# Patient Record
Sex: MALE | Race: WHITE | NOT HISPANIC OR LATINO | Employment: UNEMPLOYED | URBAN - METROPOLITAN AREA
[De-identification: names, ages, dates, MRNs, and addresses within clinical notes are randomized per-mention and may not be internally consistent; named-entity substitution may affect disease eponyms.]

---

## 2018-01-12 ENCOUNTER — RESOLUTE PROFESSIONAL BILLING HOSPITAL PROF FEE (OUTPATIENT)
Dept: HOSPITALIST | Facility: MEDICAL CENTER | Age: 63
End: 2018-01-12
Payer: COMMERCIAL

## 2018-01-12 ENCOUNTER — HOSPITAL ENCOUNTER (INPATIENT)
Facility: MEDICAL CENTER | Age: 63
LOS: 3 days | DRG: 562 | End: 2018-01-15
Attending: EMERGENCY MEDICINE | Admitting: HOSPITALIST
Payer: COMMERCIAL

## 2018-01-12 ENCOUNTER — APPOINTMENT (OUTPATIENT)
Dept: RADIOLOGY | Facility: MEDICAL CENTER | Age: 63
DRG: 562 | End: 2018-01-12
Attending: EMERGENCY MEDICINE
Payer: COMMERCIAL

## 2018-01-12 DIAGNOSIS — L97.509 DIABETIC ULCER OF OTHER PART OF FOOT ASSOCIATED WITH TYPE 2 DIABETES MELLITUS, UNSPECIFIED LATERALITY, UNSPECIFIED ULCER STAGE (HCC): ICD-10-CM

## 2018-01-12 DIAGNOSIS — E11.621 DIABETIC ULCER OF OTHER PART OF FOOT ASSOCIATED WITH TYPE 2 DIABETES MELLITUS, UNSPECIFIED LATERALITY, UNSPECIFIED ULCER STAGE (HCC): ICD-10-CM

## 2018-01-12 DIAGNOSIS — J96.01 ACUTE RESPIRATORY FAILURE WITH HYPOXEMIA (HCC): ICD-10-CM

## 2018-01-12 DIAGNOSIS — S42.225A CLOSED 2-PART NONDISPLACED FRACTURE OF SURGICAL NECK OF LEFT HUMERUS, INITIAL ENCOUNTER: ICD-10-CM

## 2018-01-12 DIAGNOSIS — F11.20 METHADONE DEPENDENCE (HCC): ICD-10-CM

## 2018-01-12 PROBLEM — S42.215A: Status: ACTIVE | Noted: 2018-01-12

## 2018-01-12 PROBLEM — R11.2 NAUSEA & VOMITING: Status: ACTIVE | Noted: 2018-01-12

## 2018-01-12 LAB
ALBUMIN SERPL BCP-MCNC: 3.3 G/DL (ref 3.2–4.9)
ALBUMIN/GLOB SERPL: 0.8 G/DL
ALP SERPL-CCNC: 60 U/L (ref 30–99)
ALT SERPL-CCNC: 10 U/L (ref 2–50)
ANION GAP SERPL CALC-SCNC: 10 MMOL/L (ref 0–11.9)
APTT PPP: 28.2 SEC (ref 24.7–36)
AST SERPL-CCNC: 19 U/L (ref 12–45)
BASOPHILS # BLD AUTO: 0.4 % (ref 0–1.8)
BASOPHILS # BLD: 0.05 K/UL (ref 0–0.12)
BILIRUB SERPL-MCNC: 0.8 MG/DL (ref 0.1–1.5)
BNP SERPL-MCNC: 162 PG/ML (ref 0–100)
BUN SERPL-MCNC: 13 MG/DL (ref 8–22)
CALCIUM SERPL-MCNC: 8.6 MG/DL (ref 8.5–10.5)
CHLORIDE SERPL-SCNC: 96 MMOL/L (ref 96–112)
CO2 SERPL-SCNC: 28 MMOL/L (ref 20–33)
CREAT SERPL-MCNC: 0.78 MG/DL (ref 0.5–1.4)
EOSINOPHIL # BLD AUTO: 0.04 K/UL (ref 0–0.51)
EOSINOPHIL NFR BLD: 0.3 % (ref 0–6.9)
ERYTHROCYTE [DISTWIDTH] IN BLOOD BY AUTOMATED COUNT: 45.4 FL (ref 35.9–50)
GLOBULIN SER CALC-MCNC: 4.2 G/DL (ref 1.9–3.5)
GLUCOSE BLD-MCNC: 259 MG/DL (ref 65–99)
GLUCOSE SERPL-MCNC: 238 MG/DL (ref 65–99)
HCT VFR BLD AUTO: 45.7 % (ref 42–52)
HGB BLD-MCNC: 15.3 G/DL (ref 14–18)
IMM GRANULOCYTES # BLD AUTO: 0.04 K/UL (ref 0–0.11)
IMM GRANULOCYTES NFR BLD AUTO: 0.3 % (ref 0–0.9)
INR PPP: 1.06 (ref 0.87–1.13)
LYMPHOCYTES # BLD AUTO: 1.88 K/UL (ref 1–4.8)
LYMPHOCYTES NFR BLD: 15.6 % (ref 22–41)
MCH RBC QN AUTO: 29.1 PG (ref 27–33)
MCHC RBC AUTO-ENTMCNC: 33.5 G/DL (ref 33.7–35.3)
MCV RBC AUTO: 87 FL (ref 81.4–97.8)
MONOCYTES # BLD AUTO: 0.87 K/UL (ref 0–0.85)
MONOCYTES NFR BLD AUTO: 7.2 % (ref 0–13.4)
NEUTROPHILS # BLD AUTO: 9.19 K/UL (ref 1.82–7.42)
NEUTROPHILS NFR BLD: 76.2 % (ref 44–72)
NRBC # BLD AUTO: 0 K/UL
NRBC BLD-RTO: 0 /100 WBC
PLATELET # BLD AUTO: 288 K/UL (ref 164–446)
PMV BLD AUTO: 9.9 FL (ref 9–12.9)
POTASSIUM SERPL-SCNC: 3.7 MMOL/L (ref 3.6–5.5)
PROT SERPL-MCNC: 7.5 G/DL (ref 6–8.2)
PROTHROMBIN TIME: 13.5 SEC (ref 12–14.6)
RBC # BLD AUTO: 5.25 M/UL (ref 4.7–6.1)
SODIUM SERPL-SCNC: 134 MMOL/L (ref 135–145)
TROPONIN I SERPL-MCNC: 0.01 NG/ML (ref 0–0.04)
WBC # BLD AUTO: 12.1 K/UL (ref 4.8–10.8)

## 2018-01-12 PROCEDURE — 85730 THROMBOPLASTIN TIME PARTIAL: CPT

## 2018-01-12 PROCEDURE — 73030 X-RAY EXAM OF SHOULDER: CPT | Mod: LT

## 2018-01-12 PROCEDURE — 700111 HCHG RX REV CODE 636 W/ 250 OVERRIDE (IP): Performed by: HOSPITALIST

## 2018-01-12 PROCEDURE — 71046 X-RAY EXAM CHEST 2 VIEWS: CPT

## 2018-01-12 PROCEDURE — 80053 COMPREHEN METABOLIC PANEL: CPT

## 2018-01-12 PROCEDURE — 94760 N-INVAS EAR/PLS OXIMETRY 1: CPT

## 2018-01-12 PROCEDURE — 82962 GLUCOSE BLOOD TEST: CPT

## 2018-01-12 PROCEDURE — 84484 ASSAY OF TROPONIN QUANT: CPT

## 2018-01-12 PROCEDURE — 96376 TX/PRO/DX INJ SAME DRUG ADON: CPT

## 2018-01-12 PROCEDURE — 93005 ELECTROCARDIOGRAM TRACING: CPT | Performed by: EMERGENCY MEDICINE

## 2018-01-12 PROCEDURE — 83880 ASSAY OF NATRIURETIC PEPTIDE: CPT

## 2018-01-12 PROCEDURE — 304561 HCHG STAT O2

## 2018-01-12 PROCEDURE — 96375 TX/PRO/DX INJ NEW DRUG ADDON: CPT

## 2018-01-12 PROCEDURE — 99285 EMERGENCY DEPT VISIT HI MDM: CPT

## 2018-01-12 PROCEDURE — 770006 HCHG ROOM/CARE - MED/SURG/GYN SEMI*

## 2018-01-12 PROCEDURE — 85610 PROTHROMBIN TIME: CPT

## 2018-01-12 PROCEDURE — A9270 NON-COVERED ITEM OR SERVICE: HCPCS | Performed by: HOSPITALIST

## 2018-01-12 PROCEDURE — 700102 HCHG RX REV CODE 250 W/ 637 OVERRIDE(OP): Performed by: HOSPITALIST

## 2018-01-12 PROCEDURE — 85025 COMPLETE CBC W/AUTO DIFF WBC: CPT

## 2018-01-12 PROCEDURE — 700111 HCHG RX REV CODE 636 W/ 250 OVERRIDE (IP): Performed by: EMERGENCY MEDICINE

## 2018-01-12 PROCEDURE — 700105 HCHG RX REV CODE 258: Performed by: EMERGENCY MEDICINE

## 2018-01-12 PROCEDURE — 73060 X-RAY EXAM OF HUMERUS: CPT | Mod: LT

## 2018-01-12 PROCEDURE — 99223 1ST HOSP IP/OBS HIGH 75: CPT | Performed by: HOSPITALIST

## 2018-01-12 PROCEDURE — 96374 THER/PROPH/DIAG INJ IV PUSH: CPT

## 2018-01-12 RX ORDER — ONDANSETRON 2 MG/ML
0.1 INJECTION INTRAMUSCULAR; INTRAVENOUS ONCE
Status: DISCONTINUED | OUTPATIENT
Start: 2018-01-12 | End: 2018-01-12

## 2018-01-12 RX ORDER — PROMETHAZINE HYDROCHLORIDE 12.5 MG/1
12.5-25 SUPPOSITORY RECTAL EVERY 4 HOURS PRN
Status: DISCONTINUED | OUTPATIENT
Start: 2018-01-12 | End: 2018-01-16 | Stop reason: HOSPADM

## 2018-01-12 RX ORDER — ONDANSETRON 2 MG/ML
4 INJECTION INTRAMUSCULAR; INTRAVENOUS EVERY 4 HOURS PRN
Status: DISCONTINUED | OUTPATIENT
Start: 2018-01-12 | End: 2018-01-16 | Stop reason: HOSPADM

## 2018-01-12 RX ORDER — ONDANSETRON 4 MG/1
4 TABLET, ORALLY DISINTEGRATING ORAL EVERY 4 HOURS PRN
Status: DISCONTINUED | OUTPATIENT
Start: 2018-01-12 | End: 2018-01-16 | Stop reason: HOSPADM

## 2018-01-12 RX ORDER — AMOXICILLIN 250 MG
2 CAPSULE ORAL 2 TIMES DAILY
Status: DISCONTINUED | OUTPATIENT
Start: 2018-01-13 | End: 2018-01-16 | Stop reason: HOSPADM

## 2018-01-12 RX ORDER — METHADONE HYDROCHLORIDE 10 MG/1
35 TABLET ORAL EVERY 12 HOURS
Status: DISCONTINUED | OUTPATIENT
Start: 2018-01-12 | End: 2018-01-16 | Stop reason: HOSPADM

## 2018-01-12 RX ORDER — DEXTROSE MONOHYDRATE 25 G/50ML
25 INJECTION, SOLUTION INTRAVENOUS
Status: DISCONTINUED | OUTPATIENT
Start: 2018-01-12 | End: 2018-01-16 | Stop reason: HOSPADM

## 2018-01-12 RX ORDER — INSULIN GLARGINE 100 [IU]/ML
20 INJECTION, SOLUTION SUBCUTANEOUS
Status: DISCONTINUED | OUTPATIENT
Start: 2018-01-12 | End: 2018-01-16 | Stop reason: HOSPADM

## 2018-01-12 RX ORDER — ONDANSETRON 2 MG/ML
4 INJECTION INTRAMUSCULAR; INTRAVENOUS ONCE
Status: COMPLETED | OUTPATIENT
Start: 2018-01-12 | End: 2018-01-12

## 2018-01-12 RX ORDER — OXYCODONE HYDROCHLORIDE 5 MG/1
5-10 TABLET ORAL EVERY 4 HOURS PRN
COMMUNITY
End: 2018-01-12

## 2018-01-12 RX ORDER — ONDANSETRON 4 MG/1
4 TABLET, FILM COATED ORAL EVERY 4 HOURS PRN
COMMUNITY
End: 2018-01-25

## 2018-01-12 RX ORDER — ONDANSETRON 4 MG/1
4 TABLET, FILM COATED ORAL EVERY 4 HOURS PRN
Qty: 20 TAB | Refills: 0 | Status: SHIPPED | OUTPATIENT
Start: 2018-01-12 | End: 2018-01-25

## 2018-01-12 RX ORDER — OXYCODONE HYDROCHLORIDE AND ACETAMINOPHEN 5; 325 MG/1; MG/1
1-2 TABLET ORAL EVERY 4 HOURS PRN
Qty: 15 TAB | Refills: 0 | Status: SHIPPED | OUTPATIENT
Start: 2018-01-12 | End: 2018-01-17

## 2018-01-12 RX ORDER — BISACODYL 10 MG
10 SUPPOSITORY, RECTAL RECTAL
Status: DISCONTINUED | OUTPATIENT
Start: 2018-01-12 | End: 2018-01-16 | Stop reason: HOSPADM

## 2018-01-12 RX ORDER — HEPARIN SODIUM 5000 [USP'U]/ML
5000 INJECTION, SOLUTION INTRAVENOUS; SUBCUTANEOUS EVERY 8 HOURS
Status: DISCONTINUED | OUTPATIENT
Start: 2018-01-13 | End: 2018-01-13

## 2018-01-12 RX ORDER — MORPHINE SULFATE 10 MG/ML
10 INJECTION, SOLUTION INTRAMUSCULAR; INTRAVENOUS ONCE
Status: COMPLETED | OUTPATIENT
Start: 2018-01-12 | End: 2018-01-12

## 2018-01-12 RX ORDER — SODIUM CHLORIDE 9 MG/ML
1000 INJECTION, SOLUTION INTRAVENOUS ONCE
Status: COMPLETED | OUTPATIENT
Start: 2018-01-12 | End: 2018-01-12

## 2018-01-12 RX ORDER — SODIUM CHLORIDE 9 MG/ML
INJECTION, SOLUTION INTRAVENOUS CONTINUOUS
Status: DISCONTINUED | OUTPATIENT
Start: 2018-01-12 | End: 2018-01-13

## 2018-01-12 RX ORDER — ACETAMINOPHEN 325 MG/1
650 TABLET ORAL EVERY 6 HOURS PRN
Status: DISCONTINUED | OUTPATIENT
Start: 2018-01-12 | End: 2018-01-16 | Stop reason: HOSPADM

## 2018-01-12 RX ORDER — OMEPRAZOLE 20 MG/1
20 CAPSULE, DELAYED RELEASE ORAL DAILY
Status: DISCONTINUED | OUTPATIENT
Start: 2018-01-13 | End: 2018-01-16 | Stop reason: HOSPADM

## 2018-01-12 RX ORDER — METHADONE HYDROCHLORIDE 10 MG/1
35 TABLET ORAL EVERY 12 HOURS
Status: DISCONTINUED | OUTPATIENT
Start: 2018-01-13 | End: 2018-01-12

## 2018-01-12 RX ORDER — PROMETHAZINE HYDROCHLORIDE 25 MG/1
12.5-25 TABLET ORAL EVERY 4 HOURS PRN
Status: DISCONTINUED | OUTPATIENT
Start: 2018-01-12 | End: 2018-01-16 | Stop reason: HOSPADM

## 2018-01-12 RX ORDER — MORPHINE SULFATE 4 MG/ML
4 INJECTION, SOLUTION INTRAMUSCULAR; INTRAVENOUS
Status: DISCONTINUED | OUTPATIENT
Start: 2018-01-12 | End: 2018-01-13

## 2018-01-12 RX ORDER — POLYETHYLENE GLYCOL 3350 17 G/17G
1 POWDER, FOR SOLUTION ORAL
Status: DISCONTINUED | OUTPATIENT
Start: 2018-01-12 | End: 2018-01-16 | Stop reason: HOSPADM

## 2018-01-12 RX ORDER — OXYCODONE HYDROCHLORIDE 5 MG/1
5 TABLET ORAL
Status: DISCONTINUED | OUTPATIENT
Start: 2018-01-12 | End: 2018-01-16 | Stop reason: HOSPADM

## 2018-01-12 RX ORDER — OXYCODONE HYDROCHLORIDE 10 MG/1
10 TABLET ORAL
Status: DISCONTINUED | OUTPATIENT
Start: 2018-01-12 | End: 2018-01-16 | Stop reason: HOSPADM

## 2018-01-12 RX ORDER — PREGABALIN 150 MG/1
300 CAPSULE ORAL 2 TIMES DAILY
Status: DISCONTINUED | OUTPATIENT
Start: 2018-01-13 | End: 2018-01-16 | Stop reason: HOSPADM

## 2018-01-12 RX ADMIN — SODIUM CHLORIDE 1000 ML: 9 INJECTION, SOLUTION INTRAVENOUS at 15:45

## 2018-01-12 RX ADMIN — ONDANSETRON 4 MG: 2 INJECTION INTRAMUSCULAR; INTRAVENOUS at 15:25

## 2018-01-12 RX ADMIN — MORPHINE SULFATE 4 MG: 4 INJECTION INTRAVENOUS at 22:01

## 2018-01-12 RX ADMIN — ONDANSETRON 4 MG: 2 INJECTION INTRAMUSCULAR; INTRAVENOUS at 17:09

## 2018-01-12 RX ADMIN — MORPHINE SULFATE 10 MG: 10 INJECTION INTRAVENOUS at 15:25

## 2018-01-12 RX ADMIN — OXYCODONE HYDROCHLORIDE 10 MG: 10 TABLET ORAL at 23:09

## 2018-01-12 RX ADMIN — MORPHINE SULFATE 10 MG: 10 INJECTION INTRAVENOUS at 17:25

## 2018-01-12 RX ADMIN — ONDANSETRON 4 MG: 2 INJECTION INTRAMUSCULAR; INTRAVENOUS at 22:01

## 2018-01-12 ASSESSMENT — PAIN SCALES - GENERAL: PAINLEVEL_OUTOF10: 10

## 2018-01-12 NOTE — LETTER
ORTHOPEDICS [facility] 3RD   [address] [place] [id number]  Phone: Dept: [phone] - Fax:        [facility] Progress Report and Disability Certification  Date of Service: 1/12/2018   No Show:  No  Date / Time of Next Visit:     Claim Information   Patient Name: Travon Pollack  Claim Number:     Employer: [employer]  Date of Injury: 12/16/2002     Insurer / TPA: State Compensation Ins Fund ID / SSN:    Occupation: [employer] Diagnosis: The encounter diagnosis was Closed 2-part nondisplaced fracture of surgical neck of left humerus, initial encounter.    Medical Information   Related to Industrial Injury?   ***   Subjective Complaints:      Objective Findings:     Pre-Existing Condition(s):     Assessment:        Status:    Permanent Disability:     Plan:      Diagnostics:      Comments:       Disability Information   Status:      From:     Through:   Restrictions are:     Physical Restrictions   Sitting:    Standing:    Stooping:    Bending:      Squatting:    Walking:    Climbing:    Pushing:      Pulling:    Other:    Reaching Above Shoulder (L):   Reaching Above Shoulder (R):       Reaching Below Shoulder (L):    Reaching Below Shoulder (R):      Not to exceed Weight Limits   Carrying(hrs):   Weight Limit(lb):   Lifting(hrs):   Weight  Limit(lb):     Comments:      Repetitive Actions   Hands: i.e. Fine Manipulations from Grasping:     Feet: i.e. Operating Foot Controls:     Driving / Operate Machinery:     Physician Name: Nolan Rasmussen Physician Signature:   e-Signature:  , Medical Director   Clinic Name / Location: [facility]  ORTHOPEDICS [address] [id number] [phone]     Clinic Phone Number: Dept: [phone]   Appointment Time:  Visit Start Time:    Check-In Time:  1:22 PM Visit Discharge Time:    Original-Treating Physician or Chiropractor    Page 2-Insurer/TPA    Page  3-Employer    Page 4-Employee

## 2018-01-12 NOTE — ED NOTES
Chief Complaint   Patient presents with   • Vomiting     pt BIB daughter to triage/ pt reports to have been transferred by flight to [facility] for care of left shoulder pain/poss surgery. pt now with vomiting since last night and not able to hold pain meds down. left shoulder warm to touch/edema to left forearm   • Shoulder Pain     pt reports to be diabetic and has not been able to check BG/ 259 in triage     Explained to pt triage process, made pt aware to tell this RN of any changes/concerns, pt verbalized understanding of process and instructions given. Pt to ER lobby.

## 2018-01-12 NOTE — ED PROVIDER NOTES
ED Provider Note    Scribed for Nolan Rasmussen M.D. by Alyssia Bhatti. 1/12/2018  3:07 PM    Primary care provider: Jon Turner M.D.  Means of arrival: walk-in  History obtained from: patient  History limited by: none    CHIEF COMPLAINT  Chief Complaint   Patient presents with   • Vomiting     pt BIB daughter to triage/ pt reports to have been transferred by flight to [facility] for care of left shoulder pain/poss surgery. pt now with vomiting since last night and not able to hold pain meds down. left shoulder warm to touch/edema to left forearm   • Shoulder Pain     pt reports to be diabetic and has not been able to check BG/ 259 in triage       HPI  Travon Pollack is a 62 y.o. male with a history of diabetes and renal disorder who presents to the Emergency Department for evaluation of left shoulder injury sustained 3 days ago when the patient experienced a mechanical ground level fall.He then began experiencing left shoulder pain and swelling and went to the ED. Imaging completed at initial evaluation in [place] showed a humeral fracture and patient was transferred to [facility]. Physicians at [facility] did not feel surgery was an option at that time and discharged patient home. Patient was discharged with 5mg Oxycodone tablets, however, whenever patient has attempted to take these, he immediately begins to vomit. Patient is normally on Methadone daily as well, however, has been unable to tolerate that for 3 days either. He is now experiencing, chills and diaphoresis.   Patient sustained a crush injury to this affected shoulder, several years ago, and it was required to be fused. He has limited mobility of that shoulder secondary to that.  No complaints of diarrhea.    REVIEW OF SYSTEMS  Pertinent positives include left shoulder pain and swelling, chills, diaphoresis, vomiting. Pertinent negatives include no diarrhea.  All other systems reviewed and negative.    PAST MEDICAL HISTORY   has a past medical history  "of Dental disorder; Diabetes (2011); Heart burn; Indigestion; Infectious disease (staph); MRSA (methicillin resistant Staphylococcus aureus); Pain (2/1/13); Renal disorder; and Snoring.    SURGICAL HISTORY   has a past surgical history that includes hernia repair (2012); other (2007); other; lumbar fusion posterior (2/11/2013); lumbar fusion posterior (3/4/2013); and lumbar laminectomy diskectomy (3/4/2013).    SOCIAL HISTORY  Social History   Substance Use Topics   • Smoking status: Former Smoker     Packs/day: 0.40     Years: 15.00     Types: Cigarettes   • Smokeless tobacco: Former User     Quit date: 2/21/2013   • Alcohol use No      History   Drug Use No       FAMILY HISTORY  No pertinent family history    CURRENT MEDICATIONS  Home Medications     Reviewed by Janis Encarnacion R.N. (Registered Nurse) on 01/12/18 at 1504  Med List Status: Complete   Medication Last Dose Status   esomeprazole (NEXIUM) 40 MG delayed-release capsule 1/9/2018 Active   insulin glargine (LANTUS) 100 UNIT/ML Solution 1/9/2018 Active   insulin regular (HUMULIN R) 100 Unit/mL SOLN 1/9/2018 Active   methadone (DOLOPHINE) 10 MG TABS 1/9/2018 Active   nicotine (NICODERM) 14 MG/24HR PT24 1/9/2018 Active   oxycodone immediate-release (ROXICODONE) 5 MG Tab  Active   pregabalin (LYRICA) 300 MG capsule 1/9/2018 Active                ALLERGIES  Allergies   Allergen Reactions   • Influenza Virus Vaccine Vomiting     Fever         PHYSICAL EXAM  VITAL SIGNS: /83   Pulse (!) 114   Temp 36.3 °C (97.3 °F)   Resp 18   Ht 1.803 m (5' 11\")   Wt 101.2 kg (223 lb)   BMI 31.10 kg/m²     Vital signs reviewed.  Constitutional:  Awake and alert, able to answer questions. Patient is diaphoretic  Head: nose is normal, no evidence of trauma  Neck: normal ROM  Cardiovascular: regular rate and rhythm  Pulmonary/Chest: clear to auscultation  Abdominal: soft, non-tender  Musculoskeletal: tenderness,swelling over left humeral head, ulceration on left 2nd toe " on dorsal surface   Neurological: moves all extremities equally  Skin: pale, mildly diaphoretic  Psychiatric: mood normal    LABS  Results for orders placed or performed during the hospital encounter of 01/12/18   CBC WITH DIFFERENTIAL   Result Value Ref Range    WBC 12.1 (H) 4.8 - 10.8 K/uL    RBC 5.25 4.70 - 6.10 M/uL    Hemoglobin 15.3 14.0 - 18.0 g/dL    Hematocrit 45.7 42.0 - 52.0 %    MCV 87.0 81.4 - 97.8 fL    MCH 29.1 27.0 - 33.0 pg    MCHC 33.5 (L) 33.7 - 35.3 g/dL    RDW 45.4 35.9 - 50.0 fL    Platelet Count 288 164 - 446 K/uL    MPV 9.9 9.0 - 12.9 fL    Neutrophils-Polys 76.20 (H) 44.00 - 72.00 %    Lymphocytes 15.60 (L) 22.00 - 41.00 %    Monocytes 7.20 0.00 - 13.40 %    Eosinophils 0.30 0.00 - 6.90 %    Basophils 0.40 0.00 - 1.80 %    Immature Granulocytes 0.30 0.00 - 0.90 %    Nucleated RBC 0.00 /100 WBC    Neutrophils (Absolute) 9.19 (H) 1.82 - 7.42 K/uL    Lymphs (Absolute) 1.88 1.00 - 4.80 K/uL    Monos (Absolute) 0.87 (H) 0.00 - 0.85 K/uL    Eos (Absolute) 0.04 0.00 - 0.51 K/uL    Baso (Absolute) 0.05 0.00 - 0.12 K/uL    Immature Granulocytes (abs) 0.04 0.00 - 0.11 K/uL    NRBC (Absolute) 0.00 K/uL   COMP METABOLIC PANEL   Result Value Ref Range    Sodium 134 (L) 135 - 145 mmol/L    Potassium 3.7 3.6 - 5.5 mmol/L    Chloride 96 96 - 112 mmol/L    Co2 28 20 - 33 mmol/L    Anion Gap 10.0 0.0 - 11.9    Glucose 238 (H) 65 - 99 mg/dL    Bun 13 8 - 22 mg/dL    Creatinine 0.78 0.50 - 1.40 mg/dL    Calcium 8.6 8.5 - 10.5 mg/dL    AST(SGOT) 19 12 - 45 U/L    ALT(SGPT) 10 2 - 50 U/L    Alkaline Phosphatase 60 30 - 99 U/L    Total Bilirubin 0.8 0.1 - 1.5 mg/dL    Albumin 3.3 3.2 - 4.9 g/dL    Total Protein 7.5 6.0 - 8.2 g/dL    Globulin 4.2 (H) 1.9 - 3.5 g/dL    A-G Ratio 0.8 g/dL   PROTHROMBIN TIME (INR)   Result Value Ref Range    PT 13.5 12.0 - 14.6 sec    INR 1.06 0.87 - 1.13   APTT   Result Value Ref Range    APTT 28.2 24.7 - 36.0 sec   ESTIMATED GFR   Result Value Ref Range    GFR If   >60 >60 mL/min/1.73 m 2    GFR If Non African American >60 >60 mL/min/1.73 m 2   TROPONIN   Result Value Ref Range    Troponin I 0.01 0.00 - 0.04 ng/mL   BTYPE NATRIURETIC PEPTIDE   Result Value Ref Range    B Natriuretic Peptide 162 (H) 0 - 100 pg/mL   ACCU-CHEK GLUCOSE   Result Value Ref Range    Glucose - Accu-Ck 259 (H) 65 - 99 mg/dL      All labs reviewed by me.    RADIOLOGY  DX-SHOULDER 2+ LEFT   Final Result      Prior LEFT shoulder arthrodesis with superimposed acute mildly displaced fracture of the proximal LEFT humerus.      DX-HUMERUS 2+ LEFT   Final Result      Left shoulder arthrodesis with proximal humeral fracture.        The radiologist's interpretation of all radiological studies have been reviewed by me.    COURSE & MEDICAL DECISION MAKING  Pertinent Labs & Imaging studies reviewed. (See chart for details) The patient's [facility] and past medical  records were reviewed    3:07 PM - Patient seen and examined at bedside. Patient will be treated with 10mg IV Morphine and 4mg IV Zofran. Ordered left shoulder xray, left humerus xray prothrombin time, APTT, CBC, CMP, accucheck glucose to evaluate his symptoms. The differential diagnoses include but are not limited to: fracture, dislocation, narcotic withdrawal. I informed the patient that some of his symptoms may be secondary to opiate withdrawal. I explained that we would administer pain medication via IV and evaluate his shoulder with imaging for consultation with orthopedics. Patient understands and agrees with treatment plan.    6:30 PM Paged Orthopedics    6:45 PM Spoke with Dr. Powers, Orthopedics, about the patient's condition. He states that because the patient's shoulder has been fused, it should heal on its own and advised the apteint to follow up with him as an outpatient.    6:49 PM I informed the patient of what the Orthopedic specialist recommended. I explained that I believed he should be able to be discharged home at this time and  advised him to restart his Methadone to help treat the withdrawal symptoms. I explained that I can prescribe break through pain medication. ORT score 0. I informed the patient of the risks of this certain pain medication. He understands and agrees with treatment plan and discharge at this time.    7:30PM patient's oxygen saturations decreased to the 80s. Chest xray will be ordered for further evaluation.    8:36 PM Paged Hospitalist. The blood work and chest x-ray do not show any acute abnormalities. I suspect he is hypoxic secondary to splinting and narcotic analgesia. Patient will be admitted for further testing and treatment    EKG: Sinus rhythm at a rate of 100. Normal P waves. Q waves in lead 3. Lead aVF. Normal ST segments. Early R-wave progression. Abnormal EKG showing a left anterior fascicular block.    DISPOSITION:  Patient will be admitted to Dr. Gurrola, Hospitalist in guarded condition.    FINAL IMPRESSION  1. Closed 2-part nondisplaced fracture of surgical neck of left humerus, initial encounter    2. Hypoxia     Alyssia ELLIOTT (Josh), am scribing for, and in the presence of, Nolan Rasmussen M.D..    Electronically signed by: Alyssia Bhatti (Josh), 1/12/2018    INolan M.D. personally performed the services described in this documentation, as scribed by Alyssia Bhatti in my presence, and it is both accurate and complete.    The note accurately reflects work and decisions made by me.  Nolan Rasmussen  1/12/2018  8:48 PM

## 2018-01-12 NOTE — LETTER
ORTHOPEDICS [facility]   [address] [place] [id number]  Phone: Dept: [phone] - Fax:        [facility] Progress Report and Disability Certification  Date of Service: 1/12/2018   No Show:  No  Date / Time of Next Visit:     Claim Information   Patient Name: Travon Pollack  Claim Number:     Employer: [employer]  Date of Injury: 12/16/2002     Insurer / TPA: [employer] ID / SSN:    Occupation: [employer] Diagnosis: Diagnoses of Closed 2-part nondisplaced fracture of surgical neck of left humerus, initial encounter, Methadone dependence (CMS-[facility]), Acute respiratory failure with hypoxemia (CMS-[facility]ly [facility]), and Diabetic ulcer of other part of foot associated with type 2 diabetes mellitus, unspecified laterality, unspecified ulcer stage (CMS-Formerly [facility]) were pertinent to this visit.    Medical Information   Related to Industrial Injury?   ***   Subjective Complaints:      Objective Findings:     Pre-Existing Condition(s):     Assessment:        Status:    Permanent Disability:     Plan:      Diagnostics:      Comments:       Disability Information   Status:      From:     Through:   Restrictions are:     Physical Restrictions   Sitting:    Standing:    Stooping:    Bending:      Squatting:    Walking:    Climbing:    Pushing:      Pulling:    Other:    Reaching Above Shoulder (L):   Reaching Above Shoulder (R):       Reaching Below Shoulder (L):    Reaching Below Shoulder (R):      Not to exceed Weight Limits   Carrying(hrs):   Weight Limit(lb):   Lifting(hrs):   Weight  Limit(lb):     Comments:      Repetitive Actions   Hands: i.e. Fine Manipulations from Grasping:     Feet: i.e. Operating Foot Controls:     Driving / Operate Machinery:     Physician Name: Nolan Rasmussen Physician Signature: IZABEL Casas M.D. e-Signature:  , Medical Director   Clinic Name / Location: [facility]  ORTHOPEDICS [facility]  [address] [place] [id number]  [phone]     Clinic Phone Number: Dept: [phone]   Appointment Time:  Visit Start Time:    Check-In Time:  1:22 PM Visit Discharge Time:    Original-Treating Physician or Chiropractor    Page 2-Insurer/TPA    Page 3-Employer    Page 4-Employee

## 2018-01-13 PROBLEM — S42.225A CLOSED 2-PART NONDISPLACED FRACTURE OF SURGICAL NECK OF LEFT HUMERUS: Status: ACTIVE | Noted: 2018-01-12

## 2018-01-13 PROBLEM — F11.93 OPIATE WITHDRAWAL (HCC): Status: ACTIVE | Noted: 2018-01-12

## 2018-01-13 PROBLEM — E11.621 DIABETIC FOOT ULCER (HCC): Status: ACTIVE | Noted: 2018-01-13

## 2018-01-13 PROBLEM — L97.509 DIABETIC FOOT ULCER (HCC): Status: ACTIVE | Noted: 2018-01-13

## 2018-01-13 LAB
ALBUMIN SERPL BCP-MCNC: 2.6 G/DL (ref 3.2–4.9)
BASOPHILS # BLD AUTO: 0.3 % (ref 0–1.8)
BASOPHILS # BLD: 0.03 K/UL (ref 0–0.12)
BUN SERPL-MCNC: 14 MG/DL (ref 8–22)
CALCIUM SERPL-MCNC: 8.1 MG/DL (ref 8.5–10.5)
CHLORIDE SERPL-SCNC: 100 MMOL/L (ref 96–112)
CO2 SERPL-SCNC: 26 MMOL/L (ref 20–33)
CREAT SERPL-MCNC: 0.75 MG/DL (ref 0.5–1.4)
CRP SERPL HS-MCNC: 3.07 MG/DL (ref 0–0.75)
DEPRECATED D DIMER PPP IA-ACNC: 431 NG/ML(D-DU)
EOSINOPHIL # BLD AUTO: 0.14 K/UL (ref 0–0.51)
EOSINOPHIL NFR BLD: 1.4 % (ref 0–6.9)
ERYTHROCYTE [DISTWIDTH] IN BLOOD BY AUTOMATED COUNT: 46.2 FL (ref 35.9–50)
ERYTHROCYTE [SEDIMENTATION RATE] IN BLOOD BY WESTERGREN METHOD: 85 MM/HOUR (ref 0–20)
EST. AVERAGE GLUCOSE BLD GHB EST-MCNC: 226 MG/DL
GLUCOSE BLD-MCNC: 131 MG/DL (ref 65–99)
GLUCOSE BLD-MCNC: 138 MG/DL (ref 65–99)
GLUCOSE BLD-MCNC: 141 MG/DL (ref 65–99)
GLUCOSE BLD-MCNC: 153 MG/DL (ref 65–99)
GLUCOSE BLD-MCNC: 178 MG/DL (ref 65–99)
GLUCOSE SERPL-MCNC: 166 MG/DL (ref 65–99)
HBA1C MFR BLD: 9.5 % (ref 0–5.6)
HCT VFR BLD AUTO: 41.1 % (ref 42–52)
HGB BLD-MCNC: 13.4 G/DL (ref 14–18)
IMM GRANULOCYTES # BLD AUTO: 0.05 K/UL (ref 0–0.11)
IMM GRANULOCYTES NFR BLD AUTO: 0.5 % (ref 0–0.9)
LYMPHOCYTES # BLD AUTO: 2.18 K/UL (ref 1–4.8)
LYMPHOCYTES NFR BLD: 21.4 % (ref 22–41)
MCH RBC QN AUTO: 28.5 PG (ref 27–33)
MCHC RBC AUTO-ENTMCNC: 32.6 G/DL (ref 33.7–35.3)
MCV RBC AUTO: 87.4 FL (ref 81.4–97.8)
MONOCYTES # BLD AUTO: 0.91 K/UL (ref 0–0.85)
MONOCYTES NFR BLD AUTO: 8.9 % (ref 0–13.4)
NEUTROPHILS # BLD AUTO: 6.86 K/UL (ref 1.82–7.42)
NEUTROPHILS NFR BLD: 67.5 % (ref 44–72)
NRBC # BLD AUTO: 0 K/UL
NRBC BLD-RTO: 0 /100 WBC
PHOSPHATE SERPL-MCNC: 3.1 MG/DL (ref 2.5–4.5)
PLATELET # BLD AUTO: 243 K/UL (ref 164–446)
PMV BLD AUTO: 9.5 FL (ref 9–12.9)
POTASSIUM SERPL-SCNC: 3.6 MMOL/L (ref 3.6–5.5)
RBC # BLD AUTO: 4.7 M/UL (ref 4.7–6.1)
SODIUM SERPL-SCNC: 135 MMOL/L (ref 135–145)
WBC # BLD AUTO: 10.2 K/UL (ref 4.8–10.8)

## 2018-01-13 PROCEDURE — 700102 HCHG RX REV CODE 250 W/ 637 OVERRIDE(OP): Performed by: HOSPITALIST

## 2018-01-13 PROCEDURE — 700105 HCHG RX REV CODE 258: Performed by: HOSPITALIST

## 2018-01-13 PROCEDURE — 82962 GLUCOSE BLOOD TEST: CPT | Mod: 91

## 2018-01-13 PROCEDURE — 700111 HCHG RX REV CODE 636 W/ 250 OVERRIDE (IP): Performed by: HOSPITALIST

## 2018-01-13 PROCEDURE — 85025 COMPLETE CBC W/AUTO DIFF WBC: CPT

## 2018-01-13 PROCEDURE — 85652 RBC SED RATE AUTOMATED: CPT

## 2018-01-13 PROCEDURE — 85379 FIBRIN DEGRADATION QUANT: CPT

## 2018-01-13 PROCEDURE — A9270 NON-COVERED ITEM OR SERVICE: HCPCS | Performed by: HOSPITALIST

## 2018-01-13 PROCEDURE — 700111 HCHG RX REV CODE 636 W/ 250 OVERRIDE (IP): Performed by: INTERNAL MEDICINE

## 2018-01-13 PROCEDURE — 99233 SBSQ HOSP IP/OBS HIGH 50: CPT | Performed by: INTERNAL MEDICINE

## 2018-01-13 PROCEDURE — 302135 SEQUENTIAL COMPRESSION MACHINE: Performed by: INTERNAL MEDICINE

## 2018-01-13 PROCEDURE — 36415 COLL VENOUS BLD VENIPUNCTURE: CPT

## 2018-01-13 PROCEDURE — 83036 HEMOGLOBIN GLYCOSYLATED A1C: CPT

## 2018-01-13 PROCEDURE — 770006 HCHG ROOM/CARE - MED/SURG/GYN SEMI*

## 2018-01-13 PROCEDURE — 86140 C-REACTIVE PROTEIN: CPT

## 2018-01-13 PROCEDURE — 80069 RENAL FUNCTION PANEL: CPT

## 2018-01-13 RX ADMIN — STANDARDIZED SENNA CONCENTRATE AND DOCUSATE SODIUM 2 TABLET: 8.6; 5 TABLET, FILM COATED ORAL at 08:15

## 2018-01-13 RX ADMIN — SODIUM CHLORIDE: 9 INJECTION, SOLUTION INTRAVENOUS at 00:20

## 2018-01-13 RX ADMIN — PREGABALIN 300 MG: 150 CAPSULE ORAL at 21:32

## 2018-01-13 RX ADMIN — PROMETHAZINE HYDROCHLORIDE 25 MG: 25 TABLET ORAL at 06:26

## 2018-01-13 RX ADMIN — SODIUM CHLORIDE: 9 INJECTION, SOLUTION INTRAVENOUS at 08:19

## 2018-01-13 RX ADMIN — OXYCODONE HYDROCHLORIDE 10 MG: 10 TABLET ORAL at 03:12

## 2018-01-13 RX ADMIN — OMEPRAZOLE 20 MG: 20 CAPSULE, DELAYED RELEASE ORAL at 08:15

## 2018-01-13 RX ADMIN — MORPHINE SULFATE 4 MG: 4 INJECTION INTRAVENOUS at 07:00

## 2018-01-13 RX ADMIN — OXYCODONE HYDROCHLORIDE 10 MG: 10 TABLET ORAL at 11:59

## 2018-01-13 RX ADMIN — ACETAMINOPHEN 650 MG: 325 TABLET, FILM COATED ORAL at 16:22

## 2018-01-13 RX ADMIN — HEPARIN SODIUM 5000 UNITS: 5000 INJECTION, SOLUTION INTRAVENOUS; SUBCUTANEOUS at 13:38

## 2018-01-13 RX ADMIN — METHADONE HYDROCHLORIDE 35 MG: 10 TABLET ORAL at 00:13

## 2018-01-13 RX ADMIN — OXYCODONE HYDROCHLORIDE 10 MG: 10 TABLET ORAL at 08:15

## 2018-01-13 RX ADMIN — METHADONE HYDROCHLORIDE 35 MG: 10 TABLET ORAL at 21:32

## 2018-01-13 RX ADMIN — STANDARDIZED SENNA CONCENTRATE AND DOCUSATE SODIUM 2 TABLET: 8.6; 5 TABLET, FILM COATED ORAL at 19:50

## 2018-01-13 RX ADMIN — ACETAMINOPHEN 650 MG: 325 TABLET, FILM COATED ORAL at 00:14

## 2018-01-13 RX ADMIN — METHADONE HYDROCHLORIDE 35 MG: 10 TABLET ORAL at 08:16

## 2018-01-13 RX ADMIN — ONDANSETRON 4 MG: 2 INJECTION INTRAMUSCULAR; INTRAVENOUS at 06:01

## 2018-01-13 RX ADMIN — PROCHLORPERAZINE EDISYLATE 10 MG: 5 INJECTION INTRAMUSCULAR; INTRAVENOUS at 06:52

## 2018-01-13 RX ADMIN — OXYCODONE HYDROCHLORIDE 10 MG: 10 TABLET ORAL at 19:50

## 2018-01-13 RX ADMIN — PREGABALIN 300 MG: 150 CAPSULE ORAL at 08:16

## 2018-01-13 RX ADMIN — INSULIN GLARGINE 20 UNITS: 100 INJECTION, SOLUTION SUBCUTANEOUS at 19:55

## 2018-01-13 RX ADMIN — INSULIN HUMAN 3 UNITS: 100 INJECTION, SOLUTION PARENTERAL at 11:59

## 2018-01-13 RX ADMIN — INSULIN GLARGINE 20 UNITS: 100 INJECTION, SOLUTION SUBCUTANEOUS at 00:43

## 2018-01-13 RX ADMIN — ENOXAPARIN SODIUM 40 MG: 100 INJECTION SUBCUTANEOUS at 21:00

## 2018-01-13 RX ADMIN — OXYCODONE HYDROCHLORIDE 10 MG: 10 TABLET ORAL at 16:22

## 2018-01-13 ASSESSMENT — PATIENT HEALTH QUESTIONNAIRE - PHQ9
4. FEELING TIRED OR HAVING LITTLE ENERGY: NEARLY EVERY DAY
9. THOUGHTS THAT YOU WOULD BE BETTER OFF DEAD, OR OF HURTING YOURSELF: NOT AT ALL
8. MOVING OR SPEAKING SO SLOWLY THAT OTHER PEOPLE COULD HAVE NOTICED. OR THE OPPOSITE, BEING SO FIGETY OR RESTLESS THAT YOU HAVE BEEN MOVING AROUND A LOT MORE THAN USUAL: NOT AT ALL
5. POOR APPETITE OR OVEREATING: SEVERAL DAYS
6. FEELING BAD ABOUT YOURSELF - OR THAT YOU ARE A FAILURE OR HAVE LET YOURSELF OR YOUR FAMILY DOWN: SEVERAL DAYS
SUM OF ALL RESPONSES TO PHQ9 QUESTIONS 1 AND 2: 2
SUM OF ALL RESPONSES TO PHQ QUESTIONS 1-9: 9
7. TROUBLE CONCENTRATING ON THINGS, SUCH AS READING THE NEWSPAPER OR WATCHING TELEVISION: MORE THAN HALF THE DAYS
3. TROUBLE FALLING OR STAYING ASLEEP OR SLEEPING TOO MUCH: NOT AT ALL
1. LITTLE INTEREST OR PLEASURE IN DOING THINGS: SEVERAL DAYS
2. FEELING DOWN, DEPRESSED, IRRITABLE, OR HOPELESS: SEVERAL DAYS

## 2018-01-13 ASSESSMENT — PAIN SCALES - GENERAL
PAINLEVEL_OUTOF10: ASSUMED PAIN PRESENT
PAINLEVEL_OUTOF10: 10
PAINLEVEL_OUTOF10: 8
PAINLEVEL_OUTOF10: 10
PAINLEVEL_OUTOF10: ASSUMED PAIN PRESENT
PAINLEVEL_OUTOF10: ASSUMED PAIN PRESENT
PAINLEVEL_OUTOF10: 8
PAINLEVEL_OUTOF10: 7
PAINLEVEL_OUTOF10: ASSUMED PAIN PRESENT
PAINLEVEL_OUTOF10: ASSUMED PAIN PRESENT
PAINLEVEL_OUTOF10: 10
PAINLEVEL_OUTOF10: 8
PAINLEVEL_OUTOF10: 8

## 2018-01-13 ASSESSMENT — ENCOUNTER SYMPTOMS
ABDOMINAL PAIN: 0
ROS GI COMMENTS: POOR APPETITE
VOMITING: 0
SHORTNESS OF BREATH: 0
CHILLS: 0
DIARRHEA: 0
HEADACHES: 1
FEVER: 0
CONSTIPATION: 0
SPUTUM PRODUCTION: 0
NAUSEA: 0
VOMITING: 1
COUGH: 0
WHEEZING: 0
NAUSEA: 1
DIZZINESS: 1
DIZZINESS: 0

## 2018-01-13 ASSESSMENT — LIFESTYLE VARIABLES
EVER_SMOKED: YES
ALCOHOL_USE: NO

## 2018-01-13 NOTE — DISCHARGE PLANNING
SW met with pt at bedside regarding HH and DME for O2.  Pt chose [facility].  SW faxed choice to [facility].

## 2018-01-13 NOTE — CARE PLAN
Problem: Pain Management  Goal: Pain level will decrease to patient's comfort goal  Outcome: PROGRESSING AS EXPECTED  Pt c/o uncontrolled pain. Requested night time dose of methadone be scheduled in MAR. After administration of scheduled methadone patient sleeping comfortably.     Problem: Respiratory:  Goal: Respiratory status will improve  Outcome: PROGRESSING SLOWER THAN EXPECTED  Refused to perform IS. States will try in AM

## 2018-01-13 NOTE — ED NOTES
"Medicated pt for pain per MAR. Pt states that he \"needs more pain medication and the doses given are not touching his pain.\"  "

## 2018-01-13 NOTE — CARE PLAN
Problem: Discharge Barriers/Planning  Goal: Patient's continuum of care needs will be met  Outcome: PROGRESSING AS EXPECTED  Ortho MD has cleared pt for dc today    Problem: Pain Management  Goal: Pain level will decrease to patient's comfort goal  Outcome: PROGRESSING AS EXPECTED  Pt pain medication is oxycodone 10mg q 3 hours. Pt was given a dose this am and is now sleeping comfortably

## 2018-01-13 NOTE — ED NOTES
Med rec complete per pt at bedside  Allergies reviewed  No ABX in last month  Pt states he has not had any of his diabetic medications since Wednesday  Has not been able to take his other medications for longer

## 2018-01-13 NOTE — ED NOTES
Report received from Ranjit DUNCAN. Assumed patient care. Pt does not wear 02 at home. Pt it currently on 2L sating at 93%. On room air pt is at 86%. MD notified.

## 2018-01-13 NOTE — FACE TO FACE
Face to Face Note  -  Durable Medical Equipment    Asiya Bennett M.D. - NPI: [id number]  I certify that this patient is under my care and that they have had a durable medical equipment(DME)face to face encounter by myself that meets the physician DME face-to-face encounter requirements with this patient on:    Date of encounter:   Patient:                    MRN:                       YOB: 2020  Travon Thao Pollack  [id number]  1955     The encounter with the patient was in whole, or in part, for the following medical condition, which is the primary reason for durable medical equipment:  Other - Hypoxemia, Humeral Fracture    I certify that, based on my findings, the following durable medical equipment is medically necessary:  Oxygen.    HOME O2 Saturation Measurements:(Values must be present for Home Oxygen orders)  Room air sat at rest: 86  Room air sat with amb: 85  With liters of O2: 5, O2 sat at rest with O2: 91  With Liters of O2: 5, O2 sat with amb with O2 : 89  Is the patient mobile?: Yes    My Clinical findings support the need for the above equipment due to:  Hypoxia    Supporting Symptoms: desaturations on room air, fatigue, nausea     ------------------------------------------------------------------------------------------------------------------    Face to Face Supporting Documentation - Home Health    The encounter with this patient was in whole or in part the primary reason for home health admission.    Date of encounter:   Patient:                    MRN:                       YOB: 2020  Travon Pollack  [id number]  1955     Home health to see patient for:  Skilled Nursing care for assessment, interventions & education, Physical Therapy evaluation and treatment and Occupational therapy evaluation and treatment    Skilled need for:  New Onset Medical Diagnosis Humeral Fracture, hypoxemia    Skilled nursing interventions to include:  Comment:  Assessment    Homebound evidenced status by:  Needs the assistance of another person in order to leave the home. Leaving home must require a considerable and taxing effort. There must exist a normal inability to leave the home.    Community Physician to provide follow up care: Jon Turner M.D.     Optional Interventions    Wound information & treatment:    Home Infusion Therapy orders:    Line/Drain/Airway:    I certify the face to face encounter for this home care referral meets the CMS requirements and the encounter/clinical assessment with the patient was, in whole, or in part, for the medical condition(s) listed above, which is the primary reason for home health care. Based on my clinical findings: the service(s) are medically necessary, support the need for home health care, and the homebound criteria are met.  I certify that this patient has had a face to face encounter by myself.  Asiya Bennett M.D. - NPI: [id number]    *Debility, frailty and advanced age in the absence of an acute deterioration or exacerbation of a condition do not qualify a patient for home health.

## 2018-01-13 NOTE — PROGRESS NOTES
[facility] Hospitalist Progress Note    Date of Service: 1/13/2018    Chief Complaint  62 y.o. Male diabetic smoker who is on chronic methadone twice a day admitted 1/12/2018 with intractable nausea and vomiting. Patient had sustained a humeral fracture, he was referred down to [place] for evaluation no surgery was performed over the course of this referral he went 3 days without his methadone he was having severe pain oxycodone or OxyContin was added to his regimen however by the time he returned home he had developed intractable nausea and vomiting likely secondary to withdrawal. He presented yesterday due to the persistent nausea and vomiting. Orthopedics has seen the patient here and recommends no surgery. He is incidentally noted to have multiple diabetic foot ulcers most with dry eschars, nothing appears to be acutely infected. He also is noted to be significantly hypoxemic with a clear chest x-ray. His BMP was unremarkable he has no peripheral edema.    Interval Problem Update  Spouse is at bedside, patient seen with RN  No surgery is planned by orthopedics  The patient still has left upper extremity pain, nausea and vomiting have resolved but he's eaten very little. He denies constipation  He is significantly but denies shortness of breath.   Plan of care discussed with the patient and spouse at bedside- limb preservation services evaluation is pending, patient will require home oxygen. Earliest possible discharge would be tomorrow.    Consultants/Specialty  Orthopaedics    Disposition  Home w/ HH      Review of Systems   Constitutional: Positive for malaise/fatigue.   Respiratory: Negative for cough, sputum production, shortness of breath and wheezing.    Cardiovascular: Negative for chest pain.   Gastrointestinal: Negative for abdominal pain, constipation, diarrhea, nausea and vomiting.        Poor appetite   Musculoskeletal: Positive for joint pain (left arm, chronic neuropathy pain).   Neurological:  Negative for dizziness.      Physical Exam  Laboratory/Imaging   Hemodynamics  Temp (24hrs), Av.4 °C (97.6 °F), Min:36.2 °C (97.1 °F), Max:36.7 °C (98 °F)   Temperature: 36.6 °C (97.8 °F)  Pulse  Av.1  Min: 91  Max: 114    Blood Pressure: 158/90, NIBP: (!) 168/89      Respiratory      Respiration: 16, Pulse Oximetry: 95 %        RLL Breath Sounds: Crackles, LLL Breath Sounds: Crackles    Fluids  No intake or output data in the 24 hours ending 18 1343    Nutrition  Orders Placed This Encounter   Procedures   • Diet Order     Standing Status:   Standing     Number of Occurrences:   1     Order Specific Question:   Diet:     Answer:   Diabetic [3]     Physical Exam   Constitutional: He is oriented to person, place, and time. He appears well-developed and well-nourished. No distress.   Moderately disheveled  Overweight   HENT:   Head: Normocephalic and atraumatic.   Eyes: EOM are normal. Pupils are equal, round, and reactive to light. Right eye exhibits no discharge. Left eye exhibits no discharge.   Poor eye contact   Cardiovascular: Normal rate and regular rhythm.    Pulmonary/Chest: No respiratory distress. He has no wheezes. He has no rales.   Shallow effort but clear  Poor excursion   Abdominal: Soft. He exhibits no distension.   Musculoskeletal: He exhibits no edema or tenderness.   Left upper extremity in sling   Neurological: He is alert and oriented to person, place, and time. No cranial nerve deficit.   Skin: Skin is warm and dry. He is not diaphoretic. No erythema.   Eschars on PIPs of toes and scattered eschars on soles of the feet-no erythema or drainage   Psychiatric:   Flat affect with depressed mood   Nursing note and vitals reviewed.      Recent Labs      18   1500  18   0341   WBC  12.1*  10.2   RBC  5.25  4.70   HEMOGLOBIN  15.3  13.4*   HEMATOCRIT  45.7  41.1*   MCV  87.0  87.4   MCH  29.1  28.5   MCHC  33.5*  32.6*   RDW  45.4  46.2   PLATELETCT  288  243   MPV  9.9  9.5      Recent Labs      01/12/18   1500  01/13/18   0341   SODIUM  134*  135   POTASSIUM  3.7  3.6   CHLORIDE  96  100   CO2  28  26   GLUCOSE  238*  166*   BUN  13  14   CREATININE  0.78  0.75   CALCIUM  8.6  8.1*     Recent Labs      01/12/18   1500   APTT  28.2   INR  1.06     Recent Labs      01/12/18   1500   BNPBTYPENAT  162*              Assessment/Plan     Diabetic foot ulcer (CMS-HCC)- (present on admission)   Assessment & Plan    Multiple eschars, none look infected LPS consult pending        Closed 2-part nondisplaced fracture of surgical neck of left humerus- (present on admission)   Assessment & Plan    Seen by Ortho  Non-operative Management/ immobilizer  Patient is right handed        Acute respiratory failure with hypoxemia (CMS-HCC)- (present on admission)   Assessment & Plan    - Chest x-ray unremarkable  - BNP only slightly elevated  - Patient denies any shortness of breath  - Incentive spirometry  Will need supplemental O2  Cause likely hypoventilation from increased narcotics        Methadone dependence (CMS-HCC)- (present on admission)   Assessment & Plan    Resume Chronic methadone dosing          Opiate withdrawal (CMS-HCC)- (present on admission)   Assessment & Plan    Intractable NV likely 2/2 methadone missed X 3 days  NV stopped  IVF stopped  Monitor PO  Denies constipation        Tobacco dependence- (present on admission)   Assessment & Plan    [place]te PRN        DM (diabetes mellitus), type 2, uncontrolled (CMS-HCC)- (present on admission)   Assessment & Plan    W/ hyperglycemia  No Lantus for 2 days sugar 200's  Dose high (generally increases beyond 20U do not result in meaningful changes in BG)  Lowered to 20U            Reviewed items::  Labs reviewed, Medications reviewed and Radiology images reviewed (there is no radiologic evidence of COPD)  Lutz catheter::  No Lutz  DVT prophylaxis pharmacological::  Enoxaparin (Lovenox)  Ulcer Prophylaxis::  Yes (chronic medication)

## 2018-01-13 NOTE — DIETARY
"Nutrition Services:    Consult for wt loss and wound noted on Nutrition Admit Screen. Pt is currently on a diabetic diet and per chart no PO documented yet. Pt denies wt loss and reports his UBW is 220 lbs (100 kg). No wt loss noted. Pt declined snacks. Per wound team note on 1/13 - \"Consulted LPS for the diabetic foot wound.  Wound care signing off\".   Consult received for diabetic diet education. During visit pt sleepy. RD will follow up for diet education. Ht: 180.3 cm, Wt: 101.152 kg, BMI 31.1.  Consult RD as needed. RD will re-screen weekly.      RD available prn     "

## 2018-01-13 NOTE — CONSULTS
DATE OF SERVICE:  01/13/2018    HISTORY OF PRESENT ILLNESS:  He is a very pleasant gentleman who had left arm   injury and was noted to have a fracture when I was called by the emergency   room to consult.  Of note, the patient has a prior history of left shoulder   arthrodesis.  He had been seen by Dr. Matthieu Garcia, underwent a left   glenohumeral arthrodesis sometime ago for neurological injury.    PHYSICAL EXAMINATION:  Examination of the shoulder was grossly limited.  He is   also grossly limited about the elbow; however, his hand is clean, dry, and   intact.  He notes no sensory disturbances.  His affect is appropriate.  He has   some swelling in the limb.  He also has some ecchymosis about the shoulder,   which is pulling in the elbow.    Reviewed x-rays, which demonstrates the cervical neck fracture of a prior   shoulder arthrodesis.    IMPRESSION:  Left proximal humerus fracture.    ASSESSMENT AND PLAN:  I believe we can treat this nonoperatively.  We will   continue the patient in a sling.  It is well aligned.  I feel that this is   probably aligned well postoperatively.  I cannot comment on his neurological   examination, as he is quite swollen at this time and I suspect he does have a   neurologic deficit from the left upper extremity injury before the   arthrodesis.    Obviously examination about the shoulder going to be limited by the fracture   as well as the history of arthrodesis.    Moving forward, I will communicate the patient's findings with Dr. Matthieu Garcia, who certainly will be the best person to care for this patient   postoperatively when he is discharged.  He should follow up with Dr. Matthieu Garcia, office number [phone].  I will personally communicate with   Dr. Garcia and his office 2 weeks out for this patient for further   followup as well.  From my perspective, the patient is stable for discharge at   any time when he meets other discharge criteria.        ____________________________________     MD JOHN Guzman    DD:  01/13/2018 11:46:53  DT:  01/13/2018 12:30:13    D#:  [id number]  Job#:  [id number]

## 2018-01-13 NOTE — H&P
Hospital Medicine History and Physical      Date of Service  1/12/2018    Chief Complaint  Chief Complaint   Patient presents with   • Vomiting     pt BIB daughter to triage/ pt reports to have been transferred by flight to [facility] for care of left shoulder pain/poss surgery. pt now with vomiting since last night and not able to hold pain meds down. left shoulder warm to touch/edema to left forearm   • Shoulder Pain     pt reports to be diabetic and has not been able to check BG/ 259 in triage     History of Presenting Illness  Ranjeet is a 62 y.o. male w/h/o insulin-dependent diabetes, heartburn, solitary kidney who presents with nausea vomiting and pain. Patient fell a few days ago and broke his arm. He went to the [facility] and was then transferred over to [facility] in [place] for orthopedic consultation. Patient's fracture was likely nonoperative so patient was discharged. However, patient returned back to his home in [place] but continued to have severe pain. He normally takes methadone twice a day and did not bring his methadone with him to either hospital. Thus with his withdrawal he had severe pain as well as nausea and vomiting. Eventually he came over to [facility] for further management.    Primary Care Physician  Jon Turner M.D.    Code Status  DNR per patient     Review of Systems  Review of Systems   Constitutional: Negative for chills and fever.   Respiratory: Negative for cough, shortness of breath and wheezing.    Cardiovascular: Negative for chest pain.   Gastrointestinal: Positive for nausea and vomiting. Negative for constipation and diarrhea.   Genitourinary: Negative for dysuria.   Musculoskeletal: Positive for joint pain (left arm).   Neurological: Positive for dizziness and headaches.     Please see HPI, all other systems were reviewed and are negative (AMA/CMS criteria)     Past Medical History  Past Medical History:   Diagnosis Date   • Pain 2/1/13    6/10 back   • Diabetes      insulin   • Dental disorder     upper and lower   • Heart burn    • Indigestion    • Infectious disease staph   • MRSA (methicillin resistant Staphylococcus aureus)    • Renal disorder     pt born with only one kidney   • Snoring        Surgical History  Past Surgical History:   Procedure Laterality Date   • LUMBAR FUSION POSTERIOR  3/4/2013    Performed by Nixon Zhao M.D. at SUR[facility]ERY [facility]   • LUMBAR LAMINECTOMY DISKECTOMY  3/4/2013    Performed by Nixon Zhao M.D. at SURGERY [facility]   • LUMBAR FUSION POSTERIOR  2013    Performed by Nixon Zhao M.D. at SURGERY [facility]   • HERNIA REPAIR  2012    x7 last one in    • OTHER  2007    back   • OTHER      neck       Medications  No current facility-administered medications on file prior to encounter.      Current Outpatient Prescriptions on File Prior to Encounter   Medication Sig Dispense Refill   • insulin glargine (LANTUS) 100 UNIT/ML Solution Inject 44 Units as instructed 2 times a day.     • nicotine (NICODERM) 14 MG/24HR PT24 Apply 1 Patch to skin as directed every 24 hours. 30 Patch 3   • esomeprazole (NEXIUM) 40 MG delayed-release capsule Take 40 mg by mouth every 48 hours.     • methadone (DOLOPHINE) 10 MG TABS Take 35 mg by mouth every 12 hours.     • pregabalin (LYRICA) 300 MG capsule Take 300 mg by mouth 2 times a day.     • insulin regular (HUMULIN R) 100 Unit/mL SOLN Inject 0-60 Units as instructed 3 times a day before meals. Sliding scale:  <200 = 0 units  200 = 20 units  250 = 40 units  300 = 60 units  >300 = 60 units       Family History  No family history on file.  Mother had unknown type of metastatic cancer and  at age 78  Father  of MI at age 62  Social History  Social History   Substance Use Topics   • Smoking status: Former Smoker     Packs/day: 0.40     Years: 15.00     Types: Cigarettes   • Smokeless tobacco: Former User     Quit date: 2013   • Alcohol use No        Allergies  Allergies   Allergen Reactions   • Influenza Virus Vaccine Rash and Nausea              Physical Exam  Laboratory   Hemodynamics  Temp (24hrs), Av.5 °C (97.7 °F), Min:36.3 °C (97.3 °F), Max:36.7 °C (98 °F)   Temperature: 36.7 °C (98 °F)  Pulse  Av  Min: 91  Max: 114    Blood Pressure: 153/86, NIBP: (!) 168/89      Respiratory      Respiration: 18, Pulse Oximetry: 91 %             Physical Exam   Constitutional: He is oriented to person, place, and time. He appears well-developed and well-nourished.   HENT:   Head: Normocephalic.   Right Ear: External ear normal.   Left Ear: External ear normal.   Nose: Nose normal.   Eyes: Conjunctivae and EOM are normal. Pupils are equal, round, and reactive to light. Right eye exhibits no discharge. Left eye exhibits no discharge. No scleral icterus.   Neck: Neck supple. No tracheal deviation present.   Cardiovascular: Tachycardia present.  Exam reveals no gallop and no friction rub.    Pulmonary/Chest: No respiratory distress. He has no wheezes. He has no rales.   Abdominal: Soft. He exhibits no distension. There is tenderness (Left abdomen). There is no rebound and no guarding.   Musculoskeletal: He exhibits edema (bilateral leg edema) and tenderness.   Left arm splint and swelling   Neurological: He is alert and oriented to person, place, and time.   Skin: Skin is warm and dry. No rash noted. No erythema.   Psychiatric: He has a normal mood and affect.       Recent Labs      18   1500   WBC  12.1*   RBC  5.25   HEMOGLOBIN  15.3   HEMATOCRIT  45.7   MCV  87.0   MCH  29.1   MCHC  33.5*   RDW  45.4   PLATELETCT  288   MPV  9.9     Recent Labs      18   1500   SODIUM  134*   POTASSIUM  3.7   CHLORIDE  96   CO2  28   GLUCOSE  238*   BUN  13   CREATININE  0.78   CALCIUM  8.6     Recent Labs      18   1500   ALTSGPT  10   ASTSGOT  19   ALKPHOSPHAT  60   TBILIRUBIN  0.8   GLUCOSE  238*     Recent Labs      18   1500   APTT  28.2   INR   1.06     Recent Labs      01/12/18   1500   BNPBTYPENAT  162*         Lab Results   Component Value Date    TROPONINI 0.01 01/12/2018       Imaging  DX-CHEST-2 VIEWS   Final Result      No acute findings.      DX-SHOULDER 2+ LEFT   Final Result      Prior LEFT shoulder arthrodesis with superimposed acute mildly displaced fracture of the proximal LEFT humerus.      DX-HUMERUS 2+ LEFT   Final Result      Left shoulder arthrodesis with proximal humeral fracture.        EKG  per my independant read:  QTc: 501, HR: 100, sinus tach, no ST/T changes     Assessment/Plan     I anticipate this patient will require at least two midnights for appropriate medical management, necessitating inpatient admission.    Closed 2-part nondisplaced fracture of surgical neck of left humerus- (present on admission)   Assessment & Plan    - Seen on arm x-ray  - Was previously evaluated in [place] but did not get surgery at that time  - Orthopedic surgeon Dr. Powers consulted        Acute hypoxemic respiratory failure (CMS-HCC)- (present on admission)   Assessment & Plan    - Chest x-ray unremarkable  - BNP only slightly elevated  - Patient denies any shortness of breath  - Incentive spirometry        Methadone dependence (CMS-HCC)- (present on admission)   Assessment & Plan    - Has been off methadone for three days  - Now having withdrawal symptoms  - Resuming home dose of methadone          Nausea & vomiting- (present on admission)   Assessment & Plan    - Likely due to methadone withdrawal  - Rehydrating with IV fluids        DM (diabetes mellitus), type 2, uncontrolled (CMS-HCC)- (present on admission)   Assessment & Plan    - Patient has not taken Lantus at home for two days, currently glucose in the 200s  - As his home dose of Lantus is fairly high dose, will try him at a lower dose of 20 q.h.s. for now with a lower sliding scale and see how he does, we can then adjust as necessary            Prophylaxis:  sc heparin

## 2018-01-13 NOTE — PROGRESS NOTES
Received bedside shift report from night RN. Pt AOx4.  Pt reports pain is 8/10 comfortable gaol is 6/10. Does call appropriately. Bed alarm is off.  Pt is resting in bed. PIV assessed and is patent. Pt is on 3 L NC. POC discussed as well as unit routine, comfort, and safety. Dicussed DC planning to home. Discussed the goal for today dc planning, mobility, LPS consult, DM education and pain control. Reviewed orders, notes, labs, and test results. Hourly rounding in place with RN rounding on odd hours and CNA on even hours. 12 hour chart check completed.

## 2018-01-14 ENCOUNTER — APPOINTMENT (OUTPATIENT)
Dept: RADIOLOGY | Facility: MEDICAL CENTER | Age: 63
DRG: 562 | End: 2018-01-14
Attending: INTERNAL MEDICINE
Payer: COMMERCIAL

## 2018-01-14 LAB
BUN SERPL-MCNC: 11 MG/DL (ref 8–22)
CREAT SERPL-MCNC: 0.64 MG/DL (ref 0.5–1.4)
GLUCOSE BLD-MCNC: 136 MG/DL (ref 65–99)
GLUCOSE BLD-MCNC: 137 MG/DL (ref 65–99)
GLUCOSE BLD-MCNC: 160 MG/DL (ref 65–99)
GLUCOSE BLD-MCNC: 235 MG/DL (ref 65–99)

## 2018-01-14 PROCEDURE — 700111 HCHG RX REV CODE 636 W/ 250 OVERRIDE (IP): Performed by: INTERNAL MEDICINE

## 2018-01-14 PROCEDURE — 82565 ASSAY OF CREATININE: CPT

## 2018-01-14 PROCEDURE — 36415 COLL VENOUS BLD VENIPUNCTURE: CPT

## 2018-01-14 PROCEDURE — 82962 GLUCOSE BLOOD TEST: CPT | Mod: 91

## 2018-01-14 PROCEDURE — 71275 CT ANGIOGRAPHY CHEST: CPT

## 2018-01-14 PROCEDURE — 770006 HCHG ROOM/CARE - MED/SURG/GYN SEMI*

## 2018-01-14 PROCEDURE — 84520 ASSAY OF UREA NITROGEN: CPT

## 2018-01-14 PROCEDURE — A9270 NON-COVERED ITEM OR SERVICE: HCPCS | Performed by: HOSPITALIST

## 2018-01-14 PROCEDURE — 99232 SBSQ HOSP IP/OBS MODERATE 35: CPT | Performed by: INTERNAL MEDICINE

## 2018-01-14 PROCEDURE — 700117 HCHG RX CONTRAST REV CODE 255: Performed by: INTERNAL MEDICINE

## 2018-01-14 PROCEDURE — 700102 HCHG RX REV CODE 250 W/ 637 OVERRIDE(OP): Performed by: HOSPITALIST

## 2018-01-14 RX ADMIN — ENOXAPARIN SODIUM 40 MG: 100 INJECTION SUBCUTANEOUS at 20:03

## 2018-01-14 RX ADMIN — OXYCODONE HYDROCHLORIDE 10 MG: 10 TABLET ORAL at 15:53

## 2018-01-14 RX ADMIN — INSULIN HUMAN 5 UNITS: 100 INJECTION, SOLUTION PARENTERAL at 20:01

## 2018-01-14 RX ADMIN — STANDARDIZED SENNA CONCENTRATE AND DOCUSATE SODIUM 2 TABLET: 8.6; 5 TABLET, FILM COATED ORAL at 08:40

## 2018-01-14 RX ADMIN — OXYCODONE HYDROCHLORIDE 10 MG: 10 TABLET ORAL at 19:29

## 2018-01-14 RX ADMIN — INSULIN GLARGINE 20 UNITS: 100 INJECTION, SOLUTION SUBCUTANEOUS at 20:01

## 2018-01-14 RX ADMIN — PREGABALIN 300 MG: 150 CAPSULE ORAL at 20:02

## 2018-01-14 RX ADMIN — METHADONE HYDROCHLORIDE 35 MG: 10 TABLET ORAL at 20:02

## 2018-01-14 RX ADMIN — OXYCODONE HYDROCHLORIDE 10 MG: 10 TABLET ORAL at 02:28

## 2018-01-14 RX ADMIN — OMEPRAZOLE 20 MG: 20 CAPSULE, DELAYED RELEASE ORAL at 08:39

## 2018-01-14 RX ADMIN — OXYCODONE HYDROCHLORIDE 10 MG: 10 TABLET ORAL at 06:29

## 2018-01-14 RX ADMIN — INSULIN HUMAN 3 UNITS: 100 INJECTION, SOLUTION PARENTERAL at 11:32

## 2018-01-14 RX ADMIN — STANDARDIZED SENNA CONCENTRATE AND DOCUSATE SODIUM 2 TABLET: 8.6; 5 TABLET, FILM COATED ORAL at 20:03

## 2018-01-14 RX ADMIN — IOHEXOL 56 ML: 350 INJECTION, SOLUTION INTRAVENOUS at 12:45

## 2018-01-14 RX ADMIN — METHADONE HYDROCHLORIDE 35 MG: 10 TABLET ORAL at 08:39

## 2018-01-14 RX ADMIN — PREGABALIN 300 MG: 150 CAPSULE ORAL at 08:40

## 2018-01-14 ASSESSMENT — PAIN SCALES - GENERAL
PAINLEVEL_OUTOF10: 3
PAINLEVEL_OUTOF10: 8
PAINLEVEL_OUTOF10: 5
PAINLEVEL_OUTOF10: ASSUMED PAIN PRESENT
PAINLEVEL_OUTOF10: 3
PAINLEVEL_OUTOF10: 6
PAINLEVEL_OUTOF10: 8
PAINLEVEL_OUTOF10: ASSUMED PAIN PRESENT

## 2018-01-14 ASSESSMENT — ENCOUNTER SYMPTOMS
NAUSEA: 0
ABDOMINAL PAIN: 0
DIARRHEA: 0
CONSTIPATION: 0
SHORTNESS OF BREATH: 0
WHEEZING: 0
SPUTUM PRODUCTION: 0
COUGH: 0
DIZZINESS: 0
VOMITING: 0

## 2018-01-14 ASSESSMENT — LIFESTYLE VARIABLES: DO YOU DRINK ALCOHOL: NO

## 2018-01-14 NOTE — PROGRESS NOTES
Pt going for CT scan of chest to r/o PE, consents for contrast not signed, waiting for pt to go down in cat [facility] to talk to radiologist prior signing.

## 2018-01-14 NOTE — CARE PLAN
Problem: Mobility  Goal: Risk for activity intolerance will decrease  Outcome: PROGRESSING AS EXPECTED  Ambulating without difficulty in 50 + feet at least three times a day

## 2018-01-14 NOTE — WOUND TEAM
"[facility]  Inpatient Services  Initial Wound & Skin Care Evaluation    LIMB PRESERVATION SERVICE NOTE:    Wound(s):Bilateral Diabetic Foot Ulcers and DTIs  Allergies: Influenza virus vaccine  Start of Care: 1/13/2018  Room/Bed  T327/02      Subjective:      History of Present Illness:   Past Medical History:   Diagnosis Date   • Dental disorder     upper and lower   • Diabetes 2011    insulin   • Heart burn    • Indigestion    • Infectious disease staph   • MRSA (methicillin resistant Staphylococcus aureus)    • Opiate withdrawal (CMS-HCC)    • Pain 2/1/13    6/10 back   • Renal disorder     pt born with only one kidney   • Snoring        Patient is a 62 y.o. male. Admitted for Acute hypoxemic respiratory failure (CMS-HCC)  Methadone dependence (CMS-HCC)  Unspecified nondisplaced fracture of surgical neck of left humerus, initial encounter for closed fracture. Nausea & vomiting,        Patient states they fell a few days ago and broke their arm. Patient's fracture was nonoperative and patient continued to have severe pain after discharge from local hospital. He normally takes methadone twice a day and did not bring his methadone with him to hospital. His has withdrawal and had severe pain as well as nausea and vomiting since.    Patient denies fevers chills, nausea, vomiting at this time.     Pain:        Patient Somnolent and resting comfortably        Objective:        PHYSICAL EXAMINATION:     General Appearance:  Well developed,  nourished, in no acute distress    Sensory Assessment       Patient sensation insensate bilaterally with light touch 10 of 10 points        Vascular Assessment       +2 DP, +2 PT bilaterally    Orthotic, protective, supportive devices:     Offloading  Heels floated with Pillows/ [employer] contacted for diabetic shoes    Vitals    BP (!) 165/95   Pulse 98   Temp 36.1 °C (97 °F)   Resp 15   Ht 1.803 m (5' 11\")   Wt 101.2 kg (223 lb)   SpO2 94%   BMI 31.10 kg/m²   "      Wound Characteristics                                                    Location: Right Foot - Plantar 1st MTH  Unstageable DFU   Initial Evaluation  Date:1/13/2018   Tissue Type and %: 100% Yellow   Periwound: Calloused   Drainage: None   Exposed structures None   Wound Edges:   Intact   Odor: None   S&S of Infection:   None   Edema: None   Sensation: Insensate               Measurements: Initial Evaluation  Date:1/13/2018   Length (cm) 1   Width (cm) 3   Depth (cm) MESHA   Tract/undermine      Location: Right Foot - 1st MTH DTI Initial Evaluation  Date:1/13/2018   Tissue Type and %: Purple - NonBlanching   Periwound: Calloused   Drainage: None   Exposed structures None   Wound Edges:   Intact   Odor: None   S&S of Infection:   None   Edema: None   Sensation: Insensate               Measurements: Initial Evaluation  Date:1/13/2018   Length (cm) 1   Width (cm) 0.2   Depth (cm) NA   Tract/undermine          Location: Left Foot Plantar - 1st Toe DTI Initial Evaluation  Date:1/13/2018   Tissue Type and %: Purple - NonBlanching   Periwound: Calloused   Drainage: None   Exposed structures None   Wound Edges:   Intact   Odor: None   S&S of Infection:   None   Edema: None   Sensation: Insensate               Measurements: Initial Evaluation  Date:1/13/2018   Length (cm) 1   Width (cm) 1   Depth (cm) NA   Tract/undermine            Location: Left Medial Heel Unstageable DFU Initial Evaluation  Date:1/13/2018   Tissue Type and %: 100% Yellow   Periwound: Calloused   Drainage: None   Exposed structures None   Wound Edges:   Intact   Odor: None   S&S of Infection:   None   Edema: None   Sensation: Insensate               Measurements: Initial Evaluation  Date:1/13/2018   Length (cm) 5   Width (cm) 2   Depth (cm) MESHA   Tract/undermine      Location: Right Medial Heel Unstageable DFU Initial Evaluation  Date:1/13/2018   Tissue Type and %: 100% Yellow   Periwound: Calloused   Drainage: None   Exposed structures None   Wound  Edges:   Intact   Odor: None   S&S of Infection:   None   Edema: None   Sensation: Insensate               Measurements: Initial Evaluation  Date:1/13/2018   Length (cm) 3   Width (cm) 2   Depth (cm) MESHA   Tract/undermine            Location: Bilateral Dorsal 2nd Toe -  Unstageable DFU Initial Evaluation  Date:1/13/2018   Tissue Type and %: 80%Black and 20% Yellow   Periwound: Calloused   Drainage: None   Exposed structures None   Wound Edges:   Intact   Odor: None   S&S of Infection:   None   Edema: None   Sensation: Insensate               Measurements: Initial Evaluation  Date:1/13/2018   Length (cm) 1.5   Width (cm) 1.5   Depth (cm) MESHA   Tract/undermine              Tests and Measures:    Labs  Recent Labs      01/12/18   1500  01/13/18   0341   WBC  12.1*  10.2   RBC  5.25  4.70   HEMOGLOBIN  15.3  13.4*   HEMATOCRIT  45.7  41.1*   MCV  87.0  87.4   MCH  29.1  28.5   MCHC  33.5*  32.6*   RDW  45.4  46.2   PLATELETCT  288  243   MPV  9.9  9.5     Recent Labs      01/12/18   1500  01/13/18   0341   SODIUM  134*  135   POTASSIUM  3.7  3.6   CHLORIDE  96  100   CO2  28  26   GLUCOSE  238*  166*   BUN  13  14       A1C 10.6% 7/11/2015 Ordered  ESR: Ordered  CRP: Ordered    BORIS    NA    Imaging    X-Ray: NA    Infection Management  Microbiology Neg Bld    Antibiotics NA       Procedures:     Debridement :  Autolytic   Cleansed with:  Normal Saline                                                                        Periwound protected with: Skin Prep   Primary dressing: Honey Hydrocolloid   Secondary Dressing: Adhesive Foam, Roll Guaze   Other:      Patient Education: Diabetic, Offloadiing    Professional Collaboration: CHRYSTAL Champion      Assessment:      Wound etiology: Diabetic Foot Ulcers    Wound Progress:  Initial Assessment    Rationale for Treatment:Medihoney Alginate:  Used to absorb exudate, provide moist wound environment, and facilitate autolytic debridement    Patient tolerance/compliance: Wants to  not lose limbs    Complicating factors: Lives in [place], Mobility, DM    Need for ongoing  Wound Care: Scheduled for OP LPS and Wound Clinic, Nursing to do dressing changes, LPS to follow      Plan:      Treatment Plan and Recommendations:    Procedures:     Bilateral Unstageable Diabetic Ulcers   Remove previous dressing, cleanse area with water and washcloth to remove residual honey. Clean with normal saline, place honey hydrocolloid, cut to size of wound bed, DO NOT PLACE HONEY ON HEALTHY TISSUE. Cover with piece of adhesive foam secure with roll gauze  Notify Wound Care if wound fails to progress or worsens.    Frequency:   NURSING TO CHANGE DRESSING EVERY 72 HOURS AND PRN FOR SATURATION OR DISLODGEMENT    Diabetic Shoes orderd  Diabetic Education ordered  A1C, ESR, CRP ordred  Outpatient Wound Care/LPS Rounds 1/26/18 ordered  Ortho Techs involved      RSKIN: CURRENT (X) ORDERED (O)  Q shift Mario:  X  Q shift pressure point assessments:  X  Pressure redistribution mattress        NUHA      Bariatric NUHA      Bariatric foam        Heel float boots       Heels floated on pillows    X  Barrier wipes      Barrier Cream      Barrier paste      Sacral silicone dressing      Padded O2 tubing      Anchorfast      Trach with Optifoam split foam       Waffle cushion      Rectal tube or BMS      Antifungal tx    Turn q 2 hours  Up to chair  Ambulate   PT/OT     Dietician      PO     TF   TPN     PVN    NPO   # days   Other       WOUND TEAM PLAN OF CARE (X):   NPWT change 3 x week:        Dressing changes by wound team:       Follow up as needed:    X LPS to FU   Other (explain):    Anticipated discharge plans (X):  SNF:           Home Care:           Outpatient Wound Center:  X       Self Care:     X    Other:           TBD:

## 2018-01-14 NOTE — CARE PLAN
Problem: Safety  Goal: Will remain free from injury  Call light within reach, pt calls for assistance at all times. Bed in low position and locked, upper bedside rails up, proper mobility signs placed, personal possessions within reach, treaded socks on. Hourly rounding in place.        Problem: Mobility  Goal: Risk for activity intolerance will decrease  Up self in room with oxygen mask at 5L.

## 2018-01-14 NOTE — PROGRESS NOTES
[facility]ist Progress Note    Date of Service: 2018    Chief Complaint  62 y.o. Male diabetic smoker who is on chronic methadone twice a day admitted 2018 with intractable nausea and vomiting. Patient had sustained a humeral fracture, he was referred down to [place] for evaluation no surgery was performed over the course of this referral he went 3 days without his methadone he was having severe pain oxycodone or OxyContin was added to his regimen however by the time he returned home he had developed intractable nausea and vomiting likely secondary to withdrawal. He presented yesterday due to the persistent nausea and vomiting. Orthopedics has seen the patient here and recommends no surgery. He is incidentally noted to have multiple diabetic foot ulcers most with dry eschars, nothing appears to be acutely infected. He also is noted to be significantly hypoxemic with a clear chest x-ray. His BMP was unremarkable he has no peripheral edema.    Interval Problem Update  Patient is much more alert, pleasant  Reviewed care plan  He is eating better, his mobility is improved, pain is controlled    Consultants/Specialty  Orthopaedics    Disposition  Home w/ HH      Review of Systems   Respiratory: Negative for cough, sputum production, shortness of breath and wheezing.    Cardiovascular: Negative for chest pain.   Gastrointestinal: Negative for abdominal pain, constipation, diarrhea, nausea and vomiting.        Appetite restored   Musculoskeletal: Positive for joint pain (left arm, chronic neuropathy pain).   Neurological: Negative for dizziness.      Physical Exam  Laboratory/Imaging   Hemodynamics  Temp (24hrs), Av.2 °C (97.2 °F), Min:36 °C (96.8 °F), Max:36.4 °C (97.6 °F)   Temperature: 36 °C (96.8 °F)  Pulse  Av  Min: 82  Max: 114    Blood Pressure: 132/86      Respiratory      Respiration: 16, Pulse Oximetry: 91 %        RUL Breath Sounds: Inspiratory Wheezes, RML Breath Sounds: Expiratory  Wheezes, RLL Breath Sounds: Diminished, JAZIEL Breath Sounds: Crackles, LLL Breath Sounds: Diminished    Fluids  No intake or output data in the 24 hours ending 01/14/18 1439    Nutrition  Orders Placed This Encounter   Procedures   • Diet Order     Standing Status:   Standing     Number of Occurrences:   1     Order Specific Question:   Diet:     Answer:   Diabetic [3]     Physical Exam   Constitutional: He is oriented to person, place, and time. He appears well-developed and well-nourished. No distress.   Obese   HENT:   Head: Normocephalic and atraumatic.   Mouth/Throat: Oropharynx is clear and moist.   Eyes: EOM are normal. Pupils are equal, round, and reactive to light. Right eye exhibits no discharge. Left eye exhibits no discharge.   Cardiovascular: Normal rate and regular rhythm.    Pulmonary/Chest: Effort normal. No respiratory distress. He has no wheezes. He has no rales.   Slightly improved breath sounds overall clear   Abdominal: Soft. Bowel sounds are normal. He exhibits no distension.   Musculoskeletal: He exhibits no edema or tenderness.   Left upper extremity in sling   Neurological: He is alert and oriented to person, place, and time. No cranial nerve deficit.   Skin: Skin is warm and dry. He is not diaphoretic. No erythema.   Both feet wrapped in bulky Kerlix   Psychiatric: He has a normal mood and affect.   Nursing note and vitals reviewed.      Recent Labs      01/12/18   1500  01/13/18   0341   WBC  12.1*  10.2   RBC  5.25  4.70   HEMOGLOBIN  15.3  13.4*   HEMATOCRIT  45.7  41.1*   MCV  87.0  87.4   MCH  29.1  28.5   MCHC  33.5*  32.6*   RDW  45.4  46.2   PLATELETCT  288  243   MPV  9.9  9.5     Recent Labs      01/12/18   1500  01/13/18   0341  01/14/18   1139   SODIUM  134*  135   --    POTASSIUM  3.7  3.6   --    CHLORIDE  96  100   --    CO2  28  26   --    GLUCOSE  238*  166*   --    BUN  13  14  11   CREATININE  0.78  0.75  0.64   CALCIUM  8.6  8.1*   --      Recent Labs      01/12/18   1500    APTT  28.2   INR  1.06     Recent Labs      01/12/18   1500   BNPBTYPENAT  162*              Assessment/Plan     Diabetic foot ulcer (CMS-HCC)- (present on admission)   Assessment & Plan    Interval dressings applied        Closed 2-part nondisplaced fracture of surgical neck of left humerus- (present on admission)   Assessment & Plan    Seen by Ortho  Non-operative Management/ immobilizer  Patient is right handed        Acute respiratory failure with hypoxemia (CMS-HCC)- (present on admission)   Assessment & Plan    Patchy bilateral opacities were reviewed on CT images-they are not convincing for pneumonia  There is no evidence of pulmonary embolus  Continue incentive spirometry  We'll check pro-calcitonin in the morning. But he does not appear to be acutely ill  Probable discharge tomorrow        Methadone dependence (CMS-HCC)- (present on admission)   Assessment & Plan    Resumed Chronic methadone dosing          Opiate withdrawal (CMS-HCC)- (present on admission)   Assessment & Plan    Resolved  Eating better        Tobacco dependence- (present on admission)   Assessment & Plan    Nicorette PRN        DM (diabetes mellitus), type 2, uncontrolled (CMS-HCC)- (present on admission)   Assessment & Plan    Sugars are within range, Lantus is 20 units            Reviewed items::  Labs reviewed, Medications reviewed and Radiology images reviewed (there is no radiologic evidence of COPD)  Lutz catheter::  No Lutz  DVT prophylaxis pharmacological::  Enoxaparin (Lovenox)  Ulcer Prophylaxis::  Yes (chronic medication)

## 2018-01-14 NOTE — PROGRESS NOTES
Unable to properly fit pt with appropriate sized offloading shoes. Will contact [facility] for different sizes.

## 2018-01-14 NOTE — PROGRESS NOTES
Pt arrived back to room from cat scan. Pt sitting at eob eating lunch. Wife at bedside. Call light within reach. Denies need for pain meds at this time.

## 2018-01-14 NOTE — PROGRESS NOTES
Custom order was placed to [employer] for bilateral diabetic shoes. For questions or status on order contact traction at ext. [phone]

## 2018-01-14 NOTE — CARE PLAN
Problem: Respiratory:  Goal: Respiratory status will improve    Intervention: Assess and monitor pulmonary status  Encouraged IS/deep breathing. Mobility and OOB for meals

## 2018-01-14 NOTE — DISCHARGE PLANNING
Medical Social Work    ASTER received update from ASTER Martinez that pt is awaiting DME and HH. ASTER requested assistance from [facility] on following up on these referrals. Unsure if the [facility] that services his area are open on Sundays

## 2018-01-15 VITALS
BODY MASS INDEX: 31.22 KG/M2 | WEIGHT: 223 LBS | RESPIRATION RATE: 16 BRPM | SYSTOLIC BLOOD PRESSURE: 130 MMHG | HEIGHT: 71 IN | OXYGEN SATURATION: 96 % | DIASTOLIC BLOOD PRESSURE: 71 MMHG | TEMPERATURE: 97.3 F | HEART RATE: 89 BPM

## 2018-01-15 LAB
GLUCOSE BLD-MCNC: 112 MG/DL (ref 65–99)
GLUCOSE BLD-MCNC: 137 MG/DL (ref 65–99)
GLUCOSE BLD-MCNC: 188 MG/DL (ref 65–99)
PROCALCITONIN SERPL-MCNC: 0.05 NG/ML

## 2018-01-15 PROCEDURE — G8979 MOBILITY GOAL STATUS: HCPCS | Mod: CI

## 2018-01-15 PROCEDURE — G8980 MOBILITY D/C STATUS: HCPCS | Mod: CI

## 2018-01-15 PROCEDURE — 97161 PT EVAL LOW COMPLEX 20 MIN: CPT

## 2018-01-15 PROCEDURE — G8988 SELF CARE GOAL STATUS: HCPCS | Mod: CI

## 2018-01-15 PROCEDURE — 99239 HOSP IP/OBS DSCHRG MGMT >30: CPT | Performed by: INTERNAL MEDICINE

## 2018-01-15 PROCEDURE — G8978 MOBILITY CURRENT STATUS: HCPCS | Mod: CI

## 2018-01-15 PROCEDURE — 700102 HCHG RX REV CODE 250 W/ 637 OVERRIDE(OP): Performed by: HOSPITALIST

## 2018-01-15 PROCEDURE — G8989 SELF CARE D/C STATUS: HCPCS | Mod: CI

## 2018-01-15 PROCEDURE — 36415 COLL VENOUS BLD VENIPUNCTURE: CPT

## 2018-01-15 PROCEDURE — 82962 GLUCOSE BLOOD TEST: CPT | Mod: 91

## 2018-01-15 PROCEDURE — 97165 OT EVAL LOW COMPLEX 30 MIN: CPT

## 2018-01-15 PROCEDURE — 770006 HCHG ROOM/CARE - MED/SURG/GYN SEMI*

## 2018-01-15 PROCEDURE — G8987 SELF CARE CURRENT STATUS: HCPCS | Mod: CI

## 2018-01-15 PROCEDURE — 700111 HCHG RX REV CODE 636 W/ 250 OVERRIDE (IP): Performed by: HOSPITALIST

## 2018-01-15 PROCEDURE — A9270 NON-COVERED ITEM OR SERVICE: HCPCS | Performed by: HOSPITALIST

## 2018-01-15 PROCEDURE — 84145 PROCALCITONIN (PCT): CPT

## 2018-01-15 RX ORDER — INSULIN GLARGINE 100 [IU]/ML
20 INJECTION, SOLUTION SUBCUTANEOUS
Qty: 1 ML | Refills: 0
Start: 2018-01-15 | End: 2019-06-15

## 2018-01-15 RX ADMIN — OXYCODONE HYDROCHLORIDE 10 MG: 10 TABLET ORAL at 11:54

## 2018-01-15 RX ADMIN — OXYCODONE HYDROCHLORIDE 10 MG: 10 TABLET ORAL at 00:02

## 2018-01-15 RX ADMIN — METHADONE HYDROCHLORIDE 35 MG: 10 TABLET ORAL at 08:49

## 2018-01-15 RX ADMIN — OMEPRAZOLE 20 MG: 20 CAPSULE, DELAYED RELEASE ORAL at 08:48

## 2018-01-15 RX ADMIN — INSULIN HUMAN 3 UNITS: 100 INJECTION, SOLUTION PARENTERAL at 16:53

## 2018-01-15 RX ADMIN — ACETAMINOPHEN 650 MG: 325 TABLET, FILM COATED ORAL at 08:49

## 2018-01-15 RX ADMIN — PROCHLORPERAZINE EDISYLATE 10 MG: 5 INJECTION INTRAMUSCULAR; INTRAVENOUS at 06:00

## 2018-01-15 RX ADMIN — OXYCODONE HYDROCHLORIDE 10 MG: 10 TABLET ORAL at 16:48

## 2018-01-15 RX ADMIN — PREGABALIN 300 MG: 150 CAPSULE ORAL at 08:48

## 2018-01-15 RX ADMIN — OXYCODONE HYDROCHLORIDE 10 MG: 10 TABLET ORAL at 03:00

## 2018-01-15 RX ADMIN — ACETAMINOPHEN 650 MG: 325 TABLET, FILM COATED ORAL at 16:48

## 2018-01-15 RX ADMIN — OXYCODONE HYDROCHLORIDE 10 MG: 10 TABLET ORAL at 06:00

## 2018-01-15 ASSESSMENT — COGNITIVE AND FUNCTIONAL STATUS - GENERAL
DAILY ACTIVITIY SCORE: 20
TURNING FROM BACK TO SIDE WHILE IN FLAT BAD: A LITTLE
SUGGESTED CMS G CODE MODIFIER DAILY ACTIVITY: CJ
CLIMB 3 TO 5 STEPS WITH RAILING: A LITTLE
PERSONAL GROOMING: A LITTLE
MOVING FROM LYING ON BACK TO SITTING ON SIDE OF FLAT BED: A LITTLE
MOBILITY SCORE: 18
WALKING IN HOSPITAL ROOM: A LITTLE
SUGGESTED CMS G CODE MODIFIER MOBILITY: CK
HELP NEEDED FOR BATHING: A LITTLE
DRESSING REGULAR LOWER BODY CLOTHING: A LITTLE
DRESSING REGULAR UPPER BODY CLOTHING: A LITTLE
STANDING UP FROM CHAIR USING ARMS: A LITTLE
MOVING TO AND FROM BED TO CHAIR: A LITTLE

## 2018-01-15 ASSESSMENT — LIFESTYLE VARIABLES: EVER_SMOKED: YES

## 2018-01-15 ASSESSMENT — ENCOUNTER SYMPTOMS
WHEEZING: 0
NAUSEA: 0
DIARRHEA: 0
SHORTNESS OF BREATH: 0
VOMITING: 0
COUGH: 0
ABDOMINAL PAIN: 0
CONSTIPATION: 0
SPUTUM PRODUCTION: 0
DIZZINESS: 0

## 2018-01-15 ASSESSMENT — PAIN SCALES - GENERAL
PAINLEVEL_OUTOF10: 4
PAINLEVEL_OUTOF10: 8
PAINLEVEL_OUTOF10: 8

## 2018-01-15 ASSESSMENT — ACTIVITIES OF DAILY LIVING (ADL): TOILETING: INDEPENDENT

## 2018-01-15 ASSESSMENT — GAIT ASSESSMENTS
DEVIATION: BRADYKINETIC;SHUFFLED GAIT
DISTANCE (FEET): 50
GAIT LEVEL OF ASSIST: SUPERVISED
ASSISTIVE DEVICE: SINGLE POINT CANE

## 2018-01-15 NOTE — THERAPY
"Occupational Therapy Evaluation completed.   Functional Status:  Pt EOB at start of session.  Pt with fused L shoulder and reports he uses UB compensatory dressing strategies at baseline.  Pt educated on sling management and ROM of LUE.  Pt walked in room with HHA (SPC not available).  Pt reports no further concerns with self-care at this time and is eager to DC home with wife.  Pt left up in chair for lunch.  Plan of Care: Patient with no further skilled OT needs in the acute care setting at this time  Discharge Recommendations:  Equipment: No Equipment Needed. Post-acute therapy Discharge to home with outpatient or home health for additional skilled therapy services.    See \"Rehab Therapy-Acute\" Patient Summary Report for complete documentation.    "

## 2018-01-15 NOTE — PROGRESS NOTES
Assumed care of patient following shift report. Patient A&Ox4. He was wearing oxmask but complains of dry membranes. Switched to nasal cannula with humidifier at 5L. Patient sats are remaining between 89-92%. Patient complains of pain for which oxy 10mg was provided. Left arm remains in sling. Plan of care and safety precautions reviewed. Call device in reach. Will continue to monitor.

## 2018-01-15 NOTE — CARE PLAN
Problem: Venous Thromboembolism (VTW)/Deep Vein Thrombosis (DVT) Prevention:  Goal: Patient will participate in Venous Thrombosis (VTE)/Deep Vein Thrombosis (DVT)Prevention Measures  Outcome: PROGRESSING AS EXPECTED  SCDs, ambulation and Lovenox for VTE prophylaxis.     Problem: Pain Management  Goal: Pain level will decrease to patient's comfort goal  Outcome: PROGRESSING AS EXPECTED  Rest, repositioning and PRN pain meds for pain control.

## 2018-01-15 NOTE — CONSULTS
Diabetes Education:  Patient with existing type 2 diabetes, AyA3e=7.5%.  Patient presents with LUE fracture and wounds on feet.  He checks FSBG religiously 3x/day and takes Lantus 44 units twice daily and regular insulin sliding scale.  In the hospital, he is only requiring Lantus 20 units once daily.  He does not take metformin due to having only 1 kidney.     Reviewed basic type 2 care including nutrition, exercise, FSBG testing, medications, sick day care, foot care, preventing and treating hypoglycemia, preventing complications.  Taught to increase Lantus by 3 units every 3 days until fasting FSBG .  Written material provided.      I Recommend checking FSBG 4x/day to keep FSBG  for optimal healing, and follow up with PCP after discharge to start a GLP-1.

## 2018-01-15 NOTE — DISCHARGE PLANNING
[facility] left a second Message for [facility].     [facility] also called [facility] to and spoke to Jluis. Per Jluis the referral will have to go through the [place] [facility]. The [place] office would also provide pricing if they accept a self Pay. ASTER on floor has been notified.

## 2018-01-15 NOTE — THERAPY
"Physical Therapy Evaluation completed.   Bed Mobility:  Supine to Sit:  (up in chair)  Transfers: Sit to Stand: Supervised  Gait: Level Of Assist: Supervised with Single Point Cane       Plan of Care: Patient with no further skilled PT needs in the acute care setting at this time  Discharge Recommendations: Equipment: No Equipment Needed. Post-acute therapy Discharge to home with outpatient or home health for additional skilled therapy services.    Mr. Pollack is a 63 y/o male who presents to acute secondary to ground level fall that resulted in closed 2 part nondisplaced fracture of surgical neck of humerus. This is being treated non-operatively and he is NWB L UE with sling. Additionally he has experienced acute respiratory failure with hypoxemia. He presents with lower extremity strength that is equal bilaterally and WFL. Bilateral LE sensation deficits, however pt reports this is baseline for him and why he ambulates with a cane. Requested trial gait with quad cane, however pt felt this was cumbersome. Pt able to perform transfers, gait, and stairs with single point cane and no physical assist. No LOB when standing and ambulating. At this time no additional acute physical therapy needs. Recommend follow up with outpatient physical therapy services to address UE deficits.     See \"Rehab Therapy-Acute\" Patient Summary Report for complete documentation.     "

## 2018-01-15 NOTE — DISCHARGE PLANNING
[facility] called [facility] and spoke to ABBEY. The will call insurance and try to obtain authorization. Abbey will send notification.

## 2018-01-15 NOTE — DISCHARGE PLANNING
Medical Social Work    SW left message for wife to see if she can pick him up tonight.     SW will remain available for DC planning.

## 2018-01-15 NOTE — DISCHARGE PLANNING
[facility] received Notification from [facility] the referral has been denied. Not accepting Workmans Comp Insurance.

## 2018-01-15 NOTE — PROGRESS NOTES
[facility] Hospitalist Progress Note    Date of Service: 1/15/2018    Chief Complaint  62 y.o. Male diabetic smoker who is on chronic methadone twice a day admitted 1/12/2018 with intractable nausea and vomiting. Patient had sustained a humeral fracture, he was referred down to [place] for evaluation no surgery was performed over the course of this referral he went 3 days without his methadone he was having severe pain oxycodone or OxyContin was added to his regimen however by the time he returned home he had developed intractable nausea and vomiting likely secondary to withdrawal. He presented yesterday due to the persistent nausea and vomiting. Orthopedics has seen the patient here and recommends no surgery. He is incidentally noted to have multiple diabetic foot ulcers most with dry eschars, nothing appears to be acutely infected. He also is noted to be significantly hypoxemic with a clear chest x-ray. His BMP was unremarkable he has no peripheral edema.    Interval Problem Update  Thus far unable to arrange for home oxygen  Home health also being declined at present  Is possible to discharge him without home health but not without oxygen at this point  CAT scan was negative for PE, showed bibasilar groundglass opacity I reviewed the images these are fairly mild-A do not appear to be infectious.  Discussed plan of care with patient  Discussed with case management  His hypoxemia is not related to his workmen's comp    Consultants/Specialty  Orthopaedics    Disposition  Home w/ HH      Review of Systems   Respiratory: Negative for cough, sputum production, shortness of breath and wheezing.    Cardiovascular: Negative for chest pain.   Gastrointestinal: Negative for abdominal pain, constipation, diarrhea, nausea and vomiting.        Appetite restored   Musculoskeletal: Positive for joint pain (left arm, chronic neuropathy pain).   Neurological: Negative for dizziness.      Physical Exam  Laboratory/Imaging    Hemodynamics  Temp (24hrs), Av.2 °C (97.2 °F), Min:36.2 °C (97.1 °F), Max:36.2 °C (97.2 °F)   Temperature: 36.2 °C (97.1 °F)  Pulse  Av.1  Min: 82  Max: 114    Blood Pressure: 152/88      Respiratory      Respiration: 17, Pulse Oximetry: 95 %        RUL Breath Sounds: Inspiratory Wheezes, RML Breath Sounds: Expiratory Wheezes, RLL Breath Sounds: Diminished, JAZIEL Breath Sounds: Expiratory Wheezes, LLL Breath Sounds: Diminished    Fluids  No intake or output data in the 24 hours ending 01/15/18 1448    Nutrition  Orders Placed This Encounter   Procedures   • Diet Order     Standing Status:   Standing     Number of Occurrences:   1     Order Specific Question:   Diet:     Answer:   Diabetic [3]     Physical Exam   Constitutional: He is oriented to person, place, and time. He appears well-developed and well-nourished. No distress.   Obese   HENT:   Head: Normocephalic and atraumatic.   Mouth/Throat: Oropharynx is clear and moist.   Eyes: EOM are normal. Pupils are equal, round, and reactive to light. Right eye exhibits no discharge. Left eye exhibits no discharge.   Cardiovascular: Normal rate and regular rhythm.    Pulmonary/Chest: Effort normal. No respiratory distress. He has no wheezes. He has no rales.   Breath sounds mildly diminished but otherwise clear   Abdominal: Soft. Bowel sounds are normal. He exhibits no distension.   Musculoskeletal: He exhibits no edema or tenderness.   Left upper extremity in sling   Neurological: He is alert and oriented to person, place, and time. No cranial nerve deficit.   Skin: Skin is warm and dry. He is not diaphoretic. No erythema.   Both feet wrapped in bulky Kerlix- same   Psychiatric: He has a normal mood and affect.   Nursing note and vitals reviewed.      Recent Labs      18   1500  18   0341   WBC  12.1*  10.2   RBC  5.25  4.70   HEMOGLOBIN  15.3  13.4*   HEMATOCRIT  45.7  41.1*   MCV  87.0  87.4   MCH  29.1  28.5   MCHC  33.5*  32.6*   RDW  45.4   46.2   PLATELETCT  288  243   MPV  9.9  9.5     Recent Labs      01/12/18   1500  01/13/18   0341  01/14/18   1139   SODIUM  134*  135   --    POTASSIUM  3.7  3.6   --    CHLORIDE  96  100   --    CO2  28  26   --    GLUCOSE  238*  166*   --    BUN  13  14  11   CREATININE  0.78  0.75  0.64   CALCIUM  8.6  8.1*   --      Recent Labs      01/12/18   1500   APTT  28.2   INR  1.06     Recent Labs      01/12/18   1500   BNPBTYPENAT  162*              Assessment/Plan     Diabetic foot ulcer (CMS-HCC)- (present on admission)   Assessment & Plan    Interval dressings applied        Closed 2-part nondisplaced fracture of surgical neck of left humerus- (present on admission)   Assessment & Plan    Seen by Ortho  Non-operative Management/ immobilizer  Patient is right handed        Acute respiratory failure with hypoxemia (CMS-HCC)- (present on admission)   Assessment & Plan    Patchy bilateral opacities were reviewed on CT images-they are not convincing for pneumonia  There is no evidence of pulmonary embolus  Continue incentive spirometry  We'll check pro-calcitonin in the morning. But he does not appear to be acutely ill  Probable discharge tomorrow        Methadone dependence (CMS-HCC)- (present on admission)   Assessment & Plan    Resumed Chronic methadone dosing          Opiate withdrawal (CMS-HCC)- (present on admission)   Assessment & Plan    Resolved  Eating better        Tobacco dependence- (present on admission)   Assessment & Plan    Nicorette PRN        DM (diabetes mellitus), type 2, uncontrolled (CMS-HCC)- (present on admission)   Assessment & Plan    Sugars are within range, Lantus is 20 units            Reviewed items::  Labs reviewed, Medications reviewed and Radiology images reviewed (peripheral opacities in bases)  Lutz catheter::  No Lutz  DVT prophylaxis pharmacological::  Enoxaparin (Lovenox)  Ulcer Prophylaxis::  Yes (chronic medication)

## 2018-01-16 ENCOUNTER — PATIENT OUTREACH (OUTPATIENT)
Dept: HEALTH INFORMATION MANAGEMENT | Facility: OTHER | Age: 63
End: 2018-01-16

## 2018-01-16 NOTE — PROGRESS NOTES
Received updated bedside shift report from night RN. Pt AOx4.  Pt reports pain is 8/10 comfort level is 3 or 4/10 . Does call appropriately. Bed alarm is off.  Pt is up in the chair for breakfast. PIV assessed and is patent. Pt is on 5 L NC, up from 3 L NC Md aware. Pt does not have o2 at home. It will be ordered for dc. POC discussed as well as unit routine, comfort, and safety. Dicussed DC planning to home. Discussed the goal for today, dc education, DM education, pain control, PT and OT evaluation, and safety. Reviewed orders, notes, labs, and test results. Hourly rounding in place with RN rounding on odd hours and CNA on even hours. 12 hour chart check completed.

## 2018-01-16 NOTE — DISCHARGE SUMMARY
CHIEF COMPLAINT ON ADMISSION  Chief Complaint   Patient presents with   • Vomiting     pt BIB daughter to triage/ pt reports to have been transferred by flight to [facility] for care of left shoulder pain/poss surgery. pt now with vomiting since last night and not able to hold pain meds down. left shoulder warm to touch/edema to left forearm   • Shoulder Pain     pt reports to be diabetic and has not been able to check BG/ 259 in triage       CODE STATUS  DNR    HPI & HOSPITAL COURSE  62 y.o. Male diabetic smoker who is on chronic methadone twice a day admitted 1/12/2018 with intractable nausea and vomiting. Patient had sustained a humeral fracture, he was referred down to [place] for evaluation no surgery was performed over the course of this referral he went 3 days without his methadone he was having severe pain oxycodone or OxyContin was added to his regimen however by the time he returned home he had developed intractable nausea and vomiting likely secondary to withdrawal. He presented yesterday due to the persistent nausea and vomiting. Orthopedics has seen the patient here and recommends no surgery. He is incidentally noted to have multiple diabetic foot ulcers most with dry eschars, nothing appears to be acutely infected. He also is noted to be significantly hypoxemic with a clear chest x-ray. His BMP was unremarkable he has no peripheral edema.  CT revealed bibasilar densities consistent with atelectasis versus infiltrate. The amount of hypoxemia he has a somewhat disproportionate to these findings. His shortness of breath improved but his hypoxemia persisted. His nausea and vomiting resolved and his appetite improved. His pain was well controlled on current regimen, he had pain medication at home and required no new prescriptions at discharge.    Home oxygen was arranged    Therefore, he is discharged in good and stable condition with close outpatient follow-up.    SPECIFIC OUTPATIENT FOLLOW-UP  Primary care,  orthopedics    DISCHARGE PROBLEM LIST  Active Problems:    DM (diabetes mellitus), type 2, uncontrolled (CMS-[facility]) POA: Yes    Tobacco dependence POA: Yes    Opiate withdrawal (CMS-[facility]) POA: Yes    Methadone dependence (CMS-[facility]) POA: Yes    Acute respiratory failure with hypoxemia (CMS-[facility]) POA: Yes    Closed 2-part nondisplaced fracture of surgical neck of left humerus POA: Yes    Diabetic foot ulcer (CMS-[facility]) POA: Yes  Resolved Problems:    * No resolved hospital problems. *      FOLLOW UP  No future appointments.  Miles Powers M.D.  [address]  [facility] [address]  [phone]    In 3 days        MEDICATIONS ON DISCHARGE   [facility]   Home Medication Instructions SHERIN:[id number]    Printed on:01/15/18 1616   Medication Information                      esomeprazole (NEXIUM) 40 MG delayed-release capsule  Take 40 mg by mouth every 48 hours.             insulin glargine (LANTUS) 100 UNIT/ML Solution  Inject 20 Units as instructed every bedtime.             insulin regular (HUMULIN R) 100 Unit/mL SOLN  Inject 0-60 Units as instructed 3 times a day before meals. Sliding scale:  <200 = 0 units  200 = 20 units  250 = 40 units  300 = 60 units  >300 = 60 units             methadone (DOLOPHINE) 10 MG TABS  Take 35 mg by mouth every 12 hours.             nicotine (NICODERM) 14 MG/24HR PT24  Apply 1 Patch to skin as directed every 24 hours.             ondansetron (ZOFRAN) 4 MG Tab tablet  Take 1 Tab by mouth every four hours as needed for Nausea/Vomiting.             ondansetron (ZOFRAN) 4 MG Tab tablet  Take 4 mg by mouth every four hours as needed for Nausea/Vomiting.             oxycodone-acetaminophen (PERCOCET) 5-325 MG Tab  Take 1-2 Tabs by mouth every four hours as needed for up to 5 days.             pregabalin (LYRICA) 300 MG capsule  Take 300 mg by mouth 2 times a day.                 DIET  Orders Placed This Encounter   Procedures   • Diet Order     Standing Status:   Standing     Number of  Occurrences:   1     Order Specific Question:   Diet:     Answer:   Diabetic [3]       ACTIVITY  Left upper extremity immobilized  Otherwise activity as tolerated    CONSULTATIONS  Orthopedics- Miles Powers MD    PROCEDURES  none    LABORATORY  Lab Results   Component Value Date/Time    SODIUM 135 01/13/2018 03:41 AM    POTASSIUM 3.6 01/13/2018 03:41 AM    CHLORIDE 100 01/13/2018 03:41 AM    CO2 26 01/13/2018 03:41 AM    GLUCOSE 166 (H) 01/13/2018 03:41 AM    BUN 11 01/14/2018 11:39 AM    CREATININE 0.64 01/14/2018 11:39 AM    CREATININE 0.7 09/17/2007 03:20 AM        Lab Results   Component Value Date/Time    WBC 10.2 01/13/2018 03:41 AM    HEMOGLOBIN 13.4 (L) 01/13/2018 03:41 AM    HEMATOCRIT 41.1 (L) 01/13/2018 03:41 AM    PLATELETCT 243 01/13/2018 03:41 AM        Total time of the discharge process exceeds 38 minutes

## 2018-01-16 NOTE — PROGRESS NOTES
Placed discharge outreach phone call to patient s/p hospital discharge 1/15/18.  Left voicemail providing my contact information and instructions to call with any questions or concerns.

## 2018-01-16 NOTE — DISCHARGE INSTRUCTIONS
Discharge Instructions    Discharged to home by car with relative. Discharged via wheelchair, hospital escort: Yes.  Special equipment needed: Not Applicable    Be sure to schedule a follow-up appointment with your primary care doctor or any specialists as instructed.     Discharge Plan:   Diet Plan: Discussed  Activity Level: Discussed  Smoking Cessation Offered: Patient Refused  Confirmed Follow up Appointment: Patient to Call and Schedule Appointment  Confirmed Symptoms Management: Discussed  Medication Reconciliation Updated: Yes  Influenza Vaccine Indication: Patient Refuses    I understand that a diet low in cholesterol, fat, and sodium is recommended for good health. Unless I have been given specific instructions below for another diet, I accept this instruction as my diet prescription.   Other diet: 1800 Calorie Diet for Diabetes Meal Planning  The 1800 calorie diet is designed for eating up to 1800 calories each day. Following this diet and making healthy meal choices can help improve overall health. This diet controls blood sugar (glucose) levels and can also help lower blood pressure and cholesterol.  SERVING SIZES  Measuring foods and serving sizes helps to make sure you are getting the right amount of food. The list below tells how big or small some common serving sizes are:  · 1 oz.........4 stacked dice.   · 3 oz.........Deck of cards.   · 1 tsp........Tip of little finger.   · 1 tbs........Thumb.   · 2 tbs........Golf ball.   · ½ cup.......Half of a fist.   · 1 cup........A fist.   GUIDELINES FOR CHOOSING FOODS  The goal of this diet is to eat a variety of foods and limit calories to 1800 each day. This can be done by choosing foods that are low in calories and fat. The diet also suggests eating small amounts of food frequently. Doing this helps control your blood glucose levels so they do not get too high or too low. Each meal or snack may include a protein food source to help you feel more  satisfied and to stabilize your blood glucose. Try to eat about the same amount of food around the same time each day. This includes weekend days, travel days, and days off work. Space your meals about 4 to 5 hours apart and add a snack between them if you wish.   For example, a daily food plan could include breakfast, a morning snack, lunch, dinner, and an evening snack. Healthy meals and snacks include whole grains, vegetables, fruits, lean meats, poultry, fish, and dairy products. As you plan your meals, select a variety of foods. Choose from the bread and starch, vegetable, fruit, dairy, and meat/protein groups. Examples of foods from each group and their suggested serving sizes are listed below. Use measuring cups and spoons to become familiar with what a healthy portion looks like.  Bread and Starch  Each serving equals 15 grams of carbohydrates.  · 1 slice bread.   · ¼ bagel.   · ¾ cup cold cereal (unsweetened).   · ½ cup hot cereal or mashed potatoes.   · 1 small potato (size of a computer mouse).   ·  cup cooked pasta or rice.   · ½ English muffin.   · 1 cup broth-based soup.   · 3 cups of popcorn.   · 4 to 6 whole-wheat crackers.   · ½ cup cooked beans, peas, or corn.   Vegetable  Each serving equals 5 grams of carbohydrates.  · ½ cup cooked vegetables.   · 1 cup raw vegetables.   · ½ cup tomato or vegetable juice.   Fruit  Each serving equals 15 grams of carbohydrates.  · 1 small apple or orange.   · 1¼ cup watermelon or strawberries.   · ½ cup applesauce (no sugar added).   · 2 tbs raisins.   · ½ banana.   · ½ cup canned fruit, packed in water, its own juice, or sweetened with a sugar substitute.   · ½ cup unsweetened fruit juice.   Dairy  Each serving equals 12 to 15 grams of carbohydrates.  · 1 cup fat-free milk.   · 6 oz artificially sweetened yogurt or plain yogurt.   · 1 cup low-fat buttermilk.   · 1 cup soy milk.   · 1 cup almond milk.   Meat/Protein  · 1 large egg.   · 2 to 3 oz meat, poultry, or  fish.   · ¼ cup low-fat cottage cheese.   · 1 tbs peanut butter.   · 1 oz low-fat cheese.   · ¼ cup tuna in water.   · ½ cup tofu.   Fat  · 1 tsp oil.   · 1 tsp trans-fat-free margarine.   · 1 tsp butter.   · 1 tsp mayonnaise.   · 2 tbs avocado.   · 1 tbs salad dressing.   · 1 tbs cream cheese.   · 2 tbs sour cream.   SAMPLE 1800 CALORIE DIET PLAN  Breakfast  · ¾ cup unsweetened cereal (1 carb serving).   · 1 cup fat-free milk (1 carb serving).   · 1 slice whole-wheat toast (1 carb serving).   · ½ small banana (1 carb serving).   · 1 scrambled egg.   · 1 tsp trans-fat-free margarine.   Lunch  · Tuna sandwich.   · 2 slices whole-wheat bread (2 carb servings).   · ½ cup canned tuna in water, drained.   · 1 tbs reduced fat mayonnaise.   · 1 stalk celery, chopped.   · 2 slices tomato.   · 1 lettuce leaf.   · 1 cup carrot sticks.   · 24 to 30 seedless grapes (2 carb servings).   · 6 oz light yogurt (1 carb serving).   Afternoon Snack  · 3 darius cracker squares (1 carb serving).   · Fat-free milk, 1 cup (1 carb serving).   · 1 tbs peanut butter.   Dinner  · 3 oz salmon, broiled with 1 tsp oil.   · 1 cup mashed potatoes (2 carb servings) with 1 tsp trans-fat-free margarine.   · 1 cup fresh or frozen green beans.   · 1 cup steamed asparagus.   · 1 cup fat-free milk (1 carb serving).   Evening Snack  · 3 cups air-popped popcorn (1 carb serving).   · 2 tbs parmesan cheese sprinkled on top.   MEAL PLAN  Use this worksheet to help you make a daily meal plan based on the 1800 calorie diet suggestions. If you are using this plan to help you control your blood glucose, you may interchange carbohydrate-containing foods (dairy, starches, and fruits). Select a variety of fresh foods of varying colors and flavors. The total amount of carbohydrate in your meals or snacks is more important than making sure you include all of the food groups every time you eat. Choose from the following foods to build your day's meals:  · 8 Starches.    · 4 Vegetables.   · 3 Fruits.   · 2 Dairy.   · 6 to 7 oz Meat/Protein.   · Up to 4 Fats.   Your dietician can use this worksheet to help you decide how many servings and which types of foods are right for you.  BREAKFAST  Food Group and Servings / Food Choice  Starch ________________________________________________________  Dairy _________________________________________________________  Fruit _________________________________________________________  Meat/Protein __________________________________________________  Fat ___________________________________________________________  LUNCH  Food Group and Servings / Food Choice  Starch ________________________________________________________  Meat/Protein __________________________________________________  Vegetable _____________________________________________________  Fruit _________________________________________________________  Dairy _________________________________________________________  Fat ___________________________________________________________  AFTERNOON SNACK  Food Group and Servings / Food Choice  Starch ________________________________________________________  Meat/Protein __________________________________________________  Fruit __________________________________________________________  Dairy _________________________________________________________  DINNER  Food Group and Servings / Food Choice  Starch _________________________________________________________  Meat/Protein ___________________________________________________  Dairy __________________________________________________________  Vegetable ______________________________________________________  Fruit ___________________________________________________________  Fat ____________________________________________________________  EVENING SNACK  Food Group and Servings / Food Choice  Fruit __________________________________________________________  Meat/Protein  ___________________________________________________  Dairy __________________________________________________________  Starch _________________________________________________________  DAILY TOTALS  Starch ____________________________  Vegetable _________________________  Fruit _____________________________  Dairy _____________________________  Meat/Protein______________________  Fat _______________________________  Document Released: 07/10/2006 Document Revised: 03/11/2013 Document Reviewed: 11/02/2012  [employer]® Patient Information ©2013 [employer] [facility].    Special Instructions: None    · Is patient discharged on Warfarin / Coumadin?   No     · Is patient Post Blood Transfusion?  No  Humerus Fracture Treated With Immobilization  The humerus is the large bone in the upper arm. A broken (fractured) humerus is often treated by wearing a cast, splint, or sling (immobilization). This holds the broken pieces in place so they can heal.   HOME CARE  · Put ice on the injured area.  ¨ Put ice in a plastic bag.  ¨ Place a towel between your skin and the bag.  ¨ Leave the ice on for 15-20 minutes, 03-04 times a day.  · If you are given a cast:  ¨ Do not scratch the skin under the cast.  ¨ Check the skin around the cast every day. You may put lotion on any red or sore areas.  ¨ Keep the cast dry and clean.  · If you are given a splint:  ¨ Wear the splint as told.  ¨ Keep the splint clean and dry.  ¨ Loosen the elastic around the splint if your fingers become numb, cold, tingle, or turn blue.  · If you are given a sling:  ¨ Wear the sling as told.  · Do not put pressure on any part of the cast or splint until it is fully hardened.  · The cast or splint must be protected with a plastic bag during bathing. Do not lower the cast or splint into water.  · Only take medicine as told by your doctor.  · Do exercises as told by your doctor.  · Follow up as told by your doctor.  GET HELP RIGHT AWAY IF:   · Your skin or fingernails turn  blue or gray.  · Your arm feels cold or numb.  · You have very bad pain in the injured arm.  · You are having problems with the medicines you were given.  MAKE SURE YOU:   · Understand these instructions.  · Will watch your condition.  · Will get help right away if you are not doing well or get worse.     This information is not intended to replace advice given to you by your health care provider. Make sure you discuss any questions you have with your health care provider.     Document Released: 06/05/2009 Document Revised: 01/08/2016 Document Reviewed: 02/01/2012  [employer] Interactive Patient Education ©2016 [employer]    Fracture Care, Generic  The 206 bones in our body are important for supporting our body (skeleton) and also for production of blood cells by the bone marrow. A fracture is a break in a tissue. A tissue is a collection of cells that performs a function or job in our body. We most commonly think of fractures in bones.  When a bone is broken, or fractured, it affects not only blood production and function. There may also be other damage when structures near the bones are injured.  There are three main types of fractures:  · Open - where there is a wound leading to the fracture site or the bone is protruding from the skin.   · Closed - where the bone has fractured but has no external wound.   · Complicated - this may involve damage to associated vital organs and major blood vessels as a result of the fracture.   Fractures are usually managed by keeping the bones in place long enough for them to heal. This is usually done with splints or casts. Sometimes surgery is required and pins, plates and screws may be used to hold fractures in proper position. The amount of time it takes a fracture to heal depends mostly on the age and health of the patient.  Young children are prone to fractures. These fractures heal rather quickly. The common fractures suffered by children tend to be associated with the  arms and wrists. As young bones do not harden for some years, children's fractures tend to 'bend and splinter', similar to a broken branch on a tree. This the reason for the name, 'greenstick fracture'.  As we grow older, there may be a loss of bone known as osteopenia or osteoporosis. These conditions make breaking a bone much easier. Sometimes a minor accident or simply over-use may produce a fracture. These fractures do not heal as fast a younger person's.  SYMPTOMS  The signs and symptoms of fractures of bones depend on how bad the injury is. If shock is present, there may be pale, cool, clammy skin with a rapid, weak pulse. There is usually pain and tenderness in the area of the fracture. There may or may not be deformity of the bone. There may be injury to surrounding tissues.  TREATMENT  · Care and treatment of fractures relies on immobilization and adequate splinting of the injury. If the fracture is complex, the wound associated with an open fracture may be difficult to handle without professional help.   · If the pulse to the end part of the limb (distal pulse) cannot be restored by gentle traction, then the limb should be stabilized in its current position. Urgent ambulance transport should be obtained. Do not waste time with splinting.   · Generally, fractured limbs should be made immobile and left for medical aid. In remote areas or some distance from medical aid, you may be required to treat as follows.   FRACTURED FOREARM  · Check for pulse at the end part of the limb. If none - gentle traction until pulse returns   · Treat any wounds   · Pad bony prominences   · Apply adequate splinting.   · Secure above and below fracture, secure wrist.   · Reassess pulse or return of color and/or warmth.   · Elevate injury with arm sling.    FRACTURED UPPER ARM  · Check for pulse at the end part of the limb, if none - gentle traction until pulse returns.   · Treat any wounds.   · Pad between arm and chest.    · Apply 'collar and cuff' sling, secure above and below fracture firmly against chest with triangular bandages.   · Reassess pulse or return of color and/or warmth.    SLINGS  · Slings are used to support an injured arm, or to supplement treatment for another injury such as fractured ribs. Generally, the most effective sling is made with a triangular bandage. Every first aid kit, no matter how small, should have at least one of these bandages.   · Although triangular bandages are preferable, any material (tie, belt, or piece of thick twine or rope) can be used in an emergency. If no likely material is at hand, an injured arm can be adequately supported by inserting it inside the injured person's shirt or blouse. Similarly, a safety pin applied to a sleeve and secured to clothing on the chest may work well enough.   · There are essentially three types of sling; the arm sling for injuries to the forearm, the elevated sling for injuries to the shoulder, and the 'collar-and-cuff' or clove hitch for injuries to the upper arm and as supplementary support to fractured ribs.   · After application of any sling, always check the circulation to the limb by feeling for the pulse at the wrist, or by squeezing a fingernail and observing for change of color in the nail bed. All slings must be in a position that is comfortable for the injured person. Never force an arm into the 'right position'.   ARM SLING  · Support the injured forearm approximately parallel to the ground with the wrist slightly higher than the elbow.   · Place an opened triangular bandage between the body and the arm, with its apex towards the elbow.   · Extend the upper point of the bandage over the shoulder on the uninjured side.   · Bring the lower point up over the arm, across the shoulder on the injured side to join the upper point and tie firmly with a reef knot.   · Ensure the elbow is secured by folding the excess bandage over the elbow and securing  with a safety pin.   ELEVATED SLING  · Support the injured arm with the elbow beside the body and the hand extended towards the uninjured shoulder.   · Place an opened triangular bandage over the forearm and hand, with the apex towards the elbow.   · Extend the upper point of the bandage over the uninjured shoulder.   · Tuck the lower part of the bandage under the injured arm, bring it under the elbow and around the back and extend the lower point up to meet the upper point at the shoulder.   · Tie firmly with a reef knot.   · Secure the elbow by folding the excess material and applying a safety pin, then ensure that the sling is tucked under the arm giving firm support.   If your caregiver has given you a follow-up appointment, it is very important to keep that appointment. This includes any orthopedic referrals, physical therapy, and rehabilitation. Any delay in obtaining necessary care could result in a delay or failure of the bones to heal. Not keeping the appointment could result in a chronic or permanent injury, pain, and disability. If there is any problem keeping the appointment, you must call back to this facility for assistance.   Document Released: 12/22/2005 Document Revised: 03/11/2013 Document Reviewed: 07/24/2009  [employer]® Patient Information ©2013 [employer].      Pain Medicine Instructions  HOW CAN PAIN MEDICINE AFFECT ME?  You were prescribed pain medicine. This medicine may:  · Make you tired or sleepy.  · Affect how well you can:  ¨ Drive  ¨ Do certain activities.  Pain medicine may not make all of your pain go away. You should be comfortable enough to:  · Move.  · Breathe.  · Take care of yourself.  HOW OFTEN SHOULD I TAKE PAIN MEDICINE AND HOW MUCH SHOULD I TAKE?  · Take pain medicine only as told by your doctor and only as needed for pain.  · You do not need to take pain medicine if you are not having pain, unless your doctor tells you to do that.  · You can take less than the prescribed  dose if you find that less medicine helps your pain.  WHAT SHOULD I AVOID WHILE I AM TAKING PAIN MEDICINE?  Follow these instructions after you start taking pain medicine, while you are taking the medicine, and for 8 hours after you stop taking the medicine:  · Do not drive.  · Do not use machinery.  · Do not use power tools.  · Do not sign legal documents.  · Do not drink alcohol.  · Do not take sleeping pills.  · Do not take care of children by yourself.  · Do not do any activities that involve climbing or being in high places.  · Do not go into any body of water unless there is an adult nearby who can watch and help you. This includes:  ¨ Lakes.  ¨ Rivers.  ¨ Oceans.  ¨ Spas.  ¨ Swimming pools.  HOW CAN I KEEP OTHERS SAFE WHILE I AM TAKING PAIN MEDICINE?  · Store your pain medicine as told by your doctor. Make sure that you keep it where children and pets cannot reach it.  · Do not share your pain medicine with anyone.  · Do not save any leftover pills. If you have any leftover pain medicine, get rid of it or destroy it as told by your doctor.  WHAT ELSE DO I NEED TO KNOW ABOUT TAKING PAIN MEDICINE?  · Use a poop (stool) softener if you have trouble pooping (constipation) because of your pain medicine. Eating more fruits and vegetables also helps with constipation.  · Write down the times when you take your pain medicine. Look at the times before you take your next dose of medicine.  · If your pain is very bad, do not take more pills than told by your doctor. Call your doctor for help.  · Your pain medicine might have acetaminophen in it. Do not take any other acetaminophen while you are taking this medicine. An overdose of acetaminophen can do very bad damage to your liver. If you are taking any medicines in addition to your pain medicine, check the active ingredients on those medicines to see if acetaminophen is listed.  WHEN SHOULD I CALL MY DOCTOR?  · Your medicine is not helping the pain.  · You do either of  these soon after you take the medicine:  ¨ Throw up (vomit).  ¨ Have watery poop (diarrhea).  · You have new pain in areas that did not hurt before.  · You have an allergic reaction to your medicine. This may include:  ¨ Feeling itchy.  ¨ Swelling.  ¨ Feeling dizzy.  ¨ Getting a new rash.  WHEN SHOULD I CALL 911 OR GO TO THE EMERGENCY ROOM?  · You feel dizzy or you faint.  · You feel very confused.  · You throw up again and again.  · Your skin or lips turn pale or bluish in color.  · You are:  ¨ Short of breath.  ¨ Breathing much more slowly than usual.  · You have a very bad allergic reaction to your medicine. This includes:  ¨ Developing a swollen tongue.  ¨ Having trouble breathing.     This information is not intended to replace advice given to you by your health care provider. Make sure you discuss any questions you have with your health care provider.     Document Released: 06/05/2009 Document Revised: 05/03/2016 Document Reviewed: 10/22/2015  [employer] Interactive Patient Education ©2016 [employer]      Diabetes and Foot Care  Diabetes may cause you to have problems because of poor blood supply (circulation) to your feet and legs. This may cause the skin on your feet to become thinner, break easier, and heal more slowly. Your skin may become dry, and the skin may peel and crack. You may also have nerve damage in your legs and feet causing decreased feeling in them. You may not notice minor injuries to your feet that could lead to infections or more serious problems. Taking care of your feet is one of the most important things you can do for yourself.   HOME CARE INSTRUCTIONS  · Wear shoes at all times, even in the house. Do not go barefoot. Bare feet are easily injured.  · Check your feet daily for blisters, cuts, and redness. If you cannot see the bottom of your feet, use a mirror or ask someone for help.  · Wash your feet with warm water (do not use hot water) and mild soap. Then pat your feet and the  areas between your toes until they are completely dry. Do not soak your feet as this can dry your skin.  · Apply a moisturizing lotion or petroleum jelly (that does not contain alcohol and is unscented) to the skin on your feet and to dry, brittle toenails. Do not apply lotion between your toes.  · Trim your toenails straight across. Do not dig under them or around the cuticle. File the edges of your nails with an emery board or nail file.  · Do not cut corns or calluses or try to remove them with medicine.  · Wear clean socks or stockings every day. Make sure they are not too tight. Do not wear knee-high stockings since they may decrease blood flow to your legs.  · Wear shoes that fit properly and have enough cushioning. To break in new shoes, wear them for just a few hours a day. This prevents you from injuring your feet. Always look in your shoes before you put them on to be sure there are no objects inside.  · Do not cross your legs. This may decrease the blood flow to your feet.  · If you find a minor scrape, cut, or break in the skin on your feet, keep it and the skin around it clean and dry. These areas may be cleansed with mild soap and water. Do not cleanse the area with peroxide, alcohol, or iodine.  · When you remove an adhesive bandage, be sure not to damage the skin around it.  · If you have a wound, look at it several times a day to make sure it is healing.  · Do not use heating pads or hot water bottles. They may burn your skin. If you have lost feeling in your feet or legs, you may not know it is happening until it is too late.  · Make sure your health care provider performs a complete foot exam at least annually or more often if you have foot problems. Report any cuts, sores, or bruises to your health care provider immediately.  SEEK MEDICAL CARE IF:   · You have an injury that is not healing.  · You have cuts or breaks in the skin.  · You have an ingrown nail.  · You notice redness on your legs or  feet.  · You feel burning or tingling in your legs or feet.  · You have pain or cramps in your legs and feet.  · Your legs or feet are numb.  · Your feet always feel cold.  SEEK IMMEDIATE MEDICAL CARE IF:   · There is increasing redness, swelling, or pain in or around a wound.  · There is a red line that goes up your leg.  · Pus is coming from a wound.  · You develop a fever or as directed by your health care provider.  · You notice a bad smell coming from an ulcer or wound.     This information is not intended to replace advice given to you by your health care provider. Make sure you discuss any questions you have with your health care provider.     Document Released: 12/15/2001 Document Revised: 08/20/2014 Document Reviewed: 05/27/2014  [employer] Patient Education ©2016 [employer]    Depression / Suicide Risk    As you are discharged from this [facility] facility, it is important to learn how to keep safe from harming yourself.    Recognize the warning signs:  · Abrupt changes in personality, positive or negative- including increase in energy   · Giving away possessions  · Change in eating patterns- significant weight changes-  positive or negative  · Change in sleeping patterns- unable to sleep or sleeping all the time   · Unwillingness or inability to communicate  · Depression  · Unusual sadness, discouragement and loneliness  · Talk of wanting to die  · Neglect of personal appearance   · Rebelliousness- reckless behavior  · Withdrawal from people/activities they love  · Confusion- inability to concentrate     If you or a loved one observes any of these behaviors or has concerns about self-harm, here's what you can do:  · Talk about it- your feelings and reasons for harming yourself  · Remove any means that you might use to hurt yourself (examples: pills, rope, extension cords, firearm)  · Get professional help from the community (Mental Health, Substance Abuse, psychological counseling)  · Do  not be alone:Call your Safe Contact- someone whom you trust who will be there for you.  · Call your local CRISIS HOTLINE 414-9661 or 594-343-1541  · Call your local Children's Mobile Crisis Response Team [place] (604) 838-5243 or www.[facility]  · Call the toll free National Suicide Prevention Hotlines   · National Suicide Prevention Lifeline 542-991-BZNI (0068)  · National for; to (do) Line Network 800-SUICIDE (047-0320)

## 2018-01-16 NOTE — DISCHARGE PLANNING
Medical Social Work    ASTER spoke with wife and she is on her way to  pt.  Oxygen has been arranged through [facility].  Oxygen has been delivered to pt's room for transport home.  Wife will need to call [facility] to set up oxygen tonight in the home, this has been arranged prior to pt going home.  SW notified bedside nurse.

## 2018-01-16 NOTE — PROGRESS NOTES
Assumed care pt @ 1900 after report from Day RN. Pt NATALI/Leonid. VSS. HRR. Lt arm with sling, NWB. BLE with dressing kept CDI.    Pt ready to be discharged. Discharge instructions given. Oxygen tank with patient.  Scripts given, AVS and consent for use of  controlled substances  Signed.     2010: pt discharged via wheelchair in private car accompanied in the lobby by CNA.

## 2018-01-16 NOTE — CARE PLAN
Problem: Discharge Barriers/Planning  Goal: Patient's continuum of care needs will be met  Outcome: PROGRESSING AS EXPECTED  Pt is cleared to dc when home o2 gets approved                                                        Problem: Pain Management  Goal: Pain level will decrease to patient's comfort goal  Outcome: PROGRESSING SLOWER THAN EXPECTED      Problem: Mobility  Goal: Risk for activity intolerance will decrease  Outcome: PROGRESSING AS EXPECTED  Pt is up with stand by assist. Sling in place for comfort

## 2018-01-17 LAB — EKG IMPRESSION: NORMAL

## 2018-01-25 ENCOUNTER — APPOINTMENT (OUTPATIENT)
Dept: RADIOLOGY | Facility: MEDICAL CENTER | Age: 63
DRG: 917 | End: 2018-01-25
Attending: EMERGENCY MEDICINE

## 2018-01-25 ENCOUNTER — RESOLUTE PROFESSIONAL BILLING HOSPITAL PROF FEE (OUTPATIENT)
Dept: HOSPITALIST | Facility: MEDICAL CENTER | Age: 63
End: 2018-01-25
Payer: COMMERCIAL

## 2018-01-25 ENCOUNTER — HOSPITAL ENCOUNTER (INPATIENT)
Facility: MEDICAL CENTER | Age: 63
LOS: 4 days | DRG: 917 | End: 2018-01-29
Attending: EMERGENCY MEDICINE | Admitting: HOSPITALIST
Payer: COMMERCIAL

## 2018-01-25 DIAGNOSIS — R06.03 RESPIRATORY DISTRESS: ICD-10-CM

## 2018-01-25 DIAGNOSIS — J96.21 ACUTE ON CHRONIC RESPIRATORY FAILURE WITH HYPOXIA (HCC): ICD-10-CM

## 2018-01-25 DIAGNOSIS — G89.4 CHRONIC PAIN SYNDROME: ICD-10-CM

## 2018-01-25 DIAGNOSIS — M51.37 DEGENERATION OF LUMBAR OR LUMBOSACRAL INTERVERTEBRAL DISC: ICD-10-CM

## 2018-01-25 DIAGNOSIS — T40.601A NARCOTIC OVERDOSE, ACCIDENTAL OR UNINTENTIONAL, INITIAL ENCOUNTER (HCC): ICD-10-CM

## 2018-01-25 DIAGNOSIS — J81.0 ACUTE PULMONARY EDEMA (HCC): ICD-10-CM

## 2018-01-25 DIAGNOSIS — T40.601A OPIATE OVERDOSE, ACCIDENTAL OR UNINTENTIONAL, INITIAL ENCOUNTER (HCC): ICD-10-CM

## 2018-01-25 PROBLEM — R79.89 ELEVATED TROPONIN: Status: ACTIVE | Noted: 2018-01-25

## 2018-01-25 PROBLEM — R40.4 ALTERED LEVEL OF CONSCIOUSNESS: Status: ACTIVE | Noted: 2018-01-25

## 2018-01-25 PROBLEM — J96.01 ACUTE RESPIRATORY FAILURE WITH HYPOXIA (HCC): Status: ACTIVE | Noted: 2018-01-25

## 2018-01-25 LAB
ALBUMIN SERPL BCP-MCNC: 3.1 G/DL (ref 3.2–4.9)
ALBUMIN/GLOB SERPL: 0.8 G/DL
ALP SERPL-CCNC: 89 U/L (ref 30–99)
ALT SERPL-CCNC: 23 U/L (ref 2–50)
ANION GAP SERPL CALC-SCNC: 8 MMOL/L (ref 0–11.9)
APTT PPP: 29.7 SEC (ref 24.7–36)
AST SERPL-CCNC: 28 U/L (ref 12–45)
BASOPHILS # BLD AUTO: 0.9 % (ref 0–1.8)
BASOPHILS # BLD: 0.09 K/UL (ref 0–0.12)
BILIRUB SERPL-MCNC: 0.4 MG/DL (ref 0.1–1.5)
BNP SERPL-MCNC: 167 PG/ML (ref 0–100)
BUN BLD-MCNC: 16 MG/DL (ref 8–22)
BUN SERPL-MCNC: 14 MG/DL (ref 8–22)
CALCIUM SERPL-MCNC: 9.1 MG/DL (ref 8.5–10.5)
CHLORIDE BLD-SCNC: 94 MMOL/L (ref 96–112)
CHLORIDE SERPL-SCNC: 96 MMOL/L (ref 96–112)
CO2 BLD-SCNC: 35 MMOL/L (ref 20–33)
CO2 SERPL-SCNC: 32 MMOL/L (ref 20–33)
CREAT BLD-MCNC: 1.3 MG/DL (ref 0.5–1.4)
CREAT SERPL-MCNC: 0.59 MG/DL (ref 0.5–1.4)
EOSINOPHIL # BLD AUTO: 0.38 K/UL (ref 0–0.51)
EOSINOPHIL NFR BLD: 3.7 % (ref 0–6.9)
ERYTHROCYTE [DISTWIDTH] IN BLOOD BY AUTOMATED COUNT: 51.3 FL (ref 35.9–50)
FLUAV RNA SPEC QL NAA+PROBE: NEGATIVE
FLUBV RNA SPEC QL NAA+PROBE: NEGATIVE
GLOBULIN SER CALC-MCNC: 4 G/DL (ref 1.9–3.5)
GLUCOSE BLD-MCNC: 165 MG/DL (ref 65–99)
GLUCOSE SERPL-MCNC: 155 MG/DL (ref 65–99)
HCT VFR BLD AUTO: 46.5 % (ref 42–52)
HGB BLD-MCNC: 14.6 G/DL (ref 14–18)
IMM GRANULOCYTES # BLD AUTO: 0.04 K/UL (ref 0–0.11)
IMM GRANULOCYTES NFR BLD AUTO: 0.4 % (ref 0–0.9)
LACTATE BLD-SCNC: 1.8 MMOL/L (ref 0.5–2)
LACTATE BLD-SCNC: 1.8 MMOL/L (ref 0.5–2)
LYMPHOCYTES # BLD AUTO: 2.05 K/UL (ref 1–4.8)
LYMPHOCYTES NFR BLD: 20 % (ref 22–41)
MAGNESIUM SERPL-MCNC: 1.5 MG/DL (ref 1.5–2.5)
MCH RBC QN AUTO: 28.4 PG (ref 27–33)
MCHC RBC AUTO-ENTMCNC: 31.4 G/DL (ref 33.7–35.3)
MCV RBC AUTO: 90.5 FL (ref 81.4–97.8)
MONOCYTES # BLD AUTO: 1.09 K/UL (ref 0–0.85)
MONOCYTES NFR BLD AUTO: 10.6 % (ref 0–13.4)
NEUTROPHILS # BLD AUTO: 6.62 K/UL (ref 1.82–7.42)
NEUTROPHILS NFR BLD: 64.4 % (ref 44–72)
NRBC # BLD AUTO: 0 K/UL
NRBC BLD-RTO: 0 /100 WBC
PLATELET # BLD AUTO: 232 K/UL (ref 164–446)
PMV BLD AUTO: 10.1 FL (ref 9–12.9)
POTASSIUM BLD-SCNC: 4.1 MMOL/L (ref 3.6–5.5)
POTASSIUM SERPL-SCNC: 4.1 MMOL/L (ref 3.6–5.5)
PROT SERPL-MCNC: 7.1 G/DL (ref 6–8.2)
RBC # BLD AUTO: 5.14 M/UL (ref 4.7–6.1)
SODIUM BLD-SCNC: 137 MMOL/L (ref 135–145)
SODIUM SERPL-SCNC: 136 MMOL/L (ref 135–145)
TROPONIN I SERPL-MCNC: 0.59 NG/ML (ref 0–0.04)
TROPONIN I SERPL-MCNC: 0.94 NG/ML (ref 0–0.04)
WBC # BLD AUTO: 10.3 K/UL (ref 4.8–10.8)

## 2018-01-25 PROCEDURE — 700105 HCHG RX REV CODE 258: Performed by: EMERGENCY MEDICINE

## 2018-01-25 PROCEDURE — 87040 BLOOD CULTURE FOR BACTERIA: CPT

## 2018-01-25 PROCEDURE — 82374 ASSAY BLOOD CARBON DIOXIDE: CPT

## 2018-01-25 PROCEDURE — 70450 CT HEAD/BRAIN W/O DYE: CPT

## 2018-01-25 PROCEDURE — 700111 HCHG RX REV CODE 636 W/ 250 OVERRIDE (IP): Performed by: HOSPITALIST

## 2018-01-25 PROCEDURE — 84295 ASSAY OF SERUM SODIUM: CPT

## 2018-01-25 PROCEDURE — 700101 HCHG RX REV CODE 250: Performed by: EMERGENCY MEDICINE

## 2018-01-25 PROCEDURE — 82565 ASSAY OF CREATININE: CPT

## 2018-01-25 PROCEDURE — 84132 ASSAY OF SERUM POTASSIUM: CPT

## 2018-01-25 PROCEDURE — 83880 ASSAY OF NATRIURETIC PEPTIDE: CPT

## 2018-01-25 PROCEDURE — 80053 COMPREHEN METABOLIC PANEL: CPT

## 2018-01-25 PROCEDURE — 700105 HCHG RX REV CODE 258: Performed by: HOSPITALIST

## 2018-01-25 PROCEDURE — 99291 CRITICAL CARE FIRST HOUR: CPT

## 2018-01-25 PROCEDURE — 700111 HCHG RX REV CODE 636 W/ 250 OVERRIDE (IP)

## 2018-01-25 PROCEDURE — 83605 ASSAY OF LACTIC ACID: CPT

## 2018-01-25 PROCEDURE — 770022 HCHG ROOM/CARE - ICU (200)

## 2018-01-25 PROCEDURE — 96365 THER/PROPH/DIAG IV INF INIT: CPT

## 2018-01-25 PROCEDURE — 85730 THROMBOPLASTIN TIME PARTIAL: CPT

## 2018-01-25 PROCEDURE — 96366 THER/PROPH/DIAG IV INF ADDON: CPT

## 2018-01-25 PROCEDURE — 71045 X-RAY EXAM CHEST 1 VIEW: CPT

## 2018-01-25 PROCEDURE — 82435 ASSAY OF BLOOD CHLORIDE: CPT

## 2018-01-25 PROCEDURE — 99291 CRITICAL CARE FIRST HOUR: CPT | Performed by: HOSPITALIST

## 2018-01-25 PROCEDURE — 84520 ASSAY OF UREA NITROGEN: CPT

## 2018-01-25 PROCEDURE — 700111 HCHG RX REV CODE 636 W/ 250 OVERRIDE (IP): Performed by: EMERGENCY MEDICINE

## 2018-01-25 PROCEDURE — 84484 ASSAY OF TROPONIN QUANT: CPT

## 2018-01-25 PROCEDURE — 96375 TX/PRO/DX INJ NEW DRUG ADDON: CPT

## 2018-01-25 PROCEDURE — 82947 ASSAY GLUCOSE BLOOD QUANT: CPT

## 2018-01-25 PROCEDURE — 87502 INFLUENZA DNA AMP PROBE: CPT

## 2018-01-25 PROCEDURE — 93005 ELECTROCARDIOGRAM TRACING: CPT | Performed by: EMERGENCY MEDICINE

## 2018-01-25 PROCEDURE — 83735 ASSAY OF MAGNESIUM: CPT

## 2018-01-25 PROCEDURE — 96376 TX/PRO/DX INJ SAME DRUG ADON: CPT

## 2018-01-25 PROCEDURE — 85025 COMPLETE CBC W/AUTO DIFF WBC: CPT

## 2018-01-25 RX ORDER — ONDANSETRON 2 MG/ML
4 INJECTION INTRAMUSCULAR; INTRAVENOUS ONCE
Status: COMPLETED | OUTPATIENT
Start: 2018-01-25 | End: 2018-01-25

## 2018-01-25 RX ORDER — DEXTROSE MONOHYDRATE 25 G/50ML
25 INJECTION, SOLUTION INTRAVENOUS
Status: DISCONTINUED | OUTPATIENT
Start: 2018-01-25 | End: 2018-01-29 | Stop reason: HOSPADM

## 2018-01-25 RX ORDER — NALOXONE HYDROCHLORIDE 0.4 MG/ML
0.4 INJECTION, SOLUTION INTRAMUSCULAR; INTRAVENOUS; SUBCUTANEOUS ONCE
Status: COMPLETED | OUTPATIENT
Start: 2018-01-25 | End: 2018-01-25

## 2018-01-25 RX ORDER — KETAMINE HYDROCHLORIDE 50 MG/ML
25 INJECTION, SOLUTION INTRAMUSCULAR; INTRAVENOUS ONCE
Status: COMPLETED | OUTPATIENT
Start: 2018-01-25 | End: 2018-01-25

## 2018-01-25 RX ORDER — AMOXICILLIN 250 MG
2 CAPSULE ORAL 2 TIMES DAILY
Status: DISCONTINUED | OUTPATIENT
Start: 2018-01-25 | End: 2018-01-29 | Stop reason: HOSPADM

## 2018-01-25 RX ORDER — PROMETHAZINE HYDROCHLORIDE 25 MG/1
12.5-25 TABLET ORAL EVERY 4 HOURS PRN
Status: DISCONTINUED | OUTPATIENT
Start: 2018-01-25 | End: 2018-01-29 | Stop reason: HOSPADM

## 2018-01-25 RX ORDER — ONDANSETRON 2 MG/ML
4 INJECTION INTRAMUSCULAR; INTRAVENOUS EVERY 4 HOURS PRN
Status: DISCONTINUED | OUTPATIENT
Start: 2018-01-25 | End: 2018-01-29 | Stop reason: HOSPADM

## 2018-01-25 RX ORDER — LORAZEPAM 2 MG/ML
1 INJECTION INTRAMUSCULAR ONCE
Status: COMPLETED | OUTPATIENT
Start: 2018-01-25 | End: 2018-01-25

## 2018-01-25 RX ORDER — HEPARIN SODIUM 5000 [USP'U]/ML
5000 INJECTION, SOLUTION INTRAVENOUS; SUBCUTANEOUS EVERY 8 HOURS
Status: DISCONTINUED | OUTPATIENT
Start: 2018-01-25 | End: 2018-01-29 | Stop reason: HOSPADM

## 2018-01-25 RX ORDER — NICOTINE 21 MG/24HR
21 PATCH, TRANSDERMAL 24 HOURS TRANSDERMAL
Status: DISCONTINUED | OUTPATIENT
Start: 2018-01-26 | End: 2018-01-29 | Stop reason: HOSPADM

## 2018-01-25 RX ORDER — BISACODYL 10 MG
10 SUPPOSITORY, RECTAL RECTAL
Status: DISCONTINUED | OUTPATIENT
Start: 2018-01-25 | End: 2018-01-29 | Stop reason: HOSPADM

## 2018-01-25 RX ORDER — ONDANSETRON 4 MG/1
4 TABLET, ORALLY DISINTEGRATING ORAL EVERY 4 HOURS PRN
Status: DISCONTINUED | OUTPATIENT
Start: 2018-01-25 | End: 2018-01-29 | Stop reason: HOSPADM

## 2018-01-25 RX ORDER — POLYETHYLENE GLYCOL 3350 17 G/17G
1 POWDER, FOR SOLUTION ORAL
Status: DISCONTINUED | OUTPATIENT
Start: 2018-01-25 | End: 2018-01-29 | Stop reason: HOSPADM

## 2018-01-25 RX ORDER — NALOXONE HYDROCHLORIDE 0.4 MG/ML
INJECTION, SOLUTION INTRAMUSCULAR; INTRAVENOUS; SUBCUTANEOUS
Status: COMPLETED
Start: 2018-01-25 | End: 2018-01-25

## 2018-01-25 RX ORDER — PROMETHAZINE HYDROCHLORIDE 25 MG/1
12.5-25 SUPPOSITORY RECTAL EVERY 4 HOURS PRN
Status: DISCONTINUED | OUTPATIENT
Start: 2018-01-25 | End: 2018-01-29 | Stop reason: HOSPADM

## 2018-01-25 RX ORDER — INSULIN GLARGINE 100 [IU]/ML
10 INJECTION, SOLUTION SUBCUTANEOUS
Status: DISCONTINUED | OUTPATIENT
Start: 2018-01-25 | End: 2018-01-27

## 2018-01-25 RX ADMIN — ONDANSETRON 4 MG: 2 INJECTION INTRAMUSCULAR; INTRAVENOUS at 16:25

## 2018-01-25 RX ADMIN — HEPARIN SODIUM 5000 UNITS: 5000 INJECTION, SOLUTION INTRAVENOUS; SUBCUTANEOUS at 22:50

## 2018-01-25 RX ADMIN — AMPICILLIN SODIUM AND SULBACTAM SODIUM 3 G: 2; 1 INJECTION, POWDER, FOR SOLUTION INTRAMUSCULAR; INTRAVENOUS at 20:59

## 2018-01-25 RX ADMIN — NALOXONE HYDROCHLORIDE 0.1 MG/HR: 1 INJECTION PARENTERAL at 19:39

## 2018-01-25 RX ADMIN — NALOXONE HYDROCHLORIDE 0.4 MG: 0.4 INJECTION, SOLUTION INTRAMUSCULAR; INTRAVENOUS; SUBCUTANEOUS at 16:10

## 2018-01-25 RX ADMIN — LORAZEPAM 1 MG: 2 INJECTION INTRAMUSCULAR; INTRAVENOUS at 17:04

## 2018-01-25 RX ADMIN — KETAMINE HYDROCHLORIDE 25 MG: 50 INJECTION, SOLUTION, CONCENTRATE INTRAMUSCULAR; INTRAVENOUS at 16:25

## 2018-01-25 RX ADMIN — NALOXONE HYDROCHLORIDE 0.4 MG: 0.4 INJECTION, SOLUTION INTRAMUSCULAR; INTRAVENOUS; SUBCUTANEOUS at 18:45

## 2018-01-25 RX ADMIN — AMPICILLIN SODIUM AND SULBACTAM SODIUM: 100; 50 INJECTION, POWDER, FOR SOLUTION INTRAVENOUS at 21:38

## 2018-01-25 ASSESSMENT — PAIN SCALES - GENERAL: PAINLEVEL_OUTOF10: 6

## 2018-01-26 ENCOUNTER — APPOINTMENT (OUTPATIENT)
Dept: RADIOLOGY | Facility: MEDICAL CENTER | Age: 63
DRG: 917 | End: 2018-01-26
Attending: HOSPITALIST

## 2018-01-26 PROBLEM — J69.0 ASPIRATION PNEUMONIA (HCC): Status: ACTIVE | Noted: 2018-01-26

## 2018-01-26 PROBLEM — J96.21 ACUTE ON CHRONIC RESPIRATORY FAILURE WITH HYPOXIA (HCC): Status: ACTIVE | Noted: 2018-01-25

## 2018-01-26 LAB
ACTION RANGE TRIGGERED IACRT: NO
ANION GAP SERPL CALC-SCNC: 7 MMOL/L (ref 0–11.9)
BASE EXCESS BLDA CALC-SCNC: 7 MMOL/L (ref -4–3)
BASOPHILS # BLD AUTO: 0.5 % (ref 0–1.8)
BASOPHILS # BLD: 0.06 K/UL (ref 0–0.12)
BODY TEMPERATURE: ABNORMAL DEGREES
BUN SERPL-MCNC: 12 MG/DL (ref 8–22)
CALCIUM SERPL-MCNC: 8.6 MG/DL (ref 8.5–10.5)
CHLORIDE SERPL-SCNC: 101 MMOL/L (ref 96–112)
CO2 BLDA-SCNC: 34 MMOL/L (ref 20–33)
CO2 SERPL-SCNC: 30 MMOL/L (ref 20–33)
CREAT SERPL-MCNC: 0.71 MG/DL (ref 0.5–1.4)
EOSINOPHIL # BLD AUTO: 0.29 K/UL (ref 0–0.51)
EOSINOPHIL NFR BLD: 2.6 % (ref 0–6.9)
ERYTHROCYTE [DISTWIDTH] IN BLOOD BY AUTOMATED COUNT: 50.3 FL (ref 35.9–50)
GLUCOSE BLD-MCNC: 109 MG/DL (ref 65–99)
GLUCOSE BLD-MCNC: 129 MG/DL (ref 65–99)
GLUCOSE BLD-MCNC: 132 MG/DL (ref 65–99)
GLUCOSE SERPL-MCNC: 110 MG/DL (ref 65–99)
HCO3 BLDA-SCNC: 33 MMOL/L (ref 17–25)
HCT VFR BLD AUTO: 43 % (ref 42–52)
HGB BLD-MCNC: 14.3 G/DL (ref 14–18)
IMM GRANULOCYTES # BLD AUTO: 0.03 K/UL (ref 0–0.11)
IMM GRANULOCYTES NFR BLD AUTO: 0.3 % (ref 0–0.9)
INST. QUALIFIED PATIENT IIQPT: YES
LYMPHOCYTES # BLD AUTO: 1.68 K/UL (ref 1–4.8)
LYMPHOCYTES NFR BLD: 14.8 % (ref 22–41)
MCH RBC QN AUTO: 29.7 PG (ref 27–33)
MCHC RBC AUTO-ENTMCNC: 33.3 G/DL (ref 33.7–35.3)
MCV RBC AUTO: 89.2 FL (ref 81.4–97.8)
MONOCYTES # BLD AUTO: 0.95 K/UL (ref 0–0.85)
MONOCYTES NFR BLD AUTO: 8.4 % (ref 0–13.4)
NEUTROPHILS # BLD AUTO: 8.36 K/UL (ref 1.82–7.42)
NEUTROPHILS NFR BLD: 73.4 % (ref 44–72)
NRBC # BLD AUTO: 0 K/UL
NRBC BLD-RTO: 0 /100 WBC
O2/TOTAL GAS SETTING VFR VENT: 65 %
PCO2 BLDA: 49 MMHG (ref 26–37)
PCO2 TEMP ADJ BLDA: 49 MMHG (ref 26–37)
PH BLDA: 7.44 [PH] (ref 7.4–7.5)
PH TEMP ADJ BLDA: 7.44 [PH] (ref 7.4–7.5)
PLATELET # BLD AUTO: 182 K/UL (ref 164–446)
PMV BLD AUTO: 9.9 FL (ref 9–12.9)
PO2 BLDA: 64 MMHG (ref 64–87)
PO2 TEMP ADJ BLDA: 64 MMHG (ref 64–87)
POTASSIUM SERPL-SCNC: 4 MMOL/L (ref 3.6–5.5)
RBC # BLD AUTO: 4.82 M/UL (ref 4.7–6.1)
SAO2 % BLDA: 92 % (ref 93–99)
SODIUM SERPL-SCNC: 138 MMOL/L (ref 135–145)
SPECIMEN DRAWN FROM PATIENT: ABNORMAL
TROPONIN I SERPL-MCNC: 0.55 NG/ML (ref 0–0.04)
WBC # BLD AUTO: 11.4 K/UL (ref 4.8–10.8)

## 2018-01-26 PROCEDURE — 82803 BLOOD GASES ANY COMBINATION: CPT

## 2018-01-26 PROCEDURE — 51798 US URINE CAPACITY MEASURE: CPT

## 2018-01-26 PROCEDURE — 700111 HCHG RX REV CODE 636 W/ 250 OVERRIDE (IP): Performed by: HOSPITALIST

## 2018-01-26 PROCEDURE — A9270 NON-COVERED ITEM OR SERVICE: HCPCS | Performed by: HOSPITALIST

## 2018-01-26 PROCEDURE — 700105 HCHG RX REV CODE 258: Performed by: INTERNAL MEDICINE

## 2018-01-26 PROCEDURE — 80048 BASIC METABOLIC PNL TOTAL CA: CPT

## 2018-01-26 PROCEDURE — 84484 ASSAY OF TROPONIN QUANT: CPT

## 2018-01-26 PROCEDURE — 770022 HCHG ROOM/CARE - ICU (200)

## 2018-01-26 PROCEDURE — 700102 HCHG RX REV CODE 250 W/ 637 OVERRIDE(OP): Performed by: HOSPITALIST

## 2018-01-26 PROCEDURE — 82962 GLUCOSE BLOOD TEST: CPT

## 2018-01-26 PROCEDURE — 36600 WITHDRAWAL OF ARTERIAL BLOOD: CPT

## 2018-01-26 PROCEDURE — 99233 SBSQ HOSP IP/OBS HIGH 50: CPT | Performed by: HOSPITALIST

## 2018-01-26 PROCEDURE — 85025 COMPLETE CBC W/AUTO DIFF WBC: CPT

## 2018-01-26 PROCEDURE — 71045 X-RAY EXAM CHEST 1 VIEW: CPT

## 2018-01-26 RX ORDER — SODIUM CHLORIDE 9 MG/ML
INJECTION, SOLUTION INTRAVENOUS CONTINUOUS
Status: DISCONTINUED | OUTPATIENT
Start: 2018-01-26 | End: 2018-01-26

## 2018-01-26 RX ORDER — ASPIRIN 81 MG/1
81 TABLET, CHEWABLE ORAL DAILY
Status: DISCONTINUED | OUTPATIENT
Start: 2018-01-26 | End: 2018-01-29 | Stop reason: HOSPADM

## 2018-01-26 RX ORDER — SODIUM CHLORIDE 9 MG/ML
INJECTION, SOLUTION INTRAVENOUS CONTINUOUS
Status: DISCONTINUED | OUTPATIENT
Start: 2018-01-26 | End: 2018-01-28

## 2018-01-26 RX ORDER — ATORVASTATIN CALCIUM 40 MG/1
40 TABLET, FILM COATED ORAL
Status: DISCONTINUED | OUTPATIENT
Start: 2018-01-26 | End: 2018-01-29 | Stop reason: HOSPADM

## 2018-01-26 RX ADMIN — SODIUM CHLORIDE: 9 INJECTION, SOLUTION INTRAVENOUS at 09:58

## 2018-01-26 RX ADMIN — HEPARIN SODIUM 5000 UNITS: 5000 INJECTION, SOLUTION INTRAVENOUS; SUBCUTANEOUS at 06:10

## 2018-01-26 RX ADMIN — HEPARIN SODIUM 5000 UNITS: 5000 INJECTION, SOLUTION INTRAVENOUS; SUBCUTANEOUS at 21:13

## 2018-01-26 RX ADMIN — ATORVASTATIN CALCIUM 40 MG: 40 TABLET, FILM COATED ORAL at 21:14

## 2018-01-26 RX ADMIN — STANDARDIZED SENNA CONCENTRATE AND DOCUSATE SODIUM 2 TABLET: 8.6; 5 TABLET, FILM COATED ORAL at 14:41

## 2018-01-26 RX ADMIN — ONDANSETRON 4 MG: 4 TABLET, ORALLY DISINTEGRATING ORAL at 23:29

## 2018-01-26 RX ADMIN — HEPARIN SODIUM 5000 UNITS: 5000 INJECTION, SOLUTION INTRAVENOUS; SUBCUTANEOUS at 14:40

## 2018-01-26 RX ADMIN — INSULIN GLARGINE 10 UNITS: 100 INJECTION, SOLUTION SUBCUTANEOUS at 21:21

## 2018-01-26 RX ADMIN — ASPIRIN 81 MG: 81 TABLET, CHEWABLE ORAL at 21:15

## 2018-01-26 RX ADMIN — NICOTINE 21 MG: 21 PATCH, EXTENDED RELEASE TRANSDERMAL at 06:09

## 2018-01-26 ASSESSMENT — PAIN SCALES - GENERAL
PAINLEVEL_OUTOF10: 2
PAINLEVEL_OUTOF10: 3
PAINLEVEL_OUTOF10: 4
PAINLEVEL_OUTOF10: 5
PAINLEVEL_OUTOF10: 4
PAINLEVEL_OUTOF10: 6
PAINLEVEL_OUTOF10: 6

## 2018-01-26 ASSESSMENT — ENCOUNTER SYMPTOMS
PALPITATIONS: 0
NAUSEA: 0
ABDOMINAL PAIN: 0
HALLUCINATIONS: 0
EYE DISCHARGE: 0
DIZZINESS: 0
BLOOD IN STOOL: 0
FEVER: 0
NERVOUS/ANXIOUS: 0
SPUTUM PRODUCTION: 0
COUGH: 1
TREMORS: 0
SPEECH CHANGE: 0
VOMITING: 0
FLANK PAIN: 0
EYE REDNESS: 0
BACK PAIN: 1

## 2018-01-26 ASSESSMENT — COPD QUESTIONNAIRES
DO YOU EVER COUGH UP ANY MUCUS OR PHLEGM?: NO/ONLY WITH OCCASIONAL COLDS OR INFECTIONS
DURING THE PAST 4 WEEKS HOW MUCH DID YOU FEEL SHORT OF BREATH: NONE/LITTLE OF THE TIME
HAVE YOU SMOKED AT LEAST 100 CIGARETTES IN YOUR ENTIRE LIFE: YES
COPD SCREENING SCORE: 4

## 2018-01-26 ASSESSMENT — LIFESTYLE VARIABLES: EVER_SMOKED: YES

## 2018-01-26 NOTE — ED NOTES
Chief Complaint   Patient presents with   • ALOC     1545-Patient bib wife from doctor office, states patient became ALOC around 1200. Patient VSS unstable in triage, sating 60% on RA.     1550-Patient taken to Ortho room, ERP called to bedside. PIV placed. NRB mask in place.

## 2018-01-26 NOTE — PROGRESS NOTES
Pulmonary Critical Care Progress Note        Chief Complaint: Change in mental status    History of Present Illness:  I was called to evaluate this patient urgently is being placed on a Narcan drip. He is a 62-year-old male with a history of chronic pain and narcotic dependence. He's been using methadone daily for many years. Apparently his wife noted that he had been lethargic and acting abnormally. He is brought to his primary care physician because of significant somnolence he was transferred to the ED. He received Narcan with significant improvement in mental status. Complained of back pain. He is required several doses of Narcan in the ED and is now being placed on Narcan drip and transferred to the intensive care unit. In the ICU on a Narcan drip he easily arouses, follows all commands. Currently denies pain. He'll be monitored very closely in the ICU with risk for further respiratory depression or possible need for intubation. There is concern for aspiration and he was placed on IV Unasyn. He was initially on a 15 L nonrebreather currently on 7 L oxygen mask.    Please note above history obtained from my partner Dr. Mcgovern.     ROS:  Respiratory: positive shortness of breath, Cardiac: unable to perform due to the patient's inability to effectively communicate, GI: unable to perform due to the patient's inability to effectively communicate.  All other systems negative.    Interval Events:  24 hour interval history reviewed    -Opiate withdrawal in the past. Crush injury to right shoulder. Sling in place.   -Off narcan today as of this a.m. No signs of additional somnolence from the pain medication  -Pneumonia and influenza recently.  -Passed swallow this am.   -Ns at 75 cc/hour  -Influenza - this visit  -Incentive spirometry  -Day 2 unasyn.  -tropi trending down .94 highest  -Echo ordered.     PFSH:  No change.    Respiratory:     Pulse Oximetry: 95 %  Chest Tube Drains:          Exam: rales  bibasilar  ImagingCXR  I have personally reviewed the chest x-ray my impression is  bilateral alveolar densities. Her inspiratory effort is noted. There appears to be fracture, subacute of the left shoulder region.         Invalid input(s): [id number]    HemoDynamics:  Pulse: 90, Heart Rate (Monitored): 89  Blood Pressure: 125/76, NIBP: 115/67       Exam: regular rate and rhythm  Imaging: Echocardiogram has been ordered but is pending interpretation.  Recent Labs      01/25/18   1608  01/25/18   2300   TROPONINI  0.94*  0.59*   BNPBTYPENAT  167*   --        Neuro:  GCS Total [place] Coma Score: 14       Exam: mild Sedated initially early this am, but now improved without evidence of somnolence. Off Narcan infusion for 3 hours prior to rounding. Follows commands. Complains of pain. He has no obvious focal deficits of his right upper extremity and legs bilaterally. His left arm is in a sling due to recent fracture and previous multiple surgeries. He has adequate sensation of his fingertips however.  Imaging: None - Reviewed and Ct Brain Reviewed from yesterday which revealed no obvious acute abnormality with consultations to vertebral carotid arteries.    Fluids:  Intake/Output       01/24/18 0700 - 01/25/18 0659 (Not Admitted) 01/25/18 0700 - 01/26/18 0659 01/26/18 0700 - 01/27/18 0659      [id number] 2081-0685 Total 8601-0174 5108-0253 Total 0285-2703 3879-5659 Total       Intake    Total Intake -- -- -- -- -- -- -- -- --       Output    Urine  --  -- --  --  250 250  --  -- --    Number of Times Voided -- -- -- -- 1 x 1 x -- -- --    Void (ml) -- -- -- -- 250 250 -- -- --    Stool  --  -- --  --  -- --  --  -- --    Number of Times Stooled -- -- -- -- 0 x 0 x -- -- --    Total Output -- -- -- -- 250 250 -- -- --       Net I/O     -- -- -- -- -250 -250 -- -- --        Weight: 99.5 kg (219 lb 5.7 oz)  Recent Labs      01/25/18   1608   SODIUM  136   POTASSIUM  4.1   CHLORIDE  96   CO2  32   BUN  14   CREATININE   0.59   MAGNESIUM  1.5   CALCIUM  9.1       GI/Nutrition:  Exam: abdomen is soft and non-tender  Imaging: None - Reviewed  NPO and taking PO  Liver Function  Recent Labs      18   1608   ALTSGPT  23   ASTSGOT  28   ALKPHOSPHAT  89   TBILIRUBIN  0.4   GLUCOSE  155*       Heme:  Recent Labs      18   1608   RBC  5.14   HEMOGLOBIN  14.6   HEMATOCRIT  46.5   PLATELETCT  232   APTT  29.7       Infectious Disease:  Temp  Av.7 °C (98.1 °F)  Min: 36.3 °C (97.3 °F)  Max: 37.1 °C (98.8 °F)  Micro: antibiotics reviewed. Negative influenza. Evidence of pneumonia and ongoing hypoxemia.  Recent Labs      18   1608   WBC  10.3   NEUTSPOLYS  64.40   LYMPHOCYTES  20.00*   MONOCYTES  10.60   EOSINOPHILS  3.70   BASOPHILS  0.90   ASTSGOT  28   ALTSGPT  23   ALKPHOSPHAT  89   TBILIRUBIN  0.4     Current Facility-Administered Medications   Medication Dose Frequency Provider Last Rate Last Dose   • Naloxone HCl (NARCAN) 4 mg in NS 1,000 mL infusion  0.1-2 mg/hr Continuous Pastor Sewell M.D. 50 mL/hr at 18 2307 0.2 mg/hr at 18 2307   • insulin glargine (LANTUS) injection 10 Units  10 Units QHS Erica Harris M.D.   Stopped at 18 2100   • senna-docusate (PERICOLACE or SENOKOT S) 8.6-50 MG per tablet 2 Tab  2 Tab BID Erica Harris M.D.        And   • polyethylene glycol/lytes (MIRALAX) PACKET 1 Packet  1 Packet QDAY PRN Erica Harris M.D.        And   • magnesium hydroxide (MILK OF MAGNESIA) suspension 30 mL  30 mL QDAY PRN Erica Harris M.D.        And   • bisacodyl (DULCOLAX) suppository 10 mg  10 mg QDAY PRN Erica Harris M.D.       • Respiratory Care per Protocol   Continuous RT Erica Harris M.D.       • heparin injection 5,000 Units  5,000 Units Q8HRS Erica Harris M.D.   5,000 Units at 18 0610   • ondansetron (ZOFRAN) syringe/vial injection 4 mg  4 mg Q4HRS PRN Erica Harris M.D.       • ondansetron (ZOFRAN ODT) dispertab 4 mg  4 mg Q4HRS PRN Erica Harris M.D.        • promethazine (PHENERGAN) tablet 12.5-25 mg  12.5-25 mg Q4HRS PRN Erica Harris M.D.       • promethazine (PHENERGAN) suppository 12.5-25 mg  12.5-25 mg Q4HRS PRN Erica Harris M.D.       • prochlorperazine (COMPAZINE) injection 5-10 mg  5-10 mg Q4HRS PRN Erica Harris M.D.       • nicotine (NICODERM) 21 MG/24HR 21 mg  21 mg Daily-0600 Erica Harris M.D.   21 mg at 01/26/18 0609    And   • nicotine polacrilex (NICORETTE) 2 MG piece 2 mg  2 mg Q HOUR PRN Erica Harris M.D.       • insulin regular (HUMULIN R) injection 3-14 Units  3-14 Units 4X/DAY YASH Harris M.D.   Stopped at 01/25/18 2100    And   • glucose 4 g chewable tablet 16 g  16 g Q15 MIN PRN Erica Harris M.D.        And   • dextrose 50% (D50W) injection 25 mL  25 mL Q15 MIN PRN Erica Harris M.D.       • ampicillin-sulbactam 12 g in  mL infusion   Continuous Abx Erica Harris M.D. 20.83 mL/hr at 01/25/18 2138       Last reviewed on 1/25/2018  8:37 PM by Rae Ch, Pharmacy Intern    Quality  Measures:  Labs reviewed, Medications reviewed and Radiology images reviewed  Lutz catheter: No Lutz      DVT Prophylaxis: Heparin  DVT prophylaxis - mechanical: SCDs  Ulcer prophylaxis: Yes      Assessment and plan:    1.  Acute hypoxemic respiratory failure:    -Patient has bilateral alveolar infiltrates. Appears to be consistent with aspiration pneumonia.  -Previous recent influenza which was negative at this admission.  -Continue with Unasyn for now.  -Note BNP level was low not consistent with pulmonary edema.    2. 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    -Patient required Narcan infusion overnight but is improved.  -I have trialed off current regimen and will slowly reintroduce pain medication as needed.    3.  Diabetes mellitus.    -Glycemic control    4. Left bundle branch block pattern    -Mild increase in troponin level. This may be a demand ischemic effect. Rule out acute ischemic  event.  -Echocardiogram is pending.    5. Recurrent left shoulder injury including current proximal humeral fracture.     -Patient has had 5 previous surgeries, but current injury is nonoperative. Patient remains in sling.    Discussed patient condition and risk of morbidity and/or mortality with Charge nurse / hot rounds.  M with multidisciplinary team  The patient remains critically ill.  Critical care time = 40 minutes in directly providing and coordinating critical care and extensive data review.  No time overlap and excludes procedures.    Masoud Hernadez D.O.

## 2018-01-26 NOTE — CONSULTS
Critical Care/Pulmonary Consultation    Date of service: 1/26/2018    Consulting Physician: Erica Harris M.D.    Chief Complaint:  Altered mental status    History of Present Illness:  I was called to evaluate this patient urgently is being placed on a Narcan drip. He is a 62-year-old male with a history of chronic pain and narcotic dependence. He's been using methadone daily for many years. Apparently his wife noted that he had been lethargic and acting abnormally. He is brought to his primary care physician because of significant somnolence he was transferred to the ED. He received Narcan with significant improvement in mental status. Complained of back pain. He is required several doses of Narcan in the ED and is now being placed on Narcan drip and transferred to the intensive care unit. In the ICU on a Narcan drip he easily arouses, follows all commands. Currently denies pain. He'll be monitored very closely in the ICU with risk for further respiratory depression or possible need for intubation. There is concern for aspiration and he was placed on IV Unasyn. He was initially on a 15 L nonrebreather currently on 7 L oxygen mask.    ROS    No current facility-administered medications on file prior to encounter.      Current Outpatient Prescriptions on File Prior to Encounter   Medication Sig Dispense Refill   • insulin glargine (LANTUS) 100 UNIT/ML Solution Inject 20 Units as instructed every bedtime. 1 mL 0   • esomeprazole (NEXIUM) 40 MG delayed-release capsule Take 40 mg by mouth every 48 hours.     • methadone (DOLOPHINE) 10 MG TABS Take 35 mg by mouth every 12 hours.     • pregabalin (LYRICA) 300 MG capsule Take 300 mg by mouth 2 times a day.     • insulin regular (HUMULIN R) 100 Unit/mL SOLN Inject 0-60 Units as instructed 3 times a day before meals. Sliding scale:  <200 = 0 units  200 = 20 units  250 = 40 units  300 = 60 units  >300 = 60 units         Social History   Substance Use Topics   • Smoking  "status: Former Smoker     Packs/day: 0.40     Years: 15.00     Types: Cigarettes   • Smokeless tobacco: Former User     Quit date: 2/21/2013   • Alcohol use No        Past Medical History:   Diagnosis Date   • Dental disorder     upper and lower   • Diabetes 2011    insulin   • Heart burn    • Indigestion    • Infectious disease staph   • MRSA (methicillin resistant Staphylococcus aureus)    • Opiate withdrawal (CMS-HCC)    • Pain 2/1/13    6/10 back   • Renal disorder     pt born with only one kidney   • Snoring        Past Surgical History:   Procedure Laterality Date   • LUMBAR FUSION POSTERIOR  3/4/2013    Performed by Nixon Zhao M.D. at SURGERY [facility] ORS   • LUMBAR LAMINECTOMY DISKECTOMY  3/4/2013    Performed by Nixon Zhao M.D. at SURGERY [facility] ORS   • LUMBAR FUSION POSTERIOR  2/11/2013    Performed by Nixon Zhao M.D. at SURGERY [facility] ORS   • HERNIA REPAIR  2012    x7 last one in 2012   • OTHER  2007    back   • OTHER      neck       Allergies: Influenza virus vaccine    No family history on file.    Vitals:    01/25/18 1539 01/25/18 1540 01/25/18 1630 01/25/18 1656   Height:  1.803 m (5' 11\")     Weight:  98 kg (216 lb)     Weight % change since last entry.:  0 %     BP: 110/63  (!) 173/98 125/76   Pulse: 90  (!) 102 95   BMI (Calculated):  30.13     Resp: 16  18    Temp: 36.3 °C (97.3 °F)      TempSrc: Temporal       01/25/18 1829 01/25/18 1830 01/25/18 2130 01/25/18 2240   Height:       Weight:       Weight % change since last entry.:       BP:       Pulse: 94 93 89 (!) 120   BMI (Calculated):       Resp: (!) 10 12 12    Temp:    37.1 °C (98.8 °F)   TempSrc:        01/25/18 2330 01/26/18 0105 01/26/18 0207   Height:      Weight:      Weight % change since last entry.:      BP:      Pulse: 96 91 87   BMI (Calculated):      Resp: 12 (!) 10 (!) 11   Temp:      TempSrc:          Physical Examination  General: Somnolent, arouses, vital signs reviewed  Neuro/Psych: Follows all " commands, moves all extremities symmetrically, nonfocal exam  HEENT: PERRL, pedis membranes pink, moist, trachea midline, supple, no crepitus  CVS: Tachycardic, regular, distant, no murmur  Respiratory: Scattered coarse crackles, no wheezing, diminished  Abdomen/: Soft, nontender  Extremities: Moderate left lower extremity edema, 2+ distal pulses bilaterally  Skin: Warm, dry, no rash    Recent Labs      01/25/18   1608   WBC  10.3   NEUTSPOLYS  64.40   LYMPHOCYTES  20.00*   MONOCYTES  10.60   EOSINOPHILS  3.70   BASOPHILS  0.90   ASTSGOT  28   ALTSGPT  23   ALKPHOSPHAT  89   TBILIRUBIN  0.4     Recent Labs      01/25/18   1608   SODIUM  136   POTASSIUM  4.1   CHLORIDE  96   CO2  32   BUN  14   CREATININE  0.59   MAGNESIUM  1.5   CALCIUM  9.1     Recent Labs      01/25/18   1608   ALTSGPT  23   ASTSGOT  28   ALKPHOSPHAT  89   TBILIRUBIN  0.4   GLUCOSE  155*           Invalid input(s): [id number]  DX-CHEST-PORTABLE (1 VIEW)   Final Result         1.  Pulmonary edema and/or infiltrates are identified, which are somewhat decreased since the prior exam.      DX-CHEST-PORTABLE (1 VIEW)   Final Result         1. Diffuse interstitial prominence and patchy opacities throughout both lungs could relate to infection, pulmonary edema or hypoventilatory change and atelectasis      CT-HEAD W/O   Final Result      1.  No acute abnormality.   2.  Incidentally noted is atherosclerotic calcification in the vertebral and carotid arteries.          Assessment and Plan:  Acute hypoxemic respiratory failure   - Possible aspiration pneumonitis secondary to altered mental status and opioid overdose   - Titrated oxygen, respiratory protocols, close monitoring in ICU   - Patient is at increased risk for further respiratory deterioration prior to admission   - Empiric antibiotics with Unasyn for possible aspiration pneumonia   - Chest x-ray shows increased interstitial edema, possible aspiration,   Opiate overdose, methadone   -  He's received multiple doses of Narcan and has been started on Narcan infusion with improvement in mental status   - CT head negative  Chronic pain, narcotic dependence on methadone  History diabetes mellitus   - Glycemic control  Sinus tachycardia with left bundle branch block   - Low level troponin elevation, suspect demand/type II   - Follow serial troponins, echocardiogram  This patient is critically ill. He'll be monitored very closely in intensive care unit. We'll titrate down Narcan drip and follow neurologic status closely. He'll be continued on broad-spectrum antimicrobial therapy and we'll follow Mr. status with respiratory protocols and monitor need for intubation. Further recommendations will follow.    The patient remains critically ill.  Critical care time = 35 minutes in directly providing and coordinating critical care and extensive data review.  No time overlap and excludes procedures.

## 2018-01-26 NOTE — PROGRESS NOTES
Patient up to R-102 with CCT and ACLS RN on monitor.  Patient is disoriented to event, and requires repeat orientation and education, VSS.  Patient attempted to get out of bed, is extremely restless with a RASS of +2., narcan drip titrated per RASS with goal of RASS of 0.  Call light and education provided to patient.

## 2018-01-26 NOTE — ED NOTES
Pt speaking to nurse at this time, pt follows commands at times, no questions requested to urinate but nothing produced, bed in lowest position.

## 2018-01-26 NOTE — ED NOTES
Med rec completed by interview with patient's wife at bedside  No abx in past 30 days  Allergy reviewed and patient's wife believes that he is not allergic to the flu vaccine because he received it recently  Pt's wife reported that doses of insulins have not changed since last being admitted

## 2018-01-26 NOTE — CARE PLAN
Problem: Safety  Goal: Will remain free from injury  Outcome: PROGRESSING AS EXPECTED  Educating patient to use call light before getting out of bed.  Call light within reach, bed locked and in lowest position.     Problem: Infection  Goal: Will remain free from infection  Outcome: PROGRESSING AS EXPECTED  CDC handout for infection prevention given.

## 2018-01-26 NOTE — ED NOTES
Pt found sitting in chair after urinating in urinal, pt is talking in full sentences every now and then starts to dose off.  Doctor in room , pt speaking to wife and doctor.

## 2018-01-26 NOTE — ED PROVIDER NOTES
" ED Provider Note    Scribed for Pastor Sewell M.D. by Bety Medina. 1/25/2018  4:07 PM    Primary care provider: Jon Turner M.D.  Means of arrival: Walk-in  History obtained from: Patient's wife and Patient  History limited by: None    CHIEF COMPLAINT  Chief Complaint   Patient presents with   • ALOC       HPI  Travon Pollack is a 62 y.o. male who presents to the Emergency Department for evaluation of altered level of consciousness onset 1200. Per patient's wife, she drove the patient up here from [place] to see Dr. Turner, Primary Care. She states that the patient was not acting at baseline on the way up here and began feeling weak when he came back from his appointment. Wife also reports that the patient began \"sounding weird.\" She denies witnessing any seizure-like activity. The patient is currently on pain medication secondary to a recent fall and last took it this morning. He is currently complaining of back pain, weakness, and nausea. Per patient's wife, he has also had a cough for the past few weeks and received a chest x-ray which was normal. The patient denies any chest pain, difficulty breathing, or abdominal pain. He has a history of diabetes. Wife reports that the patient does not have a history of strokes, MI's, or hypertension.    REVIEW OF SYSTEMS  Pertinent positives include altered level of consciousness, weakness. Pertinent negatives include no chest pain, difficult breathing, cough, abdominal pain, seizure-like activity.  All other systems reviewed and negative.  C.    PAST MEDICAL HISTORY   has a past medical history of Dental disorder; Diabetes (2011); Heart burn; Indigestion; Infectious disease (stap); MRSA (methicillin resistant Staphylococcus aureus); Opiate withdrawal (CMS-[facility]); Pain (2/1/13); Renal disorder; and Snoring.    SURGICAL HISTORY   has a past surgical history that includes hernia repair (2012); other (2007); other; lumbar fusion posterior " (2/11/2013); lumbar fusion posterior (3/4/2013); and lumbar laminectomy diskectomy (3/4/2013).    SOCIAL HISTORY  Social History   Substance Use Topics   • Smoking status: Former Smoker     Packs/day: 0.40     Years: 15.00     Types: Cigarettes   • Smokeless tobacco: Former User     Quit date: 2/21/2013   • Alcohol use No      History   Drug Use No       FAMILY HISTORY  No family history noted    CURRENT MEDICATIONS  No current facility-administered medications on file prior to encounter.      Current Outpatient Prescriptions on File Prior to Encounter   Medication Sig Dispense Refill   • insulin glargine (LANTUS) 100 UNIT/ML Solution Inject 20 Units as instructed every bedtime. 1 mL 0   • esomeprazole (NEXIUM) 40 MG delayed-release capsule Take 40 mg by mouth every 48 hours.     • methadone (DOLOPHINE) 10 MG TABS Take 35 mg by mouth every 12 hours.     • pregabalin (LYRICA) 300 MG capsule Take 300 mg by mouth 2 times a day.     • insulin regular (HUMULIN R) 100 Unit/mL SOLN Inject 0-60 Units as instructed 3 times a day before meals. Sliding scale:  <200 = 0 units  200 = 20 units  250 = 40 units  300 = 60 units  >300 = 60 units       ALLERGIES  Allergies   Allergen Reactions   • Influenza Virus Vaccine Rash and Nausea             PHYSICAL EXAM  VITAL SIGNS: /63   Pulse 90   Temp 36.3 °C (97.3 °F) (Temporal)   Resp 16   SpO2 90% Comment: 74% on room air    Constitutional: Well developed, Well nourished, No distress, Non-toxic appearance.   HENT: Normocephalic, Atraumatic, Bilateral external ears normal, Dry mucous membranes, No oral exudates.   Eyes: PERRLA, EOMI, Conjunctiva normal, No discharge. Pupils are 3 and reactive.  Neck: No tenderness, Supple, No stridor.   Lymphatic: No lymphadenopathy noted.   Cardiovascular: Normal heart rate, Normal rhythm.   Thorax & Lungs: Clear to auscultation bilaterally, No respiratory distress, No wheezing, No crackles.   Abdomen: Soft, No tenderness, No masses, No  pulsatile masses.   Skin: Warm, Dry, No erythema, No rash.   Extremities:, No edema No cyanosis.   Musculoskeletal: No tenderness to palpation or major deformities noted.  Intact distal pulses  Neurologic: Awakens but confused. Very sleepy. Seems to be able to move all extremities except for weakness in left lower extremity.  Psychiatric: Affect normal, Judgment normal, Mood normal.     LABS  Results for orders placed or performed during the hospital encounter of 01/25/18   LACTIC ACID   Result Value Ref Range    Lactic Acid 1.8 0.5 - 2.0 mmol/L   LACTIC ACID   Result Value Ref Range    Lactic Acid 1.8 0.5 - 2.0 mmol/L   CBC WITH DIFFERENTIAL   Result Value Ref Range    WBC 10.3 4.8 - 10.8 K/uL    RBC 5.14 4.70 - 6.10 M/uL    Hemoglobin 14.6 14.0 - 18.0 g/dL    Hematocrit 46.5 42.0 - 52.0 %    MCV 90.5 81.4 - 97.8 fL    MCH 28.4 27.0 - 33.0 pg    MCHC 31.4 (L) 33.7 - 35.3 g/dL    RDW 51.3 (H) 35.9 - 50.0 fL    Platelet Count 232 164 - 446 K/uL    MPV 10.1 9.0 - 12.9 fL    Neutrophils-Polys 64.40 44.00 - 72.00 %    Lymphocytes 20.00 (L) 22.00 - 41.00 %    Monocytes 10.60 0.00 - 13.40 %    Eosinophils 3.70 0.00 - 6.90 %    Basophils 0.90 0.00 - 1.80 %    Immature Granulocytes 0.40 0.00 - 0.90 %    Nucleated RBC 0.00 /100 WBC    Neutrophils (Absolute) 6.62 1.82 - 7.42 K/uL    Lymphs (Absolute) 2.05 1.00 - 4.80 K/uL    Monos (Absolute) 1.09 (H) 0.00 - 0.85 K/uL    Eos (Absolute) 0.38 0.00 - 0.51 K/uL    Baso (Absolute) 0.09 0.00 - 0.12 K/uL    Immature Granulocytes (abs) 0.04 0.00 - 0.11 K/uL    NRBC (Absolute) 0.00 K/uL   COMP METABOLIC PANEL   Result Value Ref Range    Sodium 136 135 - 145 mmol/L    Potassium 4.1 3.6 - 5.5 mmol/L    Chloride 96 96 - 112 mmol/L    Co2 32 20 - 33 mmol/L    Anion Gap 8.0 0.0 - 11.9    Glucose 155 (H) 65 - 99 mg/dL    Bun 14 8 - 22 mg/dL    Creatinine 0.59 0.50 - 1.40 mg/dL    Calcium 9.1 8.5 - 10.5 mg/dL    AST(SGOT) 28 12 - 45 U/L    ALT(SGPT) 23 2 - 50 U/L    Alkaline Phosphatase 89 30  - 99 U/L    Total Bilirubin 0.4 0.1 - 1.5 mg/dL    Albumin 3.1 (L) 3.2 - 4.9 g/dL    Total Protein 7.1 6.0 - 8.2 g/dL    Globulin 4.0 (H) 1.9 - 3.5 g/dL    A-G Ratio 0.8 g/dL   TROPONIN   Result Value Ref Range    Troponin I 0.94 (H) 0.00 - 0.04 ng/mL   BTYPE NATRIURETIC PEPTIDE   Result Value Ref Range    B Natriuretic Peptide 167 (H) 0 - 100 pg/mL   APTT   Result Value Ref Range    APTT 29.7 24.7 - 36.0 sec   MAGNESIUM   Result Value Ref Range    Magnesium 1.5 1.5 - 2.5 mg/dL   Influenza By PCR, A/B   Result Value Ref Range    Influenza virus A RNA Negative Negative    Influenza virus B, PCR Negative Negative   ESTIMATED GFR   Result Value Ref Range    GFR If African American >60 >60 mL/min/1.73 m 2    GFR If Non African American >60 >60 mL/min/1.73 m 2   EKG   Result Value Ref Range    Report       [facility] Emergency Dept.    Test Date:  2018  Pt Name:    TAMARA PINK                    Department: ER  MRN:        [id number]                      Room:        18  Gender:     Male                         Technician: [id number]  :        [id number]                   Requested By:ZEE MCCURDY  Order #:    [id number]                    Reading MD:    Measurements  Intervals                                Axis  Rate:       105                          P:          63  NH:         160                          QRS:        -53  QRSD:       114                          T:          79  QT:         380  QTc:        503    Interpretive Statements  SINUS TACHYCARDIA  INCOMPLETE LEFT BUNDLE BRANCH BLOCK  BASELINE WANDER IN LEAD(S) V5,V6  Compared to ECG 2018 20:03:40  Left bundle-branch block now present  Left anterior fascicular block no longer present  Prolonged QT interval no longer present     ISTAT CO2   Result Value Ref Range    Istat CO2 35 (H) 20 - 33 mmol/L   ISTAT GLUCOSE   Result Value Ref Range    Istat Glucose 165 (H) 65 - 99 mg/dL   ISTAT BUN   Result Value Ref Range    Istat Bun 16  8 - 22 mg/dL   ISTAT CREATININE   Result Value Ref Range    Istat Creatinine 1.3 0.5 - 1.4 mg/dL   ISTAT SODIUM   Result Value Ref Range    Istat Sodium 137 135 - 145 mmol/L   ISTAT POTASSIUM   Result Value Ref Range    Istat Potassium 4.1 3.6 - 5.5 mmol/L   ISTAT CHLORIDE   Result Value Ref Range    Istat Chloride 94 (L) 96 - 112 mmol/L   All labs reviewed by me.    EKG  12 lead EKG interpreted by me to show sinus tachycardia at a rate of 105. Incomplete LBBB. Normal p waves, Normal QRS, ST depression in V3-V6,  Normal axis. Normal intervals. No evidence of ischemia. Old EKG from 1/12/18 grossly unchanged but new slight ST depression.    RADIOLOGY  DX-CHEST-PORTABLE (1 VIEW)   Final Result         1. Diffuse interstitial prominence and patchy opacities throughout both lungs could relate to infection, pulmonary edema or hypoventilatory change and atelectasis      CT-HEAD W/O   Final Result      1.  No acute abnormality.   2.  Incidentally noted is atherosclerotic calcification in the vertebral and carotid arteries.      DX-CHEST-PORTABLE (1 VIEW)    (Results Pending)     The radiologist's interpretation of all radiological studies have been reviewed by me.    COURSE & MEDICAL DECISION MAKING  Pertinent Labs & Imaging studies reviewed. (See chart for details)    I reviewed the patient's medical records which showed the patient has chronic pain management.    4:07 PM - Patient seen and examined at bedside. Patient will be treated with 4 mg IV Zofran, 50 mg/ml IV Ketalar, 0.4 mg/ ml IV Naloxone HCl. Ordered chest x-ray, head CT, Lactic acid, CBC with differential, CMP, Urinalysis, Urine culture, Blood culture, Troponin, B type natriuretic peptide, APTT, Magnesium, Influenza by PCR A/B, and an EKG to evaluate his symptoms. The differential diagnoses include but are not limited to: opiate overdose, sepsis, influenza.    6:39 PM Recheck: Patient re-evaluated at [facility]. Patient is somnolent and unarousable. He is on a re  breather. I will give the patient another 0.4 mg of IV Narcan. Patient's wife understood and is in agreement.    6:42 PM Paged Hospitalist.    6:44 PM Spoke with Dr. Harris, Hospitalist, about the patient's condition. He will admit the patient.    Patient was here with a confirmed overdose.    Decision Making:  Patient's coming in with hypoxia, respiratory distress. The patient is having episodes of apnea, O2 saturations of 60% out in triage. The patient does arouse however continues to be hypoxic and therefore I gave the patient Narcan. After the Narcan the patient was extremely agitated and moaning and screaming in pain with active nausea vomiting then I gave the patient low dose ketamine. Which helped sedate the patient for a while however the patient did not like that, give the patient a milligram of Ativan which sedated the patient some. Shortly thereafter the patient had worsening respiratory failure, I had to give the patient another dose of Narcan which woke him up. Patient has pulmonary edema on chest x-ray, however his BNP is normal. We'll start the patient on a Narcan drip, discussed the case with Dr. Harris for admission to hospital.    DISPOSITION:  Patient will be admitted to Dr. Harris in guarded condition.    FINAL IMPRESSION  1. Respiratory distress    2. Opiate overdose, accidental or unintentional, initial encounter    3. Acute pulmonary edema (CMS-HCC)          Bety ELLIOTT (Scribtom), am scribing for, and in the presence of, Pastor Sewell M.D..    Electronically signed by: Bety Medina (Josh), 1/25/2018    Pastor ELLIOTT M.D. personally performed the services described in this documentation, as scribed by Bety Medina in my presence, and it is both accurate and complete.    The note accurately reflects work and decisions made by me.  Pastor Sewell  1/25/2018  10:03 PM

## 2018-01-26 NOTE — ED NOTES
Denita from Lab called with critical result of troponin-0.94 at 1747. Critical lab result read back to Denita. Dr. Sewell notified of critical lab result at 1748.  Critical lab result read back by Dr. Sewell.

## 2018-01-26 NOTE — H&P
DATE OF ADMISSION:  01/25/2018    PRIMARY CARE PHYSICIAN:  _____     CHIEF COMPLAINT:  Altered level of consciousness.    HISTORY OF PRESENT ILLNESS:  Patient is a 62-year-old male who has a history   of chronic pain and narcotic dependence.  He is on methadone daily.  He was   brought to the hospital today after he was found to have altered level of   consciousness.  Apparently, patient's wife reports that he awakened and he was   somewhat lethargic and not acting normal.  She brought him down from   [place] to see his primary care physician and when they arrived, he became   somewhat somnolent and his PCP advised him to come to the emergency   department.  Here, he was given a dose of Narcan and he awakened and was   having quite a bit of pain and then immediately afterwards fell back to sleep.    This has happened multiple times in the emergency department where we have   given him Narcan, he awakens and goes back to sleep.  Therefore, he has been   started on a Narcan drip.  Currently, I am unable to obtain any type of   history from the patient and the patient's wife is unavailable at this time.    REVIEW OF SYSTEMS:  Unable to obtain secondary to patient's current medical   condition.    ALLERGIES:  INFLUENZA.    PAST MEDICAL HISTORY:  1.  Chronic pain and narcotic dependence.  2.  Insulin-dependent diabetes.  3.  GERD.    PAST SURGICAL HISTORY:  He has had multiple back surgeries, hernia repair x7   according to the chart.    FAMILY HISTORY:  According to chart is metastatic cancer in his mother.    SOCIAL HISTORY:  According to chart is ongoing tobacco dependence.  No alcohol   or drug use.    CODE STATUS:  He is currently a full code, but he has been both DNR and full   code according to past admissions to the hospital.  Most recently, he was a   DNR.  We will need to clarify this with his wife once we can get a hold of   her.    HOME MEDICATIONS:  1.  Lantus 20 units at bedtime.  2.  Nexium 40 mg every  48 hours.  3.  Methadone 35 mg b.i.d.  4.  Lyrica 300 mg b.i.d.  5.  Sliding scale insulin.    PHYSICAL EXAMINATION:  VITAL SIGNS:  Blood pressure 145/88, pulse of 96, respirations 12, temperature   37.1, oxygen saturation 93% on 8 L OxyMask and weight 97.977 kilograms.  GENERAL:  Patient appears chronically ill, currently he is obtunded.  HEENT:  Dry mucous membranes.  No oropharyngeal exudates.  Eyes, EOMI, PERRLA.    Pupils are 3 mm and reactive.  NECK:  No lymphadenopathy, no JVD.  CARDIOVASCULAR:  Regular rate and rhythm.  No murmurs.  LUNGS:  He has diffuse rhonchi bilaterally and decreased air entry.  ABDOMEN:  Positive bowel sounds, soft, nontender, nondistended.  EXTREMITIES:  No clubbing or cyanosis.  NEUROLOGIC:  He is obtunded.    LABORATORY DATA:  WBC 10.3, hemoglobin 14.6, hematocrit 46.5 and platelets   232.  Sodium 136, potassium 4.1, BUN is 14 and creatinine 0.59.  Lactic acid   is 1.8.  Troponin is 0.94.  BNP is 167.    IMAGING:  Chest x-ray shows diffuse interstitial prominence with patchy   opacities throughout both lungs could relate to infection, pulmonary edema or   hypoventilatory change and atelectasis.    ASSESSMENT AND PLAN:  Patient is a 62-year-old male with a history of chronic   pain and narcotic dependence and diabetes, presents to the hospital with   altered level of consciousness.  1.  Opiate overdose.  This is consistent with his altered level of   consciousness.  He does awaken and answer some questions appropriately after   he was given Narcan.  At this point, the patient is going to be admitted to   the intensive care unit.  I have started him on a Narcan drip and will titrate   this accordingly.  According to the chart, there is also some mention of   patient being given ketamine, I am not sure what the reason was for that or   where that was given.  We will continue monitoring his oxygen saturations and   continue with OxyMask and cardiac monitoring.  Furthermore, I have  consulted   the intensivist for recommendations.  2.  Acute respiratory failure with hypoxia.  He has diffuse rhonchi   bilaterally and very abnormal chest x-ray.  He also has 1+ edema in his lower   extremities, so this may be a fluid, but there is also high likelihood that he   aspirated, so therefore, I have started him on Unasyn.  We will defer   starting Lasix or diuretics to the intensivist.  3.  Chronic pain and narcotic dependence.  4.  Deep venous thrombosis prophylaxis, heparin 5000 units t.i.d.  5.  History of diabetes.  He is on Lantus 20 units at bedtime.  Since he is   going to be n.p.o. for now, we will change him over to 10 units at bedtime and   sliding scale insulin.  6.  Code status.  Currently, I have kept him as a full code until we can   verify this with his wife.  According to his last hospitalization, he was a   DNR, but in the past he was full code and prior to that, he was DNR, so we   need to clarify this.  7.  Total critical care time 35 minutes excluding procedures and with no   overlap.  He is going to be admitted to the intensive care unit and on a   Narcan drip and we will titrate this accordingly.       ____________________________________     MD SÁNCHEZ Zavala / SUNDAR    DD:  01/26/2018 00:24:55  DT:  01/26/2018 01:34:59    D#:  [id number]  Job#:  [id number]

## 2018-01-26 NOTE — ED NOTES
Report from Love DUNCAN, assumed care of pt. Pt awakens with repeated painful stimuli. Quickly falls back asleep. Pt oxygen saturation on 5L NC down to 80% when pt is not stimulated (noted open mouth breather). Placed oxymask on pt at 15L, increased O2 saturation to 99%.

## 2018-01-26 NOTE — ED NOTES
O2 sats on 4L 74% when sleeping, O2 turned up to 5L, sat up the head of the bed, O2 sats now 95%.  Trop 0.94, ERP aware.

## 2018-01-27 PROBLEM — G89.4 CHRONIC PAIN SYNDROME: Status: ACTIVE | Noted: 2018-01-27

## 2018-01-27 PROBLEM — J96.01 ACUTE RESPIRATORY FAILURE WITH HYPOXIA (HCC): Status: ACTIVE | Noted: 2018-01-27

## 2018-01-27 LAB
ALBUMIN SERPL BCP-MCNC: 2.6 G/DL (ref 3.2–4.9)
ALBUMIN/GLOB SERPL: 0.7 G/DL
ALP SERPL-CCNC: 107 U/L (ref 30–99)
ALT SERPL-CCNC: 18 U/L (ref 2–50)
ANION GAP SERPL CALC-SCNC: 9 MMOL/L (ref 0–11.9)
AST SERPL-CCNC: 28 U/L (ref 12–45)
BASOPHILS # BLD AUTO: 0.6 % (ref 0–1.8)
BASOPHILS # BLD: 0.04 K/UL (ref 0–0.12)
BILIRUB SERPL-MCNC: 0.8 MG/DL (ref 0.1–1.5)
BNP SERPL-MCNC: 139 PG/ML (ref 0–100)
BUN SERPL-MCNC: 10 MG/DL (ref 8–22)
CALCIUM SERPL-MCNC: 8.8 MG/DL (ref 8.5–10.5)
CHLORIDE SERPL-SCNC: 98 MMOL/L (ref 96–112)
CO2 SERPL-SCNC: 27 MMOL/L (ref 20–33)
CREAT SERPL-MCNC: 0.62 MG/DL (ref 0.5–1.4)
EOSINOPHIL # BLD AUTO: 0.26 K/UL (ref 0–0.51)
EOSINOPHIL NFR BLD: 4.1 % (ref 0–6.9)
ERYTHROCYTE [DISTWIDTH] IN BLOOD BY AUTOMATED COUNT: 49.7 FL (ref 35.9–50)
GLOBULIN SER CALC-MCNC: 3.9 G/DL (ref 1.9–3.5)
GLUCOSE BLD-MCNC: 138 MG/DL (ref 65–99)
GLUCOSE BLD-MCNC: 144 MG/DL (ref 65–99)
GLUCOSE BLD-MCNC: 145 MG/DL (ref 65–99)
GLUCOSE BLD-MCNC: 240 MG/DL (ref 65–99)
GLUCOSE BLD-MCNC: 248 MG/DL (ref 65–99)
GLUCOSE SERPL-MCNC: 155 MG/DL (ref 65–99)
HCT VFR BLD AUTO: 44.7 % (ref 42–52)
HGB BLD-MCNC: 14.7 G/DL (ref 14–18)
IMM GRANULOCYTES # BLD AUTO: 0.01 K/UL (ref 0–0.11)
IMM GRANULOCYTES NFR BLD AUTO: 0.2 % (ref 0–0.9)
LYMPHOCYTES # BLD AUTO: 1.18 K/UL (ref 1–4.8)
LYMPHOCYTES NFR BLD: 18.8 % (ref 22–41)
MAGNESIUM SERPL-MCNC: 1.3 MG/DL (ref 1.5–2.5)
MCH RBC QN AUTO: 29.1 PG (ref 27–33)
MCHC RBC AUTO-ENTMCNC: 32.9 G/DL (ref 33.7–35.3)
MCV RBC AUTO: 88.3 FL (ref 81.4–97.8)
MONOCYTES # BLD AUTO: 0.78 K/UL (ref 0–0.85)
MONOCYTES NFR BLD AUTO: 12.4 % (ref 0–13.4)
NEUTROPHILS # BLD AUTO: 4.02 K/UL (ref 1.82–7.42)
NEUTROPHILS NFR BLD: 63.9 % (ref 44–72)
NRBC # BLD AUTO: 0 K/UL
NRBC BLD-RTO: 0 /100 WBC
PHOSPHATE SERPL-MCNC: 2.7 MG/DL (ref 2.5–4.5)
PLATELET # BLD AUTO: 159 K/UL (ref 164–446)
PMV BLD AUTO: 9.4 FL (ref 9–12.9)
POTASSIUM SERPL-SCNC: 3.6 MMOL/L (ref 3.6–5.5)
PROT SERPL-MCNC: 6.5 G/DL (ref 6–8.2)
RBC # BLD AUTO: 5.06 M/UL (ref 4.7–6.1)
SODIUM SERPL-SCNC: 134 MMOL/L (ref 135–145)
WBC # BLD AUTO: 6.3 K/UL (ref 4.8–10.8)

## 2018-01-27 PROCEDURE — 700102 HCHG RX REV CODE 250 W/ 637 OVERRIDE(OP): Performed by: INTERNAL MEDICINE

## 2018-01-27 PROCEDURE — 700105 HCHG RX REV CODE 258: Performed by: HOSPITALIST

## 2018-01-27 PROCEDURE — 700102 HCHG RX REV CODE 250 W/ 637 OVERRIDE(OP): Performed by: HOSPITALIST

## 2018-01-27 PROCEDURE — 93306 TTE W/DOPPLER COMPLETE: CPT

## 2018-01-27 PROCEDURE — A9270 NON-COVERED ITEM OR SERVICE: HCPCS | Performed by: INTERNAL MEDICINE

## 2018-01-27 PROCEDURE — A9270 NON-COVERED ITEM OR SERVICE: HCPCS | Performed by: HOSPITALIST

## 2018-01-27 PROCEDURE — 82962 GLUCOSE BLOOD TEST: CPT | Mod: 91

## 2018-01-27 PROCEDURE — 84100 ASSAY OF PHOSPHORUS: CPT

## 2018-01-27 PROCEDURE — 770020 HCHG ROOM/CARE - TELE (206)

## 2018-01-27 PROCEDURE — 700111 HCHG RX REV CODE 636 W/ 250 OVERRIDE (IP): Performed by: INTERNAL MEDICINE

## 2018-01-27 PROCEDURE — 80053 COMPREHEN METABOLIC PANEL: CPT

## 2018-01-27 PROCEDURE — 700111 HCHG RX REV CODE 636 W/ 250 OVERRIDE (IP): Performed by: HOSPITALIST

## 2018-01-27 PROCEDURE — 51798 US URINE CAPACITY MEASURE: CPT

## 2018-01-27 PROCEDURE — 83735 ASSAY OF MAGNESIUM: CPT

## 2018-01-27 PROCEDURE — 93306 TTE W/DOPPLER COMPLETE: CPT | Mod: 26 | Performed by: INTERNAL MEDICINE

## 2018-01-27 PROCEDURE — 83880 ASSAY OF NATRIURETIC PEPTIDE: CPT

## 2018-01-27 PROCEDURE — 85025 COMPLETE CBC W/AUTO DIFF WBC: CPT

## 2018-01-27 PROCEDURE — 700105 HCHG RX REV CODE 258: Performed by: INTERNAL MEDICINE

## 2018-01-27 PROCEDURE — 99233 SBSQ HOSP IP/OBS HIGH 50: CPT | Performed by: HOSPITALIST

## 2018-01-27 RX ORDER — OMEPRAZOLE 20 MG/1
20 CAPSULE, DELAYED RELEASE ORAL
Status: DISCONTINUED | OUTPATIENT
Start: 2018-01-27 | End: 2018-01-29 | Stop reason: HOSPADM

## 2018-01-27 RX ORDER — POTASSIUM CHLORIDE 20 MEQ/1
20 TABLET, EXTENDED RELEASE ORAL ONCE
Status: COMPLETED | OUTPATIENT
Start: 2018-01-27 | End: 2018-01-27

## 2018-01-27 RX ORDER — LORAZEPAM 1 MG/1
1 TABLET ORAL EVERY 4 HOURS PRN
Status: DISCONTINUED | OUTPATIENT
Start: 2018-01-27 | End: 2018-01-29 | Stop reason: HOSPADM

## 2018-01-27 RX ORDER — MAGNESIUM SULFATE HEPTAHYDRATE 40 MG/ML
4 INJECTION, SOLUTION INTRAVENOUS ONCE
Status: COMPLETED | OUTPATIENT
Start: 2018-01-27 | End: 2018-01-27

## 2018-01-27 RX ORDER — PREGABALIN 150 MG/1
300 CAPSULE ORAL 2 TIMES DAILY
Status: DISCONTINUED | OUTPATIENT
Start: 2018-01-27 | End: 2018-01-29 | Stop reason: HOSPADM

## 2018-01-27 RX ORDER — AMLODIPINE BESYLATE 5 MG/1
5 TABLET ORAL
Status: DISCONTINUED | OUTPATIENT
Start: 2018-01-28 | End: 2018-01-29 | Stop reason: HOSPADM

## 2018-01-27 RX ORDER — ESOMEPRAZOLE MAGNESIUM 40 MG/1
40 CAPSULE, DELAYED RELEASE ORAL
Status: DISCONTINUED | OUTPATIENT
Start: 2018-01-27 | End: 2018-01-27

## 2018-01-27 RX ORDER — METHADONE HYDROCHLORIDE 10 MG/1
15 TABLET ORAL EVERY 8 HOURS
Status: DISCONTINUED | OUTPATIENT
Start: 2018-01-27 | End: 2018-01-29 | Stop reason: HOSPADM

## 2018-01-27 RX ORDER — METHADONE HYDROCHLORIDE 10 MG/1
15 TABLET ORAL ONCE
Status: DISPENSED | OUTPATIENT
Start: 2018-01-27 | End: 2018-01-28

## 2018-01-27 RX ORDER — ACETAMINOPHEN 325 MG/1
650 TABLET ORAL EVERY 4 HOURS PRN
Status: DISCONTINUED | OUTPATIENT
Start: 2018-01-27 | End: 2018-01-29 | Stop reason: HOSPADM

## 2018-01-27 RX ORDER — OXYCODONE HYDROCHLORIDE 5 MG/1
5 TABLET ORAL EVERY 4 HOURS PRN
Status: DISCONTINUED | OUTPATIENT
Start: 2018-01-27 | End: 2018-01-29 | Stop reason: HOSPADM

## 2018-01-27 RX ORDER — INSULIN GLARGINE 100 [IU]/ML
15 INJECTION, SOLUTION SUBCUTANEOUS
Status: DISCONTINUED | OUTPATIENT
Start: 2018-01-27 | End: 2018-01-28

## 2018-01-27 RX ADMIN — AMPICILLIN SODIUM AND SULBACTAM SODIUM 3 G: 2; 1 INJECTION, POWDER, FOR SOLUTION INTRAMUSCULAR; INTRAVENOUS at 21:24

## 2018-01-27 RX ADMIN — STANDARDIZED SENNA CONCENTRATE AND DOCUSATE SODIUM 2 TABLET: 8.6; 5 TABLET, FILM COATED ORAL at 19:43

## 2018-01-27 RX ADMIN — OXYCODONE HYDROCHLORIDE 5 MG: 5 TABLET ORAL at 10:28

## 2018-01-27 RX ADMIN — POTASSIUM CHLORIDE 20 MEQ: 1500 TABLET, EXTENDED RELEASE ORAL at 10:56

## 2018-01-27 RX ADMIN — OXYCODONE HYDROCHLORIDE 5 MG: 5 TABLET ORAL at 17:18

## 2018-01-27 RX ADMIN — ATORVASTATIN CALCIUM 40 MG: 40 TABLET, FILM COATED ORAL at 19:43

## 2018-01-27 RX ADMIN — SODIUM CHLORIDE: 9 INJECTION, SOLUTION INTRAVENOUS at 21:36

## 2018-01-27 RX ADMIN — LORAZEPAM 1 MG: 1 TABLET ORAL at 11:56

## 2018-01-27 RX ADMIN — MAGNESIUM SULFATE IN WATER 4 G: 40 INJECTION, SOLUTION INTRAVENOUS at 11:46

## 2018-01-27 RX ADMIN — INSULIN GLARGINE 15 UNITS: 100 INJECTION, SOLUTION SUBCUTANEOUS at 21:25

## 2018-01-27 RX ADMIN — OXYCODONE HYDROCHLORIDE 5 MG: 5 TABLET ORAL at 21:25

## 2018-01-27 RX ADMIN — INSULIN HUMAN 4 UNITS: 100 INJECTION, SOLUTION PARENTERAL at 17:21

## 2018-01-27 RX ADMIN — ONDANSETRON 4 MG: 4 TABLET, ORALLY DISINTEGRATING ORAL at 10:44

## 2018-01-27 RX ADMIN — AMPICILLIN SODIUM AND SULBACTAM SODIUM 3 G: 2; 1 INJECTION, POWDER, FOR SOLUTION INTRAMUSCULAR; INTRAVENOUS at 17:21

## 2018-01-27 RX ADMIN — HEPARIN SODIUM 5000 UNITS: 5000 INJECTION, SOLUTION INTRAVENOUS; SUBCUTANEOUS at 19:43

## 2018-01-27 RX ADMIN — HEPARIN SODIUM 5000 UNITS: 5000 INJECTION, SOLUTION INTRAVENOUS; SUBCUTANEOUS at 14:24

## 2018-01-27 RX ADMIN — PREGABALIN 300 MG: 150 CAPSULE ORAL at 17:18

## 2018-01-27 RX ADMIN — METHADONE HYDROCHLORIDE 15 MG: 10 TABLET ORAL at 14:25

## 2018-01-27 RX ADMIN — ACETAMINOPHEN 650 MG: 325 TABLET, FILM COATED ORAL at 19:43

## 2018-01-27 RX ADMIN — ACETAMINOPHEN 650 MG: 325 TABLET, FILM COATED ORAL at 00:47

## 2018-01-27 RX ADMIN — STANDARDIZED SENNA CONCENTRATE AND DOCUSATE SODIUM 2 TABLET: 8.6; 5 TABLET, FILM COATED ORAL at 09:06

## 2018-01-27 RX ADMIN — INSULIN HUMAN 4 UNITS: 100 INJECTION, SOLUTION PARENTERAL at 12:05

## 2018-01-27 RX ADMIN — HEPARIN SODIUM 5000 UNITS: 5000 INJECTION, SOLUTION INTRAVENOUS; SUBCUTANEOUS at 05:57

## 2018-01-27 RX ADMIN — FENTANYL CITRATE 50 MCG: 50 INJECTION, SOLUTION INTRAMUSCULAR; INTRAVENOUS at 02:45

## 2018-01-27 RX ADMIN — AMPICILLIN SODIUM AND SULBACTAM SODIUM 3 G: 2; 1 INJECTION, POWDER, FOR SOLUTION INTRAMUSCULAR; INTRAVENOUS at 10:54

## 2018-01-27 RX ADMIN — NICOTINE 21 MG: 21 PATCH, EXTENDED RELEASE TRANSDERMAL at 05:57

## 2018-01-27 RX ADMIN — METHADONE HYDROCHLORIDE 15 MG: 10 TABLET ORAL at 21:24

## 2018-01-27 RX ADMIN — AMPICILLIN SODIUM AND SULBACTAM SODIUM 3 G: 2; 1 INJECTION, POWDER, FOR SOLUTION INTRAMUSCULAR; INTRAVENOUS at 05:30

## 2018-01-27 RX ADMIN — ASPIRIN 81 MG: 81 TABLET, CHEWABLE ORAL at 09:06

## 2018-01-27 RX ADMIN — OMEPRAZOLE 20 MG: 20 CAPSULE, DELAYED RELEASE ORAL at 17:18

## 2018-01-27 RX ADMIN — PROCHLORPERAZINE EDISYLATE 5 MG: 5 INJECTION INTRAMUSCULAR; INTRAVENOUS at 01:36

## 2018-01-27 ASSESSMENT — LIFESTYLE VARIABLES: ALCOHOL_USE: NO

## 2018-01-27 ASSESSMENT — PAIN SCALES - GENERAL
PAINLEVEL_OUTOF10: 10
PAINLEVEL_OUTOF10: 5
PAINLEVEL_OUTOF10: 6
PAINLEVEL_OUTOF10: 8
PAINLEVEL_OUTOF10: 6
PAINLEVEL_OUTOF10: 6
PAINLEVEL_OUTOF10: 9
PAINLEVEL_OUTOF10: 0
PAINLEVEL_OUTOF10: 10
PAINLEVEL_OUTOF10: 6

## 2018-01-27 ASSESSMENT — ENCOUNTER SYMPTOMS
BLURRED VISION: 0
EYE REDNESS: 0
TREMORS: 0
FLANK PAIN: 0
EYE DISCHARGE: 0
WHEEZING: 0
BLOOD IN STOOL: 0
DIZZINESS: 0
SEIZURES: 0
NAUSEA: 0
SHORTNESS OF BREATH: 1
BACK PAIN: 1
SPUTUM PRODUCTION: 0
HALLUCINATIONS: 0
SPEECH CHANGE: 0
CHILLS: 0
COUGH: 1
PALPITATIONS: 0
ABDOMINAL PAIN: 0
FEVER: 0
NERVOUS/ANXIOUS: 0
FOCAL WEAKNESS: 0

## 2018-01-27 ASSESSMENT — PATIENT HEALTH QUESTIONNAIRE - PHQ9
1. LITTLE INTEREST OR PLEASURE IN DOING THINGS: NOT AT ALL
SUM OF ALL RESPONSES TO PHQ9 QUESTIONS 1 AND 2: 0
SUM OF ALL RESPONSES TO PHQ QUESTIONS 1-9: 0
2. FEELING DOWN, DEPRESSED, IRRITABLE, OR HOPELESS: NOT AT ALL

## 2018-01-27 NOTE — ASSESSMENT & PLAN NOTE
Suspected narcotic overdose  Mr. Pollack was here with a suspected overdose of T40.3 - Methadone; and oxycodone he has no other known overdoses     resolved

## 2018-01-27 NOTE — ASSESSMENT & PLAN NOTE
Likely demand ischemia    Echo:  Moderately reduced left ventricular systolic function.  Left   ventricular ejection fraction is visually estimated to be 45%.  No hemodynamically significant valvular abnormalities identified  Aspirin statin

## 2018-01-27 NOTE — PROGRESS NOTES
Pulmonary Critical Care Progress Note        Chief Complaint: Change in mental status    History of Present Illness:  I was called to evaluate this patient urgently is being placed on a Narcan drip. He is a 62-year-old male with a history of chronic pain and narcotic dependence. He's been using methadone daily for many years. Apparently his wife noted that he had been lethargic and acting abnormally. He is brought to his primary care physician because of significant somnolence he was transferred to the ED. He received Narcan with significant improvement in mental status. Complained of back pain. He is required several doses of Narcan in the ED and is now being placed on Narcan drip and transferred to the intensive care unit. In the ICU on a Narcan drip he easily arouses, follows all commands. Currently denies pain. He'll be monitored very closely in the ICU with risk for further respiratory depression or possible need for intubation. There is concern for aspiration and he was placed on IV Unasyn. He was initially on a 15 L nonrebreather currently on 7 L oxygen mask.    Please note above history obtained from my partner Dr. Mcgovern.     ROS:  Respiratory: positive shortness of breath, patient states he is on 8 L oxygen at home Cardiac: unable to perform due to the patient's inability to effectively communicate, GI: unable to perform due to the patient's inability to effectively communicate.  All other systems negative.    Interval Events:  24 hour interval history reviewed     -Pain last night  -Ortho eval if necessary  -No narcan required  -Straight cath once  -NS 75   -8 liter oxy mask  -No CXR  -1500 IS  -Day 3 unasyn  -Methadone 15 q8 to be started at a low dose  -Add oxycontin back slowly. Patient admits that he received additional pain medication at [facility]    PFSH:  No change.    Respiratory:     Pulse Oximetry: 95 %  Chest Tube Drains:          Exam: rales bibasilar, however improved.  ImagingCXR  I have  personally reviewed the chest x-ray my impression is  no chest x-ray today  Recent Labs      01/26/18 2023   ISTATAPH  7.437   ISTATAPCO2  49.0*   ISTATAPO2  64   ISTATATCO2  34*   [id number]  92*   ISTATARTHCO3  33.0*   ISTATARTBE  7*   ISTATTEMP  98.6 F   ISTATFIO2  65   ISTATSPEC  Arterial   ISTATAPHTC  7.437   [id number]  64       HemoDynamics:  Pulse: 93, Heart Rate (Monitored): 91  NIBP: (!) 163/80       Exam: regular rate and rhythm  Imaging: Echocardiogram has been ordered but is pending interpretation.  Recent Labs      01/25/18   1608  01/25/18   2300  01/26/18   0610  01/27/18   0625   TROPONINI  0.94*  0.59*  0.55*   --    BNPBTYPENAT  167*   --    --   139*     BNP normal.  Neuro:  GCS Total Lang Coma Score: 15       Exam: mild Sedated initially early this am, but now improved without evidence of somnolence. Off Narcan infusion for 3 hours prior to rounding. Follows commands. Complains of pain. He has no obvious focal deficits of his right upper extremity and legs bilaterally. His left arm is in a sling due to recent fracture and previous multiple surgeries. He has adequate sensation of his fingertips.  Imaging: None - Reviewed and Ct Brain Reviewed from previously which revealed no obvious acute abnormality with of the carotid arteries.    Fluids:  Intake/Output       01/25/18 0700 - 01/26/18 0659 01/26/18 0700 - 01/27/18 0659 01/27/18 0700 - 01/28/18 0659      [id number] 0087-8300 Total 1682-1218 9266-3061 Total 1142-5723 2536-4807 Total       Intake    P.O.  --  -- --  600  -- 600  --  -- --    P.O. -- -- -- 600 -- 600 -- -- --    I.V.  --  -- --  75  600 675  150  -- 150    IV Volume (NS) -- -- -- 75 600 675 150 -- 150    Other  --  -- --  --  -- --  30  -- 30    Medications (P.O./ Enteral Liquids) -- -- -- -- -- -- 30 -- 30    Total Intake -- -- --  180 -- 180       Output    Urine  --  800 800  700  1570 2270  250  -- 250    Condom Catheter -- 550 550 200 -- 200 -- -- --     Number of Times Voided -- 1 x 1 x 6 x 11 x 17 x 2 x -- 2 x    Number of Times Incontinent of Urine -- -- -- 2 x -- 2 x -- -- --    Straight Catheter -- -- -- -- 720 720 -- -- --    Void (ml) -- 250 250  250 -- 250    Stool  --  -- --  --  -- --  --  -- --    Number of Times Stooled -- 0 x 0 x -- -- -- -- -- --    Total Output -- 800  2270 250 -- 250       Net I/O     -- -800 -800 -25 -970 -995 -70 -- -70           Recent Labs      18   1608  18   0610  18   0625   SODIUM  136  138  134*   POTASSIUM  4.1  4.0  3.6   CHLORIDE  96  101  98   CO2  32  30  27   BUN  14  12  10   CREATININE  0.59  0.71  0.62   MAGNESIUM  1.5   --   1.3*   PHOSPHORUS   --    --   2.7   CALCIUM  9.1  8.6  8.8       GI/Nutrition:  Exam: abdomen is soft and non-tender  Imaging: None - Reviewed  NPO and taking PO  Liver Function  Recent Labs      18   1608  18   0610  18   0625   ALTSGPT  23   --   18   ASTSGOT  28   --   28   ALKPHOSPHAT  89   --   107*   TBILIRUBIN  0.4   --   0.8   GLUCOSE  155*  110*  155*       Heme:  Recent Labs      18   1608  18   0610  18   0625   RBC  5.14  4.82  5.06   HEMOGLOBIN  14.6  14.3  14.7   HEMATOCRIT  46.5  43.0  44.7   PLATELETCT  232  182  159*   APTT  29.7   --    --        Infectious Disease:  Temp  Av.7 °C (98.1 °F)  Min: 36.4 °C (97.5 °F)  Max: 37.1 °C (98.7 °F)  Micro: antibiotics reviewed. Negative influenza. Evidence of pneumonia and ongoing hypoxemia, though the patient has chronic underlying obstructive lung disease with chronic hypoxemia. Note however hemoglobin only 14.7.  Recent Labs      18   1608  18   0610  18   0625   WBC  10.3  11.4*  6.3   NEUTSPOLYS  64.40  73.40*  63.90   LYMPHOCYTES  20.00*  14.80*  18.80*   MONOCYTES  10.60  8.40  12.40   EOSINOPHILS  3.70  2.60  4.10   BASOPHILS  0.90  0.50  0.60   ASTSGOT  28   --   28   ALTSGPT  23   --   18   ALKPHOSPHAT  89   --   107*    TBILIRUBIN  0.4   --   0.8     Current Facility-Administered Medications   Medication Dose Frequency Provider Last Rate Last Dose   • acetaminophen (TYLENOL) tablet 650 mg  650 mg Q4HRS PRN German Mcgovern M.D.   650 mg at 01/27/18 0047   • NS infusion   Continuous Masoud Hernadez D.O. 75 mL/hr at 01/26/18 0958     • ampicillin/sulbactam (UNASYN) 3 g in  mL IVPB  3 g Q6HRS Erica Harris M.D.   Stopped at 01/27/18 0600   • aspirin (ASA) chewable tab 81 mg  81 mg DAILY Edward Marcos M.D.   81 mg at 01/27/18 0906   • atorvastatin (LIPITOR) tablet 40 mg  40 mg QHS Edward Marcos M.D.   40 mg at 01/26/18 2114   • insulin glargine (LANTUS) injection 10 Units  10 Units QHS Erica Harris M.D.   10 Units at 01/26/18 2121   • senna-docusate (PERICOLACE or SENOKOT S) 8.6-50 MG per tablet 2 Tab  2 Tab BID Erica Harris M.D.   2 Tab at 01/27/18 0906    And   • polyethylene glycol/lytes (MIRALAX) PACKET 1 Packet  1 Packet QDAY PRN Erica Harris M.D.        And   • magnesium hydroxide (MILK OF MAGNESIA) suspension 30 mL  30 mL QDAY PRN Erica Harris M.D.        And   • bisacodyl (DULCOLAX) suppository 10 mg  10 mg QDAY PRN Erica Harris M.D.       • Respiratory Care per Protocol   Continuous RT Erica Harris M.D.       • heparin injection 5,000 Units  5,000 Units Q8HRS Erica Harris M.D.   5,000 Units at 01/27/18 0557   • ondansetron (ZOFRAN) syringe/vial injection 4 mg  4 mg Q4HRS PRN Erica Harris M.D.       • ondansetron (ZOFRAN ODT) dispertab 4 mg  4 mg Q4HRS PRN Erica Harris M.D.   4 mg at 01/26/18 2329   • promethazine (PHENERGAN) tablet 12.5-25 mg  12.5-25 mg Q4HRS PRN Erica Harris M.D.       • promethazine (PHENERGAN) suppository 12.5-25 mg  12.5-25 mg Q4HRS PRN Erica Harris M.D.       • prochlorperazine (COMPAZINE) injection 5-10 mg  5-10 mg Q4HRS PRN Erica Harris M.D.   5 mg at 01/27/18 0136   • nicotine (NICODERM) 21 MG/24HR 21 mg  21 mg Daily-0600 Erica Harris M.D.    21 mg at 01/27/18 0557    And   • nicotine polacrilex (NICORETTE) 2 MG piece 2 mg  2 mg Q HOUR PRN Erica Harris M.D.       • insulin regular (HUMULIN R) injection 3-14 Units  3-14 Units 4X/DAY ACHS Erica Harris M.D.   Stopped at 01/25/18 2100    And   • glucose 4 g chewable tablet 16 g  16 g Q15 MIN PRN Erica Harris M.D.        And   • dextrose 50% (D50W) injection 25 mL  25 mL Q15 MIN PRN Erica Harris M.D.         Last reviewed on 1/25/2018  8:37 PM by Rae Ch, Pharmacy Intern    Quality  Measures:   Labs reviewed, Medications reviewed and Radiology images reviewed   Lutz catheter: No Lutz       DVT Prophylaxis: Heparin   DVT prophylaxis - mechanical: SCDs   Ulcer prophylaxis: Yes      Assessment and plan:    1.  Acute hypoxemic respiratory failure:    -Patient has bilateral alveolar infiltrates. Appears to be consistent with aspiration pneumonia, however today patient describes severe obstructive lung disease with requirement of 8 L/m of oxygen during the day.  -Previous recent influenza which was negative at this admission.  -Continue with Unasyn for now.  -Note BNP level was low not consistent with pulmonary edema, with low BNP today as well.  -Doubt thromboembolism.    2. 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    -Patient required Narcan infusion overnight but is improved.  -I have trialed off current regimen and will slowly reintroduce pain medication as needed.  -Patient admits to also receiving oxycodone in addition to ongoing chronic methadone for his recent shoulder injury at another institution.    3.  Diabetes mellitus.    -Glycemic control    4. Left bundle branch block pattern    -Mild increase in troponin level. This may be a demand ischemic effect. Rule out acute ischemic event.  -Echocardiogram is pending.   -Again no sign of fluid overload based on normal BNP. There may be chronic alveolar densities as well.    5. Recurrent left shoulder  injury including current proximal humeral fracture.     -Patient has had 5 previous surgeries, but current injury is nonoperative. Patient remains in sling.    Discussed patient condition and risk of morbidity and/or mortality with Charge nurse / hot rounds.  M with multidisciplinary team  The patient remains critically ill.  Critical care time = 35 minutes in directly providing and coordinating critical care and extensive data review.  No time overlap and excludes procedures.    Masoud Hernadez D.O.

## 2018-01-27 NOTE — ASSESSMENT & PLAN NOTE
Uncontrolled hyperglycemia   increase Lantus 20 units continue sliding scale insulin    Monitor blood sugars and adjust accordingly

## 2018-01-27 NOTE — PROGRESS NOTES
[facility]    Date of Service: 2018    Chief Complaint  62 y.o. male admitted 2018 with altered level of consciousness secondary to suspected narcotic overdose    Interval Problem Update  In the ICU being weaned off Narcan drip    Consultants/Specialty  Dr. Hernadez    Disposition  TBD        Review of Systems   Constitutional: Negative for fever.   HENT: Negative.    Eyes: Negative for discharge and redness.   Respiratory: Positive for cough. Negative for sputum production.    Cardiovascular: Negative for chest pain and palpitations.   Gastrointestinal: Negative for abdominal pain, blood in stool, nausea and vomiting.   Genitourinary: Negative for flank pain and hematuria.   Musculoskeletal: Positive for back pain and joint pain.   Skin: Negative.    Neurological: Negative for dizziness, tremors and speech change.   Psychiatric/Behavioral: Negative for hallucinations. The patient is not nervous/anxious.    All other systems reviewed and are negative.     Physical Exam  Laboratory/Imaging   Hemodynamics  Temp (24hrs), Av.9 °C (98.4 °F), Min:36.4 °C (97.6 °F), Max:37.1 °C (98.8 °F)   Temperature: 36.4 °C (97.6 °F)  Pulse  Av.4  Min: 87  Max: 120 Heart Rate (Monitored): 91  NIBP: 111/64      Respiratory      Respiration: (!) 11, Pulse Oximetry: 94 %, O2 Daily Delivery Respiratory : OxyMask     Work Of Breathing / Effort: Mild;Shallow  RUL Breath Sounds: Clear, RML Breath Sounds: Diminished, RLL Breath Sounds: Diminished, JAZIEL Breath Sounds: Clear, LLL Breath Sounds: Diminished    Fluids    Intake/Output Summary (Last 24 hours) at 18 1709  Last data filed at 18 1600   Gross per 24 hour   Intake              475 ml   Output             1000 ml   Net             -525 ml       Nutrition  No orders of the defined types were placed in this encounter.    Physical Exam   Constitutional: He is oriented to person, place, and time. He appears well-developed and well-nourished.    HENT:   Head: Normocephalic and atraumatic.   Right Ear: External ear normal.   Left Ear: External ear normal.   Mouth/Throat: No oropharyngeal exudate.   Eyes: Conjunctivae are normal. Right eye exhibits no discharge. Left eye exhibits no discharge. No scleral icterus.   Neck: Neck supple. No JVD present. No tracheal deviation present.   Cardiovascular: Normal rate and regular rhythm.  Exam reveals no gallop and no friction rub.    No murmur heard.  Pulmonary/Chest: Effort normal. No stridor. No respiratory distress. He has no wheezes. He has rhonchi. He has no rales. He exhibits no tenderness.   Abdominal: Soft. Bowel sounds are normal. He exhibits no distension. There is no tenderness. There is no rebound.   Musculoskeletal: He exhibits no edema or tenderness.   Left shoulder immobilizer in place   Neurological: He is alert and oriented to person, place, and time. No cranial nerve deficit. He exhibits normal muscle tone.   Skin: Skin is warm and dry. He is not diaphoretic. No cyanosis. Nails show no clubbing.   Psychiatric: He has a normal mood and affect. His behavior is normal. Thought content normal.   Nursing note and vitals reviewed.      Recent Labs      01/25/18   1608  01/26/18   0610   WBC  10.3  11.4*   RBC  5.14  4.82   HEMOGLOBIN  14.6  14.3   HEMATOCRIT  46.5  43.0   MCV  90.5  89.2   MCH  28.4  29.7   MCHC  31.4*  33.3*   RDW  51.3*  50.3*   PLATELETCT  232  182   MPV  10.1  9.9     Recent Labs      01/25/18   1608  01/26/18   0610   SODIUM  136  138   POTASSIUM  4.1  4.0   CHLORIDE  96  101   CO2  32  30   GLUCOSE  155*  110*   BUN  14  12   CREATININE  0.59  0.71   CALCIUM  9.1  8.6     Recent Labs      01/25/18   1608   APTT  29.7     Recent Labs      01/25/18   1608   BNPBTYPENAT  167*              Assessment/Plan     * Acute on chronic respiratory failure with hypoxia (CMS-HCC)   Assessment & Plan    Continue oxygen  RT protocol        Aspiration pneumonia (CMS-[facility])   Assessment & Plan     Continue Unasyn          Narcotic overdose   Assessment & Plan    Suspected narcotic overdose  Mr. Pollack was here with a suspected overdose of T40.3 - Methadone; he has no other known overdoses     Weaned off Narcan  Continue to hold narcotics until mental status is improved.           Elevated troponin   Assessment & Plan    Likely demand ischemia  Check echocardiogram  Aspirin statin        Altered level of consciousness   Assessment & Plan    Likely secondary to narcotic overdose  -Clinically improved weaned off Narcan        HTN (hypertension)- (present on admission)   Assessment & Plan    Stable off medical therapy monitor blood pressure and adjust accordingly        DM (diabetes mellitus) (CMS-HCC)- (present on admission)   Assessment & Plan    Continue Lantus and sliding scale insulin  Monitor blood sugars and adjust accordingly            Reviewed items::  Medications reviewed and Labs reviewed  Lutz catheter::  No Lutz  DVT prophylaxis pharmacological::  Enoxaparin (Lovenox)  Antibiotics:  Treating active infection/contamination beyond 24 hours perioperative coverage

## 2018-01-28 LAB
ALBUMIN SERPL BCP-MCNC: 2.9 G/DL (ref 3.2–4.9)
ALBUMIN/GLOB SERPL: 0.7 G/DL
ALP SERPL-CCNC: 108 U/L (ref 30–99)
ALT SERPL-CCNC: 16 U/L (ref 2–50)
ANION GAP SERPL CALC-SCNC: 8 MMOL/L (ref 0–11.9)
AST SERPL-CCNC: 23 U/L (ref 12–45)
BASOPHILS # BLD AUTO: 0.7 % (ref 0–1.8)
BASOPHILS # BLD: 0.06 K/UL (ref 0–0.12)
BILIRUB SERPL-MCNC: 0.6 MG/DL (ref 0.1–1.5)
BUN SERPL-MCNC: 9 MG/DL (ref 8–22)
CALCIUM SERPL-MCNC: 9.2 MG/DL (ref 8.5–10.5)
CHLORIDE SERPL-SCNC: 99 MMOL/L (ref 96–112)
CO2 SERPL-SCNC: 31 MMOL/L (ref 20–33)
CREAT SERPL-MCNC: 0.7 MG/DL (ref 0.5–1.4)
EOSINOPHIL # BLD AUTO: 0.44 K/UL (ref 0–0.51)
EOSINOPHIL NFR BLD: 5 % (ref 0–6.9)
ERYTHROCYTE [DISTWIDTH] IN BLOOD BY AUTOMATED COUNT: 49.7 FL (ref 35.9–50)
GLOBULIN SER CALC-MCNC: 4.1 G/DL (ref 1.9–3.5)
GLUCOSE BLD-MCNC: 130 MG/DL (ref 65–99)
GLUCOSE BLD-MCNC: 145 MG/DL (ref 65–99)
GLUCOSE BLD-MCNC: 149 MG/DL (ref 65–99)
GLUCOSE BLD-MCNC: 172 MG/DL (ref 65–99)
GLUCOSE SERPL-MCNC: 127 MG/DL (ref 65–99)
HCT VFR BLD AUTO: 48 % (ref 42–52)
HGB BLD-MCNC: 15.2 G/DL (ref 14–18)
IMM GRANULOCYTES # BLD AUTO: 0.03 K/UL (ref 0–0.11)
IMM GRANULOCYTES NFR BLD AUTO: 0.3 % (ref 0–0.9)
LV EJECT FRACT  99904: 40
LYMPHOCYTES # BLD AUTO: 1.92 K/UL (ref 1–4.8)
LYMPHOCYTES NFR BLD: 21.7 % (ref 22–41)
MAGNESIUM SERPL-MCNC: 1.8 MG/DL (ref 1.5–2.5)
MCH RBC QN AUTO: 28.1 PG (ref 27–33)
MCHC RBC AUTO-ENTMCNC: 31.7 G/DL (ref 33.7–35.3)
MCV RBC AUTO: 88.7 FL (ref 81.4–97.8)
MONOCYTES # BLD AUTO: 0.66 K/UL (ref 0–0.85)
MONOCYTES NFR BLD AUTO: 7.5 % (ref 0–13.4)
NEUTROPHILS # BLD AUTO: 5.74 K/UL (ref 1.82–7.42)
NEUTROPHILS NFR BLD: 64.8 % (ref 44–72)
NRBC # BLD AUTO: 0 K/UL
NRBC BLD-RTO: 0 /100 WBC
PHOSPHATE SERPL-MCNC: 3 MG/DL (ref 2.5–4.5)
PLATELET # BLD AUTO: 187 K/UL (ref 164–446)
PMV BLD AUTO: 9.4 FL (ref 9–12.9)
POTASSIUM SERPL-SCNC: 3.7 MMOL/L (ref 3.6–5.5)
PROT SERPL-MCNC: 7 G/DL (ref 6–8.2)
RBC # BLD AUTO: 5.41 M/UL (ref 4.7–6.1)
SODIUM SERPL-SCNC: 138 MMOL/L (ref 135–145)
WBC # BLD AUTO: 8.9 K/UL (ref 4.8–10.8)

## 2018-01-28 PROCEDURE — 80053 COMPREHEN METABOLIC PANEL: CPT

## 2018-01-28 PROCEDURE — 82962 GLUCOSE BLOOD TEST: CPT

## 2018-01-28 PROCEDURE — A9270 NON-COVERED ITEM OR SERVICE: HCPCS | Performed by: HOSPITALIST

## 2018-01-28 PROCEDURE — 700102 HCHG RX REV CODE 250 W/ 637 OVERRIDE(OP): Performed by: INTERNAL MEDICINE

## 2018-01-28 PROCEDURE — A9270 NON-COVERED ITEM OR SERVICE: HCPCS | Performed by: INTERNAL MEDICINE

## 2018-01-28 PROCEDURE — 700111 HCHG RX REV CODE 636 W/ 250 OVERRIDE (IP): Performed by: INTERNAL MEDICINE

## 2018-01-28 PROCEDURE — 770020 HCHG ROOM/CARE - TELE (206)

## 2018-01-28 PROCEDURE — 700111 HCHG RX REV CODE 636 W/ 250 OVERRIDE (IP): Performed by: HOSPITALIST

## 2018-01-28 PROCEDURE — 700102 HCHG RX REV CODE 250 W/ 637 OVERRIDE(OP): Performed by: HOSPITALIST

## 2018-01-28 PROCEDURE — 85025 COMPLETE CBC W/AUTO DIFF WBC: CPT

## 2018-01-28 PROCEDURE — 99232 SBSQ HOSP IP/OBS MODERATE 35: CPT | Performed by: HOSPITALIST

## 2018-01-28 PROCEDURE — 84100 ASSAY OF PHOSPHORUS: CPT

## 2018-01-28 PROCEDURE — 700105 HCHG RX REV CODE 258: Performed by: INTERNAL MEDICINE

## 2018-01-28 PROCEDURE — 83735 ASSAY OF MAGNESIUM: CPT

## 2018-01-28 RX ORDER — INSULIN GLARGINE 100 [IU]/ML
20 INJECTION, SOLUTION SUBCUTANEOUS
Status: DISCONTINUED | OUTPATIENT
Start: 2018-01-28 | End: 2018-01-29 | Stop reason: HOSPADM

## 2018-01-28 RX ORDER — LISINOPRIL 20 MG/1
10 TABLET ORAL
Status: DISCONTINUED | OUTPATIENT
Start: 2018-01-28 | End: 2018-01-29 | Stop reason: HOSPADM

## 2018-01-28 RX ORDER — AMOXICILLIN AND CLAVULANATE POTASSIUM 875; 125 MG/1; MG/1
1 TABLET, FILM COATED ORAL EVERY 12 HOURS
Status: DISCONTINUED | OUTPATIENT
Start: 2018-01-28 | End: 2018-01-29 | Stop reason: HOSPADM

## 2018-01-28 RX ORDER — MAGNESIUM SULFATE HEPTAHYDRATE 40 MG/ML
2 INJECTION, SOLUTION INTRAVENOUS ONCE
Status: COMPLETED | OUTPATIENT
Start: 2018-01-28 | End: 2018-01-28

## 2018-01-28 RX ADMIN — AMPICILLIN SODIUM AND SULBACTAM SODIUM 3 G: 2; 1 INJECTION, POWDER, FOR SOLUTION INTRAMUSCULAR; INTRAVENOUS at 03:17

## 2018-01-28 RX ADMIN — AMLODIPINE BESYLATE 5 MG: 5 TABLET ORAL at 07:52

## 2018-01-28 RX ADMIN — ASPIRIN 81 MG: 81 TABLET, CHEWABLE ORAL at 07:56

## 2018-01-28 RX ADMIN — AMOXICILLIN AND CLAVULANATE POTASSIUM 1 TABLET: 875; 125 TABLET, FILM COATED ORAL at 16:14

## 2018-01-28 RX ADMIN — HEPARIN SODIUM 5000 UNITS: 5000 INJECTION, SOLUTION INTRAVENOUS; SUBCUTANEOUS at 13:32

## 2018-01-28 RX ADMIN — LISINOPRIL 10 MG: 20 TABLET ORAL at 18:22

## 2018-01-28 RX ADMIN — HEPARIN SODIUM 5000 UNITS: 5000 INJECTION, SOLUTION INTRAVENOUS; SUBCUTANEOUS at 21:09

## 2018-01-28 RX ADMIN — OXYCODONE HYDROCHLORIDE 5 MG: 5 TABLET ORAL at 23:50

## 2018-01-28 RX ADMIN — OXYCODONE HYDROCHLORIDE 5 MG: 5 TABLET ORAL at 02:49

## 2018-01-28 RX ADMIN — INSULIN HUMAN 3 UNITS: 100 INJECTION, SOLUTION PARENTERAL at 21:08

## 2018-01-28 RX ADMIN — NICOTINE 21 MG: 21 PATCH, EXTENDED RELEASE TRANSDERMAL at 04:56

## 2018-01-28 RX ADMIN — ATORVASTATIN CALCIUM 40 MG: 40 TABLET, FILM COATED ORAL at 21:10

## 2018-01-28 RX ADMIN — METHADONE HYDROCHLORIDE 15 MG: 10 TABLET ORAL at 04:55

## 2018-01-28 RX ADMIN — INSULIN GLARGINE 20 UNITS: 100 INJECTION, SOLUTION SUBCUTANEOUS at 21:08

## 2018-01-28 RX ADMIN — MAGNESIUM SULFATE IN WATER 2 G: 40 INJECTION, SOLUTION INTRAVENOUS at 10:35

## 2018-01-28 RX ADMIN — HEPARIN SODIUM 5000 UNITS: 5000 INJECTION, SOLUTION INTRAVENOUS; SUBCUTANEOUS at 04:56

## 2018-01-28 RX ADMIN — OXYCODONE HYDROCHLORIDE 5 MG: 5 TABLET ORAL at 14:01

## 2018-01-28 RX ADMIN — PROMETHAZINE HYDROCHLORIDE 25 MG: 25 TABLET ORAL at 07:57

## 2018-01-28 RX ADMIN — STANDARDIZED SENNA CONCENTRATE AND DOCUSATE SODIUM 2 TABLET: 8.6; 5 TABLET, FILM COATED ORAL at 21:09

## 2018-01-28 RX ADMIN — AMPICILLIN SODIUM AND SULBACTAM SODIUM 3 G: 2; 1 INJECTION, POWDER, FOR SOLUTION INTRAMUSCULAR; INTRAVENOUS at 10:35

## 2018-01-28 RX ADMIN — METHADONE HYDROCHLORIDE 15 MG: 10 TABLET ORAL at 21:09

## 2018-01-28 RX ADMIN — PREGABALIN 300 MG: 150 CAPSULE ORAL at 07:51

## 2018-01-28 RX ADMIN — OXYCODONE HYDROCHLORIDE 5 MG: 5 TABLET ORAL at 07:52

## 2018-01-28 RX ADMIN — PREGABALIN 300 MG: 150 CAPSULE ORAL at 17:48

## 2018-01-28 RX ADMIN — METHADONE HYDROCHLORIDE 15 MG: 10 TABLET ORAL at 13:32

## 2018-01-28 RX ADMIN — STANDARDIZED SENNA CONCENTRATE AND DOCUSATE SODIUM 2 TABLET: 8.6; 5 TABLET, FILM COATED ORAL at 07:52

## 2018-01-28 RX ADMIN — ACETAMINOPHEN 650 MG: 325 TABLET, FILM COATED ORAL at 18:22

## 2018-01-28 RX ADMIN — MAGNESIUM HYDROXIDE 30 ML: 400 SUSPENSION ORAL at 21:09

## 2018-01-28 ASSESSMENT — ENCOUNTER SYMPTOMS
DIZZINESS: 0
BACK PAIN: 1
BLURRED VISION: 0
EYE DISCHARGE: 0
TINGLING: 0
HEADACHES: 0
VOMITING: 0
HALLUCINATIONS: 0
FLANK PAIN: 0
BLOOD IN STOOL: 0
ABDOMINAL PAIN: 0
FEVER: 0
PALPITATIONS: 0
EYE REDNESS: 0
SEIZURES: 0
SPUTUM PRODUCTION: 0
SHORTNESS OF BREATH: 1
WHEEZING: 0
CHILLS: 0
NAUSEA: 0
MYALGIAS: 0
FOCAL WEAKNESS: 0
NERVOUS/ANXIOUS: 0
COUGH: 1
SPEECH CHANGE: 0

## 2018-01-28 ASSESSMENT — PAIN SCALES - GENERAL
PAINLEVEL_OUTOF10: 8
PAINLEVEL_OUTOF10: 5
PAINLEVEL_OUTOF10: 7
PAINLEVEL_OUTOF10: 2
PAINLEVEL_OUTOF10: 8
PAINLEVEL_OUTOF10: 0
PAINLEVEL_OUTOF10: 2

## 2018-01-28 NOTE — CARE PLAN
Problem: Knowledge Deficit  Goal: Knowledge of disease process/condition, treatment plan, diagnostic tests, and medications will improve    Intervention: Assess knowledge level of disease process/condition, treatment plan, diagnostic tests, and medications  Review plan of care for the day      Problem: Pain Management  Goal: Pain level will decrease to patient's comfort goal    Intervention: Follow pain managment plan developed in collaboration with patient and Interdisciplinary Team  Assess and medicate as needed for complaints of acute and chronic pain

## 2018-01-28 NOTE — CARE PLAN
Problem: Psychosocial Needs:  Goal: Level of anxiety will decrease    Intervention: Identify and develop with patient strategies to cope with anxiety triggers  Pt incessantly anxious/restless. This RN sitting close to pt room for safety observation. RN practicing verbal de-escalation and PRN ativan.       Problem: Respiratory:  Goal: Respiratory status will improve  Outcome: PROGRESSING AS EXPECTED  Pt progressed from oxymask to use of nasal cannula. Tolerating well.

## 2018-01-28 NOTE — PROGRESS NOTES
Pulmonary Critical Care Progress Note        Chief Complaint: Change in mental status    History of Present Illness:  I was called to evaluate this patient urgently is being placed on a Narcan drip. He is a 62-year-old male with a history of chronic pain and narcotic dependence. He's been using methadone daily for many years. Apparently his wife noted that he had been lethargic and acting abnormally. He is brought to his primary care physician because of significant somnolence he was transferred to the ED. He received Narcan with significant improvement in mental status. Complained of back pain. He is required several doses of Narcan in the ED and is now being placed on Narcan drip and transferred to the intensive care unit. In the ICU on a Narcan drip he easily arouses, follows all commands. Currently denies pain. He'll be monitored very closely in the ICU with risk for further respiratory depression or possible need for intubation. There is concern for aspiration and he was placed on IV Unasyn. He was initially on a 15 L nonrebreather currently on 7 L oxygen mask.    Please note above history obtained from my partner Dr. Mcgovern.     ROS:  Respiratory: positive shortness of breath, patient states he is on 5 L oxygen at home Cardiac: unable to perform due to the patient's inability to effectively communicate, GI: unable to perform due to the patient's inability to effectively communicate.  All other systems negative.    Interval Events:  24 hour interval history reviewed     -Full liquid and advance  -KVO  -Unasyn 4/5  -No BM yet.   -Increase SS   -Augmentin   -Reduced EF of 45%    PFSH:  No change.    Respiratory:     Pulse Oximetry: 94 %  Chest Tube Drains:          Exam: rales bibasilar, however improved.  ImagingCXR  I have personally reviewed the chest x-ray my impression is  no chest x-ray today  Recent Labs      01/26/18 2023   ISTATAPH  7.437   ISTATAPCO2  49.0*   ISTATAPO2  64   ISTATATCO2  34*    [id number]  92*   ISTATARTHCO3  33.0*   ISTATARTBE  7*   ISTATTEMP  98.6 F   ISTATFIO2  65   ISTATSPEC  Arterial   ISTATAPHTC  7.437   [id number]  64       HemoDynamics:  Pulse: 97, Heart Rate (Monitored): 96  NIBP: (!) 164/77       Exam: regular rate and rhythm  Imaging: Echocardiogram as reported below:    CONCLUSIONS  Moderately reduced left ventricular systolic function.  Left   ventricular ejection fraction is visually estimated to be 40%.  No hemodynamically significant valvular abnormalities identified.    Recent Labs      01/25/18   1608  01/25/18   2300  01/26/18   0610  01/27/18   0625   TROPONINI  0.94*  0.59*  0.55*   --    BNPBTYPENAT  167*   --    --   139*     BNP normal.  Neuro:  GCS Total [place] Coma Score: 15       Exam: Patient alert today. Following commands. Complains of pain of his left shoulder. Overall no significant new neurologic deficits. Continues to have request for pain medication due to his fracture the left shoulder. He remains in the sling  Imaging: None - Reviewed and Ct Brain Reviewed from previously which revealed no obvious acute abnormality with of the carotid arteries. No new imaging.    Fluids:  Intake/Output       01/26/18 0700 - 01/27/18 0659 01/27/18 0700 - 01/28/18 0659 01/28/18 0700 - 01/29/18 0659      [id number] 0897-9081 Total 8001-3618 2134-1218 Total 4992-2702 6020-8812 Total       Intake    P.O.  600  -- 600  30  540 570  --  -- --    P.O. 600 -- 600 30 540 570 -- -- --    I.V.  75  600 675  150  900 1050  --  -- --    IV Volume (NS) 75 600 675  -- -- --    Other  --  -- --  120  -- 120  --  -- --    Medications (P.O./ Enteral Liquids) -- -- -- 120 -- 120 -- -- --    Total Intake  300 1440 1740 -- -- --       Output    Urine  700  1570 2270  1025  650 1675  --  -- --    Condom Catheter 200 -- 200 -- -- -- -- -- --    Number of Times Voided 6 x 11 x 17 x 8 x 6 x 14 x -- -- --    Number of Times Incontinent of Urine 2 x -- 2 x -- -- -- --  -- --    Straight Catheter -- 720 720 -- -- -- -- -- --    Void (ml)  2415 623 7702 -- -- --    Total Output 700 1570 2270 3816 685 2029 -- -- --       Net I/O     -25 -970 -995 -725 790 65 -- -- --           Recent Labs      18   16018   SODIUM  136  138  134*  138   POTASSIUM  4.1  4.0  3.6  3.7   CHLORIDE  96  101  98  99   CO2  32  30  27  31   BUN  14  12  10  9   CREATININE  0.59  0.71  0.62  0.70   MAGNESIUM  1.5   --   1.3*  1.8   PHOSPHORUS   --    --   2.7  3.0   CALCIUM  9.1  8.6  8.8  9.2       GI/Nutrition:  Exam: abdomen is soft and non-tender  Imaging: None - Reviewed  NPO and taking PO  Liver Function  Recent Labs      18   ALTSGPT  23   --   18  16   ASTSGOT  28   --   28  23   ALKPHOSPHAT  89   --   107*  108*   TBILIRUBIN  0.4   --   0.8  0.6   GLUCOSE  155*  110*  155*  127*       Heme:  Recent Labs      18   RBC  5.14  4.82  5.06  5.41   HEMOGLOBIN  14.6  14.3  14.7  15.2   HEMATOCRIT  46.5  43.0  44.7  48.0   PLATELETCT  232  182  159*  187   APTT  29.7   --    --    --        Infectious Disease:  Temp  Av.4 °C (97.5 °F)  Min: 36 °C (96.8 °F)  Max: 36.6 °C (97.8 °F)  Micro: antibiotics reviewed. Negative influenza. Evidence of pneumonia and ongoing hypoxemia, though the patient has chronic underlying obstructive lung disease with chronic hypoxemia. Note however hemoglobin only 14.7.  Recent Labs      18   16018   WBC  10.3  11.4*  6.3  8.9   NEUTSPOLYS  64.40  73.40*  63.90  64.80   LYMPHOCYTES  20.00*  14.80*  18.80*  21.70*   MONOCYTES  10.60  8.40  12.40  7.50   EOSINOPHILS  3.70  2.60  4.10  5.00   BASOPHILS  0.90  0.50  0.60  0.70   ASTSGOT  28   --   28  23   ALTSGPT  23   --   18  16   ALKPHOSPHAT  89   --   107*  108*   TBILIRUBIN   0.4   --   0.8  0.6     Current Facility-Administered Medications   Medication Dose Frequency Provider Last Rate Last Dose   • acetaminophen (TYLENOL) tablet 650 mg  650 mg Q4HRS PRN German Mcgovern M.D.   650 mg at 01/27/18 1943   • methadone (DOLOPHINE) tablet 15 mg  15 mg Q8HRS Edward Marcos M.D.   15 mg at 01/28/18 0455   • oxycodone immediate-release (ROXICODONE) tablet 5 mg  5 mg Q4HRS PRN Edward Marcos M.D.   5 mg at 01/28/18 0249   • lorazepam (ATIVAN) tablet 1 mg  1 mg Q4HRS PRN Masoud Hernadez DSEDRICK   1 mg at 01/27/18 1156   • methadone (DOLOPHINE) tablet 15 mg  15 mg Once Edward Marcos M.D.       • insulin glargine (LANTUS) injection 15 Units  15 Units QHS Edward Marcos M.D.   15 Units at 01/27/18 2125   • pregabalin (LYRICA) capsule 300 mg  300 mg BID Edward Marcos M.D.   300 mg at 01/27/18 1718   • amlodipine (NORVASC) tablet 5 mg  5 mg Q DAY Edward Marcos M.D.       • omeprazole (PRILOSEC) capsule 20 mg  20 mg Q48HRS Edward Marcos M.D.   20 mg at 01/27/18 1718   • NS infusion   Continuous SHREYA Torres.O. 75 mL/hr at 01/27/18 2136     • ampicillin/sulbactam (UNASYN) 3 g in  mL IVPB  3 g Q6HRS SHREYA Torres.O.   Stopped at 01/28/18 0347   • aspirin (ASA) chewable tab 81 mg  81 mg DAILY Edwrad Marcos M.D.   81 mg at 01/27/18 0906   • atorvastatin (LIPITOR) tablet 40 mg  40 mg QHS Edward Marcos M.D.   40 mg at 01/27/18 1943   • senna-docusate (PERICOLACE or SENOKOT S) 8.6-50 MG per tablet 2 Tab  2 Tab BID Erica Harris M.D.   2 Tab at 01/27/18 1943    And   • polyethylene glycol/lytes (MIRALAX) PACKET 1 Packet  1 Packet QDAY PRN Erica Harris M.D.        And   • magnesium hydroxide (MILK OF MAGNESIA) suspension 30 mL  30 mL QDAY PRN Erica Harris M.D.        And   • bisacodyl (DULCOLAX) suppository 10 mg  10 mg QDAY PRN Erica Harris M.D.       • Respiratory Care per Protocol   Continuous RT Erica Harris M.D.       •  heparin injection 5,000 Units  5,000 Units Q8HRS Erica Harris M.D.   5,000 Units at 01/28/18 0456   • ondansetron (ZOFRAN) syringe/vial injection 4 mg  4 mg Q4HRS PRN Erica Harris M.D.       • ondansetron (ZOFRAN ODT) dispertab 4 mg  4 mg Q4HRS PRN Erica Harris M.D.   4 mg at 01/27/18 1044   • promethazine (PHENERGAN) tablet 12.5-25 mg  12.5-25 mg Q4HRS PRN Erica Harris M.D.       • promethazine (PHENERGAN) suppository 12.5-25 mg  12.5-25 mg Q4HRS PRN Erica Harris M.D.       • prochlorperazine (COMPAZINE) injection 5-10 mg  5-10 mg Q4HRS PRN Erica Harris M.D.   5 mg at 01/27/18 0136   • nicotine (NICODERM) 21 MG/24HR 21 mg  21 mg Daily-0600 Erica Harris M.D.   21 mg at 01/28/18 0456    And   • nicotine polacrilex (NICORETTE) 2 MG piece 2 mg  2 mg Q HOUR PRN Erica Harris M.D.       • insulin regular (HUMULIN R) injection 3-14 Units  3-14 Units 4X/DAY YASH Harris M.D.   Stopped at 01/27/18 2100    And   • glucose 4 g chewable tablet 16 g  16 g Q15 MIN PRN Erica Harris M.D.        And   • dextrose 50% (D50W) injection 25 mL  25 mL Q15 MIN PRMICHELE Harris M.D.         Last reviewed on 1/25/2018  8:37 PM by Rae Ch, Pharmacy Intern    Quality  Measures:  Labs reviewed, Medications reviewed and Radiology images reviewed  Lutz catheter: No Lutz      DVT Prophylaxis: Heparin  DVT prophylaxis - mechanical: SCDs  Ulcer prophylaxis: Yes      Assessment and plan:    1.  Acute hypoxemic respiratory failure:    -Patient has bilateral alveolar infiltrates. Appears to be consistent with aspiration pneumonia, however today patient describes severe obstructive lung disease with requirement of 5 L/m of oxygen during the day.  -Previous recent influenza which was negative at this admission.  -Continue with Unasyn for now.  -Note BNP level was low not consistent with pulmonary edema, with low BNP today as well.    2. Chronic methadone use for left shoulder pain at moderately high  doses.    -Patient required Narcan infusion overnight but is improved.  -I have trialed off current regimen and will slowly reintroduce pain medication as needed.  -Patient admits to also receiving oxycodone in addition to ongoing chronic methadone for his recent shoulder injury at another institution.    3.  Diabetes mellitus.    -Glycemic control    4. Left bundle branch block pattern    -Mild increase in troponin level. This may be a demand ischemic effect.  -Echocardiogram showing reduced ejection fraction of 40%.  -Again no sign of fluid overload based on normal BNP.   -There may be chronic alveolar densities as well on chest x-ray from previous disease    5. Recurrent left shoulder injury including current proximal humeral fracture.     -Patient has had 5 previous surgeries, but current injury is nonoperative. Patient remains in sling.  -Appreciate orthotics team evaluation during this admission.    Discussed patient condition and risk of morbidity and/or mortality with Charge nurse / hot rounds.  M with multidisciplinary team  The patient remains critically ill.  Critical care time = 35 minutes in directly providing and coordinating critical care and extensive data review.  No time overlap and excludes procedures.    Masoud Hernadez D.O.

## 2018-01-28 NOTE — RESPIRATORY CARE
COPD EDUCATION by COPD CLINICAL EDUCATOR  1/28/2018 at 7:59 AM by Noy Guevara     Patient reviewed by COPD education team. Patient does not qualify for COPD program.

## 2018-01-28 NOTE — PROGRESS NOTES
[facility] Hospitalist Progress Note    Date of Service: 2018    Chief Complaint  62 y.o. male admitted 2018 with altered level of consciousness secondary to suspected narcotic overdose    Interval Problem Update  Alert and oriented off Narcan drip  Reports that he was prescribed oxycodone on discharge from [facility] was taking 4 tablets twice a day as instructed in addition to his baseline methadone  Complains of moderate to severe pain in his left shoulder received IV fentanyl last evening for signs of narcotic withdrawal and pain      Consultants/Specialty  Dr. Hernadez    Disposition  TBD        Review of Systems   Constitutional: Negative for chills and fever.   HENT: Negative.    Eyes: Negative for blurred vision, discharge and redness.   Respiratory: Positive for cough and shortness of breath (improved). Negative for sputum production and wheezing.    Cardiovascular: Negative for chest pain and palpitations.   Gastrointestinal: Negative for abdominal pain, blood in stool and nausea.   Genitourinary: Negative for flank pain and hematuria.        Urinary retention and required straight cath once   Musculoskeletal: Positive for back pain and joint pain (left shoulder).   Skin: Negative.    Neurological: Negative for dizziness, tremors, speech change, focal weakness and seizures.   Psychiatric/Behavioral: Negative for hallucinations. The patient is not nervous/anxious.    All other systems reviewed and are negative.     Physical Exam  Laboratory/Imaging   Hemodynamics  Temp (24hrs), Av.5 °C (97.7 °F), Min:36.4 °C (97.5 °F), Max:36.6 °C (97.8 °F)   Temperature: 36.5 °C (97.7 °F)  Pulse  Av.4  Min: 87  Max: 120 Heart Rate (Monitored): (!) 103  NIBP: (!) 163/80      Respiratory      Respiration: 20, Pulse Oximetry: 95 %, O2 Daily Delivery Respiratory : Silicone Nasal Cannula     Work Of Breathing / Effort: Mild  RUL Breath Sounds: Clear, RML Breath Sounds: Clear, RLL Breath Sounds: Diminished, JAZIEL Breath  Sounds: Clear, LLL Breath Sounds: Diminished    Fluids    Intake/Output Summary (Last 24 hours) at 01/27/18 1635  Last data filed at 01/27/18 1200   Gross per 24 hour   Intake             1010 ml   Output             2795 ml   Net            -1785 ml       Nutrition  Orders Placed This Encounter   Procedures   • DIET ORDER     Standing Status:   Standing     Number of Occurrences:   1     Order Specific Question:   Diet:     Answer:   Full Liquid [11]     Physical Exam   Constitutional: He is oriented to person, place, and time. He appears well-developed and well-nourished.   HENT:   Head: Normocephalic and atraumatic.   Right Ear: External ear normal.   Left Ear: External ear normal.   Mouth/Throat: No oropharyngeal exudate.   Eyes: Conjunctivae are normal. Pupils are equal, round, and reactive to light. Right eye exhibits no discharge. Left eye exhibits no discharge. No scleral icterus.   Neck: Neck supple. No JVD present. No tracheal deviation present.   Cardiovascular: Normal rate and regular rhythm.  Exam reveals no gallop and no friction rub.    No murmur heard.  Pulmonary/Chest: Effort normal. No respiratory distress. He has no wheezes. He has rhonchi. He has rales. He exhibits no tenderness.   Abdominal: Soft. Bowel sounds are normal. He exhibits no distension. There is no tenderness. There is no rebound and no guarding.   Musculoskeletal: He exhibits no edema or tenderness.   Left shoulder immobilizer in place   Neurological: He is alert and oriented to person, place, and time. No cranial nerve deficit. He exhibits normal muscle tone.   Skin: Skin is warm and dry. He is not diaphoretic. No cyanosis. Nails show no clubbing.   Psychiatric: He has a normal mood and affect. His behavior is normal.   Nursing note and vitals reviewed.      Recent Labs      01/25/18   1608  01/26/18   0610  01/27/18   0625   WBC  10.3  11.4*  6.3   RBC  5.14  4.82  5.06   HEMOGLOBIN  14.6  14.3  14.7   HEMATOCRIT  46.5  43.0  44.7    MCV  90.5  89.2  88.3   MCH  28.4  29.7  29.1   MCHC  31.4*  33.3*  32.9*   RDW  51.3*  50.3*  49.7   PLATELETCT  232  182  159*   MPV  10.1  9.9  9.4     Recent Labs      01/25/18   1608  01/26/18   0610  01/27/18   0625   SODIUM  136  138  134*   POTASSIUM  4.1  4.0  3.6   CHLORIDE  96  101  98   CO2  32  30  27   GLUCOSE  155*  110*  155*   BUN  14  12  10   CREATININE  0.59  0.71  0.62   CALCIUM  9.1  8.6  8.8     Recent Labs      01/25/18   1608   APTT  29.7     Recent Labs      01/25/18   1608  01/27/18   0625   BNPBTYPENAT  167*  139*              Assessment/Plan     * Acute on chronic respiratory failure with hypoxia (CMS-HCC)   Assessment & Plan    Improved back to home baseline oxygen requirements        Chronic pain syndrome   Assessment & Plan    With narcotic dependence baseline regimen as 35 mg of methadone twice a day     Given intractable pain will restart methadone at 15 mg every 8 hours  Patient counseled on narcotic use and risks  Resume Lyrica        Aspiration pneumonia (CMS-HCC)   Assessment & Plan    Continue Unasyn complete 5 day course          Narcotic overdose   Assessment & Plan    Suspected narcotic overdose  Mr. Pollack was here with a suspected overdose of T40.3 - Methadone; and oxycodone he has no other known overdoses     Off Narcan drip            Elevated troponin   Assessment & Plan    Likely demand ischemia  Follow-up on echocardiogram results Aspirin statin        Altered level of consciousness   Assessment & Plan    Likely secondary to narcotic overdose    resolved        HTN (hypertension)- (present on admission)   Assessment & Plan    Start amlodipine monitor blood pressure        DM (diabetes mellitus) (CMS-HCC)- (present on admission)   Assessment & Plan    Uncontrolled hyperglycemia   increase Lantus 15 units continue sliding scale insulin    Monitor blood sugars and adjust accordingly            Reviewed items::  Medications reviewed and Labs reviewed  Lutz catheter::   No Lutz  DVT prophylaxis pharmacological::  Enoxaparin (Lovenox)  Antibiotics:  Treating active infection/contamination beyond 24 hours perioperative coverage

## 2018-01-28 NOTE — ASSESSMENT & PLAN NOTE
With narcotic dependence baseline regimen as 35 mg of methadone twice a day     Pain is controlled on methadone at 15 mg every 8 hours and when necessary oxycodone  Continue Lyrica

## 2018-01-29 ENCOUNTER — PATIENT OUTREACH (OUTPATIENT)
Dept: HEALTH INFORMATION MANAGEMENT | Facility: OTHER | Age: 63
End: 2018-01-29

## 2018-01-29 VITALS
SYSTOLIC BLOOD PRESSURE: 125 MMHG | RESPIRATION RATE: 21 BRPM | WEIGHT: 219.36 LBS | HEIGHT: 66 IN | BODY MASS INDEX: 35.25 KG/M2 | TEMPERATURE: 98 F | DIASTOLIC BLOOD PRESSURE: 76 MMHG | OXYGEN SATURATION: 93 % | HEART RATE: 96 BPM

## 2018-01-29 PROBLEM — T40.601A NARCOTIC OVERDOSE (HCC): Status: RESOLVED | Noted: 2018-01-25 | Resolved: 2018-01-29

## 2018-01-29 PROBLEM — J69.0 ASPIRATION PNEUMONIA (HCC): Status: RESOLVED | Noted: 2018-01-26 | Resolved: 2018-01-29

## 2018-01-29 PROBLEM — R40.4 ALTERED LEVEL OF CONSCIOUSNESS: Status: RESOLVED | Noted: 2018-01-25 | Resolved: 2018-01-29

## 2018-01-29 PROBLEM — J96.21 ACUTE ON CHRONIC RESPIRATORY FAILURE WITH HYPOXIA (HCC): Status: RESOLVED | Noted: 2018-01-25 | Resolved: 2018-01-29

## 2018-01-29 PROBLEM — I42.9 CARDIOMYOPATHY (HCC): Status: ACTIVE | Noted: 2018-01-29

## 2018-01-29 PROBLEM — J96.01 ACUTE RESPIRATORY FAILURE WITH HYPOXIA (HCC): Status: RESOLVED | Noted: 2018-01-27 | Resolved: 2018-01-29

## 2018-01-29 LAB
ALBUMIN SERPL BCP-MCNC: 3 G/DL (ref 3.2–4.9)
ALBUMIN/GLOB SERPL: 0.9 G/DL
ALP SERPL-CCNC: 72 U/L (ref 30–99)
ALT SERPL-CCNC: 13 U/L (ref 2–50)
ANION GAP SERPL CALC-SCNC: 6 MMOL/L (ref 0–11.9)
AST SERPL-CCNC: 19 U/L (ref 12–45)
BASOPHILS # BLD AUTO: 0.7 % (ref 0–1.8)
BASOPHILS # BLD: 0.04 K/UL (ref 0–0.12)
BILIRUB SERPL-MCNC: 0.5 MG/DL (ref 0.1–1.5)
BUN SERPL-MCNC: 11 MG/DL (ref 8–22)
CALCIUM SERPL-MCNC: 8.3 MG/DL (ref 8.5–10.5)
CHLORIDE SERPL-SCNC: 101 MMOL/L (ref 96–112)
CO2 SERPL-SCNC: 30 MMOL/L (ref 20–33)
CREAT SERPL-MCNC: 0.71 MG/DL (ref 0.5–1.4)
EOSINOPHIL # BLD AUTO: 0.58 K/UL (ref 0–0.51)
EOSINOPHIL NFR BLD: 9.4 % (ref 0–6.9)
ERYTHROCYTE [DISTWIDTH] IN BLOOD BY AUTOMATED COUNT: 51.1 FL (ref 35.9–50)
GLOBULIN SER CALC-MCNC: 3.3 G/DL (ref 1.9–3.5)
GLUCOSE BLD-MCNC: 124 MG/DL (ref 65–99)
GLUCOSE BLD-MCNC: 204 MG/DL (ref 65–99)
GLUCOSE BLD-MCNC: 97 MG/DL (ref 65–99)
GLUCOSE SERPL-MCNC: 115 MG/DL (ref 65–99)
HCT VFR BLD AUTO: 45.6 % (ref 42–52)
HGB BLD-MCNC: 14.5 G/DL (ref 14–18)
IMM GRANULOCYTES # BLD AUTO: 0.01 K/UL (ref 0–0.11)
IMM GRANULOCYTES NFR BLD AUTO: 0.2 % (ref 0–0.9)
LYMPHOCYTES # BLD AUTO: 2.17 K/UL (ref 1–4.8)
LYMPHOCYTES NFR BLD: 35.3 % (ref 22–41)
MAGNESIUM SERPL-MCNC: 1.7 MG/DL (ref 1.5–2.5)
MCH RBC QN AUTO: 28.2 PG (ref 27–33)
MCHC RBC AUTO-ENTMCNC: 31.8 G/DL (ref 33.7–35.3)
MCV RBC AUTO: 88.7 FL (ref 81.4–97.8)
MONOCYTES # BLD AUTO: 0.63 K/UL (ref 0–0.85)
MONOCYTES NFR BLD AUTO: 10.3 % (ref 0–13.4)
NEUTROPHILS # BLD AUTO: 2.71 K/UL (ref 1.82–7.42)
NEUTROPHILS NFR BLD: 44.1 % (ref 44–72)
NRBC # BLD AUTO: 0 K/UL
NRBC BLD-RTO: 0 /100 WBC
PHOSPHATE SERPL-MCNC: 3.4 MG/DL (ref 2.5–4.5)
PLATELET # BLD AUTO: 173 K/UL (ref 164–446)
PMV BLD AUTO: 9.9 FL (ref 9–12.9)
POTASSIUM SERPL-SCNC: 3.6 MMOL/L (ref 3.6–5.5)
PROT SERPL-MCNC: 6.3 G/DL (ref 6–8.2)
RBC # BLD AUTO: 5.14 M/UL (ref 4.7–6.1)
SODIUM SERPL-SCNC: 137 MMOL/L (ref 135–145)
WBC # BLD AUTO: 6.1 K/UL (ref 4.8–10.8)

## 2018-01-29 PROCEDURE — 83735 ASSAY OF MAGNESIUM: CPT

## 2018-01-29 PROCEDURE — 700102 HCHG RX REV CODE 250 W/ 637 OVERRIDE(OP): Performed by: INTERNAL MEDICINE

## 2018-01-29 PROCEDURE — A9270 NON-COVERED ITEM OR SERVICE: HCPCS | Performed by: HOSPITALIST

## 2018-01-29 PROCEDURE — 84100 ASSAY OF PHOSPHORUS: CPT

## 2018-01-29 PROCEDURE — G8987 SELF CARE CURRENT STATUS: HCPCS | Mod: CJ

## 2018-01-29 PROCEDURE — 700111 HCHG RX REV CODE 636 W/ 250 OVERRIDE (IP): Performed by: HOSPITALIST

## 2018-01-29 PROCEDURE — G8979 MOBILITY GOAL STATUS: HCPCS | Mod: CI

## 2018-01-29 PROCEDURE — 700102 HCHG RX REV CODE 250 W/ 637 OVERRIDE(OP): Performed by: HOSPITALIST

## 2018-01-29 PROCEDURE — 82962 GLUCOSE BLOOD TEST: CPT

## 2018-01-29 PROCEDURE — G8988 SELF CARE GOAL STATUS: HCPCS | Mod: CJ

## 2018-01-29 PROCEDURE — 80053 COMPREHEN METABOLIC PANEL: CPT

## 2018-01-29 PROCEDURE — A9270 NON-COVERED ITEM OR SERVICE: HCPCS | Performed by: INTERNAL MEDICINE

## 2018-01-29 PROCEDURE — 97162 PT EVAL MOD COMPLEX 30 MIN: CPT

## 2018-01-29 PROCEDURE — G8978 MOBILITY CURRENT STATUS: HCPCS | Mod: CJ

## 2018-01-29 PROCEDURE — 85025 COMPLETE CBC W/AUTO DIFF WBC: CPT

## 2018-01-29 PROCEDURE — 99239 HOSP IP/OBS DSCHRG MGMT >30: CPT | Performed by: HOSPITALIST

## 2018-01-29 PROCEDURE — 97166 OT EVAL MOD COMPLEX 45 MIN: CPT

## 2018-01-29 RX ORDER — ACETAMINOPHEN 325 MG/1
650 TABLET ORAL EVERY 4 HOURS PRN
Qty: 30 TAB | Refills: 0 | Status: SHIPPED | OUTPATIENT
Start: 2018-01-29 | End: 2019-06-15

## 2018-01-29 RX ORDER — POTASSIUM CHLORIDE 20 MEQ/1
20 TABLET, EXTENDED RELEASE ORAL ONCE
Status: COMPLETED | OUTPATIENT
Start: 2018-01-29 | End: 2018-01-29

## 2018-01-29 RX ORDER — AMOXICILLIN AND CLAVULANATE POTASSIUM 875; 125 MG/1; MG/1
1 TABLET, FILM COATED ORAL EVERY 12 HOURS
Qty: 2 TAB | Refills: 0 | Status: SHIPPED | OUTPATIENT
Start: 2018-01-29 | End: 2018-01-30

## 2018-01-29 RX ORDER — MAGNESIUM SULFATE HEPTAHYDRATE 40 MG/ML
2 INJECTION, SOLUTION INTRAVENOUS ONCE
Status: COMPLETED | OUTPATIENT
Start: 2018-01-29 | End: 2018-01-29

## 2018-01-29 RX ORDER — ATORVASTATIN CALCIUM 40 MG/1
40 TABLET, FILM COATED ORAL
Qty: 30 TAB
Start: 2018-01-29 | End: 2019-06-15

## 2018-01-29 RX ORDER — LISINOPRIL 10 MG/1
10 TABLET ORAL DAILY
Qty: 30 TAB
Start: 2018-01-30 | End: 2019-06-15

## 2018-01-29 RX ORDER — AMLODIPINE BESYLATE 5 MG/1
5 TABLET ORAL DAILY
Qty: 30 TAB | Refills: 0 | Status: SHIPPED | OUTPATIENT
Start: 2018-01-30 | End: 2019-06-15

## 2018-01-29 RX ORDER — ASPIRIN 81 MG/1
81 TABLET, CHEWABLE ORAL DAILY
Qty: 100 TAB | COMMUNITY
Start: 2018-01-30 | End: 2019-06-15

## 2018-01-29 RX ADMIN — METHADONE HYDROCHLORIDE 15 MG: 10 TABLET ORAL at 05:06

## 2018-01-29 RX ADMIN — POTASSIUM CHLORIDE 20 MEQ: 1500 TABLET, EXTENDED RELEASE ORAL at 10:55

## 2018-01-29 RX ADMIN — AMOXICILLIN AND CLAVULANATE POTASSIUM 1 TABLET: 875; 125 TABLET, FILM COATED ORAL at 08:36

## 2018-01-29 RX ADMIN — OXYCODONE HYDROCHLORIDE 5 MG: 5 TABLET ORAL at 08:39

## 2018-01-29 RX ADMIN — MAGNESIUM SULFATE IN WATER 2 G: 40 INJECTION, SOLUTION INTRAVENOUS at 10:55

## 2018-01-29 RX ADMIN — ASPIRIN 81 MG: 81 TABLET, CHEWABLE ORAL at 08:36

## 2018-01-29 RX ADMIN — HEPARIN SODIUM 5000 UNITS: 5000 INJECTION, SOLUTION INTRAVENOUS; SUBCUTANEOUS at 05:06

## 2018-01-29 RX ADMIN — PREGABALIN 300 MG: 150 CAPSULE ORAL at 17:28

## 2018-01-29 RX ADMIN — AMLODIPINE BESYLATE 5 MG: 5 TABLET ORAL at 08:36

## 2018-01-29 RX ADMIN — INSULIN HUMAN 4 UNITS: 100 INJECTION, SOLUTION PARENTERAL at 17:30

## 2018-01-29 RX ADMIN — NICOTINE 21 MG: 21 PATCH, EXTENDED RELEASE TRANSDERMAL at 05:04

## 2018-01-29 RX ADMIN — LISINOPRIL 10 MG: 20 TABLET ORAL at 08:36

## 2018-01-29 RX ADMIN — PREGABALIN 300 MG: 150 CAPSULE ORAL at 05:06

## 2018-01-29 RX ADMIN — OMEPRAZOLE 20 MG: 20 CAPSULE, DELAYED RELEASE ORAL at 17:28

## 2018-01-29 RX ADMIN — OXYCODONE HYDROCHLORIDE 5 MG: 5 TABLET ORAL at 15:56

## 2018-01-29 RX ADMIN — METHADONE HYDROCHLORIDE 15 MG: 10 TABLET ORAL at 13:44

## 2018-01-29 ASSESSMENT — COGNITIVE AND FUNCTIONAL STATUS - GENERAL
MOVING TO AND FROM BED TO CHAIR: A LITTLE
SUGGESTED CMS G CODE MODIFIER MOBILITY: CK
TURNING FROM BACK TO SIDE WHILE IN FLAT BAD: A LITTLE
CLIMB 3 TO 5 STEPS WITH RAILING: A LITTLE
TOILETING: A LITTLE
HELP NEEDED FOR BATHING: A LITTLE
SUGGESTED CMS G CODE MODIFIER DAILY ACTIVITY: CJ
WALKING IN HOSPITAL ROOM: A LITTLE
DRESSING REGULAR LOWER BODY CLOTHING: A LITTLE
MOVING FROM LYING ON BACK TO SITTING ON SIDE OF FLAT BED: A LITTLE
DRESSING REGULAR UPPER BODY CLOTHING: A LITTLE
DAILY ACTIVITIY SCORE: 20
MOBILITY SCORE: 19

## 2018-01-29 ASSESSMENT — PAIN SCALES - GENERAL
PAINLEVEL_OUTOF10: 9
PAINLEVEL_OUTOF10: 8
PAINLEVEL_OUTOF10: 6
PAINLEVEL_OUTOF10: 5
PAINLEVEL_OUTOF10: 10
PAINLEVEL_OUTOF10: 9
PAINLEVEL_OUTOF10: 5

## 2018-01-29 ASSESSMENT — ENCOUNTER SYMPTOMS
SPUTUM PRODUCTION: 0
SORE THROAT: 0
BLOOD IN STOOL: 0
SHORTNESS OF BREATH: 0
NECK PAIN: 0
WEAKNESS: 0
SPEECH CHANGE: 0
NAUSEA: 0
VOMITING: 0
DIZZINESS: 0
HEADACHES: 0
FEVER: 0
COUGH: 0
SENSORY CHANGE: 0
CHILLS: 0
PALPITATIONS: 0
NERVOUS/ANXIOUS: 0
MYALGIAS: 0
DEPRESSION: 0
EYES NEGATIVE: 1
FALLS: 0
ABDOMINAL PAIN: 0

## 2018-01-29 ASSESSMENT — GAIT ASSESSMENTS
GAIT LEVEL OF ASSIST: STAND BY ASSIST
ASSISTIVE DEVICE: OTHER (COMMENTS)
DISTANCE (FEET): 60
DEVIATION: OTHER (COMMENT)

## 2018-01-29 ASSESSMENT — ACTIVITIES OF DAILY LIVING (ADL): TOILETING: INDEPENDENT

## 2018-01-29 NOTE — THERAPY
"Occupational Therapy Evaluation completed.   Functional Status:  SBA supine>sit EOB, SBA w/sit>stand, min A w/LB dressing, d/t immobilized LUE from previous shoulder injury, pt reports spouse typically assists as needed. Walking in room and hallway w/WC push d/t lines w/close SBA. Txf to chair w/SBA. Min A w/UB dressing/sling management, currently sling not providing adequate support recommend new sling, which may also assist w/pain management. Remained in chair w/alarm on and call light w/in reach   Plan of Care: Will benefit from Occupational Therapy 1 times per week  Discharge Recommendations:  Equipment: Will Continue to Assess for Equipment Needs. Post-acute therapy Discharge to home with outpatient or home health for additional skilled therapy services.    See \"Rehab Therapy-Acute\" Patient Summary Report for complete documentation.    62 yr old male admitted for chronic pain and narcotic dependence, w/ALOC. Pt had a recent fall ~2 weeks ago, resulting in LUE-shoulder fx, no current documented restrictions pt reports NWB and in a sling. Pt has continued c/o 8/10 pain, he has diminished ADL's however is related to shoulder issues from prior. Pts wife was not present during evaluation, pt reports she is able to assist 24/7. Recommend follow up w/pts spouse that she can continue to assist w/ADL's recommend HH to address home safety, fall prevention, medication management  and adaption's for single-arm ADL's. Will remain available for 1-2 more tx in this setting to assist w/d/c planning.   "

## 2018-01-29 NOTE — PROGRESS NOTES
Assumed care. Discussed poc, safety, call system, medications and unit routine. All questions/concerns addressed. All needs met. No distress.

## 2018-01-29 NOTE — PROGRESS NOTES
Pulmonary Critical Care Progress Note        Date of service: 1/29/2018    Chief Complaint: Change in mental status    History of Present Illness:  I was called to evaluate this patient urgently is being placed on a Narcan drip. He is a 62-year-old male with a history of chronic pain and narcotic dependence. He's been using methadone daily for many years. Apparently his wife noted that he had been lethargic and acting abnormally. He is brought to his primary care physician because of significant somnolence he was transferred to the ED. He received Narcan with significant improvement in mental status. Complained of back pain. He is required several doses of Narcan in the ED and is now being placed on Narcan drip and transferred to the intensive care unit. In the ICU on a Narcan drip he easily arouses, follows all commands. Currently denies pain. He'll be monitored very closely in the ICU with risk for further respiratory depression or possible need for intubation. There is concern for aspiration and he was placed on IV Unasyn. He was initially on a 15 L nonrebreather currently on 7 L oxygen mask.       Review of Systems   Constitutional: Negative for chills and fever.   HENT: Negative for congestion, nosebleeds and sore throat.    Eyes: Negative.    Respiratory: Negative for cough, sputum production and shortness of breath.    Cardiovascular: Negative for chest pain, palpitations and leg swelling.   Gastrointestinal: Negative for abdominal pain, blood in stool, nausea and vomiting.   Genitourinary: Negative.    Musculoskeletal: Negative for falls, myalgias and neck pain.        Positive left shoulder pain but improving   Skin: Negative.    Neurological: Negative for dizziness, sensory change, speech change, weakness and headaches.   Endo/Heme/Allergies: Negative.    Psychiatric/Behavioral: Negative for depression and suicidal ideas. The patient is not nervous/anxious.    All other systems reviewed and are  negative.    Interval Events:  24 hour interval history reviewed    - possible urinary retention overnight, resolved   - K/Mag low    PFSH:  No change.    Physical Exam   Constitutional: He is oriented to person, place, and time. He appears well-developed and well-nourished. No distress.   HENT:   Head: Normocephalic and atraumatic.   Mouth/Throat: Oropharynx is clear and moist. No oropharyngeal exudate.   Eyes: Conjunctivae and EOM are normal. Pupils are equal, round, and reactive to light.   Neck: Normal range of motion. Neck supple.   Cardiovascular: Normal rate, regular rhythm, normal heart sounds and intact distal pulses.    No murmur heard.  Pulmonary/Chest: Effort normal and breath sounds normal. No respiratory distress.   Abdominal: Soft. Bowel sounds are normal. He exhibits no distension. There is no tenderness.   Genitourinary: Penis normal.   Musculoskeletal: Normal range of motion. He exhibits no edema.   Left shoulder tender but without deformity, crepitus, erythema. Sling in place, CMS intact   Neurological: He is oriented to person, place, and time. No cranial nerve deficit or sensory deficit. He exhibits normal muscle tone.   Skin: Skin is warm and dry. Capillary refill takes less than 2 seconds. No rash noted. No pallor.   Psychiatric: He has a normal mood and affect. His behavior is normal.   Nursing note and vitals reviewed.      Respiratory:    4-5 lpm FM (baseline 6-8 lpm)  Pulse Oximetry: 92 %          ImagingCXR  I have personally reviewed the chest x-ray my impression is  no film today  Recent Labs      01/26/18 2023   ISTATAPH  7.437   ISTATAPCO2  49.0*   ISTATAPO2  64   ISTATATCO2  34*   [id number]  92*   ISTATARTHCO3  33.0*   ISTATARTBE  7*   ISTATTEMP  98.6 F   ISTATFIO2  65   ISTATSPEC  Arterial   ISTATAPHTC  7.437   [id number]  64       HemoDynamics:  Pulse: 89, Heart Rate (Monitored): 87  NIBP: 142/79       Imaging: Echocardiogram as reported below:    CONCLUSIONS  Moderately  reduced left ventricular systolic function.  Left   ventricular ejection fraction is visually estimated to be 40%.  No hemodynamically significant valvular abnormalities identified.    Recent Labs      01/27/18   0625   BNPBTYPENAT  139*     BNP normal.  Neuro:  GCS Total Jasper Coma Score: 15       Imaging: None - Reviewed and Ct Brain Reviewed from previously which revealed no obvious acute abnormality with of the carotid arteries. No new imaging.    Fluids:  Intake/Output       01/27/18 0700 - 01/28/18 0659 01/28/18 0700 - 01/29/18 0659 01/29/18 0700 - 01/30/18 0659      0091-1656 4822-5592 Total 0799-9520 9248-6717 Total 4082-4593 0455-5172 Total       Intake    P.O.  30  540 570  800  540 1340  --  -- --    P.O. 30 540 570  -- -- --    I.V.  150  900 1050  --  -- --  --  -- --    IV Volume (NS)  -- -- -- -- -- --    Other  120  -- 120  --  60 60  --  -- --    Medications (P.O./ Enteral Liquids) 120 -- 120 -- 60 60 -- -- --    Total Intake 300 1440 1740  -- -- --       Output    Urine  1025  650 1675  525  325 850  --  -- --    Number of Times Voided 8 x 6 x 14 x 4 x 1 x 5 x -- -- --    Void (ml) 1272 221 6884 525 325 850 -- -- --    Stool  --  -- --  --  -- --  --  -- --    Number of Times Stooled -- -- -- -- 2 x 2 x -- -- --    Total Output 3165 328 4269 525 325 850 -- -- --       Net I/O     -725 790 65 275 275 550 -- -- --           Recent Labs      01/27/18   0625  01/28/18   0317  01/29/18   0520   SODIUM  134*  138  137   POTASSIUM  3.6  3.7  3.6   CHLORIDE  98  99  101   CO2  27  31  30   BUN  10  9  11   CREATININE  0.62  0.70  0.71   MAGNESIUM  1.3*  1.8  1.7   PHOSPHORUS  2.7  3.0  3.4   CALCIUM  8.8  9.2  8.3*       GI/Nutrition:    Imaging: None - Reviewed  taking PO  Liver Function  Recent Labs      01/27/18   0625  01/28/18   0317  01/29/18   0520   ALTSGPT  18  16  13   ASTSGOT  28  23  19   ALKPHOSPHAT  107*  108*  72   TBILIRUBIN  0.8  0.6  0.5   GLUCOSE   155*  127*  115*       Heme:  Recent Labs      18   0625  187  18   0520   RBC  5.06  5.41  5.14   HEMOGLOBIN  14.7  15.2  14.5   HEMATOCRIT  44.7  48.0  45.6   PLATELETCT  159*  187  173       Infectious Disease:  Temp  Av.5 °C (97.7 °F)  Min: 35.9 °C (96.6 °F)  Max: 37 °C (98.6 °F)  Micro: reviewed  Recent Labs      18   0520   WBC  6.3  8.9  6.1   NEUTSPOLYS  63.90  64.80  44.10   LYMPHOCYTES  18.80*  21.70*  35.30   MONOCYTES  12.40  7.50  10.30   EOSINOPHILS  4.10  5.00  9.40*   BASOPHILS  0.60  0.70  0.70   ASTSGOT  28  23  19   ALTSGPT  18  16  13   ALKPHOSPHAT  107*  108*  72   TBILIRUBIN  0.8  0.6  0.5     Current Facility-Administered Medications   Medication Dose Frequency Provider Last Rate Last Dose   • insulin glargine (LANTUS) injection 20 Units  20 Units QHS Edward Marcos M.D.   20 Units at 18 2108   • amoxicillin-clavulanate (AUGMENTIN) 875-125 MG per tablet 1 Tab  1 Tab Q12HRS Masoud Hernadez D.O.   1 Tab at 18 1614   • lisinopril (PRINIVIL) tablet 10 mg  10 mg Q DAY Edward Marcos M.D.   10 mg at 18 1822   • acetaminophen (TYLENOL) tablet 650 mg  650 mg Q4HRS PRN German Mcgovern M.D.   650 mg at 18 1822   • methadone (DOLOPHINE) tablet 15 mg  15 mg Q8HRS Edward Marcos M.D.   15 mg at 18 0506   • oxycodone immediate-release (ROXICODONE) tablet 5 mg  5 mg Q4HRS PRN Edward Marcos M.D.   5 mg at 18 2350   • lorazepam (ATIVAN) tablet 1 mg  1 mg Q4HRS PRN Masoud Hernadez D.O.   1 mg at 18 1156   • pregabalin (LYRICA) capsule 300 mg  300 mg BID Edward Marcos M.D.   300 mg at 18 0506   • amlodipine (NORVASC) tablet 5 mg  5 mg Q DAY Edward Marcos M.D.   5 mg at 18 0752   • omeprazole (PRILOSEC) capsule 20 mg  20 mg Q48HRS Edward Marcos M.D.   20 mg at 18 1718   • aspirin (ASA) chewable tab 81 mg  81 mg DAILY Edward Marcos  M.D.   81 mg at 01/28/18 0756   • atorvastatin (LIPITOR) tablet 40 mg  40 mg QHS Edward Marcos M.D.   40 mg at 01/28/18 2110   • senna-docusate (PERICOLACE or SENOKOT S) 8.6-50 MG per tablet 2 Tab  2 Tab BID Erica Harris M.D.   2 Tab at 01/28/18 2109    And   • polyethylene glycol/lytes (MIRALAX) PACKET 1 Packet  1 Packet QDAY PRN Erica Harris M.D.        And   • magnesium hydroxide (MILK OF MAGNESIA) suspension 30 mL  30 mL QDAY PRN Erica Harris M.D.   30 mL at 01/28/18 2109    And   • bisacodyl (DULCOLAX) suppository 10 mg  10 mg QDAY PRN Erica Harris M.D.       • Respiratory Care per Protocol   Continuous RT Erica Harris M.D.       • heparin injection 5,000 Units  5,000 Units Q8HRS Erica Harris M.D.   5,000 Units at 01/29/18 0506   • ondansetron (ZOFRAN) syringe/vial injection 4 mg  4 mg Q4HRS PRN Erica Harris M.D.       • ondansetron (ZOFRAN ODT) dispertab 4 mg  4 mg Q4HRS PRN Erica Harris M.D.   4 mg at 01/27/18 1044   • promethazine (PHENERGAN) tablet 12.5-25 mg  12.5-25 mg Q4HRS PRN Erica Harris M.D.   25 mg at 01/28/18 0757   • promethazine (PHENERGAN) suppository 12.5-25 mg  12.5-25 mg Q4HRS PRN Erica Harris M.D.       • prochlorperazine (COMPAZINE) injection 5-10 mg  5-10 mg Q4HRS PRN Erica Harris M.D.   5 mg at 01/27/18 0136   • nicotine (NICODERM) 21 MG/24HR 21 mg  21 mg Daily-0600 Erica Harris M.D.   21 mg at 01/29/18 0504    And   • nicotine polacrilex (NICORETTE) 2 MG piece 2 mg  2 mg Q HOUR PRN Erica Harris M.D.       • insulin regular (HUMULIN R) injection 3-14 Units  3-14 Units 4X/DAY ACHS Erica Harris M.D.   3 Units at 01/28/18 2108    And   • glucose 4 g chewable tablet 16 g  16 g Q15 MIN PRN Ercia Harris M.D.        And   • dextrose 50% (D50W) injection 25 mL  25 mL Q15 MIN PRN Erica Harris M.D.         Last reviewed on 1/25/2018  8:37 PM by Rae Ch, Pharmacy Intern    Quality  Measures:  Labs reviewed, Medications reviewed and  Radiology images reviewed  Lutz catheter: No Lutz      DVT Prophylaxis: Heparin  DVT prophylaxis - mechanical: SCDs  Ulcer prophylaxis: Yes      Assessment and plan:  Acute hypoxemic respiratory failure - resolved   - Encourage IS, limit sedative use  Chronic methadone use with unintentional overdose and associated acute toxic encephalopathy - resolved   - Educated on narcotic use and associated risk   - Wean off medications as able  Diabetes mellitus.   - Insulin sliding scale  Left bundle branch block pattern  Recurrent left shoulder injury including current proximal humeral fracture   - Outpatient or to follow-up, continue sling, therapies  Hypokalemia/mag - repletion  Prophylaxis, diet, therapies    Ok to discharge patient home today. [facility] will sign off at transfer. Please call with questions.    Discussed patient condition and risk of morbidity and/or mortality with RN, RT, Pharmacy, Charge nurse / hot rounds, Patient and hospitalist.     Jeremy M Gonda, M.D.

## 2018-01-29 NOTE — CARE PLAN
Problem: Bowel/Gastric:  Goal: Normal bowel function is maintained or improved  Outcome: PROGRESSING AS EXPECTED  Stool softeners given as needed, will discuss escalating bowel protocol with patient.     Problem: Pain Management  Goal: Pain level will decrease to patient's comfort goal  Outcome: PROGRESSING AS EXPECTED  Patient receiving schedule pain interventions per mar and PRN per MAR when needed. Non-pharmacologic interventions in place- heat pack provided to patient PRN.

## 2018-01-29 NOTE — DISCHARGE PLANNING
Informed by attending physician, Dr. Anne-Marie Marcos that the pt is ready to dc today and that the pt need HH. Met face to face with the pt. Pt's choice was [facility]. Faxed choice and notified [facility], Bindu via TC.

## 2018-01-29 NOTE — DISCHARGE PLANNING
Received call from ASTER Huang to send HH referral. [facility] at 1413.

## 2018-01-29 NOTE — FACE TO FACE
Face to Face Supporting Documentation - Home Health    The encounter with this patient was in whole or in part the primary reason for home health admission.    Date of encounter:   Patient:                    MRN:                       YOB: 2020  Travon Pollack  [id number]  1955     Home health to see patient for:  Skilled Nursing care for assessment, interventions & education, Physical Therapy evaluation and treatment and Occupational therapy evaluation and treatment    Skilled need for:  Exacerbation of Chronic Disease State chronic pain syndrome, recent shoulder surgery    Skilled nursing interventions to include:  Comment: med monitoring and management    Homebound status evidenced by:  Needs the assistance of another person in order to leave the home. Leaving home requires a considerable and taxing effort. There is a normal inability to leave the home.    Community Physician to provide follow up care: Jon Turner M.D.     Optional Interventions? No      I certify the face to face encounter for this home health care referral meets the CMS requirements and the encounter/clinical assessment with the patient was, in whole, or in part, for the medical condition(s) listed above, which is the primary reason for home health care. Based on my clinical findings: the service(s) are medically necessary, support the need for home health care, and the homebound criteria are met.  I certify that this patient has had a face to face encounter by myself.  Edward Marcos M.D. - NPI: [id number]

## 2018-01-29 NOTE — CARE PLAN
Problem: Venous Thromboembolism (VTW)/Deep Vein Thrombosis (DVT) Prevention:  Goal: Patient will participate in Venous Thrombosis (VTE)/Deep Vein Thrombosis (DVT)Prevention Measures  Heparin per MAR. SCDs on while in bed.    Problem: Skin Integrity  Goal: Risk for impaired skin integrity will decrease  Pt able to turn self in bed. Discussed skin breakdown preventative measures.    Problem: Urinary Elimination:  Goal: Ability to reestablish a normal urinary elimination pattern will improve  Pt able to urinate using urinal.    Problem: Mobility  Goal: Risk for activity intolerance will decrease  Mobilized w/ PT/OT this am w/ F[facility]. Tolerated well.

## 2018-01-29 NOTE — THERAPY
"Physical Therapy Evaluation completed.   Bed Mobility:  Supine to Sit: Stand by Assist  Transfers: Sit to Stand: Stand by Assist  Gait: Level Of Assist: Stand by Assist with w/c push and/or HHA; w/c push mostly 2' to managing lines/monitor; see below       Plan of Care: Will benefit from Physical Therapy 3 times per week  Discharge Recommendations: Equipment: Will Continue to Assess for Equipment Needs. See below    Pt presents to PT for risk reduction for LOB and falling. He is able to demonstrate short, hallway ambulation with HHA/SBA with no nohemy LOB. He reports no concerns with functional ability to enter/exit and navigate home environment once medically cleared to return home. He would optimally benefit from home PT after medical d/c for formal home assessment and higher level balance training and fall prevention. However pt is unsure if services are available as he lives somewhat remotely. If unavailable would recommend outpatient PT for higher level balance and gait training as able. WIll continue to follow while here.     See \"Rehab Therapy-Acute\" Patient Summary Report for complete documentation.     "

## 2018-01-29 NOTE — PROGRESS NOTES
Shoulder immobilizer sling has been applied and fitted to pt's L UE. At this time pt is tolerating it well.

## 2018-01-29 NOTE — DISCHARGE INSTRUCTIONS
Discharge Instructions    Discharged to home by car with relative. Discharged via wheelchair, hospital escort: Yes.  Special equipment needed: Not Applicable    Be sure to schedule a follow-up appointment with your primary care doctor or any specialists as instructed.     Discharge Plan:   Diet Plan: Discussed  Activity Level: Discussed  Confirmed Follow up Appointment: Patient to Call and Schedule Appointment  Confirmed Symptoms Management: Discussed  Medication Reconciliation Updated: Yes  Pneumococcal Vaccine Given - only chart on this line when given:  (PT REFUSED)  Influenza Vaccine Indication: Not indicated: Previously immunized this influenza season and > 8 years of age    I understand that a diet low in cholesterol, fat, and sodium is recommended for good health. Unless I have been given specific instructions below for another diet, I accept this instruction as my diet prescription.   Other diet: as tolerated    Special Instructions: None    · Is patient discharged on Warfarin / Coumadin?   No     Depression / Suicide Risk    As you are discharged from this [facility] facility, it is important to learn how to keep safe from harming yourself.    Recognize the warning signs:  · Abrupt changes in personality, positive or negative- including increase in energy   · Giving away possessions  · Change in eating patterns- significant weight changes-  positive or negative  · Change in sleeping patterns- unable to sleep or sleeping all the time   · Unwillingness or inability to communicate  · Depression  · Unusual sadness, discouragement and loneliness  · Talk of wanting to die  · Neglect of personal appearance   · Rebelliousness- reckless behavior  · Withdrawal from people/activities they love  · Confusion- inability to concentrate     If you or a loved one observes any of these behaviors or has concerns about self-harm, here's what you can do:  · Talk about it- your feelings and reasons for harming  yourself  · Remove any means that you might use to hurt yourself (examples: pills, rope, extension cords, firearm)  · Get professional help from the community (Mental Health, Substance Abuse, psychological counseling)  · Do not be alone:Call your Safe Contact- someone whom you trust who will be there for you.  · Call your local CRISIS HOTLINE 612-1528 or 418-718-6381  · Call your local Children's Mobile Crisis Response Team [place] (762) 641-3595 or www.[employer]  · Call the toll free National Suicide Prevention Hotlines   · National Suicide Prevention Lifeline 215-272-AGRY (9072)  · National Hope Line Network 800-SUICIDE (700-2295)

## 2018-01-30 LAB
BACTERIA BLD CULT: NORMAL
BACTERIA BLD CULT: NORMAL
SIGNIFICANT IND 70042: NORMAL
SIGNIFICANT IND 70042: NORMAL
SITE SITE: NORMAL
SITE SITE: NORMAL
SOURCE SOURCE: NORMAL
SOURCE SOURCE: NORMAL

## 2018-01-30 NOTE — PROGRESS NOTES
D/c instructions provided to pt. Verbalized understanding, all questions/concerns addressed. Pt left via w/c w/ all personal belongings. No distress. Stable condition.

## 2018-01-30 NOTE — DISCHARGE PLANNING
[facility] has declined referral due to not being contracted with patient's insurance.

## 2018-01-30 NOTE — DISCHARGE PLANNING
Message was left for [facility] to call regarding status of HH referral. MIRI Huang notified.

## 2018-01-30 NOTE — DISCHARGE SUMMARY
DATE OF ADMISSION:  01/25/2018.    DATE OF DISCHARGE:  01/29/2018.    PRINCIPAL DIAGNOSES:  1.  Accidental overdose on methadone and oxycodone.  2.  Acute-on-chronic respiratory failure with hypoxia.  3.  Chronic pain syndrome.  4.  Aspiration pneumonia.  5.  Elevated troponin, likely demand ischemia.  6.  Type 2 diabetes, uncontrolled with hyperglycemia.  7.  Cardiomyopathy.    PRESENTATION:  Please refer to the dictated H and P.    CONSULTANTS:  Dr. Mcgovern and Dr. Hernadez from critical care.    PERTINENT TEST RESULTS ON THIS HOSPITALIZATION:  White blood cell count 6.1,   hemoglobin 14.4, hematocrit 45.6, and platelet count 173.  Sodium is 137,   potassium 3.6, chloride 101, bicarbonate 30, glucose 115, BUN 11, creatinine   0.71, calcium is 8.3, AST 19, ALT 13, alkaline phosphatase 72, total bilirubin   0.5, albumin 3.0.  Magnesium 1.7.  Echocardiogram revealed moderately reduced   left ventricular systolic function, left ventricular ejection fraction is   visually estimated to be 45%.  No hemodynamically significant valvular   abnormalities are identified.  Chest x-ray revealed pulmonary edema and/or   infiltrate identified, which was somewhat decreased since the prior exam.    HOSPITAL COURSE:  The patient is a 62-year-old male on chronic narcotic   therapy with methadone for several years at 35 mg b.i.d., who recently had   surgery on his left shoulder at [facility].  He reports that on discharge, he   was provided with a prescription for oxycodone.  He thinks he was instructed   to taking a set of methadone, but he reports that he was taking both of his   methadone and oxycodone.  He was admitted with altered level of consciousness   secondary to narcotic overdose.  He was in the intensive care unit on Narcan   drip.  He had also aspiration pneumonia with acute-on-chronic respiratory   failure with hypoxia.  He was started on IV Unasyn.  He was clinically   monitored in the intensive care unit.  His narcotics were  held initially.  He   was tapered off Narcan drip.  His mental status improved and is back to his   baseline.  He was restarted on narcotics for his chronic pain.  His diet was   advanced.  His oxygen requirements are improved and he is currently on 5 L   nasal cannula, which is improved from his home baseline regimen of 6-8 liters.    He was evaluated by physical therapy and recommendation for home health or   outpatient therapy was made and home health referral is being processed.  The   patient was started on atorvastatin, aspirin, and ACE inhibitor.    The patient is otherwise clinically stable for discharge with the following   instructions.    DISCHARGE INSTRUCTIONS:  The patient will be discharged with the following   instructions.  DIET:  Diabetic, cardiac.    ACTIVITY:  As tolerated.    DISCHARGE MEDICATIONS:  Acetaminophen 650 mg every 4 hours as needed,   amlodipine 5 mg daily, Augmentin 875 b.i.d. for one more day, aspirin 81 mg   daily, atorvastatin 40 mg at bedtime, lisinopril 10 mg daily, Humulin R   sliding scale, insulin glargine 20 units at bedtime, Lyrica 300 mg b.i.d.,   methadone 35 mg b.i.d., Nexium 40 mg every 48 hours.  The patient was   instructed to review his medications when he gets home and discard any   oxycodone he has.  The patient will be referred for home health.  The patient   is set up to follow up with his primary care provider, Dr. Jon Turner.    Given the patient's chronic respiratory failure, he should be evaluated and   tried to be weaned off chronic narcotic therapy.    The patient should be evaluated by cardiology as an outpatient for his   cardiomyopathy, which he is currently compensated.    DISCHARGE PLAN:  Reviewed with the patient and his questions answered.    Total time spent preparing for this discharge was 40 minutes.       ____________________________________     MD ZHANNA DUBOIS / SUNDAR    DD:  01/29/2018 15:07:42  DT:  01/29/2018 23:52:54    D#:   [id number]  Job#:  [id number]    cc: REECE CARCAMO MD

## 2018-01-31 LAB — EKG IMPRESSION: NORMAL

## 2019-06-15 ENCOUNTER — HOSPITAL ENCOUNTER (OUTPATIENT)
Dept: RADIOLOGY | Facility: MEDICAL CENTER | Age: 64
End: 2019-06-15

## 2019-06-15 ENCOUNTER — HOSPITAL ENCOUNTER (INPATIENT)
Facility: MEDICAL CENTER | Age: 64
LOS: 25 days | DRG: 853 | End: 2019-07-10
Attending: EMERGENCY MEDICINE | Admitting: INTERNAL MEDICINE
Payer: COMMERCIAL

## 2019-06-15 ENCOUNTER — HOSPITAL ENCOUNTER (OUTPATIENT)
Facility: MEDICAL CENTER | Age: 64
End: 2019-06-15
Payer: COMMERCIAL

## 2019-06-15 ENCOUNTER — APPOINTMENT (OUTPATIENT)
Dept: RADIOLOGY | Facility: MEDICAL CENTER | Age: 64
DRG: 853 | End: 2019-06-15
Attending: EMERGENCY MEDICINE
Payer: COMMERCIAL

## 2019-06-15 DIAGNOSIS — R65.21 SEPTIC SHOCK DUE TO UNDETERMINED ORGANISM (HCC): ICD-10-CM

## 2019-06-15 DIAGNOSIS — L08.9 DIABETIC FOOT INFECTION (HCC): ICD-10-CM

## 2019-06-15 DIAGNOSIS — Z89.519 S/P UNILATERAL BKA (BELOW KNEE AMPUTATION) (HCC): ICD-10-CM

## 2019-06-15 DIAGNOSIS — A41.9 SEPTIC SHOCK DUE TO UNDETERMINED ORGANISM (HCC): ICD-10-CM

## 2019-06-15 DIAGNOSIS — R79.89 ELEVATED TROPONIN: ICD-10-CM

## 2019-06-15 DIAGNOSIS — E08.621 DIABETIC ULCER OF MIDFOOT ASSOCIATED WITH DIABETES MELLITUS DUE TO UNDERLYING CONDITION, LIMITED TO BREAKDOWN OF SKIN, UNSPECIFIED LATERALITY (HCC): ICD-10-CM

## 2019-06-15 DIAGNOSIS — E11.628 DIABETIC FOOT INFECTION (HCC): ICD-10-CM

## 2019-06-15 DIAGNOSIS — L97.401 DIABETIC ULCER OF MIDFOOT ASSOCIATED WITH DIABETES MELLITUS DUE TO UNDERLYING CONDITION, LIMITED TO BREAKDOWN OF SKIN, UNSPECIFIED LATERALITY (HCC): ICD-10-CM

## 2019-06-15 DIAGNOSIS — E11.65 UNCONTROLLED TYPE 2 DIABETES MELLITUS WITH HYPERGLYCEMIA (HCC): ICD-10-CM

## 2019-06-15 DIAGNOSIS — M25.512 ACUTE PAIN OF LEFT SHOULDER: ICD-10-CM

## 2019-06-15 LAB
ALBUMIN SERPL BCP-MCNC: 2.5 G/DL (ref 3.2–4.9)
ALBUMIN/GLOB SERPL: 0.7 G/DL
ALP SERPL-CCNC: 95 U/L (ref 30–99)
ALT SERPL-CCNC: 22 U/L (ref 2–50)
ANION GAP SERPL CALC-SCNC: 7 MMOL/L (ref 0–11.9)
APPEARANCE UR: CLEAR
AST SERPL-CCNC: 24 U/L (ref 12–45)
BACTERIA #/AREA URNS HPF: NEGATIVE /HPF
BASOPHILS # BLD AUTO: 0.3 % (ref 0–1.8)
BASOPHILS # BLD: 0.07 K/UL (ref 0–0.12)
BILIRUB SERPL-MCNC: 0.5 MG/DL (ref 0.1–1.5)
BILIRUB UR QL STRIP.AUTO: NEGATIVE
BUN SERPL-MCNC: 38 MG/DL (ref 8–22)
CALCIUM SERPL-MCNC: 7.7 MG/DL (ref 8.5–10.5)
CHLORIDE SERPL-SCNC: 101 MMOL/L (ref 96–112)
CO2 SERPL-SCNC: 27 MMOL/L (ref 20–33)
COLOR UR: YELLOW
CREAT SERPL-MCNC: 1 MG/DL (ref 0.5–1.4)
EKG IMPRESSION: NORMAL
EOSINOPHIL # BLD AUTO: 0.08 K/UL (ref 0–0.51)
EOSINOPHIL NFR BLD: 0.3 % (ref 0–6.9)
EPI CELLS #/AREA URNS HPF: NEGATIVE /HPF
ERYTHROCYTE [DISTWIDTH] IN BLOOD BY AUTOMATED COUNT: 50.2 FL (ref 35.9–50)
GLOBULIN SER CALC-MCNC: 3.8 G/DL (ref 1.9–3.5)
GLUCOSE SERPL-MCNC: 351 MG/DL (ref 65–99)
GLUCOSE UR STRIP.AUTO-MCNC: >=1000 MG/DL
HCT VFR BLD AUTO: 39 % (ref 42–52)
HGB BLD-MCNC: 12.2 G/DL (ref 14–18)
HYALINE CASTS #/AREA URNS LPF: ABNORMAL /LPF
IMM GRANULOCYTES # BLD AUTO: 0.27 K/UL (ref 0–0.11)
IMM GRANULOCYTES NFR BLD AUTO: 1 % (ref 0–0.9)
KETONES UR STRIP.AUTO-MCNC: NEGATIVE MG/DL
LACTATE BLD-SCNC: 1.6 MMOL/L (ref 0.5–2)
LEUKOCYTE ESTERASE UR QL STRIP.AUTO: NEGATIVE
LYMPHOCYTES # BLD AUTO: 2 K/UL (ref 1–4.8)
LYMPHOCYTES NFR BLD: 7.7 % (ref 22–41)
MCH RBC QN AUTO: 27.5 PG (ref 27–33)
MCHC RBC AUTO-ENTMCNC: 31.3 G/DL (ref 33.7–35.3)
MCV RBC AUTO: 88 FL (ref 81.4–97.8)
MICRO URNS: ABNORMAL
MONOCYTES # BLD AUTO: 1.86 K/UL (ref 0–0.85)
MONOCYTES NFR BLD AUTO: 7.1 % (ref 0–13.4)
NEUTROPHILS # BLD AUTO: 21.79 K/UL (ref 1.82–7.42)
NEUTROPHILS NFR BLD: 83.6 % (ref 44–72)
NITRITE UR QL STRIP.AUTO: NEGATIVE
NRBC # BLD AUTO: 0 K/UL
NRBC BLD-RTO: 0 /100 WBC
PH UR STRIP.AUTO: 6 [PH]
PLATELET # BLD AUTO: 224 K/UL (ref 164–446)
PMV BLD AUTO: 10.5 FL (ref 9–12.9)
POTASSIUM SERPL-SCNC: 4.2 MMOL/L (ref 3.6–5.5)
PROT SERPL-MCNC: 6.3 G/DL (ref 6–8.2)
PROT UR QL STRIP: 300 MG/DL
RBC # BLD AUTO: 4.43 M/UL (ref 4.7–6.1)
RBC # URNS HPF: ABNORMAL /HPF
RBC UR QL AUTO: ABNORMAL
SODIUM SERPL-SCNC: 135 MMOL/L (ref 135–145)
SP GR UR STRIP.AUTO: 1.02
UROBILINOGEN UR STRIP.AUTO-MCNC: 1 MG/DL
WBC # BLD AUTO: 26.1 K/UL (ref 4.8–10.8)
WBC #/AREA URNS HPF: ABNORMAL /HPF

## 2019-06-15 PROCEDURE — 80053 COMPREHEN METABOLIC PANEL: CPT

## 2019-06-15 PROCEDURE — 81001 URINALYSIS AUTO W/SCOPE: CPT

## 2019-06-15 PROCEDURE — 83605 ASSAY OF LACTIC ACID: CPT

## 2019-06-15 PROCEDURE — 73200 CT UPPER EXTREMITY W/O DYE: CPT | Mod: LT

## 2019-06-15 PROCEDURE — 86140 C-REACTIVE PROTEIN: CPT

## 2019-06-15 PROCEDURE — 700105 HCHG RX REV CODE 258: Performed by: EMERGENCY MEDICINE

## 2019-06-15 PROCEDURE — 700111 HCHG RX REV CODE 636 W/ 250 OVERRIDE (IP): Performed by: EMERGENCY MEDICINE

## 2019-06-15 PROCEDURE — 99285 EMERGENCY DEPT VISIT HI MDM: CPT

## 2019-06-15 PROCEDURE — 770020 HCHG ROOM/CARE - TELE (206)

## 2019-06-15 PROCEDURE — 85025 COMPLETE CBC W/AUTO DIFF WBC: CPT

## 2019-06-15 PROCEDURE — 96374 THER/PROPH/DIAG INJ IV PUSH: CPT

## 2019-06-15 PROCEDURE — 87040 BLOOD CULTURE FOR BACTERIA: CPT

## 2019-06-15 PROCEDURE — 93005 ELECTROCARDIOGRAM TRACING: CPT

## 2019-06-15 PROCEDURE — 99223 1ST HOSP IP/OBS HIGH 75: CPT | Mod: AI | Performed by: INTERNAL MEDICINE

## 2019-06-15 PROCEDURE — 99358 PROLONG SERVICE W/O CONTACT: CPT | Performed by: INTERNAL MEDICINE

## 2019-06-15 RX ORDER — ALBUTEROL SULFATE 90 UG/1
2 AEROSOL, METERED RESPIRATORY (INHALATION) EVERY 4 HOURS PRN
Status: DISCONTINUED | OUTPATIENT
Start: 2019-06-15 | End: 2019-06-16

## 2019-06-15 RX ORDER — SODIUM CHLORIDE 9 MG/ML
INJECTION, SOLUTION INTRAVENOUS CONTINUOUS
Status: DISCONTINUED | OUTPATIENT
Start: 2019-06-15 | End: 2019-06-17

## 2019-06-15 RX ORDER — ASPIRIN 81 MG/1
324 TABLET, CHEWABLE ORAL ONCE
Status: ACTIVE | OUTPATIENT
Start: 2019-06-15 | End: 2019-06-16

## 2019-06-15 RX ORDER — OXYCODONE HYDROCHLORIDE 5 MG/1
5 TABLET ORAL
Status: DISCONTINUED | OUTPATIENT
Start: 2019-06-15 | End: 2019-06-16

## 2019-06-15 RX ORDER — SODIUM CHLORIDE 9 MG/ML
1000 INJECTION, SOLUTION INTRAVENOUS
Status: DISCONTINUED | OUTPATIENT
Start: 2019-06-15 | End: 2019-06-17

## 2019-06-15 RX ORDER — AMOXICILLIN 250 MG
2 CAPSULE ORAL 2 TIMES DAILY
Status: DISCONTINUED | OUTPATIENT
Start: 2019-06-15 | End: 2019-06-16

## 2019-06-15 RX ORDER — INSULIN GLARGINE 100 [IU]/ML
44 INJECTION, SOLUTION SUBCUTANEOUS 2 TIMES DAILY
Status: ON HOLD | COMMUNITY
End: 2019-07-10

## 2019-06-15 RX ORDER — POLYETHYLENE GLYCOL 3350 17 G/17G
1 POWDER, FOR SOLUTION ORAL
Status: DISCONTINUED | OUTPATIENT
Start: 2019-06-15 | End: 2019-06-16

## 2019-06-15 RX ORDER — ASPIRIN 300 MG/1
300 SUPPOSITORY RECTAL ONCE
Status: ACTIVE | OUTPATIENT
Start: 2019-06-15 | End: 2019-06-16

## 2019-06-15 RX ORDER — MORPHINE SULFATE 4 MG/ML
4 INJECTION, SOLUTION INTRAMUSCULAR; INTRAVENOUS
Status: DISCONTINUED | OUTPATIENT
Start: 2019-06-15 | End: 2019-06-16

## 2019-06-15 RX ORDER — ACETAMINOPHEN 325 MG/1
650 TABLET ORAL EVERY 6 HOURS PRN
Status: DISCONTINUED | OUTPATIENT
Start: 2019-06-15 | End: 2019-06-16

## 2019-06-15 RX ORDER — PREGABALIN 150 MG/1
300 CAPSULE ORAL 2 TIMES DAILY
Status: DISCONTINUED | OUTPATIENT
Start: 2019-06-15 | End: 2019-06-16

## 2019-06-15 RX ORDER — ONDANSETRON 4 MG/1
4 TABLET, ORALLY DISINTEGRATING ORAL EVERY 4 HOURS PRN
Status: DISCONTINUED | OUTPATIENT
Start: 2019-06-15 | End: 2019-07-10 | Stop reason: HOSPADM

## 2019-06-15 RX ORDER — ASPIRIN 325 MG
325 TABLET ORAL ONCE
Status: ACTIVE | OUTPATIENT
Start: 2019-06-15 | End: 2019-06-16

## 2019-06-15 RX ORDER — BISACODYL 10 MG
10 SUPPOSITORY, RECTAL RECTAL
Status: DISCONTINUED | OUTPATIENT
Start: 2019-06-15 | End: 2019-06-16

## 2019-06-15 RX ORDER — SODIUM CHLORIDE 9 MG/ML
1000 INJECTION, SOLUTION INTRAVENOUS ONCE
Status: COMPLETED | OUTPATIENT
Start: 2019-06-15 | End: 2019-06-15

## 2019-06-15 RX ORDER — MORPHINE SULFATE 4 MG/ML
4 INJECTION, SOLUTION INTRAMUSCULAR; INTRAVENOUS ONCE
Status: COMPLETED | OUTPATIENT
Start: 2019-06-15 | End: 2019-06-15

## 2019-06-15 RX ORDER — INSULIN GLARGINE 100 [IU]/ML
44 INJECTION, SOLUTION SUBCUTANEOUS 2 TIMES DAILY
Status: DISCONTINUED | OUTPATIENT
Start: 2019-06-15 | End: 2019-06-16

## 2019-06-15 RX ORDER — OXYCODONE HYDROCHLORIDE 10 MG/1
10 TABLET ORAL
Status: DISCONTINUED | OUTPATIENT
Start: 2019-06-15 | End: 2019-06-16

## 2019-06-15 RX ORDER — ONDANSETRON 2 MG/ML
4 INJECTION INTRAMUSCULAR; INTRAVENOUS EVERY 4 HOURS PRN
Status: DISCONTINUED | OUTPATIENT
Start: 2019-06-15 | End: 2019-07-10 | Stop reason: HOSPADM

## 2019-06-15 RX ADMIN — SODIUM CHLORIDE 1000 ML: 9 INJECTION, SOLUTION INTRAVENOUS at 21:30

## 2019-06-15 RX ADMIN — MORPHINE SULFATE 4 MG: 4 INJECTION INTRAVENOUS at 22:07

## 2019-06-15 ASSESSMENT — ENCOUNTER SYMPTOMS
WHEEZING: 0
SPUTUM PRODUCTION: 0
NECK PAIN: 0
ABDOMINAL PAIN: 0
PALPITATIONS: 0
COUGH: 0
ROS SKIN COMMENTS: LEFT HEEL ULCER
FOCAL WEAKNESS: 0
BLURRED VISION: 0
NAUSEA: 0
SEIZURES: 0
SORE THROAT: 0
VOMITING: 0
MYALGIAS: 0
FLANK PAIN: 0
DIZZINESS: 0
DIAPHORESIS: 0
FALLS: 1
BACK PAIN: 0
SHORTNESS OF BREATH: 0
HEADACHES: 0
FEVER: 1
BLOOD IN STOOL: 0
CHILLS: 1
DIARRHEA: 0
BRUISES/BLEEDS EASILY: 0

## 2019-06-15 ASSESSMENT — COPD QUESTIONNAIRES
HAVE YOU SMOKED AT LEAST 100 CIGARETTES IN YOUR ENTIRE LIFE: YES
COPD SCREENING SCORE: 4
DURING THE PAST 4 WEEKS HOW MUCH DID YOU FEEL SHORT OF BREATH: NONE/LITTLE OF THE TIME
DO YOU EVER COUGH UP ANY MUCUS OR PHLEGM?: NO/ONLY WITH OCCASIONAL COLDS OR INFECTIONS

## 2019-06-15 ASSESSMENT — LIFESTYLE VARIABLES: EVER_SMOKED: YES

## 2019-06-15 NOTE — LETTER
[facility] [address] [place] [id number]  Phone: Dept: [phone] - Fax:        [facility] Progress Report and Disability Certification  Date of Service: 6/15/2019   No Show:  No  Date / Time of Next Visit:     Claim Information   Patient Name: Travon Pollack  Claim Number:     Employer: [employer]  Date of Injury:      Insurer / TPA: [employer] ID / SSN: [id number]    Occupation:  Diagnosis: Diagnoses of Diabetic foot infection (HCC), Elevated troponin, Acute pain of left shoulder, Uncontrolled type 2 diabetes mellitus with hyperglycemia (HCC), 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    Medical Information   Related to Industrial Injury?   ***   Subjective Complaints:  Reports all of his problems have been going on since 2007.  Including diabetes and the shoulder injury.   Objective Findings:     Pre-Existing Condition(s): Diabetes, left shoulder surgery in the 90s   Assessment:        Status:    Permanent Disability:     Plan:      Diagnostics: CTX-ray    Comments:       Disability Information   Status:      From:     Through:   Restrictions are:     Physical Restrictions   Sitting:    Standing:    Stooping:    Bending:      Squatting:    Walking:    Climbing:    Pushing:      Pulling:    Other:    Reaching Above Shoulder (L):   Reaching Above Shoulder (R):       Reaching Below Shoulder (L):    Reaching Below Shoulder (R):      Not to exceed Weight Limits   Carrying(hrs):   Weight Limit(lb):   Lifting(hrs):   Weight  Limit(lb):     Comments: It is unclear how these injuries are related to work.  Patient has diabetes he is a diabetic foot infection and he reinjured his shoulder a few weeks ago  and is complaining of pain in the shoulder secondary to a fall 3 weeks ago.    Repetitive Actions   Hands: i.e. Fine Manipulations from Grasping:     Feet: i.e. Operating Foot Controls:     Driving / Operate Machinery:     Physician Name: Ondina Curtis Physician Signature: ONDINA Lopez M.D. e-Signature:  , Medical Director   Clinic Name / Location: [facility]  ORTHOPEDICS [address] [address]  [phone]     Clinic Phone Number: Dept: [phone]   Appointment Time:  Visit Start Time:    Check-In Time:  9:02 PM Visit Discharge Time:    Original-Treating Physician or Chiropractor    Page 2-Insurer/TPA    Page 3-Employer    Page 4-Employee

## 2019-06-15 NOTE — LETTER
[facility] [address] [place] [address]  Phone: Dept: [phone] - Fax:        [facility] Progress Report and Disability Certification  Date of Service: 6/15/2019   No Show:  No  Date / Time of Next Visit:     Claim Information   Patient Name: Travon Pollack  Claim Number:     Employer: [employer]  Date of Injury:   12/16/2002   Insurer / TPA: [employer] ID / SSN:    Occupation: 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    Medical Information   Related to Industrial Injury?      Subjective Complaints:  Reports all of his problems have been going on since 2007.  Including diabetes and the shoulder injury.   Objective Findings:     Pre-Existing Condition(s): Diabetes, left shoulder surgery in the 90s   Assessment:        Status:    Permanent Disability:     Plan:      Diagnostics: CTX-ray    Comments:       Disability Information   Status:      From:     Through:   Restrictions are:     Physical Restrictions   Sitting:    Standing:    Stooping:    Bending:      Squatting:    Walking:    Climbing:    Pushing:      Pulling:    Other:    Reaching Above Shoulder (L):   Reaching Above Shoulder (R):       Reaching Below Shoulder (L):    Reaching Below Shoulder (R):      Not to exceed Weight Limits   Carrying(hrs):   Weight Limit(lb):   Lifting(hrs):   Weight  Limit(lb):     Comments: It is unclear how these injuries are related to work.  Patient has diabetes he is a diabetic foot infection and he reinjured  his shoulder a few weeks ago and is complaining of pain in the shoulder secondary to a fall 3 weeks ago.    Repetitive Actions   Hands: i.e. Fine Manipulations from Grasping:     Feet: i.e. Operating Foot Controls:     Driving / Operate Machinery:     Physician Name: Ondina Curtis Physician Signature: ONDINA Lopez M.D. e-Signature:  , Medical Director   Clinic Name / Location: [facility]  ORTHOPEDICS [address] [address]  [phone]     Clinic Phone Number: Dept: [phone]   Appointment Time:  Visit Start Time:    Check-In Time:  9:02 PM Visit Discharge Time:    Original-Treating Physician or Chiropractor    Page 2-Insurer/TPA    Page 3-Employer    Page 4-Employee

## 2019-06-16 ENCOUNTER — APPOINTMENT (OUTPATIENT)
Dept: RADIOLOGY | Facility: MEDICAL CENTER | Age: 64
DRG: 853 | End: 2019-06-16
Attending: FAMILY MEDICINE
Payer: COMMERCIAL

## 2019-06-16 ENCOUNTER — APPOINTMENT (OUTPATIENT)
Dept: RADIOLOGY | Facility: MEDICAL CENTER | Age: 64
DRG: 853 | End: 2019-06-16
Attending: STUDENT IN AN ORGANIZED HEALTH CARE EDUCATION/TRAINING PROGRAM
Payer: COMMERCIAL

## 2019-06-16 ENCOUNTER — APPOINTMENT (OUTPATIENT)
Dept: RADIOLOGY | Facility: MEDICAL CENTER | Age: 64
DRG: 853 | End: 2019-06-16
Attending: INTERNAL MEDICINE
Payer: COMMERCIAL

## 2019-06-16 PROBLEM — J18.9 PNEUMONIA DUE TO INFECTIOUS ORGANISM: Status: ACTIVE | Noted: 2019-06-16

## 2019-06-16 PROBLEM — G47.33 OSA (OBSTRUCTIVE SLEEP APNEA): Status: ACTIVE | Noted: 2019-06-16

## 2019-06-16 PROBLEM — T17.500A MUCUS PLUGGING OF BRONCHI: Status: ACTIVE | Noted: 2019-06-16

## 2019-06-16 PROBLEM — T17.908A ASPIRATION INTO AIRWAY: Status: ACTIVE | Noted: 2019-06-16

## 2019-06-16 LAB
ACTION RANGE TRIGGERED IACRT: YES
ANION GAP SERPL CALC-SCNC: 7 MMOL/L (ref 0–11.9)
ANION GAP SERPL CALC-SCNC: 7 MMOL/L (ref 0–11.9)
BASE EXCESS BLDA CALC-SCNC: -1 MMOL/L (ref -4–3)
BASE EXCESS BLDA CALC-SCNC: -2 MMOL/L (ref -4–3)
BASOPHILS # BLD AUTO: 0.2 % (ref 0–1.8)
BASOPHILS # BLD: 0.04 K/UL (ref 0–0.12)
BODY TEMPERATURE: 37.2 CENTIGRADE
BODY TEMPERATURE: ABNORMAL DEGREES
BUN SERPL-MCNC: 33 MG/DL (ref 8–22)
BUN SERPL-MCNC: 38 MG/DL (ref 8–22)
CALCIUM SERPL-MCNC: 7.4 MG/DL (ref 8.5–10.5)
CALCIUM SERPL-MCNC: 7.6 MG/DL (ref 8.5–10.5)
CHLORIDE SERPL-SCNC: 106 MMOL/L (ref 96–112)
CHLORIDE SERPL-SCNC: 106 MMOL/L (ref 96–112)
CO2 BLDA-SCNC: 28 MMOL/L (ref 20–33)
CO2 SERPL-SCNC: 24 MMOL/L (ref 20–33)
CO2 SERPL-SCNC: 26 MMOL/L (ref 20–33)
CREAT SERPL-MCNC: 0.97 MG/DL (ref 0.5–1.4)
CREAT SERPL-MCNC: 1.36 MG/DL (ref 0.5–1.4)
CRP SERPL HS-MCNC: 20.74 MG/DL (ref 0–0.75)
EOSINOPHIL # BLD AUTO: 0.09 K/UL (ref 0–0.51)
EOSINOPHIL NFR BLD: 0.4 % (ref 0–6.9)
ERYTHROCYTE [DISTWIDTH] IN BLOOD BY AUTOMATED COUNT: 49 FL (ref 35.9–50)
EST. AVERAGE GLUCOSE BLD GHB EST-MCNC: 303 MG/DL
GLUCOSE BLD-MCNC: 108 MG/DL (ref 65–99)
GLUCOSE BLD-MCNC: 130 MG/DL (ref 65–99)
GLUCOSE BLD-MCNC: 143 MG/DL (ref 65–99)
GLUCOSE BLD-MCNC: 236 MG/DL (ref 65–99)
GLUCOSE SERPL-MCNC: 137 MG/DL (ref 65–99)
GLUCOSE SERPL-MCNC: 205 MG/DL (ref 65–99)
GRAM STN SPEC: NORMAL
HBA1C MFR BLD: 12.2 % (ref 0–5.6)
HCO3 BLDA-SCNC: 25.9 MMOL/L (ref 17–25)
HCO3 BLDA-SCNC: 26 MMOL/L (ref 17–25)
HCT VFR BLD AUTO: 35.3 % (ref 42–52)
HGB BLD-MCNC: 11.7 G/DL (ref 14–18)
HOROWITZ INDEX BLDA+IHG-RTO: 158 MM[HG]
IMM GRANULOCYTES # BLD AUTO: 0.4 K/UL (ref 0–0.11)
IMM GRANULOCYTES NFR BLD AUTO: 1.7 % (ref 0–0.9)
INST. QUALIFIED PATIENT IIQPT: YES
LACTATE BLD-SCNC: 0.9 MMOL/L (ref 0.5–2)
LYMPHOCYTES # BLD AUTO: 1.75 K/UL (ref 1–4.8)
LYMPHOCYTES NFR BLD: 7.6 % (ref 22–41)
MCH RBC QN AUTO: 28.7 PG (ref 27–33)
MCHC RBC AUTO-ENTMCNC: 33.1 G/DL (ref 33.7–35.3)
MCV RBC AUTO: 86.7 FL (ref 81.4–97.8)
MONOCYTES # BLD AUTO: 1.66 K/UL (ref 0–0.85)
MONOCYTES NFR BLD AUTO: 7.2 % (ref 0–13.4)
NEUTROPHILS # BLD AUTO: 19.1 K/UL (ref 1.82–7.42)
NEUTROPHILS NFR BLD: 82.9 % (ref 44–72)
NRBC # BLD AUTO: 0 K/UL
NRBC BLD-RTO: 0 /100 WBC
O2/TOTAL GAS SETTING VFR VENT: 100 %
O2/TOTAL GAS SETTING VFR VENT: 97 % (ref 30–60)
PCO2 BLDA: 54.6 MMHG (ref 26–37)
PCO2 BLDA: 62.8 MMHG (ref 26–37)
PCO2 TEMP ADJ BLDA: 54.2 MMHG (ref 26–37)
PCO2 TEMP ADJ BLDA: 63.4 MMHG (ref 26–37)
PH BLDA: 7.24 [PH] (ref 7.4–7.5)
PH BLDA: 7.29 [PH] (ref 7.4–7.5)
PH TEMP ADJ BLDA: 7.24 [PH] (ref 7.4–7.5)
PH TEMP ADJ BLDA: 7.29 [PH] (ref 7.4–7.5)
PLATELET # BLD AUTO: 204 K/UL (ref 164–446)
PMV BLD AUTO: 10.2 FL (ref 9–12.9)
PO2 BLDA: 158 MMHG (ref 64–87)
PO2 BLDA: 47.7 MMHG (ref 64–87)
PO2 TEMP ADJ BLDA: 157 MMHG (ref 64–87)
PO2 TEMP ADJ BLDA: 48.4 MMHG (ref 64–87)
POTASSIUM SERPL-SCNC: 3.9 MMOL/L (ref 3.6–5.5)
POTASSIUM SERPL-SCNC: 4.4 MMOL/L (ref 3.6–5.5)
RBC # BLD AUTO: 4.07 M/UL (ref 4.7–6.1)
SALICYLATES SERPL-MCNC: 1 MG/DL (ref 15–25)
SAO2 % BLDA: 78.6 % (ref 93–99)
SAO2 % BLDA: 99 % (ref 93–99)
SIGNIFICANT IND 70042: NORMAL
SITE SITE: NORMAL
SODIUM SERPL-SCNC: 137 MMOL/L (ref 135–145)
SODIUM SERPL-SCNC: 139 MMOL/L (ref 135–145)
SOURCE SOURCE: NORMAL
SPECIMEN DRAWN FROM PATIENT: ABNORMAL
TROPONIN I SERPL-MCNC: 0.2 NG/ML (ref 0–0.04)
TROPONIN I SERPL-MCNC: 0.41 NG/ML (ref 0–0.04)
TROPONIN I SERPL-MCNC: 1.11 NG/ML (ref 0–0.04)
WBC # BLD AUTO: 23 K/UL (ref 4.8–10.8)

## 2019-06-16 PROCEDURE — 85025 COMPLETE CBC W/AUTO DIFF WBC: CPT

## 2019-06-16 PROCEDURE — 5A1945Z RESPIRATORY VENTILATION, 24-96 CONSECUTIVE HOURS: ICD-10-PCS | Performed by: INTERNAL MEDICINE

## 2019-06-16 PROCEDURE — 02HV33Z INSERTION OF INFUSION DEVICE INTO SUPERIOR VENA CAVA, PERCUTANEOUS APPROACH: ICD-10-PCS | Performed by: INTERNAL MEDICINE

## 2019-06-16 PROCEDURE — 0BC58ZZ EXTIRPATION OF MATTER FROM RIGHT MIDDLE LOBE BRONCHUS, VIA NATURAL OR ARTIFICIAL OPENING ENDOSCOPIC: ICD-10-PCS | Performed by: INTERNAL MEDICINE

## 2019-06-16 PROCEDURE — 302978 HCHG BRONCHOSCOPY-DIAGNOSTIC

## 2019-06-16 PROCEDURE — 83036 HEMOGLOBIN GLYCOSYLATED A1C: CPT

## 2019-06-16 PROCEDURE — 82962 GLUCOSE BLOOD TEST: CPT | Mod: 91

## 2019-06-16 PROCEDURE — 80307 DRUG TEST PRSMV CHEM ANLYZR: CPT

## 2019-06-16 PROCEDURE — 700105 HCHG RX REV CODE 258: Performed by: INTERNAL MEDICINE

## 2019-06-16 PROCEDURE — 99232 SBSQ HOSP IP/OBS MODERATE 35: CPT | Performed by: FAMILY MEDICINE

## 2019-06-16 PROCEDURE — 71045 X-RAY EXAM CHEST 1 VIEW: CPT

## 2019-06-16 PROCEDURE — 94640 AIRWAY INHALATION TREATMENT: CPT

## 2019-06-16 PROCEDURE — 94002 VENT MGMT INPAT INIT DAY: CPT

## 2019-06-16 PROCEDURE — A9270 NON-COVERED ITEM OR SERVICE: HCPCS | Performed by: INTERNAL MEDICINE

## 2019-06-16 PROCEDURE — 31500 INSERT EMERGENCY AIRWAY: CPT

## 2019-06-16 PROCEDURE — 87205 SMEAR GRAM STAIN: CPT

## 2019-06-16 PROCEDURE — 700101 HCHG RX REV CODE 250: Performed by: INTERNAL MEDICINE

## 2019-06-16 PROCEDURE — 99292 CRITICAL CARE ADDL 30 MIN: CPT | Mod: 25 | Performed by: INTERNAL MEDICINE

## 2019-06-16 PROCEDURE — 84484 ASSAY OF TROPONIN QUANT: CPT | Mod: 91

## 2019-06-16 PROCEDURE — B548ZZA ULTRASONOGRAPHY OF SUPERIOR VENA CAVA, GUIDANCE: ICD-10-PCS | Performed by: INTERNAL MEDICINE

## 2019-06-16 PROCEDURE — 93005 ELECTROCARDIOGRAM TRACING: CPT | Performed by: INTERNAL MEDICINE

## 2019-06-16 PROCEDURE — 700111 HCHG RX REV CODE 636 W/ 250 OVERRIDE (IP)

## 2019-06-16 PROCEDURE — 99291 CRITICAL CARE FIRST HOUR: CPT | Mod: 25 | Performed by: INTERNAL MEDICINE

## 2019-06-16 PROCEDURE — 36556 INSERT NON-TUNNEL CV CATH: CPT | Mod: GC,RT | Performed by: INTERNAL MEDICINE

## 2019-06-16 PROCEDURE — 82803 BLOOD GASES ANY COMBINATION: CPT | Mod: 91

## 2019-06-16 PROCEDURE — 31624 DX BRONCHOSCOPE/LAVAGE: CPT | Performed by: INTERNAL MEDICINE

## 2019-06-16 PROCEDURE — 83605 ASSAY OF LACTIC ACID: CPT

## 2019-06-16 PROCEDURE — 0BC48ZZ EXTIRPATION OF MATTER FROM RIGHT UPPER LOBE BRONCHUS, VIA NATURAL OR ARTIFICIAL OPENING ENDOSCOPIC: ICD-10-PCS | Performed by: INTERNAL MEDICINE

## 2019-06-16 PROCEDURE — C1751 CATH, INF, PER/CENT/MIDLINE: HCPCS

## 2019-06-16 PROCEDURE — 3E043XZ INTRODUCTION OF VASOPRESSOR INTO CENTRAL VEIN, PERCUTANEOUS APPROACH: ICD-10-PCS | Performed by: INTERNAL MEDICINE

## 2019-06-16 PROCEDURE — 700111 HCHG RX REV CODE 636 W/ 250 OVERRIDE (IP): Performed by: INTERNAL MEDICINE

## 2019-06-16 PROCEDURE — 770022 HCHG ROOM/CARE - ICU (200)

## 2019-06-16 PROCEDURE — 36556 INSERT NON-TUNNEL CV CATH: CPT

## 2019-06-16 PROCEDURE — 0BC68ZZ EXTIRPATION OF MATTER FROM RIGHT LOWER LOBE BRONCHUS, VIA NATURAL OR ARTIFICIAL OPENING ENDOSCOPIC: ICD-10-PCS | Performed by: INTERNAL MEDICINE

## 2019-06-16 PROCEDURE — 92950 HEART/LUNG RESUSCITATION CPR: CPT

## 2019-06-16 PROCEDURE — 0BH18EZ INSERTION OF ENDOTRACHEAL AIRWAY INTO TRACHEA, VIA NATURAL OR ARTIFICIAL OPENING ENDOSCOPIC: ICD-10-PCS | Performed by: INTERNAL MEDICINE

## 2019-06-16 PROCEDURE — 700102 HCHG RX REV CODE 250 W/ 637 OVERRIDE(OP): Performed by: INTERNAL MEDICINE

## 2019-06-16 PROCEDURE — 31645 BRNCHSC W/THER ASPIR 1ST: CPT | Performed by: INTERNAL MEDICINE

## 2019-06-16 PROCEDURE — 31500 INSERT EMERGENCY AIRWAY: CPT | Mod: GC | Performed by: INTERNAL MEDICINE

## 2019-06-16 PROCEDURE — 700105 HCHG RX REV CODE 258

## 2019-06-16 PROCEDURE — 87070 CULTURE OTHR SPECIMN AEROBIC: CPT

## 2019-06-16 PROCEDURE — 80048 BASIC METABOLIC PNL TOTAL CA: CPT | Mod: 91

## 2019-06-16 PROCEDURE — 0B9F8ZX DRAINAGE OF RIGHT LOWER LUNG LOBE, VIA NATURAL OR ARTIFICIAL OPENING ENDOSCOPIC, DIAGNOSTIC: ICD-10-PCS | Performed by: INTERNAL MEDICINE

## 2019-06-16 PROCEDURE — 0BCB8ZZ EXTIRPATION OF MATTER FROM LEFT LOWER LOBE BRONCHUS, VIA NATURAL OR ARTIFICIAL OPENING ENDOSCOPIC: ICD-10-PCS | Performed by: INTERNAL MEDICINE

## 2019-06-16 RX ORDER — BISACODYL 10 MG
10 SUPPOSITORY, RECTAL RECTAL
Status: DISCONTINUED | OUTPATIENT
Start: 2019-06-16 | End: 2019-06-18

## 2019-06-16 RX ORDER — MIDAZOLAM HYDROCHLORIDE 1 MG/ML
INJECTION INTRAMUSCULAR; INTRAVENOUS
Status: COMPLETED
Start: 2019-06-16 | End: 2019-06-16

## 2019-06-16 RX ORDER — MIDAZOLAM HYDROCHLORIDE 1 MG/ML
1-5 INJECTION INTRAMUSCULAR; INTRAVENOUS ONCE
Status: COMPLETED | OUTPATIENT
Start: 2019-06-16 | End: 2019-06-16

## 2019-06-16 RX ORDER — PROPOFOL 10 MG/ML
50 INJECTION, EMULSION INTRAVENOUS ONCE
Status: DISCONTINUED | OUTPATIENT
Start: 2019-06-16 | End: 2019-06-17

## 2019-06-16 RX ORDER — MORPHINE SULFATE 4 MG/ML
4 INJECTION, SOLUTION INTRAMUSCULAR; INTRAVENOUS
Status: DISCONTINUED | OUTPATIENT
Start: 2019-06-16 | End: 2019-06-17

## 2019-06-16 RX ORDER — ROCURONIUM BROMIDE 10 MG/ML
40 INJECTION, SOLUTION INTRAVENOUS ONCE
Status: COMPLETED | OUTPATIENT
Start: 2019-06-16 | End: 2019-06-16

## 2019-06-16 RX ORDER — SODIUM CHLORIDE 9 MG/ML
INJECTION, SOLUTION INTRAVENOUS
Status: COMPLETED
Start: 2019-06-16 | End: 2019-06-16

## 2019-06-16 RX ORDER — METHYLPREDNISOLONE SODIUM SUCCINATE 40 MG/ML
40 INJECTION, POWDER, LYOPHILIZED, FOR SOLUTION INTRAMUSCULAR; INTRAVENOUS EVERY 6 HOURS
Status: DISCONTINUED | OUTPATIENT
Start: 2019-06-16 | End: 2019-06-17

## 2019-06-16 RX ORDER — OXYCODONE HYDROCHLORIDE 10 MG/1
10 TABLET ORAL
Status: DISCONTINUED | OUTPATIENT
Start: 2019-06-16 | End: 2019-06-17

## 2019-06-16 RX ORDER — OXYCODONE HYDROCHLORIDE 5 MG/1
5 TABLET ORAL
Status: DISCONTINUED | OUTPATIENT
Start: 2019-06-16 | End: 2019-06-17

## 2019-06-16 RX ORDER — FAMOTIDINE 20 MG/1
20 TABLET, FILM COATED ORAL EVERY 12 HOURS
Status: DISCONTINUED | OUTPATIENT
Start: 2019-06-16 | End: 2019-06-17

## 2019-06-16 RX ORDER — PROPOFOL 10 MG/ML
50 INJECTION, EMULSION INTRAVENOUS ONCE
Status: COMPLETED | OUTPATIENT
Start: 2019-06-16 | End: 2019-06-16

## 2019-06-16 RX ORDER — IPRATROPIUM BROMIDE AND ALBUTEROL SULFATE 2.5; .5 MG/3ML; MG/3ML
3 SOLUTION RESPIRATORY (INHALATION)
Status: DISCONTINUED | OUTPATIENT
Start: 2019-06-16 | End: 2019-06-17

## 2019-06-16 RX ORDER — PROPOFOL 10 MG/ML
100 INJECTION, EMULSION INTRAVENOUS ONCE
Status: COMPLETED | OUTPATIENT
Start: 2019-06-16 | End: 2019-06-16

## 2019-06-16 RX ORDER — INSULIN GLARGINE 100 [IU]/ML
20 INJECTION, SOLUTION SUBCUTANEOUS 2 TIMES DAILY
Status: DISCONTINUED | OUTPATIENT
Start: 2019-06-16 | End: 2019-06-18

## 2019-06-16 RX ORDER — SODIUM CHLORIDE, SODIUM LACTATE, POTASSIUM CHLORIDE, AND CALCIUM CHLORIDE .6; .31; .03; .02 G/100ML; G/100ML; G/100ML; G/100ML
2000 INJECTION, SOLUTION INTRAVENOUS ONCE
Status: COMPLETED | OUTPATIENT
Start: 2019-06-16 | End: 2019-06-16

## 2019-06-16 RX ORDER — POLYETHYLENE GLYCOL 3350 17 G/17G
1 POWDER, FOR SOLUTION ORAL
Status: DISCONTINUED | OUTPATIENT
Start: 2019-06-16 | End: 2019-06-18

## 2019-06-16 RX ORDER — ACETAMINOPHEN 325 MG/1
650 TABLET ORAL EVERY 6 HOURS PRN
Status: DISCONTINUED | OUTPATIENT
Start: 2019-06-16 | End: 2019-06-18

## 2019-06-16 RX ORDER — AMOXICILLIN 250 MG
2 CAPSULE ORAL 2 TIMES DAILY
Status: DISCONTINUED | OUTPATIENT
Start: 2019-06-16 | End: 2019-06-17

## 2019-06-16 RX ADMIN — ENOXAPARIN SODIUM 40 MG: 100 INJECTION SUBCUTANEOUS at 18:21

## 2019-06-16 RX ADMIN — PROPOFOL 100 MG: 10 INJECTION, EMULSION INTRAVENOUS at 12:53

## 2019-06-16 RX ADMIN — METHYLPREDNISOLONE SODIUM SUCCINATE 40 MG: 40 INJECTION, POWDER, FOR SOLUTION INTRAMUSCULAR; INTRAVENOUS at 16:10

## 2019-06-16 RX ADMIN — IPRATROPIUM BROMIDE AND ALBUTEROL SULFATE 3 ML: .5; 3 SOLUTION RESPIRATORY (INHALATION) at 22:26

## 2019-06-16 RX ADMIN — SODIUM CHLORIDE: 9 INJECTION, SOLUTION INTRAVENOUS at 00:49

## 2019-06-16 RX ADMIN — PIPERACILLIN AND TAZOBACTAM 3.38 G: 3; .375 INJECTION, POWDER, LYOPHILIZED, FOR SOLUTION INTRAVENOUS; PARENTERAL at 00:50

## 2019-06-16 RX ADMIN — FAMOTIDINE 20 MG: 10 INJECTION INTRAVENOUS at 14:16

## 2019-06-16 RX ADMIN — PREGABALIN 300 MG: 150 CAPSULE ORAL at 08:37

## 2019-06-16 RX ADMIN — PIPERACILLIN SODIUM AND TAZOBACTAM SODIUM 3.38 G: 3; .375 INJECTION, POWDER, FOR SOLUTION INTRAVENOUS at 21:47

## 2019-06-16 RX ADMIN — PROPOFOL 50 MG: 10 INJECTION, EMULSION INTRAVENOUS at 14:49

## 2019-06-16 RX ADMIN — PIPERACILLIN SODIUM AND TAZOBACTAM SODIUM 3.38 G: 3; .375 INJECTION, POWDER, FOR SOLUTION INTRAVENOUS at 14:16

## 2019-06-16 RX ADMIN — SODIUM CHLORIDE, POTASSIUM CHLORIDE, SODIUM LACTATE AND CALCIUM CHLORIDE 2000 ML: 600; 310; 30; 20 INJECTION, SOLUTION INTRAVENOUS at 16:00

## 2019-06-16 RX ADMIN — FENTANYL CITRATE 100 MCG: 50 INJECTION INTRAMUSCULAR; INTRAVENOUS at 13:34

## 2019-06-16 RX ADMIN — FENTANYL CITRATE 100 MCG/HR: 50 INJECTION, SOLUTION INTRAMUSCULAR; INTRAVENOUS at 20:31

## 2019-06-16 RX ADMIN — PROPOFOL 5 MCG/KG/MIN: 10 INJECTION, EMULSION INTRAVENOUS at 15:37

## 2019-06-16 RX ADMIN — INSULIN GLARGINE 44 UNITS: 100 INJECTION, SOLUTION SUBCUTANEOUS at 08:31

## 2019-06-16 RX ADMIN — NOREPINEPHRINE BITARTRATE 5 MCG/MIN: 1 INJECTION INTRAVENOUS at 16:50

## 2019-06-16 RX ADMIN — PREGABALIN 300 MG: 150 CAPSULE ORAL at 00:50

## 2019-06-16 RX ADMIN — INSULIN GLARGINE 44 UNITS: 100 INJECTION, SOLUTION SUBCUTANEOUS at 01:15

## 2019-06-16 RX ADMIN — FENTANYL CITRATE 200 MCG: 50 INJECTION INTRAMUSCULAR; INTRAVENOUS at 23:04

## 2019-06-16 RX ADMIN — VANCOMYCIN HYDROCHLORIDE 1500 MG: 100 INJECTION, POWDER, LYOPHILIZED, FOR SOLUTION INTRAVENOUS at 02:20

## 2019-06-16 RX ADMIN — PIPERACILLIN SODIUM AND TAZOBACTAM SODIUM 3.38 G: 3; .375 INJECTION, POWDER, FOR SOLUTION INTRAVENOUS at 03:23

## 2019-06-16 RX ADMIN — FENTANYL CITRATE 200 MCG: 50 INJECTION INTRAMUSCULAR; INTRAVENOUS at 14:49

## 2019-06-16 RX ADMIN — INSULIN GLARGINE 20 UNITS: 100 INJECTION, SOLUTION SUBCUTANEOUS at 21:01

## 2019-06-16 RX ADMIN — SODIUM CHLORIDE 500 ML: 9 INJECTION, SOLUTION INTRAVENOUS at 14:25

## 2019-06-16 RX ADMIN — IPRATROPIUM BROMIDE AND ALBUTEROL SULFATE 3 ML: .5; 3 SOLUTION RESPIRATORY (INHALATION) at 19:20

## 2019-06-16 RX ADMIN — VANCOMYCIN HYDROCHLORIDE 1500 MG: 100 INJECTION, POWDER, LYOPHILIZED, FOR SOLUTION INTRAVENOUS at 17:57

## 2019-06-16 RX ADMIN — MIDAZOLAM: 1 INJECTION INTRAMUSCULAR; INTRAVENOUS at 14:45

## 2019-06-16 RX ADMIN — PROPOFOL 50 MG: 10 INJECTION, EMULSION INTRAVENOUS at 13:03

## 2019-06-16 RX ADMIN — ROCURONIUM BROMIDE 40 MG: 10 INJECTION INTRAVENOUS at 12:56

## 2019-06-16 RX ADMIN — MIDAZOLAM HYDROCHLORIDE 5 MG: 1 INJECTION, SOLUTION INTRAMUSCULAR; INTRAVENOUS at 14:49

## 2019-06-16 ASSESSMENT — ENCOUNTER SYMPTOMS
NAUSEA: 0
BLURRED VISION: 0
NECK PAIN: 0
TINGLING: 0
DOUBLE VISION: 0
PALPITATIONS: 0
CHILLS: 0
DIZZINESS: 0
FEVER: 0
COUGH: 1
HEARTBURN: 0
HEMOPTYSIS: 0
PHOTOPHOBIA: 0
SPUTUM PRODUCTION: 1
VOMITING: 0
DIAPHORESIS: 0
HEADACHES: 0
MYALGIAS: 0

## 2019-06-16 ASSESSMENT — PAIN SCALES - WONG BAKER
WONGBAKER_NUMERICALRESPONSE: HURTS JUST A LITTLE BIT
WONGBAKER_NUMERICALRESPONSE: HURTS EVEN MORE

## 2019-06-16 NOTE — ED PROVIDER NOTES
"ED Provider Note    CHIEF COMPLAINT  Chief Complaint   Patient presents with   • Abnormal Labs   • T-5000 GLF     left shoulder pain       HPI  Travon Pollack is a 63 y.o. male who presents with multiple complaints.  He is a transfer from [facility] with a diabetic foot wound and sepsis as well as an elevated troponin and left shoulder pain.  Per the patient's history he says he fell 3 weeks ago and hurt his left shoulder.  He has an old injury to that shoulder but the pain is been significant over the last 3 weeks he fell about a week ago as well denies any injuries from that.  He took a whole bottle of aspirin this week because of the pain in his left shoulder.  He denies any chest pain.  He says he has \"had the flu\" and had nausea and vomiting all week.  He denies any chest pain.  He has had a chronic wound of his left foot for 2 weeks for the last 4 days it is gotten worse and started draining foul-smelling material.  He currently denies chest pain he is primarily complaining of pain in his left shoulder.        REVIEW OF SYSTEMS  positive for left shoulder pain nausea vomiting left foot wound, negative for fevers. All other systems are negative.     PAST MEDICAL HISTORY   has a past medical history of Dental disorder; Diabetes (2011); Heart burn; Indigestion; Infectious disease (staph); MRSA (methicillin resistant Staphylococcus aureus); Opiate withdrawal (HCC); Pain (2/1/13); Renal disorder; and Snoring.    SOCIAL HISTORY  Social History     Social History Main Topics   • Smoking status: Former Smoker     Packs/day: 0.40     Years: 15.00     Types: Cigarettes   • Smokeless tobacco: Former User     Quit date: 2/21/2013   • Alcohol use No   • Drug use: No   • Sexual activity: Not on file       SURGICAL HISTORY   has a past surgical history that includes hernia repair (2012); other (2007); other; lumbar fusion posterior (2/11/2013); lumbar fusion posterior (3/4/2013); and lumbar laminectomy diskectomy " "(3/4/2013).    CURRENT MEDICATIONS  Home Medications     Reviewed by Aby Simmons (Pharmacy Tech) on 06/15/19 at 2204  Med List Status: Complete   Medication Last Dose Status   insulin glargine (LANTUS) 100 UNIT/ML Solution >week Active   pregabalin (LYRICA) 300 MG capsule >week Active                ALLERGIES  No Known Allergies    PHYSICAL EXAM  VITAL SIGNS: /89   Pulse (!) 108   Temp 36.6 °C (97.9 °F) (Temporal)   Resp 20   Ht 1.803 m (5' 11\")   Wt 99.8 kg (220 lb)   SpO2 94%   BMI 30.68 kg/m² .  Constitutional: Alert in no apparent distress.  HENT: No signs of trauma, Bilateral external ears normal, Nose normal.  Dry mucous membranes  Eyes: Pupils are equal and reactive, Conjunctiva normal, Non-icteric.   Neck: Normal range of motion, No tenderness, Supple, No stridor.   Cardiovascular: Regular rate and rhythm, no murmurs.   Thorax & Lungs: Normal breath sounds, No respiratory distress, No wheezing, No chest tenderness.   Abdomen: Bowel sounds normal, Soft, No tenderness, No masses, No peritoneal signs.  Skin: Warm, Dry, No erythema, No rash.   Musculoskeletal: Left shoulder with tenderness no palpable step-off, well-healed scar, left foot on the heel there is a large wound that is foul-smelling with crusting yellow drainage and surrounding cellulitis.  Neurologic: Alert,  No focal deficits noted.   Psychiatric: Affect normal, Judgment normal, Mood normal.       DIAGNOSTIC STUDIES / PROCEDURES      EKG  12 Lead EKG interpreted by me to show:  Normal sinus rhythm  Rate 109  Axis: Normal  Intervals: Normal  Normal T waves  ST depressions in leads II, aVF, V5, V6  My impression of this EKG: ST depressions without evidence of acute ischemia ST depressions are unchanged from previous EKG from 2018..      LABS  Labs Reviewed   CBC WITH DIFFERENTIAL - Abnormal; Notable for the following:        Result Value    WBC 26.1 (*)     RBC 4.43 (*)     Hemoglobin 12.2 (*)     Hematocrit 39.0 (*)     MCHC " 31.3 (*)     RDW 50.2 (*)     Neutrophils-Polys 83.60 (*)     Lymphocytes 7.70 (*)     Immature Granulocytes 1.00 (*)     Neutrophils (Absolute) 21.79 (*)     Monos (Absolute) 1.86 (*)     Immature Granulocytes (abs) 0.27 (*)     All other components within normal limits   URINALYSIS,CULTURE IF INDICATED - Abnormal; Notable for the following:     Glucose >=1000 (*)     Protein 300 (*)     Occult Blood Large (*)     All other components within normal limits   URINE MICROSCOPIC (W/UA) - Abnormal; Notable for the following:     WBC 2-5 (*)     RBC 20-50 (*)     All other components within normal limits   COMP METABOLIC PANEL   LACTIC ACID   CRP QUANTITIVE (NON-CARDIAC)   BLOOD CULTURE   BLOOD CULTURE   SALICYLATE   TROPONIN           COURSE & MEDICAL DECISION MAKING  Pertinent Labs & Imaging studies reviewed. (See chart for details)      Records from outside facility show patient is a diabetic.  They report that he had some LOC with head injury from the fall 1 week ago.  Patient was given cefepime and Vanco as well as IV fluid for hydration.  CT was performed and was negative for intracranial bleed.  Doppler ultrasound of the left lower extremity was negative for DVT.  Shoulder x-ray impression no other evidence of acute fracture there is deformity of the left humeral head which may be due to prior trauma they do report an inferior dislocation of the proximal humerus with respect to the glenoid.  Note it is unclear if this is acute or chronic.  He has a white count of 26.5, with left shift.  Sed rate is 90, INR 1.3, he was hyperglycemic with a sugar in the 500s but no evidence of DKA.  Troponin was elevated at 0.28.  Lactate was 1.1.  Urinalysis was negative for infection.  X-ray of the foot did not show evidence of obvious osteomyelitis.    This is a 63-year-old gentleman with diabetic foot wound and elevated troponin and left shoulder pain transferred from [facility].  Patient does have a leukocytosis.  His  shoulder has chronic injuries in it but apparently this pain is new as of 3 weeks ago.  CT will be performed to further evaluate.  He will require admission he has been given antibiotics.  He will be given another liter of fluid given his tachycardia.  He will be admitted to the hospitalist service.  I spoke with Dr. Olson who is agreeable to admit.  Patient will be admitted in guarded condition.      HYDRATION: Based on the patient's presentation of Tachycardia the patient was given IV fluids. IV Hydration was used because oral hydration was not as rapid as required. Upon recheck following hydration, the patient was improved.          FINAL IMPRESSION  1. Diabetic foot infection (HCC)    2. Elevated troponin    3. Acute pain of left shoulder        2.   3.    This dictation has been creating using voice recognition software. The accuracy of the dictation is limited the abilities of the software.  I expect there may be some errors of grammar and possibly content. I made every attempt to manually correct the errors within my dictation. However errors related to this voice recognition software may still exist and should be interpreted within the appropriate context.      The note accurately reflects work and decisions made by me.  Moon Curtis  6/15/2019  10:05 PM

## 2019-06-16 NOTE — PROCEDURES
Procedure Note    Date: 6/16/2019  Time: 1300    Procedure: Intubation    Indication: Acute respiratory failure, Hypercapnia  Consent: Emergency/implied    Procedure: A time-out was performed. Airway assessed and patient found to have Mallampati class 4.  Equipment prepared and RT/RN at bedside. Patient pre-oxygenated with 100% FiO2.  After medication delivery, a 4 blade used to acheive direct visualization. An ETT was placed with 1 attempt(s) into the trachea directly through the vocal cords. Appropriate placement confirmed by direct visualization, bilateral chest and epigastric auscultation, misting in the ETT, and ETCO2 detector. ETT secured in place at 27 cm at the teeth and patient connected to ventilator. Sedation/analgesia continued as appropriate. Patient tolerated procedure well without any difficulties and remains in care of bedside nurse and respiratory therapy. CXR will be performed to confirm appropriate placement of ETT.    Medications: Propofol and rocuronium  Complications: None  CXR: Pending, will follow for proper tube placement    Alberto Haines MD  [facility] Resident

## 2019-06-16 NOTE — PROGRESS NOTES
Became concerned for patient when going in to draw FSBS. Pt had poor response to sternal rubbing. Contacted [facility] for rapid response team to assist/assess. Informed Dr. Carroll and charge nurse. Dr. Carroll ordered stat ABG and chest x-ray. Orders entered. Increased oxygen to 10L and switched to oxy mask per recommendation of RRt.    Initially pt SPO2 showing at 81%.     Currently at 1218 on 15L non-rebreather pt is at 94%    See additional notes for stat ABG results/critical.

## 2019-06-16 NOTE — ASSESSMENT & PLAN NOTE
Blood pressure currently controlled at 143/67.  Continue at this point with metoprolol 12.5 mg twice daily.

## 2019-06-16 NOTE — H&P
Hospital Medicine History & Physical Note    Date of Service  6/15/2019    Primary Care Physician  Jon Turner M.D.    Consultants  None    Code Status  Full code    Chief Complaint  Left shoulder pain and left heel ulcer    History of Presenting Illness  63 y.o. male the past medical history of insulin-dependent diabetes mellitus, chronic respiratory failure on 3 L of oxygen who presented 6/15/2019 with multiple complaints.  Patient reports a history of left shoulder injury after which he underwent several surgeries.  The patient had a mechanical ground-level fall 1 week ago after which he injured his left shoulder again and reported excruciating left shoulder pain.  He reports bumping his head but denied any loss of consciousness or any headache.  He reports a left heel ulcer which has been progressively worsening..  He reports worsening redness swelling and warmth with drainage over the past 4 days.  He reports fevers and chills.  He denies any chest pain, shortness of breath, nausea, vomiting abdominal pain or diarrhea.    The patient's presented to [facility], I reviewed the medical records from the transferring facility which are summarized as follows:    WBC 26.5 neutrophil predominant, hemoglobin 12.4, hematocrit 37, MCV 86, platelets 193  Sodium 130, potassium 4.4, chloride 95, CO2 27, anion gap 8, glucose 508, BUN 45, creatinine 1.1 magnesium 2.0, calcium 8, phosphorus 2.6, total protein 6.9, albumin 3, total bili 0.7, AST 31, ALT 40, alkaline Phos 129   Troponin 0.28, lactic acid 1.1, d-dimer 1239, INR 1.3, APTT 26.7, ESR 90  Venous blood gas: pH 7.4, PCO2 36, PO2 46    UA: Nitrite negative, leukocyte esterase negative, WBC 2-5, RBC 5-10, epithelium 5-10, bacteria 1+    X-ray left calcaneus: No evidence of osteo-myelitis.    X-ray left foot: No acute fracture.  No evidence of ostium myelitis.    CT head without contrast: No acute intracranial hemorrhage    Chest x-ray: Mild increased  markings at right lung base, slightly more prominent now than on 1/9/2018 and mild infectious pneumonia is not included.    X-ray left humerus: No clear evidence of an acute fracture.  There is inferior dislocation of the proximal humerus with respect to the glenoid.    Ultrasound left lower extremity: No evidence of DVT  The patient was given a 2.8 L of normal saline and gram of IV cefepime prior to transfer.    Review of Systems  Review of Systems   Constitutional: Positive for chills, fever and malaise/fatigue. Negative for diaphoresis.   HENT: Negative for hearing loss and sore throat.    Eyes: Negative for blurred vision.   Respiratory: Negative for cough, sputum production, shortness of breath and wheezing.    Cardiovascular: Negative for chest pain, palpitations and leg swelling.   Gastrointestinal: Negative for abdominal pain, blood in stool, diarrhea, nausea and vomiting.   Genitourinary: Negative for dysuria, flank pain and urgency.   Musculoskeletal: Positive for falls and joint pain. Negative for back pain, myalgias and neck pain.   Skin: Negative for rash.        Left heel ulcer   Neurological: Negative for dizziness, focal weakness, seizures and headaches.   Endo/Heme/Allergies: Does not bruise/bleed easily.   Psychiatric/Behavioral: Negative for suicidal ideas.   All other systems reviewed and are negative.      Past Medical History   has a past medical history of Dental disorder; Diabetes (2011); Heart burn; Indigestion; Infectious disease (staph); MRSA (methicillin resistant Staphylococcus aureus); Opiate withdrawal ([facility]); Pain (2/1/13); Renal disorder; and Snoring. He also has no past medical history of CAD (coronary artery disease); COPD; Liver disease; or Seizure disorder ([facility]).    Surgical History   has a past surgical history that includes hernia repair (2012); other (2007); other; lumbar fusion posterior (2/11/2013); lumbar fusion posterior (3/4/2013); and lumbar laminectomy diskectomy  (3/4/2013).     Family History  No pertinent family history    Social History   reports that he has quit smoking. His smoking use included Cigarettes. He has a 6.00 pack-year smoking history. He quit smokeless tobacco use about 6 years ago. He reports that he does not drink alcohol or use drugs.    Allergies  No Known Allergies    Medications  Prior to Admission Medications   Prescriptions Last Dose Informant Patient Reported? Taking?   insulin glargine (LANTUS) 100 UNIT/ML Solution >week at OUT Patient Yes Yes   Sig: Inject 44 Units as instructed 2 Times a Day.   pregabalin (LYRICA) 300 MG capsule >week at OUT Patient Yes No   Sig: Take 300 mg by mouth 2 times a day.      Facility-Administered Medications: None       Physical Exam  Temp:  [36.6 °C (97.9 °F)] 36.6 °C (97.9 °F)  Pulse:  [104-108] 104  Resp:  [18-21] 19  BP: (140)/(89) 140/89  SpO2:  [88 %-94 %] 92 %    Physical Exam   Constitutional: He is oriented to person, place, and time. He appears well-developed and well-nourished. No distress.   HENT:   Head: Normocephalic and atraumatic.   Mouth/Throat: Oropharynx is clear and moist.   Eyes: Pupils are equal, round, and reactive to light. Conjunctivae are normal. No scleral icterus.   Neck: Normal range of motion. Neck supple.   Cardiovascular: Regular rhythm and normal heart sounds.    Tachycardic   Pulmonary/Chest: Effort normal and breath sounds normal. No respiratory distress. He has no wheezes. He has no rales.   Abdominal: Soft. Bowel sounds are normal. He exhibits no distension. There is no tenderness. There is no rebound.   Musculoskeletal:   Left shoulder tenderness and limited range of motion due to pain   Lymphadenopathy:     He has no cervical adenopathy.   Neurological: He is alert and oriented to person, place, and time. No cranial nerve deficit. Coordination normal.   Skin: Skin is warm. No rash noted.   Left heel ulcer with purulent drainage and surrounding cellulitis   Psychiatric: He has a  normal mood and affect. His behavior is normal.   Nursing note and vitals reviewed.      Laboratory:  Recent Labs      06/15/19   2127   WBC  26.1*   RBC  4.43*   HEMOGLOBIN  12.2*   HEMATOCRIT  39.0*   MCV  88.0   MCH  27.5   MCHC  31.3*   RDW  50.2*   PLATELETCT  224   MPV  10.5     Recent Labs      06/15/19   2127   SODIUM  135   POTASSIUM  4.2   CHLORIDE  101   CO2  27   GLUCOSE  351*   BUN  38*   CREATININE  1.00   CALCIUM  7.7*     Recent Labs      06/15/19   2127   ALTSGPT  22   ASTSGOT  24   ALKPHOSPHAT  95   TBILIRUBIN  0.5   GLUCOSE  351*                 No results for input(s): TROPONINI in the last 72 hours.    Urinalysis:    Recent Labs      06/15/19   2149   SPECGRAVITY  1.025   GLUCOSEUR  >=1000*   KETONES  Negative   NITRITE  Negative   LEUKESTERAS  Negative   WBCURINE  2-5*   RBCURINE  20-50*   BACTERIA  Negative   EPITHELCELL  Negative        Imaging:  CT-SHOULDER W/O PLUS RECONS LEFT   Final Result         Prior resection of left humeral head. The residual head neck junction appears well corticated and could relate to chronic resection/deformity. There is also deformity and remodeling of the glenoid with well-corticated margin, suggesting of chronic change    as well.      No acute fracture.      There is no articulation between the proximal humerus and glenoid. The glenohumeral joint is replaced by loculated soft tissue or dense fluid, likely chronic change. The fluid is slightly less in 2018 CT.      Infection is considered less likely given the osseous cortex adjacent to the fluid is well corticated and demonstrates no rarefaction or destruction.      US-FOREIGN FILM ULTRASOUND   Final Result      OUTSIDE IMAGES-CT HEAD   Final Result      [id number] FILM X-RAY   Final Result      OUTSIDE IMAGES-DX LOWER EXTREMITY, LEFT   Final Result      [id number] FILM X-RAY   Final Result      OUTSIDE IMAGES-DX CHEST   Final Result      OUTSIDE IMAGES-DX UPPER EXTREMITY, LEFT   Final Result             Assessment/Plan:  I anticipate this patient will require at least two midnights for appropriate medical management, necessitating inpatient admission.    Sepsis ([facility])- (present on admission)   Assessment & Plan    This is sepsis (without associated acute organ dysfunction).   Likely source is infected diabetic ulcer  Patient has been started on IV fluids per septic protocol  Lactate is within normal limits  Patient is started on IV Zosyn and IV vancomycin.  Follow blood and wound cultures       Left shoulder pain- (present on admission)   Assessment & Plan    No acute fracture noted on CT  Pain control with Tylenol, oxycodone and IV morphine.  Monitor respiratory status closely  PT OT  If persistent pain we will consider consulting his orthopedic surgeon      Elevated troponin- (present on admission)   Assessment & Plan    Likely type II NSTEMI in the setting of sepsis  Patient denies active chest pain  He will be given a full dose of aspirin  Continues cardiac monitoring with serial EKG and troponin  Check 2D echo     Diabetic foot ulcer ([facility])- (present on admission)   Assessment & Plan    Infected left heel diabetic foot ulcer  Patient has been started on IV Zosyn and IV vancomycin.  Monitor for vancomycin toxicity and follow trough levels.  Wound care and LPS have been consulted  Follow wound cultures  ESR elevated.  X-ray reveals no evidence of osteomyelitis.     DM (diabetes mellitus), type 2, uncontrolled (CMS-[facility])- (present on admission)   Assessment & Plan    Uncontrolled with hyperglycemia  Continue Lantus 44 units twice daily  Start on insulin sliding scale with serial Accu-Checks  Check hemoglobin A1c  Hypoglycemic protocol in place             VTE prophylaxis: scd    I spent a total of 30 minutes of non face to face time performing additional research, reviewing medical records from transferring facility, discussing plan of care with other healthcare providers. Start time: 10 00  pm. End time: 10 30 pm

## 2019-06-16 NOTE — PROGRESS NOTES
Pts ring removed from left hand and placed in specimen cup with pt id sticker on it.  Pts dentured removed for intubation and placed in denture cup with pt id sticker.  Both dentures and ring placed in pt bathroom.

## 2019-06-16 NOTE — PROGRESS NOTES
[facility] Medicine Daily Progress Note    Date of Service  6/16/2019    Chief Complaint  63 y.o. male admitted 6/15/2019 with Left shoulder pain and left heel ulcer    Hospital Course    63 y.o. male the past medical history of insulin-dependent diabetes mellitus, chronic respiratory failure on 3 L of oxygen who presented 6/15/2019 with multiple complaints.  Patient reports a history of left shoulder injury after which he underwent several surgeries.  The patient had a mechanical ground-level fall 1 week ago after which he injured his left shoulder again and reported excruciating left shoulder pain.  He reports bumping his head but denied any loss of consciousness or any headache.  He reports a left heel ulcer which has been progressively worsening..  He reports worsening redness swelling and warmth with drainage over the past 4 days.  He reports fevers and chills.  He denies any chest pain, shortness of breath, nausea, vomiting abdominal pain or diarrhea.    Interval Problem Update  On iv antibiotics    Consultants/Specialty  LPS    Code Status      Disposition  PENDING    Review of Systems  Review of Systems   Constitutional: Negative for chills, diaphoresis and fever.   HENT: Negative for ear discharge, ear pain and tinnitus.    Eyes: Negative for blurred vision, double vision and photophobia.   Respiratory: Positive for cough and sputum production. Negative for hemoptysis.    Cardiovascular: Negative for chest pain and palpitations.   Gastrointestinal: Negative for heartburn, nausea and vomiting.   Genitourinary: Negative for dysuria, frequency and urgency.   Musculoskeletal: Negative for myalgias and neck pain.   Skin: Negative for itching.   Neurological: Negative for dizziness, tingling and headaches.        Physical Exam  Temp:  [36.5 °C (97.7 °F)-36.8 °C (98.3 °F)] 36.8 °C (98.3 °F)  Pulse:  [] 106  Resp:  [13-22] 18  BP: (122-156)/(67-89) 141/68  SpO2:  [88 %-94 %] 90 %    Physical Exam    Constitutional: No distress.   HENT:   Head: Normocephalic and atraumatic.   Eyes: Pupils are equal, round, and reactive to light. Conjunctivae are normal.   Neck: Normal range of motion. Neck supple.   Cardiovascular: Normal rate and regular rhythm.    Pulmonary/Chest: No respiratory distress. He has rales.   Abdominal: Soft. Bowel sounds are normal.   Morbidly obese   Musculoskeletal:   Left foot dressing looks clean   Neurological: He is alert.   Skin: Skin is warm and dry. He is not diaphoretic.       Fluids    Intake/Output Summary (Last 24 hours) at 06/16/19 1111  Last data filed at 06/16/19 0757   Gross per 24 hour   Intake             1000 ml   Output              175 ml   Net              825 ml       Laboratory  Recent Labs      06/15/19   2127  06/16/19   0306   WBC  26.1*  23.0*   RBC  4.43*  4.07*   HEMOGLOBIN  12.2*  11.7*   HEMATOCRIT  39.0*  35.3*   MCV  88.0  86.7   MCH  27.5  28.7   MCHC  31.3*  33.1*   RDW  50.2*  49.0   PLATELETCT  224  204   MPV  10.5  10.2     Recent Labs      06/15/19   2127  06/16/19   0306   SODIUM  135  139   POTASSIUM  4.2  3.9   CHLORIDE  101  106   CO2  27  26   GLUCOSE  351*  205*   BUN  38*  33*   CREATININE  1.00  0.97   CALCIUM  7.7*  7.6*                   Imaging      Assessment/Plan  Sepsis (HCC)- (present on admission)   Assessment & Plan    This is sepsis (without associated acute organ dysfunction).   Likely source is infected diabetic ulcer  Has improved  Zosyn  Vancomycin and dosing as per pharmacy       Left shoulder pain- (present on admission)   Assessment & Plan    No acute fracture noted on CT  Pain control with Tylenol, oxycodone and IV morphine.  Monitor respiratory status closely  PT OT  If persistent pain we will consider consulting his orthopedic surgeon      Elevated troponin- (present on admission)   Assessment & Plan    Likely type II NSTEMI in the setting of sepsis  Patient denies active chest pain  He will be given a full dose  of aspirin  Continues cardiac monitoring with serial EKG and troponin  Check 2D echo  2nd to demand ischemia  Has trended down     Diabetic foot ulcer (HCC)- (present on admission)   Assessment & Plan    Infected left heel diabetic foot ulcer  Patient has been started on IV Zosyn and IV vancomycin.  Monitor for vancomycin toxicity and follow trough levels.  Wound care and LPS have been consulted  Follow wound cultures  ESR elevated.  X-ray reveals no evidence of osteomyelitis.     DM (diabetes mellitus), type 2, uncontrolled (CMS-H[facility])- (present on admission)   Assessment & Plan    Uncontrolled with hyperglycemia  Continue Lantus 44 units twice daily  Start on insulin sliding scale with serial Accu-Checks  Check hemoglobin A1c  Hypoglycemic protocol in place         HTN (hypertension)- (present on admission)   Assessment & Plan    Will monitor for now          VTE prophylaxis: LOVENOX

## 2019-06-16 NOTE — PROGRESS NOTES
Rapid RN (Antony Escudero) rounding on Tele 8, approached for RN concern on patient who is more lethargic than previous assessments. Per Primary RN, MD has been contacted and does not want to call a rapid response. Rapid RN placed patient on continuous pulse ox, pt saturating 80s on 6L, patient placed on Nonrebreather at 15L

## 2019-06-16 NOTE — ASSESSMENT & PLAN NOTE
Patient has chronic pain in the left shoulder.  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  His arm however moves independently.

## 2019-06-16 NOTE — PROGRESS NOTES
"Pharmacy Kinetics 63 y.o. male on vancomycin day # 1 6/15/2019    Currently on Vancomycin new start    Indication for Treatment: SSTI, diabetic foot ulcer    Pertinent history per medical record: Admitted on 6/15/2019 for severe sepsis related to diabetic foot ulcer.  Patient was transferred from [facility] where he received 1 g vancomycin. He reports a progressively worsening L heel ulceration with associated fever and chills. Empiric antibiotics initiated.     Other antibiotics: Zosyn 3.375 g IV q8h    Allergies: Patient has no known allergies.     List concerns for renal function: uncontrolled diabetes, BUN/SCr > 20:1, BMI 30, combination therapy with Zosyn     Pertinent cultures to date:   6/15 PBC x 2: in process    MRSA nares swab if pneumonia is a concern: N/A    Recent Labs      06/15/19   2127   WBC  26.1*   NEUTSPOLYS  83.60*     Recent Labs      06/15/19   2127   BUN  38*   CREATININE  1.00   ALBUMIN  2.5*     /89   Pulse (!) 104   Temp 36.6 °C (97.9 °F) (Temporal)   Resp 17   Ht 1.803 m (5' 11\")   Wt 99.8 kg (220 lb)   SpO2 92%  Temp (24hrs), Av.6 °C (97.9 °F), Min:36.6 °C (97.9 °F), Max:36.6 °C (97.9 °F)      A/P   1. Vancomycin dose change: initiate 1500 mg IV 12h  2. Next vancomycin level: prior to 4th total dose (not yet ordered)  3. Goal trough: 12-16 mcg/mL  4. Comments: new start vancomycin for SSTI. Some risk factors for renal insult and accumulation. Patient received 1 g vancomycin at [facility] at 1800. Will start maintenance dosing at ~8 hours as this is not a true loading dose. Will follow.    Oliva Shields, PharmD    "

## 2019-06-16 NOTE — PROGRESS NOTES
2 RN skin check with Elmo RN:    Pt is wearing silicone nasal cannula - skin is intact  Multiple wounds on bilateral feet, dry no drainage, brown and red coloration, kerlix dressing visualized on left heel/foot  Wound consult has been placed  Limb preservation group has been consulted  Per report, photos taken and uploaded

## 2019-06-16 NOTE — PROGRESS NOTES
Sofia from Lab called with critical result of ABG results: pH 7.24, temperature adjusted PCO2 63.4, temperature adjusted PO2 at 48.4 at 1201. Critical lab result read back to Sofia.   Dr. Carroll notified of critical lab result at 1205.  Critical lab result read back by Dr. Carroll. No new orders at this time Dr. Carroll states he will review and place orders.

## 2019-06-16 NOTE — PROGRESS NOTES
MD at bedside to assess patient. Lab at bedside attempting to draw ABG. Per MD, do not call rapid response, MD wants to see ABG results first.

## 2019-06-16 NOTE — CARE PLAN
Problem: Communication  Goal: The ability to communicate needs accurately and effectively will improve  Outcome: PROGRESSING SLOWER THAN EXPECTED  D/t lethargy this morning    Problem: Pain Management  Goal: Pain level will decrease to patient's comfort goal  Outcome: PROGRESSING AS EXPECTED  Pain increases with movement of left shoulder

## 2019-06-16 NOTE — PROCEDURES
Procedures  DATE OF OPERATION: 6/16/2019     PREOPERATIVE DIAGNOSIS: purulent secretions, increase in oxygen requirement, chest x-ray infiltrate and aspiration mucus plugging    POSTOPERATIVE DIAGNOSIS: same     PROCEDURE PERFORMED: Fiberoptic bronchoscopy with bronchoalveolar lavage    SURGEON: Zeus Reagan    ANESTHESIA: Intravenous sedation, analgesia, and pharmacologic restraint     INDICATIONS: The patient is a 63 y.o. male with acute respiratory failure.     FINDINGS: Normal anatomy and Purulent secretions  RUL, RML, RLL and LLL  Minimal to moderate JAZIEL.    SPECIMEN: Bronchoalveolar lavage for cultures    PROCEDURE: Following informed consent, the patient was properly identified and optimally positioned in bed. He was preoxygenated with 100% oxygen and placed on a regular ventilatory rate. Intravenous sedation, analgesia, and pharmacologic restraint was administered.    The fiberoptic bronchoscope was advanced through the indwelling endotracheal tube.  The upper airways were suctioned. The airways were systematically and sequentially inspected and lavaged. The pooled effluent RLL was collected in a sterile trap and submitted for cultures. Thick purulent secretions with mucus plugging from RML, RLL, RUL and LLL.  Thick green purulent secretions in right and left mainstem.  Minimal to moderate secretions JAZIEL.  Erythema present especially in Lower lobe segments and more on right.  Clear segments post bronchoscopy.  All segments including RUL, RLL, RML, JAZIEL and LLL and main airways and trachea lavaged with ns and suctioning.       The patient tolerated the procedure well. There were no apparent complications.    ____________________________________   Zeus Reagan    DD: 6/16/2019  1:55 PM

## 2019-06-16 NOTE — ASSESSMENT & PLAN NOTE
Today blood sugars are worse at 160-180.  Continue at this point with Humalog 5 units subcutaneously 3 times daily with meals as well as lispro insulin with sliding scale.

## 2019-06-16 NOTE — CONSULTS
Critical Care Consultation    Date of consult: 6/16/2019    Referring Physician  CHRYSTAL Montes De Oca*    Reason for Consultation  Consulted for respiratory failure    History of Presenting Illness  63 y.o. male who presented 6/15/2019 with multiple complaints. Admitted to medical floor.  He had fall 1 week ago and injured shoulder of which had recent surgery.  No loc but did hit his head.  Left heel ulcer with purulent fluid.  He has a past medical history of dmii on insulin, chronic respiratory failure with smoking and on 3 L o2 at home.  Fever and chills on admission. Admitted to medical floor. REspiratory failure with hypercapnia and hypoxia and lethargic on medical floor. Transferred to ICU and uanble to pull off o2 mask or follow commands, not a adequate bipap candidate. Intubated, significant secretions on bronchosocpy.  Hypotension requiring levophed.  Has shoulder pain on movement.  MOves all extremities.  Opens eyes with physical stimuli.  Post intubation family arrived, discussed clinical situation with them.  At baseline he is able to drive a car, carry out regular daily activities. Possible copd diagnosis in the past.     Code Status  Full Code    Review of Systems  Review of Systems   Unable to perform ROS: Mental status change       Past Medical History   has a past medical history of Dental disorder; Diabetes (2011); Heart burn; Indigestion; Infectious disease (staph); MRSA (methicillin resistant Staphylococcus aureus); Opiate withdrawal (HCC); Pain (2/1/13); Renal disorder; and Snoring. He also has no past medical history of CAD (coronary artery disease); COPD; Liver disease; or Seizure disorder ([facility]).    Surgical History   has a past surgical history that includes hernia repair (2012); other (2007); other; lumbar fusion posterior (2/11/2013); lumbar fusion posterior (3/4/2013); and lumbar laminectomy diskectomy (3/4/2013).    Family History  family history is not on file.    Social History    reports that he has quit smoking. His smoking use included Cigarettes. He has a 6.00 pack-year smoking history. He quit smokeless tobacco use about 6 years ago. He reports that he does not drink alcohol or use drugs.    Medications  Home Medications     Reviewed by Aby Simmons (Pharmacy [facility]) on 06/15/19 at 2205  Med List Status: Complete   Medication Last Dose Status   insulin glargine (LANTUS) 100 UNIT/ML Solution >week Active   pregabalin (LYRICA) 300 MG capsule >week Active              Current Facility-Administered Medications   Medication Dose Route Frequency Provider Last Rate Last Dose   • enoxaparin (LOVENOX) inj 40 mg  40 mg Subcutaneous DAILY Kristen Carroll M.D.       • aspirin (ASA) tablet 325 mg  325 mg Oral Once Onesimo Olson M.D.   Stopped at 06/15/19 2330    Or   • aspirin (ASA) chewable tab 324 mg  324 mg Oral Once Onesimo Olson M.D.        Or   • aspirin (ASA) suppository 300 mg  300 mg Rectal Once Onesimo Olson M.D.       • senna-docusate (PERICOLACE or SENOKOT S) 8.6-50 MG per tablet 2 Tab  2 Tab Oral BID Onesimo Olson M.D.        And   • polyethylene glycol/lytes (MIRALAX) PACKET 1 Packet  1 Packet Oral QDAY PRN Onesimo Olson M.D.        And   • magnesium hydroxide (MILK OF MAGNESIA) suspension 30 mL  30 mL Oral QDAY PRN Onesimo Olson M.D.        And   • bisacodyl (DULCOLAX) suppository 10 mg  10 mg Rectal QDAY PRN Onesimo Olson M.D.       • Respiratory Care per Protocol   Nebulization Continuous RT Onesimo Olson M.D.       • NS (BOLUS) infusion 1,000 mL  1,000 mL Intravenous Once PRN Onesimo Olson M.D.       • NS infusion   Intravenous Continuous Kristen Carroll M.D. 75 mL/hr at 06/16/19 1107     • acetaminophen (TYLENOL) tablet 650 mg  650 mg Oral Q6HRS PRN Onesimo Olson M.D.       • Pharmacy Consult Request ...Pain Management Review 1 Each  1 Each Other PHARMACY TO DOSE Onesimo Olson M.D.        And   • oxyCODONE immediate-release (ROXICODONE) tablet 5 mg  5 mg Oral Q3HRS PRN  Onesimo Olson M.D.        And   • oxyCODONE immediate release (ROXICODONE) tablet 10 mg  10 mg Oral Q3HRS PRN Onesimo Olson M.D.        And   • morphine (pf) 4 mg/ml injection 4 mg  4 mg Intravenous Q3HRS PRN Onesimo Olson M.D.       • piperacillin-tazobactam (ZOSYN) 3.375 g in  mL IVPB  3.375 g Intravenous Q8HRS Onesimo Olson M.D.   Stopped at 06/16/19 0723    And   • MD Alert...Vancomycin per Pharmacy  1 Each Other PHARMACY TO DOSE Onesimo Olson M.D.       • ondansetron (ZOFRAN) syringe/vial injection 4 mg  4 mg Intravenous Q4HRS PRN Onesimo Olson M.D.       • ondansetron (ZOFRAN ODT) dispertab 4 mg  4 mg Oral Q4HRS PRN Onesimo Olson M.D.       • insulin regular (HUMULIN R) injection 3-14 Units  3-14 Units Subcutaneous Q6HRS Onesimo Olson M.D.   Stopped at 06/16/19 0000    And   • glucose 4 g chewable tablet 16 g  16 g Oral Q15 MIN PRN Onesimo Olson M.D.        And   • DEXTROSE 10% BOLUS 250 mL  250 mL Intravenous Q15 MIN PRN Onesimo Olson M.D.       • insulin glargine (LANTUS) injection 44 Units  44 Units Subcutaneous BID Onesimo Olson M.D.   44 Units at 06/16/19 0831   • albuterol inhaler 2 Puff  2 Puff Inhalation Q4HRS PRN Onesimo Olson M.D.       • vancomycin 1,500 mg in  mL IVPB  15 mg/kg Intravenous Q12HR Onesimo Olson M.D.   Stopped at 06/16/19 0420       Allergies  No Known Allergies    Vital Signs last 24 hours  Temp:  [36.5 °C (97.7 °F)-37.2 °C (99 °F)] 37.1 °C (98.8 °F)  Pulse:  [] 110  Resp:  [13-22] 16  BP: (122-156)/(67-89) 138/80  SpO2:  [88 %-94 %] 94 %    Physical Exam  Physical Exam   Constitutional: He appears distressed.   HENT:   Head: Normocephalic and atraumatic.   Right Ear: External ear normal.   Left Ear: External ear normal.   Mouth/Throat: No oropharyngeal exudate.   Eyes: Pupils are equal, round, and reactive to light. Conjunctivae and EOM are normal. Right eye exhibits no discharge. Left eye exhibits no discharge.   Neck: No JVD present. No tracheal deviation present.    Cardiovascular: Regular rhythm and normal heart sounds.    No murmur heard.  Sinus tachycardia   Pulmonary/Chest: No stridor. He is in respiratory distress. He has wheezes. He has no rales. He exhibits no tenderness.   rhonchi   Abdominal: He exhibits no mass. There is no tenderness. There is no rebound and no guarding.   Musculoskeletal: He exhibits no edema, tenderness or deformity.   Left foot ulcer with drainage   Neurological: He displays normal reflexes. No cranial nerve deficit. Coordination normal.   Unable to remove mask, agitated with painful stimuli/movement, not following commands, unable to remove o2 mask.  Moves all ext   Skin: Skin is warm. No rash noted. He is diaphoretic. No erythema.   Psychiatric: He has a normal mood and affect. His behavior is normal.   Nursing note and vitals reviewed.      Fluids    Intake/Output Summary (Last 24 hours) at 06/16/19 1240  Last data filed at 06/16/19 0757   Gross per 24 hour   Intake             1000 ml   Output              175 ml   Net              825 ml       Laboratory  Recent Results (from the past 48 hour(s))   CBC WITH DIFFERENTIAL    Collection Time: 06/15/19  9:27 PM   Result Value Ref Range    WBC 26.1 (H) 4.8 - 10.8 K/uL    RBC 4.43 (L) 4.70 - 6.10 M/uL    Hemoglobin 12.2 (L) 14.0 - 18.0 g/dL    Hematocrit 39.0 (L) 42.0 - 52.0 %    MCV 88.0 81.4 - 97.8 fL    MCH 27.5 27.0 - 33.0 pg    MCHC 31.3 (L) 33.7 - 35.3 g/dL    RDW 50.2 (H) 35.9 - 50.0 fL    Platelet Count 224 164 - 446 K/uL    MPV 10.5 9.0 - 12.9 fL    Neutrophils-Polys 83.60 (H) 44.00 - 72.00 %    Lymphocytes 7.70 (L) 22.00 - 41.00 %    Monocytes 7.10 0.00 - 13.40 %    Eosinophils 0.30 0.00 - 6.90 %    Basophils 0.30 0.00 - 1.80 %    Immature Granulocytes 1.00 (H) 0.00 - 0.90 %    Nucleated RBC 0.00 /100 WBC    Neutrophils (Absolute) 21.79 (H) 1.82 - 7.42 K/uL    Lymphs (Absolute) 2.00 1.00 - 4.80 K/uL    Monos (Absolute) 1.86 (H) 0.00 - 0.85 K/uL    Eos (Absolute) 0.08 0.00 - 0.51 K/uL     Baso (Absolute) 0.07 0.00 - 0.12 K/uL    Immature Granulocytes (abs) 0.27 (H) 0.00 - 0.11 K/uL    NRBC (Absolute) 0.00 K/uL   COMP METABOLIC PANEL    Collection Time: 06/15/19  9:27 PM   Result Value Ref Range    Sodium 135 135 - 145 mmol/L    Potassium 4.2 3.6 - 5.5 mmol/L    Chloride 101 96 - 112 mmol/L    Co2 27 20 - 33 mmol/L    Anion Gap 7.0 0.0 - 11.9    Glucose 351 (H) 65 - 99 mg/dL    Bun 38 (H) 8 - 22 mg/dL    Creatinine 1.00 0.50 - 1.40 mg/dL    Calcium 7.7 (L) 8.5 - 10.5 mg/dL    AST(SGOT) 24 12 - 45 U/L    ALT(SGPT) 22 2 - 50 U/L    Alkaline Phosphatase 95 30 - 99 U/L    Total Bilirubin 0.5 0.1 - 1.5 mg/dL    Albumin 2.5 (L) 3.2 - 4.9 g/dL    Total Protein 6.3 6.0 - 8.2 g/dL    Globulin 3.8 (H) 1.9 - 3.5 g/dL    A-G Ratio 0.7 g/dL   LACTIC ACID    Collection Time: 06/15/19  9:27 PM   Result Value Ref Range    Lactic Acid 1.6 0.5 - 2.0 mmol/L   CRP QUANTITIVE (NON-CARDIAC)    Collection Time: 06/15/19  9:27 PM   Result Value Ref Range    Stat C-Reactive Protein 20.74 (H) 0.00 - 0.75 mg/dL   ESTIMATED GFR    Collection Time: 06/15/19  9:27 PM   Result Value Ref Range    GFR If African American >60 >60 mL/min/1.73 m 2    GFR If Non African American >60 >60 mL/min/1.73 m 2   URINALYSIS CULTURE, IF INDICATED    Collection Time: 06/15/19  9:49 PM   Result Value Ref Range    Color Yellow     Character Clear     Specific Gravity 1.025 <1.035    Ph 6.0 5.0 - 8.0    Glucose >=1000 (A) Negative mg/dL    Ketones Negative Negative mg/dL    Protein 300 (A) Negative mg/dL    Bilirubin Negative Negative    Urobilinogen, Urine 1.0 Negative    Nitrite Negative Negative    Leukocyte Esterase Negative Negative    Occult Blood Large (A) Negative    Micro Urine Req Microscopic    URINE MICROSCOPIC (W/UA)    Collection Time: 06/15/19  9:49 PM   Result Value Ref Range    WBC 2-5 (A) /hpf    RBC 20-50 (A) /hpf    Bacteria Negative None /hpf    Epithelial Cells Negative /hpf    Hyaline Cast 0-2 /lpf   ACCU-CHEK GLUCOSE     Collection Time: 06/16/19 12:44 AM   Result Value Ref Range    Glucose - Accu-Ck 236 (H) 65 - 99 mg/dL   Basic Metabolic Panel (BMP)    Collection Time: 06/16/19  3:06 AM   Result Value Ref Range    Sodium 139 135 - 145 mmol/L    Potassium 3.9 3.6 - 5.5 mmol/L    Chloride 106 96 - 112 mmol/L    Co2 26 20 - 33 mmol/L    Glucose 205 (H) 65 - 99 mg/dL    Bun 33 (H) 8 - 22 mg/dL    Creatinine 0.97 0.50 - 1.40 mg/dL    Calcium 7.6 (L) 8.5 - 10.5 mg/dL    Anion Gap 7.0 0.0 - 11.9   CBC with Differential    Collection Time: 06/16/19  3:06 AM   Result Value Ref Range    WBC 23.0 (H) 4.8 - 10.8 K/uL    RBC 4.07 (L) 4.70 - 6.10 M/uL    Hemoglobin 11.7 (L) 14.0 - 18.0 g/dL    Hematocrit 35.3 (L) 42.0 - 52.0 %    MCV 86.7 81.4 - 97.8 fL    MCH 28.7 27.0 - 33.0 pg    MCHC 33.1 (L) 33.7 - 35.3 g/dL    RDW 49.0 35.9 - 50.0 fL    Platelet Count 204 164 - 446 K/uL    MPV 10.2 9.0 - 12.9 fL    Neutrophils-Polys 82.90 (H) 44.00 - 72.00 %    Lymphocytes 7.60 (L) 22.00 - 41.00 %    Monocytes 7.20 0.00 - 13.40 %    Eosinophils 0.40 0.00 - 6.90 %    Basophils 0.20 0.00 - 1.80 %    Immature Granulocytes 1.70 (H) 0.00 - 0.90 %    Nucleated RBC 0.00 /100 WBC    Neutrophils (Absolute) 19.10 (H) 1.82 - 7.42 K/uL    Lymphs (Absolute) 1.75 1.00 - 4.80 K/uL    Monos (Absolute) 1.66 (H) 0.00 - 0.85 K/uL    Eos (Absolute) 0.09 0.00 - 0.51 K/uL    Baso (Absolute) 0.04 0.00 - 0.12 K/uL    Immature Granulocytes (abs) 0.40 (H) 0.00 - 0.11 K/uL    NRBC (Absolute) 0.00 K/uL   Salicylate    Collection Time: 06/16/19  3:06 AM   Result Value Ref Range    Salicylates, Quant. 1 (L) 15 - 25 mg/dL   Troponin in four (4) hours    Collection Time: 06/16/19  3:06 AM   Result Value Ref Range    Troponin I 0.41 (H) 0.00 - 0.04 ng/mL   ESTIMATED GFR    Collection Time: 06/16/19  3:06 AM   Result Value Ref Range    GFR If African American >60 >60 mL/min/1.73 m 2    GFR If Non African American >60 >60 mL/min/1.73 m 2   EKG in four (4) hours    Collection Time: 06/16/19   6:30 AM   Result Value Ref Range    Report       [facility]    Test Date:  2019  Pt Name:    TAMARA PINK                    Department: ER  MRN:        [id number]                      Room:       T828  Gender:     Male                         Technician: [facility]  :        [id number]                   Requested By:IDALMSI HENDRICKS  Order #:    [id number]                    Reading MD:    Measurements  Intervals                                Axis  Rate:       91                           P:          47  PA:         152                          QRS:        -53  QRSD:       116                          T:          -16  QT:         392  QTc:        483    Interpretive Statements  SINUS RHYTHM  INCOMPLETE LEFT BUNDLE BRANCH BLOCK  LVH WITH SECONDARY REPOLARIZATION ABNORMALITY  BASELINE WANDER IN LEAD(S) V4  Compared to ECG 2018 16:22:28  Left ventricular hypertrophy now present  Early repolarization now present  Sinus tachycardia no longer present     ACCU-CHEK GLUCOSE    Collection Time: 19 11:36 AM   Result Value Ref Range    Glucose - Accu-Ck 130 (H) 65 - 99 mg/dL   ABG - LAB    Collection Time: 19 11:53 AM   Result Value Ref Range    Ph 7.24 (LL) 7.40 - 7.50    Pco2 62.8 (HH) 26.0 - 37.0 mmHg    Po2 47.7 (LL) 64.0 - 87.0 mmHg    O2 Saturation 78.6 (L) 93.0 - 99.0 %    Hco3 26 (H) 17 - 25 mmol/L    Base Excess -2 -4 - 3 mmol/L    Body Temp 37.2 Centigrade    FIO2 97 (H) 30 - 60 %    Ph -TC 7.24 (LL) 7.40 - 7.50    Pco2 -TC 63.4 (HH) 26.0 - 37.0 mmHg    Po2 -TC 48.4 (LL) 64.0 - 87.0 mmHg       Imaging  DX-CHEST-PORTABLE (1 VIEW)   Final Result      Endotracheal tube projects at the level of the aortic arch.      Increased perihilar and bibasilar opacities may represent a combination of edema, atelectasis, pneumonia.         DX-CHEST-PORTABLE (1 VIEW)   Final Result      Increasing reticular and hazy opacity is compatible with edema      CT-SHOULDER W/O PLUS RECONS LEFT   Final Result         Prior  resection of left humeral head. The residual head neck junction appears well corticated and could relate to chronic resection/deformity. There is also deformity and remodeling of the glenoid with well-corticated margin, suggesting of chronic change    as well.      No acute fracture.      There is no articulation between the proximal humerus and glenoid. The glenohumeral joint is replaced by loculated soft tissue or dense fluid, likely chronic change. The fluid is slightly less in 2018 CT.      Infection is considered less likely given the osseous cortex adjacent to the fluid is well corticated and demonstrates no rarefaction or destruction.      US-FOREIGN FILM ULTRASOUND   Final Result      OUTSIDE IMAGES-CT HEAD   Final Result      [id number] FILM X-RAY   Final Result      OUTSIDE IMAGES-DX LOWER EXTREMITY, LEFT   Final Result      [id number] FILM X-RAY   Final Result      OUTSIDE IMAGES-DX CHEST   Final Result      OUTSIDE IMAGES-DX UPPER EXTREMITY, LEFT   Final Result          Assessment/Plan  Septic shock due to undetermined organism (HCC)- (present on admission)   Assessment & Plan    This is severe sepsis with the following associated acute organ dysfunction(s): acute respiratory failure, metabolic/septic encephalopathy.   Encephalopathy, confused, moves all extremities  Improved once on vent and sedation, following commands  Keep map > 65  On vanc and zosyn, continue for now  Significant secretions/purulence on bronchoscopy  Bal pending  Aspiration vs cap  mrsa nares  Reduce antibiotics tomorrow  Has leg wound, cultured prior  Blood cx ntd admission  No sedatives per nursing mar prior to respiratory failure except lyrica, unlikely to cause profound sedation as chronic med  Levophed map > 65  Volume resuscitated, post resuscitation us non fluid responsive, adequate  Will need post resusictation diuresis as equilibrizes       Mucus plugging of bronchi   Assessment & Plan    Thick mucus  plugging of airways  More on right  Bronchoscopy with extensive lavage     Aspiration into airway   Assessment & Plan    Some thick secretions on bronchoscopy  Right worse than left  Possible aspiration     Pneumonia due to infectious organism   Assessment & Plan    Aspiration vs community  ? Cause of falls at home  Antibiotics  Possible aspiration per bronchoscopy  Family says possible aspiration in the past  Will need swallow eval post extubation     SALOME (obstructive sleep apnea)   Assessment & Plan    Contributory  Diagnosis per family in past     Left shoulder pain- (present on admission)   Assessment & Plan    Acute on chronic     Cardiomyopathy (HCC)- (present on admission)   Assessment & Plan    Bedside echo reviewed  Echo may need to be repeated  Troponin elevation  Re-evaluate tomorrow  Stressed by respiratory failure     Acute on chronic respiratory failure with hypoxia (HCC)- (present on admission)   Assessment & Plan    Secondary to septic shock  Pneumonia  Baseline o2 at home  Rt/o2 protocol  Not bipap candidate  Significant purulence bronch, bal pending  Hx smoking heavily, ? Copd diagnosis per family  Steroids scheduled  duonebs scheduled  Antibiotics  Us post resuscitation adequate volume     Elevated troponin- (present on admission)   Assessment & Plan    Likely demand ischemia  Per bedside echo adequate ef  Repeat echo if no improvement, prior 40% ef in January  Troponin to 1.11, continue to trend     Diabetic foot ulcer (HCC)- (present on admission)   Assessment & Plan    Purulent  On vanc/zosyn  cx pending     Tobacco dependence- (present on admission)   Assessment & Plan    contributory     DM (diabetes mellitus), type 2, uncontrolled (CMS-HCC)- (present on admission)   Assessment & Plan    ssi  lantus     HTN (hypertension)- (present on admission)   Assessment & Plan    Hold anti hypertensives         Discussed patient condition and risk of morbidity and/or mortality with Hospitalist, Family,  RN, RT, Pharmacy, Code status disscussed and Patient.      The patient remains critically ill.  Required intubation and on mechanical ventilator.  Extensive fluid resuscitation, on pressors for map > 65.  Critical care time = 110 minutes in directly providing and coordinating critical care and extensive data review.  No time overlap and excludes procedures.

## 2019-06-16 NOTE — PROCEDURES
Procedure Note    Date: 6/16/2019  Time: 1520    Procedure: Central Venous Line placement  Site: RIJ vein    Indication: Venous Access, multiple medications  Consent: Informed consent obtained from patient or designated decision maker after explaining the benefits/risks of the procedure including but not limited to bleeding, infection, nerve or other deep structure injury, pneumothorax/hemothorax, arrythmia, or death. Patient or surrogate expressed understanding and agreement and signed consent which can be found in the patient's chart.    Procedure: After obtaining consent, a time-out was performed. Appropriate site confirmed with ultrasound and patient positioned, prepped, and draped in sterile fashion. All those present wearing cap and mask and those physically participating remained adhering to sterile fashion with cap, mask, gloves, and gown. 5 mL of local anesthetic injected (1% lidocaine without epinephrine) achieving appropriate comfort level for patient. Vein localized and accessed using continuous ultrasound guidance and a 7 Fr three lumen catheter placed using Seldinger technique. Able to aspirate dark, non-pulsatile blood through each lumen and sterile saline flushed easily before capping. Line secured and dressed in sterile fashion. Patient tolerated procedure well without any difficulties and remains in care of bedside nurse. CXR will be performed to confirm appropriate placement for internal jugular or subclavian CVLs.    EBL: minimal  Complications: None  CXR: cath tip in SVC, no pneumo    Alberto RUT Haines  [facility] Resident

## 2019-06-16 NOTE — ASSESSMENT & PLAN NOTE
Status post BKA day #15    Continue oxycodone as needed for pain management.  Continue with physical therapy and Occupational Therapy.  Continue with wound care.  Patient does have a brace in place.  Continue with bowel protocol with him having oxycodone.    Pending rehab/insurance approval  Encourage activity

## 2019-06-16 NOTE — PROGRESS NOTES
2 RN skin check complete with DAKOTA Black    · Devices in place NC  · Skin assessed under devices yes  · Confirmed wounds found on BL feet  · New potential wounds noted on BL feet  · Wound consult: yes  · Wound reported and pictures uploaded: yes    R foot mult wounds  L foot mult wounds. Large open wound on medial heel    · The following interventions in place: L foot wrapped, silicone NC

## 2019-06-16 NOTE — DISCHARGE PLANNING
PT's daughter requesting letter for hotel room discount and for additional days stay. Typed letter stating she was here on an emergency visit due to father's hospitalization delivered to PTs room and spoke with daughter. Instructed her to contact his RNCM at [phone] should the hotel require additional information.

## 2019-06-16 NOTE — ED TRIAGE NOTES
Pt transfered from [place] for possible sepsis from left heal diabetic ulcer x 3 weeks. + drainage. Pt also had GLF 3 weeks ago and 7 days ago with a left shoulder dislocation. Pt reports taking a whole bottle of 325 mg ASA this past week d/t pain, resulting in metabolic alkalosis. Pt has an elevated trop of 0.28, denies chest discomfort. WBC 26.5. + SOB and n/v. ERP at BS.

## 2019-06-16 NOTE — ED NOTES
Med Rec Updated and Complete per Pt at bedside  Allergies Reviewed  No PO ABX Last 14 days.    Pt reports he has been out of his medications for more than a week.    Pt denies OTC medication at this time.

## 2019-06-16 NOTE — PROGRESS NOTES
Assumed care at 0030, bedside report received from ED RN. Pt. Is ST on the monitor. Initial assessment completed, orders reviewed, call light within reach, bed alarm is in use, and hourly rounding in place. POC addressed with patient, no additional questions at this time.

## 2019-06-16 NOTE — THERAPY
PT consult received.  Pt pending LPS consultation.  Hold therapy intervention until LPS POC established for foot ulcerations.    Alyssia Beatty PT/DPT  Pager# [phone]

## 2019-06-17 ENCOUNTER — APPOINTMENT (OUTPATIENT)
Dept: RADIOLOGY | Facility: MEDICAL CENTER | Age: 64
DRG: 853 | End: 2019-06-17
Attending: INTERNAL MEDICINE
Payer: COMMERCIAL

## 2019-06-17 ENCOUNTER — APPOINTMENT (OUTPATIENT)
Dept: RADIOLOGY | Facility: MEDICAL CENTER | Age: 64
DRG: 853 | End: 2019-06-17
Attending: HOSPITALIST
Payer: COMMERCIAL

## 2019-06-17 LAB
ACTION RANGE TRIGGERED IACRT: NO
ALBUMIN SERPL BCP-MCNC: 2.2 G/DL (ref 3.2–4.9)
ALBUMIN/GLOB SERPL: 0.6 G/DL
ALP SERPL-CCNC: 158 U/L (ref 30–99)
ALT SERPL-CCNC: 42 U/L (ref 2–50)
ANION GAP SERPL CALC-SCNC: 12 MMOL/L (ref 0–11.9)
ANION GAP SERPL CALC-SCNC: 9 MMOL/L (ref 0–11.9)
AST SERPL-CCNC: 55 U/L (ref 12–45)
BASE EXCESS BLDA CALC-SCNC: -4 MMOL/L (ref -4–3)
BASOPHILS # BLD AUTO: 0.3 % (ref 0–1.8)
BASOPHILS # BLD: 0.06 K/UL (ref 0–0.12)
BILIRUB SERPL-MCNC: 0.4 MG/DL (ref 0.1–1.5)
BODY TEMPERATURE: ABNORMAL DEGREES
BUN SERPL-MCNC: 39 MG/DL (ref 8–22)
BUN SERPL-MCNC: 39 MG/DL (ref 8–22)
CALCIUM SERPL-MCNC: 7.4 MG/DL (ref 8.5–10.5)
CALCIUM SERPL-MCNC: 7.4 MG/DL (ref 8.5–10.5)
CHLORIDE SERPL-SCNC: 106 MMOL/L (ref 96–112)
CHLORIDE SERPL-SCNC: 106 MMOL/L (ref 96–112)
CO2 BLDA-SCNC: 21 MMOL/L (ref 20–33)
CO2 SERPL-SCNC: 22 MMOL/L (ref 20–33)
CO2 SERPL-SCNC: 23 MMOL/L (ref 20–33)
CREAT SERPL-MCNC: 1.34 MG/DL (ref 0.5–1.4)
CREAT SERPL-MCNC: 1.45 MG/DL (ref 0.5–1.4)
CRP SERPL HS-MCNC: 22.05 MG/DL (ref 0–0.75)
EKG IMPRESSION: NORMAL
EKG IMPRESSION: NORMAL
EOSINOPHIL # BLD AUTO: 0 K/UL (ref 0–0.51)
EOSINOPHIL NFR BLD: 0 % (ref 0–6.9)
ERYTHROCYTE [DISTWIDTH] IN BLOOD BY AUTOMATED COUNT: 52 FL (ref 35.9–50)
ERYTHROCYTE [SEDIMENTATION RATE] IN BLOOD BY WESTERGREN METHOD: 101 MM/HOUR (ref 0–20)
GLOBULIN SER CALC-MCNC: 3.6 G/DL (ref 1.9–3.5)
GLUCOSE BLD-MCNC: 116 MG/DL (ref 65–99)
GLUCOSE BLD-MCNC: 158 MG/DL (ref 65–99)
GLUCOSE BLD-MCNC: 349 MG/DL (ref 65–99)
GLUCOSE BLD-MCNC: 356 MG/DL (ref 65–99)
GLUCOSE SERPL-MCNC: 164 MG/DL (ref 65–99)
GLUCOSE SERPL-MCNC: 246 MG/DL (ref 65–99)
HCO3 BLDA-SCNC: 19.7 MMOL/L (ref 17–25)
HCT VFR BLD AUTO: 37 % (ref 42–52)
HGB BLD-MCNC: 11.5 G/DL (ref 14–18)
HOROWITZ INDEX BLDA+IHG-RTO: 160 MM[HG]
IMM GRANULOCYTES # BLD AUTO: 0.31 K/UL (ref 0–0.11)
IMM GRANULOCYTES NFR BLD AUTO: 1.4 % (ref 0–0.9)
INR PPP: 1.25 (ref 0.87–1.13)
INST. QUALIFIED PATIENT IIQPT: YES
LACTATE BLD-SCNC: 1.1 MMOL/L (ref 0.5–2)
LACTATE BLD-SCNC: 1.6 MMOL/L (ref 0.5–2)
LACTATE BLD-SCNC: 1.6 MMOL/L (ref 0.5–2)
LYMPHOCYTES # BLD AUTO: 0.97 K/UL (ref 1–4.8)
LYMPHOCYTES NFR BLD: 4.4 % (ref 22–41)
MAGNESIUM SERPL-MCNC: 2 MG/DL (ref 1.5–2.5)
MCH RBC QN AUTO: 27.7 PG (ref 27–33)
MCHC RBC AUTO-ENTMCNC: 31.1 G/DL (ref 33.7–35.3)
MCV RBC AUTO: 89.2 FL (ref 81.4–97.8)
MONOCYTES # BLD AUTO: 0.41 K/UL (ref 0–0.85)
MONOCYTES NFR BLD AUTO: 1.9 % (ref 0–13.4)
NEUTROPHILS # BLD AUTO: 20.05 K/UL (ref 1.82–7.42)
NEUTROPHILS NFR BLD: 92 % (ref 44–72)
NRBC # BLD AUTO: 0 K/UL
NRBC BLD-RTO: 0 /100 WBC
O2/TOTAL GAS SETTING VFR VENT: 50 %
PCO2 BLDA: 32.1 MMHG (ref 26–37)
PCO2 TEMP ADJ BLDA: 32.2 MMHG (ref 26–37)
PH BLDA: 7.39 [PH] (ref 7.4–7.5)
PH TEMP ADJ BLDA: 7.39 [PH] (ref 7.4–7.5)
PHOSPHATE SERPL-MCNC: 4 MG/DL (ref 2.5–4.5)
PLATELET # BLD AUTO: 245 K/UL (ref 164–446)
PMV BLD AUTO: 10.4 FL (ref 9–12.9)
PO2 BLDA: 80 MMHG (ref 64–87)
PO2 TEMP ADJ BLDA: 81 MMHG (ref 64–87)
POTASSIUM SERPL-SCNC: 4 MMOL/L (ref 3.6–5.5)
POTASSIUM SERPL-SCNC: 4.2 MMOL/L (ref 3.6–5.5)
PREALB SERPL-MCNC: <3 MG/DL (ref 18–38)
PROT SERPL-MCNC: 5.8 G/DL (ref 6–8.2)
PROTHROMBIN TIME: 16 SEC (ref 12–14.6)
RBC # BLD AUTO: 4.15 M/UL (ref 4.7–6.1)
SAO2 % BLDA: 96 % (ref 93–99)
SODIUM SERPL-SCNC: 138 MMOL/L (ref 135–145)
SODIUM SERPL-SCNC: 140 MMOL/L (ref 135–145)
SPECIMEN DRAWN FROM PATIENT: ABNORMAL
TROPONIN I SERPL-MCNC: 0.62 NG/ML (ref 0–0.04)
TROPONIN I SERPL-MCNC: 0.88 NG/ML (ref 0–0.04)
TROPONIN I SERPL-MCNC: 1.09 NG/ML (ref 0–0.04)
VANCOMYCIN TROUGH SERPL-MCNC: 26.1 UG/ML (ref 10–20)
VANCOMYCIN TROUGH SERPL-MCNC: 26.4 UG/ML (ref 10–20)
WBC # BLD AUTO: 21.8 K/UL (ref 4.8–10.8)

## 2019-06-17 PROCEDURE — 80048 BASIC METABOLIC PNL TOTAL CA: CPT

## 2019-06-17 PROCEDURE — 85652 RBC SED RATE AUTOMATED: CPT

## 2019-06-17 PROCEDURE — 85610 PROTHROMBIN TIME: CPT

## 2019-06-17 PROCEDURE — 700111 HCHG RX REV CODE 636 W/ 250 OVERRIDE (IP): Performed by: INTERNAL MEDICINE

## 2019-06-17 PROCEDURE — 700102 HCHG RX REV CODE 250 W/ 637 OVERRIDE(OP): Performed by: INTERNAL MEDICINE

## 2019-06-17 PROCEDURE — A9270 NON-COVERED ITEM OR SERVICE: HCPCS | Performed by: INTERNAL MEDICINE

## 2019-06-17 PROCEDURE — 80202 ASSAY OF VANCOMYCIN: CPT | Mod: 91

## 2019-06-17 PROCEDURE — 99291 CRITICAL CARE FIRST HOUR: CPT | Performed by: INTERNAL MEDICINE

## 2019-06-17 PROCEDURE — 80053 COMPREHEN METABOLIC PANEL: CPT

## 2019-06-17 PROCEDURE — 36600 WITHDRAWAL OF ARTERIAL BLOOD: CPT

## 2019-06-17 PROCEDURE — 85025 COMPLETE CBC W/AUTO DIFF WBC: CPT

## 2019-06-17 PROCEDURE — 84100 ASSAY OF PHOSPHORUS: CPT

## 2019-06-17 PROCEDURE — 770022 HCHG ROOM/CARE - ICU (200)

## 2019-06-17 PROCEDURE — 82803 BLOOD GASES ANY COMBINATION: CPT

## 2019-06-17 PROCEDURE — 93010 ELECTROCARDIOGRAM REPORT: CPT | Mod: 76 | Performed by: INTERNAL MEDICINE

## 2019-06-17 PROCEDURE — 94003 VENT MGMT INPAT SUBQ DAY: CPT

## 2019-06-17 PROCEDURE — 93010 ELECTROCARDIOGRAM REPORT: CPT | Performed by: INTERNAL MEDICINE

## 2019-06-17 PROCEDURE — 84484 ASSAY OF TROPONIN QUANT: CPT | Mod: 91

## 2019-06-17 PROCEDURE — 84134 ASSAY OF PREALBUMIN: CPT

## 2019-06-17 PROCEDURE — 94150 VITAL CAPACITY TEST: CPT

## 2019-06-17 PROCEDURE — 94640 AIRWAY INHALATION TREATMENT: CPT

## 2019-06-17 PROCEDURE — 86140 C-REACTIVE PROTEIN: CPT

## 2019-06-17 PROCEDURE — 83605 ASSAY OF LACTIC ACID: CPT | Mod: 91

## 2019-06-17 PROCEDURE — 700101 HCHG RX REV CODE 250: Performed by: INTERNAL MEDICINE

## 2019-06-17 PROCEDURE — 73610 X-RAY EXAM OF ANKLE: CPT | Mod: LT

## 2019-06-17 PROCEDURE — 700105 HCHG RX REV CODE 258: Performed by: INTERNAL MEDICINE

## 2019-06-17 PROCEDURE — 71045 X-RAY EXAM CHEST 1 VIEW: CPT

## 2019-06-17 PROCEDURE — 82962 GLUCOSE BLOOD TEST: CPT | Mod: 91

## 2019-06-17 PROCEDURE — 83735 ASSAY OF MAGNESIUM: CPT

## 2019-06-17 RX ORDER — FUROSEMIDE 10 MG/ML
20 INJECTION INTRAMUSCULAR; INTRAVENOUS EVERY 12 HOURS
Status: DISCONTINUED | OUTPATIENT
Start: 2019-06-17 | End: 2019-06-18

## 2019-06-17 RX ORDER — OXYCODONE HYDROCHLORIDE 5 MG/1
5-10 TABLET ORAL EVERY 4 HOURS PRN
Status: DISCONTINUED | OUTPATIENT
Start: 2019-06-17 | End: 2019-06-27

## 2019-06-17 RX ORDER — LABETALOL HYDROCHLORIDE 5 MG/ML
10-20 INJECTION, SOLUTION INTRAVENOUS EVERY 4 HOURS PRN
Status: DISCONTINUED | OUTPATIENT
Start: 2019-06-17 | End: 2019-07-10 | Stop reason: HOSPADM

## 2019-06-17 RX ORDER — HYDRALAZINE HYDROCHLORIDE 20 MG/ML
10-20 INJECTION INTRAMUSCULAR; INTRAVENOUS EVERY 4 HOURS PRN
Status: DISCONTINUED | OUTPATIENT
Start: 2019-06-17 | End: 2019-07-10 | Stop reason: HOSPADM

## 2019-06-17 RX ORDER — METHADONE HYDROCHLORIDE 10 MG/1
70 TABLET ORAL DAILY
Status: DISCONTINUED | OUTPATIENT
Start: 2019-06-17 | End: 2019-06-17

## 2019-06-17 RX ORDER — METHADONE HYDROCHLORIDE 10 MG/1
35 TABLET ORAL 2 TIMES DAILY
Status: ON HOLD | COMMUNITY
End: 2019-07-10

## 2019-06-17 RX ORDER — IPRATROPIUM BROMIDE AND ALBUTEROL SULFATE 2.5; .5 MG/3ML; MG/3ML
3 SOLUTION RESPIRATORY (INHALATION)
Status: DISCONTINUED | OUTPATIENT
Start: 2019-06-17 | End: 2019-07-10 | Stop reason: HOSPADM

## 2019-06-17 RX ORDER — METHADONE HYDROCHLORIDE 10 MG/1
35 TABLET ORAL 2 TIMES DAILY
Status: DISCONTINUED | OUTPATIENT
Start: 2019-06-17 | End: 2019-06-29

## 2019-06-17 RX ORDER — METHYLPREDNISOLONE SODIUM SUCCINATE 40 MG/ML
40 INJECTION, POWDER, LYOPHILIZED, FOR SOLUTION INTRAMUSCULAR; INTRAVENOUS EVERY 12 HOURS
Status: DISCONTINUED | OUTPATIENT
Start: 2019-06-18 | End: 2019-06-18

## 2019-06-17 RX ORDER — PREGABALIN 150 MG/1
300 CAPSULE ORAL 2 TIMES DAILY
Status: DISCONTINUED | OUTPATIENT
Start: 2019-06-17 | End: 2019-07-10 | Stop reason: HOSPADM

## 2019-06-17 RX ORDER — AMOXICILLIN 250 MG
2 CAPSULE ORAL 2 TIMES DAILY
Status: DISCONTINUED | OUTPATIENT
Start: 2019-06-17 | End: 2019-07-10 | Stop reason: HOSPADM

## 2019-06-17 RX ADMIN — FUROSEMIDE 20 MG: 10 INJECTION, SOLUTION INTRAMUSCULAR; INTRAVENOUS at 17:37

## 2019-06-17 RX ADMIN — PREGABALIN 300 MG: 150 CAPSULE ORAL at 17:45

## 2019-06-17 RX ADMIN — INSULIN HUMAN 12 UNITS: 100 INJECTION, SOLUTION PARENTERAL at 17:30

## 2019-06-17 RX ADMIN — VANCOMYCIN HYDROCHLORIDE 1500 MG: 100 INJECTION, POWDER, LYOPHILIZED, FOR SOLUTION INTRAVENOUS at 15:28

## 2019-06-17 RX ADMIN — METHADONE HYDROCHLORIDE 35 MG: 10 TABLET ORAL at 16:12

## 2019-06-17 RX ADMIN — INSULIN GLARGINE 20 UNITS: 100 INJECTION, SOLUTION SUBCUTANEOUS at 05:52

## 2019-06-17 RX ADMIN — IPRATROPIUM BROMIDE AND ALBUTEROL SULFATE 3 ML: .5; 3 SOLUTION RESPIRATORY (INHALATION) at 02:12

## 2019-06-17 RX ADMIN — METHYLPREDNISOLONE SODIUM SUCCINATE 40 MG: 40 INJECTION, POWDER, FOR SOLUTION INTRAMUSCULAR; INTRAVENOUS at 00:10

## 2019-06-17 RX ADMIN — IPRATROPIUM BROMIDE AND ALBUTEROL SULFATE 3 ML: .5; 3 SOLUTION RESPIRATORY (INHALATION) at 10:19

## 2019-06-17 RX ADMIN — HYDRALAZINE HYDROCHLORIDE 10 MG: 20 INJECTION INTRAMUSCULAR; INTRAVENOUS at 19:45

## 2019-06-17 RX ADMIN — SENNOSIDES,DOCUSATE SODIUM 2 TABLET: 8.6; 5 TABLET, FILM COATED ORAL at 17:34

## 2019-06-17 RX ADMIN — FUROSEMIDE 20 MG: 10 INJECTION, SOLUTION INTRAMUSCULAR; INTRAVENOUS at 09:31

## 2019-06-17 RX ADMIN — FAMOTIDINE 20 MG: 20 TABLET ORAL at 05:36

## 2019-06-17 RX ADMIN — INSULIN HUMAN 3 UNITS: 100 INJECTION, SOLUTION PARENTERAL at 01:32

## 2019-06-17 RX ADMIN — SENNOSIDES,DOCUSATE SODIUM 2 TABLET: 8.6; 5 TABLET, FILM COATED ORAL at 05:37

## 2019-06-17 RX ADMIN — INSULIN HUMAN 10 UNITS: 100 INJECTION, SOLUTION PARENTERAL at 12:22

## 2019-06-17 RX ADMIN — METOPROLOL TARTRATE 12.5 MG: 25 TABLET, FILM COATED ORAL at 17:33

## 2019-06-17 RX ADMIN — FENTANYL CITRATE 100 MCG: 50 INJECTION INTRAMUSCULAR; INTRAVENOUS at 08:32

## 2019-06-17 RX ADMIN — IPRATROPIUM BROMIDE AND ALBUTEROL SULFATE 3 ML: .5; 3 SOLUTION RESPIRATORY (INHALATION) at 06:26

## 2019-06-17 RX ADMIN — INSULIN HUMAN 3 UNITS: 100 INJECTION, SOLUTION PARENTERAL at 23:46

## 2019-06-17 RX ADMIN — VANCOMYCIN HYDROCHLORIDE 1500 MG: 100 INJECTION, POWDER, LYOPHILIZED, FOR SOLUTION INTRAVENOUS at 02:25

## 2019-06-17 RX ADMIN — FENTANYL CITRATE 100 MCG: 50 INJECTION INTRAMUSCULAR; INTRAVENOUS at 04:12

## 2019-06-17 RX ADMIN — FENTANYL CITRATE 200 MCG: 50 INJECTION INTRAMUSCULAR; INTRAVENOUS at 02:24

## 2019-06-17 RX ADMIN — METHYLPREDNISOLONE SODIUM SUCCINATE 40 MG: 40 INJECTION, POWDER, FOR SOLUTION INTRAMUSCULAR; INTRAVENOUS at 12:15

## 2019-06-17 RX ADMIN — INSULIN GLARGINE 20 UNITS: 100 INJECTION, SOLUTION SUBCUTANEOUS at 17:31

## 2019-06-17 RX ADMIN — ENOXAPARIN SODIUM 40 MG: 100 INJECTION SUBCUTANEOUS at 05:37

## 2019-06-17 RX ADMIN — PIPERACILLIN SODIUM AND TAZOBACTAM SODIUM 3.38 G: 3; .375 INJECTION, POWDER, FOR SOLUTION INTRAVENOUS at 14:14

## 2019-06-17 RX ADMIN — PIPERACILLIN SODIUM AND TAZOBACTAM SODIUM 3.38 G: 3; .375 INJECTION, POWDER, FOR SOLUTION INTRAVENOUS at 21:52

## 2019-06-17 RX ADMIN — PIPERACILLIN SODIUM AND TAZOBACTAM SODIUM 3.38 G: 3; .375 INJECTION, POWDER, FOR SOLUTION INTRAVENOUS at 05:37

## 2019-06-17 RX ADMIN — METHYLPREDNISOLONE SODIUM SUCCINATE 40 MG: 40 INJECTION, POWDER, FOR SOLUTION INTRAMUSCULAR; INTRAVENOUS at 05:38

## 2019-06-17 RX ADMIN — INSULIN HUMAN 4 UNITS: 100 INJECTION, SOLUTION PARENTERAL at 05:52

## 2019-06-17 RX ADMIN — FENTANYL CITRATE 100 MCG: 50 INJECTION INTRAMUSCULAR; INTRAVENOUS at 05:37

## 2019-06-17 ASSESSMENT — PULMONARY FUNCTION TESTS: FVC: 2.8

## 2019-06-17 ASSESSMENT — LIFESTYLE VARIABLES: EVER_SMOKED: YES

## 2019-06-17 NOTE — PROGRESS NOTES
MS    SR-ST 70s-100s  .20/.08/.38  Rare PVCs noted    12 hour chart check    2 RN skin check  Foot wounds noted in flow sheets  Mepilex in place   Turned Q2

## 2019-06-17 NOTE — PROGRESS NOTES
Art from Lab called with critical result of Troponin of 1.11 at 1945. Critical lab result read back to Art.   Lab result relayed to primary RN: Paola at 1950

## 2019-06-17 NOTE — PROGRESS NOTES
Pt notified this RN that he normally takes Methadone 70mg/day at home as well as lyrica 600mg/day at home.  Methadone verified with pharmacy in narcotic database.  Dr. Gonda notified.  Orders placed.

## 2019-06-17 NOTE — PROGRESS NOTES
Patient intubated, on mechanical ventilation  Discussed with critical care service  Hospitalists will sign off with plans to re-establish care when the patient is extubated or at any time that we can assist with care

## 2019-06-17 NOTE — FLOWSHEET NOTE
06/17/19 0516   Weaning Parameters   RR (bpm) 12   #FVC / Vital Capacity (liters)  2.8   NIF (cm H2O)  -61   Rapid Shallow Breathing Index (RR/VT) 10   Spontaneous VE 19.3   Spontaneous VT 1740   Weaning Trial   Weaning Trial Stopped due to: Pt weaned for 1 hour and returned to rest settings per protocol   Length of Weaning Trial (Hours) 1   Vent Weaning Smart Text completed? Yes

## 2019-06-17 NOTE — PROGRESS NOTES
2 RN skin assessment completed with DAKOTA Travis.  Pt has wounds located:  - diabetic ulcers on R LE, heels and toes  - diabetic ulcer on L LE (covered with dressing)    Pictures of wounds in EPIC.    12 hour chart check

## 2019-06-17 NOTE — DIETARY
"Nutrition Support/TF Assessment:  Day 2 of admit.  Travon Pollack is a 63 y.o. male with admitting DX of Diabetic foot ulcer, Sepsis, Left Shoulder pain, Elevated troponin.      Current problem list:  1. Septic shock due to undetermined organism  2. Mucus plugging of bronchi  3. Aspiration into airway  4. Pneumonia due to infectious organism  5. SALOME  6. Left shoulder pain  7. Cardiomyopathy   8. Acute on chronic respiratory failure with hypoxia   9. Elevated troponin  10. Diabetic foot ulcer   11. Tobacco dependence  12. DM   13. HTN     Assessment:  Estimated Nutritional Needs based on:   Height: 180.3 cm (5' 10.98\") (copied from last entry)  Weight: 104 kg (229 lb 4.5 oz)  Weight to Use in Calculations: 104 kg (229 lb 4.5 oz) per bed scale today.   Ideal Body Weight: 78 kg (172 lb)  Percent Ideal Body Weight: 133.3  Body mass index is 31.99 kg/m²., BMI classification: class I obesity.       Calculation/Equation: MSJ X 1.0= 1849; modified [place] equation (THACKER) =1877X 60-70%= 2116-4418 kcals/day  *Est needs adjusted per [facility] obesity guidelines  Total Calories / day: 6736-8832 (Calories / k-16)  Total Grams Protein / day: 117-156  (Grams Protein / k.5-2.0 of IBW)  Total Free Water/day: 8368-5053 ml/day (25-30 ml/kg)     Evaluation:   1. TF appropriate due to mechanical ventilation.   2. Enteral access per abd X ray (): Enteric tube projects over the stomach.  3. Pertinent Labs: am Glu 246, BUN 39, Creat 1.45, Prealbumin <3.0 on . FSBS: 108-158 (-).   4. Pertinent Meds: Propofol stopped this am, Lasix, SSI, Electrolyte replacement, Solumedrol, Levophed (stopped today), Zofran PRN, Zosyn, Bowel protocol, Vanco.   5. Skin: Diabetic Foot Ulcer to Left foot. Wound care consult pending.   6. No family present to interview.   7. Plans for swallow eval if pt extubated today.     Malnutrition Risk: limited information.      Recommendations/Plan:  1. Begin Peptamen Intense Very High Protein @ 25 " ml/hr and advance per TF protocol to goal rate of  55 ml/hr to provide 1320 kcals/day, 121 g pro/day, and 1109 ml free water/day.    2. If pt extubated, advance diet as determined safe per SLP.  3. Fluids per MD.  4. RD following.

## 2019-06-17 NOTE — PROGRESS NOTES
"Pharmacy Kinetics 63 y.o. male on vancomycin day # 2 2019    Currently on Vancomycin 1500 mg iv q12hr    Indication for Treatment:   SSTI, pneumonia     Pertinent history per medical record:   Admitted on 6/15/2019 as a transfer from an outside facility for sepsis. Patient was found to have a left heel ulcer, with purulent discharge noted on physical examination. He required intubation  - , and underwent bronchoscopy and findings were suggestive of pnrom. Broad spectrum antibiotics have been initiated.     Other antibiotics:   Zosyn 3.375 gm IV q8h     Allergies:  Patient has no known allergies.      List concerns for renal function:   Elevated renal indices, hypermetabolic, hypoalbuminemia, BMI 32      Pertinent cultures to date:   6/15 PBCs x 2 - NGTD    BAL - NGTD     Recent Labs      06/15/19   2127  06/16/19   0306  19   0415   WBC  26.1*  23.0*  21.8*   NEUTSPOLYS  83.60*  82.90*  92.00*     Recent Labs      06/15/19   2127  06/16/19   0306  190  19   0005  19   0415   BUN  38*  33*  38*  39*  39*   CREATININE  1.00  0.97  1.36  1.34  1.45*   ALBUMIN  2.5*   --    --   2.2*   --      Recent Labs      19   0005  19   1324   VANCOTROUGH  26.1*  26.4*     Intake/Output Summary (Last 24 hours) at 19 1559  Last data filed at 19 0800   Gross per 24 hour   Intake          3618.69 ml   Output             1525 ml   Net          2093.69 ml      /80   Pulse 90   Temp 37.2 °C (99 °F) (Temporal)   Resp (!) 22   Ht 1.803 m (5' 10.98\")   Wt 104 kg (229 lb 4.5 oz)   SpO2 94%  Temp (24hrs), Av.4 °C (99.3 °F), Min:36.8 °C (98.3 °F), Max:38 °C (100.4 °F)      A/P   1. Vancomycin dose change: start 2000 mg IV q24h (1600)   2. Next vancomycin level: ~ (not yet ordered)   3. Goal trough: 16-20  4. Comments: Patient with several risk factors for drug accumulation and associated toxicity. Trough level obtained prior to the third total dose is " supratherapeutic. UOP is adequate at ~0.9 cc/kg/hr throughout the day. Will reduce dose frequency to Q24H and obtain a trough after several doses to ensure sufficient drug clearance and therapeutic trough levels. Pharmacy will continue to monitor and adjust dosing or recommend de-escalation as appropriate.       Sohan Monzon, PharmD

## 2019-06-17 NOTE — CARE PLAN
Problem: Safety  Goal: Will remain free from falls  Outcome: PROGRESSING AS EXPECTED    Intervention: Assess risk factors for falls  Sue Flores fall risk assessment completed.  Pt identified as HIGH RISK    Intervention: Implement fall precautions  Fall precautions in place.  Pt educated to call for assistance and does so appropriately.      Problem: Skin Integrity  Goal: Risk for impaired skin integrity will decrease  Outcome: PROGRESSING AS EXPECTED    Intervention: Implement precautions to protect skin integrity in collaboration with the interdisciplinary team  Q2 turns, pillows in use under elbows and to float heels.   Mepilex placed on coccyx.  2 RN skin check Qshift.

## 2019-06-17 NOTE — WOUND TEAM
"[facility]  Inpatient Services  Initial Wound and Skin Care Evaluation    Admission Date: 6/15/2019     Consult Date: 06/17/2019 @ 9037   [facility], [facility], SH: Reviewed    Unit where seen by Wound Team: T601/00     WOUND CONSULT RELATED TO:  DFU's bilateral feet     SUBJECTIVE:  \"I've had that wound on my heel for 3 weeks\"      Self Report / Pain Level:  \"It's tender\"       OBJECTIVE:  ABD and gauze dressing in place to left heel wound    WOUND TYPE, LOCATION, CHARACTERISTICS (Pressure Injuries: location, stage, POA or date identified)      Wound 06/16/19 Diabetic Ulcer Heel right plantar (Active)   Site Assessment Brown;Black    Elizabeth-wound Assessment Dry    Margins Defined edges;Attached edges    Wound Length (cm) 1 cm    Wound Width (cm) 1 cm    Wound Surface Area (cm^2) 1 cm^2    Closure Open to air    Drainage Amount None    Cleansing Not Applicable    Periwound Protectant Not Applicable    Dressing Options Open to Air    Dressing Cleansing/Solutions Not Applicable    WOUND NURSE ONLY - Odor None    WOUND NURSE ONLY - Pulses Right;DP;PT;1+    WOUND NURSE ONLY - Exposed Structures MESHA due to dry scab    WOUND NURSE ONLY - Tissue Type and Percentage 100% brown/black        Wound 06/16/19 Diabetic Ulcer Heel left medial and plantar (Active)   Wound Image       Site Assessment Yellow;Pink;Red;Light purple    Elizabeth-wound Assessment Edema;Dark edges;Maceration;Induration    Margins Defined edges;Unattached edges    Wound Length (cm) 3 cm    Wound Width (cm) 2 cm    Wound Depth (cm) 0.5 cm    Wound Surface Area (cm^2) 6 cm^2    Tunneling 0 cm    Undermining 0 cm    Closure Adhesive bandage    Drainage Amount Small    Drainage Description Serosanguineous    Non-staged Wound Description Full thickness    Treatments Cleansed    Cleansing Normal Saline Irrigation    Periwound Protectant Skin Protectant wipes to Periwound    Dressing Options Hydrofiber Silver;Adhesive Foam    Dressing Cleansing/Solutions Not " Applicable    Dressing Changed New    Dressing Status Clean;Dry;Intact    Dressing Change Frequency Daily    NEXT Dressing Change  06/18/19    NEXT Weekly Photo (Inpatient Only) 06/24/19    WOUND NURSE ONLY - Odor Foul    WOUND NURSE ONLY - Pulses Left;1+;DP;PT    WOUND NURSE ONLY - Exposed Structures MESHA due to slough    WOUND NURSE ONLY - Tissue Type and Percentage mixture of yellow slough, dark red tissue         Vascular:    Dorsal Pedal pulses:  left/right 1+, multiphasic with doppler  Posterior tib pulses:   left/right 1+, multiphasic with doppler    BORIS:      ordered today     Lab Values:    WBC:       WBC   Date/Time Value Ref Range Status   06/17/2019 04:15 AM 21.8 (H) 4.8 - 10.8 K/uL Final     A1C:      Lab Results   Component Value Date/Time    HBA1C 12.2 (H) 06/16/2019 11:37 AM           Culture: deferred, no viable tissue to culture    INTERVENTIONS BY WOUND TEAM:   Right foot, toes with multiple dry scabs, right plantar heel with dry scab, left open to air. Dressing removed from left foot, wound cleansed with NS and gauze, measured and photographed. Pedal pulses assessed, see above. No sting skin prep to kalyn wound left medial heel, covered with Aquacel Ag and adhesive foam. Multiple small dry scabs to left foot and toes, left open to air.    Dressing selection:  AqAg, foam         Interdisciplinary consultation: Patient, Bedside RN (Pooja), Jose Mcgill PA-C at bedside to visualize left medial heel, will consult ortho surgery MD    EVALUATION: Patient is 64 y/o male with poorly controlled diabetes admitted with left shoulder pain and left DFU. Open wound itself is relatively small, however, it extends under the skin to the entire plantar heel up to mid foot. Skin is indurated, painful to touch. AqAg to manage bioburden, absorb exudate, and maintain moist wound environment without laterally wicking exudate therefore reducing kalyn-wound maceration. Ortho to consult and LPS to follow. Patient made  NPO (tube feeding shut off) for possible OR this evening. Sed rate, 3 view x ray of left foot and BORIS's ordered.    Factors affecting wound healing: DM, possible infection, tobacco abuse, age    Goals: Steady decrease in wound area and depth weekly.    NURSING PLAN OF CARE ORDERS (X):    Dressing changes: See Dressing Care orders: X  Skin care: See Skin Care orders:   Rectal tube care: See Rectal Tube Care orders:   Other orders:    RSKIN: CURRENT (X) ORDERED (O):   Q shift Mario:  X  Q shift pressure point assessments:  X  Pressure redistribution mattress            NUHA  ICU bed      Bariatric NUHA         Bariatric foam           Heel float boots          Float Heels off Bed with Pillows X              Barrier wipes         Barrier Cream         Barrier paste          Sacral silicone dressing         Silicone O2 tubing  X       Anchorfast         Cannula fixation Device (Tender )          Gray Foam Ear protectors           Trach with Optifoam split foam                 Waffle cushion        Waffle Overlay         Rectal tube or BMS         Antifungal tx      Interdry          Reposition q 2 hours  X      Up to chair        Ambulate      PT/OT        Dietician        Diabetes Education     PO     TF X    TPN     NPO   # days   Other        WOUND TEAM PLAN OF CARE (X):   NPWT change 3 x week:        Dressing changes by wound team:       Follow up as needed:  X     Other (explain): LPS to see patient    Anticipated discharge plans (X):   SNF:           Home Care:           Outpatient Wound Center:            Self Care:            Other:  Discharge plan to be determined, will need outpatient wound care and LPS follow up at clinic when discharged.

## 2019-06-17 NOTE — PROGRESS NOTES
Critical Care Progress Note    Date of admission  6/15/2019    Chief Complaint  63 y.o. male admitted 6/15/2019 with Respiratory failure    Hospital Course    63 y.o. male who presented 6/15/2019 with multiple complaints. Admitted to medical floor.  He had fall 1 week ago and injured shoulder of which had recent surgery.  No loc but did hit his head.  Left heel ulcer with purulent fluid.  He has a past medical history of dmii on insulin, chronic respiratory failure with smoking and on 3 L o2 at home.  Fever and chills on admission. Admitted to medical floor. REspiratory failure with hypercapnia and hypoxia and lethargic on medical floor. Transferred to ICU and uanble to pull off o2 mask or follow commands, not a adequate bipap candidate. Intubated, significant secretions on bronchosocpy.  Hypotension requiring levophed.  Has shoulder pain on movement.  MOves all extremities.  Opens eyes with physical stimuli.  Post intubation family arrived, discussed clinical situation with them.  At baseline he is able to drive a car, carry out regular daily activities. Possible copd diagnosis in the past.         Interval Problem Update  Reviewed last 24 hour events:   - alert and follows, writing on fent gtt @ 100, off propofol   - -160   - off levophed since 1930 last night   - Tm 38.2, decreasing WBC   - last BM PTA, brayan Tfs at goal   - good UOP   - VD # 2 - SBT x 1hr with NIF -61, FVC 2.8, RSB 10, secretions small   - day 2 vanco/zosyn   - increased creatinine   - lactic acid ok    Review of Systems  Review of Systems   Unable to perform ROS: Intubated        Vital Signs for last 24 hours   Temp:  [36.1 °C (97 °F)-38 °C (100.4 °F)] 37.1 °C (98.8 °F)  Pulse:  [] 78  Resp:  [0-27] 26  BP: (136-141)/(68-80) 138/80  SpO2:  [81 %-100 %] 100 %    Respiratory Information for the last 24 hours  Miller Vent Mode: APVCMV  Rate (breaths/min): 26  Vt Target (mL): 470  PEEP/CPAP: 10  FiO2: 40  P Support: 5  P MEAN:  16  Length of Weaning Trial (Hours): 1  Control VTE (exp VT): 802    Physical Exam   Physical Exam   Constitutional: He appears well-developed and well-nourished. He is cooperative.  Non-toxic appearance. He appears ill. No distress. He is sedated and intubated.   HENT:   Head: Normocephalic and atraumatic.   Nose: Nose normal.   Mouth/Throat: Oropharynx is clear and moist.   Endotracheal tube and gastric tube in position   Eyes: Pupils are equal, round, and reactive to light. Conjunctivae are normal. No scleral icterus.   Neck: Neck supple. No JVD present. No tracheal deviation present.   Right IJ central venous catheter without surrounding hematoma   Cardiovascular: Normal rate, regular rhythm, normal heart sounds and intact distal pulses.  PMI is not displaced.  Exam reveals no decreased pulses.    No murmur heard.  Pulmonary/Chest: Effort normal. No accessory muscle usage. No tachypnea. He is intubated. He has no decreased breath sounds. He has no wheezes. He has rhonchi in the right lower field and the left lower field. He has no rales.   Able to take in large tidal volumes and hold breath during spontaneous breathing trial, minimal secretions   Abdominal: Soft. Bowel sounds are normal. He exhibits no distension. There is no tenderness. There is no rebound.   Genitourinary:   Genitourinary Comments: Lutz catheter in place   Musculoskeletal: He exhibits no edema or tenderness.   Neurological: He is alert. No cranial nerve deficit. He exhibits normal muscle tone.   Nodding appropriately, moving all extremities, no focal deficits   Skin: Skin is warm. No pallor.   Psychiatric:   Unable to assess given current clinical condition   Nursing note and vitals reviewed.      Medications  Current Facility-Administered Medications   Medication Dose Route Frequency Provider Last Rate Last Dose   • famotidine (PEPCID) tablet 20 mg  20 mg Enteral Tube Q12HRS Zeus K Merary   20 mg at 06/17/19 0536    Or   • famotidine  (PEPCID) injection 20 mg  20 mg Intravenous Q12HRS Zeus K Merary   20 mg at 06/16/19 1416   • MD Alert...ICU Electrolyte Replacement per Pharmacy   Other PHARMACY TO DOSE Zeus K Merary       • Pharmacy Consult: Enteral tube insertion - review meds/change route/product selection   Other PHARMACY TO DOSE Zeus K Merary       • enoxaparin (LOVENOX) inj 40 mg  40 mg Subcutaneous DAILY Zeus K Mearry   40 mg at 06/17/19 0537   • ipratropium-albuterol (DUONEB) nebulizer solution  3 mL Nebulization Q4HRS (RT) Zeus K Merary   3 mL at 06/17/19 0626   • fentaNYL (SUBLIMAZE) injection 100 mcg  100 mcg Intravenous Q15 MIN PRN Zeus K Merary   100 mcg at 06/17/19 0537    And   • fentaNYL (SUBLIMAZE) injection 200 mcg  200 mcg Intravenous Q15 MIN PRN Zeus K Mreary   200 mcg at 06/17/19 0224    And   • fentaNYL (SUBLIMAZE) 50 mcg/mL in 50mL (Continuous Infusion)   Intravenous Continuous Zeus K Merary 2 mL/hr at 06/16/19 2351 100 mcg/hr at 06/16/19 2351    And   • propofol (DIPRIVAN) injection  0-80 mcg/kg/min Intravenous Continuous Zues K Merary 3 mL/hr at 06/17/19 0636 5 mcg/kg/min at 06/17/19 0636   • Pharmacy Consult Request ...Pain Management Review 1 Each  1 Each Other PHARMACY TO DOSE Zeus K Merary        And   • oxyCODONE immediate-release (ROXICODONE) tablet 5 mg  5 mg Enteral Tube Q3HRS PRN Zeus K Merary        And   • oxyCODONE immediate release (ROXICODONE) tablet 10 mg  10 mg Enteral Tube Q3HRS PRN Zeus K Merary        And   • morphine (pf) 4 mg/ml injection 4 mg  4 mg Intravenous Q3HRS PRN Zeus K Merary       • polyethylene glycol/lytes (MIRALAX) PACKET 1 Packet  1 Packet Enteral Tube QDAY PRN Zeus K Merary        And   • senna-docusate (PERICOLACE or SENOKOT S) 8.6-50 MG per tablet 2 Tab  2 Tab Enteral Tube BID Zeus K Merary   2 Tab at 06/17/19 0537    And   • magnesium hydroxide (MILK OF MAGNESIA) suspension 30 mL  30 mL Enteral Tube QDAY PRN Zeus EARL Merary        And   • bisacodyl (DULCOLAX) suppository 10 mg  10 mg  Rectal QDAY PRN Zeus K Merary       • acetaminophen (TYLENOL) tablet 650 mg  650 mg Enteral Tube Q6HRS PRN Zeus K Merary       • insulin glargine (LANTUS) injection 20 Units  20 Units Subcutaneous BID Zeus K Merary   20 Units at 06/17/19 0552   • norepinephrine (LEVOPHED) 8 mg in  mL Infusion  0-30 mcg/min Intravenous Continuous Zeus K Merary   Stopped at 06/16/19 1933   • methylPREDNISolone (SOLU-MEDROL) 40 MG injection 40 mg  40 mg Intravenous Q6HRS Zeus K Merary   40 mg at 06/17/19 0538   • propofol (DIPRIVAN) injection  50 mg Intravenous Once Zeus K Merary   Stopped at 06/16/19 1615   • Pharmacy Consult: Enteral tube insertion - review meds/change route/product selection  1 Each Other PHARMACY TO DOSE Zeus K Merary       • Respiratory Care per Protocol   Nebulization Continuous RT Onesimo Olson M.D.       • NS (BOLUS) infusion 1,000 mL  1,000 mL Intravenous Once PRN Onesimo Olson M.D.       • NS infusion   Intravenous Continuous Kristen Carroll M.D. 75 mL/hr at 06/16/19 1107     • piperacillin-tazobactam (ZOSYN) 3.375 g in  mL IVPB  3.375 g Intravenous Q8HRS Onesimo Olson M.D. 25 mL/hr at 06/17/19 0537 3.375 g at 06/17/19 0537    And   • MD Alert...Vancomycin per Pharmacy  1 Each Other PHARMACY TO DOSE Onesimo Olson M.D.       • ondansetron (ZOFRAN) syringe/vial injection 4 mg  4 mg Intravenous Q4HRS PRN Onesimo Olson M.D.       • ondansetron (ZOFRAN ODT) dispertab 4 mg  4 mg Oral Q4HRS PRN Onesimo Olson M.D.       • insulin regular (HUMULIN R) injection 3-14 Units  3-14 Units Subcutaneous Q6HRS Onesimo Olson M.D.   4 Units at 06/17/19 0552    And   • DEXTROSE 10% BOLUS 250 mL  250 mL Intravenous Q15 MIN PRN Onesimo Olson M.D.       • vancomycin 1,500 mg in  mL IVPB  15 mg/kg Intravenous Q12HR Onesimo Olson M.D.   Stopped at 06/17/19 0425       Fluids    Intake/Output Summary (Last 24 hours) at 06/17/19 0820  Last data filed at 06/17/19 0600   Gross per 24 hour   Intake          5324.74 ml    Output             1375 ml   Net          3949.74 ml       Laboratory  Recent Labs      06/16/19   1153  06/16/19   1630  06/17/19   0414   OFOKX41N  7.24*   --    --    [id number]  62.8*   --    --    [id number]  47.7*   --    --    [id number]  78.6*   --    --    ARTHCO3  26*   --    --    FIO2  97*   --    --    ARTBE  -2   --    --    ISTATAPH   --   7.285*  7.395*   ISTATAPCO2   --   54.6*  32.1   ISTATAPO2   --   158*  80   ISTATATCO2   --   28  21   [id number]   --   99  96   ISTATARTHCO3   --   25.9*  19.7   ISTATARTBE   --   -1  -4   ISTATTEMP   --   98.3 F  37.1 C   ISTATFIO2   --   100  50   ISTATSPEC   --   Arterial  Arterial   ISTATAPHTC   --   7.287*  7.394*   [id number]   --   157*  81     Recent Labs      06/16/19 1755 06/17/19   0005  06/17/19   0415   TROPONINI  1.11*  1.09*  0.88*     Recent Labs      06/16/19 2050 06/17/19   0005  06/17/19   0415   SODIUM  137  138  140   POTASSIUM  4.4  4.0  4.2   CHLORIDE  106  106  106   CO2  24  23  22   BUN  38*  39*  39*   CREATININE  1.36  1.34  1.45*   MAGNESIUM   --    --   2.0   PHOSPHORUS   --    --   4.0   CALCIUM  7.4*  7.4*  7.4*     Recent Labs      06/15/19   2127   06/16/19   2050  06/17/19   0005  06/17/19   0415   ALTSGPT  22   --    --   42   --    ASTSGOT  24   --    --   55*   --    ALKPHOSPHAT  95   --    --   158*   --    TBILIRUBIN  0.5   --    --   0.4   --    PREALBUMIN   --    --    --   <3.0*   --    GLUCOSE  351*   < >  137*  164*  246*    < > = values in this interval not displayed.     Recent Labs      06/15/19   2127  06/16/19   0306  06/17/19   0005  06/17/19   0415   WBC  26.1*  23.0*   --   21.8*   NEUTSPOLYS  83.60*  82.90*   --   92.00*   LYMPHOCYTES  7.70*  7.60*   --   4.40*   MONOCYTES  7.10  7.20   --   1.90   EOSINOPHILS  0.30  0.40   --   0.00   BASOPHILS  0.30  0.20   --   0.30   ASTSGOT  24   --   55*   --    ALTSGPT  22   --   42   --    ALKPHOSPHAT  95   --   158*   --    TBILIRUBIN  0.5   --   0.4   --       Recent Labs      06/15/19   2127  06/16/19   0306  06/17/19   0415   RBC  4.43*  4.07*  4.15*   HEMOGLOBIN  12.2*  11.7*  11.5*   HEMATOCRIT  39.0*  35.3*  37.0*   PLATELETCT  224  204  245   PROTHROMBTM   --    --   16.0*   INR   --    --   1.25*       Imaging  X-Ray:  I have personally reviewed the images and compared with prior images. and My impression is: Unchanged bilateral lower lobe right greater than left pneumonia with mild edema pattern, underlying emphysema, enlarged cardiac silhouette, endotracheal tube/gastric tube/right IJ central venous catheter in good position    Assessment/Plan  Acute on chronic respiratory failure with hypoxia (HCC)- (present on admission)   Assessment & Plan    Intubated 6/16  Extubation trial  RT/O2 protocols  Titrate oxygen support to keep goal SaO2 between 8892%  Early mobilization  Start forced diuresis  Limit sedatives     Septic shock due to undetermined organism (HCC)- (present on admission)   Assessment & Plan    This is severe sepsis with the following associated acute organ dysfunction(s): acute respiratory failure, metabolic/septic encephalopathy.   Source = pulmonary    Status post sepsis protocol  Maintain off norepinephrine if able to maintain mean arterial pressure greater than 65  Continue antibiotics, follow-up on cultures  Adequately fluid resuscitated, currently hypervolemic  Discontinue lactic acid monitoring     Aspiration into airway   Assessment & Plan    Aspiration precautions  Aggressive pulmonary toiletry     Pneumonia due to infectious organism   Assessment & Plan    Aspiration vs community  Continue broad-spectrum antibiotics x5 days, follow-up on cultures with hopeful de-escalation soon     SALOME (obstructive sleep apnea)   Assessment & Plan    Nightly CPAP once extubated     Cardiomyopathy (HCC)- (present on admission)   Assessment & Plan    Echocardiography pending  Start Lasix     Elevated troponin- (present on admission)   Assessment & Plan     Likely demand ischemia -improved  Discontinue monitoring  Echo pending     Diabetic foot ulcer (HCC)- (present on admission)   Assessment & Plan    With surrounding cellulitis  Continue antibiotics  Follow-up on cultures  Wound care     DM (diabetes mellitus), type 2, uncontrolled (CMS-HCC)- (present on admission)   Assessment & Plan    Continue Lantus twice daily with sliding scale insulin  Diabetic tube feeds     HTN (hypertension)- (present on admission)   Assessment & Plan    Controlled currently with recent vasopressor requirements  Eventually resume antihypertensives  PRN labetalol and hydralazine for goal SBP less than 160     Mucus plugging of bronchi   Assessment & Plan    Mucolytic  Pulmonary toiletry     Left shoulder pain- (present on admission)   Assessment & Plan    Acute on chronic  Analgesia with PRN oxycodone  Therapies     Tobacco dependence- (present on admission)   Assessment & Plan    Nicotine replacement patch  Tobacco cessation education to be provided     Full code    VTE:  Lovenox  Ulcer: H2 Antagonist  Lines: Central Line  Ongoing indication addressed and Lutz Catheter  Ongoing indication addressed    I have performed a physical exam and reviewed and updated ROS and Plan today (6/17/2019). In review of yesterday's note (6/16/2019), there are no changes except as documented above.     Patient remains critically ill today requiring active management of his mechanical ventilatory support as well as close blood pressure monitoring and pulmonary toiletry. High risk of deterioration and worsening vital organ dysfunction and death without the above critical care interventions.    Discussed patient condition and risk of morbidity and/or mortality with Family, RN, RT, Pharmacy, Charge nurse / hot rounds and Patient  The patient remains critically ill.  Critical care time = 35 minutes in directly providing and coordinating critical care and extensive data review.  No time overlap and excludes  procedures.

## 2019-06-17 NOTE — THERAPY
Order received for OT eval.   Pt transferred to CICU for resp failure, now intubated, Nsg advised to hold today as pt is waiting for plan for his left heel wound.

## 2019-06-17 NOTE — CARE PLAN
Problem: Safety  Goal: Will remain free from falls  Outcome: PROGRESSING AS EXPECTED    Intervention: Assess risk factors for falls  Sue Flores fall risk assessment completed.  Pt identified as HIGH RISK.   Intervention: Implement fall precautions  Fall precautions in place        Problem: Pain Management  Goal: Pain level will decrease to patient's comfort goal  Outcome: PROGRESSING AS EXPECTED    Intervention: Follow pain managment plan developed in collaboration with patient and Interdisciplinary Team  Q2hr pain assessments using CPOT scale.  Intervention: Educate and implement non-pharmacologic comfort measures. Examples: relaxation, distration, play therapy, activity therapy, massage, etc.  Extra pillows in use for support and repositioning.  Music in use for distraction.

## 2019-06-17 NOTE — PROGRESS NOTES
Pt having ST segment changes including ST depression.  Dr. Reagan notified.  Per Dr. Reagan, order STAT 12 lead and troponin.

## 2019-06-17 NOTE — PROGRESS NOTES
Magali relayed critical lab result of troponin of 1.1 at 1855.  Dr Reagan notified.  Per Dr. Reagan, trend troponin with Q6 hour lab draws.  This information was relayed to NOC RN, Elmer.

## 2019-06-17 NOTE — PROGRESS NOTES
Notified by wound RN of concerns to left foot wound.    Orders for stat 3 view of left foot/ankle and ortho consult.    Ortho MD on floor, to bedside immediately.  Recommended to stop TF in case of emergent surgery.

## 2019-06-17 NOTE — ASSESSMENT & PLAN NOTE
Intubated 6/16-6/17  Encourage incentive spirometry/out of bed to chair  Limit sedatives  RT/O2 protocols - attempt to wean off O2 if able  Diuresis

## 2019-06-17 NOTE — ASSESSMENT & PLAN NOTE
This is severe sepsis with the following associated acute organ dysfunction(s): acute respiratory failure, metabolic/septic encephalopathy. -Improved  Source = pulmonary, soft tissue    Status post sepsis protocol  Continue antibiotics  S/p L foot debridement 6/18, wound vac  Adequately fluid resuscitated, remains hypervolemic

## 2019-06-17 NOTE — ASSESSMENT & PLAN NOTE
With surrounding cellulitis, osteomyelitis s/p surgical debridement 6/18  WOund vac  Continue antibiotics  Wound care  Orthopedics consulted  Analgesia prn

## 2019-06-17 NOTE — CARE PLAN
Problem: Safety  Goal: Will remain free from injury  Outcome: PROGRESSING AS EXPECTED  Bed alarm on, call light in place and treaded slipper socks on patient.    Problem: Mobility  Goal: Risk for activity intolerance will decrease  Outcome: PROGRESSING SLOWER THAN EXPECTED  Patient was able to nod his head that he would like to sit at edge of bed and mobilize. However, significant pain in his left shoulder prevented mobilization. Medicated per MAR and ice packs applied.

## 2019-06-17 NOTE — CARE PLAN
Problem: Ventilation Defect:  Goal: Ability to achieve and maintain unassisted ventilation or tolerate decreased levels of ventilator support  Outcome: PROGRESSING SLOWER THAN EXPECTED  Adult Ventilation Update    Total Vent Days: 2    CMV, 26, 470, +10, 60%.  Q4H Duoneb    Patient Lines/Drains/Airways Status    Active Airway     Name: Placement date: Placement time: Site: Days:    Airway ETT Oral 8.0 06/16/19   1306   Oral   2                       Plateau Pressure (Q Shift): 19 (06/16/19 2226)  Static Compliance (ml / cm H2O): 53 (06/16/19 2226)      Sputum/Suction   Cough: Productive (06/17/19 0000)  Sputum Amount: Moderate (06/17/19 0000)  Sputum Color: Tan;White (06/17/19 0000)  Sputum Consistency: Thick (06/17/19 0000)    Mobility  Level of Mobility: Level IV (06/17/19 0000)  Activity Performed: Sitting up in bed (06/16/19 0030)  Assistance: Assistance of One (06/16/19 0030)  Ambulation Tolerance: General Weakness (06/16/19 0030)  Pt Calls for Assistance: Yes (06/16/19 0319)  Staff Present for Mobilization: RN (06/16/19 0030)  Gait: Unable to Ambulate (06/16/19 0030)  Assistive Devices: Hand held assist (06/16/19 0030)  Reason Not Mobilized: Other (comment) (Pt newly intubated) (06/16/19 1600)  Mobilization Comments:  (pt intubated today) (06/16/19 1600)    Events/Summary/Plan: changed from dry to wet circuit (06/16/19 2226)

## 2019-06-17 NOTE — PROGRESS NOTES
"Pharmacy Kinetics 63 y.o. male on vancomycin day # 2 2019    Currently on Vancomycin 1500 mg IV 12hr (0200 1400)    Indication for Treatment: SSTI, diabetic foot ulcer     Pertinent history per medical record: Admitted on 6/15/2019 for severe sepsis related to diabetic foot ulcer.  Patient was transferred from [facility] facility where he received 1 g vancomycin. He reports a progressively worsening L heel ulceration with associated fever and chills. Empiric antibiotics initiated.      Other antibiotics: Zosyn 3.375 g IV q8h     Allergies: Patient has no known allergies.      List concerns for renal function: uncontrolled diabetes, BUN/SCr > 20:1, BMI 30, combination therapy with Zosyn      Pertinent cultures to date:   06/15/19: PBCx2: in process  19: Resp: In process     MRSA nares swab if pneumonia is a concern: N/A    Recent Labs      06/15/19   2127  06/16/19   0306   WBC  26.1*  23.0*   NEUTSPOLYS  83.60*  82.90*     Recent Labs      06/15/19   2127  06/16/19   0306   BUN  38*  33*   CREATININE  1.00  0.97   ALBUMIN  2.5*   --      No results for input(s): VANCOTROUGH, VANCOPEAK, VANCORANDOM in the last 72 hours.  Intake/Output Summary (Last 24 hours) at 19 1802  Last data filed at 19 1600   Gross per 24 hour   Intake          5175.73 ml   Output              575 ml   Net          4600.73 ml      /80   Pulse 75   Temp 36.8 °C (98.3 °F) (Temporal)   Resp (!) 26   Ht 1.803 m (5' 11\")   Wt 97.4 kg (214 lb 11.7 oz)   SpO2 90%  Temp (24hrs), Av.7 °C (98.1 °F), Min:36.1 °C (97 °F), Max:37.2 °C (99 °F)      A/P        1. Vancomycin dose change: No change  2. Next vancomycin level: 19@0130  3. Goal trough: 12-16 mcg/mL  4. Comments:  Risk factors for accumulation. Patient received 1 g vancomycin at outlying facility. Maintenance dosing initiated at ~8 hours due to not recieving a true loading dose. Pt tx to ICU, level ordered. Levo initiated.       Alexander Montano PharmD BCPS   "

## 2019-06-17 NOTE — CARE PLAN
Problem: Ventilation Defect:  Goal: Ability to achieve and maintain unassisted ventilation or tolerate decreased levels of ventilator support    Intervention: Support and monitor invasive and noninvasive mechanical ventilation  Adult Ventilation Update    Total Vent Days: 1    Patient Lines/Drains/Airways Status    Active Airway     Name: Placement date: Placement time: Site: Days:    Airway ETT Oral 8.0 06/16/19   1306   Oral   less than 1              Plateau Pressure (Q Shift): 15 (06/16/19 1305)  Static Compliance (ml / cm H2O): 56 (06/16/19 1305)    Mobility  Level of Mobility: Level IV (06/16/19 0030)  Activity Performed: Sitting up in bed (06/16/19 0030)  Assistance: Assistance of One (06/16/19 0030)  Ambulation Tolerance: General Weakness (06/16/19 0030)  Pt Calls for Assistance: Yes (06/16/19 0319)  Staff Present for Mobilization: RN (06/16/19 0030)  Gait: Unable to Ambulate (06/16/19 0030)  Assistive Devices: Hand held assist (06/16/19 0030)  Reason Not Mobilized: Unstable condition;Patient refused - pain (06/16/19 0030)    Events/Summary/Plan: pt bronched at bedside, no complications (06/16/19 1509)

## 2019-06-17 NOTE — ASSESSMENT & PLAN NOTE
Controlled  Continue metoprolol, lisinopril  PRN labetalol and hydralazine for goal SBP less than 160

## 2019-06-18 ENCOUNTER — ANESTHESIA (OUTPATIENT)
Dept: SURGERY | Facility: MEDICAL CENTER | Age: 64
DRG: 853 | End: 2019-06-18
Payer: COMMERCIAL

## 2019-06-18 ENCOUNTER — APPOINTMENT (OUTPATIENT)
Dept: RADIOLOGY | Facility: MEDICAL CENTER | Age: 64
DRG: 853 | End: 2019-06-18
Attending: INTERNAL MEDICINE
Payer: COMMERCIAL

## 2019-06-18 ENCOUNTER — APPOINTMENT (OUTPATIENT)
Dept: CARDIOLOGY | Facility: MEDICAL CENTER | Age: 64
DRG: 853 | End: 2019-06-18
Attending: INTERNAL MEDICINE
Payer: COMMERCIAL

## 2019-06-18 ENCOUNTER — ANESTHESIA EVENT (OUTPATIENT)
Dept: SURGERY | Facility: MEDICAL CENTER | Age: 64
DRG: 853 | End: 2019-06-18
Payer: COMMERCIAL

## 2019-06-18 PROBLEM — N17.9 AKI (ACUTE KIDNEY INJURY) (HCC): Status: ACTIVE | Noted: 2019-06-18

## 2019-06-18 LAB
ANION GAP SERPL CALC-SCNC: 9 MMOL/L (ref 0–11.9)
BACTERIA SPEC RESP CULT: NORMAL
BASOPHILS # BLD AUTO: 0 % (ref 0–1.8)
BASOPHILS # BLD: 0 K/UL (ref 0–0.12)
BUN SERPL-MCNC: 33 MG/DL (ref 8–22)
CALCIUM SERPL-MCNC: 7.7 MG/DL (ref 8.5–10.5)
CHLORIDE SERPL-SCNC: 106 MMOL/L (ref 96–112)
CO2 SERPL-SCNC: 27 MMOL/L (ref 20–33)
CREAT SERPL-MCNC: 1.09 MG/DL (ref 0.5–1.4)
CRP SERPL HS-MCNC: 9.83 MG/DL (ref 0–0.75)
EOSINOPHIL # BLD AUTO: 0.23 K/UL (ref 0–0.51)
EOSINOPHIL NFR BLD: 0.9 % (ref 0–6.9)
ERYTHROCYTE [DISTWIDTH] IN BLOOD BY AUTOMATED COUNT: 50.1 FL (ref 35.9–50)
GLUCOSE BLD-MCNC: 150 MG/DL (ref 65–99)
GLUCOSE BLD-MCNC: 191 MG/DL (ref 65–99)
GLUCOSE BLD-MCNC: 238 MG/DL (ref 65–99)
GLUCOSE BLD-MCNC: 306 MG/DL (ref 65–99)
GLUCOSE BLD-MCNC: 315 MG/DL (ref 65–99)
GLUCOSE BLD-MCNC: 93 MG/DL (ref 65–99)
GLUCOSE SERPL-MCNC: 104 MG/DL (ref 65–99)
GRAM STN SPEC: NORMAL
GRAM STN SPEC: NORMAL
HCT VFR BLD AUTO: 39.6 % (ref 42–52)
HGB BLD-MCNC: 12.5 G/DL (ref 14–18)
LV EJECT FRACT  99904: 40
LV EJECT FRACT MOD 2C 99903: 36.8
LV EJECT FRACT MOD 4C 99902: 36.28
LV EJECT FRACT MOD BP 99901: 35.94
LYMPHOCYTES # BLD AUTO: 1.96 K/UL (ref 1–4.8)
LYMPHOCYTES NFR BLD: 7.8 % (ref 22–41)
MAGNESIUM SERPL-MCNC: 2 MG/DL (ref 1.5–2.5)
MANUAL DIFF BLD: NORMAL
MCH RBC QN AUTO: 27.7 PG (ref 27–33)
MCHC RBC AUTO-ENTMCNC: 31.6 G/DL (ref 33.7–35.3)
MCV RBC AUTO: 87.8 FL (ref 81.4–97.8)
MONOCYTES # BLD AUTO: 0.65 K/UL (ref 0–0.85)
MONOCYTES NFR BLD AUTO: 2.6 % (ref 0–13.4)
MORPHOLOGY BLD-IMP: NORMAL
NEUTROPHILS # BLD AUTO: 22.26 K/UL (ref 1.82–7.42)
NEUTROPHILS NFR BLD: 88.7 % (ref 44–72)
NRBC # BLD AUTO: 0 K/UL
NRBC BLD-RTO: 0 /100 WBC
PHOSPHATE SERPL-MCNC: 2.5 MG/DL (ref 2.5–4.5)
PLATELET # BLD AUTO: 298 K/UL (ref 164–446)
PLATELET BLD QL SMEAR: NORMAL
PMV BLD AUTO: 10 FL (ref 9–12.9)
POTASSIUM SERPL-SCNC: 3.4 MMOL/L (ref 3.6–5.5)
PREALB SERPL-MCNC: 4 MG/DL (ref 18–38)
RBC # BLD AUTO: 4.51 M/UL (ref 4.7–6.1)
SIGNIFICANT IND 70042: NORMAL
SIGNIFICANT IND 70042: NORMAL
SITE SITE: NORMAL
SITE SITE: NORMAL
SODIUM SERPL-SCNC: 142 MMOL/L (ref 135–145)
SOURCE SOURCE: NORMAL
SOURCE SOURCE: NORMAL
WBC # BLD AUTO: 25.1 K/UL (ref 4.8–10.8)

## 2019-06-18 PROCEDURE — 0JBR0ZZ EXCISION OF LEFT FOOT SUBCUTANEOUS TISSUE AND FASCIA, OPEN APPROACH: ICD-10-PCS | Performed by: ORTHOPAEDIC SURGERY

## 2019-06-18 PROCEDURE — A9270 NON-COVERED ITEM OR SERVICE: HCPCS | Performed by: INTERNAL MEDICINE

## 2019-06-18 PROCEDURE — 87186 SC STD MICRODIL/AGAR DIL: CPT | Mod: 91

## 2019-06-18 PROCEDURE — 80048 BASIC METABOLIC PNL TOTAL CA: CPT

## 2019-06-18 PROCEDURE — 501488 HCHG SUCTION CANN, WOUNDVAC TRAC: Performed by: ORTHOPAEDIC SURGERY

## 2019-06-18 PROCEDURE — 93306 TTE W/DOPPLER COMPLETE: CPT

## 2019-06-18 PROCEDURE — 99233 SBSQ HOSP IP/OBS HIGH 50: CPT | Performed by: HOSPITALIST

## 2019-06-18 PROCEDURE — 500881 HCHG PACK, EXTREMITY: Performed by: ORTHOPAEDIC SURGERY

## 2019-06-18 PROCEDURE — 700111 HCHG RX REV CODE 636 W/ 250 OVERRIDE (IP): Performed by: HOSPITALIST

## 2019-06-18 PROCEDURE — 160002 HCHG RECOVERY MINUTES (STAT): Performed by: ORTHOPAEDIC SURGERY

## 2019-06-18 PROCEDURE — 85027 COMPLETE CBC AUTOMATED: CPT

## 2019-06-18 PROCEDURE — 160027 HCHG SURGERY MINUTES - 1ST 30 MINS LEVEL 2: Performed by: ORTHOPAEDIC SURGERY

## 2019-06-18 PROCEDURE — A6550 NEG PRES WOUND THER DRSG SET: HCPCS | Performed by: ORTHOPAEDIC SURGERY

## 2019-06-18 PROCEDURE — 700101 HCHG RX REV CODE 250: Performed by: ANESTHESIOLOGY

## 2019-06-18 PROCEDURE — 86140 C-REACTIVE PROTEIN: CPT

## 2019-06-18 PROCEDURE — 99233 SBSQ HOSP IP/OBS HIGH 50: CPT | Performed by: INTERNAL MEDICINE

## 2019-06-18 PROCEDURE — 700105 HCHG RX REV CODE 258: Performed by: ANESTHESIOLOGY

## 2019-06-18 PROCEDURE — 700111 HCHG RX REV CODE 636 W/ 250 OVERRIDE (IP): Performed by: INTERNAL MEDICINE

## 2019-06-18 PROCEDURE — 501330 HCHG SET, CYSTO IRRIG TUBING: Performed by: ORTHOPAEDIC SURGERY

## 2019-06-18 PROCEDURE — 85007 BL SMEAR W/DIFF WBC COUNT: CPT

## 2019-06-18 PROCEDURE — 87070 CULTURE OTHR SPECIMN AEROBIC: CPT

## 2019-06-18 PROCEDURE — 87205 SMEAR GRAM STAIN: CPT

## 2019-06-18 PROCEDURE — 700111 HCHG RX REV CODE 636 W/ 250 OVERRIDE (IP): Performed by: ANESTHESIOLOGY

## 2019-06-18 PROCEDURE — 87077 CULTURE AEROBIC IDENTIFY: CPT | Mod: 91

## 2019-06-18 PROCEDURE — 160038 HCHG SURGERY MINUTES - EA ADDL 1 MIN LEVEL 2: Performed by: ORTHOPAEDIC SURGERY

## 2019-06-18 PROCEDURE — 700102 HCHG RX REV CODE 250 W/ 637 OVERRIDE(OP): Performed by: INTERNAL MEDICINE

## 2019-06-18 PROCEDURE — 160035 HCHG PACU - 1ST 60 MINS PHASE I: Performed by: ORTHOPAEDIC SURGERY

## 2019-06-18 PROCEDURE — 84134 ASSAY OF PREALBUMIN: CPT

## 2019-06-18 PROCEDURE — 84100 ASSAY OF PHOSPHORUS: CPT

## 2019-06-18 PROCEDURE — 87015 SPECIMEN INFECT AGNT CONCNTJ: CPT

## 2019-06-18 PROCEDURE — 87075 CULTR BACTERIA EXCEPT BLOOD: CPT

## 2019-06-18 PROCEDURE — 83735 ASSAY OF MAGNESIUM: CPT

## 2019-06-18 PROCEDURE — 82962 GLUCOSE BLOOD TEST: CPT

## 2019-06-18 PROCEDURE — 700102 HCHG RX REV CODE 250 W/ 637 OVERRIDE(OP): Performed by: HOSPITALIST

## 2019-06-18 PROCEDURE — 93306 TTE W/DOPPLER COMPLETE: CPT | Mod: 26 | Performed by: INTERNAL MEDICINE

## 2019-06-18 PROCEDURE — A9270 NON-COVERED ITEM OR SERVICE: HCPCS | Performed by: HOSPITALIST

## 2019-06-18 PROCEDURE — 770020 HCHG ROOM/CARE - TELE (206)

## 2019-06-18 PROCEDURE — 700105 HCHG RX REV CODE 258: Performed by: INTERNAL MEDICINE

## 2019-06-18 PROCEDURE — 160048 HCHG OR STATISTICAL LEVEL 1-5: Performed by: ORTHOPAEDIC SURGERY

## 2019-06-18 PROCEDURE — 501838 HCHG SUTURE GENERAL: Performed by: ORTHOPAEDIC SURGERY

## 2019-06-18 PROCEDURE — 71045 X-RAY EXAM CHEST 1 VIEW: CPT

## 2019-06-18 PROCEDURE — 160009 HCHG ANES TIME/MIN: Performed by: ORTHOPAEDIC SURGERY

## 2019-06-18 RX ORDER — BISACODYL 10 MG
10 SUPPOSITORY, RECTAL RECTAL
Status: DISCONTINUED | OUTPATIENT
Start: 2019-06-18 | End: 2019-07-10 | Stop reason: HOSPADM

## 2019-06-18 RX ORDER — FUROSEMIDE 10 MG/ML
40 INJECTION INTRAMUSCULAR; INTRAVENOUS EVERY 12 HOURS
Status: DISCONTINUED | OUTPATIENT
Start: 2019-06-18 | End: 2019-06-18

## 2019-06-18 RX ORDER — POLYETHYLENE GLYCOL 3350 17 G/17G
1 POWDER, FOR SOLUTION ORAL
Status: DISCONTINUED | OUTPATIENT
Start: 2019-06-18 | End: 2019-07-10 | Stop reason: HOSPADM

## 2019-06-18 RX ORDER — MEPERIDINE HYDROCHLORIDE 25 MG/ML
25 INJECTION INTRAMUSCULAR; INTRAVENOUS; SUBCUTANEOUS
Status: DISCONTINUED | OUTPATIENT
Start: 2019-06-18 | End: 2019-06-18 | Stop reason: HOSPADM

## 2019-06-18 RX ORDER — SODIUM CHLORIDE, SODIUM LACTATE, POTASSIUM CHLORIDE, CALCIUM CHLORIDE 600; 310; 30; 20 MG/100ML; MG/100ML; MG/100ML; MG/100ML
INJECTION, SOLUTION INTRAVENOUS CONTINUOUS
Status: DISCONTINUED | OUTPATIENT
Start: 2019-06-18 | End: 2019-06-18

## 2019-06-18 RX ORDER — HYDROMORPHONE HYDROCHLORIDE 1 MG/ML
0.4 INJECTION, SOLUTION INTRAMUSCULAR; INTRAVENOUS; SUBCUTANEOUS
Status: DISCONTINUED | OUTPATIENT
Start: 2019-06-18 | End: 2019-06-18 | Stop reason: HOSPADM

## 2019-06-18 RX ORDER — KETOROLAC TROMETHAMINE 30 MG/ML
INJECTION, SOLUTION INTRAMUSCULAR; INTRAVENOUS PRN
Status: DISCONTINUED | OUTPATIENT
Start: 2019-06-18 | End: 2019-06-18 | Stop reason: SURG

## 2019-06-18 RX ORDER — PREDNISONE 20 MG/1
20 TABLET ORAL DAILY
Status: DISCONTINUED | OUTPATIENT
Start: 2019-06-19 | End: 2019-06-19

## 2019-06-18 RX ORDER — SODIUM CHLORIDE, SODIUM LACTATE, POTASSIUM CHLORIDE, CALCIUM CHLORIDE 600; 310; 30; 20 MG/100ML; MG/100ML; MG/100ML; MG/100ML
INJECTION, SOLUTION INTRAVENOUS CONTINUOUS
Status: DISCONTINUED | OUTPATIENT
Start: 2019-06-18 | End: 2019-06-18 | Stop reason: HOSPADM

## 2019-06-18 RX ORDER — LORAZEPAM 2 MG/ML
0.5 INJECTION INTRAMUSCULAR
Status: DISCONTINUED | OUTPATIENT
Start: 2019-06-18 | End: 2019-06-18 | Stop reason: HOSPADM

## 2019-06-18 RX ORDER — HYDROMORPHONE HYDROCHLORIDE 1 MG/ML
0.2 INJECTION, SOLUTION INTRAMUSCULAR; INTRAVENOUS; SUBCUTANEOUS
Status: DISCONTINUED | OUTPATIENT
Start: 2019-06-18 | End: 2019-06-18 | Stop reason: HOSPADM

## 2019-06-18 RX ORDER — LABETALOL HYDROCHLORIDE 5 MG/ML
5 INJECTION, SOLUTION INTRAVENOUS
Status: DISCONTINUED | OUTPATIENT
Start: 2019-06-18 | End: 2019-06-18 | Stop reason: HOSPADM

## 2019-06-18 RX ORDER — HYDRALAZINE HYDROCHLORIDE 20 MG/ML
5 INJECTION INTRAMUSCULAR; INTRAVENOUS
Status: DISCONTINUED | OUTPATIENT
Start: 2019-06-18 | End: 2019-06-18 | Stop reason: HOSPADM

## 2019-06-18 RX ORDER — DEXAMETHASONE SODIUM PHOSPHATE 4 MG/ML
INJECTION, SOLUTION INTRA-ARTICULAR; INTRALESIONAL; INTRAMUSCULAR; INTRAVENOUS; SOFT TISSUE PRN
Status: DISCONTINUED | OUTPATIENT
Start: 2019-06-18 | End: 2019-06-18 | Stop reason: SURG

## 2019-06-18 RX ORDER — OXYCODONE HCL 5 MG/5 ML
5 SOLUTION, ORAL ORAL
Status: DISCONTINUED | OUTPATIENT
Start: 2019-06-18 | End: 2019-06-18 | Stop reason: HOSPADM

## 2019-06-18 RX ORDER — DIPHENHYDRAMINE HYDROCHLORIDE 50 MG/ML
12.5 INJECTION INTRAMUSCULAR; INTRAVENOUS
Status: DISCONTINUED | OUTPATIENT
Start: 2019-06-18 | End: 2019-06-18 | Stop reason: HOSPADM

## 2019-06-18 RX ORDER — HYDROMORPHONE HYDROCHLORIDE 1 MG/ML
0.1 INJECTION, SOLUTION INTRAMUSCULAR; INTRAVENOUS; SUBCUTANEOUS
Status: DISCONTINUED | OUTPATIENT
Start: 2019-06-18 | End: 2019-06-18 | Stop reason: HOSPADM

## 2019-06-18 RX ORDER — INSULIN GLARGINE 100 [IU]/ML
44 INJECTION, SOLUTION SUBCUTANEOUS 2 TIMES DAILY
Status: DISCONTINUED | OUTPATIENT
Start: 2019-06-18 | End: 2019-06-20

## 2019-06-18 RX ORDER — ONDANSETRON 2 MG/ML
4 INJECTION INTRAMUSCULAR; INTRAVENOUS
Status: DISCONTINUED | OUTPATIENT
Start: 2019-06-18 | End: 2019-06-18 | Stop reason: HOSPADM

## 2019-06-18 RX ORDER — POTASSIUM CHLORIDE 20 MEQ/1
60 TABLET, EXTENDED RELEASE ORAL ONCE
Status: COMPLETED | OUTPATIENT
Start: 2019-06-18 | End: 2019-06-18

## 2019-06-18 RX ORDER — HEPARIN SODIUM 5000 [USP'U]/ML
5000 INJECTION, SOLUTION INTRAVENOUS; SUBCUTANEOUS EVERY 8 HOURS
Status: DISCONTINUED | OUTPATIENT
Start: 2019-06-18 | End: 2019-06-18

## 2019-06-18 RX ORDER — ACETAMINOPHEN 325 MG/1
650 TABLET ORAL EVERY 6 HOURS PRN
Status: DISCONTINUED | OUTPATIENT
Start: 2019-06-18 | End: 2019-07-10 | Stop reason: HOSPADM

## 2019-06-18 RX ORDER — ONDANSETRON 2 MG/ML
INJECTION INTRAMUSCULAR; INTRAVENOUS PRN
Status: DISCONTINUED | OUTPATIENT
Start: 2019-06-18 | End: 2019-06-18 | Stop reason: SURG

## 2019-06-18 RX ORDER — HALOPERIDOL 5 MG/ML
1 INJECTION INTRAMUSCULAR
Status: DISCONTINUED | OUTPATIENT
Start: 2019-06-18 | End: 2019-06-18 | Stop reason: HOSPADM

## 2019-06-18 RX ORDER — LINEZOLID 2 MG/ML
600 INJECTION, SOLUTION INTRAVENOUS EVERY 12 HOURS
Status: DISCONTINUED | OUTPATIENT
Start: 2019-06-18 | End: 2019-06-19

## 2019-06-18 RX ORDER — SODIUM CHLORIDE, SODIUM LACTATE, POTASSIUM CHLORIDE, CALCIUM CHLORIDE 600; 310; 30; 20 MG/100ML; MG/100ML; MG/100ML; MG/100ML
INJECTION, SOLUTION INTRAVENOUS
Status: DISCONTINUED | OUTPATIENT
Start: 2019-06-18 | End: 2019-06-18 | Stop reason: SURG

## 2019-06-18 RX ORDER — SODIUM CHLORIDE, SODIUM LACTATE, POTASSIUM CHLORIDE, CALCIUM CHLORIDE 600; 310; 30; 20 MG/100ML; MG/100ML; MG/100ML; MG/100ML
INJECTION, SOLUTION INTRAVENOUS
Status: COMPLETED
Start: 2019-06-18 | End: 2019-06-18

## 2019-06-18 RX ORDER — OXYCODONE HCL 5 MG/5 ML
10 SOLUTION, ORAL ORAL
Status: DISCONTINUED | OUTPATIENT
Start: 2019-06-18 | End: 2019-06-18 | Stop reason: HOSPADM

## 2019-06-18 RX ORDER — FUROSEMIDE 10 MG/ML
20 INJECTION INTRAMUSCULAR; INTRAVENOUS EVERY 12 HOURS
Status: DISCONTINUED | OUTPATIENT
Start: 2019-06-18 | End: 2019-06-18

## 2019-06-18 RX ADMIN — PIPERACILLIN SODIUM AND TAZOBACTAM SODIUM 3.38 G: 3; .375 INJECTION, POWDER, FOR SOLUTION INTRAVENOUS at 15:04

## 2019-06-18 RX ADMIN — METHYLPREDNISOLONE SODIUM SUCCINATE 40 MG: 40 INJECTION, POWDER, FOR SOLUTION INTRAMUSCULAR; INTRAVENOUS at 05:53

## 2019-06-18 RX ADMIN — MIDAZOLAM HYDROCHLORIDE 2 MG: 1 INJECTION, SOLUTION INTRAMUSCULAR; INTRAVENOUS at 13:19

## 2019-06-18 RX ADMIN — OXYCODONE HYDROCHLORIDE 5 MG: 5 TABLET ORAL at 15:23

## 2019-06-18 RX ADMIN — INSULIN HUMAN 10 UNITS: 100 INJECTION, SOLUTION PARENTERAL at 23:24

## 2019-06-18 RX ADMIN — ONDANSETRON 4 MG: 2 INJECTION INTRAMUSCULAR; INTRAVENOUS at 13:35

## 2019-06-18 RX ADMIN — METHADONE HYDROCHLORIDE 35 MG: 10 TABLET ORAL at 06:34

## 2019-06-18 RX ADMIN — OXYCODONE HYDROCHLORIDE 10 MG: 5 TABLET ORAL at 21:07

## 2019-06-18 RX ADMIN — SENNOSIDES,DOCUSATE SODIUM 2 TABLET: 8.6; 5 TABLET, FILM COATED ORAL at 08:26

## 2019-06-18 RX ADMIN — DEXAMETHASONE SODIUM PHOSPHATE 8 MG: 4 INJECTION, SOLUTION INTRA-ARTICULAR; INTRALESIONAL; INTRAMUSCULAR; INTRAVENOUS; SOFT TISSUE at 13:35

## 2019-06-18 RX ADMIN — FENTANYL CITRATE 100 MCG: 50 INJECTION, SOLUTION INTRAMUSCULAR; INTRAVENOUS at 13:27

## 2019-06-18 RX ADMIN — PROPOFOL 100 MG: 10 INJECTION, EMULSION INTRAVENOUS at 13:27

## 2019-06-18 RX ADMIN — LIDOCAINE HYDROCHLORIDE 60 MG: 20 INJECTION, SOLUTION INTRAVENOUS at 13:27

## 2019-06-18 RX ADMIN — PIPERACILLIN SODIUM AND TAZOBACTAM SODIUM 3.38 G: 3; .375 INJECTION, POWDER, FOR SOLUTION INTRAVENOUS at 06:34

## 2019-06-18 RX ADMIN — FUROSEMIDE 20 MG: 10 INJECTION, SOLUTION INTRAMUSCULAR; INTRAVENOUS at 06:06

## 2019-06-18 RX ADMIN — LINEZOLID 600 MG: 600 INJECTION, SOLUTION INTRAVENOUS at 17:15

## 2019-06-18 RX ADMIN — SODIUM CHLORIDE, POTASSIUM CHLORIDE, SODIUM LACTATE AND CALCIUM CHLORIDE: 600; 310; 30; 20 INJECTION, SOLUTION INTRAVENOUS at 13:19

## 2019-06-18 RX ADMIN — INSULIN GLARGINE 44 UNITS: 100 INJECTION, SOLUTION SUBCUTANEOUS at 17:13

## 2019-06-18 RX ADMIN — PREGABALIN 300 MG: 150 CAPSULE ORAL at 09:45

## 2019-06-18 RX ADMIN — INSULIN HUMAN 10 UNITS: 100 INJECTION, SOLUTION PARENTERAL at 17:12

## 2019-06-18 RX ADMIN — METOPROLOL TARTRATE 12.5 MG: 25 TABLET, FILM COATED ORAL at 06:34

## 2019-06-18 RX ADMIN — PREGABALIN 300 MG: 150 CAPSULE ORAL at 17:05

## 2019-06-18 RX ADMIN — SENNOSIDES,DOCUSATE SODIUM 2 TABLET: 8.6; 5 TABLET, FILM COATED ORAL at 17:05

## 2019-06-18 RX ADMIN — METOPROLOL TARTRATE 12.5 MG: 25 TABLET, FILM COATED ORAL at 17:05

## 2019-06-18 RX ADMIN — POTASSIUM CHLORIDE 60 MEQ: 1500 TABLET, EXTENDED RELEASE ORAL at 08:25

## 2019-06-18 RX ADMIN — KETOROLAC TROMETHAMINE 30 MG: 30 INJECTION, SOLUTION INTRAMUSCULAR at 13:35

## 2019-06-18 RX ADMIN — PIPERACILLIN SODIUM AND TAZOBACTAM SODIUM 3.38 G: 3; .375 INJECTION, POWDER, FOR SOLUTION INTRAVENOUS at 22:24

## 2019-06-18 RX ADMIN — METHADONE HYDROCHLORIDE 35 MG: 10 TABLET ORAL at 17:06

## 2019-06-18 RX ADMIN — LINEZOLID 600 MG: 600 INJECTION, SOLUTION INTRAVENOUS at 09:45

## 2019-06-18 ASSESSMENT — ENCOUNTER SYMPTOMS
DOUBLE VISION: 0
COUGH: 0
BRUISES/BLEEDS EASILY: 0
FEVER: 0
DIARRHEA: 0
VOMITING: 0
SHORTNESS OF BREATH: 0
FLANK PAIN: 0
PALPITATIONS: 0
SPUTUM PRODUCTION: 0
DEPRESSION: 0
CHILLS: 0
NAUSEA: 0
BLURRED VISION: 0
SORE THROAT: 0
ABDOMINAL PAIN: 0
HEADACHES: 0
LOSS OF CONSCIOUSNESS: 0
BACK PAIN: 0
DIZZINESS: 0

## 2019-06-18 ASSESSMENT — PAIN SCALES - GENERAL: PAIN_LEVEL: 1

## 2019-06-18 NOTE — ANESTHESIA POSTPROCEDURE EVALUATION
Patient: Travon Pollack    Procedure Summary     Date:  06/18/19 Room / Location:  [place] [address] / SURGERY [facility]    Anesthesia Start:  1319 Anesthesia Stop:  1355    Procedure:  IRRIGATION AND DEBRIDEMENT, WOUND - LEFT HEEL Diagnosis:      Surgeon:  Simon Diallo M.D. Responsible Provider:  Vincent Muñoz M.D.    Anesthesia Type:  general ASA Status:  3 - Emergent          Final Anesthesia Type: general  Last vitals  BP   NIBP: 158/88    Temp   35.9 °C (96.6 °F)    Pulse   Pulse: 82, Heart Rate (Monitored): 77   Resp   19    SpO2   92 %      Anesthesia Post Evaluation    Patient location during evaluation: PACU  Patient participation: complete - patient participated  Level of consciousness: awake and alert  Pain score: 1    Airway patency: patent  Anesthetic complications: no  Cardiovascular status: hemodynamically stable  Respiratory status: acceptable  Hydration status: euvolemic    PONV: none           Nurse Pain Score: 5 (NPRS)

## 2019-06-18 NOTE — ANESTHESIA PROCEDURE NOTES
Airway  Performed by: ABENA MACIAS  Authorized by: ABENA MACIAS     Location:  OR  Urgency:  Elective  Indications for Airway Management:  Anesthesia  Spontaneous Ventilation: absent    Sedation Level:  Deep  Preoxygenated: Yes    Final Airway Type:  Supraglottic airway  Final Supraglottic Airway:  Standard LMA  SGA Size:  4  Number of Attempts at Approach:  1

## 2019-06-18 NOTE — PROGRESS NOTES
Critical Care Progress Note    Date of admission  6/15/2019    Chief Complaint  63 y.o. male admitted 6/15/2019 with Respiratory failure    Hospital Course    63 y.o. male who presented 6/15/2019 with multiple complaints. Admitted to medical floor.  He had fall 1 week ago and injured shoulder of which had recent surgery.  No loc but did hit his head.  Left heel ulcer with purulent fluid.  He has a past medical history of dmii on insulin, chronic respiratory failure with smoking and on 3 L o2 at home.  Fever and chills on admission. Admitted to medical floor. REspiratory failure with hypercapnia and hypoxia and lethargic on medical floor.  Mildly transferred to ICU and uanble to pull off o2 mask or follow commands, not a adequate bipap candidate. Intubated, significant secretions on bronchosocpy.  Hypotension requiring levophed.  Has shoulder pain on movement.  MOves all extremities.  Opens eyes with physical stimuli.  Post intubation family arrived, discussed clinical situation with them.  At baseline he is able to drive a car, carry out regular daily activities. Possible copd diagnosis in the past.         Interval Problem Update  Reviewed last 24 hour events:   - Extubated without complications   - Seen by Ortho for left foot osteomyelitis   - AAOx4   - AF   - NSR   - NPO for possible surgery today   - good UOP, dominguez in place   - resumed on methadone   - low K   - switched from vanco to zyvox    Review of Systems  Review of Systems   Constitutional: Negative for fever.   HENT: Negative for congestion.    Eyes: Negative for blurred vision.   Respiratory: Negative for sputum production and shortness of breath.    Cardiovascular: Negative for chest pain.   Gastrointestinal: Negative for nausea.   Genitourinary: Negative for flank pain.   Musculoskeletal: Negative for back pain.   Skin: Negative for rash.   Neurological: Negative for dizziness.   Endo/Heme/Allergies: Does not bruise/bleed easily.    Psychiatric/Behavioral: Negative for depression.   All other systems reviewed and are negative.       Vital Signs for last 24 hours   Temp:  [36.6 °C (97.9 °F)-37.3 °C (99.1 °F)] 36.6 °C (97.9 °F)  Pulse:  [] 76  Resp:  [9-28] 15  SpO2:  [89 %-98 %] 91 %   SBP: 130s to 150s    Respiratory Information for the last 24 hours  Miller Vent Mode: APVCMV  Rate (breaths/min): 22  Vt Target (mL): 470  PEEP/CPAP: 8  FiO2: 30  P MEAN: 11  Control VTE (exp VT): 727 --> extubated to 3 L nasal cannula on 6/17    Physical Exam   Physical Exam   Constitutional: He is oriented to person, place, and time. He appears well-developed and well-nourished. He is cooperative.  Non-toxic appearance. He does not appear ill.   HENT:   Head: Normocephalic and atraumatic.   Right Ear: External ear normal.   Left Ear: External ear normal.   Eyes: Pupils are equal, round, and reactive to light. Conjunctivae are normal. Right eye exhibits no discharge. Left eye exhibits no discharge.   Neck: Neck supple. No tracheal deviation present. No thyromegaly present.   Right IJ central venous catheter without surrounding hematoma   Cardiovascular: Normal rate, regular rhythm, normal heart sounds and intact distal pulses.  PMI is not displaced.  Exam reveals no decreased pulses.    No murmur heard.  Pulmonary/Chest: Effort normal. No tachypnea. No respiratory distress. He has no decreased breath sounds. He has wheezes in the right lower field. He has rhonchi in the left lower field.   Able to take in large tidal volumes and hold breath during spontaneous breathing trial, minimal secretions   Abdominal: Soft. Bowel sounds are normal. There is no tenderness. There is no rebound.   Musculoskeletal: He exhibits no edema or deformity.   Left foot with purulent drainage, tunneling wound, mild surrounding erythema   Lymphadenopathy:     He has no cervical adenopathy.   Neurological: He is alert and oriented to person, place, and time. No cranial nerve  deficit. He exhibits normal muscle tone.   Skin: Skin is warm and dry. He is not diaphoretic. No erythema.   Psychiatric: He has a normal mood and affect. His behavior is normal.   Nursing note and vitals reviewed.      Medications  Current Facility-Administered Medications   Medication Dose Route Frequency Provider Last Rate Last Dose   • Linezolid (ZYVOX) premix 600 mg  600 mg Intravenous Q12HRS SHREYA Madden.OLatha       • heparin injection 5,000 Units  5,000 Units Subcutaneous Q8HRS SHREYA Madden.OLatha   Stopped at 06/18/19 0645   • lactated ringers infusion   Intravenous Continuous SHREYA Madden.SHELLEY       • potassium chloride SA (Kdur) tablet 60 mEq  60 mEq Oral Once Camilo Rockwell D.O.       • furosemide (LASIX) injection 20 mg  20 mg Intravenous Q12HRS Jeremy M Gonda, M.D.   20 mg at 06/18/19 0606   • methylPREDNISolone (SOLU-MEDROL) 40 MG injection 40 mg  40 mg Intravenous Q12HRS Jeremy M Gonda, M.D.   40 mg at 06/18/19 0553   • oxyCODONE immediate-release (ROXICODONE) tablet 5-10 mg  5-10 mg Oral Q4HRS PRN Jeremy M Gonda, M.D.       • ipratropium-albuterol (DUONEB) nebulizer solution  3 mL Nebulization Q4H PRN (RT) Onesimo Olson M.D.       • pregabalin (LYRICA) capsule 300 mg  300 mg Oral BID Jeremy M Gonda, M.D.   Stopped at 06/18/19 0530   • methadone (DOLOPHINE) tablet 35 mg  35 mg Oral BID Jeremy M Gonda, M.D.   35 mg at 06/18/19 0634   • metoprolol (LOPRESSOR) tablet 12.5 mg  12.5 mg Oral TWICE DAILY Jeremy M Gonda, M.D.   12.5 mg at 06/18/19 0634   • hydrALAZINE (APRESOLINE) injection 10-20 mg  10-20 mg Intravenous Q4HRS PRN Jeremy M Gonda, M.D.   10 mg at 06/17/19 1945   • labetalol (NORMODYNE,TRANDATE) injection 10-20 mg  10-20 mg Intravenous Q4HRS PRN Jeremy M Gonda, M.D.       • senna-docusate (PERICOLACE or SENOKOT S) 8.6-50 MG per tablet 2 Tab  2 Tab Oral BID Zeus Reagan   Stopped at 06/18/19 0530   • MD Alert...ICU Electrolyte Replacement per Pharmacy    Other PHARMACY TO DOSE Zeus K Merary       • Pharmacy Consult: Enteral tube insertion - review meds/change route/product selection   Other PHARMACY TO DOSE Zeus K Merary       • Pharmacy Consult Request ...Pain Management Review 1 Each  1 Each Other PHARMACY TO DOSE Zeus K Merary       • polyethylene glycol/lytes (MIRALAX) PACKET 1 Packet  1 Packet Enteral Tube QDAY PRN Zeus K Merary        And   • magnesium hydroxide (MILK OF MAGNESIA) suspension 30 mL  30 mL Enteral Tube QDAY PRN Zeus K Merary        And   • bisacodyl (DULCOLAX) suppository 10 mg  10 mg Rectal QDAY PRN Zeus K Merary       • acetaminophen (TYLENOL) tablet 650 mg  650 mg Enteral Tube Q6HRS PRN Zeus K Merary       • insulin glargine (LANTUS) injection 20 Units  20 Units Subcutaneous BID Zeus K Merary   Stopped at 06/18/19 0600   • Pharmacy Consult: Enteral tube insertion - review meds/change route/product selection  1 Each Other PHARMACY TO DOSE Zeus K Merary       • Respiratory Care per Protocol   Nebulization Continuous RT Onesimo Olson M.D.       • piperacillin-tazobactam (ZOSYN) 3.375 g in  mL IVPB  3.375 g Intravenous Q8HRS Onesimo Olson M.D. 25 mL/hr at 06/18/19 0634 3.375 g at 06/18/19 0634    And   • MD Alert...Vancomycin per Pharmacy  1 Each Other PHARMACY TO DOSE Onesimo Olson M.D.       • ondansetron (ZOFRAN) syringe/vial injection 4 mg  4 mg Intravenous Q4HRS PRN Onesimo Olson M.D.       • ondansetron (ZOFRAN ODT) dispertab 4 mg  4 mg Oral Q4HRS PRN Onesimo Olson M.D.       • insulin regular (HUMULIN R) injection 3-14 Units  3-14 Units Subcutaneous Q6HRS Onesimo Olson M.D.   Stopped at 06/18/19 0603    And   • DEXTROSE 10% BOLUS 250 mL  250 mL Intravenous Q15 MIN PRN Onesimo Olson M.D.           Fluids    Intake/Output Summary (Last 24 hours) at 06/18/19 0812  Last data filed at 06/18/19 0600   Gross per 24 hour   Intake              550 ml   Output             3045 ml   Net            -2495 ml       Laboratory  Recent Labs      06/16/19    1153  06/16/19   1630  06/17/19   0414   UBFRR61N  7.24*   --    --    PMGVTK467H  62.8*   --    --    LYUBY215I  47.7*   --    --    OQIM2PJJ  78.6*   --    --    ARTHCO3  26*   --    --    FIO2  97*   --    --    ARTBE  -2   --    --    ISTATAPH   --   7.285*  7.395*   ISTATAPCO2   --   54.6*  32.1   ISTATAPO2   --   158*  80   ISTATATCO2   --   28  21   AHNFMGD3BIA   --   99  96   ISTATARTHCO3   --   25.9*  19.7   ISTATARTBE   --   -1  -4   ISTATTEMP   --   98.3 F  37.1 C   ISTATFIO2   --   100  50   ISTATSPEC   --   Arterial  Arterial   ISTATAPHTC   --   7.287*  7.394*   [id number]   --   157*  81     Recent Labs      06/17/19 0005 06/17/19 0415 06/17/19   0840   TROPONINI  1.09*  0.88*  0.62*     Recent Labs      06/17/19 0005 06/17/19 0415 06/18/19   0600   SODIUM  138  140  142   POTASSIUM  4.0  4.2  3.4*   CHLORIDE  106  106  106   CO2  23  22  27   BUN  39*  39*  33*   CREATININE  1.34  1.45*  1.09   MAGNESIUM   --   2.0  2.0   PHOSPHORUS   --   4.0  2.5   CALCIUM  7.4*  7.4*  7.7*     Recent Labs      06/15/19   2127   06/17/19   0005  06/17/19   0415  06/18/19   0600   ALTSGPT  22   --   42   --    --    ASTSGOT  24   --   55*   --    --    ALKPHOSPHAT  95   --   158*   --    --    TBILIRUBIN  0.5   --   0.4   --    --    PREALBUMIN   --    --   <3.0*   --   4.0*   GLUCOSE  351*   < >  164*  246*  104*    < > = values in this interval not displayed.     Recent Labs      06/15/19   2127  06/16/19   0306  06/17/19   0005  06/17/19 0415 06/18/19   0600   WBC  26.1*  23.0*   --   21.8*  25.1*   NEUTSPOLYS  83.60*  82.90*   --   92.00*   --    LYMPHOCYTES  7.70*  7.60*   --   4.40*   --    MONOCYTES  7.10  7.20   --   1.90   --    EOSINOPHILS  0.30  0.40   --   0.00   --    BASOPHILS  0.30  0.20   --   0.30   --    ASTSGOT  24   --   55*   --    --    ALTSGPT  22   --   42   --    --    ALKPHOSPHAT  95   --   158*   --    --    TBILIRUBIN  0.5   --   0.4   --    --      Recent Labs       06/16/19   0306  06/17/19   0415  06/18/19   0600   RBC  4.07*  4.15*  4.51*   HEMOGLOBIN  11.7*  11.5*  12.5*   HEMATOCRIT  35.3*  37.0*  39.6*   PLATELETCT  204  245  298   PROTHROMBTM   --   16.0*   --    INR   --   1.25*   --        Imaging  X-Ray:  I have personally reviewed the images and compared with prior images. and My impression is: Mild improvement in diffuse bilateral infiltrates, large cardiac silhouette, edema pattern, cervical hardware noted, right IJ central venous catheter  Foot x-ray noted    Assessment/Plan  Acute on chronic respiratory failure with hypoxia (HCC)- (present on admission)   Assessment & Plan    Intubated 6/16-6/17  Encourage incentive spirometry/out of bed to chair  Limit sedatives  RT/O2 protocols  Diuresis     Septic shock due to undetermined organism (HCC)- (present on admission)   Assessment & Plan    This is severe sepsis with the following associated acute organ dysfunction(s): acute respiratory failure, metabolic/septic encephalopathy. -Improving  Source = pulmonary, soft tissue    Status post sepsis protocol  Continue antibiotics, follow-up on cultures  Source control in the OR for left foot today  Adequately fluid resuscitated, currently hypervolemic     OZ (acute kidney injury) (HCC)   Assessment & Plan    Improving, secondary to sepsis  Avoid nephrotoxins  Monitor urine output, creatinine closely     Aspiration into airway   Assessment & Plan    Aspiration precautions  Aggressive pulmonary toiletry     Pneumonia due to infectious organism   Assessment & Plan    Aspiration vs community  Continue antibiotics x5 days     SALOME (obstructive sleep apnea)   Assessment & Plan    Nightly CPAP     Cardiomyopathy (HCC)- (present on admission)   Assessment & Plan    Echocardiography pending  Continue Lasix, beta-blocker     Elevated troponin- (present on admission)   Assessment & Plan    Likely demand ischemia -improved  Echo pending     Diabetic foot ulcer (HCC)- (present on  admission)   Assessment & Plan    With surrounding cellulitis, osteomyelitis  Continue antibiotics  Wound care  Orthopedics consultation  Plans for surgical debridement today     DM (diabetes mellitus), type 2, uncontrolled (CMS-HCC)- (present on admission)   Assessment & Plan    Continue Lantus twice daily with sliding scale insulin  Diabetic tube feeds -currently n.p.o. for surgery     HTN (hypertension)- (present on admission)   Assessment & Plan    With elevated blood pressure today  Resume metoprolol  PRN labetalol and hydralazine for goal SBP less than 160     Mucus plugging of bronchi   Assessment & Plan    Continue mucolytic  Pulmonary toiletry  Encourage incentive spirometry     Left shoulder pain- (present on admission)   Assessment & Plan    Acute on chronic  Analgesia with PRN oxycodone  Therapies     Tobacco dependence- (present on admission)   Assessment & Plan    Nicotine replacement patch  Tobacco cessation education provided     Hypokalemia- (present on admission)   Assessment & Plan    Repletion and monitor closely     Low back pain- (present on admission)   Assessment & Plan    Chronic  Continue methadone     Full code    VTE:  Lovenox  Ulcer: Not Indicated  Lines: Central Line  To be removed today and Lutz Catheter  To be removed today    I have performed a physical exam and reviewed and updated ROS and Plan today (6/18/2019). In review of yesterday's note (6/17/2019), there are no changes except as documented above.     Discussed patient condition and risk of morbidity and/or mortality with Hospitalist, Family, RN, RT, Pharmacy, , Charge nurse / hot rounds, Patient and orthopedics

## 2019-06-18 NOTE — PROGRESS NOTES
Bedside swallow eval completed and pt passed with no issues. Okay to restart previous diet per Dr. Alvarado.

## 2019-06-18 NOTE — ANESTHESIA QCDR
2019 [facility] Clinical Data Registry (for Quality Improvement)     Postoperative nausea/vomiting risk protocol (Adult = 18 yrs and Pediatric 3-17 yrs)- (430 and 463)  General inhalation anesthetic (NOT TIVA) with PONV risk factors: Yes  Provision of anti-emetic therapy with at least 2 different classes of agents: Yes   Patient DID NOT receive anti-emetic therapy and reason is documented in Medical Record:  N/A    Multimodal Pain Management- (AQI59)  Patient undergoing Elective Surgery (i.e. Outpatient, or ASC, or Prescheduled Surgery prior to Hospital Admission): Yes  Use of Multimodal Pain Management, two or more drugs and/or interventions, NOT including systemic opioids: Yes   Exception: Documented allergy to multiple classes of analgesics:  N/A    PACU assessment of acute postoperative pain prior to Anesthesia Care End- Applies to Patients Age = 18- (ABG7)  Initial PACU pain score is which of the following: < 7/10  Patient unable to report pain score: N/A    Post-anesthetic transfer of care checklist/protocol to PACU/ICU- (426 and 427)  Upon conclusion of case, patient transferred to which of the following locations: PACU/Non-ICU  Use of transfer checklist/protocol: Yes  Exclusion: Service Performed in Patient Hospital Room (and thus did not require transfer): N/A    PACU Reintubation- (AQI31)  General anesthesia requiring endotracheal intubation (ETT) along with subsequent extubation in OR or PACU: Yes  Required reintubation in the PACU: No   Extubation was a planned trial documented in the medical record prior to removal of the original airway device:  N/A    Unplanned admission to ICU related to anesthesia service up through end of PACU care- (MD51)  Unplanned admission to ICU (not initially anticipated at anesthesia start time): No

## 2019-06-18 NOTE — CONSULTS
"DATE OF SERVICE:  06/18/2019    CHIEF COMPLAINT:  \"My left heel hurts.  I was having trouble breathing.\"    HISTORY OF PRESENT ILLNESS:  The patient is a 63-year-old male who reports a   left heel wound for the last 3 weeks, previously seen a doctor in [place]   who was helping him doing packing changes.  He states that at some point, he   started feeling systemically ill and presented to the hospital.  He was also   found to have pneumonia, at which point during the hospital stay, he was   intubated for respiratory distress as well as a getting tube feeds.  He was in   septic shock.  Orthopedics was consulted for a left medial heel wound.  The   patient still has a high white count and is sick.  The patient states that he   has diabetes, which is under worker's compensation secondary to previous   infections as well as having his pancreas removed; however, he is not able to   give any other complete history.    Past Medical History:   Diagnosis Date   • Dental disorder     upper and lower   • Diabetes 2011    insulin   • Heart burn    • Indigestion    • Infectious disease staph   • MRSA (methicillin resistant Staphylococcus aureus)    • Opiate withdrawal (HCC)    • Pain 2/1/13    6/10 back   • Renal disorder     pt born with only one kidney   • Snoring          Past Surgical History:   Procedure Laterality Date   • LUMBAR FUSION POSTERIOR  3/4/2013    Performed by Nixon Zhao M.D. at SURGERY [facility]   • LUMBAR LAMINECTOMY DISKECTOMY  3/4/2013    Performed by Nixon Zhao M.D. at SURGERY [facility]   • LUMBAR FUSION POSTERIOR  2/11/2013    Performed by Nixon Zhao M.D. at SURGERY [facility]   • HERNIA REPAIR  2012    x7 last one in 2012   • OTHER  2007    back   • OTHER      neck       No Known Allergies      FAMILY HISTORY:  Please refer to the chart.    REVIEW OF SYSTEMS:  As per above.    PHYSICAL EXAMINATION:  GENERAL:  This is an age-appropriate male in mild distress.  Otherwise, " he is   able to speak and converse appropriately.  RESPIRATORY:  Slightly labored.  CARDIOVASCULAR:  Distal pulses are present, 2+.  MUSCULOSKELETAL:  The right lower extremity reveals some chronic changes of   the foot and ankle from a slight pressure wound.  On the left side, he does   have a left medial heel wound.  I am not able to express any purulence.  He   does have some discoloration around the area as well as some erythema.  He is   diffusely tender about that area as well.    DIAGNOSTIC STUDIES:  X-rays did not show any evidence of osteomyelitis or air   present.    ASSESSMENT:  A 63-year-old male with a left heel wound with no pneumonia, but   sepsis, which is not quite sure from the pneumonia or his foot.    PLAN:  The patient will benefit from surgical debridement.  We discussed with   the orthopedic trauma team today and I believe Dr. Diallo is trying to take   him back for a surgical intervention.  The patient was advised he is at risk   of possibly losing his leg given the infection, location, and likelihood of   healing.  The patient expressed understanding.  Other care, continue per the medical team.  Recommend continuing   antibiotics.       ____________________________________     MD JHONY Silva / SUNDAR    DD:  06/18/2019 11:29:34  DT:  06/18/2019 14:51:10    D#:  [id number]  Job#:  [id number]

## 2019-06-18 NOTE — CARE PLAN
Problem: Knowledge Deficit  Goal: Knowledge of disease process/condition, treatment plan, diagnostic tests, and medications will improve  Outcome: PROGRESSING AS EXPECTED  AOx4, anxious. POC discussed w/ pt and updates given to wife. Understands treatment plan. Possible surgery on left foot in AM, NPO at midnight. Educated on meds & procedures/tests. Questions answered.      Problem: Pain Management  Goal: Pain level will decrease to patient's comfort goal  Outcome: PROGRESSING AS EXPECTED  C/o left shoulder pain. Verbalizes pain using 0-10 scale. Orders in place for scheduled methadone BID. Refuses PRN oxycodone. Non-pharmacologic options discussed and offered.

## 2019-06-18 NOTE — PROGRESS NOTES
Dr. Gonda notified of pt's high blood pressure in the 160's.  Orders to be placed into [facility] by Dr. Gonda.

## 2019-06-18 NOTE — ASSESSMENT & PLAN NOTE
Most recently left ventricular ejection fraction is 40%.  Most likely combination of diabetes and hypertension.  Continue at this point with diabetic management.

## 2019-06-18 NOTE — PROGRESS NOTES
[facility] Medicine Daily Progress Note    Date of Service  6/18/2019    Chief Complaint  63 y.o. male admitted 6/15/2019 with L shoulder pain and L heel ulcer    Hospital Course    Hx TIIDM, COPD on 3 L HOT.  Presenting to [facility] with multiple complaints including shoulder pain following a fall and a chronic heel ulcer.  Admitted to CICU with Dx of sepsis from diabetic foot ulcer, Type II NSTEMI with Trop of 1.09.  Intubated on admission, extubated 6/17    Interval Problem Update  Pt c/o shoulder pain which is chronic for him.  Otherwise is hungry but has no other complaints    Consultants/Specialty  LPS  Pulmonology    Code Status  full    Disposition  OK to floor  Awaiting OR for L heel wound    Review of Systems  Review of Systems   Constitutional: Negative for chills and fever.   HENT: Negative for nosebleeds and sore throat.    Eyes: Negative for blurred vision and double vision.   Respiratory: Negative for cough and shortness of breath.    Cardiovascular: Negative for chest pain, palpitations and leg swelling.   Gastrointestinal: Negative for abdominal pain, diarrhea, nausea and vomiting.   Genitourinary: Negative for dysuria and urgency.   Musculoskeletal: Negative for back pain.        L shoulder pain   Skin: Negative for rash.   Neurological: Negative for dizziness, loss of consciousness and headaches.        Physical Exam  Temp:  [36.7 °C (98 °F)-37.3 °C (99.1 °F)] 36.7 °C (98 °F)  Pulse:  [] 75  Resp:  [0-28] 16  SpO2:  [89 %-100 %] 90 %    Physical Exam   Constitutional: He is oriented to person, place, and time. He appears well-developed and well-nourished. No distress.   HENT:   Head: Normocephalic and atraumatic.   Nose: Nose normal.   Mouth/Throat: Oropharynx is clear and moist.   Eyes: Conjunctivae are normal.   Neck: No JVD present.   Cardiovascular: Normal rate.  Exam reveals no gallop.    No murmur heard.  Pulmonary/Chest: Effort normal. No stridor. No respiratory distress. He has no  wheezes. He has no rales.   Abdominal: Soft. There is no tenderness. There is no rebound and no guarding.   Musculoskeletal: He exhibits no edema.   Deep wound with foul smell to L heel   Neurological: He is alert and oriented to person, place, and time.   Skin: Skin is warm and dry. No rash noted. He is not diaphoretic.   Psychiatric: He has a normal mood and affect. Thought content normal.   Nursing note and vitals reviewed.      Fluids    Intake/Output Summary (Last 24 hours) at 06/18/19 0509  Last data filed at 06/18/19 0400   Gross per 24 hour   Intake            746.2 ml   Output             3190 ml   Net          -2443.8 ml       Laboratory  Recent Labs      06/15/19   2127  06/16/19   0306  06/17/19   0415   WBC  26.1*  23.0*  21.8*   RBC  4.43*  4.07*  4.15*   HEMOGLOBIN  12.2*  11.7*  11.5*   HEMATOCRIT  39.0*  35.3*  37.0*   MCV  88.0  86.7  89.2   MCH  27.5  28.7  27.7   MCHC  31.3*  33.1*  31.1*   RDW  50.2*  49.0  52.0*   PLATELETCT  224  204  245   MPV  10.5  10.2  10.4     Recent Labs      06/16/19   2050  06/17/19   0005  06/17/19   0415   SODIUM  137  138  140   POTASSIUM  4.4  4.0  4.2   CHLORIDE  106  106  106   CO2  24  23  22   GLUCOSE  137*  164*  246*   BUN  38*  39*  39*   CREATININE  1.36  1.34  1.45*   CALCIUM  7.4*  7.4*  7.4*     Recent Labs      06/17/19   0415   INR  1.25*               Imaging  EC-ECHOCARDIOGRAM COMPLETE W/O CONT         DX-CHEST-PORTABLE (1 VIEW)   Final Result         1.  Pulmonary edema and/or infiltrates are identified, which are somewhat decreased since the prior exam.      DX-ANKLE 3+ VIEWS LEFT   Final Result      Soft tissue swelling. No acute fracture identified.      DX-CHEST-PORTABLE (1 VIEW)   Final Result      1.  Improved aeration of the RIGHT lung base   2.  Otherwise no interval change in BILATERAL atelectasis or airspace disease      DX-ABDOMEN FOR TUBE PLACEMENT   Final Result      Enteric tube projects over the stomach.      DX-CHEST-LIMITED (1  VIEW)   Final Result      New right IJ line tip overlies the SVC and no pneumothorax is identified      New right basilar opacity effaces the diaphragm and could be from atelectasis or consolidation      DX-CHEST-PORTABLE (1 VIEW)   Final Result      Endotracheal tube projects at the level of the aortic arch.      Increased perihilar and bibasilar opacities may represent a combination of edema, atelectasis, pneumonia.         DX-CHEST-PORTABLE (1 VIEW)   Final Result      Increasing reticular and hazy opacity is compatible with edema      CT-SHOULDER W/O PLUS RECONS LEFT   Final Result         Prior resection of left humeral head. The residual head neck junction appears well corticated and could relate to chronic resection/deformity. There is also deformity and remodeling of the glenoid with well-corticated margin, suggesting of chronic change    as well.      No acute fracture.      There is no articulation between the proximal humerus and glenoid. The glenohumeral joint is replaced by loculated soft tissue or dense fluid, likely chronic change. The fluid is slightly less in 2018 CT.      Infection is considered less likely given the osseous cortex adjacent to the fluid is well corticated and demonstrates no rarefaction or destruction.      US-FOREIGN FILM ULTRASOUND   Final Result      OUTSIDE IMAGES-CT HEAD   Final Result      SB-[id number]-FJBXIDY FILM X-RAY   Final Result      OUTSIDE IMAGES-DX LOWER EXTREMITY, LEFT   Final Result      IZ-DKNCLBZ-NSEURDQ FILM X-RAY   Final Result      OUTSIDE IMAGES-DX CHEST   Final Result      OUTSIDE IMAGES-DX UPPER EXTREMITY, LEFT   Final Result      US-EXTREMITY ARTERY LOWER BILAT W/BORIS (COMBO)    (Results Pending)        Assessment/Plan  Acute on chronic respiratory failure with hypoxia (HCC)- (present on admission)   Assessment & Plan    Extubated 6/17 and doing well  Taper steroids  Pulmonology following  mobilize     Septic shock due to undetermined organism (HCC)-  (present on admission)   Assessment & Plan    This is sepsis (without associated acute organ dysfunction).   Likely source is infected diabetic ulcer  Has improved  Zosyn  Vancomycin change to linezolid due to OZ  Follow cultures; neg to date       Pneumonia due to infectious organism   Assessment & Plan    Linezolid/zosyn for diabetic foot wound     Cardiomyopathy (HCC)- (present on admission)   Assessment & Plan    Echo pending  On metoprolol with parameters  Consider addition of ACEI/ARB but holding due to renal function for now     Elevated troponin- (present on admission)   Assessment & Plan    Likely type II NSTEMI in the setting of sepsis  Patient denies active chest pain  He will be given a full dose of aspirin  Continues cardiac monitoring with serial EKG and troponin  Check 2D echo  2nd to demand ischemia  Has trended down  No CP or CP equivalent     Diabetic foot ulcer (HCC)- (present on admission)   Assessment & Plan    Infected left heel diabetic foot ulcer  Patient has been started on IV Zosyn and IV vancomycin.  Monitor for vancomycin toxicity and follow trough levels.  Wound care and LPS have been consulted  Follow wound cultures  OR today     DM (diabetes mellitus), type 2, uncontrolled (CMS-HCC)- (present on admission)   Assessment & Plan    Uncontrolled with hyperglycemia  lantus 20 units BID: NPO today.  May need to titrate up tomorrow once back on po  on insulin sliding scale with serial Accu-Checks  A1c 12.2; home regimen not effective  Diabetic education consult placed  Hypoglycemic protocol in place         HTN (hypertension)- (present on admission)   Assessment & Plan    Will monitor for now     OZ (acute kidney injury) ([facility])   Assessment & Plan    Bumped up today  Dc lasix  Dc vanc  Follow dily BMP and UOP  Avoid nephrotoxins  reanl dose medications as appropriate  Start IVFs while npo for OR     Left shoulder pain- (present on admission)   Assessment & Plan    No acute fracture noted on  CT  Pain control with Tylenol, oxycodone and IV morphine.  Monitor respiratory status closely  PT OT  If persistent pain we will consider consulting his orthopedic surgeon      Tobacco dependence- (present on admission)   Assessment & Plan    coumseled today x 6 mins          VTE prophylaxis: none as going to OR; plan enoxaparin post op

## 2019-06-18 NOTE — ANESTHESIA PREPROCEDURE EVALUATION
Relevant Problems   (+) ARF (acute renal failure) ([facility])   (+) CVA (cerebral vascular accident) ([facility])   (+) DM (diabetes mellitus), type 2, uncontrolled ([facility])   (+) HTN (hypertension)   (+) SALOME (obstructive sleep apnea)   (+) Pneumonia due to infectious organism       Physical Exam    Airway   Mallampati: I  TM distance: >3 FB  Neck ROM: full       Cardiovascular - normal exam  Rhythm: regular  Rate: normal  (-) murmur     Dental - normal exam         Pulmonary - normal exam  Breath sounds clear to auscultation     Abdominal    Neurological - normal exam                 Anesthesia Plan    ASA 3 - emergent   ASA physical status 3 criteria: diabetes - poorly controlled, hypertension - poorly controlled and COPD    Plan - general       Airway plan will be ETT        Induction: intravenous    Postoperative Plan: Postoperative administration of opioids is intended.    Pertinent diagnostic labs and testing reviewed    Informed Consent:    Anesthetic plan and risks discussed with patient.    Use of blood products discussed with: patient whom consented to blood products.

## 2019-06-18 NOTE — PROGRESS NOTES
LIMB PRESERVATION SERVICE     64 y/o male with DM and left heel ulcer that has worsened. Seen by wound team yesterday. Wound care initiated.      NPO  I & D left foot with possible VAC with Dr. Diallo this afternoon      Results:   Xray left ankle   Soft tissue swelling. No acute fracture identified.      CRP 9.83, trending down 22.05  a1c 12.2%          PLAN  Keep NPO, sips with meds  Surgery this afternoon  BORIS pending  Diabetes education  On IV abx, recommend Consult ID  eval for offloading devices post op    D/W: Paola RN, Dr. Diallo

## 2019-06-18 NOTE — RESPIRATORY CARE
Extubation    Cuff leak noted yes  Stridor present no     FiO2%: 40 % (06/17/19 0800)  O2 (LPM): 3 (06/17/19 1340)  O2 Daily Delivery Respiratory : Silicone Nasal Cannula (06/17/19 1340)  Patient toleration well  RCP Complete? yes  Events/Summary/Plan: Pt extubated to 3L NC (06/17/19 1337)

## 2019-06-18 NOTE — PROGRESS NOTES
Waiting for orthopedic surgeon to see pt for possible surgery on left heel tonight or tomorrow morning.   Pt expressing he does not want to have the surgery tonight and is asking for water. Pt is currently NPO. Spoke to Dr. Mcgill (ext [phone]) who saw pt this afternoon. Dr. Mcgill does not think surgery will happen tonight and is okay with pt having a diet tonight and then being NPO at midnight.

## 2019-06-18 NOTE — WOUND TEAM
Wound consult received for NPWT management. Discussed POC with LPS REGULO Ni, will begin 3 x weekly NPWT dressing changes on Thursday 06/20/2019.

## 2019-06-18 NOTE — PROGRESS NOTES
Dr. Barrios at bedside to evaluate patient.  Per Dr. Barrios, possible surgery today if there is a time slot available.  Dr. Barrios to follow up this AM.

## 2019-06-18 NOTE — CARE PLAN
Problem: Pain Management  Goal: Pain level will decrease to patient's comfort goal  Outcome: PROGRESSING AS EXPECTED    Intervention: Follow pain managment plan developed in collaboration with patient and Interdisciplinary Team  Q2hr pain assessments using 0-10 scale.  Pt has chronic shoulder pain and has methadone prescribed 35mg BID which is his home dose.  Pain medication administered as necessary and as prescribed per the medication regimen.       Problem: Psychosocial Needs:  Goal: Level of anxiety will decrease  Outcome: PROGRESSING AS EXPECTED    Intervention: Identify and develop with patient strategies to cope with anxiety triggers  Pt anxious about possible surgery today. Plan of care discussed with patient and questions answered.

## 2019-06-18 NOTE — OP REPORT
DATE OF SERVICE:  06/18/2019    PREOPERATIVE DIAGNOSES:  1.  Chronic left heel wound.  2.  Diabetes mellitus.    POSTOPERATIVE DIAGNOSES:  1.  Chronic left heel wound.  2.  Diabetes mellitus.    SURGICAL PROCEDURES:  Excisional debridement of left heel (skin and   subcutaneous tissue) ? 6x3 cm and 2.5x2.5 cm.    SURGEON:  Simon Diallo MD    ASSISTANT:  Sylvester Larios DO    ANESTHESIOLOGIST:  Vincent Muñoz MD    ANESTHETIC:  General.    ESTIMATED BLOOD LOSS:  5 mL    INDICATIONS:  The patient is 63 years old.  He is diabetic.  He has had a   chronic ulcer on his left heel, admitted to the ICU recently.  He has been   seen by the limb preservation service and I have been asked to evaluate him   for surgical treatment.  He had a chronic heel wound, recommended debridement.    Risks include bleeding, persistent or worsening infection, pain, stiffness,   wound healing problems, limb loss, thromboembolic phenomena, anesthetic and   medical complications, etc.    PROCEDURES:  The patient was identified in the preoperative holding area and   left leg was marked.  He was taken to the operating room where general   anesthetic was administered.  He was already on intravenous antibiotics.  No   additional doses were given.  He was placed supine on the operating room   table.  The left lower extremity was prepped and draped in sterile fashion.    Timeout was held to confirm the patient's identity and correct surgical site.    There was some sloughing epidermis on the plantar aspect of the heel, which I   removed.  Underlying this was necrotic area of skin and subcutaneous tissue on   the weightbearing surface of the heel pad.  I excised this with a scalpel.    Tissue was sent for culture. No exposed bone. The wound was irrigated. VAC  sponge was placed in the wound and sealed with Ioban. Suction was set to  125 mm Hg and seal was maintained. Patient was extubated and taken to the   recovery room in stable  condition.    PLAN:    1. VAC therapy  2. Heel protection  3. Antibiotics and follow cultures  4. Limb preservation service ongoing management       ____________________________________     MD DARYL DOWNING / SUNDAR    DD:  06/18/2019 13:53:31  DT:  06/18/2019 15:55:28    D#:  [id number]  Job#:  [id number]

## 2019-06-18 NOTE — PROGRESS NOTES
Ortho Progress Note:  Formal consult to follow  63M with left heel wound. The patient likely needs surgical intervention. We are working on the logistics of that today. Keep NPO. LPS service is continuing to follow    Jose De Jesus Barrios MD  [facility]  Foot and Ankle Fellow  [phone]

## 2019-06-18 NOTE — ANESTHESIA TIME REPORT
Anesthesia Start and Stop Event Times     Date Time Event    6/18/2019 1319 Anesthesia Start     1355 Anesthesia Stop        Responsible Staff  06/18/19    Name Role Begin End    Vincent Muñoz M.D. Anesth [id number]        Preop Diagnosis (Free Text):  Pre-op Diagnosis             Preop Diagnosis (Codes):  Diagnosis Information     Diagnosis Code(s):         Post op Diagnosis  Heel ulcer due to DM (HCC)      Premium Reason  Non-Premium    Comments:

## 2019-06-18 NOTE — ASSESSMENT & PLAN NOTE
-nicotine replacement protocol and cessation education provided for 12 minutes, discussed options of nicotine patch, acupuncture, medical treatment with wellbutrin and chantix. Discussed other options. Code 85631

## 2019-06-18 NOTE — PROGRESS NOTES
Per ned Adams to have meds with sips of water. Notified holding lovenox for possible surgery today.

## 2019-06-19 ENCOUNTER — APPOINTMENT (OUTPATIENT)
Dept: RADIOLOGY | Facility: MEDICAL CENTER | Age: 64
DRG: 853 | End: 2019-06-19
Attending: FAMILY MEDICINE
Payer: COMMERCIAL

## 2019-06-19 LAB
ANION GAP SERPL CALC-SCNC: 7 MMOL/L (ref 0–11.9)
BASOPHILS # BLD AUTO: 0.2 % (ref 0–1.8)
BASOPHILS # BLD: 0.05 K/UL (ref 0–0.12)
BUN SERPL-MCNC: 34 MG/DL (ref 8–22)
CALCIUM SERPL-MCNC: 7.9 MG/DL (ref 8.5–10.5)
CHLORIDE SERPL-SCNC: 104 MMOL/L (ref 96–112)
CO2 SERPL-SCNC: 28 MMOL/L (ref 20–33)
CREAT SERPL-MCNC: 1.07 MG/DL (ref 0.5–1.4)
EOSINOPHIL # BLD AUTO: 0.08 K/UL (ref 0–0.51)
EOSINOPHIL NFR BLD: 0.4 % (ref 0–6.9)
ERYTHROCYTE [DISTWIDTH] IN BLOOD BY AUTOMATED COUNT: 49.6 FL (ref 35.9–50)
GLUCOSE BLD-MCNC: 174 MG/DL (ref 65–99)
GLUCOSE BLD-MCNC: 211 MG/DL (ref 65–99)
GLUCOSE BLD-MCNC: 276 MG/DL (ref 65–99)
GLUCOSE BLD-MCNC: 277 MG/DL (ref 65–99)
GLUCOSE SERPL-MCNC: 199 MG/DL (ref 65–99)
HCT VFR BLD AUTO: 37.7 % (ref 42–52)
HGB BLD-MCNC: 12 G/DL (ref 14–18)
IMM GRANULOCYTES # BLD AUTO: 0.23 K/UL (ref 0–0.11)
IMM GRANULOCYTES NFR BLD AUTO: 1 % (ref 0–0.9)
LYMPHOCYTES # BLD AUTO: 2.44 K/UL (ref 1–4.8)
LYMPHOCYTES NFR BLD: 11 % (ref 22–41)
MAGNESIUM SERPL-MCNC: 1.9 MG/DL (ref 1.5–2.5)
MCH RBC QN AUTO: 28.1 PG (ref 27–33)
MCHC RBC AUTO-ENTMCNC: 31.8 G/DL (ref 33.7–35.3)
MCV RBC AUTO: 88.3 FL (ref 81.4–97.8)
MONOCYTES # BLD AUTO: 1.35 K/UL (ref 0–0.85)
MONOCYTES NFR BLD AUTO: 6.1 % (ref 0–13.4)
NEUTROPHILS # BLD AUTO: 18.04 K/UL (ref 1.82–7.42)
NEUTROPHILS NFR BLD: 81.3 % (ref 44–72)
NRBC # BLD AUTO: 0 K/UL
NRBC BLD-RTO: 0 /100 WBC
PHOSPHATE SERPL-MCNC: 3.1 MG/DL (ref 2.5–4.5)
PLATELET # BLD AUTO: 301 K/UL (ref 164–446)
PMV BLD AUTO: 10.1 FL (ref 9–12.9)
POTASSIUM SERPL-SCNC: 4 MMOL/L (ref 3.6–5.5)
RBC # BLD AUTO: 4.27 M/UL (ref 4.7–6.1)
SODIUM SERPL-SCNC: 139 MMOL/L (ref 135–145)
WBC # BLD AUTO: 22.2 K/UL (ref 4.8–10.8)

## 2019-06-19 PROCEDURE — A9270 NON-COVERED ITEM OR SERVICE: HCPCS | Performed by: INTERNAL MEDICINE

## 2019-06-19 PROCEDURE — 99223 1ST HOSP IP/OBS HIGH 75: CPT | Performed by: INTERNAL MEDICINE

## 2019-06-19 PROCEDURE — 99233 SBSQ HOSP IP/OBS HIGH 50: CPT | Performed by: INTERNAL MEDICINE

## 2019-06-19 PROCEDURE — 99233 SBSQ HOSP IP/OBS HIGH 50: CPT | Performed by: HOSPITALIST

## 2019-06-19 PROCEDURE — 82962 GLUCOSE BLOOD TEST: CPT

## 2019-06-19 PROCEDURE — A9270 NON-COVERED ITEM OR SERVICE: HCPCS | Performed by: HOSPITALIST

## 2019-06-19 PROCEDURE — 700102 HCHG RX REV CODE 250 W/ 637 OVERRIDE(OP): Performed by: HOSPITALIST

## 2019-06-19 PROCEDURE — 97162 PT EVAL MOD COMPLEX 30 MIN: CPT

## 2019-06-19 PROCEDURE — 700105 HCHG RX REV CODE 258: Performed by: INTERNAL MEDICINE

## 2019-06-19 PROCEDURE — 700111 HCHG RX REV CODE 636 W/ 250 OVERRIDE (IP): Performed by: INTERNAL MEDICINE

## 2019-06-19 PROCEDURE — 80048 BASIC METABOLIC PNL TOTAL CA: CPT

## 2019-06-19 PROCEDURE — 97165 OT EVAL LOW COMPLEX 30 MIN: CPT

## 2019-06-19 PROCEDURE — 770020 HCHG ROOM/CARE - TELE (206)

## 2019-06-19 PROCEDURE — 84100 ASSAY OF PHOSPHORUS: CPT

## 2019-06-19 PROCEDURE — 85025 COMPLETE CBC W/AUTO DIFF WBC: CPT

## 2019-06-19 PROCEDURE — 83735 ASSAY OF MAGNESIUM: CPT

## 2019-06-19 PROCEDURE — 700102 HCHG RX REV CODE 250 W/ 637 OVERRIDE(OP): Performed by: INTERNAL MEDICINE

## 2019-06-19 PROCEDURE — 700111 HCHG RX REV CODE 636 W/ 250 OVERRIDE (IP): Performed by: HOSPITALIST

## 2019-06-19 RX ORDER — PREDNISONE 10 MG/1
10 TABLET ORAL DAILY
Status: DISCONTINUED | OUTPATIENT
Start: 2019-06-20 | End: 2019-06-20

## 2019-06-19 RX ORDER — ASPIRIN 81 MG/1
81 TABLET, CHEWABLE ORAL DAILY
Status: DISCONTINUED | OUTPATIENT
Start: 2019-06-19 | End: 2019-07-10 | Stop reason: HOSPADM

## 2019-06-19 RX ORDER — LISINOPRIL 10 MG/1
10 TABLET ORAL
Status: DISCONTINUED | OUTPATIENT
Start: 2019-06-19 | End: 2019-06-26

## 2019-06-19 RX ORDER — LINEZOLID 600 MG/1
600 TABLET, FILM COATED ORAL EVERY 12 HOURS
Status: DISCONTINUED | OUTPATIENT
Start: 2019-06-19 | End: 2019-06-21

## 2019-06-19 RX ADMIN — ASPIRIN 81 MG 81 MG: 81 TABLET ORAL at 13:56

## 2019-06-19 RX ADMIN — METHADONE HYDROCHLORIDE 35 MG: 10 TABLET ORAL at 17:11

## 2019-06-19 RX ADMIN — PREGABALIN 300 MG: 150 CAPSULE ORAL at 17:11

## 2019-06-19 RX ADMIN — LINEZOLID 600 MG: 600 TABLET, FILM COATED ORAL at 17:17

## 2019-06-19 RX ADMIN — INSULIN HUMAN 4 UNITS: 100 INJECTION, SOLUTION PARENTERAL at 23:36

## 2019-06-19 RX ADMIN — METOPROLOL TARTRATE 12.5 MG: 25 TABLET, FILM COATED ORAL at 05:26

## 2019-06-19 RX ADMIN — INSULIN HUMAN 3 UNITS: 100 INJECTION, SOLUTION PARENTERAL at 05:33

## 2019-06-19 RX ADMIN — METOPROLOL TARTRATE 12.5 MG: 25 TABLET, FILM COATED ORAL at 17:10

## 2019-06-19 RX ADMIN — PIPERACILLIN SODIUM AND TAZOBACTAM SODIUM 3.38 G: 3; .375 INJECTION, POWDER, FOR SOLUTION INTRAVENOUS at 13:56

## 2019-06-19 RX ADMIN — OXYCODONE HYDROCHLORIDE 10 MG: 5 TABLET ORAL at 20:42

## 2019-06-19 RX ADMIN — INSULIN HUMAN 7 UNITS: 100 INJECTION, SOLUTION PARENTERAL at 11:55

## 2019-06-19 RX ADMIN — PIPERACILLIN SODIUM AND TAZOBACTAM SODIUM 3.38 G: 3; .375 INJECTION, POWDER, FOR SOLUTION INTRAVENOUS at 05:29

## 2019-06-19 RX ADMIN — INSULIN GLARGINE 44 UNITS: 100 INJECTION, SOLUTION SUBCUTANEOUS at 05:33

## 2019-06-19 RX ADMIN — LINEZOLID 600 MG: 600 INJECTION, SOLUTION INTRAVENOUS at 05:29

## 2019-06-19 RX ADMIN — ENOXAPARIN SODIUM 40 MG: 100 INJECTION SUBCUTANEOUS at 05:25

## 2019-06-19 RX ADMIN — OXYCODONE HYDROCHLORIDE 10 MG: 5 TABLET ORAL at 02:35

## 2019-06-19 RX ADMIN — INSULIN GLARGINE 44 UNITS: 100 INJECTION, SOLUTION SUBCUTANEOUS at 17:21

## 2019-06-19 RX ADMIN — PIPERACILLIN SODIUM AND TAZOBACTAM SODIUM 3.38 G: 3; .375 INJECTION, POWDER, FOR SOLUTION INTRAVENOUS at 22:29

## 2019-06-19 RX ADMIN — LISINOPRIL 10 MG: 10 TABLET ORAL at 13:56

## 2019-06-19 RX ADMIN — PREGABALIN 300 MG: 150 CAPSULE ORAL at 05:25

## 2019-06-19 RX ADMIN — METHADONE HYDROCHLORIDE 35 MG: 10 TABLET ORAL at 05:24

## 2019-06-19 RX ADMIN — INSULIN HUMAN 7 UNITS: 100 INJECTION, SOLUTION PARENTERAL at 17:21

## 2019-06-19 RX ADMIN — PREDNISONE 20 MG: 20 TABLET ORAL at 05:25

## 2019-06-19 ASSESSMENT — ENCOUNTER SYMPTOMS
SORE THROAT: 0
WHEEZING: 0
HEADACHES: 0
DIARRHEA: 0
BLURRED VISION: 0
ABDOMINAL PAIN: 0
COUGH: 1
MYALGIAS: 1
HEARTBURN: 0
COUGH: 0
SHORTNESS OF BREATH: 0
LOSS OF CONSCIOUSNESS: 0
DOUBLE VISION: 0
BLOOD IN STOOL: 0
NERVOUS/ANXIOUS: 0
ORTHOPNEA: 0
PALPITATIONS: 0
VOMITING: 0
FEVER: 0
DIZZINESS: 0
NAUSEA: 0
BACK PAIN: 0
CHILLS: 0

## 2019-06-19 ASSESSMENT — COGNITIVE AND FUNCTIONAL STATUS - GENERAL
MOBILITY SCORE: 20
SUGGESTED CMS G CODE MODIFIER MOBILITY: CK
MOBILITY SCORE: 17
CLIMB 3 TO 5 STEPS WITH RAILING: A LITTLE
MOVING TO AND FROM BED TO CHAIR: A LITTLE
DRESSING REGULAR LOWER BODY CLOTHING: A LITTLE
SUGGESTED CMS G CODE MODIFIER DAILY ACTIVITY: CH
STANDING UP FROM CHAIR USING ARMS: A LITTLE
MOVING FROM LYING ON BACK TO SITTING ON SIDE OF FLAT BED: A LITTLE
DAILY ACTIVITIY SCORE: 24
MOVING FROM LYING ON BACK TO SITTING ON SIDE OF FLAT BED: A LITTLE
DAILY ACTIVITIY SCORE: 19
DRESSING REGULAR UPPER BODY CLOTHING: A LITTLE
TOILETING: A LITTLE
WALKING IN HOSPITAL ROOM: A LOT
PERSONAL GROOMING: A LITTLE
WALKING IN HOSPITAL ROOM: A LITTLE
SUGGESTED CMS G CODE MODIFIER MOBILITY: CJ
STANDING UP FROM CHAIR USING ARMS: A LITTLE
HELP NEEDED FOR BATHING: A LITTLE
CLIMB 3 TO 5 STEPS WITH RAILING: A LOT
SUGGESTED CMS G CODE MODIFIER DAILY ACTIVITY: CK

## 2019-06-19 ASSESSMENT — LIFESTYLE VARIABLES
EVER_SMOKED: YES
ALCOHOL_USE: NO

## 2019-06-19 ASSESSMENT — ACTIVITIES OF DAILY LIVING (ADL): TOILETING: INDEPENDENT

## 2019-06-19 ASSESSMENT — PATIENT HEALTH QUESTIONNAIRE - PHQ9
1. LITTLE INTEREST OR PLEASURE IN DOING THINGS: NOT AT ALL
2. FEELING DOWN, DEPRESSED, IRRITABLE, OR HOPELESS: NOT AT ALL
SUM OF ALL RESPONSES TO PHQ9 QUESTIONS 1 AND 2: 0

## 2019-06-19 ASSESSMENT — GAIT ASSESSMENTS: GAIT LEVEL OF ASSIST: UNABLE TO PARTICIPATE

## 2019-06-19 NOTE — CARE PLAN
Problem: Knowledge Deficit  Goal: Knowledge of disease process/condition, treatment plan, diagnostic tests, and medications will improve  Outcome: PROGRESSING AS EXPECTED  AOx4, anxious. POC discussed w/ pt. Tele orders in place and pending new bed assignment. Understands treatment plan. S/p debridement of left heel with wound vac in place. Educated on meds & procedures/tests. Questions answered.        Problem: Pain Management  Goal: Pain level will decrease to patient's comfort goal  Outcome: PROGRESSING AS EXPECTED  C/o left shoulder pain and left heel pain. Verbalizes pain using 0-10 scale. Orders in place for scheduled methadone BID. PRN oxycodone q4h. Medicate as needed. Non-pharmacologic options discussed and offered.

## 2019-06-19 NOTE — PROGRESS NOTES
[place] RAJPUT of Dr. Diallo to discuss discharge plans with wound vac.  Voicemail left with this RN's contact information.

## 2019-06-19 NOTE — PROGRESS NOTES
Critical Care Progress Note    Date of admission  6/15/2019    Chief Complaint  63 y.o. male admitted 6/15/2019 with Respiratory failure    Hospital Course    63 y.o. male who presented 6/15/2019 with multiple complaints. Admitted to medical floor.  He had fall 1 week ago and injured shoulder of which had recent surgery.  No loc but did hit his head.  Left heel ulcer with purulent fluid.  He has a past medical history of dmii on insulin, chronic respiratory failure with smoking and on 3 L o2 at home.  Fever and chills on admission. Admitted to medical floor. REspiratory failure with hypercapnia and hypoxia and lethargic on medical floor.  Mildly transferred to ICU and uanble to pull off o2 mask or follow commands, not a adequate bipap candidate. Intubated, significant secretions on bronchosocpy.  Hypotension requiring levophed.  Has shoulder pain on movement.  MOves all extremities.  Opens eyes with physical stimuli.  Post intubation family arrived, discussed clinical situation with them.  At baseline he is able to drive a car, carry out regular daily activities. Possible copd diagnosis in the past.         Interval Problem Update  Reviewed last 24 hour events:   - went to OR for debridement of L foot, wound vac in place   - AF, decreasing WBC   - wanting to go home   - NSR   - AAOx4   - echo with EF 40%    Review of Systems  Review of Systems   Constitutional: Negative for malaise/fatigue.   HENT: Negative for sore throat.    Respiratory: Positive for cough. Negative for shortness of breath and wheezing.    Cardiovascular: Positive for leg swelling. Negative for orthopnea.   Gastrointestinal: Negative for blood in stool and heartburn.   Genitourinary: Negative for dysuria.   Musculoskeletal: Negative for joint pain.   Neurological: Negative for headaches.   Psychiatric/Behavioral: The patient is not nervous/anxious.    All other systems reviewed and are negative.       Vital Signs for last 24 hours   Temp:  [35.9  °C (96.6 °F)-36.6 °C (97.9 °F)] 36.4 °C (97.5 °F)  Pulse:  [66-94] 81  Resp:  [14-24] 16  SpO2:  [89 %-95 %] 95 %   SBP: 130-150    Respiratory Information for the last 24 hours    3 lpm n/c    Physical Exam   Physical Exam   Constitutional: He is oriented to person, place, and time. He appears well-developed and well-nourished. He is cooperative.  Non-toxic appearance. No distress.   HENT:   Head: Normocephalic and atraumatic.   Nose: Nose normal.   Mouth/Throat: Oropharynx is clear and moist.   Eyes: Pupils are equal, round, and reactive to light. Conjunctivae and EOM are normal.   Neck: Neck supple. No JVD present. No tracheal deviation present.   Cardiovascular: Normal rate, regular rhythm, normal heart sounds and intact distal pulses.  PMI is not displaced.  Exam reveals no friction rub and no decreased pulses.    Pulmonary/Chest: Effort normal. No accessory muscle usage. No tachypnea. No respiratory distress. He has no decreased breath sounds. He has no wheezes. He has no rhonchi. He exhibits no tenderness.   Speaking in full sentences   Abdominal: Soft. Bowel sounds are normal. He exhibits no distension. There is no tenderness.   Musculoskeletal: He exhibits tenderness. He exhibits no edema.   Left foot bandage clean/dry/intact with wound VAC in place with minimal output, CMS intact distally   Neurological: He is alert and oriented to person, place, and time. No cranial nerve deficit. Coordination normal.   Skin: Skin is warm and dry. No pallor.   Psychiatric: He has a normal mood and affect. His behavior is normal. Judgment and thought content normal.   Nursing note and vitals reviewed.      Medications  Current Facility-Administered Medications   Medication Dose Route Frequency Provider Last Rate Last Dose   • linezolid (ZYVOX) tablet 600 mg  600 mg Oral Q12HRS Camilo Rockwell D.O.       • enoxaparin (LOVENOX) inj 40 mg  40 mg Subcutaneous DAILY Jeremy M Gonda, M.D.   40 mg at 06/19/19 0525   •  acetaminophen (TYLENOL) tablet 650 mg  650 mg Oral Q6HRS PRN Camilo Rockwell D.O.       • magnesium hydroxide (MILK OF MAGNESIA) suspension 30 mL  30 mL Oral QDAY PRN SHRYEA Madden.O.        And   • polyethylene glycol/lytes (MIRALAX) PACKET 1 Packet  1 Packet Oral QDAY PRN SHREYA Madden.O.        And   • bisacodyl (DULCOLAX) suppository 10 mg  10 mg Rectal QDAY PRN Camilo Rockwell D.O.       • predniSONE (DELTASONE) tablet 20 mg  20 mg Oral DAILY Camilo Rockwell D.O.   20 mg at 06/19/19 0525   • insulin glargine (LANTUS) injection 44 Units  44 Units Subcutaneous BID Camilo Rockwell D.O.   44 Units at 06/19/19 0533   • oxyCODONE immediate-release (ROXICODONE) tablet 5-10 mg  5-10 mg Oral Q4HRS PRN Jeremy M Gonda, M.D.   10 mg at 06/19/19 0235   • ipratropium-albuterol (DUONEB) nebulizer solution  3 mL Nebulization Q4H PRN (RT) Onesimo Olson M.D.       • pregabalin (LYRICA) capsule 300 mg  300 mg Oral BID Jeremy M Gonda, M.D.   300 mg at 06/19/19 0525   • methadone (DOLOPHINE) tablet 35 mg  35 mg Oral BID Jeremy M Gonda, M.D.   35 mg at 06/19/19 0524   • metoprolol (LOPRESSOR) tablet 12.5 mg  12.5 mg Oral TWICE DAILY Jeremy M Gonda, M.D.   12.5 mg at 06/19/19 0526   • hydrALAZINE (APRESOLINE) injection 10-20 mg  10-20 mg Intravenous Q4HRS PRN Jeremy M Gonda, M.D.   10 mg at 06/17/19 1945   • labetalol (NORMODYNE,TRANDATE) injection 10-20 mg  10-20 mg Intravenous Q4HRS PRN Jeremy M Gonda, M.D.       • senna-docusate (PERICOLACE or SENOKOT S) 8.6-50 MG per tablet 2 Tab  2 Tab Oral BID Zeus Reagan   2 Tab at 06/18/19 1705   • Pharmacy Consult Request ...Pain Management Review 1 Each  1 Each Other PHARMACY TO DOSE Zeus RAJAT Reagan       • Respiratory Care per Protocol   Nebulization Continuous RT Onesimo Olson M.D.       • piperacillin-tazobactam (ZOSYN) 3.375 g in  mL IVPB  3.375 g Intravenous Q8HRS Onesimo Olson M.D. 25 mL/hr at 06/19/19 0529 3.375 g at 06/19/19  0529   • ondansetron (ZOFRAN) syringe/vial injection 4 mg  4 mg Intravenous Q4HRS PRN Onesimo Olson M.D.       • ondansetron (ZOFRAN ODT) dispertab 4 mg  4 mg Oral Q4HRS PRN Onesimo Olson M.D.       • insulin regular (HUMULIN R) injection 3-14 Units  3-14 Units Subcutaneous Q6HRS Onesimo Olson M.D.   3 Units at 06/19/19 0533    And   • DEXTROSE 10% BOLUS 250 mL  250 mL Intravenous Q15 MIN PRN Onesimo Olson M.D.           Fluids    Intake/Output Summary (Last 24 hours) at 06/19/19 0828  Last data filed at 06/19/19 0600   Gross per 24 hour   Intake             1665 ml   Output             2560 ml   Net             -895 ml       Laboratory  Recent Labs      06/16/19   1153  06/16/19   1630  06/17/19   0414   MGASR49R  7.24*   --    --    IOUDNG950E  62.8*   --    --    WPMWG540C  47.7*   --    --    KTVC1RJX  78.6*   --    --    ARTHCO3  26*   --    --    FIO2  97*   --    --    ARTBE  -2   --    --    ISTATAPH   --   7.285*  7.395*   ISTATAPCO2   --   54.6*  32.1   ISTATAPO2   --   158*  80   ISTATATCO2   --   28  21   AMQATTE5OFP   --   99  96   ISTATARTHCO3   --   25.9*  19.7   ISTATARTBE   --   -1  -4   ISTATTEMP   --   98.3 F  37.1 C   ISTATFIO2   --   100  50   ISTATSPEC   --   Arterial  Arterial   ISTATAPHTC   --   7.287*  7.394*   YLWPQTGT5JR   --   157*  81     Recent Labs      06/17/19   0005  06/17/19   0415  06/17/19   0840   TROPONINI  1.09*  0.88*  0.62*     Recent Labs      06/17/19   0415  06/18/19   0600  06/19/19   0445   SODIUM  140  142  139   POTASSIUM  4.2  3.4*  4.0   CHLORIDE  106  106  104   CO2  22  27  28   BUN  39*  33*  34*   CREATININE  1.45*  1.09  1.07   MAGNESIUM  2.0  2.0  1.9   PHOSPHORUS  4.0  2.5  3.1   CALCIUM  7.4*  7.7*  7.9*     Recent Labs      06/17/19   0005  06/17/19   0415  06/18/19   0600  06/19/19   0445   ALTSGPT  42   --    --    --    ASTSGOT  55*   --    --    --    ALKPHOSPHAT  158*   --    --    --    TBILIRUBIN  0.4   --    --    --    PREALBUMIN  <3.0*   --   4.0*    --    GLUCOSE  164*  246*  104*  199*     Recent Labs      06/17/19   0005  06/17/19   0415  06/18/19   0600  06/19/19   0445   WBC   --   21.8*  25.1*  22.2*   NEUTSPOLYS   --   92.00*  88.70*  81.30*   LYMPHOCYTES   --   4.40*  7.80*  11.00*   MONOCYTES   --   1.90  2.60  6.10   EOSINOPHILS   --   0.00  0.90  0.40   BASOPHILS   --   0.30  0.00  0.20   ASTSGOT  55*   --    --    --    ALTSGPT  42   --    --    --    ALKPHOSPHAT  158*   --    --    --    TBILIRUBIN  0.4   --    --    --      Recent Labs      06/17/19   0415  06/18/19   0600 06/19/19   0445   RBC  4.15*  4.51*  4.27*   HEMOGLOBIN  11.5*  12.5*  12.0*   HEMATOCRIT  37.0*  39.6*  37.7*   PLATELETCT  245  298  301   PROTHROMBTM  16.0*   --    --    INR  1.25*   --    --        Imaging  X-Ray:  No film today 6/19    Assessment/Plan  Acute on chronic respiratory failure with hypoxia (HCC)- (present on admission)   Assessment & Plan    Intubated 6/16-6/17  Encourage incentive spirometry/out of bed to chair  Limit sedatives  RT/O2 protocols - attempt to wean off O2 if able  Diuresis     Septic shock due to undetermined organism (HCC)- (present on admission)   Assessment & Plan    This is severe sepsis with the following associated acute organ dysfunction(s): acute respiratory failure, metabolic/septic encephalopathy. -Improved  Source = pulmonary, soft tissue    Status post sepsis protocol  Continue antibiotics  S/p L foot debridement 6/18, wound vac  Adequately fluid resuscitated, remains hypervolemic     OZ (acute kidney injury) (HCC)   Assessment & Plan    Secondary to sepsis - improving slowly  Avoid nephrotoxins  Monitor urine output, creatinine     Aspiration into airway   Assessment & Plan    Aspiration precautions  Aggressive pulmonary toiletry     Pneumonia due to infectious organism   Assessment & Plan    Aspiration vs community  Continue antibiotics x5 days     SALOME (obstructive sleep apnea)   Assessment & Plan    Nightly CPAP     Cardiomyopathy  (HCC)- (present on admission)   Assessment & Plan    EF 40%, out of shock  Continue Lasix, beta-blocker, ACE-I     Elevated troponin- (present on admission)   Assessment & Plan    Likely demand ischemia -improved     Diabetic foot ulcer (HCC)- (present on admission)   Assessment & Plan    With surrounding cellulitis, osteomyelitis s/p surgical debridement 6/18  WOund vac  Continue antibiotics  Wound care  Orthopedics consulted  Analgesia prn     DM (diabetes mellitus), type 2, uncontrolled (CMS-[facility])- (present on admission)   Assessment & Plan    Continue Lantus twice daily with sliding scale insulin  Diabetic diet     HTN (hypertension)- (present on admission)   Assessment & Plan    Controlled  Continue metoprolol, lisinopril  PRN labetalol and hydralazine for goal SBP less than 160     Mucus plugging of bronchi   Assessment & Plan    Continue mucolytic prn  Encourage incentive spirometry     Left shoulder pain- (present on admission)   Assessment & Plan    Acute on chronic  Analgesia with PRN oxycodone  Therapies     Tobacco dependence- (present on admission)   Assessment & Plan    Nicotine replacement patch  Tobacco cessation education provided     Hypokalemia- (present on admission)   Assessment & Plan    Repleted     Low back pain- (present on admission)   Assessment & Plan    Chronic  Continue methadone     Full code, therapy eval    VTE:  Lovenox  Ulcer: Not Indicated  Lines: None    I have performed a physical exam and reviewed and updated ROS and Plan today (6/19/2019). In review of yesterday's note (6/18/2019), there are no changes except as documented above.     Discussed patient condition and risk of morbidity and/or mortality with RN, RT, Pharmacy, , Charge nurse / hot rounds, Patient, orthopedics and Dr. Blackwell

## 2019-06-19 NOTE — DISCHARGE PLANNING
This RN CM will continue to provide discharge planning for this patient as it is a complicated discharge.    I can be reached at [phone] w/ updates or requests.

## 2019-06-19 NOTE — DISCHARGE PLANNING
Hospital Care Management Discharge Planning     Anticipated Discharge Disposition:   · Home w/ [place] and WC     Action:   · The only  agency that serves [place], [place] is [facility] in [place], [place].   · [facility] only accepts patient with workman's comp if they also have medicare.  · Patient communicated in HCM assessment that he does not have any other insurance.   · Aleksandra at [facility] reports that they do not have a wound care clinic but they will take patients in the infusion center on a case to case basis.  · The Infusion Nurse, Mima, ([phone]) has to review the patient's medical needs before accepting the patient onto their services at [facility] and she is not in the office until Monday, June 24th.  · MD and patient updated.     Barriers to Discharge:   · Medical Clearance.   · Wound Vac authorization.  · Wound Care Set-up in [facility].     Plan:   · F/U with patient on capability of home to micky travel for wound care.    · Continue to provide support services and assistance with discharge planning as needed.

## 2019-06-19 NOTE — PROGRESS NOTES
Received Bedside report. Assumed care at 1600. This pt is AOx4, voiding appropriately, denies pain. Patient and RN discussed plan of care: questions answered. Labs noted, assessment complete, patient tolerating cardiac diabetic diet. Tele box in place. Pt is on 3L of O2 via NC. Call light in place, fall precautions in place, patient educated on importance of calling for assistance. No additional needs at this time. VSS

## 2019-06-19 NOTE — CARE PLAN
Problem: Nutritional:  Goal: Nutrition support tolerated and meeting greater than 85% of estimated needs  Outcome: NOT MET  TF D/c'd. Pt now on PO diet.   RD will see for diet education per consult.

## 2019-06-19 NOTE — PROGRESS NOTES
[facility] Medicine Daily Progress Note    Date of Service  6/19/2019    Chief Complaint  63 y.o. male admitted 6/15/2019 with L shoulder pain and L heel ulcer    Hospital Course    Hx TIIDM, COPD on 3 L HOT.  Presenting to [place] with multiple complaints including shoulder pain following a fall and a chronic heel ulcer.  Admitted to CICU with Dx of sepsis from diabetic foot ulcer, Type II NSTEMI with Trop of 1.09.  Intubated on admission, extubated 6/17    Interval Problem Update  Pt c/o shoulder pain which is chronic for him.    In good spirits today after surgery yesterday.  Eager to go home but understands need to take wound and infection very seriously     Consultants/Specialty  [facility]  Pulmonology    Code Status  full    Disposition  OK to floor  Awaiting OR for L heel wound    Review of Systems  Review of Systems   Constitutional: Negative for chills and fever.   HENT: Negative for nosebleeds and sore throat.    Eyes: Negative for blurred vision and double vision.   Respiratory: Negative for cough and shortness of breath.    Cardiovascular: Negative for chest pain, palpitations and leg swelling.   Gastrointestinal: Negative for abdominal pain, diarrhea, nausea and vomiting.   Genitourinary: Negative for dysuria and urgency.   Musculoskeletal: Negative for back pain.        L shoulder pain   Skin: Negative for rash.        R heel wound    Neurological: Negative for dizziness, loss of consciousness and headaches.        Physical Exam  Temp:  [35.9 °C (96.6 °F)-36.7 °C (98.1 °F)] 36.4 °C (97.5 °F)  Pulse:  [66-94] 81  Resp:  [14-27] 16  SpO2:  [89 %-95 %] 95 %    Physical Exam   Constitutional: He is oriented to person, place, and time. He appears well-developed and well-nourished. No distress.   HENT:   Head: Normocephalic and atraumatic.   Nose: Nose normal.   Mouth/Throat: Oropharynx is clear and moist.   Eyes: Conjunctivae are normal.   Neck: No JVD present.   Cardiovascular: Normal rate.  Exam reveals no  gallop.    No murmur heard.  Pulmonary/Chest: Effort normal. No stridor. No respiratory distress. He has no wheezes. He has no rales.   Abdominal: Soft. There is no tenderness. There is no rebound and no guarding.   Musculoskeletal: He exhibits no edema.   Wound vac in place L heel   Neurological: He is alert and oriented to person, place, and time.   Skin: Skin is warm and dry. No rash noted. He is not diaphoretic.   Psychiatric: He has a normal mood and affect. His behavior is normal. Judgment and thought content normal.   Nursing note and vitals reviewed.      Fluids    Intake/Output Summary (Last 24 hours) at 06/19/19 0537  Last data filed at 06/19/19 0400   Gross per 24 hour   Intake             1425 ml   Output             3090 ml   Net            -1665 ml       Laboratory  Recent Labs      06/17/19   0415  06/18/19   0600  06/19/19   0445   WBC  21.8*  25.1*  22.2*   RBC  4.15*  4.51*  4.27*   HEMOGLOBIN  11.5*  12.5*  12.0*   HEMATOCRIT  37.0*  39.6*  37.7*   MCV  89.2  87.8  88.3   MCH  27.7  27.7  28.1   MCHC  31.1*  31.6*  31.8*   RDW  52.0*  50.1*  49.6   PLATELETCT  245  298  301   MPV  10.4  10.0  10.1     Recent Labs      06/17/19   0415  06/18/19   0600  06/19/19   0445   SODIUM  140  142  139   POTASSIUM  4.2  3.4*  4.0   CHLORIDE  106  106  104   CO2  22  27  28   GLUCOSE  246*  104*  199*   BUN  39*  33*  34*   CREATININE  1.45*  1.09  1.07   CALCIUM  7.4*  7.7*  7.9*     Recent Labs      06/17/19   0415   INR  1.25*               Imaging  EC-ECHOCARDIOGRAM COMPLETE W/O CONT   Final Result      DX-CHEST-PORTABLE (1 VIEW)   Final Result         1.  Pulmonary edema and/or infiltrates are identified, which are somewhat decreased since the prior exam.      DX-ANKLE 3+ VIEWS LEFT   Final Result      Soft tissue swelling. No acute fracture identified.      DX-CHEST-PORTABLE (1 VIEW)   Final Result      1.  Improved aeration of the RIGHT lung base   2.  Otherwise no interval change in BILATERAL  atelectasis or airspace disease      DX-ABDOMEN FOR TUBE PLACEMENT   Final Result      Enteric tube projects over the stomach.      DX-CHEST-LIMITED (1 VIEW)   Final Result      New right IJ line tip overlies the SVC and no pneumothorax is identified      New right basilar opacity effaces the diaphragm and could be from atelectasis or consolidation      DX-CHEST-PORTABLE (1 VIEW)   Final Result      Endotracheal tube projects at the level of the aortic arch.      Increased perihilar and bibasilar opacities may represent a combination of edema, atelectasis, pneumonia.         DX-CHEST-PORTABLE (1 VIEW)   Final Result      Increasing reticular and hazy opacity is compatible with edema      CT-SHOULDER W/O PLUS RECONS LEFT   Final Result         Prior resection of left humeral head. The residual head neck junction appears well corticated and could relate to chronic resection/deformity. There is also deformity and remodeling of the glenoid with well-corticated margin, suggesting of chronic change    as well.      No acute fracture.      There is no articulation between the proximal humerus and glenoid. The glenohumeral joint is replaced by loculated soft tissue or dense fluid, likely chronic change. The fluid is slightly less in 2018 CT.      Infection is considered less likely given the osseous cortex adjacent to the fluid is well corticated and demonstrates no rarefaction or destruction.      US-FOREIGN FILM ULTRASOUND   Final Result      OUTSIDE IMAGES-CT HEAD   Final Result      [id number] FILM X-RAY   Final Result      OUTSIDE IMAGES-DX LOWER EXTREMITY, LEFT   Final Result      [id number] FILM X-RAY   Final Result      OUTSIDE IMAGES-DX CHEST   Final Result      OUTSIDE IMAGES-DX UPPER EXTREMITY, LEFT   Final Result      US-EXTREMITY ARTERY LOWER BILAT W/BORIS (COMBO)    (Results Pending)        Assessment/Plan  Acute on chronic respiratory failure with hypoxia (HCC)- (present on admission)    Assessment & Plan    Extubated 6/17 and doing well  Taper steroids: prednisone to 10mg and dc after tomorrow  Pulmonology following  mobilize     Septic shock due to undetermined organism (HCC)- (present on admission)   Assessment & Plan    This is sepsis (without associated acute organ dysfunction).   Likely source is infected diabetic ulcer vs CAP  Has improved  Zosyn  Vancomycin changed to linezolid due to OZ  Follow cultures; G+C from wound, blood neg       OZ (acute kidney injury) ([facility])   Assessment & Plan    Renal function has normalized  Follow dily BMP and UOP  Avoid nephrotoxins  reanl dose medications as appropriate       Pneumonia due to infectious organism   Assessment & Plan    Linezolid/zosyn for diabetic foot wound     Cardiomyopathy (HCC)- (present on admission)   Assessment & Plan    Echo LVEF 40%  On metoprolol with parameters  Add lisniopril 10mg     Elevated troponin- (present on admission)   Assessment & Plan    Likely type II NSTEMI in the setting of sepsis  ASA  LVEF 40%  2nd to demand ischemia  Has trended down  No CP or CP equivalent     Diabetic foot ulcer ([facility])- (present on admission)   Assessment & Plan    Infected left heel diabetic foot ulcer  Patient has been started on IV Zosyn and IV vancomycin.  Vanc changed to linezolid due to renal function  Wound care and LPS have been consulted  S/p debridement 6/18  ID consulted  No evidence of Osteo     DM (diabetes mellitus), type 2, uncontrolled (CMS-[facility])- (present on admission)   Assessment & Plan    Uncontrolled with hyperglycemia  lantus 44 units:   Increased to high dose SSI  Range from 170-350 last 24hrs: cont to follow today and up titrate lantus tonight once pattern is more clear  A1c 12.2; home regimen not effective  Diabetic education consult placed  Hypoglycemic protocol in place         HTN (hypertension)- (present on admission)   Assessment & Plan    Will monitor for now     Left shoulder pain- (present on admission)    Assessment & Plan    No acute fracture noted on CT  Pain control with Tylenol, oxycodone and IV morphine.  Monitor respiratory status closely  PT OT  If persistent pain we will consider consulting his orthopedic surgeon      Tobacco dependence- (present on admission)   Assessment & Plan    Cont to encourage cessation          VTE prophylaxis: none as going to OR; plan enoxaparin post op

## 2019-06-19 NOTE — PROGRESS NOTES
Pt transported to T733 on transport monitor with primary Rn and ACLS RN.   Report given to bedside nurse Karen.

## 2019-06-19 NOTE — FACE TO FACE
Face to Face Supporting Documentation - Home Health    The encounter with this patient was in whole or in part the primary reason for home health admission.    Date of encounter:   Patient:                    MRN:                       YOB: 2021  Travon Pollack  [id number]  1955     Home health to see patient for:  Wound Care    Skilled need for:  Surgical Aftercare diabetic foot wound, wound vac    Skilled nursing interventions to include:  Wound Care    Homebound status evidenced by:  Need the aid of supportive devices such as crutches, canes, wheelchairs or walkers. Leaving home requires a considerable and taxing effort. There is a normal inability to leave the home.    Community Physician to provide follow up care: Jon Turner M.D.     Optional Interventions? No      I certify the face to face encounter for this home health care referral meets the CMS requirements and the encounter/clinical assessment with the patient was, in whole, or in part, for the medical condition(s) listed above, which is the primary reason for home health care. Based on my clinical findings: the service(s) are medically necessary, support the need for home health care, and the homebound criteria are met.  I certify that this patient has had a face to face encounter by myself.  Camilo Rockwell D.O. - NPI: [id number]

## 2019-06-19 NOTE — DISCHARGE PLANNING
Hospital Care Management Discharge Planning     Anticipated Discharge Disposition:   · Home w/ [place] and [place]     Action:   · This RN CM spoke with patient regarding ability to come to [place] for [place] care. Patient is unable to afford the trips to [place].  · Patient communicates that he does, in fact, have Medicare A, B, and D.  · This RN CM requested PFA to add his Medicare coverage to his face sheet so that we can send the HH referral to [facility] in [place], [place].  · This RN CM requested Dr. Blackwell to complete face to face for HH wound care and place HH order.   · Met with patient at bedside to obtain HH choice.  · Faxed Choice form to ANGELITA Cade.     Barriers to Discharge:   · Medical Clearance.   · Wound Vac authorization.  · HH acceptance/ wound care set-up     Plan:   · [facility] and [facility].   · Continue to provide support services and assistance with discharge planning as needed.

## 2019-06-19 NOTE — DISCHARGE PLANNING
Hospital Care Management Discharge Planning     Anticipated Discharge Disposition:   · Home w/ wound vac and wound care      Action:   · This RN CM updated Clarisa with [facility] of patient going home with wound vac.  · Authorization form signed by Dr. Blackwell.  · Insurance authorization form and medically necessary information faxed to Clarisa from [facility] to aid in form completion.      Barriers to Discharge:   · Medical Clearance.   · Wound Vac authorization.  · Wound Care Set-up in [facility].     Plan:   · F/U with patient regarding wound care preferences.    · Continue to provide support services and assistance with discharge planning as needed.

## 2019-06-19 NOTE — CONSULTS
INFECTIOUS DISEASES INPATIENT CONSULT NOTE     Date of Service: 6/19/2019    Consult Requested By:  Arnoldo Rockwell D.O.    Reason for Consultation: Left heel diabetic foot infection    History of Present Illness:   Travon Pollack is a 63 y.o. man with history of type 2 diabetes mellitus comp located by peripheral neuropathy, history of MRSA infection in 2007 and SALOME on nocturnal oxygen admitted 6/15/2019 for respiratory failure.  Patient states that he developed a blister on the plantar aspect of his left foot approximately 3 weeks prior to admission.  At that time he saw a physician who performed some debridement.  Thereafter his wound increased in size with increasing pain with ambulation but patient denies any surrounding erythema or drainage.  He was instructed to keep the foot wound clean using Betadine however approximately 2 weeks prior to admission he developed flulike symptoms associated with vomiting and weakness.  At that time he also was noticing subjective fevers and chills but denies any cough or shortness of breath.  He had been treated with ampicillin 3 times a day for the heel and states that the antibiotics did help.  Of note, patient also sustained a mechanical ground-level fall 1 week prior to admission injuring his left shoulder.  Patient has a history of prior left shoulder surgery.  Given his flulike symptoms and increasing left heel pain, he initially presented to [facility] in [place].  At the outside hospital he was found to have an elevated white count.  An x-ray of his left heel was negative for osteomyelitis.  He was subsequently transferred to [facility] for higher level of care.  On presentation, he was afebrile with a leukocytosis of 26.1, tachycardic and tachypneic.  He was found to be in acute kidney injury.  He was initially admitted to the medical floor however he developed acute hypoxic respiratory failure with lethargy and was transferred to the ICU, intubated  and started got on mechanical ventilation.  Chest x-ray revealed new right basilar opacities.  He underwent a bronchoscopy on 6/16 with cultures growing normal upper respiratory sammie.  Patient has improved from a respiratory standpoint and was extubated on 6/17/2019.  On exam he was noted to have a worsening with osteomyelitis.  X-ray showed soft tissue swelling.  Patient was evaluated by orthopedic surgery and is now status post excisional debridement of his left foot heel down to subcutaneous tissue on 6/19.  Gram stain is positive for gram-positive rods and gram-positive cocci with cultures pending.  Cultures from admission are negative to date.  He is currently on oral linezolid and Zosyn.  Infectious disease service consulted for further antibiotic recommendations and management for left heel diabetic foot infection.      Review of Systems   Constitutional: Negative for chills and fever.   Respiratory: Negative for cough and shortness of breath.    Cardiovascular: Negative for chest pain.   Gastrointestinal: Negative for abdominal pain, diarrhea, nausea and vomiting.   Musculoskeletal: Positive for joint pain and myalgias.   Neurological: Negative for dizziness and headaches.   All other systems reviewed and are negative.      PMH:   Past Medical History:   Diagnosis Date   • Dental disorder     upper and lower   • Diabetes 2011    insulin   • Heart burn    • Indigestion    • Infectious disease staph   • MRSA (methicillin resistant Staphylococcus aureus)    • Opiate withdrawal (HCC)    • Pain 2/1/13    6/10 back   • Renal disorder     pt born with only one kidney   • Snoring    Obstructive sleep apnea on nocturnal oxygen 3 L nasal cannula    PSH:  Past Surgical History:   Procedure Laterality Date   • IRRIGATION & DEBRIDEMENT ORTHO  6/18/2019    Procedure: IRRIGATION AND DEBRIDEMENT, WOUND - LEFT HEEL;  Surgeon: Simon Diallo M.D.;  Location: SURGERY [facility];  Service: Orthopedics   • LUMBAR FUSION  POSTERIOR  3/4/2013    Performed by Nixon Zhao M.D. at SURGERY [facility]   • LUMBAR LAMINECTOMY DISKECTOMY  3/4/2013    Performed by Nixon Zhao M.D. at SURGERY [facility]   • LUMBAR FUSION POSTERIOR  2/11/2013    Performed by Nixon Zhao M.D. at SURGERY [facility]   • HERNIA REPAIR  2012    x7 last one in 2012   • OTHER  2007    back   • OTHER      neck       FAMILY HX:  Father with coronary artery disease    SOCIAL HX:  Social History     Social History   • Marital status:      Spouse name: N/A   • Number of children: N/A   • Years of education: N/A     Occupational History   • Not on file.     Social History Main Topics   • Smoking status: Former Smoker     Packs/day: 0.40     Years: 15.00     Types: Cigarettes   • Smokeless tobacco: Former User     Quit date: 2/21/2013   • Alcohol use No   • Drug use: No   • Sexual activity: Not on file     Other Topics Concern   • Not on file     Social History Narrative   • No narrative on file     History   Smoking Status   • Former Smoker   • Packs/day: 0.40   • Years: 15.00   • Types: Cigarettes   Smokeless Tobacco   • Former User   • Quit date: 2/21/2013     History   Alcohol Use No       Allergies/Intolerances:  No Known Allergies    History reviewed with the patient    Other Current Medications:    Current Facility-Administered Medications:   •  linezolid (ZYVOX) tablet 600 mg, 600 mg, Oral, Q12HRS, Camilo Rockwell D.O.  •  enoxaparin (LOVENOX) inj 40 mg, 40 mg, Subcutaneous, DAILY, Jeremy M Gonda, M.D., 40 mg at 06/19/19 0525  •  acetaminophen (TYLENOL) tablet 650 mg, 650 mg, Oral, Q6HRS PRN, Camlio Rockwell D.O.  •  [DISCONTINUED] senna-docusate (PERICOLACE or SENOKOT S) 8.6-50 MG per tablet 2 Tab, 2 Tab, Enteral Tube, BID, 2 Tab at 06/17/19 0537 **AND** polyethylene glycol/lytes (MIRALAX) PACKET 1 Packet, 1 Packet, Oral, QDAY PRN **AND** magnesium hydroxide (MILK OF MAGNESIA) suspension 30 mL, 30 mL, Oral, QDAY PRN  **AND** bisacodyl (DULCOLAX) suppository 10 mg, 10 mg, Rectal, QDAY PRN, Camilo Rockwell D.O.  •  predniSONE (DELTASONE) tablet 20 mg, 20 mg, Oral, DAILY, Camilo Rockwell D.O., 20 mg at 06/19/19 0525  •  insulin glargine (LANTUS) injection 44 Units, 44 Units, Subcutaneous, BID, Camilo Rockwell D.O., 44 Units at 06/19/19 0533  •  oxyCODONE immediate-release (ROXICODONE) tablet 5-10 mg, 5-10 mg, Oral, Q4HRS PRN, Jeremy M Gonda, M.D., 10 mg at 06/19/19 0235  •  ipratropium-albuterol (DUONEB) nebulizer solution, 3 mL, Nebulization, Q4H PRN (RT), Onesimo Olson M.D.  •  pregabalin (LYRICA) capsule 300 mg, 300 mg, Oral, BID, Jeremy M Gonda, M.D., 300 mg at 06/19/19 0525  •  methadone (DOLOPHINE) tablet 35 mg, 35 mg, Oral, BID, Jeremy M Gonda, M.D., 35 mg at 06/19/19 0524  •  metoprolol (LOPRESSOR) tablet 12.5 mg, 12.5 mg, Oral, TWICE DAILY, Jeremy M Gonda, M.D., 12.5 mg at 06/19/19 0526  •  hydrALAZINE (APRESOLINE) injection 10-20 mg, 10-20 mg, Intravenous, Q4HRS PRN, Jeremy M Gonda, M.D., 10 mg at 06/17/19 1945  •  labetalol (NORMODYNE,TRANDATE) injection 10-20 mg, 10-20 mg, Intravenous, Q4HRS PRN, Jeremy M Gonda, M.D.  •  senna-docusate (PERICOLACE or SENOKOT S) 8.6-50 MG per tablet 2 Tab, 2 Tab, Oral, BID, Zeus K Merary, 2 Tab at 06/18/19 1705  •  Notify provider if pain remains uncontrolled, , , CONTINUOUS **AND** Use the numeric rating scale (NRS-11) on regular floors and Critical-Care Pain Observation Tool (CPOT) on ICUs/Trauma to assess pain, , , CONTINUOUS **AND** Pulse Ox (Oximetry), , , CONTINUOUS **AND** Pharmacy Consult Request ...Pain Management Review 1 Each, 1 Each, Other, PHARMACY TO DOSE **AND** If patient difficult to arouse and/or has respiratory depression, stop any opiates that are currently infusing and call a Rapid Response., , , CONTINUOUS **AND** [DISCONTINUED] oxyCODONE immediate-release (ROXICODONE) tablet 5 mg, 5 mg, Enteral Tube, Q3HRS PRN **AND** [DISCONTINUED] oxyCODONE  "immediate release (ROXICODONE) tablet 10 mg, 10 mg, Enteral Tube, Q3HRS PRN **AND** [DISCONTINUED] morphine (pf) 4 mg/ml injection 4 mg, 4 mg, Intravenous, Q3HRS PRN, Zeus Reagan  •  Respiratory Care per Protocol, , Nebulization, Continuous RT, Onesimo Olson M.D.  •  [COMPLETED] piperacillin-tazobactam (ZOSYN) 3.375 g in  mL IVPB, 3.375 g, Intravenous, Once, Stopped at 19 0120 **AND** piperacillin-tazobactam (ZOSYN) 3.375 g in  mL IVPB, 3.375 g, Intravenous, Q8HRS, Last Rate: 25 mL/hr at 19 05, 3.375 g at 19 05 **AND** [DISCONTINUED] MD Alert...Vancomycin per Pharmacy, 1 Each, Other, PHARMACY TO DOSE, Onesimo Olson M.D.  •  ondansetron (ZOFRAN) syringe/vial injection 4 mg, 4 mg, Intravenous, Q4HRS PRN, Onesimo Olson M.D.  •  ondansetron (ZOFRAN ODT) dispertab 4 mg, 4 mg, Oral, Q4HRS PRN, Onesimo Olson M.D.  •  insulin regular (HUMULIN R) injection 3-14 Units, 3-14 Units, Subcutaneous, Q6HRS, 3 Units at 19 0533 **AND** Accu-Chek Q6 if NPO, , , Q6H **AND** NOTIFY MD and PharmD, , , Once **AND** [DISCONTINUED] glucose 4 g chewable tablet 16 g, 16 g, Oral, Q15 MIN PRN **AND** DEXTROSE 10% BOLUS 250 mL, 250 mL, Intravenous, Q15 MIN PRN, Onesimo Olson M.D.  [unfilled]    Most Recent Vital Signs:  /80   Pulse 81   Temp 36.4 °C (97.5 °F) (Temporal)   Resp 16   Ht 1.803 m (5' 10.98\") Comment: copied from last entry  Wt 104 kg (229 lb 4.5 oz)   SpO2 95%   BMI 31.99 kg/m²   Temp  Av.8 °C (98.2 °F)  Min: 35.9 °C (96.6 °F)  Max: 38 °C (100.4 °F)    Physical Exam:  General: well nourished, no diaphoresis, well-appearing, no acute distress  HEENT: sclera anicteric, PERRL, extraocular muscles intact, mucous membranes moist, oropharynx clear and moist, no oral lesions or exudate  Neck: supple, no lymphadenopathy  Chest: CTAB, no rales, rhonchi or wheezes, normal work of breathing.  Cardiac: regular rate and rhythm, normal S1 S2, no murmurs, rubs or gallops  Abdomen: + " bowel sounds, soft, non-tender, non-distended, no hepatosplenomegaly  Extremities: Left heel wound VAC surrounded by primary surgical bandage.  Toes are warm and wiggles  Skin: warm and dry, no rashes or worrisome lesions  Neuro: Alert and oriented times 3, non-focal exam, speech fluent, full range of motion to bilateral upper and lower extremities  Psych: normal mood and behavior, pleasant; memory intact, normal judgement    Pertinent Lab Results:  Recent Labs      06/17/19   0415  06/18/19   0600 06/19/19   0445   WBC  21.8*  25.1*  22.2*      Recent Labs      06/17/19   0415  06/18/19   0600 06/19/19   0445   HEMOGLOBIN  11.5*  12.5*  12.0*   HEMATOCRIT  37.0*  39.6*  37.7*   MCV  89.2  87.8  88.3   MCH  27.7  27.7  28.1   PLATELETCT  245  298  301         Recent Labs      06/17/19   0415  06/18/19 0600 06/19/19   0445   SODIUM  140  142  139   POTASSIUM  4.2  3.4*  4.0   CHLORIDE  106  106  104   CO2  22  27  28   CREATININE  1.45*  1.09  1.07        Recent Labs      06/17/19   0005   ALBUMIN  2.2*        Pertinent Micro:  Results     Procedure Component Value Units Date/Time    GRAM STAIN [[id number]] Collected:  06/18/19 1340    Order Status:  Completed Specimen:  Tissue Updated:  06/18/19 1907     Significant Indicator .     Source TISS     Site LEFT HEEL WOUND     Gram Stain Result Many WBCs.  Few Gram positive rods.  Many Gram positive cocci.      Narrative:       Surgery Specimen    CULTURE TISSUE W/ GRM STAIN [[id number]] Collected:  06/18/19 1340    Order Status:  Completed Specimen:  Other Updated:  06/18/19 1439    Anaerobic Culture [[id number]] Collected:  06/18/19 1340    Order Status:  Completed Specimen:  Other Updated:  06/18/19 1439    CULTURE RESPIRATORY W/ GRM STN [[id number]] Collected:  06/16/19 1500    Order Status:  Completed Specimen:  Respirate Updated:  06/18/19 1013     Significant Indicator NEG     Source RESP     Site Bronchoalveolar Lavage RLL     Culture Result Rare growth usual  "upper respiratory sammie  No clinically significant Staphylococcus aureus, Methicillin  Resistant Staphylococcus aureus, or Pseudomonas species  isolated.       Gram Stain Result Moderate WBCs.  No organisms seen.      BLOOD CULTURE [[id number]] Collected:  06/15/19 2230    Order Status:  Completed Specimen:  Blood from Peripheral Updated:  06/17/19 0825     Significant Indicator NEG     Source BLD     Site PERIPHERAL     Culture Result No Growth  Note: Blood cultures are incubated for 5 days and  are monitored continuously.Positive blood cultures  are called to the RN and reported as soon as  they are identified.      Narrative:       Per Hospital Policy: Only change Specimen Src: to \"Line\" if  specified by physician order.  Right AC    BLOOD CULTURE [[id number]] Collected:  06/15/19 2249    Order Status:  Completed Specimen:  Blood from Peripheral Updated:  06/17/19 0825     Significant Indicator NEG     Source BLD     Site PERIPHERAL     Culture Result No Growth  Note: Blood cultures are incubated for 5 days and  are monitored continuously.Positive blood cultures  are called to the RN and reported as soon as  they are identified.      Narrative:       Per Hospital Policy: Only change Specimen Src: to \"Line\" if  specified by physician order.  Left Hand    GRAM STAIN [[id number]] Collected:  06/16/19 1500    Order Status:  Completed Specimen:  Respirate Updated:  06/16/19 2351     Significant Indicator .     Source RESP     Site Bronchoalveolar Lavage RLL     Gram Stain Result Moderate WBCs.  No organisms seen.      Blood Culture [[id number]] Collected:  06/16/19 0000    Order Status:  Canceled Specimen:  Other from Peripheral     Blood Culture [[id number]] Collected:  06/16/19 0000    Order Status:  Canceled Specimen:  Other from Peripheral     Blood Culture [[id number]]     Order Status:  Canceled Specimen:  Blood from Peripheral     Blood Culture [[id number]]     Order Status:  Canceled Specimen:  Blood from Peripheral  "    URINALYSIS CULTURE, IF INDICATED [[id number]]  (Abnormal) Collected:  06/15/19 2143    Order Status:  Completed Specimen:  Blood Updated:  06/15/19 2203     Color Yellow     Character Clear     Specific Gravity 1.025     Ph 6.0     Glucose >=1000 (A) mg/dL      Ketones Negative mg/dL      Protein 300 (A) mg/dL      Bilirubin Negative     Urobilinogen, Urine 1.0     Nitrite Negative     Leukocyte Esterase Negative     Occult Blood Large (A)     Micro Urine Req Microscopic        Blood Culture   Date Value Ref Range Status   01/25/2018 No growth after 5 days of incubation.  Final   01/25/2018 No growth after 5 days of incubation.  Final        Studies:  Ct-shoulder W/o Plus Recons Left    Result Date: 6/15/2019  6/15/2019 10:05 PM HISTORY/REASON FOR EXAM:  Shoulder pain, acute, persistent, xray and exam nonspecific. History of left shoulder dislocation. Sepsis. TECHNIQUE/ EXAM DESCRIPTION AND NUMBER OF VIEWS:  CT scan of the LEFT shoulder without contrast and including reconstructions. Thin-section noncontrast helical images were obtained from the clavicle through the scapula. Coronal and sagittal reconstructions were generated. Up to date radiation dose reduction adjustments have been utilized to meet ALARA standards for radiation dose reduction. COMPARISON: Radiograph 6/15/2019, 1/12/2018, CT 1/14/2018 FINDINGS: Prior resection of the left humeral head with residual head neck junction appears well corticated and could relate to chronic resection/deformity. There is also deformity and remodeling of the glenoid with well-corticated margin, suggesting of chronic change as well. There is no articulation between the proximal humerus and glenoid. The joint is replaced by loculated soft tissue or dense fluid (image 65 series 4). All rotator cuff muscle are completely atrophied and likely no rotator cuff tendon connecting to the humerus.     Prior resection of left humeral head. The residual head neck junction appears  well corticated and could relate to chronic resection/deformity. There is also deformity and remodeling of the glenoid with well-corticated margin, suggesting of chronic change  as well. No acute fracture. There is no articulation between the proximal humerus and glenoid. The glenohumeral joint is replaced by loculated soft tissue or dense fluid, likely chronic change. The fluid is slightly less in 2018 CT. Infection is considered less likely given the osseous cortex adjacent to the fluid is well corticated and demonstrates no rarefaction or destruction.    Dx-ankle 3+ Views Left    Result Date: 6/17/2019 6/17/2019 3:01 PM HISTORY/REASON FOR EXAM:  For worsening wound. Soft tissue ulceration TECHNIQUE/EXAM DESCRIPTION AND NUMBER OF VIEWS:  3 views of the LEFT ankle. COMPARISON: None. FINDINGS: Soft tissue swelling. No acute fracture identified. Ankle joint degenerative changes. Large posterior calcaneal spur at the Achilles insertion. Soft tissue swelling posterior and inferior to the calcaneus.     Soft tissue swelling. No acute fracture identified.    Dx-chest-limited (1 View)    Result Date: 6/16/2019 6/16/2019 3:37 PM HISTORY/REASON FOR EXAM: central line placement. TECHNIQUE/EXAM DESCRIPTION AND NUMBER OF VIEWS: Single portable view of the chest. COMPARISON: 1:44 PM. FINDINGS: New right IJ line tip overlies the SVC. No pneumothorax New partial right hemidiaphragm effacement with hazy basilar opacity. No other change is identified     New right IJ line tip overlies the SVC and no pneumothorax is identified New right basilar opacity effaces the diaphragm and could be from atelectasis or consolidation    Dx-chest-portable (1 View)    Result Date: 6/18/2019 6/18/2019 4:30 AM HISTORY/REASON FOR EXAM: For indication of respiratory failure; For indication of respiratory failure TECHNIQUE/EXAM DESCRIPTION:  Single AP view of the chest. COMPARISON: Yesterday FINDINGS: Endotracheal tube has been removed in the  interim.  Dobbhoff tube has been removed in the interim. Cardiomegaly is observed. The mediastinal contour appears within normal limits.  The central  pulmonary vasculature appears prominent and indistinct. The lungs appear well expanded bilaterally.  Diffuse scattered hazy pulmonary parenchymal opacities are seen. No significant pleural effusions are identified. The bony structures appear age-appropriate.     1.  Pulmonary edema and/or infiltrates are identified, which are somewhat decreased since the prior exam.    Dx-chest-portable (1 View)    Result Date: 6/17/2019 6/17/2019 2:07 AM HISTORY/REASON FOR EXAM:  For indication of respiratory failure; For indication of respiratory failure TECHNIQUE/EXAM DESCRIPTION AND NUMBER OF VIEWS: Single portable view of the chest. COMPARISON: Yesterday FINDINGS: Hardware is stably positioned in its visualized extent. HEART: Stable size. LUNGS: ] Opacity is decreased. LEFT basilar opacities unchanged. PLEURA: No effusion or pneumothorax.     1.  Improved aeration of the RIGHT lung base 2.  Otherwise no interval change in BILATERAL atelectasis or airspace disease    Dx-chest-portable (1 View)    Result Date: 6/16/2019 6/16/2019 1:46 PM HISTORY/REASON FOR EXAM:  intubation. TECHNIQUE/EXAM DESCRIPTION AND NUMBER OF VIEWS: Single portable view of the chest. COMPARISON: 6/16/2019 FINDINGS: The cardiomediastinal silhouette is stable. There are increased perihilar and bibasilar airspace opacities, right greater than left. No pleural effusion or pneumothorax is seen. There is a chronic deformity of the proximal left humerus. Endotracheal tube  projects at the level of the aortic arch.     Endotracheal tube projects at the level of the aortic arch. Increased perihilar and bibasilar opacities may represent a combination of edema, atelectasis, pneumonia.     Dx-chest-portable (1 View)    Result Date: 6/16/2019 6/16/2019 12:03 PM HISTORY/REASON FOR EXAM: Cough. Desaturation  TECHNIQUE/EXAM DESCRIPTION AND NUMBER OF VIEWS: Single AP view of the chest. COMPARISON: Yesterday FINDINGS: Lungs: There is some increasing reticular and hazy indistinct opacity. Lung volumes have mildly diminished Pleura:  No pleural space process is seen. Heart and mediastinum: The cardiomediastinal contours are stable.     Increasing reticular and hazy opacity is compatible with edema    Ec-echocardiogram Complete W/o Cont    Result Date: 2019  Transthoracic Echo Report Echocardiography Laboratory CONCLUSIONS Compared to the images of the study done 18, RCA territory wall motion abnormalities are now present. Fair quality study, some off axis views. Moderately reduced left ventricular systolic function. Left ventricular ejection fraction is visually estimated to be 40%. Hypokinesis of the basal to mid inferior and inferolateral wall. Mild mitral annular calcification. Unable to estimate pulmonary artery pressure due to an inadequate tricuspid regurgitant jet. Dilated inferior vena cava with inspiratory collapse, RAP  8 mmHg. TAMARA PINK Exam Date:         2019                    10:51 Exam Location:     Inpatient Priority:          Routine Ordering Physician:        NATALEE RODRIGUEZ Referring Physician:       [id number] Brown Sonographer:               DELIA Schulte Age:    63     Gender:    M MRN:    [id number] :    1955 BSA:    2.17   Ht (in):    71     Wt (lb):    214 Exam Type:     Complete Indications:     Respiratory Distress ICD Codes:       518.82 CPT Codes:       44751 BP:   150    /   82     HR:   80 Technical Quality:       Fair MEASUREMENTS  (Male / Female) Normal Values 2D ECHO LV Diastolic Diameter PLAX        5.5 cm                4.2 - 5.9 / 3.9 - 5.3 cm LV Systolic Diameter PLAX         4.4 cm                2.1 - 4.0 cm IVS Diastolic Thickness           1.3 cm                LVPW Diastolic Thickness          1.1 cm                RV Diameter 4C                    2.9 cm                 2.5 - 2.9 cm LVOT Diameter                     2.2 cm                RA Diameter                       3.8 cm                Estimated LV Ejection Fraction    40 %                  LV Ejection Fraction MOD BP       35.9 %                >= 55  % LV Ejection Fraction MOD 4C       36.3 %                LV Ejection Fraction MOD 2C       36.8 %                LA Volume Index                   27.8 cm³/m²           16 - 28 cm³/m² IVC Diameter                      2.1 cm                DOPPLER AV Peak Velocity                  1.3 m/s               AV Peak Gradient                  7.2 mmHg              AV Mean Gradient                  3.7 mmHg              LVOT Peak Velocity                0.72 m/s              AV Area Cont Eq vti               2.4 cm²               Mitral E Point Velocity           0.49 m/s              Mitral E to A Ratio               0.58                  MV Pressure Half Time             46 ms                 MV Area PHT                       4.8 cm²               MV Deceleration Time              159 ms                Pulmonary Vein Systolic Velocity  0.37 m/s              Pulmonary Vein Diastolic Velocit  0.29 m/s              Pulmonary Vein S/D Ratio          1.3                   PV Peak Velocity                  0.88 m/s              PV Peak Gradient                  3.1 mmHg              RVOT Peak Velocity                0.55 m/s              * Indicates values subject to auto-interpretation LV EF:  40    % FINDINGS Left Ventricle Normal left ventricular chamber size. Mild concentric left ventricular hypertrophy. Moderately reduced left ventricular systolic function. Left ventricular ejection fraction is visually estimated to be 40%. Hypokinesis of the basal to mid inferior and inferolateral wall. Normal diastolic function. Right Ventricle Normal right ventricular size and systolic function. Right Atrium Normal right atrial size. Dilated inferior vena cava with inspiratory collapse.  Left Atrium Normal left atrial size. Mitral Valve Structurally normal mitral valve without significant stenosis. Mild mitral annular calcification. Trace mitral regurgitation. Aortic Valve Structurally normal aortic valve without significant stenosis or regurgitation. Tricuspid Valve Structurally normal tricuspid valve without significant stenosis or regurgitation. Unable to estimate pulmonary artery pressure due to an inadequate tricuspid regurgitant jet. Pulmonic Valve The pulmonic valve is not well visualized. Pericardium Normal pericardium without effusion. Aorta Normal aortic root for body surface area. Ascending aorta diameter is 3.2 cm. Mya Alegre MD (Electronically Signed) Final Date:     18 June 2019                 17:39    Dx-abdomen For Tube Placement    Result Date: 6/16/2019 6/16/2019 5:59 PM HISTORY/REASON FOR EXAM:  Line evaluation. TECHNIQUE/EXAM DESCRIPTION AND NUMBER OF VIEWS:  1 view(s) of the abdomen. COMPARISON:  9/15/2007. FINDINGS: Enteric tube projects over the stomach. There is a nonspecific bowel gas pattern. Surgical clips are seen in the right upper quadrant. There are bibasilar airspace opacities. Postsurgical and degenerative changes are seen in the lumbar spine.     Enteric tube projects over the stomach.      IMPRESSION:   1.  Severe sepsis   2.  Left heel diabetic foot infection    3.  Acute hypoxic respiratory failure  4.  Acute kidney injury  5.  Pneumonia  6.  Leukocytosis  7.  Elevated troponins  8.  Type 2 diabetes mellitus    PLAN:   Travon Pollack is a 63 y.o. man with a history of type 2 diabetes mellitus, complicated by peripheral neuropathy and diabetic foot ulcer, and a history of MRSA infection in 2007 admitted for worsening left heel diabetic foot infection and respiratory failure.  Patient was found to be in septic shock secondary to pneumonia and diabetic foot infection.  He required intubation and mechanical ventilation however he was extubated on 6/17/2019 and  is now on nasal cannula.  He is now status post excisional debridement of the left heel down to subcutaneous tissue on 6/18/2019 by Dr. Diallo.  Gram stain is positive for GPC's and G OH's with cultures pending.  Blood cultures are negative to date.  His acute kidney injury is also improving after vancomycin was discontinued.  As it appears that his wound did not go down to bone, anticipate treating the patient for soft tissue infection for approximately 2 weeks from surgery.  Continue wound VAC and care.  Continue linezolid and Zosyn (which will also treat his pneumonia) pending further cultures.  Continue supportive care by the ICU team and wean off submental oxygen as tolerated.  Further recommendations per hospital course and culture results.      Plan of care discussed with IM Dr. Blackwell. Will continue to follow    Alyce Hernandez M.D.      Please note that this dictation was created using voice recognition software. I have worked with technical experts from [facility] to optimize the interface.  I have made every reasonable attempt to correct obvious errors, but there may be errors of grammar and possibly content that I did not discover before finalizing the note.

## 2019-06-20 LAB
ANION GAP SERPL CALC-SCNC: 7 MMOL/L (ref 0–11.9)
BASOPHILS # BLD AUTO: 0.2 % (ref 0–1.8)
BASOPHILS # BLD: 0.03 K/UL (ref 0–0.12)
BUN SERPL-MCNC: 37 MG/DL (ref 8–22)
CALCIUM SERPL-MCNC: 7.9 MG/DL (ref 8.5–10.5)
CHLORIDE SERPL-SCNC: 104 MMOL/L (ref 96–112)
CO2 SERPL-SCNC: 26 MMOL/L (ref 20–33)
CREAT SERPL-MCNC: 1.16 MG/DL (ref 0.5–1.4)
EOSINOPHIL # BLD AUTO: 0.18 K/UL (ref 0–0.51)
EOSINOPHIL NFR BLD: 1.2 % (ref 0–6.9)
ERYTHROCYTE [DISTWIDTH] IN BLOOD BY AUTOMATED COUNT: 49.4 FL (ref 35.9–50)
GLUCOSE BLD-MCNC: 132 MG/DL (ref 65–99)
GLUCOSE BLD-MCNC: 145 MG/DL (ref 65–99)
GLUCOSE BLD-MCNC: 161 MG/DL (ref 65–99)
GLUCOSE BLD-MCNC: 212 MG/DL (ref 65–99)
GLUCOSE SERPL-MCNC: 161 MG/DL (ref 65–99)
HCT VFR BLD AUTO: 42.1 % (ref 42–52)
HGB BLD-MCNC: 13.5 G/DL (ref 14–18)
IMM GRANULOCYTES # BLD AUTO: 0.15 K/UL (ref 0–0.11)
IMM GRANULOCYTES NFR BLD AUTO: 1 % (ref 0–0.9)
LYMPHOCYTES # BLD AUTO: 2.22 K/UL (ref 1–4.8)
LYMPHOCYTES NFR BLD: 14.7 % (ref 22–41)
MCH RBC QN AUTO: 28.4 PG (ref 27–33)
MCHC RBC AUTO-ENTMCNC: 32.1 G/DL (ref 33.7–35.3)
MCV RBC AUTO: 88.6 FL (ref 81.4–97.8)
MONOCYTES # BLD AUTO: 0.89 K/UL (ref 0–0.85)
MONOCYTES NFR BLD AUTO: 5.9 % (ref 0–13.4)
NEUTROPHILS # BLD AUTO: 11.65 K/UL (ref 1.82–7.42)
NEUTROPHILS NFR BLD: 77 % (ref 44–72)
NRBC # BLD AUTO: 0 K/UL
NRBC BLD-RTO: 0 /100 WBC
PLATELET # BLD AUTO: 256 K/UL (ref 164–446)
PMV BLD AUTO: 9.6 FL (ref 9–12.9)
POTASSIUM SERPL-SCNC: 3.9 MMOL/L (ref 3.6–5.5)
RBC # BLD AUTO: 4.75 M/UL (ref 4.7–6.1)
SODIUM SERPL-SCNC: 137 MMOL/L (ref 135–145)
WBC # BLD AUTO: 15.1 K/UL (ref 4.8–10.8)

## 2019-06-20 PROCEDURE — 700105 HCHG RX REV CODE 258: Performed by: INTERNAL MEDICINE

## 2019-06-20 PROCEDURE — 82962 GLUCOSE BLOOD TEST: CPT

## 2019-06-20 PROCEDURE — 99233 SBSQ HOSP IP/OBS HIGH 50: CPT | Performed by: INTERNAL MEDICINE

## 2019-06-20 PROCEDURE — 700102 HCHG RX REV CODE 250 W/ 637 OVERRIDE(OP): Performed by: HOSPITALIST

## 2019-06-20 PROCEDURE — 700102 HCHG RX REV CODE 250 W/ 637 OVERRIDE(OP): Performed by: INTERNAL MEDICINE

## 2019-06-20 PROCEDURE — 97605 NEG PRS WND THER DME<=50SQCM: CPT

## 2019-06-20 PROCEDURE — 700111 HCHG RX REV CODE 636 W/ 250 OVERRIDE (IP): Performed by: INTERNAL MEDICINE

## 2019-06-20 PROCEDURE — 36415 COLL VENOUS BLD VENIPUNCTURE: CPT

## 2019-06-20 PROCEDURE — 80048 BASIC METABOLIC PNL TOTAL CA: CPT

## 2019-06-20 PROCEDURE — 770020 HCHG ROOM/CARE - TELE (206)

## 2019-06-20 PROCEDURE — A9270 NON-COVERED ITEM OR SERVICE: HCPCS | Performed by: INTERNAL MEDICINE

## 2019-06-20 PROCEDURE — 700111 HCHG RX REV CODE 636 W/ 250 OVERRIDE (IP): Performed by: HOSPITALIST

## 2019-06-20 PROCEDURE — A9270 NON-COVERED ITEM OR SERVICE: HCPCS | Performed by: HOSPITALIST

## 2019-06-20 PROCEDURE — 85025 COMPLETE CBC W/AUTO DIFF WBC: CPT

## 2019-06-20 PROCEDURE — 99232 SBSQ HOSP IP/OBS MODERATE 35: CPT | Performed by: HOSPITALIST

## 2019-06-20 RX ORDER — TRAMADOL HYDROCHLORIDE 50 MG/1
50 TABLET ORAL EVERY 6 HOURS PRN
Status: DISCONTINUED | OUTPATIENT
Start: 2019-06-20 | End: 2019-06-27

## 2019-06-20 RX ORDER — INSULIN GLARGINE 100 [IU]/ML
50 INJECTION, SOLUTION SUBCUTANEOUS 2 TIMES DAILY
Status: DISCONTINUED | OUTPATIENT
Start: 2019-06-20 | End: 2019-06-21

## 2019-06-20 RX ORDER — SODIUM CHLORIDE 9 MG/ML
INJECTION, SOLUTION INTRAVENOUS
Status: ACTIVE
Start: 2019-06-20 | End: 2019-06-20

## 2019-06-20 RX ADMIN — OXYCODONE HYDROCHLORIDE 10 MG: 5 TABLET ORAL at 02:35

## 2019-06-20 RX ADMIN — OXYCODONE HYDROCHLORIDE 10 MG: 5 TABLET ORAL at 12:38

## 2019-06-20 RX ADMIN — METOPROLOL TARTRATE 12.5 MG: 25 TABLET, FILM COATED ORAL at 05:10

## 2019-06-20 RX ADMIN — LINEZOLID 600 MG: 600 TABLET, FILM COATED ORAL at 05:09

## 2019-06-20 RX ADMIN — PIPERACILLIN SODIUM AND TAZOBACTAM SODIUM 3.38 G: 3; .375 INJECTION, POWDER, FOR SOLUTION INTRAVENOUS at 05:13

## 2019-06-20 RX ADMIN — OXYCODONE HYDROCHLORIDE 10 MG: 5 TABLET ORAL at 22:54

## 2019-06-20 RX ADMIN — PREGABALIN 300 MG: 150 CAPSULE ORAL at 05:11

## 2019-06-20 RX ADMIN — ENOXAPARIN SODIUM 40 MG: 100 INJECTION SUBCUTANEOUS at 06:00

## 2019-06-20 RX ADMIN — OXYCODONE HYDROCHLORIDE 10 MG: 5 TABLET ORAL at 18:22

## 2019-06-20 RX ADMIN — ASPIRIN 81 MG 81 MG: 81 TABLET ORAL at 05:08

## 2019-06-20 RX ADMIN — INSULIN GLARGINE 50 UNITS: 100 INJECTION, SOLUTION SUBCUTANEOUS at 18:28

## 2019-06-20 RX ADMIN — PREGABALIN 300 MG: 150 CAPSULE ORAL at 18:23

## 2019-06-20 RX ADMIN — PIPERACILLIN SODIUM AND TAZOBACTAM SODIUM 3.38 G: 3; .375 INJECTION, POWDER, FOR SOLUTION INTRAVENOUS at 21:30

## 2019-06-20 RX ADMIN — INSULIN GLARGINE 44 UNITS: 100 INJECTION, SOLUTION SUBCUTANEOUS at 05:20

## 2019-06-20 RX ADMIN — INSULIN HUMAN 3 UNITS: 100 INJECTION, SOLUTION PARENTERAL at 12:04

## 2019-06-20 RX ADMIN — TRAMADOL HYDROCHLORIDE 50 MG: 50 TABLET, FILM COATED ORAL at 21:31

## 2019-06-20 RX ADMIN — METHADONE HYDROCHLORIDE 35 MG: 10 TABLET ORAL at 05:09

## 2019-06-20 RX ADMIN — TRAMADOL HYDROCHLORIDE 50 MG: 50 TABLET, FILM COATED ORAL at 15:14

## 2019-06-20 RX ADMIN — METOPROLOL TARTRATE 12.5 MG: 25 TABLET, FILM COATED ORAL at 18:23

## 2019-06-20 RX ADMIN — METHADONE HYDROCHLORIDE 35 MG: 10 TABLET ORAL at 18:22

## 2019-06-20 RX ADMIN — INSULIN LISPRO 4 UNITS: 100 INJECTION, SOLUTION INTRAVENOUS; SUBCUTANEOUS at 21:39

## 2019-06-20 RX ADMIN — LISINOPRIL 10 MG: 10 TABLET ORAL at 05:09

## 2019-06-20 RX ADMIN — LINEZOLID 600 MG: 600 TABLET, FILM COATED ORAL at 18:27

## 2019-06-20 RX ADMIN — PREDNISONE 10 MG: 10 TABLET ORAL at 05:11

## 2019-06-20 RX ADMIN — SENNOSIDES,DOCUSATE SODIUM 2 TABLET: 8.6; 5 TABLET, FILM COATED ORAL at 05:11

## 2019-06-20 ASSESSMENT — ENCOUNTER SYMPTOMS
PALPITATIONS: 0
LOSS OF CONSCIOUSNESS: 0
SHORTNESS OF BREATH: 0
FEVER: 0
WHEEZING: 0
ABDOMINAL PAIN: 0
BACK PAIN: 0
FALLS: 0
DIARRHEA: 0
PSYCHIATRIC NEGATIVE: 1
CHILLS: 0
BLURRED VISION: 0
VOMITING: 0
MYALGIAS: 1
SENSORY CHANGE: 1
WEAKNESS: 0
COUGH: 0
SORE THROAT: 0
FOCAL WEAKNESS: 0
DOUBLE VISION: 0
HEADACHES: 0
NAUSEA: 0
SENSORY CHANGE: 0
DIZZINESS: 0

## 2019-06-20 NOTE — THERAPY
"Physical Therapy Evaluation completed.   Bed Mobility:  Supine to Sit: Supervised  Transfers: Sit to Stand: Minimal Assist    Plan of Care: Will benefit from Physical Therapy 3 times per week  Discharge Recommendations: Equipment: Will Continue to Assess for Equipment Needs.     Pt admitted after ground-level fall 1 week ago with c/c of L shoulder pain and worsening L heel ulcer requiring debridement. Pt demonstrates impairments in L LE strength and dynamic balance impacted by non-formal weight bearing precautions of the L foot; L shoulder pain and lack of strength/motor control/ROM impact functional mobility and increase pain with ADLs. Dynamic balance is impacted by B LE numbness and pain at the L foot. Pt reports fall occurred secondary to his L leg 'giving out'; he states this is the first time this has happened since he lost strength in 2007 after his 3rd back surgery. At this time pt would continue to benefit from further acute therapy to address mentioned deficits; if pt continues to progress as expected pt may be able to dc home given motivation and prior level of independence. Will continue to follow.     See \"Rehab Therapy-Acute\" Patient Summary Report for complete documentation.     "

## 2019-06-20 NOTE — PROGRESS NOTES
Bedside report received from day RN. Patient's plan of care reviewed. Patient found resting in bed, A&Ox4. VSS. No signs of distress, declines pain at this time. All needs met. Safety precautions in place. Tele box on. Bed in lowest/locked position. Call light and personal belongings within reach. Will continue to monitor.

## 2019-06-20 NOTE — PROGRESS NOTES
Diabetes education: Met with patient this afternoon. Please see consult note.  Plan: Pt states he has his supplies at home. Pt may benefit with an updated sliding scale chart at discharge to get his blood sugars to goal. Pt needs sliding scale changed from every six hours to ac and hs now that he is eating. Pt may also need to change insulin from regular to humalog and add a meal bolus to the regime. Please call [phone] if needs change.

## 2019-06-20 NOTE — CARE PLAN
Problem: Communication  Goal: The ability to communicate needs accurately and effectively will improve  Outcome: PROGRESSING AS EXPECTED  Patient encouraged to voice all feelings/questions/concerns. All needs met at this time. Call light within reach.    Problem: Safety  Goal: Will remain free from falls  Outcome: PROGRESSING AS EXPECTED  Safety precautions in place. Education provided to patient, verbalized understanding. Bed in lowest/locked position. Bed alarm on. Room close to nurses station. Call light and personal belongings within reach. Will continue to monitor.

## 2019-06-20 NOTE — PROGRESS NOTES
WCN team recommending toe touch/non-weight bearing LLE, but will be able to bear weight on RLE w/ boot. Currently VeriFlow set to LLE, medi-honey to the RT heel. Will change wound vac to LLE on Saturday.

## 2019-06-20 NOTE — DISCHARGE PLANNING
Hospital Care Management Discharge Planning     Anticipated Discharge Disposition:   · TBD     Action:   · This RN CM spoke to Kimmie with [facility]. They have declined the referral because they do not take wound vacs anymore.   · [facility] in [place], [place] may be able to accept him onto their wound care services, but no one is in the office for referrals until Monday.   · This RN CM updated pt and BS RN.   · BS RN provided update that patient was transitioned to an irrigating wound vac this afternoon.  · This information impacts placement and discharge plan.       Barriers to Discharge:   · Post-acute placement plan.     Plan:   · F/U with [facility] treatment team.    · Continue to provide support services and assistance with discharge planning as needed.

## 2019-06-20 NOTE — THERAPY
"Occupational Therapy Evaluation completed.   Functional Status:  Min A UB dressing, SPV seated grooming, Min A STS w/ HHA, maintained NWB status on L LE, L UE non functional at shoulder and elbow, forearm, wrist and digits intact.   Plan of Care: Will benefit from Occupational Therapy 3 times per week  Discharge Recommendations:  Equipment: Will Continue to Assess for Equipment Needs. Post-acute therapy Discharge to home with outpatient or home health for additional skilled therapy services.    See \"Rehab Therapy-Acute\" Patient Summary Report for complete documentation.      Pt is a 62 y/o male who presents to acute following GLF resulting in L shoulder pain and chronic heel ulcer resulting in sepsis. Pt has no formal WBing orders at this time, maintained NWB status throughout session. L UE non functional at shoulder and elbow, forearm, wrist and digits appear intact. Pt hypersensitive about L shoulder being touched. Pt demo'd impaired balance, functional mobility and activity tolerance impacting functional independence. Will continue to follow for Acute OT services.  "

## 2019-06-20 NOTE — PROGRESS NOTES
" LIMB PRESERVATION SERVICE      HPI:  62 y/o male with DM, CAD, COPD, MRSA infection. Admitted for DFU, sepsis, shoulder pain, respiratory failure.   LPS involved for L heel ulcer. Pt reports around beginning of May 2019 developed blister after increasing his exercise. He followed up with MD in [place] and ulcer was debrided. He was placed on abx. Instructed to clean foot with betadine.The ulcer worsened with increased pain and edema. He developed nausea and vomiting. Denied fevers, chills, or purulent drainage from foot. He also had GLF about 1 wk ago. He presented to [facility] and was transferred to [facility] for further care. Found to be septic, developed acute respiratory failure and was intubated 6/15, and later extubated 6/17.      Diagnosed with DM in 2007. Checks sugars 4x/day. Averages 160-170. Takes insulin. Denies neuropathy. Seen by CDE. Does not have diabetic shoes. Wears slippers at home. Works at [employer] in [place].    SURGERY DATE: 6/18/19 by Dr. Fraser    PROCEDURE: Excisional debridement of left heel with VAC placement        6/20: POD # 2. Patient denies fevers, chills, nausea, vomiting.  Pain controlled.     /65   Pulse 82   Temp (!) 35.6 °C (96 °F) (Temporal)   Resp 18   Ht 1.803 m (5' 10.98\")   Wt 102.1 kg (225 lb 1.4 oz)   SpO2 91%   BMI 31.41 kg/m²     SURGICAL SITE ASSESSMENT:    Pedal Pulses: +2 DP and PT to L foot/. +2 to DP R foot, +1 PT to R foot     Foot warm    L plantar heel and medial heel ulcer   Wound bed with dark yellow tissue and slough over adipose  Erythema to medial aspect, tender with palpation  Edema: +2-3 to medial aspect   Drainage moderate amount      PROCEDURE: using curette, scissors, and forceps excised non viable tissue until healthy adipose and bleeding tissue present. Yellow drainage noted. Malodorous.  Tender to medial aspect with 3 pinpoint openings covered with slough.   Total area debrided ~22cm2 to subq tissue layer.   veraflo completed by " Susana RN with wound team see note for further detail.       R plantar foot  4 scattered scabs, callused areas  Heel ulcer with dried brown exudate, soft slightly boggy. Tender to touch.   No erythema or edema noted      PROCEDURE  Using curette, forceps excised non viable tissue. Pt unable to tolerate further debridement. Adherent slough layer remains. Total area debrided 3cm2 to subq tissue layer.   Wound care completed by Susana.   honeycolloid     See post debridement photos for measurements        DIABETES MANAGEMENT:  Blood glucose: 161  A1c:   Lab Results   Component Value Date/Time    HBA1C 12.2 (H) 06/16/2019 11:37 AM        Diabetes education: seen patient on 6/19    INFECTION MANAGEMENT:  WBC: 15.1. Decreased from yesterday  Wound culture results:   Results     Procedure Component Value Units Date/Time    Anaerobic Culture [[id number]] Collected:  06/18/19 1340    Order Status:  Completed Specimen:  Tissue Updated:  06/20/19 1449     Significant Indicator NEG     Source TISS     Site LEFT HEEL WOUND     Culture Result Culture in progress.    Narrative:       Surgery Specimen    CULTURE TISSUE W/ GRM STAIN [[id number]]  (Abnormal)  (Susceptibility) Collected:  06/18/19 1340    Order Status:  Completed Specimen:  Tissue Updated:  06/20/19 1449     Significant Indicator POS (POS)     Source TISS     Site LEFT HEEL WOUND     Culture Result - (A)     Gram Stain Result Many WBCs.  Few Gram positive rods.  Many Gram positive cocci.       Culture Result Staphylococcus aureus  Heavy growth   (A)      Group D Enterococcus species  Moderate growth  Combination therapy with ampicillin, penicillin, or  vancomycin (for susceptible strains) plus an aminoglycoside  is usually indicated for serious enterococcal infections,  such as endocarditis unless high-level resistance to both  gentamicin and streptomycin is documented; such combinations  are predicted to result in synergistic killing of the  Enterococcus.   (A)       Streptococcus constellatus  Heavy growth   (A)    Narrative:       Surgery Specimen    Culture & Susceptibility     STAPHYLOCOCCUS AUREUS     Antibiotic Sensitivity Microscan Unit Status    Ampicillin/sulbactam Sensitive <=8/4 mcg/mL Preliminary    Method: TAIWO    Clindamycin Sensitive <=0.5 mcg/mL Preliminary    Method: TAIWO    Daptomycin Sensitive <=0.5 mcg/mL Preliminary    Method: TAIWO    Erythromycin Sensitive <=0.5 mcg/mL Preliminary    Method: TAIWO    Moxifloxacin Sensitive <=0.5 mcg/mL Preliminary    Method: TAIWO    Oxacillin Sensitive <=0.25 mcg/mL Preliminary    Method: TAIWO    Tetracycline Sensitive <=4 mcg/mL Preliminary    Method: TAIWO    Trimeth/Sulfa Sensitive <=0.5/9.5 mcg/mL Preliminary    Method: TAIWO    Vancomycin Sensitive 2 mcg/mL Preliminary    Method: TAIWO                       GRAM STAIN [[id number]] Collected:  06/18/19 1340    Order Status:  Completed Specimen:  Tissue Updated:  06/18/19 1907     Significant Indicator .     Source TISS     Site LEFT HEEL WOUND     Gram Stain Result Many WBCs.  Few Gram positive rods.  Many Gram positive cocci.      Narrative:       Surgery Specimen    CULTURE RESPIRATORY W/ GRM STN [[id number]] Collected:  06/16/19 1500    Order Status:  Completed Specimen:  Respirate Updated:  06/18/19 1013     Significant Indicator NEG     Source RESP     Site Bronchoalveolar Lavage RLL     Culture Result Rare growth usual upper respiratory sammie  No clinically significant Staphylococcus aureus, Methicillin  Resistant Staphylococcus aureus, or Pseudomonas species  isolated.       Gram Stain Result Moderate WBCs.  No organisms seen.      BLOOD CULTURE [[id number]] Collected:  06/15/19 2230    Order Status:  Completed Specimen:  Blood from Peripheral Updated:  06/17/19 0825     Significant Indicator NEG     Source BLD     Site PERIPHERAL     Culture Result No Growth  Note: Blood cultures are incubated for 5 days and  are monitored continuously.Positive blood cultures  are called to  "the RN and reported as soon as  they are identified.      Narrative:       Per Hospital Policy: Only change Specimen Src: to \"Line\" if  specified by physician order.  Right AC    BLOOD CULTURE [[id number]] Collected:  06/15/19 2249    Order Status:  Completed Specimen:  Blood from Peripheral Updated:  06/17/19 0825     Significant Indicator NEG     Source BLD     Site PERIPHERAL     Culture Result No Growth  Note: Blood cultures are incubated for 5 days and  are monitored continuously.Positive blood cultures  are called to the RN and reported as soon as  they are identified.      Narrative:       Per Hospital Policy: Only change Specimen Src: to \"Line\" if  specified by physician order.  Left Hand    GRAM STAIN [[id number]] Collected:  06/16/19 1500    Order Status:  Completed Specimen:  Respirate Updated:  06/16/19 2351     Significant Indicator .     Source RESP     Site Bronchoalveolar Lavage RLL     Gram Stain Result Moderate WBCs.  No organisms seen.      Blood Culture [[id number]] Collected:  06/16/19 0000    Order Status:  Canceled Specimen:  Other from Peripheral     Blood Culture [[id number]] Collected:  06/16/19 0000    Order Status:  Canceled Specimen:  Other from Peripheral     Blood Culture [[id number]]     Order Status:  Canceled Specimen:  Blood from Peripheral     Blood Culture [[id number]]     Order Status:  Canceled Specimen:  Blood from Peripheral     URINALYSIS CULTURE, IF INDICATED [[id number]]  (Abnormal) Collected:  06/15/19 2149    Order Status:  Completed Specimen:  Blood Updated:  06/15/19 2203     Color Yellow     Character Clear     Specific Gravity 1.025     Ph 6.0     Glucose >=1000 (A) mg/dL      Ketones Negative mg/dL      Protein 300 (A) mg/dL      Bilirubin Negative     Urobilinogen, Urine 1.0     Nitrite Negative     Leukocyte Esterase Negative     Occult Blood Large (A)     Micro Urine Req Microscopic                 PLAN:  POD # 2 L heel debridement  Discolored adipose with slough. " Malodorous with bedside excisional debridement today    Wound care: Verflo  3 x week . Change to cleanse choice veraflo next  Change  honeycolloid to R heel. Wound team to change 3x/wk      Antibiotics: Per ID recommendation. Malodorous with bedside debridement today. Polymicrobial. sensitivities pending. Cellulitis remains to medial L foot    Weight Bearing Status: Touch toe weight bearing LLE, WBAT RLE    Offloading: Offloading shoe  Ordered bilaterally    PT Consult: consulted    Diabetes Education: involved    Plan to return to [facility].: will determine how wound responds with veraflo and veraflo cleanse choice. May need another I &D if condition worsens      DISCHARGE PLAN:    Disposition: recommend SNF  From [place].     Follow-up: LPS rounds in [facility]ound Clinic in 6/28. Ordered     Diabetic shoes and inserts once ulcers have resolved.           KARTHIK VarnerRLathaN.    If any questions or concerns, please call [phone]

## 2019-06-20 NOTE — DISCHARGE PLANNING
Care Transition Team Assessment      In the case of an emergency, pt's NOK is Shu Pollack (Wife) [phone].     This RNCM spoke with pt at bedside and obtained the information used in this assessment. Pt verified accuracy of facesheet. Pt lives in a single story house with his wife. The house has 3 steps onto the deck with no railings. Pt uses the [facility] on [facility] in [place]. Prior to current hospitalization, pt was completely independent with ADLS/IADLS. Patient uses a walker to ambulate. Pt drives his car to and from [place] [employer]. Pt and his spouse collect a combined total of $3500/month in SS. Pt denies hx of SA or MH. Pt communicates his spouse is his support system. Pt does not have an AD and refused packet at this time. Pt's plan is to discharge home with a [place] D[facility] and HH for wound care.     Upon D/C, pt states that his wife, Shu, will provide transport home, if applicable.       Information Source  Orientation : Oriented x 4  Information Given By: Patient  Informant's Name: Travon  Who is responsible for making decisions for patient? : Patient    Readmission Evaluation  Is this a readmission?: No    Elopement Risk  Legal Hold: No  Ambulatory or Self Mobile in Wheelchair: Yes  Disoriented: No  Psychiatric Symptoms: None  History of Wandering: No  Elopement this Admit: No  Vocalizing Wanting to Leave: No  Displays Behaviors, Body Language Wanting to Leave: No-Not at Risk for Elopement  Elopement Risk: Not at Risk for Elopement    Interdisciplinary Discharge Planning  Primary Care Physician: Johnny  Lives with - Patient's Self Care Capacity: Spouse  Patient or legal guardian wants to designate a caregiver (see row info): No  Support Systems: Spouse / Significant Other  Housing / Facility: 1 Story House  Do You Take your Prescribed Medications Regularly: Yes  Able to Return to Previous ADL's: Yes  Mobility Issues: Yes  Prior Services: None  Patient Expects to be Discharged  to:: Home  Assistance Needed: No  Durable Medical Equipment: Home Oxygen  DME Provider / Phone: Kyra    Discharge Preparedness  What is your plan after discharge?: Home with help  What are your discharge supports?: Spouse  Prior Functional Level: Ambulatory, Drives Self, Independent with Activities of Daily Living, Independent with Medication Management  Difficulity with ADLs: None  Difficulity with IADLs: None    Functional Assesment  Prior Functional Level: Ambulatory, Drives Self, Independent with Activities of Daily Living, Independent with Medication Management    Finances  Financial Barriers to Discharge: No  Prescription Coverage: Yes    Vision / Hearing Impairment  Vision Impairment : No  Hearing Impairment : No    Advance Directive  Advance Directive?: None  Advance Directive offered?: AD Booklet refused    Domestic Abuse  Have you ever been the victim of abuse or violence?: No  Physical Abuse or Sexual Abuse: No  Verbal Abuse or Emotional Abuse: No  Possible Abuse Reported to:: Not Applicable    Psychological Assessment  History of Substance Abuse: None  History of Psychiatric Problems: No  Non-compliant with Treatment: No  Newly Diagnosed Illness: No    Discharge Risks or Barriers  Discharge risks or barriers?: Post-acute placement / services, Complex medical needs  Patient risk factors: Complex medical needs    Anticipated Discharge Information  Anticipated discharge disposition: Home  Discharge Address: [address] [place] [address], [address]  Discharge Contact Phone Number: [phone]

## 2019-06-20 NOTE — DISCHARGE PLANNING
Received Choice form at 0805  Agency/Facility Name: [facility]  Referral sent per Choice form @ 0805     Received Choice form at 0905  Agency/Facility Name: [facility]  Referral sent per Choice form @ 0905  Phone [phone]  Fax [phone]     @7715  Agency/Facility Name: [facility]  Spoke To: Kimmie  Outcome: Wondering insurance.    @1240  Agency/Facility Name: [facility]  Spoke To: Kimmie  Outcome: Does patient have a wound vac?    @4613  Agency/Facility Name: [facility]  Outcome: Declined due to wound vac.

## 2019-06-20 NOTE — PROGRESS NOTES
"[facility] Medicine Daily Progress Note    Date of Service  6/20/2019    Chief Complaint  63 y.o. male admitted 6/15/2019 with L shoulder pain and L heel ulcer.    Hospital Course    Hx TIIDM, COPD on 3 L HOT.  Presenting to [facility] with multiple complaints including shoulder pain following a fall and a chronic heel ulcer.  Admitted to CICU with Dx of sepsis from diabetic foot ulcer, Type II NSTEMI with Trop of 1.09.  Intubated on admission, extubated 6/17.    Interval Problem Update  Doing well today, eager for discharge. Understands it's difficult given the limited resources where he lives. Will discuss with SW today. Has been having significant pain with underlying chronic pain. States that they have \"have tried everything\" and regimen has been unchanged since 2007.    Consultants/Specialty  LPS  Pulmonology/Critical Care    Code Status  full    Disposition  Pending outpatient wound care.    Review of Systems  Review of Systems   Constitutional: Positive for malaise/fatigue. Negative for chills and fever.   HENT: Negative for congestion, hearing loss and sore throat.    Eyes: Negative for blurred vision and double vision.   Respiratory: Negative for cough, shortness of breath and wheezing.    Cardiovascular: Negative for chest pain, palpitations and leg swelling.   Gastrointestinal: Negative for abdominal pain, diarrhea, nausea and vomiting.   Genitourinary: Negative for dysuria, hematuria and urgency.   Musculoskeletal: Positive for joint pain. Negative for back pain and falls.        L shoulder pain, chronic   Skin: Negative for rash.        R heel wound    Neurological: Negative for dizziness, sensory change, focal weakness, loss of consciousness, weakness and headaches.   Psychiatric/Behavioral: Negative.         Physical Exam  Temp:  [36 °C (96.8 °F)-36.3 °C (97.4 °F)] 36.3 °C (97.4 °F)  Pulse:  [66-87] 86  Resp:  [14-22] 18  BP: (139-158)/(73-87) 139/75  SpO2:  [90 %-100 %] 90 %    Physical Exam "   Constitutional: He is oriented to person, place, and time. He appears well-developed and well-nourished. No distress.   HENT:   Head: Normocephalic and atraumatic.   Mouth/Throat: Oropharynx is clear and moist.   Eyes: EOM are normal. Right eye exhibits no discharge. Left eye exhibits no discharge. No scleral icterus.   Neck: Normal range of motion. No JVD present.   Cardiovascular: Normal rate.    No murmur heard.  Pulmonary/Chest: Effort normal and breath sounds normal. No stridor. No respiratory distress. He has no wheezes. He has no rales.   Abdominal: Soft. Bowel sounds are normal. There is no tenderness. There is no guarding.   Musculoskeletal: He exhibits edema (trace).   Wound vac in place L heel   Neurological: He is alert and oriented to person, place, and time.   Skin: Skin is warm and dry. No rash noted. He is not diaphoretic.   Psychiatric: He has a normal mood and affect. His speech is normal and behavior is normal.   Vitals reviewed.      Fluids    Intake/Output Summary (Last 24 hours) at 06/20/19 0725  Last data filed at 06/20/19 0530   Gross per 24 hour   Intake             1215 ml   Output             1775 ml   Net             -560 ml       Laboratory  Recent Labs      06/18/19   0600  06/19/19   0445  06/20/19   0502   WBC  25.1*  22.2*  15.1*   RBC  4.51*  4.27*  4.75   HEMOGLOBIN  12.5*  12.0*  13.5*   HEMATOCRIT  39.6*  37.7*  42.1   MCV  87.8  88.3  88.6   MCH  27.7  28.1  28.4   MCHC  31.6*  31.8*  32.1*   RDW  50.1*  49.6  49.4   PLATELETCT  298  301  256   MPV  10.0  10.1  9.6     Recent Labs      06/18/19   0600  06/19/19   0445  06/20/19   0502   SODIUM  142  139  137   POTASSIUM  3.4*  4.0  3.9   CHLORIDE  106  104  104   CO2  27  28  26   GLUCOSE  104*  199*  161*   BUN  33*  34*  37*   CREATININE  1.09  1.07  1.16   CALCIUM  7.7*  7.9*  7.9*                   Imaging  EC-ECHOCARDIOGRAM COMPLETE W/O CONT   Final Result      DX-CHEST-PORTABLE (1 VIEW)   Final Result         1.   Pulmonary edema and/or infiltrates are identified, which are somewhat decreased since the prior exam.      DX-ANKLE 3+ VIEWS LEFT   Final Result      Soft tissue swelling. No acute fracture identified.      DX-CHEST-PORTABLE (1 VIEW)   Final Result      1.  Improved aeration of the RIGHT lung base   2.  Otherwise no interval change in BILATERAL atelectasis or airspace disease      DX-ABDOMEN FOR TUBE PLACEMENT   Final Result      Enteric tube projects over the stomach.      DX-CHEST-LIMITED (1 VIEW)   Final Result      New right IJ line tip overlies the SVC and no pneumothorax is identified      New right basilar opacity effaces the diaphragm and could be from atelectasis or consolidation      DX-CHEST-PORTABLE (1 VIEW)   Final Result      Endotracheal tube projects at the level of the aortic arch.      Increased perihilar and bibasilar opacities may represent a combination of edema, atelectasis, pneumonia.         DX-CHEST-PORTABLE (1 VIEW)   Final Result      Increasing reticular and hazy opacity is compatible with edema      CT-SHOULDER W/O PLUS RECONS LEFT   Final Result         Prior resection of left humeral head. The residual head neck junction appears well corticated and could relate to chronic resection/deformity. There is also deformity and remodeling of the glenoid with well-corticated margin, suggesting of chronic change    as well.      No acute fracture.      There is no articulation between the proximal humerus and glenoid. The glenohumeral joint is replaced by loculated soft tissue or dense fluid, likely chronic change. The fluid is slightly less in 2018 CT.      Infection is considered less likely given the osseous cortex adjacent to the fluid is well corticated and demonstrates no rarefaction or destruction.      US-FOREIGN FILM ULTRASOUND   Final Result      OUTSIDE IMAGES-CT HEAD   Final Result      CI-[id number]-YTTBVNS FILM X-RAY   Final Result      OUTSIDE IMAGES-DX LOWER EXTREMITY, LEFT   Final  Result      AV-[id number]-[id number] FILM X-RAY   Final Result      OUTSIDE IMAGES-DX CHEST   Final Result      OUTSIDE IMAGES-DX UPPER EXTREMITY, LEFT   Final Result           Assessment/Plan  * Acute on chronic respiratory failure with hypoxia (HCC)- (present on admission)   Assessment & Plan    Extubated 6/17, doing well on 3L. Typically only on home O2 at night. Secondary to aspiration vs CAP  - continue to wean O2 to home regimen  - RT protocol  - weaning steroids, last dose of prednisone today  - increase mobilization, IS  - abx x5 days     Diabetic foot ulcer (HCC)- (present on admission)   Assessment & Plan    Infected left heel diabetic foot ulcer with group D enterococcus. No evidence of osteomyelitis  - ID and LPS following, appreciate  - s/p debridement on 6/18  - continue linezolid (changed from vanc 2/2 renal function) and zosyn; anticipate 2 weeks abx  - pain control  - wound care and wound vac  - SW following for dispo     Septic shock due to undetermined organism (HCC)- (present on admission)   Assessment & Plan    Improving. This is sepsis (without associated acute organ dysfunction). Likely source is infected diabetic ulcer and pneumonia. Leukocytosis improving, down to 15.1 today  - abx as above     OZ (acute kidney injury) (HCC)- (present on admission)   Assessment & Plan    Cr peaked at 1.45, down to 1.16 today. Appears to be stabilizing  - renally dose medications  - avoid nephrotoxic agents  - trend BMP  - will need to hold lisinopril if Cr worsens     Pneumonia due to infectious organism- (present on admission)   Assessment & Plan    Linezolid/zosyn for diabetic foot wound     Cardiomyopathy (HCC)- (present on admission)   Assessment & Plan    Echo LVEF 40%. No exacerbation and is euvolemic.  - continue metop and lisinopril with holding parameters     Elevated troponin- (present on admission)   Assessment & Plan    Likely type II NSTEMI in the setting of sepsis, trended down. No chest pain     DM  (diabetes mellitus), type 2, uncontrolled (CMS-HCC)- (present on admission)   Assessment & Plan    Uncontrolled, with hyperglycemia. A1C elevated at 12.2%  - lantus 44U BID  - resistant sliding scale changed from q6 to ac/hs  - hypoglycemia protocol and DM diet  - DM education     HTN (hypertension)- (present on admission)   Assessment & Plan    Normo to hypertensive.  - continue metop, lisinopril (monitor renal function, had slight bump in Cr today)  - labetalol IV PRN     Left shoulder pain- (present on admission)   Assessment & Plan    No acute fracture noted on CT  Pain control with Tylenol, oxycodone and IV morphine.  Monitor respiratory status closely  PT OT  If persistent pain we will consider consulting his orthopedic surgeon      Tobacco dependence- (present on admission)   Assessment & Plan    Cont to encourage cessation     Hypokalemia- (present on admission)   Assessment & Plan    Improved. Magnesium normal  - monitor and replete as needed          VTE prophylaxis: lovenox

## 2019-06-20 NOTE — CONSULTS
Diabetes education: Met with pt this afternoon. Pt has hx of diabetes on insulin. Pt states he uses Lantus pens and regular insulin vials. Pt states he takes 44 units of Lantus twice a day and ac meals if blood sugars are over 200/240 he covers with regular insulin per sliding scale.  Discussed goals for blood sugar, insulin storage, shelf life ( he uses regular insulin longer than the 28/20 days recommended), and site rotation. He states he test his blood sugars 3 to 4 times a day. Reviewed foot care as well.  Pt was admitted with blood sugar of 351 and Hg A1c of 12.2%. Pt is currently on Lantus 44 units BID with Regular insulin sliding scale every six hours ( needs to change to ac and hs now that pt eating) with blood sugars of 174(3 units), 277 ( 7 units) and 276 ( 7 units). Now that pt eating he may benefit in changing the insulin to Humalog and adding a meal bolus.  Plan: Pt states he has his supplies at home. Pt may benefit with an updated sliding scale chart at discharge to get his blood sugars to goal. Pt needs sliding scale changed from every six hours to ac and hs now that he is eating. Pt may also need to change insulin from regular to humalog and add a meal bolus to the regime. Please call [phone] if needs change.

## 2019-06-20 NOTE — DISCHARGE PLANNING
Hospital Care Management Discharge Planning     Anticipated Discharge Disposition:   · Home w/ [place] and WC     Action:   · This RN CM received 3 phone calls regarding patient's insurance coverage for wound vac and HH.  · Clarisa with [employer] spoke to the workman's   and they stated they will not be covering the diabetic ulcer treatment.  · This RN CM updated Clarisa with [employer] ([phone]) that patient also has medicare A and B.  · Per Kimmie with [employer], they do not take workman's comp.  · This RN CM updated Kimmie with [employer]  ([phone]) that the patient has medicare A and B.     Barriers to Discharge:   · Medical Clearance.  · Wound Vac and Wound Care.     Plan:   · Await call back from Clarisa and Kimmie.  · F/U with Critical access hospital treatment team.   · Continue to provide support services and assistance with discharge planning as needed.

## 2019-06-20 NOTE — PROGRESS NOTES
2 RN skin check complete with DAKOTA Mohamud.   Devices in place: silicone NC, wound vac.  Skin assessed under devices: yes.  Confirmed pressure ulcers found on: N/A.  New potential pressure ulcers noted on N/A. Wound consult placed appropriately for existing wounds.  The following interventions in place: pressure redistribution mattress, patient turns self in bed, skin assessed appropriately, silicone NC in use, heel float boots in use.    Patient has bilateral redness to ears that is blanchable.  Left heel has wound vac in place.  Scattered scabs/ulcers to patient's right foot.  No other skin issues at this time.

## 2019-06-20 NOTE — WOUND TEAM
"[facility]  Inpatient Services  Wound and Skin Care Evaluation    Admission Date: 6/15/2019     Consult Date: 06/17/2019 @ 0420   [facility], [facility]H, SH: Reviewed    Unit where seen by Wound Team: [id number]     WOUND CONSULT RELATED TO:  DFU's bilateral feet     SUBJECTIVE:  \"I hope this heals soon.\"      Self Report / Pain Level:  Pt premedicated prior to dressing change       OBJECTIVE:  NPWT dressing to left heel intact - no leaks or drainage noted.     WOUND TYPE, LOCATION, CHARACTERISTICS (Pressure Injuries: location, stage, POA or date identified)       Negative Pressure Wound Therapy Foot Left (Active)   NPWT Pump Mode / Pressure Setting Continuous;125 mmHg    Dressing Type Small; veraflo    Number of Foam Pieces Used 3    Canister Changed No    VAC VeraFlo Irrigant Normal Saline    VAC VeraFlo Soak Time (mins) 10    VAC VeraFlo Instill Volume (ml) 6    VAC VeraFlo - Therapy Time (hrs) 2.5    VAC VeraFlo Pressure (mm/Hg) Continuous;125 mmHg      Wound 06/16/19 Diabetic Ulcer Heel right plantar (Active)   Wound Image      Site Assessment Pink;Bleeding    Elizabeth-wound Assessment Intact    Margins Attached edges    Wound Length (cm) 1 cm    Wound Width (cm) 1 cm    Wound Depth (cm) 0.3 cm    Wound Surface Area (cm^2) 1 cm^2    Tunneling 0 cm    Undermining 0 cm    Closure None    Drainage Amount Small    Drainage Description Serosanguineous    Non-staged Wound Description Full thickness    Treatments Cleansed;Site care    Cleansing Approved Wound Cleanser    Periwound Protectant Benzoin    Dressing Options Honey Colloid;Adhesive Foam    Dressing Cleansing/Solutions Not Applicable    Dressing Changed New    Dressing Status Clean;Dry;Intact    Dressing Change Frequency Tuesday, Thursday, Saturday    NEXT Dressing Change  06/22/19    NEXT Weekly Photo (Inpatient Only) 06/27/19    WOUND NURSE ONLY - Odor None    WOUND NURSE ONLY - Pulses Right;DP;PT;1+    WOUND NURSE ONLY - Exposed Structures None    WOUND NURSE " ONLY - Tissue Type and Percentage 100% pink        Wound 06/16/19 Diabetic Ulcer Heel left medial and plantar (Active)   Wound Image         Site Assessment Yellow;White;Red    Elizabeth-wound Assessment Pink;Maceration    Margins Defined edges    Wound Length (cm) 3 cm    Wound Width (cm) 2 cm    Wound Depth (cm) 0.5 cm    Wound Surface Area (cm^2) 6 cm^2    Tunneling 0 cm    Undermining 0 cm    Closure Secondary intention    Drainage Amount Moderate    Drainage Description Sanguineous    Non-staged Wound Description Full thickness    Treatments Cleansed;Site care    Cleansing Approved Wound Cleanser    Periwound Protectant Benzoin;Drape    Dressing Options Wound Vac    Dressing Cleansing/Solutions Normal Saline    Dressing Changed Changed    Dressing Status Clean;Dry;Intact    Dressing Change Frequency Tuesday, Thursday, Saturday    NEXT Dressing Change  06/22/19    NEXT Weekly Photo (Inpatient Only) 06/27/19    WOUND NURSE ONLY - Odor Mild;Foul    WOUND NURSE ONLY - Pulses Left;2+;DP    WOUND NURSE ONLY - Exposed Structures Adipose    WOUND NURSE ONLY - Tissue Type and Percentage mixture of yellow, white, pink/red tissue    WOUND NURSE ONLY - Time Spent with Patient (mins) 80           Vascular:    Dorsal Pedal pulses:  Left 2+, Right 2+  Posterior tib pulses:   Left: 2+, Right 1+        Lab Values:    WBC:       WBC   Date/Time Value Ref Range Status   06/20/2019 05:02 AM 15.1 (H) 4.8 - 10.8 K/uL Final     A1C:      Lab Results   Component Value Date/Time    HBA1C 12.2 (H) 06/16/2019 11:37 AM           Culture:  Left Heel: completed on 6/18. Positive for saphy aureus, group d enterococcus species, streptococcus constellatus     INTERVENTIONS BY WOUND TEAM:  Patient seen w/ Naseem LPS APRN.  NPWT dressing removed from left heel. No retained foam noted. Pt noted to have moderate sanguinous bleeding on left medial heel wound. Attempted to stop bleeding w/ pressure, but was not successful. 1 piece of silver nitrate used to  cauterize bleeding on left medial heel wound w/ positive results. Cleansed wound w/ wound cleanser and gauze. Naseem ESCALERA performed bedside debridement of bilateral heel wounds (please refer to her note). Measurements and picture done of bilateral heel wounds. Benzoin to periwound of left heel. AqAg applied to part of periwound, w/ paste ring applied to the other part of periwound. 2 pieces of veraflo foam used to fill wound. Foam secured w/ drape. Small window cut for button from trac pad. (3 pieces of veraflo foam used total). Trac pad applied. Seal check and test cycle done. Veraflo settings set at 6 ml of fluid/soak time for 10 mins/every 2.5 hours. No leaks or drainage noted. Sacral mepilex applied under tubing for skin protection. Small piece of honey colloid cut and applied to right plantar foot wound secured w/ adhesive foam.     Dressing selection: Left heel: NPWT veraflo dressing  Right Heel: Honeycolloid, adhesive foam        Interdisciplinary consultation: Patient, Bedside RN ([place]), Naseem ESCALERA    EVALUATION: Patient is 62 y/o male with poorly controlled diabetes admitted with left shoulder pain and left DFU. Patient taken to the oR on 6/18 for excisional debridement of left heel and placement of NPWT dressing. NPWT dressing was removed and pt was switched to NPWT veraflo dressing to assist in removing adherent slough and promote granulation of tissue. Honey colloid applied to right plantar foot wound. Honey colloid applied to cleanse and autolytically debride due to its high osmolarity. As well as help lower overall wound pH, while promoting a moisture-balanced environment conducive to wound healing.    Factors affecting wound healing: DM, possible infection, tobacco abuse, age    Goals: Steady decrease in wound area and depth weekly.    NURSING PLAN OF CARE ORDERS (X):    Dressing changes: See UPDATED  Dressing Care orders: X  Skin care: See Skin Care orders:   Rectal tube care: See Rectal Tube Care  orders:   Other orders:    WOUND TEAM PLAN OF CARE (X):   NPWT change 3 x week:    X    Dressing changes by wound team:       Follow up as needed:       Other (explain):     Anticipated discharge plans (X):   SNF:           Home Care:           Outpatient Wound Center:            Self Care:            Other:  TBD -Patient currently on veraflo NPWT dressing. Pt will need outpatient wound follow up.

## 2019-06-20 NOTE — PROGRESS NOTES
Infectious Disease Progress Note    Author: Alyce Hernandez M.D. Date & Time of service: 2019  9:00 AM    Chief Complaint:  Follow-up for left heel infection    Interval History:  63 y.o. man with history of type 2 diabetes mellitus comp located by peripheral neuropathy and diabetic foot ulcers, history of MRSA infection in  and SALOME on nocturnal oxygen admitted 6/15/2019 for respiratory failure.  Found to have a left heel wound infection status post excisional debridement down to subcutaneous tissue on 2019.  Cultures growing Staphylococcus aureus.     afebrile WBC 15.1 patient transferred out of the ICU overnight.  He is doing well without any new issues but is eager to go home.  Tolerating IV antibiotics.  No diarrhea    Labs Reviewed.    Review of Systems:  Review of Systems   Constitutional: Negative for chills and fever.   Respiratory: Negative for cough and shortness of breath.    Gastrointestinal: Negative for abdominal pain, diarrhea, nausea and vomiting.   Musculoskeletal: Positive for myalgias.   Neurological: Positive for sensory change.   All other systems reviewed and are negative.      Hemodynamics:  Temp (24hrs), Av.2 °C (97.2 °F), Min:36 °C (96.8 °F), Max:36.3 °C (97.4 °F)  Temperature: 36.3 °C (97.4 °F)  Pulse  Av.3  Min: 66  Max: 117Heart Rate (Monitored): 78  Blood Pressure : 139/75, NIBP: 130/67       Physical Exam:  Physical Exam   Constitutional: He is oriented to person, place, and time. He appears well-developed.   HENT:   Mouth/Throat: Oropharynx is clear and moist. No oropharyngeal exudate.   Eyes: Pupils are equal, round, and reactive to light. Conjunctivae and EOM are normal.   Neck: Neck supple.   Cardiovascular: Normal rate and regular rhythm.    No murmur heard.  Pulmonary/Chest: Effort normal. He has no wheezes. He has no rales.   Abdominal: Soft. There is no tenderness.   Musculoskeletal: He exhibits no edema.   Left heel with wound VAC in place and  primary surgical bandage    Left shoulder scar-clean   Neurological: He is alert and oriented to person, place, and time.   Psychiatric: He has a normal mood and affect. His behavior is normal.       Meds:    Current Facility-Administered Medications:   •  linezolid  •  predniSONE  •  aspirin  •  lisinopril  •  enoxaparin (LOVENOX) injection  •  acetaminophen  •  [DISCONTINUED] senna-docusate **AND** polyethylene glycol/lytes **AND** magnesium hydroxide **AND** bisacodyl  •  insulin glargine  •  oxyCODONE immediate-release  •  ipratropium-albuterol  •  pregabalin  •  methadone  •  metoprolol  •  hydrALAZINE  •  labetalol  •  senna-docusate  •  Notify provider if pain remains uncontrolled **AND** Use the numeric rating scale (NRS-11) on regular floors and Critical-Care Pain Observation Tool (CPOT) on ICUs/Trauma to assess pain **AND** Pulse Ox (Oximetry) **AND** Pharmacy Consult Request **AND** If patient difficult to arouse and/or has respiratory depression, stop any opiates that are currently infusing and call a Rapid Response. **AND** [DISCONTINUED] oxyCODONE immediate-release **AND** [DISCONTINUED] oxyCODONE immediate-release **AND** [DISCONTINUED] morphine injection  •  Respiratory Care per Protocol  •  [COMPLETED] piperacillin-tazobactam **AND** piperacillin-tazobactam **AND** [DISCONTINUED] MD Alert...Vancomycin per Pharmacy  •  ondansetron  •  ondansetron  •  insulin regular **AND** Accu-Chek Q6 if NPO **AND** NOTIFY MD and PharmD **AND** [DISCONTINUED] glucose **AND** dextrose 10% bolus    Labs:  Recent Labs      06/18/19   0600  06/19/19   0445  06/20/19   0502   WBC  25.1*  22.2*  15.1*   RBC  4.51*  4.27*  4.75   HEMOGLOBIN  12.5*  12.0*  13.5*   HEMATOCRIT  39.6*  37.7*  42.1   MCV  87.8  88.3  88.6   MCH  27.7  28.1  28.4   RDW  50.1*  49.6  49.4   PLATELETCT  298  301  256   MPV  10.0  10.1  9.6   NEUTSPOLYS  88.70*  81.30*  77.00*   LYMPHOCYTES  7.80*  11.00*  14.70*   MONOCYTES  2.60  6.10  5.90    EOSINOPHILS  0.90  0.40  1.20   BASOPHILS  0.00  0.20  0.20     Recent Labs      06/18/19   0600  06/19/19   0445  06/20/19   0502   SODIUM  142  139  137   POTASSIUM  3.4*  4.0  3.9   CHLORIDE  106  104  104   CO2  27  28  26   GLUCOSE  104*  199*  161*   BUN  33*  34*  37*     Recent Labs      06/18/19   0600  06/19/19   0445  06/20/19   0502   CREATININE  1.09  1.07  1.16       Imaging:  Ct-shoulder W/o Plus Recons Left    Result Date: 6/15/2019  6/15/2019 10:05 PM HISTORY/REASON FOR EXAM:  Shoulder pain, acute, persistent, xray and exam nonspecific. History of left shoulder dislocation. Sepsis. TECHNIQUE/ EXAM DESCRIPTION AND NUMBER OF VIEWS:  CT scan of the LEFT shoulder without contrast and including reconstructions. Thin-section noncontrast helical images were obtained from the clavicle through the scapula. Coronal and sagittal reconstructions were generated. Up to date radiation dose reduction adjustments have been utilized to meet ALARA standards for radiation dose reduction. COMPARISON: Radiograph 6/15/2019, 1/12/2018, CT 1/14/2018 FINDINGS: Prior resection of the left humeral head with residual head neck junction appears well corticated and could relate to chronic resection/deformity. There is also deformity and remodeling of the glenoid with well-corticated margin, suggesting of chronic change as well. There is no articulation between the proximal humerus and glenoid. The joint is replaced by loculated soft tissue or dense fluid (image 65 series 4). All rotator cuff muscle are completely atrophied and likely no rotator cuff tendon connecting to the humerus.     Prior resection of left humeral head. The residual head neck junction appears well corticated and could relate to chronic resection/deformity. There is also deformity and remodeling of the glenoid with well-corticated margin, suggesting of chronic change  as well. No acute fracture. There is no articulation between the proximal humerus and  glenoid. The glenohumeral joint is replaced by loculated soft tissue or dense fluid, likely chronic change. The fluid is slightly less in 2018 CT. Infection is considered less likely given the osseous cortex adjacent to the fluid is well corticated and demonstrates no rarefaction or destruction.    Dx-ankle 3+ Views Left    Result Date: 6/17/2019 6/17/2019 3:01 PM HISTORY/REASON FOR EXAM:  For worsening wound. Soft tissue ulceration TECHNIQUE/EXAM DESCRIPTION AND NUMBER OF VIEWS:  3 views of the LEFT ankle. COMPARISON: None. FINDINGS: Soft tissue swelling. No acute fracture identified. Ankle joint degenerative changes. Large posterior calcaneal spur at the Achilles insertion. Soft tissue swelling posterior and inferior to the calcaneus.     Soft tissue swelling. No acute fracture identified.    Dx-chest-limited (1 View)    Result Date: 6/16/2019 6/16/2019 3:37 PM HISTORY/REASON FOR EXAM: central line placement. TECHNIQUE/EXAM DESCRIPTION AND NUMBER OF VIEWS: Single portable view of the chest. COMPARISON: 1:44 PM. FINDINGS: New right IJ line tip overlies the SVC. No pneumothorax New partial right hemidiaphragm effacement with hazy basilar opacity. No other change is identified     New right IJ line tip overlies the SVC and no pneumothorax is identified New right basilar opacity effaces the diaphragm and could be from atelectasis or consolidation    Dx-chest-portable (1 View)    Result Date: 6/18/2019 6/18/2019 4:30 AM HISTORY/REASON FOR EXAM: For indication of respiratory failure; For indication of respiratory failure TECHNIQUE/EXAM DESCRIPTION:  Single AP view of the chest. COMPARISON: Yesterday FINDINGS: Endotracheal tube has been removed in the interim.  Dobbhoff tube has been removed in the interim. Cardiomegaly is observed. The mediastinal contour appears within normal limits.  The central  pulmonary vasculature appears prominent and indistinct. The lungs appear well expanded bilaterally.  Diffuse scattered  hazy pulmonary parenchymal opacities are seen. No significant pleural effusions are identified. The bony structures appear age-appropriate.     1.  Pulmonary edema and/or infiltrates are identified, which are somewhat decreased since the prior exam.    Dx-chest-portable (1 View)    Result Date: 6/17/2019 6/17/2019 2:07 AM HISTORY/REASON FOR EXAM:  For indication of respiratory failure; For indication of respiratory failure TECHNIQUE/EXAM DESCRIPTION AND NUMBER OF VIEWS: Single portable view of the chest. COMPARISON: Yesterday FINDINGS: Hardware is stably positioned in its visualized extent. HEART: Stable size. LUNGS: ] Opacity is decreased. LEFT basilar opacities unchanged. PLEURA: No effusion or pneumothorax.     1.  Improved aeration of the RIGHT lung base 2.  Otherwise no interval change in BILATERAL atelectasis or airspace disease    Dx-chest-portable (1 View)    Result Date: 6/16/2019 6/16/2019 1:46 PM HISTORY/REASON FOR EXAM:  intubation. TECHNIQUE/EXAM DESCRIPTION AND NUMBER OF VIEWS: Single portable view of the chest. COMPARISON: 6/16/2019 FINDINGS: The cardiomediastinal silhouette is stable. There are increased perihilar and bibasilar airspace opacities, right greater than left. No pleural effusion or pneumothorax is seen. There is a chronic deformity of the proximal left humerus. Endotracheal tube  projects at the level of the aortic arch.     Endotracheal tube projects at the level of the aortic arch. Increased perihilar and bibasilar opacities may represent a combination of edema, atelectasis, pneumonia.     Dx-chest-portable (1 View)    Result Date: 6/16/2019 6/16/2019 12:03 PM HISTORY/REASON FOR EXAM: Cough. Desaturation TECHNIQUE/EXAM DESCRIPTION AND NUMBER OF VIEWS: Single AP view of the chest. COMPARISON: Yesterday FINDINGS: Lungs: There is some increasing reticular and hazy indistinct opacity. Lung volumes have mildly diminished Pleura:  No pleural space process is seen. Heart and mediastinum:  The cardiomediastinal contours are stable.     Increasing reticular and hazy opacity is compatible with edema    Ec-echocardiogram Complete W/o Cont    Result Date: 2019  Transthoracic Echo Report Echocardiography Laboratory CONCLUSIONS Compared to the images of the study done 18, RCA territory wall motion abnormalities are now present. Fair quality study, some off axis views. Moderately reduced left ventricular systolic function. Left ventricular ejection fraction is visually estimated to be 40%. Hypokinesis of the basal to mid inferior and inferolateral wall. Mild mitral annular calcification. Unable to estimate pulmonary artery pressure due to an inadequate tricuspid regurgitant jet. Dilated inferior vena cava with inspiratory collapse, RAP  8 mmHg. TAMARA PINK Exam Date:         2019                    10:51 Exam Location:     Inpatient Priority:          Routine Ordering Physician:        NATALEE RODRIGUEZ Referring Physician:       [id number] Brown Sonographer:               DELIA Schulte Age:    63     Gender:    M MRN:    [id number] :    1955 BSA:    2.17   Ht (in):    71     Wt (lb):    214 Exam Type:     Complete Indications:     Respiratory Distress ICD Codes:       518.82 CPT Codes:       70462 BP:   150    /   82     HR:   80 Technical Quality:       Fair MEASUREMENTS  (Male / Female) Normal Values 2D ECHO LV Diastolic Diameter PLAX        5.5 cm                4.2 - 5.9 / 3.9 - 5.3 cm LV Systolic Diameter PLAX         4.4 cm                2.1 - 4.0 cm IVS Diastolic Thickness           1.3 cm                LVPW Diastolic Thickness          1.1 cm                RV Diameter 4C                    2.9 cm                2.5 - 2.9 cm LVOT Diameter                     2.2 cm                RA Diameter                       3.8 cm                Estimated LV Ejection Fraction    40 %                  LV Ejection Fraction MOD BP       35.9 %                >= 55  % LV Ejection Fraction MOD  4C       36.3 %                LV Ejection Fraction MOD 2C       36.8 %                LA Volume Index                   27.8 cm³/m²           16 - 28 cm³/m² IVC Diameter                      2.1 cm                DOPPLER AV Peak Velocity                  1.3 m/s               AV Peak Gradient                  7.2 mmHg              AV Mean Gradient                  3.7 mmHg              LVOT Peak Velocity                0.72 m/s              AV Area Cont Eq vti               2.4 cm²               Mitral E Point Velocity           0.49 m/s              Mitral E to A Ratio               0.58                  MV Pressure Half Time             46 ms                 MV Area PHT                       4.8 cm²               MV Deceleration Time              159 ms                Pulmonary Vein Systolic Velocity  0.37 m/s              Pulmonary Vein Diastolic Velocit  0.29 m/s              Pulmonary Vein S/D Ratio          1.3                   PV Peak Velocity                  0.88 m/s              PV Peak Gradient                  3.1 mmHg              RVOT Peak Velocity                0.55 m/s              * Indicates values subject to auto-interpretation LV EF:  40    % FINDINGS Left Ventricle Normal left ventricular chamber size. Mild concentric left ventricular hypertrophy. Moderately reduced left ventricular systolic function. Left ventricular ejection fraction is visually estimated to be 40%. Hypokinesis of the basal to mid inferior and inferolateral wall. Normal diastolic function. Right Ventricle Normal right ventricular size and systolic function. Right Atrium Normal right atrial size. Dilated inferior vena cava with inspiratory collapse. Left Atrium Normal left atrial size. Mitral Valve Structurally normal mitral valve without significant stenosis. Mild mitral annular calcification. Trace mitral regurgitation. Aortic Valve Structurally normal aortic valve without significant stenosis or regurgitation. Tricuspid  Valve Structurally normal tricuspid valve without significant stenosis or regurgitation. Unable to estimate pulmonary artery pressure due to an inadequate tricuspid regurgitant jet. Pulmonic Valve The pulmonic valve is not well visualized. Pericardium Normal pericardium without effusion. Aorta Normal aortic root for body surface area. Ascending aorta diameter is 3.2 cm. Mya Alegre MD (Electronically Signed) Final Date:     18 June 2019                 17:39    Dx-abdomen For Tube Placement    Result Date: 6/16/2019 6/16/2019 5:59 PM HISTORY/REASON FOR EXAM:  Line evaluation. TECHNIQUE/EXAM DESCRIPTION AND NUMBER OF VIEWS:  1 view(s) of the abdomen. COMPARISON:  9/15/2007. FINDINGS: Enteric tube projects over the stomach. There is a nonspecific bowel gas pattern. Surgical clips are seen in the right upper quadrant. There are bibasilar airspace opacities. Postsurgical and degenerative changes are seen in the lumbar spine.     Enteric tube projects over the stomach.      Micro:  Results     Procedure Component Value Units Date/Time    Anaerobic Culture [[id number]] Collected:  06/18/19 1340    Order Status:  Completed Specimen:  Tissue Updated:  06/19/19 1454     Significant Indicator NEG     Source TISS     Site LEFT HEEL WOUND     Culture Result Culture in progress.    Narrative:       Surgery Specimen    CULTURE TISSUE W/ GRM STAIN [[id number]]  (Abnormal) Collected:  06/18/19 1340    Order Status:  Completed Specimen:  Tissue Updated:  06/19/19 1454     Significant Indicator POS (POS)     Source TISS     Site LEFT HEEL WOUND     Culture Result - (A)     Gram Stain Result Many WBCs.  Few Gram positive rods.  Many Gram positive cocci.       Culture Result Staphylococcus aureus  Heavy growth   (A)    Narrative:       Surgery Specimen    GRAM STAIN [[id number]] Collected:  06/18/19 1340    Order Status:  Completed Specimen:  Tissue Updated:  06/18/19 1907     Significant Indicator .     Source TISS     Site LEFT  "HEEL WOUND     Gram Stain Result Many WBCs.  Few Gram positive rods.  Many Gram positive cocci.      Narrative:       Surgery Specimen    CULTURE RESPIRATORY W/ GRM STN [[id number]] Collected:  06/16/19 1500    Order Status:  Completed Specimen:  Respirate Updated:  06/18/19 1013     Significant Indicator NEG     Source RESP     Site Bronchoalveolar Lavage RLL     Culture Result Rare growth usual upper respiratory sammie  No clinically significant Staphylococcus aureus, Methicillin  Resistant Staphylococcus aureus, or Pseudomonas species  isolated.       Gram Stain Result Moderate WBCs.  No organisms seen.      BLOOD CULTURE [[id number]] Collected:  06/15/19 2230    Order Status:  Completed Specimen:  Blood from Peripheral Updated:  06/17/19 0825     Significant Indicator NEG     Source BLD     Site PERIPHERAL     Culture Result No Growth  Note: Blood cultures are incubated for 5 days and  are monitored continuously.Positive blood cultures  are called to the RN and reported as soon as  they are identified.      Narrative:       Per Hospital Policy: Only change Specimen Src: to \"Line\" if  specified by physician order.  Right AC    BLOOD CULTURE [[id number]] Collected:  06/15/19 2249    Order Status:  Completed Specimen:  Blood from Peripheral Updated:  06/17/19 0825     Significant Indicator NEG     Source BLD     Site PERIPHERAL     Culture Result No Growth  Note: Blood cultures are incubated for 5 days and  are monitored continuously.Positive blood cultures  are called to the RN and reported as soon as  they are identified.      Narrative:       Per Hospital Policy: Only change Specimen Src: to \"Line\" if  specified by physician order.  Left Hand    GRAM STAIN [[id number]] Collected:  06/16/19 1500    Order Status:  Completed Specimen:  Respirate Updated:  06/16/19 2351     Significant Indicator .     Source RESP     Site Bronchoalveolar Lavage RLL     Gram Stain Result Moderate WBCs.  No organisms seen.      Blood " Culture [[id number]] Collected:  06/16/19 0000    Order Status:  Canceled Specimen:  Other from Peripheral     Blood Culture [[id number]] Collected:  06/16/19 0000    Order Status:  Canceled Specimen:  Other from Peripheral     Blood Culture [[id number]]     Order Status:  Canceled Specimen:  Blood from Peripheral     Blood Culture [[id number]]     Order Status:  Canceled Specimen:  Blood from Peripheral     URINALYSIS CULTURE, IF INDICATED [[id number]]  (Abnormal) Collected:  06/15/19 2149    Order Status:  Completed Specimen:  Blood Updated:  06/15/19 2203     Color Yellow     Character Clear     Specific Gravity 1.025     Ph 6.0     Glucose >=1000 (A) mg/dL      Ketones Negative mg/dL      Protein 300 (A) mg/dL      Bilirubin Negative     Urobilinogen, Urine 1.0     Nitrite Negative     Leukocyte Esterase Negative     Occult Blood Large (A)     Micro Urine Req Microscopic          Assessment:  Active Hospital Problems    Diagnosis   • Acute on chronic respiratory failure with hypoxia ([facility]) [J96.21]   • Septic shock due to undetermined organism ([facility]) [A41.9, R65.21]   • OZ (acute kidney injury) ([facility]) [N17.9]   • SALOME (obstructive sleep apnea) [G47.33]   • Pneumonia due to infectious organism [J18.9]   • Aspiration into airway [T17.908A]   • Cardiomyopathy (HCC) [I42.9]   • Elevated troponin [R74.8]   • Diabetic foot ulcer ([facility]) [E11.621, L97.509]   • DM (diabetes mellitus), type 2, uncontrolled (CMS-HCC) [E11.65]   • HTN (hypertension) [I10]   • Mucus plugging of bronchi [J98.09]   • Left shoulder pain [M25.512]   • Tobacco dependence [F17.200]   • Hypokalemia [E87.6]   • Low back pain [M54.5]       Plan:  Left diabetic foot infection  Fevers resolved  Leukocytosis decreasing  Status post excisional debridement down to subcutaneous tissues on 6/19/2019  Or cultures-Staphylococcus aureus  Continue p.o. Zyvox and IV Zosyn for now pending susceptibilities  Monitor CBC  Continue wound VAC and care  Anticipate a 2-week  course of antibiotics from date of surgery for soft tissue infection    Pneumonia  Status post extubation on 6/17  Status post bronchoscopy with BAL on 6/16  BAL cultures-upper respiratory sammie  On antibiotics above    Acute kidney injury  Secondary to septic shock which is now resolved  Avoid nephrotoxins  Renally dose antibiotics accordingly  Overall improving  Monitor    Type 2 diabetes mellitus, poorly controlled  Hemoglobin A1c 12.2  Diabetes education  Hindrance to wound healing  Control blood sugars to control current infection and promote wound healing  Blood sugar 161 today    Diabetic peripheral neuropathy  Contributing to above  Control blood sugars    Elevated troponins  Likely demand ischemia secondary to septic shock  Troponins now downtrending    Discussed with internal medicine.

## 2019-06-21 PROBLEM — R79.89 ELEVATED TROPONIN: Status: RESOLVED | Noted: 2018-01-25 | Resolved: 2019-06-21

## 2019-06-21 LAB
ANION GAP SERPL CALC-SCNC: 8 MMOL/L (ref 0–11.9)
BACTERIA BLD CULT: NORMAL
BACTERIA BLD CULT: NORMAL
BACTERIA TISS AEROBE CULT: ABNORMAL
BUN SERPL-MCNC: 28 MG/DL (ref 8–22)
CALCIUM SERPL-MCNC: 8 MG/DL (ref 8.5–10.5)
CHLORIDE SERPL-SCNC: 106 MMOL/L (ref 96–112)
CO2 SERPL-SCNC: 26 MMOL/L (ref 20–33)
CREAT SERPL-MCNC: 1.13 MG/DL (ref 0.5–1.4)
ERYTHROCYTE [DISTWIDTH] IN BLOOD BY AUTOMATED COUNT: 49.1 FL (ref 35.9–50)
GLUCOSE BLD-MCNC: 149 MG/DL (ref 65–99)
GLUCOSE BLD-MCNC: 158 MG/DL (ref 65–99)
GLUCOSE BLD-MCNC: 166 MG/DL (ref 65–99)
GLUCOSE BLD-MCNC: 82 MG/DL (ref 65–99)
GLUCOSE SERPL-MCNC: 99 MG/DL (ref 65–99)
GRAM STN SPEC: ABNORMAL
HCT VFR BLD AUTO: 39.5 % (ref 42–52)
HGB BLD-MCNC: 12.5 G/DL (ref 14–18)
MCH RBC QN AUTO: 27.8 PG (ref 27–33)
MCHC RBC AUTO-ENTMCNC: 31.6 G/DL (ref 33.7–35.3)
MCV RBC AUTO: 88 FL (ref 81.4–97.8)
PLATELET # BLD AUTO: 252 K/UL (ref 164–446)
PMV BLD AUTO: 9.6 FL (ref 9–12.9)
POTASSIUM SERPL-SCNC: 3.6 MMOL/L (ref 3.6–5.5)
RBC # BLD AUTO: 4.49 M/UL (ref 4.7–6.1)
SIGNIFICANT IND 70042: ABNORMAL
SIGNIFICANT IND 70042: NORMAL
SIGNIFICANT IND 70042: NORMAL
SITE SITE: ABNORMAL
SITE SITE: NORMAL
SITE SITE: NORMAL
SODIUM SERPL-SCNC: 140 MMOL/L (ref 135–145)
SOURCE SOURCE: ABNORMAL
SOURCE SOURCE: NORMAL
SOURCE SOURCE: NORMAL
WBC # BLD AUTO: 15.6 K/UL (ref 4.8–10.8)

## 2019-06-21 PROCEDURE — 700105 HCHG RX REV CODE 258: Performed by: INTERNAL MEDICINE

## 2019-06-21 PROCEDURE — 700111 HCHG RX REV CODE 636 W/ 250 OVERRIDE (IP): Performed by: INTERNAL MEDICINE

## 2019-06-21 PROCEDURE — A9270 NON-COVERED ITEM OR SERVICE: HCPCS | Performed by: INTERNAL MEDICINE

## 2019-06-21 PROCEDURE — 82962 GLUCOSE BLOOD TEST: CPT | Mod: 91

## 2019-06-21 PROCEDURE — 36415 COLL VENOUS BLD VENIPUNCTURE: CPT

## 2019-06-21 PROCEDURE — 700102 HCHG RX REV CODE 250 W/ 637 OVERRIDE(OP): Performed by: INTERNAL MEDICINE

## 2019-06-21 PROCEDURE — 85027 COMPLETE CBC AUTOMATED: CPT

## 2019-06-21 PROCEDURE — 99232 SBSQ HOSP IP/OBS MODERATE 35: CPT | Performed by: HOSPITALIST

## 2019-06-21 PROCEDURE — 700102 HCHG RX REV CODE 250 W/ 637 OVERRIDE(OP): Performed by: HOSPITALIST

## 2019-06-21 PROCEDURE — 770006 HCHG ROOM/CARE - MED/SURG/GYN SEMI*

## 2019-06-21 PROCEDURE — A9270 NON-COVERED ITEM OR SERVICE: HCPCS | Performed by: HOSPITALIST

## 2019-06-21 PROCEDURE — 80048 BASIC METABOLIC PNL TOTAL CA: CPT

## 2019-06-21 PROCEDURE — 99233 SBSQ HOSP IP/OBS HIGH 50: CPT | Performed by: INTERNAL MEDICINE

## 2019-06-21 RX ORDER — INSULIN GLARGINE 100 [IU]/ML
44 INJECTION, SOLUTION SUBCUTANEOUS 2 TIMES DAILY
Status: DISCONTINUED | OUTPATIENT
Start: 2019-06-21 | End: 2019-06-30

## 2019-06-21 RX ADMIN — INSULIN LISPRO 3 UNITS: 100 INJECTION, SOLUTION INTRAVENOUS; SUBCUTANEOUS at 11:54

## 2019-06-21 RX ADMIN — PREGABALIN 300 MG: 150 CAPSULE ORAL at 05:16

## 2019-06-21 RX ADMIN — PIPERACILLIN SODIUM AND TAZOBACTAM SODIUM 3.38 G: 3; .375 INJECTION, POWDER, FOR SOLUTION INTRAVENOUS at 13:01

## 2019-06-21 RX ADMIN — INSULIN GLARGINE 44 UNITS: 100 INJECTION, SOLUTION SUBCUTANEOUS at 17:50

## 2019-06-21 RX ADMIN — INSULIN LISPRO 3 UNITS: 100 INJECTION, SOLUTION INTRAVENOUS; SUBCUTANEOUS at 20:56

## 2019-06-21 RX ADMIN — PIPERACILLIN SODIUM AND TAZOBACTAM SODIUM 3.38 G: 3; .375 INJECTION, POWDER, FOR SOLUTION INTRAVENOUS at 05:17

## 2019-06-21 RX ADMIN — PREGABALIN 300 MG: 150 CAPSULE ORAL at 17:11

## 2019-06-21 RX ADMIN — PIPERACILLIN SODIUM AND TAZOBACTAM SODIUM 3.38 G: 3; .375 INJECTION, POWDER, FOR SOLUTION INTRAVENOUS at 21:06

## 2019-06-21 RX ADMIN — SENNOSIDES,DOCUSATE SODIUM 2 TABLET: 8.6; 5 TABLET, FILM COATED ORAL at 17:11

## 2019-06-21 RX ADMIN — OXYCODONE HYDROCHLORIDE 5 MG: 5 TABLET ORAL at 21:06

## 2019-06-21 RX ADMIN — METHADONE HYDROCHLORIDE 35 MG: 10 TABLET ORAL at 17:10

## 2019-06-21 RX ADMIN — METOPROLOL TARTRATE 12.5 MG: 25 TABLET, FILM COATED ORAL at 05:16

## 2019-06-21 RX ADMIN — ASPIRIN 81 MG 81 MG: 81 TABLET ORAL at 05:17

## 2019-06-21 RX ADMIN — INSULIN LISPRO 5 UNITS: 100 INJECTION, SOLUTION INTRAVENOUS; SUBCUTANEOUS at 17:48

## 2019-06-21 RX ADMIN — INSULIN LISPRO 5 UNITS: 100 INJECTION, SOLUTION INTRAVENOUS; SUBCUTANEOUS at 11:56

## 2019-06-21 RX ADMIN — LINEZOLID 600 MG: 600 TABLET, FILM COATED ORAL at 17:46

## 2019-06-21 RX ADMIN — METOPROLOL TARTRATE 12.5 MG: 25 TABLET, FILM COATED ORAL at 17:11

## 2019-06-21 RX ADMIN — ENOXAPARIN SODIUM 40 MG: 100 INJECTION SUBCUTANEOUS at 05:17

## 2019-06-21 RX ADMIN — LINEZOLID 600 MG: 600 TABLET, FILM COATED ORAL at 05:15

## 2019-06-21 RX ADMIN — LISINOPRIL 10 MG: 10 TABLET ORAL at 05:16

## 2019-06-21 RX ADMIN — METHADONE HYDROCHLORIDE 35 MG: 10 TABLET ORAL at 05:15

## 2019-06-21 ASSESSMENT — ENCOUNTER SYMPTOMS
BACK PAIN: 0
BLOOD IN STOOL: 0
DIAPHORESIS: 0
PSYCHIATRIC NEGATIVE: 1
SORE THROAT: 0
FALLS: 0
ABDOMINAL PAIN: 0
NAUSEA: 0
LOSS OF CONSCIOUSNESS: 0
PALPITATIONS: 0
WHEEZING: 0
DIARRHEA: 0
CHILLS: 0
VOMITING: 0
SENSORY CHANGE: 1
FEVER: 0
BLURRED VISION: 0
WEAKNESS: 0
SHORTNESS OF BREATH: 0
FOCAL WEAKNESS: 0
COUGH: 0
DIZZINESS: 0
MYALGIAS: 1
SENSORY CHANGE: 0
HEADACHES: 0

## 2019-06-21 NOTE — PROGRESS NOTES
Infectious Disease Progress Note    Author: Alyce Hernandez M.D. Date & Time of service: 2019  11:06 AM    Chief Complaint:  Follow-up for left heel infection    Interval History:  63 y.o. man with history of type 2 diabetes mellitus comp located by peripheral neuropathy and diabetic foot ulcers, history of MRSA infection in  and SALOME on nocturnal oxygen admitted 6/15/2019 for respiratory failure.  Found to have a left heel wound infection status post excisional debridement down to subcutaneous tissue on 2019.  Cultures growing Staphylococcus aureus.     afebrile WBC 15.1 patient transferred out of the ICU overnight.  He is doing well without any new issues but is eager to go home.  Tolerating IV antibiotics.  No diarrhea   AF WBC 15.6 patient had bedside debridement of his right heel yesterday and endured significant pain.  Wound photos reviewed and R heel with significant erythema, edema and necrotic tissue    Labs Reviewed.    Review of Systems:  Review of Systems   Constitutional: Negative for chills and fever.   Respiratory: Negative for cough and shortness of breath.    Gastrointestinal: Negative for abdominal pain, diarrhea, nausea and vomiting.   Musculoskeletal: Positive for myalgias.   Neurological: Positive for sensory change. Negative for dizziness and headaches.   All other systems reviewed and are negative.      Hemodynamics:  Temp (24hrs), Av.2 °C (97.1 °F), Min:35.6 °C (96 °F), Max:36.7 °C (98 °F)  Temperature: 36.1 °C (97 °F)  Pulse  Av.1  Min: 66  Max: 117   Blood Pressure : 124/71       Physical Exam:  Physical Exam   Constitutional: He is oriented to person, place, and time. He appears well-developed.   HENT:   Mouth/Throat: Oropharynx is clear and moist. No oropharyngeal exudate.   Eyes: Pupils are equal, round, and reactive to light. Conjunctivae and EOM are normal.   Neck: Neck supple.   Cardiovascular: Normal rate and regular rhythm.    No murmur  heard.  Pulmonary/Chest: Effort normal. He has no wheezes. He has no rales.   Abdominal: Soft. There is no tenderness.   Musculoskeletal: He exhibits no edema.   Left heel with wound VAC in place     Left great toe plantar ulcer    Right heel wound with scant serosanguineous drainage.  No surrounding erythema    Left shoulder scar-clean   Neurological: He is alert and oriented to person, place, and time.   Psychiatric: He has a normal mood and affect. His behavior is normal.       Meds:    Current Facility-Administered Medications:   •  insulin glargine  •  tramadol  •  insulin lispro **AND** insulin lispro **AND** Accu-Chek ACHS **AND** NOTIFY MD and PharmD **AND** glucose **AND** dextrose 10% bolus  •  linezolid  •  aspirin  •  lisinopril  •  enoxaparin (LOVENOX) injection  •  acetaminophen  •  [DISCONTINUED] senna-docusate **AND** polyethylene glycol/lytes **AND** magnesium hydroxide **AND** bisacodyl  •  oxyCODONE immediate-release  •  ipratropium-albuterol  •  pregabalin  •  methadone  •  metoprolol  •  hydrALAZINE  •  labetalol  •  senna-docusate  •  Notify provider if pain remains uncontrolled **AND** Use the numeric rating scale (NRS-11) on regular floors and Critical-Care Pain Observation Tool (CPOT) on ICUs/Trauma to assess pain **AND** Pulse Ox (Oximetry) **AND** Pharmacy Consult Request **AND** If patient difficult to arouse and/or has respiratory depression, stop any opiates that are currently infusing and call a Rapid Response. **AND** [DISCONTINUED] oxyCODONE immediate-release **AND** [DISCONTINUED] oxyCODONE immediate-release **AND** [DISCONTINUED] morphine injection  •  Respiratory Care per Protocol  •  [COMPLETED] piperacillin-tazobactam **AND** piperacillin-tazobactam **AND** [DISCONTINUED] MD Alert...Vancomycin per Pharmacy  •  ondansetron  •  ondansetron    Labs:  Recent Labs      06/19/19   0445  06/20/19   0502  06/21/19   0340   WBC  22.2*  15.1*  15.6*   RBC  4.27*  4.75  4.49*   HEMOGLOBIN   12.0*  13.5*  12.5*   HEMATOCRIT  37.7*  42.1  39.5*   MCV  88.3  88.6  88.0   MCH  28.1  28.4  27.8   RDW  49.6  49.4  49.1   PLATELETCT  301  256  252   MPV  10.1  9.6  9.6   NEUTSPOLYS  81.30*  77.00*   --    LYMPHOCYTES  11.00*  14.70*   --    MONOCYTES  6.10  5.90   --    EOSINOPHILS  0.40  1.20   --    BASOPHILS  0.20  0.20   --      Recent Labs      06/19/19   0445  06/20/19   0502  06/21/19   0340   SODIUM  139  137  140   POTASSIUM  4.0  3.9  3.6   CHLORIDE  104  104  106   CO2  28  26  26   GLUCOSE  199*  161*  99   BUN  34*  37*  28*     Recent Labs      06/19/19 0445  06/20/19   0502  06/21/19   0340   CREATININE  1.07  1.16  1.13       Imaging:  Ct-shoulder W/o Plus Recons Left    Result Date: 6/15/2019  6/15/2019 10:05 PM HISTORY/REASON FOR EXAM:  Shoulder pain, acute, persistent, xray and exam nonspecific. History of left shoulder dislocation. Sepsis. TECHNIQUE/ EXAM DESCRIPTION AND NUMBER OF VIEWS:  CT scan of the LEFT shoulder without contrast and including reconstructions. Thin-section noncontrast helical images were obtained from the clavicle through the scapula. Coronal and sagittal reconstructions were generated. Up to date radiation dose reduction adjustments have been utilized to meet ALARA standards for radiation dose reduction. COMPARISON: Radiograph 6/15/2019, 1/12/2018, CT 1/14/2018 FINDINGS: Prior resection of the left humeral head with residual head neck junction appears well corticated and could relate to chronic resection/deformity. There is also deformity and remodeling of the glenoid with well-corticated margin, suggesting of chronic change as well. There is no articulation between the proximal humerus and glenoid. The joint is replaced by loculated soft tissue or dense fluid (image 65 series 4). All rotator cuff muscle are completely atrophied and likely no rotator cuff tendon connecting to the humerus.     Prior resection of left humeral head. The residual head neck junction  appears well corticated and could relate to chronic resection/deformity. There is also deformity and remodeling of the glenoid with well-corticated margin, suggesting of chronic change  as well. No acute fracture. There is no articulation between the proximal humerus and glenoid. The glenohumeral joint is replaced by loculated soft tissue or dense fluid, likely chronic change. The fluid is slightly less in 2018 CT. Infection is considered less likely given the osseous cortex adjacent to the fluid is well corticated and demonstrates no rarefaction or destruction.    Dx-ankle 3+ Views Left    Result Date: 6/17/2019 6/17/2019 3:01 PM HISTORY/REASON FOR EXAM:  For worsening wound. Soft tissue ulceration TECHNIQUE/EXAM DESCRIPTION AND NUMBER OF VIEWS:  3 views of the LEFT ankle. COMPARISON: None. FINDINGS: Soft tissue swelling. No acute fracture identified. Ankle joint degenerative changes. Large posterior calcaneal spur at the Achilles insertion. Soft tissue swelling posterior and inferior to the calcaneus.     Soft tissue swelling. No acute fracture identified.    Dx-chest-limited (1 View)    Result Date: 6/16/2019 6/16/2019 3:37 PM HISTORY/REASON FOR EXAM: central line placement. TECHNIQUE/EXAM DESCRIPTION AND NUMBER OF VIEWS: Single portable view of the chest. COMPARISON: 1:44 PM. FINDINGS: New right IJ line tip overlies the SVC. No pneumothorax New partial right hemidiaphragm effacement with hazy basilar opacity. No other change is identified     New right IJ line tip overlies the SVC and no pneumothorax is identified New right basilar opacity effaces the diaphragm and could be from atelectasis or consolidation    Dx-chest-portable (1 View)    Result Date: 6/18/2019 6/18/2019 4:30 AM HISTORY/REASON FOR EXAM: For indication of respiratory failure; For indication of respiratory failure TECHNIQUE/EXAM DESCRIPTION:  Single AP view of the chest. COMPARISON: Yesterday FINDINGS: Endotracheal tube has been removed in  the interim.  Dobbhoff tube has been removed in the interim. Cardiomegaly is observed. The mediastinal contour appears within normal limits.  The central  pulmonary vasculature appears prominent and indistinct. The lungs appear well expanded bilaterally.  Diffuse scattered hazy pulmonary parenchymal opacities are seen. No significant pleural effusions are identified. The bony structures appear age-appropriate.     1.  Pulmonary edema and/or infiltrates are identified, which are somewhat decreased since the prior exam.    Dx-chest-portable (1 View)    Result Date: 6/17/2019 6/17/2019 2:07 AM HISTORY/REASON FOR EXAM:  For indication of respiratory failure; For indication of respiratory failure TECHNIQUE/EXAM DESCRIPTION AND NUMBER OF VIEWS: Single portable view of the chest. COMPARISON: Yesterday FINDINGS: Hardware is stably positioned in its visualized extent. HEART: Stable size. LUNGS: ] Opacity is decreased. LEFT basilar opacities unchanged. PLEURA: No effusion or pneumothorax.     1.  Improved aeration of the RIGHT lung base 2.  Otherwise no interval change in BILATERAL atelectasis or airspace disease    Dx-chest-portable (1 View)    Result Date: 6/16/2019 6/16/2019 1:46 PM HISTORY/REASON FOR EXAM:  intubation. TECHNIQUE/EXAM DESCRIPTION AND NUMBER OF VIEWS: Single portable view of the chest. COMPARISON: 6/16/2019 FINDINGS: The cardiomediastinal silhouette is stable. There are increased perihilar and bibasilar airspace opacities, right greater than left. No pleural effusion or pneumothorax is seen. There is a chronic deformity of the proximal left humerus. Endotracheal tube  projects at the level of the aortic arch.     Endotracheal tube projects at the level of the aortic arch. Increased perihilar and bibasilar opacities may represent a combination of edema, atelectasis, pneumonia.     Dx-chest-portable (1 View)    Result Date: 6/16/2019 6/16/2019 12:03 PM HISTORY/REASON FOR EXAM: Cough. Desaturation  TECHNIQUE/EXAM DESCRIPTION AND NUMBER OF VIEWS: Single AP view of the chest. COMPARISON: Yesterday FINDINGS: Lungs: There is some increasing reticular and hazy indistinct opacity. Lung volumes have mildly diminished Pleura:  No pleural space process is seen. Heart and mediastinum: The cardiomediastinal contours are stable.     Increasing reticular and hazy opacity is compatible with edema    Ec-echocardiogram Complete W/o Cont    Result Date: 2019  Transthoracic Echo Report Echocardiography Laboratory CONCLUSIONS Compared to the images of the study done 18, RCA territory wall motion abnormalities are now present. Fair quality study, some off axis views. Moderately reduced left ventricular systolic function. Left ventricular ejection fraction is visually estimated to be 40%. Hypokinesis of the basal to mid inferior and inferolateral wall. Mild mitral annular calcification. Unable to estimate pulmonary artery pressure due to an inadequate tricuspid regurgitant jet. Dilated inferior vena cava with inspiratory collapse, RAP  8 mmHg. TAMARA PINK Exam Date:         2019                    10:51 Exam Location:     Inpatient Priority:          Routine Ordering Physician:        NATALEE RODRIGUEZ Referring Physician:       [id number] Brown Sonographer:               DELIA Schulte Age:    63     Gender:    M MRN:    [id number] :    1955 BSA:    2.17   Ht (in):    71     Wt (lb):    214 Exam Type:     Complete Indications:     Respiratory Distress ICD Codes:       518.82 CPT Codes:       50646 BP:   150    /   82     HR:   80 Technical Quality:       Fair MEASUREMENTS  (Male / Female) Normal Values 2D ECHO LV Diastolic Diameter PLAX        5.5 cm                4.2 - 5.9 / 3.9 - 5.3 cm LV Systolic Diameter PLAX         4.4 cm                2.1 - 4.0 cm IVS Diastolic Thickness           1.3 cm                LVPW Diastolic Thickness          1.1 cm                RV Diameter 4C                    2.9 cm                 2.5 - 2.9 cm LVOT Diameter                     2.2 cm                RA Diameter                       3.8 cm                Estimated LV Ejection Fraction    40 %                  LV Ejection Fraction MOD BP       35.9 %                >= 55  % LV Ejection Fraction MOD 4C       36.3 %                LV Ejection Fraction MOD 2C       36.8 %                LA Volume Index                   27.8 cm³/m²           16 - 28 cm³/m² IVC Diameter                      2.1 cm                DOPPLER AV Peak Velocity                  1.3 m/s               AV Peak Gradient                  7.2 mmHg              AV Mean Gradient                  3.7 mmHg              LVOT Peak Velocity                0.72 m/s              AV Area Cont Eq vti               2.4 cm²               Mitral E Point Velocity           0.49 m/s              Mitral E to A Ratio               0.58                  MV Pressure Half Time             46 ms                 MV Area PHT                       4.8 cm²               MV Deceleration Time              159 ms                Pulmonary Vein Systolic Velocity  0.37 m/s              Pulmonary Vein Diastolic Velocit  0.29 m/s              Pulmonary Vein S/D Ratio          1.3                   PV Peak Velocity                  0.88 m/s              PV Peak Gradient                  3.1 mmHg              RVOT Peak Velocity                0.55 m/s              * Indicates values subject to auto-interpretation LV EF:  40    % FINDINGS Left Ventricle Normal left ventricular chamber size. Mild concentric left ventricular hypertrophy. Moderately reduced left ventricular systolic function. Left ventricular ejection fraction is visually estimated to be 40%. Hypokinesis of the basal to mid inferior and inferolateral wall. Normal diastolic function. Right Ventricle Normal right ventricular size and systolic function. Right Atrium Normal right atrial size. Dilated inferior vena cava with inspiratory collapse.  "Left Atrium Normal left atrial size. Mitral Valve Structurally normal mitral valve without significant stenosis. Mild mitral annular calcification. Trace mitral regurgitation. Aortic Valve Structurally normal aortic valve without significant stenosis or regurgitation. Tricuspid Valve Structurally normal tricuspid valve without significant stenosis or regurgitation. Unable to estimate pulmonary artery pressure due to an inadequate tricuspid regurgitant jet. Pulmonic Valve The pulmonic valve is not well visualized. Pericardium Normal pericardium without effusion. Aorta Normal aortic root for body surface area. Ascending aorta diameter is 3.2 cm. Mya Alegre MD (Electronically Signed) Final Date:     18 June 2019                 17:39    Dx-abdomen For Tube Placement    Result Date: 6/16/2019 6/16/2019 5:59 PM HISTORY/REASON FOR EXAM:  Line evaluation. TECHNIQUE/EXAM DESCRIPTION AND NUMBER OF VIEWS:  1 view(s) of the abdomen. COMPARISON:  9/15/2007. FINDINGS: Enteric tube projects over the stomach. There is a nonspecific bowel gas pattern. Surgical clips are seen in the right upper quadrant. There are bibasilar airspace opacities. Postsurgical and degenerative changes are seen in the lumbar spine.     Enteric tube projects over the stomach.      Micro:  Results     Procedure Component Value Units Date/Time    BLOOD CULTURE [[id number]] Collected:  06/15/19 2249    Order Status:  Completed Specimen:  Blood from Peripheral Updated:  06/21/19 0100     Significant Indicator NEG     Source BLD     Site PERIPHERAL     Culture Result No growth after 5 days of incubation.    Narrative:       Per Hospital Policy: Only change Specimen Src: to \"Line\" if  specified by physician order.  Left Hand    BLOOD CULTURE [[id number]] Collected:  06/15/19 2230    Order Status:  Completed Specimen:  Blood from Peripheral Updated:  06/21/19 0100     Significant Indicator NEG     Source BLD     Site PERIPHERAL     Culture Result No growth " "after 5 days of incubation.    Narrative:       Per Hospital Policy: Only change Specimen Src: to \"Line\" if  specified by physician order.  Right AC    Anaerobic Culture [[id number]] Collected:  06/18/19 1340    Order Status:  Completed Specimen:  Tissue Updated:  06/20/19 1449     Significant Indicator NEG     Source TISS     Site LEFT HEEL WOUND     Culture Result Culture in progress.    Narrative:       Surgery Specimen    CULTURE TISSUE W/ GRM STAIN [[id number]]  (Abnormal)  (Susceptibility) Collected:  06/18/19 1340    Order Status:  Completed Specimen:  Tissue Updated:  06/20/19 1449     Significant Indicator POS (POS)     Source TISS     Site LEFT HEEL WOUND     Culture Result - (A)     Gram Stain Result Many WBCs.  Few Gram positive rods.  Many Gram positive cocci.       Culture Result Staphylococcus aureus  Heavy growth   (A)      Group D Enterococcus species  Moderate growth  Combination therapy with ampicillin, penicillin, or  vancomycin (for susceptible strains) plus an aminoglycoside  is usually indicated for serious enterococcal infections,  such as endocarditis unless high-level resistance to both  gentamicin and streptomycin is documented; such combinations  are predicted to result in synergistic killing of the  Enterococcus.   (A)      Streptococcus constellatus  Heavy growth   (A)    Narrative:       Surgery Specimen    Culture & Susceptibility     STAPHYLOCOCCUS AUREUS     Antibiotic Sensitivity Microscan Unit Status    Ampicillin/sulbactam Sensitive <=8/4 mcg/mL Preliminary    Method: TAIWO    Clindamycin Sensitive <=0.5 mcg/mL Preliminary    Method: TAIWO    Daptomycin Sensitive <=0.5 mcg/mL Preliminary    Method: TAIWO    Erythromycin Sensitive <=0.5 mcg/mL Preliminary    Method: TAIWO    Moxifloxacin Sensitive <=0.5 mcg/mL Preliminary    Method: TAIWO    Oxacillin Sensitive <=0.25 mcg/mL Preliminary    Method: TAIWO    Tetracycline Sensitive <=4 mcg/mL Preliminary    Method: TAIWO    Trimeth/Sulfa Sensitive " <=0.5/9.5 mcg/mL Preliminary    Method: TAIWO    Vancomycin Sensitive 2 mcg/mL Preliminary    Method: TAIWO                       GRAM STAIN [[id number]] Collected:  06/18/19 1340    Order Status:  Completed Specimen:  Tissue Updated:  06/18/19 1907     Significant Indicator .     Source TISS     Site LEFT HEEL WOUND     Gram Stain Result Many WBCs.  Few Gram positive rods.  Many Gram positive cocci.      Narrative:       Surgery Specimen    CULTURE RESPIRATORY W/ GRM STN [[id number]] Collected:  06/16/19 1500    Order Status:  Completed Specimen:  Respirate Updated:  06/18/19 1013     Significant Indicator NEG     Source RESP     Site Bronchoalveolar Lavage RLL     Culture Result Rare growth usual upper respiratory sammie  No clinically significant Staphylococcus aureus, Methicillin  Resistant Staphylococcus aureus, or Pseudomonas species  isolated.       Gram Stain Result Moderate WBCs.  No organisms seen.      GRAM STAIN [[id number]] Collected:  06/16/19 1500    Order Status:  Completed Specimen:  Respirate Updated:  06/16/19 2351     Significant Indicator .     Source RESP     Site Bronchoalveolar Lavage RLL     Gram Stain Result Moderate WBCs.  No organisms seen.      Blood Culture [[id number]] Collected:  06/16/19 0000    Order Status:  Canceled Specimen:  Other from Peripheral     Blood Culture [[id number]] Collected:  06/16/19 0000    Order Status:  Canceled Specimen:  Other from Peripheral     Blood Culture [[id number]]     Order Status:  Canceled Specimen:  Blood from Peripheral     Blood Culture [[id number]]     Order Status:  Canceled Specimen:  Blood from Peripheral     URINALYSIS CULTURE, IF INDICATED [[id number]]  (Abnormal) Collected:  06/15/19 2149    Order Status:  Completed Specimen:  Blood Updated:  06/15/19 2203     Color Yellow     Character Clear     Specific Gravity 1.025     Ph 6.0     Glucose >=1000 (A) mg/dL      Ketones Negative mg/dL      Protein 300 (A) mg/dL      Bilirubin Negative      Urobilinogen, Urine 1.0     Nitrite Negative     Leukocyte Esterase Negative     Occult Blood Large (A)     Micro Urine Req Microscopic          Assessment:  Active Hospital Problems    Diagnosis   • Acute on chronic respiratory failure with hypoxia ([facility]) [J96.21]   • Septic shock due to undetermined organism ([facility]) [A41.9, R65.21]   • OZ (acute kidney injury) ([facility]) [N17.9]   • SALOME (obstructive sleep apnea) [G47.33]   • Pneumonia due to infectious organism [J18.9]   • Aspiration into airway [T17.908A]   • Cardiomyopathy ([facility]) [I42.9]   • Elevated troponin [R74.8]   • Diabetic foot ulcer ([facility]) [E11.621, L97.509]   • DM (diabetes mellitus), type 2, uncontrolled ([facility]) [E11.65]   • HTN (hypertension) [I10]   • Mucus plugging of bronchi [J98.09]   • Left shoulder pain [M25.512]   • Tobacco dependence [F17.200]   • Hypokalemia [E87.6]   • Low back pain [M54.5]       Plan:  Left diabetic foot infection, not improving. On treatment  Fevers resolved  Leukocytosis decreasing  Status post excisional debridement down to subcutaneous tissues on 6/19/2019  Or cultures- MSSA, amp S efaecalis and Streptococcus Constellatus  DC Zyvox   Continue IV Zosyn for now  Monitor CBC  Continue wound VAC and care  May require a BKA if no clinical improvement  Wound care photos of left heel reviewed with necrosis, erythema and edema  Prognosis for limb salvage guarded    Pneumonia  Status post extubation on 6/17  Status post bronchoscopy with BAL on 6/16  BAL cultures-upper respiratory sammie  On antibiotics above    Acute kidney injury  Secondary to septic shock which is now resolved  Avoid nephrotoxins  Renally dose antibiotics accordingly  Overall improving  Monitor    Type 2 diabetes mellitus, poorly controlled  Hemoglobin A1c 12.2  Diabetes education  [place]rance to wound healing  Control blood sugars to control current infection and promote wound healing    Diabetic peripheral neuropathy  Contributing to above  Control blood  sugars    Elevated troponins  Likely demand ischemia secondary to septic shock  Troponins now downtrending    I have performed a physical exam and reviewed and updated ROS and plan today 6/21/2019.  In review of yesterday's note 6/20/2019, there are no changes except as documented above.    Discussed with internal medicine and JW Gusman from [facility]

## 2019-06-21 NOTE — CARE PLAN
Problem: Venous Thromboembolism (VTW)/Deep Vein Thrombosis (DVT) Prevention:  Goal: Patient will participate in Venous Thrombosis (VTE)/Deep Vein Thrombosis (DVT)Prevention Measures   06/21/19 0022   OTHER   Pharmacologic Prophylaxis Used LMWH: Enoxaparin(Lovenox)       Problem: Pain Management  Goal: Pain level will decrease to patient's comfort goal  Pain medication provided as needed

## 2019-06-21 NOTE — PROGRESS NOTES
S/p left heel debridement  In moon boot  VAC has been changed  WBC declining  Cx - polymicrobial  Continue VAC therapy  LPS following  ABX  NWB left heel  May require eventual BKA

## 2019-06-21 NOTE — DISCHARGE PLANNING
Hospital Care management Discharge Planning     Anticipated Discharge Disposition:  · SNF     Action:  · This RN CM met with pt at bedside to obtain SNF choice.  · Patient communicates that he would like to go to [facility] in [place], but if they can't take him, he is ok with staying [place].    · This RN CM contacted [facility] and spoke to Sfoy in admissions. They do not and will not have a Male bed available for another week or more.  · This RN CM faxed choice form for [place] SNFs to ANGELITA Naqvi.     Barriers to Discharge:  · SNF acceptance and bed availability.     Plan:  · Follow up with [facility] and treatment team.

## 2019-06-21 NOTE — RESPIRATORY CARE
COPD EDUCATION by COPD CLINICAL EDUCATOR  6/21/2019 at 10:35 AM by Maria G Díaz     Patient interviewed by COPD education team. Patient does not have a formal diagnosis of COPD, he denies having COPD and denies using any home pulmonary medications, he is a non-smoker, therefore does not qualify for the COPD program.

## 2019-06-21 NOTE — PROGRESS NOTES
[facility] Medicine Daily Progress Note    Date of Service  6/21/2019    Chief Complaint  63 y.o. male admitted 6/15/2019 with L shoulder pain and L heel ulcer.    Hospital Course    Hx TIIDM, COPD on 3 L HOT.  Presenting to [place] with multiple complaints including shoulder pain following a fall and a chronic heel ulcer.  Admitted to CICU with Dx of sepsis from diabetic foot ulcer, Type II NSTEMI with Trop of 1.09.  Intubated on admission, extubated 6/17.    Interval Problem Update  Doing well today, states his pain is moderately well controlled which is unusual for him. Wound vac in place, given type of wound vac, will need SNF. Referral placed    Consultants/Specialty  LPS  Pulmonology/Critical Care    Code Status  full    Disposition  SNF referral placed    Review of Systems  Review of Systems   Constitutional: Negative for chills, diaphoresis, fever and malaise/fatigue.   HENT: Negative for congestion, hearing loss and sore throat.    Eyes: Negative for blurred vision.   Respiratory: Negative for cough, shortness of breath and wheezing.    Cardiovascular: Negative for chest pain, palpitations and leg swelling.   Gastrointestinal: Negative for abdominal pain, blood in stool, nausea and vomiting.   Genitourinary: Negative for dysuria, hematuria and urgency.   Musculoskeletal: Positive for joint pain. Negative for back pain and falls.        L shoulder pain, chronic   Skin: Negative for rash.        R heel wound    Neurological: Negative for dizziness, sensory change, focal weakness, loss of consciousness, weakness and headaches.   Psychiatric/Behavioral: Negative.         Physical Exam  Temp:  [35.6 °C (96 °F)-36.7 °C (98 °F)] 36.7 °C (98 °F)  Pulse:  [81-91] 85  Resp:  [18] 18  BP: (116-164)/(65-82) 156/82  SpO2:  [90 %-92 %] 90 %    Physical Exam   Constitutional: He is oriented to person, place, and time. He appears well-developed and well-nourished. No distress.   HENT:   Head: Normocephalic and atraumatic.    Mouth/Throat: Oropharynx is clear and moist.   Eyes: EOM are normal. Right eye exhibits no discharge. Left eye exhibits no discharge. No scleral icterus.   Neck: Normal range of motion. No JVD present.   Cardiovascular: Normal rate.    No murmur heard.  Pulmonary/Chest: Effort normal and breath sounds normal. No stridor. No respiratory distress. He has no wheezes. He has no rales.   Abdominal: Soft. Bowel sounds are normal. There is no tenderness. There is no guarding.   Musculoskeletal: He exhibits edema (trace).   Wound vac in place L heel   Neurological: He is alert and oriented to person, place, and time.   Skin: Skin is warm and dry. No rash noted. He is not diaphoretic.   Psychiatric: He has a normal mood and affect. His speech is normal and behavior is normal.   Vitals reviewed.  Patient seen and examined today on 6/21, unchanged from 6/20.    Fluids    Intake/Output Summary (Last 24 hours) at 06/21/19 0743  Last data filed at 06/21/19 0400   Gross per 24 hour   Intake              550 ml   Output             1250 ml   Net             -700 ml       Laboratory  Recent Labs      06/19/19   0445  06/20/19   0502  06/21/19   0340   WBC  22.2*  15.1*  15.6*   RBC  4.27*  4.75  4.49*   HEMOGLOBIN  12.0*  13.5*  12.5*   HEMATOCRIT  37.7*  42.1  39.5*   MCV  88.3  88.6  88.0   MCH  28.1  28.4  27.8   MCHC  31.8*  32.1*  31.6*   RDW  49.6  49.4  49.1   PLATELETCT  301  256  252   MPV  10.1  9.6  9.6     Recent Labs      06/19/19   0445  06/20/19   0502  06/21/19   0340   SODIUM  139  137  140   POTASSIUM  4.0  3.9  3.6   CHLORIDE  104  104  106   CO2  28  26  26   GLUCOSE  199*  161*  99   BUN  34*  37*  28*   CREATININE  1.07  1.16  1.13   CALCIUM  7.9*  7.9*  8.0*                   Imaging  EC-ECHOCARDIOGRAM COMPLETE W/O CONT   Final Result      DX-CHEST-PORTABLE (1 VIEW)   Final Result         1.  Pulmonary edema and/or infiltrates are identified, which are somewhat decreased since the prior exam.      DX-ANKLE  3+ VIEWS LEFT   Final Result      Soft tissue swelling. No acute fracture identified.      DX-CHEST-PORTABLE (1 VIEW)   Final Result      1.  Improved aeration of the RIGHT lung base   2.  Otherwise no interval change in BILATERAL atelectasis or airspace disease      DX-ABDOMEN FOR TUBE PLACEMENT   Final Result      Enteric tube projects over the stomach.      DX-CHEST-LIMITED (1 VIEW)   Final Result      New right IJ line tip overlies the SVC and no pneumothorax is identified      New right basilar opacity effaces the diaphragm and could be from atelectasis or consolidation      DX-CHEST-PORTABLE (1 VIEW)   Final Result      Endotracheal tube projects at the level of the aortic arch.      Increased perihilar and bibasilar opacities may represent a combination of edema, atelectasis, pneumonia.         DX-CHEST-PORTABLE (1 VIEW)   Final Result      Increasing reticular and hazy opacity is compatible with edema      CT-SHOULDER W/O PLUS RECONS LEFT   Final Result         Prior resection of left humeral head. The residual head neck junction appears well corticated and could relate to chronic resection/deformity. There is also deformity and remodeling of the glenoid with well-corticated margin, suggesting of chronic change    as well.      No acute fracture.      There is no articulation between the proximal humerus and glenoid. The glenohumeral joint is replaced by loculated soft tissue or dense fluid, likely chronic change. The fluid is slightly less in 2018 CT.      Infection is considered less likely given the osseous cortex adjacent to the fluid is well corticated and demonstrates no rarefaction or destruction.      US-FOREIGN FILM ULTRASOUND   Final Result      OUTSIDE IMAGES-CT HEAD   Final Result      [id number] FILM X-RAY   Final Result      OUTSIDE IMAGES-DX LOWER EXTREMITY, LEFT   Final Result      [id number]-[id number] FILM X-RAY   Final Result      OUTSIDE IMAGES-DX CHEST   Final Result      OUTSIDE  IMAGES-DX UPPER EXTREMITY, LEFT   Final Result           Assessment/Plan  * Acute on chronic respiratory failure with hypoxia (HCC)- (present on admission)   Assessment & Plan    Extubated 6/17, doing well on 1-2L continuous. Typically only on home O2 at night. Secondary to aspiration vs CAP  - continue to wean O2 to home regimen  - RT protocol  - weaning steroids, last dose of prednisone 6/20  - increase mobilization, IS  - abx x5 days     Diabetic foot ulcer (HCC)- (present on admission)   Assessment & Plan    Infected left heel diabetic foot ulcer with group D enterococcus. No evidence of osteomyelitis  - ID and LPS following, appreciate  - s/p debridement on 6/18  - continue linezolid (changed from vanc 2/2 renal function) and zosyn; anticipate 2 weeks abx  - pain control  - wound care and wound vac     Septic shock due to undetermined organism (HCC)- (present on admission)   Assessment & Plan    Improving. This is sepsis (without associated acute organ dysfunction). Likely source is infected diabetic ulcer and pneumonia. Leukocytosis improving, down to 15.1 today  - abx as above     OZ (acute kidney injury) (HCC)- (present on admission)   Assessment & Plan    Cr peaked at 1.45, down to 1.15 today. Stabilizing  - renally dose medications  - avoid nephrotoxic agents  - trend BMP  - will need to hold lisinopril if Cr worsens     Pneumonia due to infectious organism- (present on admission)   Assessment & Plan    Linezolid/zosyn for diabetic foot wound     Cardiomyopathy (HCC)- (present on admission)   Assessment & Plan    Echo LVEF 40%. No exacerbation and is euvolemic.  - continue metop and lisinopril with holding parameters     DM (diabetes mellitus), type 2, uncontrolled (CMS-HCC)- (present on admission)   Assessment & Plan    Uncontrolled, with hyperglycemia. A1C elevated at 12.2%. Blood sugars much improved over the last 24 hours  - lantus 44U BID  - resistant sliding scale and 5U TID with meals  -  hypoglycemia protocol and DM diet  - DM education     HTN (hypertension)- (present on admission)   Assessment & Plan    Normo to hypertensive.  - continue metop, lisinopril  - labetalol IV PRN     Left shoulder pain- (present on admission)   Assessment & Plan    No acute fracture noted on CT  Pain control with Tylenol, oxycodone and IV morphine.  Monitor respiratory status closely  PT OT  If persistent pain we will consider consulting his orthopedic surgeon      Tobacco dependence- (present on admission)   Assessment & Plan    Cont to encourage cessation     Hypokalemia- (present on admission)   Assessment & Plan    Improved. Magnesium normal  - monitor and replete as needed          VTE prophylaxis: lovenox

## 2019-06-21 NOTE — DISCHARGE PLANNING
Received Choice form at 1445  Agency[facility]Facility [facility]  Referral sent per Choice form @ 1445     @1510  Agency/Facility Name: [facility]  Spoke To: Xavier  Outcome: Declined due to non contracted with workers comp.    @1600  Agency/Facility Name: [facility]  Spoke To: Eusebio  Outcome: Declined due to criminal record and veraflo wound vac.    Agency/Facility Name: [facility]  Spoke To: Sofia  Outcome: Declined due to non-contracted insurance.    Agency/Facility Name: [facility]  Spoke To: Sofy  Outcome: Declined due to workers comp.

## 2019-06-21 NOTE — PROGRESS NOTES
Report received. Patient oriented x4. Pain level 8 out of 10 shoulder pain. Medication provided earlier, ice pack provided. Wound vac in place. Denies SOB. Fall risk interventions in place, bed in low position, and bed alarm on. Assessment completed.

## 2019-06-21 NOTE — PROGRESS NOTES
Patient refused scd, educated regarding importance.  Education reinforced by DAKOTA willis.  Patient continues to refuse.    Patient has wounds on legs, and is painful to have on per patient

## 2019-06-22 LAB
ANION GAP SERPL CALC-SCNC: 5 MMOL/L (ref 0–11.9)
BUN SERPL-MCNC: 27 MG/DL (ref 8–22)
CALCIUM SERPL-MCNC: 8 MG/DL (ref 8.5–10.5)
CHLORIDE SERPL-SCNC: 104 MMOL/L (ref 96–112)
CO2 SERPL-SCNC: 27 MMOL/L (ref 20–33)
CREAT SERPL-MCNC: 1.19 MG/DL (ref 0.5–1.4)
ERYTHROCYTE [DISTWIDTH] IN BLOOD BY AUTOMATED COUNT: 50.5 FL (ref 35.9–50)
GLUCOSE BLD-MCNC: 103 MG/DL (ref 65–99)
GLUCOSE BLD-MCNC: 104 MG/DL (ref 65–99)
GLUCOSE BLD-MCNC: 146 MG/DL (ref 65–99)
GLUCOSE BLD-MCNC: 68 MG/DL (ref 65–99)
GLUCOSE BLD-MCNC: 91 MG/DL (ref 65–99)
GLUCOSE SERPL-MCNC: 83 MG/DL (ref 65–99)
HCT VFR BLD AUTO: 35.6 % (ref 42–52)
HGB BLD-MCNC: 11.4 G/DL (ref 14–18)
MCH RBC QN AUTO: 28.6 PG (ref 27–33)
MCHC RBC AUTO-ENTMCNC: 32 G/DL (ref 33.7–35.3)
MCV RBC AUTO: 89.2 FL (ref 81.4–97.8)
PLATELET # BLD AUTO: 245 K/UL (ref 164–446)
PMV BLD AUTO: 9.6 FL (ref 9–12.9)
POTASSIUM SERPL-SCNC: 3.7 MMOL/L (ref 3.6–5.5)
RBC # BLD AUTO: 3.99 M/UL (ref 4.7–6.1)
SODIUM SERPL-SCNC: 136 MMOL/L (ref 135–145)
WBC # BLD AUTO: 15.9 K/UL (ref 4.8–10.8)

## 2019-06-22 PROCEDURE — 700102 HCHG RX REV CODE 250 W/ 637 OVERRIDE(OP): Performed by: HOSPITALIST

## 2019-06-22 PROCEDURE — 700111 HCHG RX REV CODE 636 W/ 250 OVERRIDE (IP): Performed by: INTERNAL MEDICINE

## 2019-06-22 PROCEDURE — 99233 SBSQ HOSP IP/OBS HIGH 50: CPT | Performed by: INTERNAL MEDICINE

## 2019-06-22 PROCEDURE — 700105 HCHG RX REV CODE 258: Performed by: INTERNAL MEDICINE

## 2019-06-22 PROCEDURE — A9270 NON-COVERED ITEM OR SERVICE: HCPCS | Performed by: INTERNAL MEDICINE

## 2019-06-22 PROCEDURE — 85027 COMPLETE CBC AUTOMATED: CPT

## 2019-06-22 PROCEDURE — 82962 GLUCOSE BLOOD TEST: CPT | Mod: 91

## 2019-06-22 PROCEDURE — 99232 SBSQ HOSP IP/OBS MODERATE 35: CPT | Performed by: INTERNAL MEDICINE

## 2019-06-22 PROCEDURE — 700102 HCHG RX REV CODE 250 W/ 637 OVERRIDE(OP): Performed by: INTERNAL MEDICINE

## 2019-06-22 PROCEDURE — 770006 HCHG ROOM/CARE - MED/SURG/GYN SEMI*

## 2019-06-22 PROCEDURE — 97605 NEG PRS WND THER DME<=50SQCM: CPT

## 2019-06-22 PROCEDURE — 80048 BASIC METABOLIC PNL TOTAL CA: CPT

## 2019-06-22 PROCEDURE — A9270 NON-COVERED ITEM OR SERVICE: HCPCS | Performed by: HOSPITALIST

## 2019-06-22 PROCEDURE — 700105 HCHG RX REV CODE 258

## 2019-06-22 PROCEDURE — 36415 COLL VENOUS BLD VENIPUNCTURE: CPT

## 2019-06-22 RX ORDER — SODIUM CHLORIDE 9 MG/ML
INJECTION, SOLUTION INTRAVENOUS
Status: COMPLETED
Start: 2019-06-22 | End: 2019-06-22

## 2019-06-22 RX ADMIN — METOPROLOL TARTRATE 12.5 MG: 25 TABLET, FILM COATED ORAL at 06:20

## 2019-06-22 RX ADMIN — OXYCODONE HYDROCHLORIDE 10 MG: 5 TABLET ORAL at 01:43

## 2019-06-22 RX ADMIN — METOPROLOL TARTRATE 12.5 MG: 25 TABLET, FILM COATED ORAL at 17:34

## 2019-06-22 RX ADMIN — OXYCODONE HYDROCHLORIDE 10 MG: 5 TABLET ORAL at 09:29

## 2019-06-22 RX ADMIN — OXYCODONE HYDROCHLORIDE 10 MG: 5 TABLET ORAL at 23:33

## 2019-06-22 RX ADMIN — OXYCODONE HYDROCHLORIDE 10 MG: 5 TABLET ORAL at 14:07

## 2019-06-22 RX ADMIN — PIPERACILLIN SODIUM AND TAZOBACTAM SODIUM 3.38 G: 3; .375 INJECTION, POWDER, FOR SOLUTION INTRAVENOUS at 06:22

## 2019-06-22 RX ADMIN — PREGABALIN 300 MG: 150 CAPSULE ORAL at 06:20

## 2019-06-22 RX ADMIN — INSULIN GLARGINE 44 UNITS: 100 INJECTION, SOLUTION SUBCUTANEOUS at 18:34

## 2019-06-22 RX ADMIN — ASPIRIN 81 MG 81 MG: 81 TABLET ORAL at 06:21

## 2019-06-22 RX ADMIN — METHADONE HYDROCHLORIDE 35 MG: 10 TABLET ORAL at 06:16

## 2019-06-22 RX ADMIN — METHADONE HYDROCHLORIDE 35 MG: 10 TABLET ORAL at 17:34

## 2019-06-22 RX ADMIN — PIPERACILLIN SODIUM AND TAZOBACTAM SODIUM 3.38 G: 3; .375 INJECTION, POWDER, FOR SOLUTION INTRAVENOUS at 21:39

## 2019-06-22 RX ADMIN — PREGABALIN 300 MG: 150 CAPSULE ORAL at 17:34

## 2019-06-22 RX ADMIN — ENOXAPARIN SODIUM 40 MG: 100 INJECTION SUBCUTANEOUS at 06:22

## 2019-06-22 RX ADMIN — SODIUM CHLORIDE 500 ML: 9 INJECTION, SOLUTION INTRAVENOUS at 06:21

## 2019-06-22 RX ADMIN — OXYCODONE HYDROCHLORIDE 10 MG: 5 TABLET ORAL at 18:34

## 2019-06-22 RX ADMIN — LISINOPRIL 10 MG: 10 TABLET ORAL at 06:20

## 2019-06-22 RX ADMIN — PIPERACILLIN SODIUM AND TAZOBACTAM SODIUM 3.38 G: 3; .375 INJECTION, POWDER, FOR SOLUTION INTRAVENOUS at 13:06

## 2019-06-22 RX ADMIN — TRAMADOL HYDROCHLORIDE 50 MG: 50 TABLET, FILM COATED ORAL at 21:43

## 2019-06-22 ASSESSMENT — ENCOUNTER SYMPTOMS
MYALGIAS: 1
MYALGIAS: 0
COUGH: 0
FOCAL WEAKNESS: 0
ORTHOPNEA: 0
LOSS OF CONSCIOUSNESS: 0
EYE PAIN: 0
HEARTBURN: 0
ABDOMINAL PAIN: 0
PALPITATIONS: 0
SORE THROAT: 0
EYE DISCHARGE: 0
DIZZINESS: 0
WEAKNESS: 0
SPUTUM PRODUCTION: 0
WEIGHT LOSS: 0
HEADACHES: 0
INSOMNIA: 0
NAUSEA: 0
STRIDOR: 0
CHILLS: 0
DIARRHEA: 0
SENSORY CHANGE: 0
NERVOUS/ANXIOUS: 0
BLOOD IN STOOL: 0
VOMITING: 0
SEIZURES: 0
BLURRED VISION: 0
FALLS: 0
FEVER: 0
SHORTNESS OF BREATH: 0
SENSORY CHANGE: 1
BACK PAIN: 0
WHEEZING: 0
DEPRESSION: 0
EYE REDNESS: 0
DIAPHORESIS: 0
NECK PAIN: 0

## 2019-06-22 NOTE — CARE PLAN
Problem: Safety  Goal: Will remain free from injury  Outcome: PROGRESSING AS EXPECTED  Discussed safety and fall risk. Safety and fall precautions in place, bed/chair alarm in use, call light within reach, bed locked in lowest position, non-skid socks in place, clutter-free environment, DME in bathroom. Patient verbalized understanding of safety and fall risk and uses call light appropriately for assistance.    Problem: Mobility  Goal: Risk for activity intolerance will decrease  Outcome: PROGRESSING AS EXPECTED  Patient is tolerating ambulation well with front-wheel walker and uses call light appropriately for assistance when out of bed.

## 2019-06-22 NOTE — PROGRESS NOTES
Infectious Disease Progress Note    Author: April Marcus M.D. Date & Time of service: 2019  9:17 AM    Chief Complaint:  Follow-up for left heel infection    Interval History:  63 y.o. man with history of type 2 diabetes mellitus comp located by peripheral neuropathy and diabetic foot ulcers, history of MRSA infection in  and SALOME on nocturnal oxygen admitted 6/15/2019 for respiratory failure.  Found to have a left heel wound infection status post excisional debridement down to subcutaneous tissue on 2019.  Cultures growing Staphylococcus aureus.     afebrile WBC 15.1 patient transferred out of the ICU overnight.  He is doing well without any new issues but is eager to go home.  Tolerating IV antibiotics.  No diarrhea   AF WBC 15.6 patient had bedside debridement of his right heel yesterday and endured significant pain.  Wound photos reviewed and R heel with significant erythema, edema and necrotic tissue  - No fevers. Feels tired. Still on 1.5 l of O2. He is on 3l of nocturnal O2 at home  Labs Reviewed.    Review of Systems:  Review of Systems   Constitutional: Negative for chills and fever.   Respiratory: Negative for cough and shortness of breath.    Gastrointestinal: Negative for abdominal pain, diarrhea, nausea and vomiting.   Musculoskeletal: Positive for myalgias.   Neurological: Positive for sensory change. Negative for dizziness and headaches.   All other systems reviewed and are negative.      Hemodynamics:  Temp (24hrs), Av.7 °C (98 °F), Min:36.3 °C (97.3 °F), Max:37.4 °C (99.4 °F)  Temperature: 37.4 °C (99.4 °F)  Pulse  Av.9  Min: 66  Max: 117   Blood Pressure : 131/70       Physical Exam:  Physical Exam   Constitutional: He is oriented to person, place, and time. He appears well-developed.   HENT:   Mouth/Throat: Oropharynx is clear and moist. No oropharyngeal exudate.   Eyes: Pupils are equal, round, and reactive to light. Conjunctivae and EOM are normal.   Neck:  Neck supple.   Cardiovascular: Normal rate and regular rhythm.    No murmur heard.  Pulmonary/Chest: Effort normal. He has no wheezes. He has no rales.   Abdominal: Soft. There is no tenderness.   Musculoskeletal: He exhibits no edema.   Left heel with wound VAC in place     Left great toe plantar ulcer    Right heel wound with scant serosanguineous drainage.  No surrounding erythema    Left shoulder scar-clean   Neurological: He is alert and oriented to person, place, and time.   Psychiatric: He has a normal mood and affect. His behavior is normal.       Meds:    Current Facility-Administered Medications:   •  insulin glargine  •  tramadol  •  insulin lispro **AND** insulin lispro **AND** Accu-Chek ACHS **AND** NOTIFY MD and PharmD **AND** glucose **AND** dextrose 10% bolus  •  aspirin  •  lisinopril  •  enoxaparin (LOVENOX) injection  •  acetaminophen  •  [DISCONTINUED] senna-docusate **AND** polyethylene glycol/lytes **AND** magnesium hydroxide **AND** bisacodyl  •  oxyCODONE immediate-release  •  ipratropium-albuterol  •  pregabalin  •  methadone  •  metoprolol  •  hydrALAZINE  •  labetalol  •  senna-docusate  •  Notify provider if pain remains uncontrolled **AND** Use the numeric rating scale (NRS-11) on regular floors and Critical-Care Pain Observation Tool (CPOT) on ICUs/Trauma to assess pain **AND** Pulse Ox (Oximetry) **AND** Pharmacy Consult Request **AND** If patient difficult to arouse and/or has respiratory depression, stop any opiates that are currently infusing and call a Rapid Response. **AND** [DISCONTINUED] oxyCODONE immediate-release **AND** [DISCONTINUED] oxyCODONE immediate-release **AND** [DISCONTINUED] morphine injection  •  Respiratory Care per Protocol  •  [COMPLETED] piperacillin-tazobactam **AND** piperacillin-tazobactam **AND** [DISCONTINUED] MD Alert...Vancomycin per Pharmacy  •  ondansetron  •  ondansetron    Labs:  Recent Labs      06/20/19   0502  06/21/19   0340  06/22/19   0406    WBC  15.1*  15.6*  15.9*   RBC  4.75  4.49*  3.99*   HEMOGLOBIN  13.5*  12.5*  11.4*   HEMATOCRIT  42.1  39.5*  35.6*   MCV  88.6  88.0  89.2   MCH  28.4  27.8  28.6   RDW  49.4  49.1  50.5*   PLATELETCT  256  252  245   MPV  9.6  9.6  9.6   NEUTSPOLYS  77.00*   --    --    LYMPHOCYTES  14.70*   --    --    MONOCYTES  5.90   --    --    EOSINOPHILS  1.20   --    --    BASOPHILS  0.20   --    --      Recent Labs      06/20/19   0502  06/21/19   0340  06/22/19   0406   SODIUM  137  140  136   POTASSIUM  3.9  3.6  3.7   CHLORIDE  104  106  104   CO2  26  26  27   GLUCOSE  161*  99  83   BUN  37*  28*  27*     Recent Labs      06/20/19   0502  06/21/19   0340  06/22/19   0406   CREATININE  1.16  1.13  1.19       Imaging:  Ct-shoulder W/o Plus Recons Left    Result Date: 6/15/2019  6/15/2019 10:05 PM HISTORY/REASON FOR EXAM:  Shoulder pain, acute, persistent, xray and exam nonspecific. History of left shoulder dislocation. Sepsis. TECHNIQUE/ EXAM DESCRIPTION AND NUMBER OF VIEWS:  CT scan of the LEFT shoulder without contrast and including reconstructions. Thin-section noncontrast helical images were obtained from the clavicle through the scapula. Coronal and sagittal reconstructions were generated. Up to date radiation dose reduction adjustments have been utilized to meet ALARA standards for radiation dose reduction. COMPARISON: Radiograph 6/15/2019, 1/12/2018, CT 1/14/2018 FINDINGS: Prior resection of the left humeral head with residual head neck junction appears well corticated and could relate to chronic resection/deformity. There is also deformity and remodeling of the glenoid with well-corticated margin, suggesting of chronic change as well. There is no articulation between the proximal humerus and glenoid. The joint is replaced by loculated soft tissue or dense fluid (image 65 series 4). All rotator cuff muscle are completely atrophied and likely no rotator cuff tendon connecting to the humerus.     Prior  resection of left humeral head. The residual head neck junction appears well corticated and could relate to chronic resection/deformity. There is also deformity and remodeling of the glenoid with well-corticated margin, suggesting of chronic change  as well. No acute fracture. There is no articulation between the proximal humerus and glenoid. The glenohumeral joint is replaced by loculated soft tissue or dense fluid, likely chronic change. The fluid is slightly less in 2018 CT. Infection is considered less likely given the osseous cortex adjacent to the fluid is well corticated and demonstrates no rarefaction or destruction.    Dx-ankle 3+ Views Left    Result Date: 6/17/2019 6/17/2019 3:01 PM HISTORY/REASON FOR EXAM:  For worsening wound. Soft tissue ulceration TECHNIQUE/EXAM DESCRIPTION AND NUMBER OF VIEWS:  3 views of the LEFT ankle. COMPARISON: None. FINDINGS: Soft tissue swelling. No acute fracture identified. Ankle joint degenerative changes. Large posterior calcaneal spur at the Achilles insertion. Soft tissue swelling posterior and inferior to the calcaneus.     Soft tissue swelling. No acute fracture identified.    Dx-chest-limited (1 View)    Result Date: 6/16/2019 6/16/2019 3:37 PM HISTORY/REASON FOR EXAM: central line placement. TECHNIQUE/EXAM DESCRIPTION AND NUMBER OF VIEWS: Single portable view of the chest. COMPARISON: 1:44 PM. FINDINGS: New right IJ line tip overlies the SVC. No pneumothorax New partial right hemidiaphragm effacement with hazy basilar opacity. No other change is identified     New right IJ line tip overlies the SVC and no pneumothorax is identified New right basilar opacity effaces the diaphragm and could be from atelectasis or consolidation    Dx-chest-portable (1 View)    Result Date: 6/18/2019 6/18/2019 4:30 AM HISTORY/REASON FOR EXAM: For indication of respiratory failure; For indication of respiratory failure TECHNIQUE/EXAM DESCRIPTION:  Single AP view of the chest.  COMPARISON: Yesterday FINDINGS: Endotracheal tube has been removed in the interim.  Dobbhoff tube has been removed in the interim. Cardiomegaly is observed. The mediastinal contour appears within normal limits.  The central  pulmonary vasculature appears prominent and indistinct. The lungs appear well expanded bilaterally.  Diffuse scattered hazy pulmonary parenchymal opacities are seen. No significant pleural effusions are identified. The bony structures appear age-appropriate.     1.  Pulmonary edema and/or infiltrates are identified, which are somewhat decreased since the prior exam.    Dx-chest-portable (1 View)    Result Date: 6/17/2019 6/17/2019 2:07 AM HISTORY/REASON FOR EXAM:  For indication of respiratory failure; For indication of respiratory failure TECHNIQUE/EXAM DESCRIPTION AND NUMBER OF VIEWS: Single portable view of the chest. COMPARISON: Yesterday FINDINGS: Hardware is stably positioned in its visualized extent. HEART: Stable size. LUNGS: ] Opacity is decreased. LEFT basilar opacities unchanged. PLEURA: No effusion or pneumothorax.     1.  Improved aeration of the RIGHT lung base 2.  Otherwise no interval change in BILATERAL atelectasis or airspace disease    Dx-chest-portable (1 View)    Result Date: 6/16/2019 6/16/2019 1:46 PM HISTORY/REASON FOR EXAM:  intubation. TECHNIQUE/EXAM DESCRIPTION AND NUMBER OF VIEWS: Single portable view of the chest. COMPARISON: 6/16/2019 FINDINGS: The cardiomediastinal silhouette is stable. There are increased perihilar and bibasilar airspace opacities, right greater than left. No pleural effusion or pneumothorax is seen. There is a chronic deformity of the proximal left humerus. Endotracheal tube  projects at the level of the aortic arch.     Endotracheal tube projects at the level of the aortic arch. Increased perihilar and bibasilar opacities may represent a combination of edema, atelectasis, pneumonia.     Dx-chest-portable (1 View)    Result Date:  2019 12:03 PM HISTORY/REASON FOR EXAM: Cough. Desaturation TECHNIQUE/EXAM DESCRIPTION AND NUMBER OF VIEWS: Single AP view of the chest. COMPARISON: Yesterday FINDINGS: Lungs: There is some increasing reticular and hazy indistinct opacity. Lung volumes have mildly diminished Pleura:  No pleural space process is seen. Heart and mediastinum: The cardiomediastinal contours are stable.     Increasing reticular and hazy opacity is compatible with edema    Ec-echocardiogram Complete W/o Cont    Result Date: 2019  Transthoracic Echo Report Echocardiography Laboratory CONCLUSIONS Compared to the images of the study done 18, RCA territory wall motion abnormalities are now present. Fair quality study, some off axis views. Moderately reduced left ventricular systolic function. Left ventricular ejection fraction is visually estimated to be 40%. Hypokinesis of the basal to mid inferior and inferolateral wall. Mild mitral annular calcification. Unable to estimate pulmonary artery pressure due to an inadequate tricuspid regurgitant jet. Dilated inferior vena cava with inspiratory collapse, RAP  8 mmHg. TAMARA PINK Exam Date:         2019                    10:51 Exam Location:     Inpatient Priority:          Routine Ordering Physician:        NATALEE RODRIGUEZ Referring Physician:       [id number] Sonographer:               DELIA Schulte Age:    63     Gender:    M MRN:    [id number] :    1955 BSA:    2.17   Ht (in):    71     Wt (lb):    214 Exam Type:     Complete Indications:     Respiratory Distress ICD Codes:       518.82 CPT Codes:       67093 BP:   150    /   82     HR:   80 Technical Quality:       Fair MEASUREMENTS  (Male / Female) Normal Values 2D ECHO LV Diastolic Diameter PLAX        5.5 cm                4.2 - 5.9 / 3.9 - 5.3 cm LV Systolic Diameter PLAX         4.4 cm                2.1 - 4.0 cm IVS Diastolic Thickness           1.3 cm                LVPW Diastolic Thickness           1.1 cm                RV Diameter 4C                    2.9 cm                2.5 - 2.9 cm LVOT Diameter                     2.2 cm                RA Diameter                       3.8 cm                Estimated LV Ejection Fraction    40 %                  LV Ejection Fraction MOD BP       35.9 %                >= 55  % LV Ejection Fraction MOD 4C       36.3 %                LV Ejection Fraction MOD 2C       36.8 %                LA Volume Index                   27.8 cm³/m²           16 - 28 cm³/m² IVC Diameter                      2.1 cm                DOPPLER AV Peak Velocity                  1.3 m/s               AV Peak Gradient                  7.2 mmHg              AV Mean Gradient                  3.7 mmHg              LVOT Peak Velocity                0.72 m/s              AV Area Cont Eq vti               2.4 cm²               Mitral E Point Velocity           0.49 m/s              Mitral E to A Ratio               0.58                  MV Pressure Half Time             46 ms                 MV Area PHT                       4.8 cm²               MV Deceleration Time              159 ms                Pulmonary Vein Systolic Velocity  0.37 m/s              Pulmonary Vein Diastolic Velocit  0.29 m/s              Pulmonary Vein S/D Ratio          1.3                   PV Peak Velocity                  0.88 m/s              PV Peak Gradient                  3.1 mmHg              RVOT Peak Velocity                0.55 m/s              * Indicates values subject to auto-interpretation LV EF:  40    % FINDINGS Left Ventricle Normal left ventricular chamber size. Mild concentric left ventricular hypertrophy. Moderately reduced left ventricular systolic function. Left ventricular ejection fraction is visually estimated to be 40%. Hypokinesis of the basal to mid inferior and inferolateral wall. Normal diastolic function. Right Ventricle Normal right ventricular size and systolic function. Right Atrium  Normal right atrial size. Dilated inferior vena cava with inspiratory collapse. Left Atrium Normal left atrial size. Mitral Valve Structurally normal mitral valve without significant stenosis. Mild mitral annular calcification. Trace mitral regurgitation. Aortic Valve Structurally normal aortic valve without significant stenosis or regurgitation. Tricuspid Valve Structurally normal tricuspid valve without significant stenosis or regurgitation. Unable to estimate pulmonary artery pressure due to an inadequate tricuspid regurgitant jet. Pulmonic Valve The pulmonic valve is not well visualized. Pericardium Normal pericardium without effusion. Aorta Normal aortic root for body surface area. Ascending aorta diameter is 3.2 cm. Mya Alegre MD (Electronically Signed) Final Date:     18 June 2019                 17:39    Dx-abdomen For Tube Placement    Result Date: 6/16/2019 6/16/2019 5:59 PM HISTORY/REASON FOR EXAM:  Line evaluation. TECHNIQUE/EXAM DESCRIPTION AND NUMBER OF VIEWS:  1 view(s) of the abdomen. COMPARISON:  9/15/2007. FINDINGS: Enteric tube projects over the stomach. There is a nonspecific bowel gas pattern. Surgical clips are seen in the right upper quadrant. There are bibasilar airspace opacities. Postsurgical and degenerative changes are seen in the lumbar spine.     Enteric tube projects over the stomach.      Micro:  Results     Procedure Component Value Units Date/Time    CULTURE TISSUE W/ GRM STAIN [[id number]]  (Abnormal)  (Susceptibility) Collected:  06/18/19 1340    Order Status:  Completed Specimen:  Tissue Updated:  06/21/19 1534     Significant Indicator POS (POS)     Source TISS     Site LEFT HEEL WOUND     Culture Result - (A)     Gram Stain Result Many WBCs.  Few Gram positive rods.  Many Gram positive cocci.   (A)     Culture Result Staphylococcus aureus  Heavy growth   (A)      Enterococcus faecalis  Moderate growth  Combination therapy with ampicillin, penicillin, or  vancomycin (for  susceptible strains) plus an aminoglycoside  is usually indicated for serious enterococcal infections,  such as endocarditis unless high-level resistance to both  gentamicin and streptomycin is documented; such combinations  are predicted to result in synergistic killing of the  Enterococcus.   (A)      Streptococcus constellatus  Heavy growth   (A)    Narrative:       Surgery Specimen    Culture & Susceptibility     ENTEROCOCCUS FAECALIS     Antibiotic Sensitivity Microscan Unit Status    Ampicillin Sensitive <=2 mcg/mL Final    Method: TAIWO    Daptomycin Sensitive 2 mcg/mL Final    Method: TAIWO    Gent Synergy Sensitive <=500 mcg/mL Final    Method: TAIWO    Penicillin Sensitive 2 mcg/mL Final    Method: TAIWO    Vancomycin Sensitive 2 mcg/mL Final    Method: TAIWO              STAPHYLOCOCCUS AUREUS     Antibiotic Sensitivity Microscan Unit Status    Ampicillin/sulbactam Sensitive <=8/4 mcg/mL Final    Method: TAIWO    Clindamycin Sensitive <=0.5 mcg/mL Final    Method: TAIWO    Daptomycin Sensitive <=0.5 mcg/mL Final    Method: TAIWO    Erythromycin Sensitive <=0.5 mcg/mL Final    Method: TAIWO    Moxifloxacin Sensitive <=0.5 mcg/mL Final    Method: TAIWO    Oxacillin Sensitive <=0.25 mcg/mL Final    Method: TAIWO    Tetracycline Sensitive <=4 mcg/mL Final    Method: TAIWO    Trimeth/Sulfa Sensitive <=0.5/9.5 mcg/mL Final    Method: TAIWO    Vancomycin Sensitive 2 mcg/mL Final    Method: TAIWO                       Anaerobic Culture [[id number]] Collected:  06/18/19 1340    Order Status:  Completed Specimen:  Tissue Updated:  06/21/19 1534     Significant Indicator NEG     Source TISS     Site LEFT HEEL WOUND     Culture Result Culture in progress.    Narrative:       Surgery Specimen    BLOOD CULTURE [[id number]] Collected:  06/15/19 2249    Order Status:  Completed Specimen:  Blood from Peripheral Updated:  06/21/19 0100     Significant Indicator NEG     Source BLD     Site PERIPHERAL     Culture Result No growth after 5 days of  "incubation.    Narrative:       Per Hospital Policy: Only change Specimen Src: to \"Line\" if  specified by physician order.  Left Hand    BLOOD CULTURE [[id number]] Collected:  06/15/19 2230    Order Status:  Completed Specimen:  Blood from Peripheral Updated:  06/21/19 0100     Significant Indicator NEG     Source BLD     Site PERIPHERAL     Culture Result No growth after 5 days of incubation.    Narrative:       Per Hospital Policy: Only change Specimen Src: to \"Line\" if  specified by physician order.  Right AC    GRAM STAIN [[id number]] Collected:  06/18/19 1340    Order Status:  Completed Specimen:  Tissue Updated:  06/18/19 1907     Significant Indicator .     Source TISS     Site LEFT HEEL WOUND     Gram Stain Result Many WBCs.  Few Gram positive rods.  Many Gram positive cocci.      Narrative:       Surgery Specimen    CULTURE RESPIRATORY W/ GRM STN [[id number]] Collected:  06/16/19 1500    Order Status:  Completed Specimen:  Respirate Updated:  06/18/19 1013     Significant Indicator NEG     Source RESP     Site Bronchoalveolar Lavage RLL     Culture Result Rare growth usual upper respiratory sammie  No clinically significant Staphylococcus aureus, Methicillin  Resistant Staphylococcus aureus, or Pseudomonas species  isolated.       Gram Stain Result Moderate WBCs.  No organisms seen.      GRAM STAIN [[id number]] Collected:  06/16/19 1500    Order Status:  Completed Specimen:  Respirate Updated:  06/16/19 2351     Significant Indicator .     Source RESP     Site Bronchoalveolar Lavage RLL     Gram Stain Result Moderate WBCs.  No organisms seen.      Blood Culture [[id number]] Collected:  06/16/19 0000    Order Status:  Canceled Specimen:  Other from Peripheral     Blood Culture [[id number]] Collected:  06/16/19 0000    Order Status:  Canceled Specimen:  Other from Peripheral     Blood Culture [[id number]]     Order Status:  Canceled Specimen:  Blood from Peripheral     Blood Culture [[id number]]     Order Status:  " Canceled Specimen:  Blood from Peripheral     URINALYSIS CULTURE, IF INDICATED [[id number]]  (Abnormal) Collected:  06/15/19 2149    Order Status:  Completed Specimen:  Blood Updated:  06/15/19 2203     Color Yellow     Character Clear     Specific Gravity 1.025     Ph 6.0     Glucose >=1000 (A) mg/dL      Ketones Negative mg/dL      Protein 300 (A) mg/dL      Bilirubin Negative     Urobilinogen, Urine 1.0     Nitrite Negative     Leukocyte Esterase Negative     Occult Blood Large (A)     Micro Urine Req Microscopic          Assessment:  Active Hospital Problems    Diagnosis   • Acute on chronic respiratory failure with hypoxia ([facility]) [J96.21]   • Septic shock due to undetermined organism ([facility]) [A41.9, R65.21]   • OZ (acute kidney injury) ([facility]) [N17.9]   • SALOME (obstructive sleep apnea) [G47.33]   • Pneumonia due to infectious organism [J18.9]   • Aspiration into airway [T17.908A]   • Cardiomyopathy ([facility]) [I42.9]   • Elevated troponin [R74.8]   • Diabetic foot ulcer ([facility]) [E11.621, L97.509]   • DM (diabetes mellitus), type 2, uncontrolled ([facility]) [E11.65]   • HTN (hypertension) [I10]   • Mucus plugging of bronchi [J98.09]   • Left shoulder pain [M25.512]   • Tobacco dependence [F17.200]   • Hypokalemia [E87.6]   • Low back pain [M54.5]       Plan:  Left diabetic foot infection, not improving. On treatment  Fevers resolved  Leukocytosis decreasing  Status post excisional debridement down to subcutaneous tissues on 6/19/2019  Or cultures- MSSA, amp S efaecalis and Streptococcus Constellatus  DC Zyvox   Change zosyn to Unasyn if no shortage    Pneumonia  Status post extubation on 6/17  Status post bronchoscopy with BAL on 6/16  BAL cultures-upper respiratory sammie  On antibiotics above    Acute kidney injury  Secondary to septic shock which is now resolved  Avoid nephrotoxins  Renally dose antibiotics accordingly  Overall improving  Monitor    Type 2 diabetes mellitus, poorly controlled  Hemoglobin A1c 12.2  Diabetes  education  Hindrance to wound healing  Control blood sugars to control current infection and promote wound healing    Diabetic peripheral neuropathy  Contributing to above  Control blood sugars    Elevated troponins  Likely demand ischemia secondary to septic shock  Troponins now downtrending    I have performed a physical exam and reviewed and updated ROS and plan today 6/22/2019.  In review of yesterday's note 6/21/2019, there are no changes except as documented above.    Discussed with internal medicine and JW Gusman from [facility]

## 2019-06-22 NOTE — WOUND TEAM
[facility]  Inpatient Services  Wound and Skin Care Evaluation     Admission Date: 6/15/2019     Consult Date: 06/17/2019 @ 4851   [facility], PMH, SH: Reviewed    Unit where seen by Wound Team: [id number]      WOUND CONSULT RELATED TO:  VAC change      SUBJECTIVE:  agreed to pain pill       Self Report / Pain Level:  Pt premedicated prior to dressing change        OBJECTIVE:       WOUND TYPE, LOCATION, CHARACTERISTICS (Pressure Injuries: location, stage, POA or date identified)      Negative Pressure Wound Therapy Foot Left (Active)   NPWT Pump Mode / Pressure Setting 125 mmHg;Continuous    Dressing Type Small;Black foam    Number of Foam Pieces Used 2    Canister Changed No    VAC VeraFlo Irrigant Normal Saline    VAC VeraFlo Soak Time (mins) 10    VAC VeraFlo Instill Volume (ml) 6    VAC VeraFlo - Therapy Time (hrs) 2.5    VAC VeraFlo Pressure (mm/Hg) 125 mmHg;Continuous      Wound 06/16/19 Diabetic Ulcer Heel right plantar (Active)   Wound Image      Site Assessment Yellow    Elizabeth-wound Assessment Dry    Margins Attached edges    Wound Length (cm) 1 cm 6/20/2019   Wound Width (cm) 1 cm    Wound Depth (cm) 0.3 cm    Wound Surface Area (cm^2) 1 cm^2    Drainage Amount None    Drainage Description MESHA    Non-staged Wound Description Full thickness    Treatments Cleansed    Cleansing Approved Wound Cleanser    Periwound Protectant Benzoin    Dressing Options Honey Colloid;Adhesive Foam    Dressing Changed Changed    Dressing Status Clean;Dry;Intact    Dressing Change Frequency Mon, Wed, Fri    NEXT Dressing Change  06/24/19    NEXT Weekly Photo (Inpatient Only) 06/26/19    WOUND NURSE ONLY - Odor None    WOUND NURSE ONLY - Exposed Structures None    WOUND NURSE ONLY - Tissue Type and Percentage yellow        Wound 06/16/19 Diabetic Ulcer Heel left medial and plantar (Active)   Wound Image         6/20/2019   Site Assessment Tan;Yellow;Painful    Elizabeth-wound Assessment Non-blanchable erythema;Painful;Maceration     Margins Attached edges    Wound Length (cm) 3 cm, 2 cm 6/20/2019   Wound Width (cm) 4.5 cm, 3.0 cm    Wound Depth (cm) 2 cm, 1. cm    Wound Surface Area (cm^2) 35 cm^2    Drainage Amount Moderate    Drainage Description Serosanguineous    Non-staged Wound Description Full thickness    Treatments Cleansed    Cleansing Approved Wound Cleanser    Periwound Protectant Skin Protectant wipes to Periwound;Benzoin;Drape    Dressing Options Wound Vac    Dressing Changed Changed    Dressing Status Clean;Dry;Intact    Dressing Change Frequency Monday, Wednesday, Friday    NEXT Dressing Change  06/24/19    NEXT Weekly Photo (Inpatient Only) 06/26/19    WOUND NURSE ONLY - Odor None    WOUND NURSE ONLY - Tissue Type and Percentage tan, grey, yellow           Lab Values:     WBC:                                              WBC   Date/Time Value Ref Range Status   06/20/2019 05:02 AM 15.1 (H) 4.8 - 10.8 K/uL Final      A1C:                                                Lab Results   Component Value Date/Time     HBA1C 12.2 (H) 06/16/2019 11:37 AM                       Culture:  Left Heel: completed on 6/18. Positive for saphy aureus, group d enterococcus species, streptococcus constellatus      INTERVENTIONS BY WOUND TEAM:  removed dressing, cleaned wounds with wound cleanser. Applied No sting skin prep to periwound skin. Applied paste ring around heel wound, applied to pieced of black foam to wound bed, then draped. Resumed Verflo VAC at 125 mmHg, 6 ml NS, 10 min soak, every 2.5 hrs. Applied Honey colloid to R heel wound, secured with adhesive foam 4x4.     Dressing selection: above     Interdisciplinary consultation: RN, patient     EVALUATION: periwound on L heel macerated with peeling periowound skin, which was removed with scissors. Wound bed has slough, will need to see if it progresses with Verfaflo VAC by next dressing change.     Factors affecting wound healing: DM, possible infection, tobacco abuse, age     Goals:  Steady decrease in wound area and depth weekly.     NURSING PLAN OF CARE ORDERS (X):     Dressing changes: See UPDATED  Dressing Care orders: X  Skin care: See Skin Care orders:   Rectal tube care: See Rectal Tube Care orders:   Other orders:     WOUND TEAM PLAN OF CARE (X):   NPWT change 3 x week:    X    Dressing changes by wound team:       Follow up as needed:       Other (explain):      Anticipated discharge plans (X):   SNF:           Home Care:           Outpatient Wound Center:            Self Care:            Other:  TBD -Patient currently on veraflo NPWT dressing. Pt will need outpatient wound follow up.

## 2019-06-22 NOTE — CARE PLAN
Problem: Communication  Goal: The ability to communicate needs accurately and effectively will improve  Outcome: PROGRESSING AS EXPECTED  Patient agrees to use call light when needing to communicate with staff    Problem: Safety  Goal: Will remain free from injury  Outcome: PROGRESSING AS EXPECTED  Patient is calling when he needs to ambulate    Problem: Infection  Goal: Will remain free from infection  Outcome: PROGRESSING AS EXPECTED  On Zosyn    Problem: Venous Thromboembolism (VTW)/Deep Vein Thrombosis (DVT) Prevention:  Goal: Patient will participate in Venous Thrombosis (VTE)/Deep Vein Thrombosis (DVT)Prevention Measures  Outcome: PROGRESSING AS EXPECTED

## 2019-06-22 NOTE — PROGRESS NOTES
2 RN skin check complete with Chitra RN  Wound vac and moon boots in place  Skin assessed under devices  No confirmed pressure ulcers found   No new potential pressure ulcers noted   Sacrum red and blanching. Ears red and blanching. Multiple diabetic foot wounds to bilateral lower extremities.    The following interventions in place Mepilex to heels, moon boots, silicone oxygen tubing, and pillows in use for support and positioning.

## 2019-06-22 NOTE — PROGRESS NOTES
[facility] Medicine Daily Progress Note    Date of Service  6/22/2019    Chief Complaint  63 y.o. male admitted 6/15/2019 with L shoulder pain and L heel ulcer.    Hospital Course    Hx TIIDM, COPD on 3 L HOT.  Presenting to [place] with multiple complaints including shoulder pain following a fall and a chronic heel ulcer.  Admitted to CICU with Dx of sepsis from diabetic foot ulcer, Type II NSTEMI with Trop of 1.09.  Intubated on admission, extubated 6/17.    Interval Problem Update  I saw and examined the patient today.  He is wondering whether he would need further more intervention for his heel wound.  ID and surgery is following.  Denies nausea vomiting diarrhea.  Consultants/Specialty  LPS  Pulmonology/Critical Care    Code Status  full    Disposition  SNF referral placed    Review of Systems  Review of Systems   Constitutional: Negative for chills, diaphoresis, fever, malaise/fatigue and weight loss.   HENT: Negative for congestion, hearing loss, nosebleeds and sore throat.    Eyes: Negative for blurred vision, pain, discharge and redness.   Respiratory: Negative for cough, sputum production, shortness of breath, wheezing and stridor.    Cardiovascular: Negative for chest pain, palpitations, orthopnea and leg swelling.   Gastrointestinal: Negative for abdominal pain, blood in stool, diarrhea, heartburn, nausea and vomiting.   Genitourinary: Negative for dysuria, frequency, hematuria and urgency.   Musculoskeletal: Positive for joint pain. Negative for back pain, falls, myalgias and neck pain.        L shoulder pain, chronic   Skin: Negative for itching and rash.        R heel wound    Neurological: Negative for dizziness, sensory change, focal weakness, seizures, loss of consciousness, weakness and headaches.   Psychiatric/Behavioral: Negative for depression. The patient is not nervous/anxious and does not have insomnia.         Physical Exam  Temp:  [36.3 °C (97.3 °F)-36.8 °C (98.2 °F)] 36.6 °C (97.8  °F)  Pulse:  [80-86] 80  Resp:  [16-20] 17  BP: (117-151)/(58-74) 127/69  SpO2:  [91 %-93 %] 91 %    Physical Exam   Constitutional: He is oriented to person, place, and time. He appears well-developed and well-nourished. No distress.   HENT:   Head: Normocephalic and atraumatic.   Mouth/Throat: Oropharynx is clear and moist.   Eyes: Pupils are equal, round, and reactive to light. Conjunctivae and EOM are normal. Right eye exhibits no discharge. Left eye exhibits no discharge. No scleral icterus.   Neck: Normal range of motion. Neck supple. No JVD present. No tracheal deviation present. No thyromegaly present.   Cardiovascular: Normal rate and regular rhythm.    No murmur heard.  Pulmonary/Chest: Effort normal and breath sounds normal. No stridor. No respiratory distress. He has no wheezes. He has no rales.   Abdominal: Soft. Bowel sounds are normal. He exhibits no distension. There is no tenderness. There is no guarding.   Musculoskeletal: He exhibits edema (trace). He exhibits no tenderness.   Wound vac in place L heel   Neurological: He is alert and oriented to person, place, and time. No cranial nerve deficit.   Skin: Skin is warm and dry. No rash noted. He is not diaphoretic. No erythema.   Psychiatric: He has a normal mood and affect. His speech is normal.   Vitals reviewed.  Patient seen and examined today on 6/21, unchanged from 6/20.    Fluids    Intake/Output Summary (Last 24 hours) at 06/22/19 0805  Last data filed at 06/22/19 0400   Gross per 24 hour   Intake              440 ml   Output              800 ml   Net             -360 ml       Laboratory  Recent Labs      06/20/19   0502  06/21/19   0340  06/22/19   0406   WBC  15.1*  15.6*  15.9*   RBC  4.75  4.49*  3.99*   HEMOGLOBIN  13.5*  12.5*  11.4*   HEMATOCRIT  42.1  39.5*  35.6*   MCV  88.6  88.0  89.2   MCH  28.4  27.8  28.6   MCHC  32.1*  31.6*  32.0*   RDW  49.4  49.1  50.5*   PLATELETCT  256  252  245   MPV  9.6  9.6  9.6     Recent Labs       06/20/19   0502  06/21/19   0340  06/22/19   0406   SODIUM  137  140  136   POTASSIUM  3.9  3.6  3.7   CHLORIDE  104  106  104   CO2  26  26  27   GLUCOSE  161*  99  83   BUN  37*  28*  27*   CREATININE  1.16  1.13  1.19   CALCIUM  7.9*  8.0*  8.0*                   Imaging  EC-ECHOCARDIOGRAM COMPLETE W/O CONT   Final Result      DX-CHEST-PORTABLE (1 VIEW)   Final Result         1.  Pulmonary edema and/or infiltrates are identified, which are somewhat decreased since the prior exam.      DX-ANKLE 3+ VIEWS LEFT   Final Result      Soft tissue swelling. No acute fracture identified.      DX-CHEST-PORTABLE (1 VIEW)   Final Result      1.  Improved aeration of the RIGHT lung base   2.  Otherwise no interval change in BILATERAL atelectasis or airspace disease      DX-ABDOMEN FOR TUBE PLACEMENT   Final Result      Enteric tube projects over the stomach.      DX-CHEST-LIMITED (1 VIEW)   Final Result      New right IJ line tip overlies the SVC and no pneumothorax is identified      New right basilar opacity effaces the diaphragm and could be from atelectasis or consolidation      DX-CHEST-PORTABLE (1 VIEW)   Final Result      Endotracheal tube projects at the level of the aortic arch.      Increased perihilar and bibasilar opacities may represent a combination of edema, atelectasis, pneumonia.         DX-CHEST-PORTABLE (1 VIEW)   Final Result      Increasing reticular and hazy opacity is compatible with edema      CT-SHOULDER W/O PLUS RECONS LEFT   Final Result         Prior resection of left humeral head. The residual head neck junction appears well corticated and could relate to chronic resection/deformity. There is also deformity and remodeling of the glenoid with well-corticated margin, suggesting of chronic change    as well.      No acute fracture.      There is no articulation between the proximal humerus and glenoid. The glenohumeral joint is replaced by loculated soft tissue or dense fluid, likely chronic change.  The fluid is slightly less in 2018 CT.      Infection is considered less likely given the osseous cortex adjacent to the fluid is well corticated and demonstrates no rarefaction or destruction.      US-FOREIGN FILM ULTRASOUND   Final Result      OUTSIDE IMAGES-CT HEAD   Final Result      [id number] FILM X-RAY   Final Result      OUTSIDE IMAGES-DX LOWER EXTREMITY, LEFT   Final Result      [id number] FILM X-RAY   Final Result      OUTSIDE IMAGES-DX CHEST   Final Result      OUTSIDE IMAGES-DX UPPER EXTREMITY, LEFT   Final Result           Assessment/Plan  * Acute on chronic respiratory failure with hypoxia (HCC)- (present on admission)   Assessment & Plan    Extubated 6/17, doing well on 1-2L continuous. Typically only on home O2 at night. Secondary to aspiration vs CAP  - continue to wean O2 to home regimen  - RT protocol  - weaning steroids, last dose of prednisone 6/20  - increase mobilization, IS  - abx x5 days     Diabetic foot ulcer (HCC)- (present on admission)   Assessment & Plan    Infected left heel diabetic foot ulcer with group D enterococcus. No evidence of osteomyelitis  - ID and LPS following, appreciate  - s/p debridement on 6/18  - On IV Zosyn, anticipate 2 weeks abx  - pain control  - wound care and wound vac     Septic shock due to undetermined organism (HCC)- (present on admission)   Assessment & Plan    This is sepsis (without associated acute organ dysfunction).   source is infected diabetic ulcer and pneumonia.   Improving  Antibiotic as above     OZ (acute kidney injury) (HCC)- (present on admission)   Assessment & Plan    Cr peaked at 1.45, down to 1.19 today. Stabilizing  - renally dose medications  - avoid nephrotoxic agents  - trend BMP  - will need to hold lisinopril if Cr worsens     Pneumonia due to infectious organism- (present on admission)   Assessment & Plan    On Zosyn     Cardiomyopathy (HCC)- (present on admission)   Assessment & Plan    Echo LVEF 40%. No  exacerbation and is euvolemic.  - continue metop and lisinopril with holding parameters     DM (diabetes mellitus), type 2, uncontrolled (CMS-HCC)- (present on admission)   Assessment & Plan    Uncontrolled, with hyperglycemia. A1C elevated at 12.2%. Blood sugars much improved over the last 24 hours  - lantus 44U BID  - resistant sliding scale and 5U TID with meals  - hypoglycemia protocol and DM diet  - DM education     HTN (hypertension)- (present on admission)   Assessment & Plan    Normo to hypertensive.  - continue metop, lisinopril  - labetalol IV PRN     Left shoulder pain- (present on admission)   Assessment & Plan    No acute fracture noted on CT  Pain control with Tylenol, oxycodone and IV morphine.  Monitor respiratory status closely  PT OT  If persistent pain we will consider consulting his orthopedic surgeon      Tobacco dependence- (present on admission)   Assessment & Plan    Cont to encourage cessation     Hypokalemia- (present on admission)   Assessment & Plan    Improved  monitor and replete as needed          VTE prophylaxis: lovenox

## 2019-06-22 NOTE — PROGRESS NOTES
Patient refused SCDs, educated regarding importance.  Education reinforced by DAKOTA Buenrostro.  Patient continues to refuse.

## 2019-06-23 PROBLEM — D72.829 LEUCOCYTOSIS: Status: ACTIVE | Noted: 2019-06-23

## 2019-06-23 LAB
BASOPHILS # BLD AUTO: 0.2 % (ref 0–1.8)
BASOPHILS # BLD: 0.03 K/UL (ref 0–0.12)
EOSINOPHIL # BLD AUTO: 0.2 K/UL (ref 0–0.51)
EOSINOPHIL NFR BLD: 1.6 % (ref 0–6.9)
ERYTHROCYTE [DISTWIDTH] IN BLOOD BY AUTOMATED COUNT: 50.7 FL (ref 35.9–50)
GLUCOSE BLD-MCNC: 119 MG/DL (ref 65–99)
GLUCOSE BLD-MCNC: 172 MG/DL (ref 65–99)
GLUCOSE BLD-MCNC: 194 MG/DL (ref 65–99)
GLUCOSE BLD-MCNC: 56 MG/DL (ref 65–99)
GLUCOSE BLD-MCNC: 90 MG/DL (ref 65–99)
HCT VFR BLD AUTO: 38.5 % (ref 42–52)
HGB BLD-MCNC: 12.3 G/DL (ref 14–18)
IMM GRANULOCYTES # BLD AUTO: 0.08 K/UL (ref 0–0.11)
IMM GRANULOCYTES NFR BLD AUTO: 0.6 % (ref 0–0.9)
LYMPHOCYTES # BLD AUTO: 2.34 K/UL (ref 1–4.8)
LYMPHOCYTES NFR BLD: 18.4 % (ref 22–41)
MCH RBC QN AUTO: 28.7 PG (ref 27–33)
MCHC RBC AUTO-ENTMCNC: 31.9 G/DL (ref 33.7–35.3)
MCV RBC AUTO: 89.7 FL (ref 81.4–97.8)
MONOCYTES # BLD AUTO: 0.91 K/UL (ref 0–0.85)
MONOCYTES NFR BLD AUTO: 7.2 % (ref 0–13.4)
NEUTROPHILS # BLD AUTO: 9.15 K/UL (ref 1.82–7.42)
NEUTROPHILS NFR BLD: 72 % (ref 44–72)
NRBC # BLD AUTO: 0 K/UL
NRBC BLD-RTO: 0 /100 WBC
PLATELET # BLD AUTO: 232 K/UL (ref 164–446)
PMV BLD AUTO: 9.9 FL (ref 9–12.9)
RBC # BLD AUTO: 4.29 M/UL (ref 4.7–6.1)
WBC # BLD AUTO: 12.7 K/UL (ref 4.8–10.8)

## 2019-06-23 PROCEDURE — 700105 HCHG RX REV CODE 258: Performed by: INTERNAL MEDICINE

## 2019-06-23 PROCEDURE — 36415 COLL VENOUS BLD VENIPUNCTURE: CPT

## 2019-06-23 PROCEDURE — 82962 GLUCOSE BLOOD TEST: CPT | Mod: 91

## 2019-06-23 PROCEDURE — 99233 SBSQ HOSP IP/OBS HIGH 50: CPT | Performed by: INTERNAL MEDICINE

## 2019-06-23 PROCEDURE — A9270 NON-COVERED ITEM OR SERVICE: HCPCS | Performed by: HOSPITALIST

## 2019-06-23 PROCEDURE — 770006 HCHG ROOM/CARE - MED/SURG/GYN SEMI*

## 2019-06-23 PROCEDURE — 700102 HCHG RX REV CODE 250 W/ 637 OVERRIDE(OP): Performed by: HOSPITALIST

## 2019-06-23 PROCEDURE — 700102 HCHG RX REV CODE 250 W/ 637 OVERRIDE(OP): Performed by: INTERNAL MEDICINE

## 2019-06-23 PROCEDURE — 85025 COMPLETE CBC W/AUTO DIFF WBC: CPT

## 2019-06-23 PROCEDURE — 99232 SBSQ HOSP IP/OBS MODERATE 35: CPT | Performed by: INTERNAL MEDICINE

## 2019-06-23 PROCEDURE — 700111 HCHG RX REV CODE 636 W/ 250 OVERRIDE (IP): Performed by: INTERNAL MEDICINE

## 2019-06-23 PROCEDURE — A9270 NON-COVERED ITEM OR SERVICE: HCPCS | Performed by: INTERNAL MEDICINE

## 2019-06-23 RX ADMIN — OXYCODONE HYDROCHLORIDE 10 MG: 5 TABLET ORAL at 09:28

## 2019-06-23 RX ADMIN — METHADONE HYDROCHLORIDE 35 MG: 10 TABLET ORAL at 04:41

## 2019-06-23 RX ADMIN — PREGABALIN 300 MG: 150 CAPSULE ORAL at 04:42

## 2019-06-23 RX ADMIN — INSULIN LISPRO 5 UNITS: 100 INJECTION, SOLUTION INTRAVENOUS; SUBCUTANEOUS at 18:25

## 2019-06-23 RX ADMIN — METOPROLOL TARTRATE 12.5 MG: 25 TABLET, FILM COATED ORAL at 04:40

## 2019-06-23 RX ADMIN — INSULIN GLARGINE 44 UNITS: 100 INJECTION, SOLUTION SUBCUTANEOUS at 18:25

## 2019-06-23 RX ADMIN — METHADONE HYDROCHLORIDE 35 MG: 10 TABLET ORAL at 17:33

## 2019-06-23 RX ADMIN — LISINOPRIL 10 MG: 10 TABLET ORAL at 04:42

## 2019-06-23 RX ADMIN — INSULIN LISPRO 3 UNITS: 100 INJECTION, SOLUTION INTRAVENOUS; SUBCUTANEOUS at 18:25

## 2019-06-23 RX ADMIN — PIPERACILLIN SODIUM AND TAZOBACTAM SODIUM 3.38 G: 3; .375 INJECTION, POWDER, FOR SOLUTION INTRAVENOUS at 13:56

## 2019-06-23 RX ADMIN — METOPROLOL TARTRATE 12.5 MG: 25 TABLET, FILM COATED ORAL at 17:33

## 2019-06-23 RX ADMIN — PIPERACILLIN SODIUM AND TAZOBACTAM SODIUM 3.38 G: 3; .375 INJECTION, POWDER, FOR SOLUTION INTRAVENOUS at 21:12

## 2019-06-23 RX ADMIN — INSULIN LISPRO 3 UNITS: 100 INJECTION, SOLUTION INTRAVENOUS; SUBCUTANEOUS at 21:10

## 2019-06-23 RX ADMIN — OXYCODONE HYDROCHLORIDE 10 MG: 5 TABLET ORAL at 13:56

## 2019-06-23 RX ADMIN — OXYCODONE HYDROCHLORIDE 10 MG: 5 TABLET ORAL at 03:20

## 2019-06-23 RX ADMIN — PREGABALIN 300 MG: 150 CAPSULE ORAL at 17:33

## 2019-06-23 RX ADMIN — PIPERACILLIN SODIUM AND TAZOBACTAM SODIUM 3.38 G: 3; .375 INJECTION, POWDER, FOR SOLUTION INTRAVENOUS at 04:40

## 2019-06-23 RX ADMIN — ENOXAPARIN SODIUM 40 MG: 100 INJECTION SUBCUTANEOUS at 04:40

## 2019-06-23 ASSESSMENT — ENCOUNTER SYMPTOMS
DIZZINESS: 0
NERVOUS/ANXIOUS: 0
WEIGHT LOSS: 0
SORE THROAT: 0
HEARTBURN: 0
SHORTNESS OF BREATH: 0
INSOMNIA: 0
ABDOMINAL PAIN: 0
CHILLS: 0
DIARRHEA: 0
BACK PAIN: 0
DEPRESSION: 0
BLOOD IN STOOL: 0
BLURRED VISION: 0
FEVER: 0
NECK PAIN: 0
PALPITATIONS: 0
SPUTUM PRODUCTION: 0
FALLS: 0
SENSORY CHANGE: 0
VOMITING: 0
MYALGIAS: 1
HEADACHES: 0
MYALGIAS: 0
SENSORY CHANGE: 1
COUGH: 0
NAUSEA: 0
EYE PAIN: 0
STRIDOR: 0

## 2019-06-23 NOTE — PROGRESS NOTES
[facility] Medicine Daily Progress Note    Date of Service  6/23/2019    Chief Complaint  63 y.o. male admitted 6/15/2019 with L shoulder pain and L heel ulcer.    Hospital Course    Hx TIIDM, COPD on 3 L HOT.  Presenting to [place] with multiple complaints including shoulder pain following a fall and a chronic heel ulcer.  Admitted to CICU with Dx of sepsis from diabetic foot ulcer, Type II NSTEMI with Trop of 1.09.  Intubated on admission, extubated 6/17.    Interval Problem Update  I saw and examined the patient today.   Denies nausea vomiting diarrhea. Doing leg exercise on the bed.   No acute issue overnight.  Consultants/Specialty  LPS  Pulmonology/Critical Care    Code Status  full    Disposition  SNF referral placed    Review of Systems  Review of Systems   Constitutional: Negative for chills, fever, malaise/fatigue and weight loss.   HENT: Negative for congestion, hearing loss, nosebleeds and sore throat.    Eyes: Negative for blurred vision and pain.   Respiratory: Negative for cough, sputum production and stridor.    Cardiovascular: Negative for chest pain and palpitations.   Gastrointestinal: Negative for blood in stool, heartburn, nausea and vomiting.   Genitourinary: Negative for dysuria and urgency.   Musculoskeletal: Positive for joint pain. Negative for back pain, falls, myalgias and neck pain.        L shoulder pain, chronic   Skin: Negative for itching and rash.        R heel wound    Neurological: Negative for dizziness, sensory change and headaches.   Psychiatric/Behavioral: Negative for depression. The patient is not nervous/anxious and does not have insomnia.         Physical Exam  Temp:  [36.4 °C (97.6 °F)-37.4 °C (99.4 °F)] 36.4 °C (97.6 °F)  Pulse:  [78-89] 78  Resp:  [16-17] 16  BP: (131-146)/(60-71) 139/61  SpO2:  [84 %-96 %] 96 %    Physical Exam   Constitutional: He is oriented to person, place, and time. He appears well-developed and well-nourished. No distress.   HENT:   Head:  Normocephalic and atraumatic.   Eyes: Pupils are equal, round, and reactive to light. EOM are normal. Right eye exhibits no discharge. Left eye exhibits no discharge.   Neck: Normal range of motion. Neck supple. No tracheal deviation present. No thyromegaly present.   Cardiovascular: Normal rate and regular rhythm.    Pulmonary/Chest: Effort normal and breath sounds normal. No respiratory distress.   Abdominal: Soft. Bowel sounds are normal. He exhibits no distension.   Musculoskeletal: He exhibits edema (trace). He exhibits no tenderness.   Wound vac in place L heel   Neurological: He is alert and oriented to person, place, and time. No cranial nerve deficit.   Skin: Skin is warm and dry. No rash noted. He is not diaphoretic. No erythema.   Psychiatric: He has a normal mood and affect. His speech is normal.   Vitals reviewed.  Patient seen and examined today on 6/21, unchanged from 6/20.    Fluids    Intake/Output Summary (Last 24 hours) at 06/23/19 0829  Last data filed at 06/23/19 0425   Gross per 24 hour   Intake              240 ml   Output             1800 ml   Net            -1560 ml       Laboratory  Recent Labs      06/21/19   0340  06/22/19   0406  06/23/19   0516   WBC  15.6*  15.9*  12.7*   RBC  4.49*  3.99*  4.29*   HEMOGLOBIN  12.5*  11.4*  12.3*   HEMATOCRIT  39.5*  35.6*  38.5*   MCV  88.0  89.2  89.7   MCH  27.8  28.6  28.7   MCHC  31.6*  32.0*  31.9*   RDW  49.1  50.5*  50.7*   PLATELETCT  252  245  232   MPV  9.6  9.6  9.9     Recent Labs      06/21/19   0340  06/22/19   0406   SODIUM  140  136   POTASSIUM  3.6  3.7   CHLORIDE  106  104   CO2  26  27   GLUCOSE  99  83   BUN  28*  27*   CREATININE  1.13  1.19   CALCIUM  8.0*  8.0*                   Imaging  EC-ECHOCARDIOGRAM COMPLETE W/O CONT   Final Result      DX-CHEST-PORTABLE (1 VIEW)   Final Result         1.  Pulmonary edema and/or infiltrates are identified, which are somewhat decreased since the prior exam.      DX-ANKLE 3+ VIEWS LEFT    Final Result      Soft tissue swelling. No acute fracture identified.      DX-CHEST-PORTABLE (1 VIEW)   Final Result      1.  Improved aeration of the RIGHT lung base   2.  Otherwise no interval change in BILATERAL atelectasis or airspace disease      DX-ABDOMEN FOR TUBE PLACEMENT   Final Result      Enteric tube projects over the stomach.      DX-CHEST-LIMITED (1 VIEW)   Final Result      New right IJ line tip overlies the SVC and no pneumothorax is identified      New right basilar opacity effaces the diaphragm and could be from atelectasis or consolidation      DX-CHEST-PORTABLE (1 VIEW)   Final Result      Endotracheal tube projects at the level of the aortic arch.      Increased perihilar and bibasilar opacities may represent a combination of edema, atelectasis, pneumonia.         DX-CHEST-PORTABLE (1 VIEW)   Final Result      Increasing reticular and hazy opacity is compatible with edema      CT-SHOULDER W/O PLUS RECONS LEFT   Final Result         Prior resection of left humeral head. The residual head neck junction appears well corticated and could relate to chronic resection/deformity. There is also deformity and remodeling of the glenoid with well-corticated margin, suggesting of chronic change    as well.      No acute fracture.      There is no articulation between the proximal humerus and glenoid. The glenohumeral joint is replaced by loculated soft tissue or dense fluid, likely chronic change. The fluid is slightly less in 2018 CT.      Infection is considered less likely given the osseous cortex adjacent to the fluid is well corticated and demonstrates no rarefaction or destruction.      US-FOREIGN FILM ULTRASOUND   Final Result      OUTSIDE IMAGES-CT HEAD   Final Result      [id number] FILM X-RAY   Final Result      OUTSIDE IMAGES-DX LOWER EXTREMITY, LEFT   Final Result      [id number] FILM X-RAY   Final Result      OUTSIDE IMAGES-DX CHEST   Final Result      OUTSIDE IMAGES-DX UPPER  EXTREMITY, LEFT   Final Result           Assessment/Plan  * Acute on chronic respiratory failure with hypoxia (HCC)- (present on admission)   Assessment & Plan    Extubated 6/17, doing well on 1-2L continuous. Typically only on home O2 at night. Secondary to aspiration vs CAP  - continue to wean O2 to home regimen  - RT protocol  - weaning steroids, last dose of prednisone 6/20  - increase mobilization, IS  - abx x5 days     Diabetic foot ulcer (formerly [facility])- (present on admission)   Assessment & Plan    Infected left heel diabetic foot ulcer with group D enterococcus. No evidence of osteomyelitis  - s/p debridement on 6/18  - On IV Zosyn, anticipate 2 weeks abx  - pain control  - wound care and wound vac  - ID on     Septic shock due to undetermined organism (formerly [facility])- (present on admission)   Assessment & Plan    This is sepsis (without associated acute organ dysfunction).   source is infected diabetic ulcer and pneumonia.   Improving  Antibiotic as above     OZ (acute kidney injury) (formerly [facility])- (present on admission)   Assessment & Plan    Cr peaked at 1.45, down to 1.19 today. Stabilizing  - renally dose medications  - avoid nephrotoxic agents  - trend BMP  - will need to hold lisinopril if Cr worsens     Pneumonia due to infectious organism- (present on admission)   Assessment & Plan    On Zosyn     Cardiomyopathy (formerly [facility])- (present on admission)   Assessment & Plan    Echo LVEF 40%. No exacerbation and is euvolemic.  - continue metop and lisinopril with holding parameters     DM (diabetes mellitus), type 2, uncontrolled (CMS-formerly [facility])- (present on admission)   Assessment & Plan    Uncontrolled, with hyperglycemia. A1C elevated at 12.2%. Blood sugars much improved over the last 24 hours  - lantus 44U BID  - resistant sliding scale and 5U TID with meals  - hypoglycemia protocol and DM diet  - DM education     HTN (hypertension)- (present on admission)   Assessment & Plan    Normo to hypertensive.  - continue metop, lisinopril  -  labetalol IV PRN     Leucocytosis   Assessment & Plan    Wbc 12 today  Improving slowly  Follow cbc in am     Left shoulder pain- (present on admission)   Assessment & Plan    No acute fracture noted on CT  Pain control with Tylenol, oxycodone and IV morphine.  Monitor respiratory status closely  PT OT  If persistent pain we will consider consulting his orthopedic surgeon      Tobacco dependence- (present on admission)   Assessment & Plan    Cont to encourage cessation     Hypokalemia- (present on admission)   Assessment & Plan    Improved  monitor and replete as needed          VTE prophylaxis: lovenox

## 2019-06-23 NOTE — CARE PLAN
Problem: Communication  Goal: The ability to communicate needs accurately and effectively will improve  Outcome: PROGRESSING AS EXPECTED  Pt A&O x4. Able to verbalize needs and appropriate use of call light.       Problem: Safety  Goal: Will remain free from injury  Outcome: PROGRESSING AS EXPECTED  Fall precautions in place - strip alarm on, room close to nurses station, bed locked/placed in lowest position, call light within reach, side rails up x3, hourly rounding.

## 2019-06-23 NOTE — PROGRESS NOTES
Infectious Disease Progress Note    Author: April Marcus M.D. Date & Time of service: 2019  12:26 PM    Chief Complaint:  Follow-up for left heel infection    Interval History:  63 y.o. man with history of type 2 diabetes mellitus comp located by peripheral neuropathy and diabetic foot ulcers, history of MRSA infection in  and SALOME on nocturnal oxygen admitted 6/15/2019 for respiratory failure.  Found to have a left heel wound infection status post excisional debridement down to subcutaneous tissue on 2019.  Cultures growing Staphylococcus aureus.     afebrile WBC 15.1 patient transferred out of the ICU overnight.  He is doing well without any new issues but is eager to go home.  Tolerating IV antibiotics.  No diarrhea   AF WBC 15.6 patient had bedside debridement of his right heel yesterday and endured significant pain.  Wound photos reviewed and R heel with significant erythema, edema and necrotic tissue  - No fevers. Feels tired. Still on 1.5 l of O2. He is on 3l of nocturnal O2 at home  2019-no fevers.  No new issues overnight.  WBC is decreased to 12.7.  Creatinine is slightly up at 1.19  Labs Reviewed.    Review of Systems:  Review of Systems   Constitutional: Negative for chills and fever.   Respiratory: Negative for cough and shortness of breath.    Gastrointestinal: Negative for abdominal pain, diarrhea, nausea and vomiting.   Musculoskeletal: Positive for myalgias.   Neurological: Positive for sensory change. Negative for dizziness and headaches.   All other systems reviewed and are negative.      Hemodynamics:  Temp (24hrs), Av.7 °C (98.1 °F), Min:36.1 °C (97 °F), Max:37.4 °C (99.4 °F)  Temperature: 36.1 °C (97 °F)  Pulse  Av.8  Min: 66  Max: 117   Blood Pressure : 109/56       Physical Exam:  Physical Exam   Constitutional: He is oriented to person, place, and time. He appears well-developed.   HENT:   Mouth/Throat: Oropharynx is clear and moist. No  oropharyngeal exudate.   Eyes: Pupils are equal, round, and reactive to light. Conjunctivae and EOM are normal.   Neck: Neck supple.   Cardiovascular: Normal rate and regular rhythm.    No murmur heard.  Pulmonary/Chest: Effort normal. He has no wheezes. He has no rales.   Abdominal: Soft. There is no tenderness.   Musculoskeletal: He exhibits no edema.   Left heel with wound VAC in place     Left great toe plantar ulcer    Right heel wound with scant serosanguineous drainage.  No surrounding erythema    Left shoulder scar-clean   Neurological: He is alert and oriented to person, place, and time.   Psychiatric: He has a normal mood and affect. His behavior is normal.       Meds:    Current Facility-Administered Medications:   •  insulin glargine  •  tramadol  •  insulin lispro **AND** insulin lispro **AND** Accu-Chek ACHS **AND** NOTIFY MD and PharmD **AND** glucose **AND** dextrose 10% bolus  •  aspirin  •  lisinopril  •  enoxaparin (LOVENOX) injection  •  acetaminophen  •  [DISCONTINUED] senna-docusate **AND** polyethylene glycol/lytes **AND** magnesium hydroxide **AND** bisacodyl  •  oxyCODONE immediate-release  •  ipratropium-albuterol  •  pregabalin  •  methadone  •  metoprolol  •  hydrALAZINE  •  labetalol  •  senna-docusate  •  Notify provider if pain remains uncontrolled **AND** Use the numeric rating scale (NRS-11) on regular floors and Critical-Care Pain Observation Tool (CPOT) on ICUs/Trauma to assess pain **AND** Pulse Ox (Oximetry) **AND** Pharmacy Consult Request **AND** If patient difficult to arouse and/or has respiratory depression, stop any opiates that are currently infusing and call a Rapid Response. **AND** [DISCONTINUED] oxyCODONE immediate-release **AND** [DISCONTINUED] oxyCODONE immediate-release **AND** [DISCONTINUED] morphine injection  •  Respiratory Care per Protocol  •  [COMPLETED] piperacillin-tazobactam **AND** piperacillin-tazobactam **AND** [DISCONTINUED] MD Alert...Vancomycin per  Pharmacy  •  ondansetron  •  ondansetron    Labs:  Recent Labs      06/21/19 0340  06/22/19   0406  06/23/19   0516   WBC  15.6*  15.9*  12.7*   RBC  4.49*  3.99*  4.29*   HEMOGLOBIN  12.5*  11.4*  12.3*   HEMATOCRIT  39.5*  35.6*  38.5*   MCV  88.0  89.2  89.7   MCH  27.8  28.6  28.7   RDW  49.1  50.5*  50.7*   PLATELETCT  252  245  232   MPV  9.6  9.6  9.9   NEUTSPOLYS   --    --   72.00   LYMPHOCYTES   --    --   18.40*   MONOCYTES   --    --   7.20   EOSINOPHILS   --    --   1.60   BASOPHILS   --    --   0.20     Recent Labs      06/21/19 0340  06/22/19   0406   SODIUM  140  136   POTASSIUM  3.6  3.7   CHLORIDE  106  104   CO2  26  27   GLUCOSE  99  83   BUN  28*  27*     Recent Labs      06/21/19 0340 06/22/19   0406   CREATININE  1.13  1.19       Imaging:  Ct-shoulder W/o Plus Recons Left    Result Date: 6/15/2019  6/15/2019 10:05 PM HISTORY/REASON FOR EXAM:  Shoulder pain, acute, persistent, xray and exam nonspecific. History of left shoulder dislocation. Sepsis. TECHNIQUE/ EXAM DESCRIPTION AND NUMBER OF VIEWS:  CT scan of the LEFT shoulder without contrast and including reconstructions. Thin-section noncontrast helical images were obtained from the clavicle through the scapula. Coronal and sagittal reconstructions were generated. Up to date radiation dose reduction adjustments have been utilized to meet ALARA standards for radiation dose reduction. COMPARISON: Radiograph 6/15/2019, 1/12/2018, CT 1/14/2018 FINDINGS: Prior resection of the left humeral head with residual head neck junction appears well corticated and could relate to chronic resection/deformity. There is also deformity and remodeling of the glenoid with well-corticated margin, suggesting of chronic change as well. There is no articulation between the proximal humerus and glenoid. The joint is replaced by loculated soft tissue or dense fluid (image 65 series 4). All rotator cuff muscle are completely atrophied and likely no rotator cuff  tendon connecting to the humerus.     Prior resection of left humeral head. The residual head neck junction appears well corticated and could relate to chronic resection/deformity. There is also deformity and remodeling of the glenoid with well-corticated margin, suggesting of chronic change  as well. No acute fracture. There is no articulation between the proximal humerus and glenoid. The glenohumeral joint is replaced by loculated soft tissue or dense fluid, likely chronic change. The fluid is slightly less in 2018 CT. Infection is considered less likely given the osseous cortex adjacent to the fluid is well corticated and demonstrates no rarefaction or destruction.    Dx-ankle 3+ Views Left    Result Date: 6/17/2019 6/17/2019 3:01 PM HISTORY/REASON FOR EXAM:  For worsening wound. Soft tissue ulceration TECHNIQUE/EXAM DESCRIPTION AND NUMBER OF VIEWS:  3 views of the LEFT ankle. COMPARISON: None. FINDINGS: Soft tissue swelling. No acute fracture identified. Ankle joint degenerative changes. Large posterior calcaneal spur at the Achilles insertion. Soft tissue swelling posterior and inferior to the calcaneus.     Soft tissue swelling. No acute fracture identified.    Dx-chest-limited (1 View)    Result Date: 6/16/2019 6/16/2019 3:37 PM HISTORY/REASON FOR EXAM: central line placement. TECHNIQUE/EXAM DESCRIPTION AND NUMBER OF VIEWS: Single portable view of the chest. COMPARISON: 1:44 PM. FINDINGS: New right IJ line tip overlies the SVC. No pneumothorax New partial right hemidiaphragm effacement with hazy basilar opacity. No other change is identified     New right IJ line tip overlies the SVC and no pneumothorax is identified New right basilar opacity effaces the diaphragm and could be from atelectasis or consolidation    Dx-chest-portable (1 View)    Result Date: 6/18/2019 6/18/2019 4:30 AM HISTORY/REASON FOR EXAM: For indication of respiratory failure; For indication of respiratory failure TECHNIQUE/EXAM  DESCRIPTION:  Single AP view of the chest. COMPARISON: Yesterday FINDINGS: Endotracheal tube has been removed in the interim.  Dobbhoff tube has been removed in the interim. Cardiomegaly is observed. The mediastinal contour appears within normal limits.  The central  pulmonary vasculature appears prominent and indistinct. The lungs appear well expanded bilaterally.  Diffuse scattered hazy pulmonary parenchymal opacities are seen. No significant pleural effusions are identified. The bony structures appear age-appropriate.     1.  Pulmonary edema and/or infiltrates are identified, which are somewhat decreased since the prior exam.    Dx-chest-portable (1 View)    Result Date: 6/17/2019 6/17/2019 2:07 AM HISTORY/REASON FOR EXAM:  For indication of respiratory failure; For indication of respiratory failure TECHNIQUE/EXAM DESCRIPTION AND NUMBER OF VIEWS: Single portable view of the chest. COMPARISON: Yesterday FINDINGS: Hardware is stably positioned in its visualized extent. HEART: Stable size. LUNGS: ] Opacity is decreased. LEFT basilar opacities unchanged. PLEURA: No effusion or pneumothorax.     1.  Improved aeration of the RIGHT lung base 2.  Otherwise no interval change in BILATERAL atelectasis or airspace disease    Dx-chest-portable (1 View)    Result Date: 6/16/2019 6/16/2019 1:46 PM HISTORY/REASON FOR EXAM:  intubation. TECHNIQUE/EXAM DESCRIPTION AND NUMBER OF VIEWS: Single portable view of the chest. COMPARISON: 6/16/2019 FINDINGS: The cardiomediastinal silhouette is stable. There are increased perihilar and bibasilar airspace opacities, right greater than left. No pleural effusion or pneumothorax is seen. There is a chronic deformity of the proximal left humerus. Endotracheal tube  projects at the level of the aortic arch.     Endotracheal tube projects at the level of the aortic arch. Increased perihilar and bibasilar opacities may represent a combination of edema, atelectasis, pneumonia.      Dx-chest-portable (1 View)    Result Date: 2019 12:03 PM HISTORY/REASON FOR EXAM: Cough. Desaturation TECHNIQUE/EXAM DESCRIPTION AND NUMBER OF VIEWS: Single AP view of the chest. COMPARISON: Yesterday FINDINGS: Lungs: There is some increasing reticular and hazy indistinct opacity. Lung volumes have mildly diminished Pleura:  No pleural space process is seen. Heart and mediastinum: The cardiomediastinal contours are stable.     Increasing reticular and hazy opacity is compatible with edema    Ec-echocardiogram Complete W/o Cont    Result Date: 2019  Transthoracic Echo Report Echocardiography Laboratory CONCLUSIONS Compared to the images of the study done 18, RCA territory wall motion abnormalities are now present. Fair quality study, some off axis views. Moderately reduced left ventricular systolic function. Left ventricular ejection fraction is visually estimated to be 40%. Hypokinesis of the basal to mid inferior and inferolateral wall. Mild mitral annular calcification. Unable to estimate pulmonary artery pressure due to an inadequate tricuspid regurgitant jet. Dilated inferior vena cava with inspiratory collapse, RAP  8 mmHg. TAMARA PINK Exam Date:         2019                    10:51 Exam Location:     Inpatient Priority:          Routine Ordering Physician:        NATALEE RODRIGUEZ Referring Physician:       [id number] Brown Sonographer:               DELIA Schulte Age:    63     Gender:    M MRN:    [id number] :    1955 BSA:    2.17   Ht (in):    71     Wt (lb):    214 Exam Type:     Complete Indications:     Respiratory Distress ICD Codes:       518.82 CPT Codes:       92048 BP:   150    /   82     HR:   80 Technical Quality:       Fair MEASUREMENTS  (Male / Female) Normal Values 2D ECHO LV Diastolic Diameter PLAX        5.5 cm                4.2 - 5.9 / 3.9 - 5.3 cm LV Systolic Diameter PLAX         4.4 cm                2.1 - 4.0 cm IVS Diastolic Thickness            1.3 cm                LVPW Diastolic Thickness          1.1 cm                RV Diameter 4C                    2.9 cm                2.5 - 2.9 cm LVOT Diameter                     2.2 cm                RA Diameter                       3.8 cm                Estimated LV Ejection Fraction    40 %                  LV Ejection Fraction MOD BP       35.9 %                >= 55  % LV Ejection Fraction MOD 4C       36.3 %                LV Ejection Fraction MOD 2C       36.8 %                LA Volume Index                   27.8 cm³/m²           16 - 28 cm³/m² IVC Diameter                      2.1 cm                DOPPLER AV Peak Velocity                  1.3 m/s               AV Peak Gradient                  7.2 mmHg              AV Mean Gradient                  3.7 mmHg              LVOT Peak Velocity                0.72 m/s              AV Area Cont Eq vti               2.4 cm²               Mitral E Point Velocity           0.49 m/s              Mitral E to A Ratio               0.58                  MV Pressure Half Time             46 ms                 MV Area PHT                       4.8 cm²               MV Deceleration Time              159 ms                Pulmonary Vein Systolic Velocity  0.37 m/s              Pulmonary Vein Diastolic Velocit  0.29 m/s              Pulmonary Vein S/D Ratio          1.3                   PV Peak Velocity                  0.88 m/s              PV Peak Gradient                  3.1 mmHg              RVOT Peak Velocity                0.55 m/s              * Indicates values subject to auto-interpretation LV EF:  40    % FINDINGS Left Ventricle Normal left ventricular chamber size. Mild concentric left ventricular hypertrophy. Moderately reduced left ventricular systolic function. Left ventricular ejection fraction is visually estimated to be 40%. Hypokinesis of the basal to mid inferior and inferolateral wall. Normal diastolic function. Right Ventricle Normal right  ventricular size and systolic function. Right Atrium Normal right atrial size. Dilated inferior vena cava with inspiratory collapse. Left Atrium Normal left atrial size. Mitral Valve Structurally normal mitral valve without significant stenosis. Mild mitral annular calcification. Trace mitral regurgitation. Aortic Valve Structurally normal aortic valve without significant stenosis or regurgitation. Tricuspid Valve Structurally normal tricuspid valve without significant stenosis or regurgitation. Unable to estimate pulmonary artery pressure due to an inadequate tricuspid regurgitant jet. Pulmonic Valve The pulmonic valve is not well visualized. Pericardium Normal pericardium without effusion. Aorta Normal aortic root for body surface area. Ascending aorta diameter is 3.2 cm. Mya Alegre MD (Electronically Signed) Final Date:     18 June 2019                 17:39    Dx-abdomen For Tube Placement    Result Date: 6/16/2019 6/16/2019 5:59 PM HISTORY/REASON FOR EXAM:  Line evaluation. TECHNIQUE/EXAM DESCRIPTION AND NUMBER OF VIEWS:  1 view(s) of the abdomen. COMPARISON:  9/15/2007. FINDINGS: Enteric tube projects over the stomach. There is a nonspecific bowel gas pattern. Surgical clips are seen in the right upper quadrant. There are bibasilar airspace opacities. Postsurgical and degenerative changes are seen in the lumbar spine.     Enteric tube projects over the stomach.      Micro:  Results     Procedure Component Value Units Date/Time    CULTURE TISSUE W/ GRM STAIN [[id number]]  (Abnormal)  (Susceptibility) Collected:  06/18/19 1340    Order Status:  Completed Specimen:  Tissue Updated:  06/21/19 1534     Significant Indicator POS (POS)     Source TISS     Site LEFT HEEL WOUND     Culture Result - (A)     Gram Stain Result Many WBCs.  Few Gram positive rods.  Many Gram positive cocci.   (A)     Culture Result Staphylococcus aureus  Heavy growth   (A)      Enterococcus faecalis  Moderate growth  Combination  therapy with ampicillin, penicillin, or  vancomycin (for susceptible strains) plus an aminoglycoside  is usually indicated for serious enterococcal infections,  such as endocarditis unless high-level resistance to both  gentamicin and streptomycin is documented; such combinations  are predicted to result in synergistic killing of the  Enterococcus.   (A)      Streptococcus constellatus  Heavy growth   (A)    Narrative:       Surgery Specimen    Culture & Susceptibility     ENTEROCOCCUS FAECALIS     Antibiotic Sensitivity Microscan Unit Status    Ampicillin Sensitive <=2 mcg/mL Final    Method: TAIWO    Daptomycin Sensitive 2 mcg/mL Final    Method: TIAWO    Gent Synergy Sensitive <=500 mcg/mL Final    Method: TAIWO    Penicillin Sensitive 2 mcg/mL Final    Method: TAIWO    Vancomycin Sensitive 2 mcg/mL Final    Method: TAIWO              STAPHYLOCOCCUS AUREUS     Antibiotic Sensitivity Microscan Unit Status    Ampicillin/sulbactam Sensitive <=8/4 mcg/mL Final    Method: TAIWO    Clindamycin Sensitive <=0.5 mcg/mL Final    Method: TAIWO    Daptomycin Sensitive <=0.5 mcg/mL Final    Method: TAIWO    Erythromycin Sensitive <=0.5 mcg/mL Final    Method: TAIWO    Moxifloxacin Sensitive <=0.5 mcg/mL Final    Method: TAIWO    Oxacillin Sensitive <=0.25 mcg/mL Final    Method: TAIWO    Tetracycline Sensitive <=4 mcg/mL Final    Method: TAIWO    Trimeth/Sulfa Sensitive <=0.5/9.5 mcg/mL Final    Method: TAIWO    Vancomycin Sensitive 2 mcg/mL Final    Method: TAIWO                       Anaerobic Culture [[id number]] Collected:  06/18/19 1340    Order Status:  Completed Specimen:  Tissue Updated:  06/21/19 1534     Significant Indicator NEG     Source TISS     Site LEFT HEEL WOUND     Culture Result Culture in progress.    Narrative:       Surgery Specimen    BLOOD CULTURE [[id number]] Collected:  06/15/19 2249    Order Status:  Completed Specimen:  Blood from Peripheral Updated:  06/21/19 0100     Significant Indicator NEG     Source BLD     Site  "PERIPHERAL     Culture Result No growth after 5 days of incubation.    Narrative:       Per Hospital Policy: Only change Specimen Src: to \"Line\" if  specified by physician order.  Left Hand    BLOOD CULTURE [[id number]] Collected:  06/15/19 2230    Order Status:  Completed Specimen:  Blood from Peripheral Updated:  06/21/19 0100     Significant Indicator NEG     Source BLD     Site PERIPHERAL     Culture Result No growth after 5 days of incubation.    Narrative:       Per Hospital Policy: Only change Specimen Src: to \"Line\" if  specified by physician order.  Right AC    GRAM STAIN [[id number]] Collected:  06/18/19 1340    Order Status:  Completed Specimen:  Tissue Updated:  06/18/19 1907     Significant Indicator .     Source TISS     Site LEFT HEEL WOUND     Gram Stain Result Many WBCs.  Few Gram positive rods.  Many Gram positive cocci.      Narrative:       Surgery Specimen    CULTURE RESPIRATORY W/ GRM STN [[id number]] Collected:  06/16/19 1500    Order Status:  Completed Specimen:  Respirate Updated:  06/18/19 1013     Significant Indicator NEG     Source RESP     Site Bronchoalveolar Lavage RLL     Culture Result Rare growth usual upper respiratory sammie  No clinically significant Staphylococcus aureus, Methicillin  Resistant Staphylococcus aureus, or Pseudomonas species  isolated.       Gram Stain Result Moderate WBCs.  No organisms seen.      GRAM STAIN [[id number]] Collected:  06/16/19 1500    Order Status:  Completed Specimen:  Respirate Updated:  06/16/19 2351     Significant Indicator .     Source RESP     Site Bronchoalveolar Lavage RLL     Gram Stain Result Moderate WBCs.  No organisms seen.            Assessment:  Active Hospital Problems    Diagnosis   • Acute on chronic respiratory failure with hypoxia ([facility]) [J96.21]   • Septic shock due to undetermined organism ([facility]) [A41.9, R65.21]   • OZ (acute kidney injury) ([facility]) [N17.9]   • SALOME (obstructive sleep apnea) [G47.33]   • Pneumonia due to infectious " organism [J18.9]   • Aspiration into airway [T17.908A]   • Cardiomyopathy (HCC) [I42.9]   • Elevated troponin [R74.8]   • Diabetic foot ulcer (HCC) [E11.621, L97.509]   • DM (diabetes mellitus), type 2, uncontrolled (CMS-HCC) [E11.65]   • HTN (hypertension) [I10]   • Mucus plugging of bronchi [J98.09]   • Left shoulder pain [M25.512]   • Tobacco dependence [F17.200]   • Hypokalemia [E87.6]   • Low back pain [M54.5]       Plan:  Left diabetic foot infection, not improving. On treatment  Fevers resolved  Leukocytosis decreasing  Status post excisional debridement down to subcutaneous tissues on 6/19/2019  Or cultures- MSSA, amp S efaecalis and Streptococcus Constellatus  DC Zyvox   Change zosyn to Unasyn if no shortage  Continue with the wound care  Pneumonia  Status post extubation on 6/17  Status post bronchoscopy with BAL on 6/16  BAL cultures-upper respiratory sammie  On antibiotics above    Acute kidney injury  Secondary to septic shock which is now resolved  Avoid nephrotoxins  Renally dose antibiotics accordingly  Overall improving  Monitor    Type 2 diabetes mellitus, poorly controlled  Hemoglobin A1c 12.2  Diabetes education  [place]rance to wound healing  Control blood sugars to control current infection and promote wound healing    Diabetic peripheral neuropathy  Contributing to above  Control blood sugars    Elevated troponins  Likely demand ischemia secondary to septic shock  Troponins now downtrending    I have performed a physical exam and reviewed and updated ROS and plan today 6/23/2019.  In review of yesterday's note 6/22/2019, there are no changes except as documented above.    Discussed with internal medicine and JW Gusman from [facility]

## 2019-06-23 NOTE — PROGRESS NOTES
Patients am fsbs was 57, repeat on the other hand was 56. Gave patient 2 OJs and had him eat breakfast. After breakfast fsbs was 90. Gave patient another snack and held all am insulins.

## 2019-06-24 ENCOUNTER — ANESTHESIA (OUTPATIENT)
Dept: SURGERY | Facility: MEDICAL CENTER | Age: 64
DRG: 853 | End: 2019-06-24
Payer: COMMERCIAL

## 2019-06-24 ENCOUNTER — ANESTHESIA EVENT (OUTPATIENT)
Dept: SURGERY | Facility: MEDICAL CENTER | Age: 64
DRG: 853 | End: 2019-06-24
Payer: COMMERCIAL

## 2019-06-24 LAB
BACTERIA SPEC ANAEROBE CULT: ABNORMAL
BACTERIA SPEC ANAEROBE CULT: ABNORMAL
GLUCOSE BLD-MCNC: 109 MG/DL (ref 65–99)
GLUCOSE BLD-MCNC: 128 MG/DL (ref 65–99)
GLUCOSE BLD-MCNC: 139 MG/DL (ref 65–99)
GLUCOSE BLD-MCNC: 64 MG/DL (ref 65–99)
GLUCOSE BLD-MCNC: 70 MG/DL (ref 65–99)
SIGNIFICANT IND 70042: ABNORMAL
SITE SITE: ABNORMAL
SOURCE SOURCE: ABNORMAL

## 2019-06-24 PROCEDURE — 160048 HCHG OR STATISTICAL LEVEL 1-5: Performed by: ORTHOPAEDIC SURGERY

## 2019-06-24 PROCEDURE — 160035 HCHG PACU - 1ST 60 MINS PHASE I: Performed by: ORTHOPAEDIC SURGERY

## 2019-06-24 PROCEDURE — 700111 HCHG RX REV CODE 636 W/ 250 OVERRIDE (IP): Performed by: ANESTHESIOLOGY

## 2019-06-24 PROCEDURE — A9270 NON-COVERED ITEM OR SERVICE: HCPCS | Performed by: INTERNAL MEDICINE

## 2019-06-24 PROCEDURE — 700105 HCHG RX REV CODE 258: Performed by: ANESTHESIOLOGY

## 2019-06-24 PROCEDURE — 160028 HCHG SURGERY MINUTES - 1ST 30 MINS LEVEL 3: Performed by: ORTHOPAEDIC SURGERY

## 2019-06-24 PROCEDURE — 160002 HCHG RECOVERY MINUTES (STAT): Performed by: ORTHOPAEDIC SURGERY

## 2019-06-24 PROCEDURE — 700102 HCHG RX REV CODE 250 W/ 637 OVERRIDE(OP): Performed by: INTERNAL MEDICINE

## 2019-06-24 PROCEDURE — 700111 HCHG RX REV CODE 636 W/ 250 OVERRIDE (IP): Performed by: INTERNAL MEDICINE

## 2019-06-24 PROCEDURE — 160009 HCHG ANES TIME/MIN: Performed by: ORTHOPAEDIC SURGERY

## 2019-06-24 PROCEDURE — A9270 NON-COVERED ITEM OR SERVICE: HCPCS | Performed by: HOSPITALIST

## 2019-06-24 PROCEDURE — 700105 HCHG RX REV CODE 258: Performed by: HOSPITALIST

## 2019-06-24 PROCEDURE — 770006 HCHG ROOM/CARE - MED/SURG/GYN SEMI*

## 2019-06-24 PROCEDURE — 160022 HCHG BLOCK: Performed by: ORTHOPAEDIC SURGERY

## 2019-06-24 PROCEDURE — 82962 GLUCOSE BLOOD TEST: CPT

## 2019-06-24 PROCEDURE — 500881 HCHG PACK, EXTREMITY: Performed by: ORTHOPAEDIC SURGERY

## 2019-06-24 PROCEDURE — 99232 SBSQ HOSP IP/OBS MODERATE 35: CPT | Performed by: INTERNAL MEDICINE

## 2019-06-24 PROCEDURE — 99233 SBSQ HOSP IP/OBS HIGH 50: CPT | Performed by: INTERNAL MEDICINE

## 2019-06-24 PROCEDURE — 160039 HCHG SURGERY MINUTES - EA ADDL 1 MIN LEVEL 3: Performed by: ORTHOPAEDIC SURGERY

## 2019-06-24 PROCEDURE — 88311 DECALCIFY TISSUE: CPT

## 2019-06-24 PROCEDURE — 160036 HCHG PACU - EA ADDL 30 MINS PHASE I: Performed by: ORTHOPAEDIC SURGERY

## 2019-06-24 PROCEDURE — 700105 HCHG RX REV CODE 258: Performed by: INTERNAL MEDICINE

## 2019-06-24 PROCEDURE — 700102 HCHG RX REV CODE 250 W/ 637 OVERRIDE(OP): Performed by: HOSPITALIST

## 2019-06-24 PROCEDURE — L1830 KO IMMOB CANVAS LONG PRE OTS: HCPCS | Performed by: ORTHOPAEDIC SURGERY

## 2019-06-24 PROCEDURE — 0Y6J0Z1 DETACHMENT AT LEFT LOWER LEG, HIGH, OPEN APPROACH: ICD-10-PCS | Performed by: ORTHOPAEDIC SURGERY

## 2019-06-24 PROCEDURE — 97530 THERAPEUTIC ACTIVITIES: CPT

## 2019-06-24 PROCEDURE — 97116 GAIT TRAINING THERAPY: CPT

## 2019-06-24 PROCEDURE — 500367 HCHG DRAIN KIT, HEMOVAC: Performed by: ORTHOPAEDIC SURGERY

## 2019-06-24 PROCEDURE — 503339 HCHG DRESSSING PICO: Performed by: ORTHOPAEDIC SURGERY

## 2019-06-24 PROCEDURE — 500423 HCHG DRESSING, ABD COMBINE: Performed by: ORTHOPAEDIC SURGERY

## 2019-06-24 PROCEDURE — 88307 TISSUE EXAM BY PATHOLOGIST: CPT

## 2019-06-24 PROCEDURE — 700101 HCHG RX REV CODE 250: Performed by: ANESTHESIOLOGY

## 2019-06-24 PROCEDURE — 700101 HCHG RX REV CODE 250: Performed by: ORTHOPAEDIC SURGERY

## 2019-06-24 PROCEDURE — 501838 HCHG SUTURE GENERAL: Performed by: ORTHOPAEDIC SURGERY

## 2019-06-24 RX ORDER — SODIUM CHLORIDE, SODIUM LACTATE, POTASSIUM CHLORIDE, CALCIUM CHLORIDE 600; 310; 30; 20 MG/100ML; MG/100ML; MG/100ML; MG/100ML
INJECTION, SOLUTION INTRAVENOUS CONTINUOUS
Status: DISCONTINUED | OUTPATIENT
Start: 2019-06-24 | End: 2019-06-24 | Stop reason: HOSPADM

## 2019-06-24 RX ORDER — MAGNESIUM HYDROXIDE 1200 MG/15ML
LIQUID ORAL
Status: COMPLETED | OUTPATIENT
Start: 2019-06-24 | End: 2019-06-24

## 2019-06-24 RX ORDER — CEFAZOLIN SODIUM 1 G/3ML
INJECTION, POWDER, FOR SOLUTION INTRAMUSCULAR; INTRAVENOUS PRN
Status: DISCONTINUED | OUTPATIENT
Start: 2019-06-24 | End: 2019-06-24 | Stop reason: SURG

## 2019-06-24 RX ORDER — HYDROMORPHONE HYDROCHLORIDE 1 MG/ML
0.4 INJECTION, SOLUTION INTRAMUSCULAR; INTRAVENOUS; SUBCUTANEOUS
Status: DISCONTINUED | OUTPATIENT
Start: 2019-06-24 | End: 2019-06-24 | Stop reason: HOSPADM

## 2019-06-24 RX ORDER — ONDANSETRON 2 MG/ML
4 INJECTION INTRAMUSCULAR; INTRAVENOUS
Status: DISCONTINUED | OUTPATIENT
Start: 2019-06-24 | End: 2019-06-24 | Stop reason: HOSPADM

## 2019-06-24 RX ORDER — METOPROLOL TARTRATE 1 MG/ML
1 INJECTION, SOLUTION INTRAVENOUS
Status: DISCONTINUED | OUTPATIENT
Start: 2019-06-24 | End: 2019-06-24 | Stop reason: HOSPADM

## 2019-06-24 RX ORDER — OXYCODONE HCL 5 MG/5 ML
5 SOLUTION, ORAL ORAL
Status: DISCONTINUED | OUTPATIENT
Start: 2019-06-24 | End: 2019-06-24 | Stop reason: HOSPADM

## 2019-06-24 RX ORDER — HYDROMORPHONE HYDROCHLORIDE 1 MG/ML
0.5 INJECTION, SOLUTION INTRAMUSCULAR; INTRAVENOUS; SUBCUTANEOUS
Status: DISCONTINUED | OUTPATIENT
Start: 2019-06-24 | End: 2019-06-25

## 2019-06-24 RX ORDER — ONDANSETRON 2 MG/ML
INJECTION INTRAMUSCULAR; INTRAVENOUS PRN
Status: DISCONTINUED | OUTPATIENT
Start: 2019-06-24 | End: 2019-06-24 | Stop reason: SURG

## 2019-06-24 RX ORDER — MEPERIDINE HYDROCHLORIDE 25 MG/ML
6.25 INJECTION INTRAMUSCULAR; INTRAVENOUS; SUBCUTANEOUS
Status: DISCONTINUED | OUTPATIENT
Start: 2019-06-24 | End: 2019-06-24 | Stop reason: HOSPADM

## 2019-06-24 RX ORDER — DEXAMETHASONE SODIUM PHOSPHATE 4 MG/ML
INJECTION, SOLUTION INTRA-ARTICULAR; INTRALESIONAL; INTRAMUSCULAR; INTRAVENOUS; SOFT TISSUE PRN
Status: DISCONTINUED | OUTPATIENT
Start: 2019-06-24 | End: 2019-06-24 | Stop reason: SURG

## 2019-06-24 RX ORDER — HYDROMORPHONE HYDROCHLORIDE 1 MG/ML
0.6 INJECTION, SOLUTION INTRAMUSCULAR; INTRAVENOUS; SUBCUTANEOUS
Status: DISCONTINUED | OUTPATIENT
Start: 2019-06-24 | End: 2019-06-24 | Stop reason: HOSPADM

## 2019-06-24 RX ORDER — DIPHENHYDRAMINE HYDROCHLORIDE 50 MG/ML
12.5 INJECTION INTRAMUSCULAR; INTRAVENOUS
Status: DISCONTINUED | OUTPATIENT
Start: 2019-06-24 | End: 2019-06-24 | Stop reason: HOSPADM

## 2019-06-24 RX ORDER — OXYCODONE HCL 5 MG/5 ML
10 SOLUTION, ORAL ORAL
Status: DISCONTINUED | OUTPATIENT
Start: 2019-06-24 | End: 2019-06-24 | Stop reason: HOSPADM

## 2019-06-24 RX ORDER — KETOROLAC TROMETHAMINE 30 MG/ML
15 INJECTION, SOLUTION INTRAMUSCULAR; INTRAVENOUS ONCE
Status: COMPLETED | OUTPATIENT
Start: 2019-06-24 | End: 2019-06-24

## 2019-06-24 RX ORDER — BUPIVACAINE HYDROCHLORIDE 5 MG/ML
INJECTION, SOLUTION EPIDURAL; INTRACAUDAL PRN
Status: DISCONTINUED | OUTPATIENT
Start: 2019-06-24 | End: 2019-06-24 | Stop reason: SURG

## 2019-06-24 RX ORDER — SODIUM CHLORIDE, SODIUM LACTATE, POTASSIUM CHLORIDE, CALCIUM CHLORIDE 600; 310; 30; 20 MG/100ML; MG/100ML; MG/100ML; MG/100ML
INJECTION, SOLUTION INTRAVENOUS
Status: DISCONTINUED | OUTPATIENT
Start: 2019-06-24 | End: 2019-06-24 | Stop reason: SURG

## 2019-06-24 RX ORDER — HYDRALAZINE HYDROCHLORIDE 20 MG/ML
5 INJECTION INTRAMUSCULAR; INTRAVENOUS
Status: DISCONTINUED | OUTPATIENT
Start: 2019-06-24 | End: 2019-06-24 | Stop reason: HOSPADM

## 2019-06-24 RX ORDER — HYDROMORPHONE HYDROCHLORIDE 1 MG/ML
0.2 INJECTION, SOLUTION INTRAMUSCULAR; INTRAVENOUS; SUBCUTANEOUS
Status: DISCONTINUED | OUTPATIENT
Start: 2019-06-24 | End: 2019-06-24 | Stop reason: HOSPADM

## 2019-06-24 RX ORDER — DEXTROSE MONOHYDRATE 100 MG/ML
INJECTION, SOLUTION INTRAVENOUS CONTINUOUS
Status: DISCONTINUED | OUTPATIENT
Start: 2019-06-24 | End: 2019-06-26

## 2019-06-24 RX ORDER — HALOPERIDOL 5 MG/ML
1 INJECTION INTRAMUSCULAR
Status: DISCONTINUED | OUTPATIENT
Start: 2019-06-24 | End: 2019-06-24 | Stop reason: HOSPADM

## 2019-06-24 RX ADMIN — PIPERACILLIN SODIUM AND TAZOBACTAM SODIUM 3.38 G: 3; .375 INJECTION, POWDER, FOR SOLUTION INTRAVENOUS at 05:04

## 2019-06-24 RX ADMIN — SODIUM CHLORIDE, POTASSIUM CHLORIDE, SODIUM LACTATE AND CALCIUM CHLORIDE: 600; 310; 30; 20 INJECTION, SOLUTION INTRAVENOUS at 20:13

## 2019-06-24 RX ADMIN — FENTANYL CITRATE 100 MCG: 50 INJECTION, SOLUTION INTRAMUSCULAR; INTRAVENOUS at 20:17

## 2019-06-24 RX ADMIN — METOPROLOL TARTRATE 12.5 MG: 25 TABLET, FILM COATED ORAL at 16:52

## 2019-06-24 RX ADMIN — PREGABALIN 300 MG: 150 CAPSULE ORAL at 05:00

## 2019-06-24 RX ADMIN — PIPERACILLIN SODIUM AND TAZOBACTAM SODIUM 3.38 G: 3; .375 INJECTION, POWDER, FOR SOLUTION INTRAVENOUS at 14:07

## 2019-06-24 RX ADMIN — OXYCODONE HYDROCHLORIDE 10 MG: 5 TABLET ORAL at 22:45

## 2019-06-24 RX ADMIN — SENNOSIDES,DOCUSATE SODIUM 2 TABLET: 8.6; 5 TABLET, FILM COATED ORAL at 06:00

## 2019-06-24 RX ADMIN — CEFAZOLIN 2 G: 330 INJECTION, POWDER, FOR SOLUTION INTRAMUSCULAR; INTRAVENOUS at 20:13

## 2019-06-24 RX ADMIN — METOPROLOL TARTRATE 12.5 MG: 25 TABLET, FILM COATED ORAL at 05:01

## 2019-06-24 RX ADMIN — LISINOPRIL 10 MG: 10 TABLET ORAL at 05:02

## 2019-06-24 RX ADMIN — METHADONE HYDROCHLORIDE 35 MG: 10 TABLET ORAL at 16:52

## 2019-06-24 RX ADMIN — METHADONE HYDROCHLORIDE 35 MG: 10 TABLET ORAL at 05:00

## 2019-06-24 RX ADMIN — PREGABALIN 300 MG: 150 CAPSULE ORAL at 16:53

## 2019-06-24 RX ADMIN — ONDANSETRON 4 MG: 2 INJECTION INTRAMUSCULAR; INTRAVENOUS at 20:55

## 2019-06-24 RX ADMIN — FENTANYL CITRATE 50 MCG: 50 INJECTION INTRAMUSCULAR; INTRAVENOUS at 21:46

## 2019-06-24 RX ADMIN — DEXAMETHASONE SODIUM PHOSPHATE 8 MG: 4 INJECTION, SOLUTION INTRA-ARTICULAR; INTRALESIONAL; INTRAMUSCULAR; INTRAVENOUS; SOFT TISSUE at 20:30

## 2019-06-24 RX ADMIN — KETOROLAC TROMETHAMINE 15 MG: 30 INJECTION, SOLUTION INTRAMUSCULAR at 08:56

## 2019-06-24 RX ADMIN — OXYCODONE HYDROCHLORIDE 10 MG: 5 TABLET ORAL at 11:43

## 2019-06-24 RX ADMIN — ENOXAPARIN SODIUM 40 MG: 100 INJECTION SUBCUTANEOUS at 05:00

## 2019-06-24 RX ADMIN — HYDROMORPHONE HYDROCHLORIDE 0.5 MG: 1 INJECTION, SOLUTION INTRAMUSCULAR; INTRAVENOUS; SUBCUTANEOUS at 23:47

## 2019-06-24 RX ADMIN — DEXTROSE MONOHYDRATE: 100 INJECTION, SOLUTION INTRAVENOUS at 14:04

## 2019-06-24 RX ADMIN — EPHEDRINE SULFATE 10 MG: 50 INJECTION INTRAMUSCULAR; INTRAVENOUS; SUBCUTANEOUS at 20:25

## 2019-06-24 RX ADMIN — BUPIVACAINE HYDROCHLORIDE 30 ML: 5 INJECTION, SOLUTION EPIDURAL; INTRACAUDAL; PERINEURAL at 20:18

## 2019-06-24 RX ADMIN — PROPOFOL 170 MG: 10 INJECTION, EMULSION INTRAVENOUS at 20:17

## 2019-06-24 RX ADMIN — DEXTROSE MONOHYDRATE 250 ML: 100 INJECTION, SOLUTION INTRAVENOUS at 18:03

## 2019-06-24 ASSESSMENT — COGNITIVE AND FUNCTIONAL STATUS - GENERAL
MOVING FROM LYING ON BACK TO SITTING ON SIDE OF FLAT BED: A LOT
DAILY ACTIVITIY SCORE: 18
HELP NEEDED FOR BATHING: A LITTLE
PERSONAL GROOMING: A LITTLE
CLIMB 3 TO 5 STEPS WITH RAILING: A LOT
TOILETING: A LITTLE
SUGGESTED CMS G CODE MODIFIER DAILY ACTIVITY: CK
MOVING TO AND FROM BED TO CHAIR: A LITTLE
WALKING IN HOSPITAL ROOM: A LITTLE
MOBILITY SCORE: 17
STANDING UP FROM CHAIR USING ARMS: A LITTLE
SUGGESTED CMS G CODE MODIFIER MOBILITY: CK
DRESSING REGULAR LOWER BODY CLOTHING: A LOT
DRESSING REGULAR UPPER BODY CLOTHING: A LITTLE

## 2019-06-24 ASSESSMENT — ENCOUNTER SYMPTOMS
PALPITATIONS: 0
EYE PAIN: 0
BACK PAIN: 0
DEPRESSION: 0
SPUTUM PRODUCTION: 0
BLURRED VISION: 0
BLOOD IN STOOL: 0
FALLS: 0
HEADACHES: 0
SHORTNESS OF BREATH: 0
VOMITING: 0
HEARTBURN: 0
SENSORY CHANGE: 0
COUGH: 0
MYALGIAS: 0
FEVER: 0
DIZZINESS: 0
INSOMNIA: 0
NERVOUS/ANXIOUS: 0
WEIGHT LOSS: 0
CHILLS: 0

## 2019-06-24 ASSESSMENT — GAIT ASSESSMENTS
ASSISTIVE DEVICE: FRONT WHEEL WALKER
GAIT LEVEL OF ASSIST: MINIMAL ASSIST
DEVIATION: ANTALGIC;STEP TO;BRADYKINETIC
DISTANCE (FEET): 50

## 2019-06-24 ASSESSMENT — PAIN SCALES - GENERAL: PAIN_LEVEL: 2

## 2019-06-24 NOTE — PROGRESS NOTES
"Orthopedic Physician Note    Subjective:  Pt doing ok  Obje*ctive:  left heel wound progressing. Necrotic tissue  Blood pressure 105/49, pulse 78, temperature 36.2 °C (97.1 °F), temperature source Temporal, resp. rate 18, height 1.803 m (5' 10.98\"), weight 96.7 kg (213 lb 3 oz), SpO2 90 %.    Labs:  Recent Labs      06/22/19   0406  06/23/19   0516   WBC  15.9*  12.7*   RBC  3.99*  4.29*   HEMOGLOBIN  11.4*  12.3*   HEMATOCRIT  35.6*  38.5*   MCV  89.2  89.7   MCH  28.6  28.7   RDW  50.5*  50.7*   PLATELETCT  245  232   MPV  9.6  9.9   NEUTSPOLYS   --   72.00   LYMPHOCYTES   --   18.40*   MONOCYTES   --   7.20   EOSINOPHILS   --   1.60   BASOPHILS   --   0.20         Recent Labs      06/22/19   0406   SODIUM  136   POTASSIUM  3.7   CHLORIDE  104   CO2  27   GLUCOSE  83   BUN  27*         Assessment:  63M with a left heel wound  Plan:  -The patient has failed InD/Veriflow. We discussed the patients situation with him and the best option is BKA. He understands. We will take him back today for BKA.  -NPO  -Elevate    Jose De Jesus Barrios MD  [facility]  Foot and Ankle Fellow  [phone]             "

## 2019-06-24 NOTE — DISCHARGE PLANNING
Agency/Facility Name: [facility]  Spoke To: Shazia  Outcome: Patient declined. At this time, [facility] is not accepting patients with workers comp. LSW notified and is exploring other options.    Agency/Facility Name: [facility]  Spoke To: Roselia  Outcome: Patient declined. At this time, [facility] is not accepting patients with workers comp.

## 2019-06-24 NOTE — PROGRESS NOTES
[facility] Medicine Daily Progress Note    Date of Service  6/24/2019    Chief Complaint  63 y.o. male admitted 6/15/2019 with L shoulder pain and L heel ulcer.    Hospital Course    Hx TIIDM, COPD on 3 L HOT.  Presenting to [place] with multiple complaints including shoulder pain following a fall and a chronic heel ulcer.  Admitted to CICU with Dx of sepsis from diabetic foot ulcer, Type II NSTEMI with Trop of 1.09.  Intubated on admission, extubated 6/17.    Interval Problem Update  Denies nausea vomiting diarrhea. Doing leg exercise on the bed.   No acute issue overnight. I saw and examined the patient today.    Consultants/Specialty  LPS  Pulmonology/Critical Care    Code Status  full    Disposition  SNF referral placed    Review of Systems  Review of Systems   Constitutional: Negative for chills, fever and weight loss.   HENT: Negative for congestion, hearing loss and nosebleeds.    Eyes: Negative for blurred vision and pain.   Respiratory: Negative for cough and sputum production.    Cardiovascular: Negative for chest pain and palpitations.   Gastrointestinal: Negative for blood in stool, heartburn and vomiting.   Genitourinary: Negative for dysuria and urgency.   Musculoskeletal: Positive for joint pain. Negative for back pain, falls and myalgias.        L shoulder pain, chronic   Skin: Negative for itching and rash.        R heel wound    Neurological: Negative for dizziness, sensory change and headaches.   Psychiatric/Behavioral: Negative for depression. The patient is not nervous/anxious and does not have insomnia.         Physical Exam  Temp:  [36.1 °C (97 °F)-36.9 °C (98.5 °F)] 36.7 °C (98.1 °F)  Pulse:  [76-95] 95  Resp:  [16-18] 17  BP: (100-129)/(56-67) 119/64  SpO2:  [87 %-93 %] 92 %    Physical Exam   Constitutional: He is oriented to person, place, and time. He appears well-developed and well-nourished. No distress.   HENT:   Head: Normocephalic and atraumatic.   Eyes: Pupils are equal, round, and  reactive to light. EOM are normal. Left eye exhibits no discharge.   Neck: Normal range of motion. Neck supple. No thyromegaly present.   Cardiovascular: Normal rate and regular rhythm.    Pulmonary/Chest: Effort normal. No respiratory distress.   Abdominal: Soft. Bowel sounds are normal. He exhibits no distension.   Musculoskeletal: He exhibits edema (trace). He exhibits no tenderness.   Wound vac in place L heel   Neurological: He is alert and oriented to person, place, and time. No cranial nerve deficit.   Skin: Skin is warm and dry. No rash noted. He is not diaphoretic. No erythema.   Psychiatric: He has a normal mood and affect. His speech is normal.   Vitals reviewed.  Patient seen and examined today on 6/21, unchanged from 6/20.    Fluids    Intake/Output Summary (Last 24 hours) at 06/24/19 0735  Last data filed at 06/24/19 0520   Gross per 24 hour   Intake              240 ml   Output              450 ml   Net             -210 ml       Laboratory  Recent Labs      06/22/19   0406  06/23/19   0516   WBC  15.9*  12.7*   RBC  3.99*  4.29*   HEMOGLOBIN  11.4*  12.3*   HEMATOCRIT  35.6*  38.5*   MCV  89.2  89.7   MCH  28.6  28.7   MCHC  32.0*  31.9*   RDW  50.5*  50.7*   PLATELETCT  245  232   MPV  9.6  9.9     Recent Labs      06/22/19   0406   SODIUM  136   POTASSIUM  3.7   CHLORIDE  104   CO2  27   GLUCOSE  83   BUN  27*   CREATININE  1.19   CALCIUM  8.0*                   Imaging  EC-ECHOCARDIOGRAM COMPLETE W/O CONT   Final Result      DX-CHEST-PORTABLE (1 VIEW)   Final Result         1.  Pulmonary edema and/or infiltrates are identified, which are somewhat decreased since the prior exam.      DX-ANKLE 3+ VIEWS LEFT   Final Result      Soft tissue swelling. No acute fracture identified.      DX-CHEST-PORTABLE (1 VIEW)   Final Result      1.  Improved aeration of the RIGHT lung base   2.  Otherwise no interval change in BILATERAL atelectasis or airspace disease      DX-ABDOMEN FOR TUBE PLACEMENT   Final Result       Enteric tube projects over the stomach.      DX-CHEST-LIMITED (1 VIEW)   Final Result      New right IJ line tip overlies the SVC and no pneumothorax is identified      New right basilar opacity effaces the diaphragm and could be from atelectasis or consolidation      DX-CHEST-PORTABLE (1 VIEW)   Final Result      Endotracheal tube projects at the level of the aortic arch.      Increased perihilar and bibasilar opacities may represent a combination of edema, atelectasis, pneumonia.         DX-CHEST-PORTABLE (1 VIEW)   Final Result      Increasing reticular and hazy opacity is compatible with edema      CT-SHOULDER W/O PLUS RECONS LEFT   Final Result         Prior resection of left humeral head. The residual head neck junction appears well corticated and could relate to chronic resection/deformity. There is also deformity and remodeling of the glenoid with well-corticated margin, suggesting of chronic change    as well.      No acute fracture.      There is no articulation between the proximal humerus and glenoid. The glenohumeral joint is replaced by loculated soft tissue or dense fluid, likely chronic change. The fluid is slightly less in 2018 CT.      Infection is considered less likely given the osseous cortex adjacent to the fluid is well corticated and demonstrates no rarefaction or destruction.      US-FOREIGN FILM ULTRASOUND   Final Result      OUTSIDE IMAGES-CT HEAD   Final Result      [id number] FILM X-RAY   Final Result      OUTSIDE IMAGES-DX LOWER EXTREMITY, LEFT   Final Result      [id number] FILM X-RAY   Final Result      OUTSIDE IMAGES-DX CHEST   Final Result      OUTSIDE IMAGES-DX UPPER EXTREMITY, LEFT   Final Result           Assessment/Plan  * Acute on chronic respiratory failure with hypoxia (HCC)- (present on admission)   Assessment & Plan    Extubated 6/17, doing well on 1-2L continuous. Typically only on home O2 at night. Secondary to aspiration vs CAP  - continue to wean O2  to home regimen  - RT protocol  - weaning steroids, last dose of prednisone 6/20  - increase mobilization, IS  - abx x5 days     Diabetic foot ulcer (HCC)- (present on admission)   Assessment & Plan    Infected left heel diabetic foot ulcer with group D enterococcus. No evidence of osteomyelitis  - s/p debridement on 6/18  - On IV Zosyn, anticipate 2 weeks abx  - pain control  - wound care and wound vac  - ID on     Septic shock due to undetermined organism ([facility])- (present on admission)   Assessment & Plan    This is sepsis (without associated acute organ dysfunction).   source is infected diabetic ulcer and pneumonia.   Improving  On zosyn IV     OZ (acute kidney injury) ([facility])- (present on admission)   Assessment & Plan    Cr peaked at 1.45, down to 1.19 today. Stabilizing  - renally dose medications  - avoid nephrotoxic agents  - trend BMP  - will need to hold lisinopril if Cr worsens     Pneumonia due to infectious organism- (present on admission)   Assessment & Plan    On Zosyn     Cardiomyopathy ([facility])- (present on admission)   Assessment & Plan    Echo LVEF 40%. No exacerbation and is euvolemic.  - continue metop and lisinopril with holding parameters     DM (diabetes mellitus), type 2, uncontrolled (CMS-[facility])- (present on admission)   Assessment & Plan    Uncontrolled, with hyperglycemia. A1C elevated at 12.2%. Blood sugars much improved over the last 24 hours  - lantus 44U BID  - resistant sliding scale and 5U TID with meals  - hypoglycemia protocol and DM diet  - DM education     Essential hypertension- (present on admission)   Assessment & Plan    Normo to hypertensive.  - continue metop, lisinopril  - labetalol IV PRN     Leucocytosis   Assessment & Plan    Wbc 12   Improving slowly  Follow cbc in am     Left shoulder pain- (present on admission)   Assessment & Plan    No acute fracture noted on CT  Pain control with Tylenol, oxycodone and IV morphine.  Monitor respiratory status closely  PT OT  If  persistent pain we will consider consulting his orthopedic surgeon      Tobacco dependence- (present on admission)   Assessment & Plan    Cont to encourage cessation     Hypokalemia- (present on admission)   Assessment & Plan    Improved  monitor and replete as needed          VTE prophylaxis: lovenox

## 2019-06-24 NOTE — WOUND TEAM
"[facility]  Inpatient Services  Wound and Skin Care Progress Note    HPI, PMH, SH: Reviewed    WOUND TEAM FOLLOW UP: Scheduled wound vac change and assessment of right heel wounds.  Unit where seen by Wound Team: [id number]      SUBJECTIVE: \"They are going to amputate.    Self Report / Pain Level: Patient denied     OBJECTIVE: Dr. Barrios had removed the wound vac dressing to left heel.    WOUND TYPE, LOCATION, CHARACTERISTICS:      Wound 06/16/19 Diabetic Ulcer Heel right plantar (Active)   Wound Image      Site Assessment Pink    Elizabeth-wound Assessment Maceration;Callused    Margins MESHA    Wound Length (cm) 1 cm    Wound Width (cm) 1 cm    Wound Depth (cm) 0.3 cm    Wound Surface Area (cm^2) 1 cm^2    Tunneling 0 cm    Undermining 0 cm    Closure None    Drainage Amount Scant    Drainage Description Serous    Non-staged Wound Description Full thickness    Treatments Cleansed;Site care    Cleansing Approved Wound Cleanser    Periwound Protectant Benzoin    Dressing Options Hydrofiber Silver;Adhesive Foam    Dressing Cleansing/Solutions Not Applicable    Dressing Changed New    Dressing Status Clean;Dry;Intact    Dressing Change Frequency Every 48 hrs    NEXT Dressing Change  06/26/19    NEXT Weekly Photo (Inpatient Only) 06/26/19    WOUND NURSE ONLY - Odor None    WOUND NURSE ONLY - Pulses Right;DP;PT;1+    WOUND NURSE ONLY - Exposed Structures None    WOUND NURSE ONLY - Tissue Type and Percentage 100% pale pink        Wound 06/16/19 Diabetic Ulcer Heel left medial and plantar (Active)   Wound Image         Site Assessment Tang;Yellow    Elizabeth-wound Assessment Maceration;Swelling;Red;White    Margins Undefined edges;Unattached edges    Wound Length (cm) 3 cm    Wound Width (cm) 2 cm    Wound Depth (cm) 0.5 cm    Wound Surface Area (cm^2) 6 cm^2    Tunneling 0 cm    Undermining 0 cm    Closure MESHA    Drainage Amount Moderate    Drainage Description Tan    Non-staged Wound Description Full thickness  "   Treatments Other (Comment);Site care    Cleansing Not Applicable    Periwound Protectant Not Applicable    Dressing Options Moist Gauze;Absorbent Abdominal Pad;Roll Gauze    Dressing Cleansing/Solutions Not Applicable    Dressing Changed New    Dressing Status Clean;Dry;Intact    Dressing Change Frequency Monday, Wednesday, Friday    NEXT Dressing Change  06/24/19    NEXT Weekly Photo (Inpatient Only) 06/26/19    WOUND NURSE ONLY - Odor None    WOUND NURSE ONLY - Pulses Left;2+;DP    WOUND NURSE ONLY - Exposed Structures Adipose    WOUND NURSE ONLY - Tissue Type and Percentage tan, grey, yellow         INTERVENTIONS BY WOUND TEAM: Dr. Barrios had removed wound vac dressing for assessment of wound. He stated that patient will need an amputation and to leave the wound vac off. Dressed this wound with wet to dry. Removed dressing from right heel. Cleaned wounds, cleaned periwound well to remove left over honey. Changed dressing to hydrofiber silver because slough removed from wound beds and periwound macerated. Secured with adhesive silicone foam. Placed offloading shoes to both feet.    Interdisciplinary consultation: Patient, nursing, Dr. Barrios, Naseem ESCALERA with L[facility].      EVALUATION AND PROGRESS OF WOUND(S): Left BKA amputation planned. Right heel wounds with clean wound beds, hydrofiber silver provides topical antimicrobial treatment and absorption of exudate.     Rationale for changes in Plan of Care: Left foot wound declining, osteomyelitis. Right heel wound were autolytically debrided by using honey hydrocolloid.  Now needs absorptive dressing.    Factors affecting wound healing: Diabetes, infection.  Goals: Steady decrease in size of wounds to right heel. Left BKA today.    NURSING PLAN OF CARE:    Dressing changes: Continue previous Dressing Maintenance orders:        See new Dressing Maintenance orders:    X   Skin care: See Skin Care orders:        Rectal tube care: See  Rectal Tube Care orders:      Other orders:           WOUND TEAM PLAN OF CARE (X):   NPWT change 3 x week:        Dressing changes:       Follow up as needed:    X   Other: LPS following.

## 2019-06-24 NOTE — DISCHARGE PLANNING
Anticipated Discharge Disposition: Pending    Action: Plan for pt has been to d/c to SNF, there have been difficulties getting placement due to pt's worker's comp. It was determined as of this morning that pt will be undergoing a BKA, with surgery anticipated to take place today. Pt's needs will need to be reevaluated after surgery.     Barriers to Discharge: Payer source.     Plan: No further d/c planning needs anticipated at this time.

## 2019-06-24 NOTE — PROGRESS NOTES
Infectious Disease Progress Note    Author: Rae Ortiz M.D. Date & Time of service: 2019  12:52 PM    Chief Complaint:  Follow-up for left heel infection/ulcer, septic shock    Interval History:  63 y.o.WM with history of type 2 diabetes mellitus comp located by peripheral neuropathy and diabetic foot ulcers, history of MRSA infection in  and [place] on nocturnal oxygen admitted 6/15/2019 for respiratory failure.  Found to have a left heel wound infection status post excisional debridement down to subcutaneous tissue on 2019.  Cultures growing Staphylococcus aureus.     afebrile WBC 15.1 patient transferred out of the ICU overnight.  He is doing well without any new issues but is eager to go home.  Tolerating IV antibiotics.  No diarrhea   AF WBC 15.6 patient had bedside debridement of his right heel yesterday and endured significant pain.  Wound photos reviewed and R heel with significant erythema, edema and necrotic tissue  - No fevers. Feels tired. Still on 1.5 l of O2. He is on 3l of nocturnal O2 at home  2019-no fevers.  No new issues overnight.  WBC is decreased to 12.7.  Creatinine is slightly up at 1.19   AF WBC 12.7 somnolent today-no acute events overnight  Labs Reviewed. Wound reviewed    Review of Systems:  Review of Systems   Unable to perform ROS: Other   Constitutional: Negative for fever.   Respiratory: Negative for shortness of breath.        Hemodynamics:  Temp (24hrs), Av.5 °C (97.7 °F), Min:36.1 °C (97 °F), Max:36.9 °C (98.5 °F)  Temperature:  (Q8 per RN)  Pulse  Av.7  Min: 66  Max: 117   Blood Pressure : 105/49       Physical Exam:  Physical Exam   Constitutional: No distress.   Eyes: Right eye exhibits no discharge. Left eye exhibits no discharge.   Neck: No JVD present.   Cardiovascular: Normal rate.    Pulmonary/Chest: Effort normal. No stridor. No respiratory distress.   Abdominal: Soft. He exhibits no distension.   Musculoskeletal: He  exhibits no edema.   Left heel with wound VAC in place   Pictures 3 X 4.5 X 2cm, shagggy red base    Left great toe ulcerated callus    Right heel 1 cm X 1 cm x 0.3 cm No surrounding erythema    Left shoulder scar   Skin: He is not diaphoretic.   Psychiatric: He has a normal mood and affect. His behavior is normal.   Nursing note and vitals reviewed.      Meds:    Current Facility-Administered Medications:   •  insulin glargine  •  tramadol  •  insulin lispro **AND** insulin lispro **AND** Accu-Chek ACHS **AND** NOTIFY MD and PharmD **AND** glucose **AND** dextrose 10% bolus  •  aspirin  •  lisinopril  •  enoxaparin (LOVENOX) injection  •  acetaminophen  •  [DISCONTINUED] senna-docusate **AND** polyethylene glycol/lytes **AND** magnesium hydroxide **AND** bisacodyl  •  oxyCODONE immediate-release  •  ipratropium-albuterol  •  pregabalin  •  methadone  •  metoprolol  •  hydrALAZINE  •  labetalol  •  senna-docusate  •  Notify provider if pain remains uncontrolled **AND** Use the numeric rating scale (NRS-11) on regular floors and Critical-Care Pain Observation Tool (CPOT) on ICUs/Trauma to assess pain **AND** Pulse Ox (Oximetry) **AND** Pharmacy Consult Request **AND** If patient difficult to arouse and/or has respiratory depression, stop any opiates that are currently infusing and call a Rapid Response. **AND** [DISCONTINUED] oxyCODONE immediate-release **AND** [DISCONTINUED] oxyCODONE immediate-release **AND** [DISCONTINUED] morphine injection  •  Respiratory Care per Protocol  •  [COMPLETED] piperacillin-tazobactam **AND** piperacillin-tazobactam **AND** [DISCONTINUED] MD Alert...Vancomycin per Pharmacy  •  ondansetron  •  ondansetron    Labs:  Recent Labs      06/22/19   0406  06/23/19   0516   WBC  15.9*  12.7*   RBC  3.99*  4.29*   HEMOGLOBIN  11.4*  12.3*   HEMATOCRIT  35.6*  38.5*   MCV  89.2  89.7   MCH  28.6  28.7   RDW  50.5*  50.7*   PLATELETCT  245  232   MPV  9.6  9.9   NEUTSPOLYS   --   72.00    LYMPHOCYTES   --   18.40*   MONOCYTES   --   7.20   EOSINOPHILS   --   1.60   BASOPHILS   --   0.20     Recent Labs      06/22/19   0406   SODIUM  136   POTASSIUM  3.7   CHLORIDE  104   CO2  27   GLUCOSE  83   BUN  27*     Recent Labs      06/22/19   0406   CREATININE  1.19       Imaging:  Ct-shoulder W/o Plus Recons Left    Result Date: 6/15/2019  6/15/2019 10:05 PM HISTORY/REASON FOR EXAM:  Shoulder pain, acute, persistent, xray and exam nonspecific. History of left shoulder dislocation. Sepsis. TECHNIQUE/ EXAM DESCRIPTION AND NUMBER OF VIEWS:  CT scan of the LEFT shoulder without contrast and including reconstructions. Thin-section noncontrast helical images were obtained from the clavicle through the scapula. Coronal and sagittal reconstructions were generated. Up to date radiation dose reduction adjustments have been utilized to meet ALARA standards for radiation dose reduction. COMPARISON: Radiograph 6/15/2019, 1/12/2018, CT 1/14/2018 FINDINGS: Prior resection of the left humeral head with residual head neck junction appears well corticated and could relate to chronic resection/deformity. There is also deformity and remodeling of the glenoid with well-corticated margin, suggesting of chronic change as well. There is no articulation between the proximal humerus and glenoid. The joint is replaced by loculated soft tissue or dense fluid (image 65 series 4). All rotator cuff muscle are completely atrophied and likely no rotator cuff tendon connecting to the humerus.     Prior resection of left humeral head. The residual head neck junction appears well corticated and could relate to chronic resection/deformity. There is also deformity and remodeling of the glenoid with well-corticated margin, suggesting of chronic change  as well. No acute fracture. There is no articulation between the proximal humerus and glenoid. The glenohumeral joint is replaced by loculated soft tissue or dense fluid, likely chronic change.  The fluid is slightly less in 2018 CT. Infection is considered less likely given the osseous cortex adjacent to the fluid is well corticated and demonstrates no rarefaction or destruction.    Dx-ankle 3+ Views Left    Result Date: 6/17/2019 6/17/2019 3:01 PM HISTORY/REASON FOR EXAM:  For worsening wound. Soft tissue ulceration TECHNIQUE/EXAM DESCRIPTION AND NUMBER OF VIEWS:  3 views of the LEFT ankle. COMPARISON: None. FINDINGS: Soft tissue swelling. No acute fracture identified. Ankle joint degenerative changes. Large posterior calcaneal spur at the Achilles insertion. Soft tissue swelling posterior and inferior to the calcaneus.     Soft tissue swelling. No acute fracture identified.    Dx-chest-limited (1 View)    Result Date: 6/16/2019 6/16/2019 3:37 PM HISTORY/REASON FOR EXAM: central line placement. TECHNIQUE/EXAM DESCRIPTION AND NUMBER OF VIEWS: Single portable view of the chest. COMPARISON: 1:44 PM. FINDINGS: New right IJ line tip overlies the SVC. No pneumothorax New partial right hemidiaphragm effacement with hazy basilar opacity. No other change is identified     New right IJ line tip overlies the SVC and no pneumothorax is identified New right basilar opacity effaces the diaphragm and could be from atelectasis or consolidation    Dx-chest-portable (1 View)    Result Date: 6/18/2019 6/18/2019 4:30 AM HISTORY/REASON FOR EXAM: For indication of respiratory failure; For indication of respiratory failure TECHNIQUE/EXAM DESCRIPTION:  Single AP view of the chest. COMPARISON: Yesterday FINDINGS: Endotracheal tube has been removed in the interim.  Dobbhoff tube has been removed in the interim. Cardiomegaly is observed. The mediastinal contour appears within normal limits.  The central  pulmonary vasculature appears prominent and indistinct. The lungs appear well expanded bilaterally.  Diffuse scattered hazy pulmonary parenchymal opacities are seen. No significant pleural effusions are identified. The bony  structures appear age-appropriate.     1.  Pulmonary edema and/or infiltrates are identified, which are somewhat decreased since the prior exam.    Dx-chest-portable (1 View)    Result Date: 6/17/2019 6/17/2019 2:07 AM HISTORY/REASON FOR EXAM:  For indication of respiratory failure; For indication of respiratory failure TECHNIQUE/EXAM DESCRIPTION AND NUMBER OF VIEWS: Single portable view of the chest. COMPARISON: Yesterday FINDINGS: Hardware is stably positioned in its visualized extent. HEART: Stable size. LUNGS: ] Opacity is decreased. LEFT basilar opacities unchanged. PLEURA: No effusion or pneumothorax.     1.  Improved aeration of the RIGHT lung base 2.  Otherwise no interval change in BILATERAL atelectasis or airspace disease    Dx-chest-portable (1 View)    Result Date: 6/16/2019 6/16/2019 1:46 PM HISTORY/REASON FOR EXAM:  intubation. TECHNIQUE/EXAM DESCRIPTION AND NUMBER OF VIEWS: Single portable view of the chest. COMPARISON: 6/16/2019 FINDINGS: The cardiomediastinal silhouette is stable. There are increased perihilar and bibasilar airspace opacities, right greater than left. No pleural effusion or pneumothorax is seen. There is a chronic deformity of the proximal left humerus. Endotracheal tube  projects at the level of the aortic arch.     Endotracheal tube projects at the level of the aortic arch. Increased perihilar and bibasilar opacities may represent a combination of edema, atelectasis, pneumonia.     Dx-chest-portable (1 View)    Result Date: 6/16/2019 6/16/2019 12:03 PM HISTORY/REASON FOR EXAM: Cough. Desaturation TECHNIQUE/EXAM DESCRIPTION AND NUMBER OF VIEWS: Single AP view of the chest. COMPARISON: Yesterday FINDINGS: Lungs: There is some increasing reticular and hazy indistinct opacity. Lung volumes have mildly diminished Pleura:  No pleural space process is seen. Heart and mediastinum: The cardiomediastinal contours are stable.     Increasing reticular and hazy opacity is compatible with  edema    Ec-echocardiogram Complete W/o Cont    Result Date: 6/18/2019  Transthoracic Echo Report Echocardiography Laboratory CONCLUSIONS Compared to the images of the study done 1/27/18, RCA territory wall motion abnormalities are now present. Fair quality study, some off axis views. Moderately reduced left ventricular systolic function. Left ventricular ejection fraction is visually estimated to be 40%. Hypokinesis of the basal to mid inferior and inferolateral wall. Mild mitral annular calcification. Unable to estimate pulmonary artery pressure due to an inadequate tricuspid regurgitant jet. Dilated inferior vena cava with inspiratory collapse, EF:  40    % FINDINGS Left Ventricle Normal left ventricular chamber size. Mild concentric left ventricular hypertrophy. Moderately reduced left ventricular systolic function. Left ventricular ejection fraction is visually estimated to be 40%. Hypokinesis of the basal to mid inferior and inferolateral wall. Normal diastolic function. Right Ventricle Normal right ventricular size and systolic function. Right Atrium Normal right atrial size. Dilated inferior vena cava with inspiratory collapse. Left Atrium Normal left atrial size. Mitral Valve Structurally normal mitral valve without significant stenosis. Mild mitral annular calcification. Trace mitral regurgitation. Aortic Valve Structurally normal aortic valve without significant stenosis or regurgitation. Tricuspid Valve Structurally normal tricuspid valve without significant stenosis or regurgitation. Unable to estimate pulmonary artery pressure due to an inadequate tricuspid regurgitant jet. Pulmonic Valve The pulmonic valve is not well visualized. Pericardium Normal pericardium without effusion. Aorta Normal aortic root for body surface area. Ascending aorta diameter is 3.2 cm. Mya Alegre MD (Electronically Signed) Final Date:     18 June 2019                 17:39    Dx-abdomen For Tube Placement    Result Date:  6/16/2019 6/16/2019 5:59 PM HISTORY/REASON FOR EXAM:  Line evaluation. TECHNIQUE/EXAM DESCRIPTION AND NUMBER OF VIEWS:  1 view(s) of the abdomen. COMPARISON:  9/15/2007. FINDINGS: Enteric tube projects over the stomach. There is a nonspecific bowel gas pattern. Surgical clips are seen in the right upper quadrant. There are bibasilar airspace opacities. Postsurgical and degenerative changes are seen in the lumbar spine.     Enteric tube projects over the stomach.      Micro:  Results     Procedure Component Value Units Date/Time    CULTURE TISSUE W/ GRM STAIN [[id number]]  (Abnormal)  (Susceptibility) Collected:  06/18/19 1340    Order Status:  Completed Specimen:  Tissue Updated:  06/21/19 1030     Significant Indicator POS (POS)     Source TISS     Site LEFT HEEL WOUND     Culture Result - (A)     Gram Stain Result Many WBCs.  Few Gram positive rods.  Many Gram positive cocci.   (A)     Culture Result Staphylococcus aureus  Heavy growth   (A)      Enterococcus faecalis  Moderate growth  Combination therapy with ampicillin, penicillin, or  vancomycin (for susceptible strains) plus an aminoglycoside  is usually indicated for serious enterococcal infections,  such as endocarditis unless high-level resistance to both  gentamicin and streptomycin is documented; such combinations  are predicted to result in synergistic killing of the  Enterococcus.   (A)      Streptococcus constellatus  Heavy growth   (A)    Narrative:       Surgery Specimen    Culture & Susceptibility     ENTEROCOCCUS FAECALIS     Antibiotic Sensitivity Microscan Unit Status    Ampicillin Sensitive <=2 mcg/mL Final    Method: TAIWO    Daptomycin Sensitive 2 mcg/mL Final    Method: TAIWO    Gent Synergy Sensitive <=500 mcg/mL Final    Method: TAIWO    Penicillin Sensitive 2 mcg/mL Final    Method: TAIWO    Vancomycin Sensitive 2 mcg/mL Final    Method: TAIWO              STAPHYLOCOCCUS AUREUS     Antibiotic Sensitivity Microscan Unit Status    Ampicillin/sulbactam  "Sensitive <=8/4 mcg/mL Final    Method: TAIWO    Clindamycin Sensitive <=0.5 mcg/mL Final    Method: TAIWO    Daptomycin Sensitive <=0.5 mcg/mL Final    Method: TAIWO    Erythromycin Sensitive <=0.5 mcg/mL Final    Method: TAIWO    Moxifloxacin Sensitive <=0.5 mcg/mL Final    Method: TAIWO    Oxacillin Sensitive <=0.25 mcg/mL Final    Method: TAIWO    Tetracycline Sensitive <=4 mcg/mL Final    Method: TAIWO    Trimeth/Sulfa Sensitive <=0.5/9.5 mcg/mL Final    Method: TAIWO    Vancomycin Sensitive 2 mcg/mL Final    Method: TAIWO                       Anaerobic Culture [[id number]] Collected:  06/18/19 1340    Order Status:  Completed Specimen:  Tissue Updated:  06/21/19 1534     Significant Indicator NEG     Source TISS     Site LEFT HEEL WOUND     Culture Result Culture in progress.    Narrative:       Surgery Specimen    BLOOD CULTURE [[id number]] Collected:  06/15/19 2249    Order Status:  Completed Specimen:  Blood from Peripheral Updated:  06/21/19 0100     Significant Indicator NEG     Source BLD     Site PERIPHERAL     Culture Result No growth after 5 days of incubation.    Narrative:       Per Hospital Policy: Only change Specimen Src: to \"Line\" if  specified by physician order.  Left Hand    BLOOD CULTURE [[id number]] Collected:  06/15/19 2230    Order Status:  Completed Specimen:  Blood from Peripheral Updated:  06/21/19 0100     Significant Indicator NEG     Source BLD     Site PERIPHERAL     Culture Result No growth after 5 days of incubation.    Narrative:       Per Hospital Policy: Only change Specimen Src: to \"Line\" if  specified by physician order.  Right AC    GRAM STAIN [[id number]] Collected:  06/18/19 1340    Order Status:  Completed Specimen:  Tissue Updated:  06/18/19 1907     Significant Indicator .     Source TISS     Site LEFT HEEL WOUND     Gram Stain Result Many WBCs.  Few Gram positive rods.  Many Gram positive cocci.      Narrative:       Surgery Specimen    CULTURE RESPIRATORY W/ GRM STN [[id number]] " Collected:  06/16/19 1500    Order Status:  Completed Specimen:  Respirate Updated:  06/18/19 1013     Significant Indicator NEG     Source RESP     Site Bronchoalveolar Lavage RLL     Culture Result Rare growth usual upper respiratory sammie  No clinically significant Staphylococcus aureus, Methicillin  Resistant Staphylococcus aureus, or Pseudomonas species  isolated.       Gram Stain Result Moderate WBCs.  No organisms seen.            Assessment:  Active Hospital Problems    Diagnosis   • Acute on chronic respiratory failure with hypoxia ([facility]) [J96.21]   • Septic shock due to undetermined organism ([facility]) [A41.9, R65.21]   • OZ (acute kidney injury) ([facility]) [N17.9]   • Pneumonia due to infectious organism [J18.9]   • Aspiration into airway [T17.908A]   • Cardiomyopathy ([facility]) [I42.9]   • Elevated troponin [R74.8]   • Diabetic foot ulcer ([facility]) [E11.621, L97.509]   • DM (diabetes mellitus), type 2, uncontrolled (CMS-H[facility]) [E11.65]       Plan:  Left diabetic foot infection, not improving. On treatment  Afebrile  Leukocytosis decreasing  Shock resolved  s/p excisional debridement down to muscle on 6/19/2019  Operative cultures- MSSA, Efaecalis and Streptococcus constellatus  Continue  Unasyn if no shortage  Anticipate 2-3 week course from surgery  Continue wound care  High risk for amputation/limb loss    Pneumonia  Status post extubation on 6/17  Status post bronchoscopy with BAL on 6/16  BAL cultures-upper respiratory sammie  Blood cxs neg  On antibiotics above    Acute kidney injury, improved  Secondary to septic shock   Avoid nephrotoxins  Renally dose antibiotics accordingly  Monitor    Type 2 diabetes mellitus, poorly controlled  Hemoglobin A1c 12.2  Keep BS under 150 to help control current infection    Diabetic peripheral neuropathy  Contributing to above  Control blood sugars

## 2019-06-24 NOTE — THERAPY
"Physical Therapy Treatment completed.   Bed Mobility:  Supine to Sit:  (up in chair)  Transfers: Sit to Stand: Minimal Assist  Gait: Level Of Assist: Minimal Assist with Front-Wheel Walker       Plan of Care: Will benefit from Physical Therapy 3 times per week  Discharge Recommendations: Equipment: Will Continue to Assess for Equipment Needs. Post-acute therapy Recommend post-acute placement for continued physical therapy services prior to discharge home. Patient can tolerate post-acute therapies at a 5x/week frequency.       See \"Rehab Therapy-Acute\" Patient Summary Report for complete documentation.     Pt progressing slowly w/ therapy. Pt is limited d/t a previous L shoulder injury. Pt gets increased shoulder pain to maintain TTWB on the L LE. He is able to perform bed mobility at a SPV level. Pt is not at a safe functional level for DC home. He has 3 stairs at home w/ no rail and is not at a safe level to attempt. Pt continues to be at a level in which he will benefit from post acute therapy.  "

## 2019-06-24 NOTE — CARE PLAN
Problem: Infection  Goal: Will remain free from infection  Outcome: PROGRESSING AS EXPECTED  Implemented hand hygiene and proper isolation precautions before and after interacting with patient to prevent spread of germs     Problem: Pain Management  Goal: Pain level will decrease to patient's comfort goal  Outcome: PROGRESSING AS EXPECTED  Pt pain assessed and does not require medication as per MAR. Pt pain well controlled and meeting comfort goal of sleeping comfortably

## 2019-06-24 NOTE — DISCHARGE PLANNING
Agency/Facility Name: [facility]  Outcome: Updated notes sent to the above facilities.

## 2019-06-25 LAB
GLUCOSE BLD-MCNC: 132 MG/DL (ref 65–99)
GLUCOSE BLD-MCNC: 214 MG/DL (ref 65–99)
GLUCOSE BLD-MCNC: 223 MG/DL (ref 65–99)
GLUCOSE BLD-MCNC: 288 MG/DL (ref 65–99)
GLUCOSE BLD-MCNC: 290 MG/DL (ref 65–99)
PATHOLOGY CONSULT NOTE: NORMAL

## 2019-06-25 PROCEDURE — 700102 HCHG RX REV CODE 250 W/ 637 OVERRIDE(OP): Performed by: HOSPITALIST

## 2019-06-25 PROCEDURE — 700105 HCHG RX REV CODE 258

## 2019-06-25 PROCEDURE — 700111 HCHG RX REV CODE 636 W/ 250 OVERRIDE (IP): Performed by: INTERNAL MEDICINE

## 2019-06-25 PROCEDURE — 99232 SBSQ HOSP IP/OBS MODERATE 35: CPT | Performed by: INTERNAL MEDICINE

## 2019-06-25 PROCEDURE — 700105 HCHG RX REV CODE 258: Performed by: INTERNAL MEDICINE

## 2019-06-25 PROCEDURE — 770006 HCHG ROOM/CARE - MED/SURG/GYN SEMI*

## 2019-06-25 PROCEDURE — A9270 NON-COVERED ITEM OR SERVICE: HCPCS | Performed by: HOSPITALIST

## 2019-06-25 PROCEDURE — 82962 GLUCOSE BLOOD TEST: CPT

## 2019-06-25 PROCEDURE — 700102 HCHG RX REV CODE 250 W/ 637 OVERRIDE(OP): Performed by: INTERNAL MEDICINE

## 2019-06-25 PROCEDURE — A9270 NON-COVERED ITEM OR SERVICE: HCPCS | Performed by: INTERNAL MEDICINE

## 2019-06-25 PROCEDURE — 97164 PT RE-EVAL EST PLAN CARE: CPT

## 2019-06-25 RX ORDER — HYDROMORPHONE HYDROCHLORIDE 1 MG/ML
1 INJECTION, SOLUTION INTRAMUSCULAR; INTRAVENOUS; SUBCUTANEOUS
Status: DISCONTINUED | OUTPATIENT
Start: 2019-06-25 | End: 2019-06-27

## 2019-06-25 RX ORDER — SODIUM CHLORIDE 9 MG/ML
INJECTION, SOLUTION INTRAVENOUS
Status: COMPLETED
Start: 2019-06-25 | End: 2019-06-25

## 2019-06-25 RX ADMIN — ENOXAPARIN SODIUM 40 MG: 100 INJECTION SUBCUTANEOUS at 08:15

## 2019-06-25 RX ADMIN — OXYCODONE HYDROCHLORIDE 10 MG: 5 TABLET ORAL at 08:15

## 2019-06-25 RX ADMIN — TRAMADOL HYDROCHLORIDE 50 MG: 50 TABLET, FILM COATED ORAL at 17:02

## 2019-06-25 RX ADMIN — OXYCODONE HYDROCHLORIDE 10 MG: 5 TABLET ORAL at 14:48

## 2019-06-25 RX ADMIN — PIPERACILLIN SODIUM AND TAZOBACTAM SODIUM 3.38 G: 3; .375 INJECTION, POWDER, FOR SOLUTION INTRAVENOUS at 05:42

## 2019-06-25 RX ADMIN — INSULIN LISPRO 5 UNITS: 100 INJECTION, SOLUTION INTRAVENOUS; SUBCUTANEOUS at 09:42

## 2019-06-25 RX ADMIN — LISINOPRIL 10 MG: 10 TABLET ORAL at 05:44

## 2019-06-25 RX ADMIN — INSULIN LISPRO 4 UNITS: 100 INJECTION, SOLUTION INTRAVENOUS; SUBCUTANEOUS at 20:44

## 2019-06-25 RX ADMIN — ONDANSETRON 4 MG: 4 TABLET, ORALLY DISINTEGRATING ORAL at 14:52

## 2019-06-25 RX ADMIN — INSULIN GLARGINE 44 UNITS: 100 INJECTION, SOLUTION SUBCUTANEOUS at 09:39

## 2019-06-25 RX ADMIN — HYDROMORPHONE HYDROCHLORIDE 1 MG: 1 INJECTION, SOLUTION INTRAMUSCULAR; INTRAVENOUS; SUBCUTANEOUS at 09:50

## 2019-06-25 RX ADMIN — PREGABALIN 300 MG: 150 CAPSULE ORAL at 05:43

## 2019-06-25 RX ADMIN — INSULIN LISPRO 5 UNITS: 100 INJECTION, SOLUTION INTRAVENOUS; SUBCUTANEOUS at 13:03

## 2019-06-25 RX ADMIN — PIPERACILLIN SODIUM AND TAZOBACTAM SODIUM 3.38 G: 3; .375 INJECTION, POWDER, FOR SOLUTION INTRAVENOUS at 14:48

## 2019-06-25 RX ADMIN — HYDROMORPHONE HYDROCHLORIDE 1 MG: 1 INJECTION, SOLUTION INTRAMUSCULAR; INTRAVENOUS; SUBCUTANEOUS at 00:49

## 2019-06-25 RX ADMIN — HYDROMORPHONE HYDROCHLORIDE 1 MG: 1 INJECTION, SOLUTION INTRAMUSCULAR; INTRAVENOUS; SUBCUTANEOUS at 22:21

## 2019-06-25 RX ADMIN — INSULIN LISPRO 7 UNITS: 100 INJECTION, SOLUTION INTRAVENOUS; SUBCUTANEOUS at 09:42

## 2019-06-25 RX ADMIN — OXYCODONE HYDROCHLORIDE 10 MG: 5 TABLET ORAL at 02:43

## 2019-06-25 RX ADMIN — HYDROMORPHONE HYDROCHLORIDE 1 MG: 1 INJECTION, SOLUTION INTRAMUSCULAR; INTRAVENOUS; SUBCUTANEOUS at 06:34

## 2019-06-25 RX ADMIN — PIPERACILLIN AND TAZOBACTAM 3.38 G: 3; .375 INJECTION, POWDER, LYOPHILIZED, FOR SOLUTION INTRAVENOUS; PARENTERAL at 21:13

## 2019-06-25 RX ADMIN — INSULIN GLARGINE 44 UNITS: 100 INJECTION, SOLUTION SUBCUTANEOUS at 17:08

## 2019-06-25 RX ADMIN — PREGABALIN 300 MG: 150 CAPSULE ORAL at 17:01

## 2019-06-25 RX ADMIN — METOPROLOL TARTRATE 12.5 MG: 25 TABLET, FILM COATED ORAL at 05:43

## 2019-06-25 RX ADMIN — METHADONE HYDROCHLORIDE 35 MG: 10 TABLET ORAL at 17:01

## 2019-06-25 RX ADMIN — OXYCODONE HYDROCHLORIDE 10 MG: 5 TABLET ORAL at 20:39

## 2019-06-25 RX ADMIN — INSULIN LISPRO 4 UNITS: 100 INJECTION, SOLUTION INTRAVENOUS; SUBCUTANEOUS at 13:03

## 2019-06-25 RX ADMIN — SODIUM CHLORIDE 500 ML: 9 INJECTION, SOLUTION INTRAVENOUS at 05:45

## 2019-06-25 RX ADMIN — METHADONE HYDROCHLORIDE 35 MG: 10 TABLET ORAL at 05:42

## 2019-06-25 RX ADMIN — HYDROMORPHONE HYDROCHLORIDE 1 MG: 1 INJECTION, SOLUTION INTRAMUSCULAR; INTRAVENOUS; SUBCUTANEOUS at 03:47

## 2019-06-25 ASSESSMENT — LIFESTYLE VARIABLES
EVER FELT BAD OR GUILTY ABOUT YOUR DRINKING: YES
HAVE PEOPLE ANNOYED YOU BY CRITICIZING YOUR DRINKING: NO
AVERAGE NUMBER OF DAYS PER WEEK YOU HAVE A DRINK CONTAINING ALCOHOL: 0
TOTAL SCORE: 3
TOTAL SCORE: 3
AVERAGE NUMBER OF DAYS PER WEEK YOU HAVE A DRINK CONTAINING ALCOHOL: 2
TOTAL SCORE: 3
TOTAL SCORE: 0
HOW MANY TIMES IN THE PAST YEAR HAVE YOU HAD 5 OR MORE DRINKS IN A DAY: 20
EVER FELT BAD OR GUILTY ABOUT YOUR DRINKING: NO
DOES PATIENT WANT TO STOP DRINKING: NO
ALCOHOL_USE: NO
ON A TYPICAL DAY WHEN YOU DRINK ALCOHOL HOW MANY DRINKS DO YOU HAVE: 6
HAVE PEOPLE ANNOYED YOU BY CRITICIZING YOUR DRINKING: NO
EVER HAD A DRINK FIRST THING IN THE MORNING TO STEADY YOUR NERVES TO GET RID OF A HANGOVER: NO
DOES PATIENT WANT TO TALK TO SOMEONE ABOUT QUITTING: YES
CONSUMPTION TOTAL: POSITIVE
ON A TYPICAL DAY WHEN YOU DRINK ALCOHOL HOW MANY DRINKS DO YOU HAVE: 0
CONSUMPTION TOTAL: NEGATIVE
HOW MANY TIMES IN THE PAST YEAR HAVE YOU HAD 5 OR MORE DRINKS IN A DAY: 0
TOTAL SCORE: 0
DOES PATIENT WANT TO STOP DRINKING: YES
TOTAL SCORE: 0
HAVE YOU EVER FELT YOU SHOULD CUT DOWN ON YOUR DRINKING: YES
HAVE YOU EVER FELT YOU SHOULD CUT DOWN ON YOUR DRINKING: NO
ALCOHOL_USE: YES
DOES PATIENT WANT TO TALK TO SOMEONE ABOUT QUITTING: NO
EVER HAD A DRINK FIRST THING IN THE MORNING TO STEADY YOUR NERVES TO GET RID OF A HANGOVER: YES

## 2019-06-25 ASSESSMENT — ENCOUNTER SYMPTOMS
WEIGHT LOSS: 0
DIARRHEA: 0
BACK PAIN: 0
FEVER: 0
SPUTUM PRODUCTION: 0
HEARTBURN: 0
BLOOD IN STOOL: 0
BLURRED VISION: 0
PALPITATIONS: 0
COUGH: 0
NAUSEA: 0
EYE PAIN: 0
DIZZINESS: 0
SHORTNESS OF BREATH: 0
ABDOMINAL PAIN: 0
NERVOUS/ANXIOUS: 0
HEADACHES: 0
CHILLS: 0
DEPRESSION: 0
MYALGIAS: 0
VOMITING: 0

## 2019-06-25 ASSESSMENT — COGNITIVE AND FUNCTIONAL STATUS - GENERAL
MOVING FROM LYING ON BACK TO SITTING ON SIDE OF FLAT BED: UNABLE
WALKING IN HOSPITAL ROOM: TOTAL
STANDING UP FROM CHAIR USING ARMS: A LOT
MOBILITY SCORE: 10
MOVING TO AND FROM BED TO CHAIR: UNABLE
SUGGESTED CMS G CODE MODIFIER MOBILITY: CL
CLIMB 3 TO 5 STEPS WITH RAILING: TOTAL

## 2019-06-25 NOTE — ANESTHESIA PROCEDURE NOTES
Airway  Date/Time: 6/24/2019 8:15 PM  Performed by: ELIAZAR SMITH  Authorized by: ELIAZAR SMITH     Location:  OR  Urgency:  Elective  Indications for Airway Management:  Anesthesia  Spontaneous Ventilation: absent    Sedation Level:  Deep  Preoxygenated: Yes    Final Airway Type:  Supraglottic airway  Final Supraglottic Airway:  Standard LMA  SGA Size:  4  Number of Attempts at Approach:  1

## 2019-06-25 NOTE — ANESTHESIA POSTPROCEDURE EVALUATION
Patient: Travon Pollack    Procedure Summary     Date:  06/24/19 Room / Location:  [place] [address] / SURGERY [facility]    Anesthesia Start:  2013 Anesthesia Stop:  2124    Procedure:  AMPUTATION, BELOW KNEE (Left Leg Lower) Diagnosis:  (Osteomyelitis left foot, diabetes)    Surgeon:  Ashok Schneider M.D. Responsible Provider:  Marco Segovia M.D.    Anesthesia Type:  general, peripheral nerve block ASA Status:  3          Final Anesthesia Type: general, peripheral nerve block  Last vitals  BP   Blood Pressure : 107/72, NIBP: 153/99    Temp   36.4 °C (97.5 °F)    Pulse   Pulse: 75, Heart Rate (Monitored): 76   Resp   16    SpO2   91 %      Anesthesia Post Evaluation    Patient location during evaluation: PACU  Patient participation: complete - patient participated  Level of consciousness: awake and alert  Pain score: 2    Airway patency: patent  Anesthetic complications: no  Cardiovascular status: hemodynamically stable  Respiratory status: acceptable  Hydration status: euvolemic    PONV: none           Nurse Pain Score: 8 (NPRS)

## 2019-06-25 NOTE — OP REPORT
DATE OF OPERATION: 6/24/2019     PREOPERATIVE DIAGNOSIS: left lower extremity osteomyelitis    POSTOPERATIVE DIAGNOSIS: Same    PROCEDURE PERFORMED: left below knee amputation    SURGEON: Ashok Schneider M.D.    ASSIST: Dr. Jose De Jesus Barrios MD, Carolina Crawford CFA    ANESTHESIA: General    INDICATIONS: The patient is a 63 y.o.-year-old male who presents with a dysvascular lower extremity with signs of bony osteomyelitis.  Given their radiographic and exam findings, the patient is an appropriately indicated candidate for left below knee amputation.  I discussed the risks, benefits, and alternatives of surgery with the patient.  Risks discussed included failure to clear the infection, wound healing complication, need for additional surgery including more proximal amputation, neurovascular injury, blood loss, phantom limb sensation or pain, DVT/PE, and the medical risks of anesthesia (including stroke, MI, and death).  Benefits discussed included improved chance of clearance of the infection and improved function.  The patient is in agreement with the plan to proceed, and their informed consent was signed and documented in the medical record.  I met with the patient pre-operatively and marked the operative extremity with their agreement.  He was taken to the operating room.    FINDINGS: Non-viable lower extremity    WOUND CLASSIFICATION: Class IV    SPECIMEN: None    ESTIMATED BLOOD LOSS: Minimal    COMPLICATIONS: None      PROCEDURE: The patient underwent general anesthesia, and was positioned supine with all bony prominences well padded.  A well padded non-sterile tourniquet was applied the the operative extremity, and the operative extremity was prepped and draped in sterile orthopaedic fashion, using ChloraPrep.  The surgical team scrubbed in, and a procedural pause was conducted to verify correct patient, correct extremity, presence of the surgeons initials on the operative extremity, and administration of IV  antibiotics, in this case, Ancef.    Following generalized agreement, the tourniquet was inflated, and a standard below knee amputation flap was drawn.  The incision was then made, and we dissected through fascia sharply.  The anterior and lateral compartment musculature was then divided with cautery, and the anterior vascular bundle was ligated.  A traction neurolysis was performed on the nerve.    We then elevated the periosteum of the tibia proximally, and performed a transverse osteotomy of the tibia using the saw.  A napkin ring osteotomy of the fibula was performed with the saw, making our proximal cut just proximal to the tibial cut.  The amputation was then completed, and the posterior flap contoured using the amputation knife.  The residual limb was passed off.  The anterior aspect of the tibia was beveled using the saw.    We then dissected out the posterior neurovascular bundle, and ligated the vessels.  A traction neurolysis was performed on the nerve, as well as the sural nerve.  The posterior flap was debulked as needed, and the tourniquet deflated.  Hemostasis was obtained with cautery.  The wound was then irrigated with copious amounts of normal saline.  The wound was then closed over a hemovac drain, with #1 Vicryl in the fascia, a few 2-0 Vicryl in the subcutaneous tissue, and 2-0 Nylon in the skin.  Sterile dressings and a knee immobilizer were applied, and the patient was transported to the recovery room in stable condition, suffering no complications.  All counts were correct.    The use of Dr. Barrios as a surgical assistant was necessary for assistance with amputation and closure.    Post-Operative Plan:    1.  NWB operative extremity, knee immobilizer when at rest  2.  DVT prophylaxis - SCD's, chemical per medicine  3.  Remove drain when output < 30 mL/24 hours  4.  Antibiotics - Per ID recs  5.  Discharge planning     ____________________________________   Ashok Schneider M.D.    DD:  6/24/2019  9:02 PM

## 2019-06-25 NOTE — PROGRESS NOTES
POST-OP NOTE FOR LIMB PRESERVATION SERVICE    SURGERY DATE: 6/24/2019    PROCEDURE: Procedure(s):  AMPUTATION, BELOW KNEE - Wound Class: Clean with Drain    WEIGHT BEARING STATUS: Non Weight bearing    PT CONSULT: Yes    ANTIBIOTICS: per primary. BKA was clean    PLAN TO RETURN TO O.R.: No    WOUND CARE PLAN: Incisional Vac - Remove POD # 7    FOLLOW-UP: Follow up with [facility] in 1 weeks    DURABLE MEDICAL EQUIPMENT: Immobilizer    OTHER:       Jose De Jesus Barrios M.D.

## 2019-06-25 NOTE — ANESTHESIA TIME REPORT
Anesthesia Start and Stop Event Times     Date Time Event    6/24/2019 2013 Anesthesia Start     2124 Anesthesia Stop        Responsible Staff  06/24/19    Name Role Begin End    Marco Segovia M.D. Anesth 2013 2124        Preop Diagnosis (Free Text):  Pre-op Diagnosis     Osteomyelitis left foot, diabetes        Preop Diagnosis (Codes):  Diagnosis Information     Diagnosis Code(s):         Post op Diagnosis  Amputation, below knee, unilateral, traumatic (HCC)      Premium Reason  A. 3PM - 7AM    Comments: Juliette Premium with Sciatic Block

## 2019-06-25 NOTE — CARE PLAN
Problem: Infection  Goal: Will remain free from infection  Outcome: PROGRESSING AS EXPECTED  Implemented hand hygiene and proper precautions before and after interacting with patient to prevent spread of germs     Problem: Pain Management  Goal: Pain level will decrease to patient's comfort goal  Outcome: PROGRESSING AS EXPECTED  Pt pain assessed and medicated as per MAR post-surgery. Pt pain well controlled and meeting comfort goal of sleeping comfortably

## 2019-06-25 NOTE — PROGRESS NOTES
Infectious Disease Progress Note    Author: Rae Ortiz M.D. Date & Time of service: 2019  1:25 PM    Chief Complaint:  Follow-up for left heel infection/ulcer, septic shock    Interval History:  63 y.o.WM with history of type 2 diabetes mellitus comp located by peripheral neuropathy and diabetic foot ulcers, history of MRSA infection in  and [place] on nocturnal oxygen admitted 6/15/2019 for respiratory failure.  Found to have a left heel wound infection status post excisional debridement down to subcutaneous tissue on 2019.  Cultures growing Staphylococcus aureus.     afebrile WBC 15.1 patient transferred out of the ICU overnight.  He is doing well without any new issues but is eager to go home.  Tolerating IV antibiotics.  No diarrhea   AF WBC 15.6 patient had bedside debridement of his right heel yesterday and endured significant pain.  Wound photos reviewed and R heel with significant erythema, edema and necrotic tissue  - No fevers. Feels tired. Still on 1.5 l of O2. He is on 3l of nocturnal O2 at home  2019-no fevers.  No new issues overnight.  WBC is decreased to 12.7.  Creatinine is slightly up at 1.19   AF WBC 12.7 somnolent today-no acute events overnight   AF had BKA last night Denies SE abx  Labs Reviewed. Wound reviewed    Review of Systems:  Review of Systems   Constitutional: Negative for fever.   Respiratory: Negative for shortness of breath.    Gastrointestinal: Negative for abdominal pain, diarrhea, nausea and vomiting.   Musculoskeletal: Positive for joint pain.   All other systems reviewed and are negative.      Hemodynamics:  Temp (24hrs), Av.6 °C (97.8 °F), Min:36.4 °C (97.5 °F), Max:36.8 °C (98.3 °F)  Temperature: 36.8 °C (98.3 °F)  Pulse  Av.3  Min: 66  Max: 117Heart Rate (Monitored): 76  Blood Pressure : 129/58, NIBP: 112/68       Physical Exam:  Physical Exam   Constitutional: No distress.   Eyes: Right eye exhibits no discharge. Left eye  exhibits no discharge.   Neck: No JVD present.   Cardiovascular: Normal rate.    Pulmonary/Chest: Effort normal. No stridor. No respiratory distress. He has no wheezes.   Abdominal: Soft. He exhibits no distension. There is no tenderness.   Musculoskeletal: He exhibits no edema.   Left BKA with HV    Right heel 1 cm X 1 cm x 0.3 cm No surrounding erythema    Left shoulder scar   Neurological:   somnolent   Skin: He is not diaphoretic.   Psychiatric: He has a normal mood and affect. His behavior is normal.   Nursing note and vitals reviewed.      Meds:    Current Facility-Administered Medications:   •  HYDROmorphone  •  dextrose 10%  •  insulin glargine  •  tramadol  •  insulin lispro **AND** insulin lispro **AND** Accu-Chek ACHS **AND** NOTIFY MD and PharmD **AND** glucose **AND** dextrose 10% bolus  •  aspirin  •  lisinopril  •  enoxaparin (LOVENOX) injection  •  acetaminophen  •  [DISCONTINUED] senna-docusate **AND** polyethylene glycol/lytes **AND** magnesium hydroxide **AND** bisacodyl  •  oxyCODONE immediate-release  •  ipratropium-albuterol  •  pregabalin  •  methadone  •  metoprolol  •  hydrALAZINE  •  labetalol  •  senna-docusate  •  Notify provider if pain remains uncontrolled **AND** Use the numeric rating scale (NRS-11) on regular floors and Critical-Care Pain Observation Tool (CPOT) on ICUs/Trauma to assess pain **AND** Pulse Ox (Oximetry) **AND** Pharmacy Consult Request **AND** If patient difficult to arouse and/or has respiratory depression, stop any opiates that are currently infusing and call a Rapid Response. **AND** [DISCONTINUED] oxyCODONE immediate-release **AND** [DISCONTINUED] oxyCODONE immediate-release **AND** [DISCONTINUED] morphine injection  •  Respiratory Care per Protocol  •  [COMPLETED] piperacillin-tazobactam **AND** piperacillin-tazobactam **AND** [DISCONTINUED] MD Alert...Vancomycin per Pharmacy  •  ondansetron  •  ondansetron    Labs:  Recent Labs      06/23/19   0516   WBC  12.7*    RBC  4.29*   HEMOGLOBIN  12.3*   HEMATOCRIT  38.5*   MCV  89.7   MCH  28.7   RDW  50.7*   PLATELETCT  232   MPV  9.9   NEUTSPOLYS  72.00   LYMPHOCYTES  18.40*   MONOCYTES  7.20   EOSINOPHILS  1.60   BASOPHILS  0.20     No results for input(s): SODIUM, POTASSIUM, CHLORIDE, CO2, GLUCOSE, BUN, CPKTOTAL in the last 72 hours.  No results for input(s): ALBUMIN, TBILIRUBIN, ALKPHOSPHAT, TOTPROTEIN, ALTSGPT, ASTSGOT, CREATININE in the last 72 hours.    Imaging:  Ct-shoulder W/o Plus Recons Left    Result Date: 6/15/2019  6/15/2019 10:05 PM HISTORY/REASON FOR EXAM:  Shoulder pain, acute, persistent, xray and exam nonspecific. History of left shoulder dislocation. Sepsis. TECHNIQUE/ EXAM DESCRIPTION AND NUMBER OF VIEWS:  CT scan of the LEFT shoulder without contrast and including reconstructions. Thin-section noncontrast helical images were obtained from the clavicle through the scapula. Coronal and sagittal reconstructions were generated. Up to date radiation dose reduction adjustments have been utilized to meet ALARA standards for radiation dose reduction. COMPARISON: Radiograph 6/15/2019, 1/12/2018, CT 1/14/2018 FINDINGS: Prior resection of the left humeral head with residual head neck junction appears well corticated and could relate to chronic resection/deformity. There is also deformity and remodeling of the glenoid with well-corticated margin, suggesting of chronic change as well. There is no articulation between the proximal humerus and glenoid. The joint is replaced by loculated soft tissue or dense fluid (image 65 series 4). All rotator cuff muscle are completely atrophied and likely no rotator cuff tendon connecting to the humerus.     Prior resection of left humeral head. The residual head neck junction appears well corticated and could relate to chronic resection/deformity. There is also deformity and remodeling of the glenoid with well-corticated margin, suggesting of chronic change  as well. No acute  fracture. There is no articulation between the proximal humerus and glenoid. The glenohumeral joint is replaced by loculated soft tissue or dense fluid, likely chronic change. The fluid is slightly less in 2018 CT. Infection is considered less likely given the osseous cortex adjacent to the fluid is well corticated and demonstrates no rarefaction or destruction.    Dx-ankle 3+ Views Left    Result Date: 6/17/2019 6/17/2019 3:01 PM HISTORY/REASON FOR EXAM:  For worsening wound. Soft tissue ulceration TECHNIQUE/EXAM DESCRIPTION AND NUMBER OF VIEWS:  3 views of the LEFT ankle. COMPARISON: None. FINDINGS: Soft tissue swelling. No acute fracture identified. Ankle joint degenerative changes. Large posterior calcaneal spur at the Achilles insertion. Soft tissue swelling posterior and inferior to the calcaneus.     Soft tissue swelling. No acute fracture identified.    Dx-chest-limited (1 View)    Result Date: 6/16/2019 6/16/2019 3:37 PM HISTORY/REASON FOR EXAM: central line placement. TECHNIQUE/EXAM DESCRIPTION AND NUMBER OF VIEWS: Single portable view of the chest. COMPARISON: 1:44 PM. FINDINGS: New right IJ line tip overlies the SVC. No pneumothorax New partial right hemidiaphragm effacement with hazy basilar opacity. No other change is identified     New right IJ line tip overlies the SVC and no pneumothorax is identified New right basilar opacity effaces the diaphragm and could be from atelectasis or consolidation    Dx-chest-portable (1 View)    Result Date: 6/18/2019 6/18/2019 4:30 AM HISTORY/REASON FOR EXAM: For indication of respiratory failure; For indication of respiratory failure TECHNIQUE/EXAM DESCRIPTION:  Single AP view of the chest. COMPARISON: Yesterday FINDINGS: Endotracheal tube has been removed in the interim.  Dobbhoff tube has been removed in the interim. Cardiomegaly is observed. The mediastinal contour appears within normal limits.  The central  pulmonary vasculature appears prominent and  indistinct. The lungs appear well expanded bilaterally.  Diffuse scattered hazy pulmonary parenchymal opacities are seen. No significant pleural effusions are identified. The bony structures appear age-appropriate.     1.  Pulmonary edema and/or infiltrates are identified, which are somewhat decreased since the prior exam.    Dx-chest-portable (1 View)    Result Date: 6/17/2019 6/17/2019 2:07 AM HISTORY/REASON FOR EXAM:  For indication of respiratory failure; For indication of respiratory failure TECHNIQUE/EXAM DESCRIPTION AND NUMBER OF VIEWS: Single portable view of the chest. COMPARISON: Yesterday FINDINGS: Hardware is stably positioned in its visualized extent. HEART: Stable size. LUNGS: ] Opacity is decreased. LEFT basilar opacities unchanged. PLEURA: No effusion or pneumothorax.     1.  Improved aeration of the RIGHT lung base 2.  Otherwise no interval change in BILATERAL atelectasis or airspace disease    Dx-chest-portable (1 View)    Result Date: 6/16/2019 6/16/2019 1:46 PM HISTORY/REASON FOR EXAM:  intubation. TECHNIQUE/EXAM DESCRIPTION AND NUMBER OF VIEWS: Single portable view of the chest. COMPARISON: 6/16/2019 FINDINGS: The cardiomediastinal silhouette is stable. There are increased perihilar and bibasilar airspace opacities, right greater than left. No pleural effusion or pneumothorax is seen. There is a chronic deformity of the proximal left humerus. Endotracheal tube  projects at the level of the aortic arch.     Endotracheal tube projects at the level of the aortic arch. Increased perihilar and bibasilar opacities may represent a combination of edema, atelectasis, pneumonia.     Dx-chest-portable (1 View)    Result Date: 6/16/2019 6/16/2019 12:03 PM HISTORY/REASON FOR EXAM: Cough. Desaturation TECHNIQUE/EXAM DESCRIPTION AND NUMBER OF VIEWS: Single AP view of the chest. COMPARISON: Yesterday FINDINGS: Lungs: There is some increasing reticular and hazy indistinct opacity. Lung volumes have mildly  diminished Pleura:  No pleural space process is seen. Heart and mediastinum: The cardiomediastinal contours are stable.     Increasing reticular and hazy opacity is compatible with edema    Ec-echocardiogram Complete W/o Cont    Result Date: 6/18/2019  Transthoracic Echo Report Echocardiography Laboratory CONCLUSIONS Compared to the images of the study done 1/27/18, RCA territory wall motion abnormalities are now present. Fair quality study, some off axis views. Moderately reduced left ventricular systolic function. Left ventricular ejection fraction is visually estimated to be 40%. Hypokinesis of the basal to mid inferior and inferolateral wall. Mild mitral annular calcification. Unable to estimate pulmonary artery pressure due to an inadequate tricuspid regurgitant jet. Dilated inferior vena cava with inspiratory collapse, EF:  40    % FINDINGS Left Ventricle Normal left ventricular chamber size. Mild concentric left ventricular hypertrophy. Moderately reduced left ventricular systolic function. Left ventricular ejection fraction is visually estimated to be 40%. Hypokinesis of the basal to mid inferior and inferolateral wall. Normal diastolic function. Right Ventricle Normal right ventricular size and systolic function. Right Atrium Normal right atrial size. Dilated inferior vena cava with inspiratory collapse. Left Atrium Normal left atrial size. Mitral Valve Structurally normal mitral valve without significant stenosis. Mild mitral annular calcification. Trace mitral regurgitation. Aortic Valve Structurally normal aortic valve without significant stenosis or regurgitation. Tricuspid Valve Structurally normal tricuspid valve without significant stenosis or regurgitation. Unable to estimate pulmonary artery pressure due to an inadequate tricuspid regurgitant jet. Pulmonic Valve The pulmonic valve is not well visualized. Pericardium Normal pericardium without effusion. Aorta Normal aortic root for body surface  area. Ascending aorta diameter is 3.2 cm. Mya Alegre MD (Electronically Signed) Final Date:     18 June 2019                 17:39    Dx-abdomen For Tube Placement    Result Date: 6/16/2019 6/16/2019 5:59 PM HISTORY/REASON FOR EXAM:  Line evaluation. TECHNIQUE/EXAM DESCRIPTION AND NUMBER OF VIEWS:  1 view(s) of the abdomen. COMPARISON:  9/15/2007. FINDINGS: Enteric tube projects over the stomach. There is a nonspecific bowel gas pattern. Surgical clips are seen in the right upper quadrant. There are bibasilar airspace opacities. Postsurgical and degenerative changes are seen in the lumbar spine.     Enteric tube projects over the stomach.      Micro:  Results     Procedure Component Value Units Date/Time    CULTURE TISSUE W/ GRM STAIN [[id number]]  (Abnormal)  (Susceptibility) Collected:  06/18/19 1340    Order Status:  Completed Specimen:  Tissue Updated:  06/24/19 1300     Significant Indicator POS (POS)     Source TISS     Site LEFT HEEL WOUND     Culture Result - (A)     Gram Stain Result Many WBCs.  Few Gram positive rods.  Many Gram positive cocci.   (A)     Culture Result Staphylococcus aureus  Heavy growth   (A)      Enterococcus faecalis  Moderate growth  Combination therapy with ampicillin, penicillin, or  vancomycin (for susceptible strains) plus an aminoglycoside  is usually indicated for serious enterococcal infections,  such as endocarditis unless high-level resistance to both  gentamicin and streptomycin is documented; such combinations  are predicted to result in synergistic killing of the  Enterococcus.   (A)      Streptococcus constellatus  Heavy growth   (A)    Narrative:       Surgery Specimen    Culture & Susceptibility     ENTEROCOCCUS FAECALIS     Antibiotic Sensitivity Microscan Unit Status    Ampicillin Sensitive <=2 mcg/mL Final    Method: TAIWO    Daptomycin Sensitive 2 mcg/mL Final    Method: TAIWO    Gent Synergy Sensitive <=500 mcg/mL Final    Method: TAIWO    Penicillin Sensitive 2  "mcg/mL Final    Method: TAIWO    Vancomycin Sensitive 2 mcg/mL Final    Method: TAIWO              STAPHYLOCOCCUS AUREUS     Antibiotic Sensitivity Microscan Unit Status    Ampicillin/sulbactam Sensitive <=8/4 mcg/mL Final    Method: TAIWO    Clindamycin Sensitive <=0.5 mcg/mL Final    Method: TAIWO    Daptomycin Sensitive <=0.5 mcg/mL Final    Method: TAIWO    Erythromycin Sensitive <=0.5 mcg/mL Final    Method: TAIWO    Moxifloxacin Sensitive <=0.5 mcg/mL Final    Method: TAIWO    Oxacillin Sensitive <=0.25 mcg/mL Final    Method: TAIWO    Tetracycline Sensitive <=4 mcg/mL Final    Method: TAIWO    Trimeth/Sulfa Sensitive <=0.5/9.5 mcg/mL Final    Method: TAIWO    Vancomycin Sensitive 2 mcg/mL Final    Method: TAIWO                       Anaerobic Culture [[id number]]  (Abnormal) Collected:  06/18/19 1340    Order Status:  Completed Specimen:  Tissue Updated:  06/24/19 1300     Significant Indicator POS (POS)     Source TISS     Site LEFT HEEL WOUND     Culture Result Growth noted after further incubation, see below for  organism identification.   (A)      Prevotella bivia  Heavy growth   (A)    Narrative:       Surgery Specimen    BLOOD CULTURE [[id number]] Collected:  06/15/19 2249    Order Status:  Completed Specimen:  Blood from Peripheral Updated:  06/21/19 0100     Significant Indicator NEG     Source BLD     Site PERIPHERAL     Culture Result No growth after 5 days of incubation.    Narrative:       Per Hospital Policy: Only change Specimen Src: to \"Line\" if  specified by physician order.  Left Hand    BLOOD CULTURE [[id number]] Collected:  06/15/19 2230    Order Status:  Completed Specimen:  Blood from Peripheral Updated:  06/21/19 0100     Significant Indicator NEG     Source BLD     Site PERIPHERAL     Culture Result No growth after 5 days of incubation.    Narrative:       Per Hospital Policy: Only change Specimen Src: to \"Line\" if  specified by physician order.  Right AC    GRAM STAIN [[id number]] Collected:  06/18/19 1340    " Order Status:  Completed Specimen:  Tissue Updated:  06/18/19 1907     Significant Indicator .     Source TISS     Site LEFT HEEL WOUND     Gram Stain Result Many WBCs.  Few Gram positive rods.  Many Gram positive cocci.      Narrative:       Surgery Specimen          Assessment:  Active Hospital Problems    Diagnosis   • Acute on chronic respiratory failure with hypoxia ([facility]) [J96.21]   • Septic shock due to undetermined organism ([facility]) [A41.9, R65.21]   • OZ (acute kidney injury) ([facility]) [N17.9]   • Pneumonia due to infectious organism [J18.9]   • Aspiration into airway [T17.908A]   • Cardiomyopathy ([facility]) [I42.9]   • Elevated troponin [R74.8]   • Diabetic foot ulcer ([facility]) [E11.621, L97.509]   • DM (diabetes mellitus), type 2, uncontrolled ([facility]) [E11.65]       Plan:  Left diabetic foot infection  Afebrile  Leukocytosis decreasing at last check  Shock resolved  s/p excisional debridement down to muscle on 6/19/2019  Operative cultures- MSSA, Efaecalis, Prevotellaand Streptococcus constellatus  S/p Left BKA-definitive treatment  DC abx after todays doses    Pneumonia  Status post extubation on 6/17  Status post bronchoscopy with BAL on 6/16  BAL cultures-upper respiratory sammie  Blood cxs neg  On antibiotics above    Acute kidney injury, improved  Secondary to septic shock   Avoid nephrotoxins  Renally dose antibiotics accordingly  Monitor    Type 2 diabetes mellitus, poorly controlled  Hemoglobin A1c 12.2  Keep BS under 150 to help control current infection  -290    Diabetic peripheral neuropathy  Contributing to above      DW IM Dr Leahy/Pharmacy

## 2019-06-25 NOTE — PROGRESS NOTES
[facility] Medicine Daily Progress Note    Date of Service  6/25/2019    Chief Complaint  63 y.o. male admitted 6/15/2019 with L shoulder pain and L heel ulcer.    Hospital Course    Hx TIIDM, COPD on 3 L HOT.  Presenting to [place] with multiple complaints including shoulder pain following a fall and a chronic heel ulcer.  Admitted to CICU with Dx of sepsis from diabetic foot ulcer, Type II NSTEMI with Trop of 1.09.  Intubated on admission, extubated 6/17.    Interval Problem Update  POD 1 Left BKA. Denies nausea vomiting diarrhea. Doing leg exercise on the bed. No acute issue overnight. I saw and examined the patient today.  Pain is tolerable.    Consultants/Specialty  LPS  Pulmonology/Critical Care    Code Status  full    Disposition  SNF referral placed    Review of Systems  Review of Systems   Constitutional: Negative for chills, fever and weight loss.   HENT: Negative for congestion, hearing loss and nosebleeds.    Eyes: Negative for blurred vision and pain.   Respiratory: Negative for cough and sputum production.    Cardiovascular: Negative for chest pain and palpitations.   Gastrointestinal: Negative for blood in stool, heartburn, nausea and vomiting.   Genitourinary: Negative for dysuria and urgency.   Musculoskeletal: Positive for joint pain. Negative for back pain and myalgias.        L shoulder pain, chronic   Skin: Negative for itching and rash.        R heel wound    Neurological: Negative for dizziness and headaches.   Psychiatric/Behavioral: Negative for depression. The patient is not nervous/anxious.         Physical Exam  Temp:  [36.2 °C (97.1 °F)-36.7 °C (98 °F)] 36.7 °C (98 °F)  Pulse:  [75-82] 81  Resp:  [16-18] 18  BP: (105-115)/(49-72) 113/70  SpO2:  [90 %-92 %] 92 %    Physical Exam   Constitutional: He is oriented to person, place, and time. He appears well-developed and well-nourished. No distress.   HENT:   Head: Normocephalic and atraumatic.   Eyes: Pupils are equal, round, and reactive  to light. Conjunctivae and EOM are normal. Left eye exhibits no discharge.   Neck: Normal range of motion. No thyromegaly present.   Cardiovascular: Normal rate and regular rhythm.    Pulmonary/Chest: Effort normal and breath sounds normal. No respiratory distress. He has no wheezes.   Abdominal: Soft. Bowel sounds are normal. He exhibits no distension. There is no tenderness.   Musculoskeletal: He exhibits edema (trace). He exhibits no tenderness.   Wound vac in place L heel   Neurological: He is alert and oriented to person, place, and time. No cranial nerve deficit.   Skin: Skin is warm and dry. No rash noted. He is not diaphoretic. No erythema.   Psychiatric: He has a normal mood and affect. His speech is normal.   Vitals reviewed.  Patient seen and examined today on 6/21, unchanged from 6/20.    Fluids    Intake/Output Summary (Last 24 hours) at 06/25/19 0756  Last data filed at 06/24/19 2300   Gross per 24 hour   Intake              500 ml   Output              255 ml   Net              245 ml       Laboratory  Recent Labs      06/23/19   0516   WBC  12.7*   RBC  4.29*   HEMOGLOBIN  12.3*   HEMATOCRIT  38.5*   MCV  89.7   MCH  28.7   MCHC  31.9*   RDW  50.7*   PLATELETCT  232   MPV  9.9                       Imaging  EC-ECHOCARDIOGRAM COMPLETE W/O CONT   Final Result      DX-CHEST-PORTABLE (1 VIEW)   Final Result         1.  Pulmonary edema and/or infiltrates are identified, which are somewhat decreased since the prior exam.      DX-ANKLE 3+ VIEWS LEFT   Final Result      Soft tissue swelling. No acute fracture identified.      DX-CHEST-PORTABLE (1 VIEW)   Final Result      1.  Improved aeration of the RIGHT lung base   2.  Otherwise no interval change in BILATERAL atelectasis or airspace disease      DX-ABDOMEN FOR TUBE PLACEMENT   Final Result      Enteric tube projects over the stomach.      DX-CHEST-LIMITED (1 VIEW)   Final Result      New right IJ line tip overlies the SVC and no pneumothorax is  identified      New right basilar opacity effaces the diaphragm and could be from atelectasis or consolidation      DX-CHEST-PORTABLE (1 VIEW)   Final Result      Endotracheal tube projects at the level of the aortic arch.      Increased perihilar and bibasilar opacities may represent a combination of edema, atelectasis, pneumonia.         DX-CHEST-PORTABLE (1 VIEW)   Final Result      Increasing reticular and hazy opacity is compatible with edema      CT-SHOULDER W/O PLUS RECONS LEFT   Final Result         Prior resection of left humeral head. The residual head neck junction appears well corticated and could relate to chronic resection/deformity. There is also deformity and remodeling of the glenoid with well-corticated margin, suggesting of chronic change    as well.      No acute fracture.      There is no articulation between the proximal humerus and glenoid. The glenohumeral joint is replaced by loculated soft tissue or dense fluid, likely chronic change. The fluid is slightly less in 2018 CT.      Infection is considered less likely given the osseous cortex adjacent to the fluid is well corticated and demonstrates no rarefaction or destruction.      US-FOREIGN FILM ULTRASOUND   Final Result      OUTSIDE IMAGES-CT HEAD   Final Result      DS-[id number]-[id number] FILM X-RAY   Final Result      OUTSIDE IMAGES-DX LOWER EXTREMITY, LEFT   Final Result      GE-GXJIOOV-XDHXTYL FILM X-RAY   Final Result      OUTSIDE IMAGES-DX CHEST   Final Result      OUTSIDE IMAGES-DX UPPER EXTREMITY, LEFT   Final Result           Assessment/Plan  * Acute on chronic respiratory failure with hypoxia (HCC)- (present on admission)   Assessment & Plan    Extubated 6/17, doing well on 1-2L continuous. Typically only on home O2 at night. Secondary to aspiration vs CAP  - continue to wean O2 to home regimen  - RT protocol  - weaning steroids, last dose of prednisone 6/20  - increase mobilization, IS  - abx x5 days     Diabetic foot ulcer (HCC)-  (present on admission)   Assessment & Plan    Infected left heel diabetic foot ulcer with group D enterococcus. No evidence of osteomyelitis  - s/p debridement on 6/18  - POD 1 left BKA  - On IV Zosyn, anticipate 2 weeks abx  - pain control  - ID on     Septic shock due to undetermined organism (HCC)- (present on admission)   Assessment & Plan    This is sepsis (without associated acute organ dysfunction).   source is infected diabetic ulcer and pneumonia.   Improved  On zosyn IV     OZ (acute kidney injury) (HCC)- (present on admission)   Assessment & Plan    Cr peaked at 1.45, down to 1.19. Stabilizing  - renally dose medications  - avoid nephrotoxic agents  - trend BMP  - will need to hold lisinopril if Cr worsens     Pneumonia due to infectious organism- (present on admission)   Assessment & Plan    On Zosyn     Cardiomyopathy (HCC)- (present on admission)   Assessment & Plan    Echo LVEF 40%. No exacerbation and is euvolemic.  - continue metop and lisinopril with holding parameters     DM (diabetes mellitus), type 2, uncontrolled (CMS-[facility])- (present on admission)   Assessment & Plan    Uncontrolled, with hyperglycemia. A1C elevated at 12.2%. Blood sugars much improved over the last 24 hours  - lantus 44U BID  - resistant sliding scale and 5U TID with meals  - hypoglycemia protocol and DM diet  - DM education     Essential hypertension- (present on admission)   Assessment & Plan    Normo to hypertensive.  - continue metop, lisinopril  - labetalol IV PRN     Leucocytosis   Assessment & Plan      Improving slowly  Follow cbc in am     Left shoulder pain- (present on admission)   Assessment & Plan    No acute fracture noted on CT  Pain control with Tylenol, oxycodone and IV morphine.  Monitor respiratory status closely  PT OT  If persistent pain we will consider consulting his orthopedic surgeon      Tobacco dependence- (present on admission)   Assessment & Plan    Cont to encourage cessation      Hypokalemia- (present on admission)   Assessment & Plan    Improved  monitor and replete as needed          VTE prophylaxis: lovenox

## 2019-06-25 NOTE — DISCHARGE PLANNING
Agency/Facility Name: [facility]  Spoke To: Admissions  Outcome: Left voicemail to follow up on referral.

## 2019-06-25 NOTE — PROGRESS NOTES
"Orthopedic Physician  Note    Subjective:  Pt having some pain  Objective:  Dressings c/d/i. Hemovac and knee immob in place    Blood pressure 113/70, pulse 81, temperature 36.7 °C (98 °F), temperature source Temporal, resp. rate 18, height 1.803 m (5' 10.98\"), weight 96.7 kg (213 lb 3 oz), SpO2 92 %.    Labs:  Recent Labs      06/23/19   0516   WBC  12.7*   RBC  4.29*   HEMOGLOBIN  12.3*   HEMATOCRIT  38.5*   MCV  89.7   MCH  28.7   RDW  50.7*   PLATELETCT  232   MPV  9.9   NEUTSPOLYS  72.00   LYMPHOCYTES  18.40*   MONOCYTES  7.20   EOSINOPHILS  1.60   BASOPHILS  0.20         No results for input(s): SODIUM, POTASSIUM, CHLORIDE, CO2, GLUCOSE, BUN, CPKTOTAL in the last 72 hours.  Assessment:  63M sp L BKA for infected foot. Wound margins were clean.  Plan:  NWB  PT  DVT proph  Elevate  Pull hemovac drain this afternoon    LELIA dressings stays in place. Can turn off after 5-7 days when beeping.  FU with Dr. Schneider in 7-10 days for wound check.   Will get in touch with prosthetist today    Jose De Jesus Barrios MD  [facility]  Foot and Ankle Fellow  [phone]           "

## 2019-06-25 NOTE — ANESTHESIA PROCEDURE NOTES
Peripheral Block  Performed by: ELIAZAR SMITH  Authorized by: ELIAZAR SMITH     Patient Location:  OR  Start Time:  6/24/2019 8:17 PM  End Time:  6/24/2019 8:18 PM  Reason for Block: at surgeon's request and post-op pain management    patient identified, IV checked, site marked, risks and benefits discussed, surgical consent, monitors and equipment checked, pre-op evaluation and timeout performed    Patient Position:  Supine  Prep: ChloraPrep    Monitoring:  Heart rate, continuous pulse ox and cardiac monitor  Block Region:  Lower Extremity  Lower Extremity - Block Type:  SCIATIC nerve block, lateral approach    Laterality:  Left  Procedures: ultrasound guided  Image captured, interpreted and electronically stored.  Local Infiltration:  Lidocaine  Strength:  1 %  Dose:  3 ml  Block Type:  Single-shot  Needle Length:  100mm  Needle Gauge:  21 G  Needle Localization:  Ultrasound guidance  Injection Assessment:  Negative aspiration for heme, no paresthesia on injection, incremental injection and local visualized surrounding nerve on ultrasound   Ultrasound saved in chart

## 2019-06-25 NOTE — THERAPY
"Physical Therapy Re-Evaluation completed.   Bed Mobility:  Supine to Sit: Minimal Assist  Transfers: Sit to Stand:  (politely declined)  Gait: Level Of Assist:  (declined attempt) with No Equipment Needed       Plan of Care: Will benefit from Physical Therapy 4 times per week  Discharge Recommendations: Equipment: Will Continue to Assess for Equipment Needs. Post-acute therapy Discharge to a transitional care facility for continued skilled therapy services.    Pt is now s/p L BKA and presents most limited by pain as well as novelty of amputation. He was receptive to education on progression toward prosthesis and reported he is struggling with sit to stand as he cannot place weight ot L LE anymore. Pt required min A to achieve sitting upright at EOB. Discussed attempts at standing/gait with platform FWW for L UE for comfort. Pt agreeable, but not today. Will continue to follow while pt remains here, in house. Could benefit from placement.     See \"Rehab Therapy-Acute\" Patient Summary Report for complete documentation.     "

## 2019-06-25 NOTE — ANESTHESIA QCDR
2019 [facility] Clinical Data Registry (for Quality Improvement)     Postoperative nausea/vomiting risk protocol (Adult = 18 yrs and Pediatric 3-17 yrs)- (430 and 463)  General inhalation anesthetic (NOT TIVA) with PONV risk factors: Yes  Provision of anti-emetic therapy with at least 2 different classes of agents: Yes   Patient DID NOT receive anti-emetic therapy and reason is documented in Medical Record:  N/A    Multimodal Pain Management- (AQI59)  Patient undergoing Elective Surgery (i.e. Outpatient, or ASC, or Prescheduled Surgery prior to Hospital Admission): Yes  Use of Multimodal Pain Management, two or more drugs and/or interventions, NOT including systemic opioids: Yes   Exception: Documented allergy to multiple classes of analgesics:  N/A    PACU assessment of acute postoperative pain prior to Anesthesia Care End- Applies to Patients Age = 18- (ABG7)  Initial PACU pain score is which of the following: < 7/10  Patient unable to report pain score: N/A    Post-anesthetic transfer of care checklist/protocol to PACU/ICU- (426 and 427)  Upon conclusion of case, patient transferred to which of the following locations: PACU/Non-ICU  Use of transfer checklist/protocol:   Exclusion: Service Performed in Patient Hospital Room (and thus did not require transfer):     PACU Reintubation- (AQI31)  General anesthesia requiring endotracheal intubation (ETT) along with subsequent extubation in OR or PACU: Yes  Required reintubation in the PACU: No   Extubation was a planned trial documented in the medical record prior to removal of the original airway device:  N/A    Unplanned admission to ICU related to anesthesia service up through end of PACU care- (MD51)  Unplanned admission to ICU (not initially anticipated at anesthesia start time): No

## 2019-06-25 NOTE — ANESTHESIA PREPROCEDURE EVALUATION
Relevant Problems   (+) ARF (acute renal failure) ([facility])   (+) CVA (cerebral vascular accident) ([facility])   (+) DM (diabetes mellitus), type 2, uncontrolled (CMS-[facility])   (+) Essential hypertension   (+) SALOME (obstructive sleep apnea)   (+) Pneumonia due to infectious organism       Physical Exam    Airway   Mallampati: II  TM distance: >3 FB  Neck ROM: full       Cardiovascular - normal exam  Rhythm: regular  Rate: normal  (-) murmur     Dental - normal exam         Pulmonary - normal exam  Breath sounds clear to auscultation     Abdominal    Neurological - normal exam                 Anesthesia Plan    ASA 3   ASA physical status 3 criteria: diabetes - poorly controlled and CVA or TIA - history (> 3 months)    Plan - general and peripheral nerve block     Peripheral nerve block will be post-op pain control  Airway plan will be LMA        Induction: intravenous    Postoperative Plan: Postoperative administration of opioids is intended.    Pertinent diagnostic labs and testing reviewed    Informed Consent:    Anesthetic plan and risks discussed with patient.    Use of blood products discussed with: patient whom consented to blood products.

## 2019-06-25 NOTE — DISCHARGE PLANNING
Current documentation indicates potential for inpatient rehab. Please consider a PMR referral to assist with discharge planning.

## 2019-06-25 NOTE — PROGRESS NOTES
Pt alert and awake.  Complaints of minimal pain, meds given.  Denies nausea.  VSS.  Knee immobilizer on.  Hvac compressed and draining minimal fluid.  Wife updated on POC.

## 2019-06-26 ENCOUNTER — HOSPITAL ENCOUNTER (INPATIENT)
Facility: REHABILITATION | Age: 64
End: 2019-06-26
Attending: PHYSICAL MEDICINE & REHABILITATION | Admitting: PHYSICAL MEDICINE & REHABILITATION
Payer: MEDICARE

## 2019-06-26 LAB
ALBUMIN SERPL BCP-MCNC: 2.3 G/DL (ref 3.2–4.9)
ALBUMIN/GLOB SERPL: 0.5 G/DL
ALP SERPL-CCNC: 153 U/L (ref 30–99)
ALT SERPL-CCNC: 20 U/L (ref 2–50)
ANION GAP SERPL CALC-SCNC: 9 MMOL/L (ref 0–11.9)
AST SERPL-CCNC: 19 U/L (ref 12–45)
BASOPHILS # BLD AUTO: 0.3 % (ref 0–1.8)
BASOPHILS # BLD: 0.03 K/UL (ref 0–0.12)
BILIRUB SERPL-MCNC: 0.2 MG/DL (ref 0.1–1.5)
BUN SERPL-MCNC: 36 MG/DL (ref 8–22)
CALCIUM SERPL-MCNC: 8.2 MG/DL (ref 8.5–10.5)
CHLORIDE SERPL-SCNC: 101 MMOL/L (ref 96–112)
CO2 SERPL-SCNC: 28 MMOL/L (ref 20–33)
CREAT SERPL-MCNC: 1.5 MG/DL (ref 0.5–1.4)
EOSINOPHIL # BLD AUTO: 0.07 K/UL (ref 0–0.51)
EOSINOPHIL NFR BLD: 0.7 % (ref 0–6.9)
ERYTHROCYTE [DISTWIDTH] IN BLOOD BY AUTOMATED COUNT: 51.6 FL (ref 35.9–50)
GLOBULIN SER CALC-MCNC: 4.2 G/DL (ref 1.9–3.5)
GLUCOSE BLD-MCNC: 102 MG/DL (ref 65–99)
GLUCOSE BLD-MCNC: 163 MG/DL (ref 65–99)
GLUCOSE BLD-MCNC: 81 MG/DL (ref 65–99)
GLUCOSE BLD-MCNC: 86 MG/DL (ref 65–99)
GLUCOSE SERPL-MCNC: 189 MG/DL (ref 65–99)
HCT VFR BLD AUTO: 31.5 % (ref 42–52)
HGB BLD-MCNC: 10 G/DL (ref 14–18)
IMM GRANULOCYTES # BLD AUTO: 0.04 K/UL (ref 0–0.11)
IMM GRANULOCYTES NFR BLD AUTO: 0.4 % (ref 0–0.9)
LYMPHOCYTES # BLD AUTO: 2.34 K/UL (ref 1–4.8)
LYMPHOCYTES NFR BLD: 21.8 % (ref 22–41)
MCH RBC QN AUTO: 27.5 PG (ref 27–33)
MCHC RBC AUTO-ENTMCNC: 30.5 G/DL (ref 33.7–35.3)
MCV RBC AUTO: 90.2 FL (ref 81.4–97.8)
MONOCYTES # BLD AUTO: 0.91 K/UL (ref 0–0.85)
MONOCYTES NFR BLD AUTO: 8.5 % (ref 0–13.4)
NEUTROPHILS # BLD AUTO: 7.36 K/UL (ref 1.82–7.42)
NEUTROPHILS NFR BLD: 68.3 % (ref 44–72)
NRBC # BLD AUTO: 0 K/UL
NRBC BLD-RTO: 0 /100 WBC
PLATELET # BLD AUTO: 280 K/UL (ref 164–446)
PMV BLD AUTO: 9.7 FL (ref 9–12.9)
POTASSIUM SERPL-SCNC: 4.4 MMOL/L (ref 3.6–5.5)
PROT SERPL-MCNC: 6.5 G/DL (ref 6–8.2)
RBC # BLD AUTO: 3.56 M/UL (ref 4.7–6.1)
SODIUM SERPL-SCNC: 138 MMOL/L (ref 135–145)
WBC # BLD AUTO: 10.8 K/UL (ref 4.8–10.8)

## 2019-06-26 PROCEDURE — A9270 NON-COVERED ITEM OR SERVICE: HCPCS | Performed by: INTERNAL MEDICINE

## 2019-06-26 PROCEDURE — 700105 HCHG RX REV CODE 258: Performed by: INTERNAL MEDICINE

## 2019-06-26 PROCEDURE — 51798 US URINE CAPACITY MEASURE: CPT

## 2019-06-26 PROCEDURE — A9270 NON-COVERED ITEM OR SERVICE: HCPCS | Performed by: HOSPITALIST

## 2019-06-26 PROCEDURE — 99232 SBSQ HOSP IP/OBS MODERATE 35: CPT | Performed by: INTERNAL MEDICINE

## 2019-06-26 PROCEDURE — 36415 COLL VENOUS BLD VENIPUNCTURE: CPT

## 2019-06-26 PROCEDURE — 97530 THERAPEUTIC ACTIVITIES: CPT

## 2019-06-26 PROCEDURE — 700102 HCHG RX REV CODE 250 W/ 637 OVERRIDE(OP): Performed by: HOSPITALIST

## 2019-06-26 PROCEDURE — 80053 COMPREHEN METABOLIC PANEL: CPT

## 2019-06-26 PROCEDURE — 700111 HCHG RX REV CODE 636 W/ 250 OVERRIDE (IP): Performed by: INTERNAL MEDICINE

## 2019-06-26 PROCEDURE — 770006 HCHG ROOM/CARE - MED/SURG/GYN SEMI*

## 2019-06-26 PROCEDURE — 700102 HCHG RX REV CODE 250 W/ 637 OVERRIDE(OP): Performed by: INTERNAL MEDICINE

## 2019-06-26 PROCEDURE — 99233 SBSQ HOSP IP/OBS HIGH 50: CPT | Performed by: INTERNAL MEDICINE

## 2019-06-26 PROCEDURE — 82962 GLUCOSE BLOOD TEST: CPT

## 2019-06-26 PROCEDURE — 85025 COMPLETE CBC W/AUTO DIFF WBC: CPT

## 2019-06-26 RX ORDER — NALOXONE HYDROCHLORIDE 0.4 MG/ML
0.4 INJECTION, SOLUTION INTRAMUSCULAR; INTRAVENOUS; SUBCUTANEOUS PRN
Status: DISCONTINUED | OUTPATIENT
Start: 2019-06-26 | End: 2019-07-10 | Stop reason: HOSPADM

## 2019-06-26 RX ORDER — SODIUM CHLORIDE 9 MG/ML
INJECTION, SOLUTION INTRAVENOUS CONTINUOUS
Status: DISCONTINUED | OUTPATIENT
Start: 2019-06-26 | End: 2019-06-28

## 2019-06-26 RX ADMIN — OXYCODONE HYDROCHLORIDE 10 MG: 5 TABLET ORAL at 03:26

## 2019-06-26 RX ADMIN — METHADONE HYDROCHLORIDE 35 MG: 10 TABLET ORAL at 05:13

## 2019-06-26 RX ADMIN — SODIUM CHLORIDE: 9 INJECTION, SOLUTION INTRAVENOUS at 13:16

## 2019-06-26 RX ADMIN — SENNOSIDES,DOCUSATE SODIUM 2 TABLET: 8.6; 5 TABLET, FILM COATED ORAL at 17:48

## 2019-06-26 RX ADMIN — TRAMADOL HYDROCHLORIDE 50 MG: 50 TABLET, FILM COATED ORAL at 13:15

## 2019-06-26 RX ADMIN — INSULIN GLARGINE 44 UNITS: 100 INJECTION, SOLUTION SUBCUTANEOUS at 09:18

## 2019-06-26 RX ADMIN — INSULIN LISPRO 5 UNITS: 100 INJECTION, SOLUTION INTRAVENOUS; SUBCUTANEOUS at 09:18

## 2019-06-26 RX ADMIN — METOPROLOL TARTRATE 12.5 MG: 25 TABLET, FILM COATED ORAL at 17:48

## 2019-06-26 RX ADMIN — PREGABALIN 300 MG: 150 CAPSULE ORAL at 17:48

## 2019-06-26 RX ADMIN — OXYCODONE HYDROCHLORIDE 10 MG: 5 TABLET ORAL at 09:23

## 2019-06-26 RX ADMIN — ENOXAPARIN SODIUM 40 MG: 100 INJECTION SUBCUTANEOUS at 05:15

## 2019-06-26 RX ADMIN — METOPROLOL TARTRATE 12.5 MG: 25 TABLET, FILM COATED ORAL at 05:12

## 2019-06-26 RX ADMIN — PREGABALIN 300 MG: 150 CAPSULE ORAL at 05:12

## 2019-06-26 RX ADMIN — INSULIN LISPRO 3 UNITS: 100 INJECTION, SOLUTION INTRAVENOUS; SUBCUTANEOUS at 09:16

## 2019-06-26 RX ADMIN — TRAMADOL HYDROCHLORIDE 50 MG: 50 TABLET, FILM COATED ORAL at 00:17

## 2019-06-26 RX ADMIN — ASPIRIN 81 MG 81 MG: 81 TABLET ORAL at 05:13

## 2019-06-26 RX ADMIN — METHADONE HYDROCHLORIDE 35 MG: 10 TABLET ORAL at 18:00

## 2019-06-26 RX ADMIN — INSULIN GLARGINE 44 UNITS: 100 INJECTION, SOLUTION SUBCUTANEOUS at 17:55

## 2019-06-26 ASSESSMENT — COGNITIVE AND FUNCTIONAL STATUS - GENERAL
MOBILITY SCORE: 10
HELP NEEDED FOR BATHING: A LITTLE
DRESSING REGULAR UPPER BODY CLOTHING: A LITTLE
SUGGESTED CMS G CODE MODIFIER MOBILITY: CL
MOVING FROM LYING ON BACK TO SITTING ON SIDE OF FLAT BED: UNABLE
STANDING UP FROM CHAIR USING ARMS: A LOT
SUGGESTED CMS G CODE MODIFIER DAILY ACTIVITY: CK
PERSONAL GROOMING: A LITTLE
CLIMB 3 TO 5 STEPS WITH RAILING: TOTAL
TOILETING: A LITTLE
DRESSING REGULAR LOWER BODY CLOTHING: A LOT
DAILY ACTIVITIY SCORE: 18
WALKING IN HOSPITAL ROOM: TOTAL
MOVING TO AND FROM BED TO CHAIR: UNABLE

## 2019-06-26 ASSESSMENT — ENCOUNTER SYMPTOMS
PALPITATIONS: 0
COUGH: 0
BACK PAIN: 0
DIARRHEA: 0
BLURRED VISION: 0
ABDOMINAL PAIN: 0
DEPRESSION: 0
DIZZINESS: 0
MYALGIAS: 0
VOMITING: 0
HEARTBURN: 0
FEVER: 0
SHORTNESS OF BREATH: 0
NAUSEA: 0
HEADACHES: 0
WEIGHT LOSS: 0
EYE PAIN: 0

## 2019-06-26 ASSESSMENT — GAIT ASSESSMENTS: GAIT LEVEL OF ASSIST: UNABLE TO PARTICIPATE

## 2019-06-26 NOTE — PROGRESS NOTES
2 RN skin check complete with Emily DUNCAN.   Devices in place Left knee immobilizer left leg with amputation dressing (left in place) and surgical shoe right foot with know wounds.  Skin assessed under devices yes.  Confirmed pressure ulcers found on none.  New potential pressure ulcers noted on none.   The following interventions in place Encourage ambulation and oob patient only able to tolerate edge of bed today.  Turn and position q2 hours and prn, pillow support, incontinent care (no mepilex placed for this reason). Barrier wipes and paste prn.

## 2019-06-26 NOTE — CONSULTS
Medical chart review completed.     Patient is a 63 y.o. male  with a past medical history of insulin dependent diabetes with peripheral neuropathy, chronic respiratory failure on 3 L home O2, admitted to [facility] on 6/15 as a transfer from [place], with elevated troponin to peak 1.11, sepsis, and left diabetic foot ulcer. Also complaining of left shoulder pain with history of fall.  Left humerus xray from outside hospital with inferior dislocation of proximal humerus. CT of shoulder with prior resection of left humeral head, GH joint with chronic loculated fluid collection. Patient was intubated on admission, extubated 6/17, s/p steroids. Patient had debridement on 6/18 of foot ulcer, then returned to OR on 6/24 for left transtibial amputation with Dr. Schneider. Patient followed by ID, antibiotics stopped today. He currently has hemovac, woundvac, and knee immobilizer. Plan for follow up in 7-10 days for wound check. Noted to have increased Cr today. Patient had ECHO with EF 40%. Patient was on methadone 70 mg at home prior to admission, and continues this medication.     Patient with multiple co-morbidities(including but not limited to diabetes, anemia, acute kidney injury, acute systolic CHF); with cognitive deficits and functional deficits in mobility/self-cares, and Severe de-conditioning.     Pre-morbidly, this patient lived in a single level home with Three steps to enter, with spouse. The patient was evaluated by acute care Physical Therapy and Occupational Therapy; currently requiring minimal assistance for mobility and minimal to moderate assistance for ADLs.    6 clicks score 10 mobility, 18 ADLs    As long as patient is able to get his methadone after discharge (I will not be able to prescribe), he is a good candidate for an acute inpatient rehabilitation program with a coordinated program of care at an intensity and frequency not available at a lower level of care.     Note: if the  patient continues to progress while waiting for medical clearance, and no longer requires 2 out of 3 therapy services (PT/OT/SLP), then the patient would no longer meet the criteria for acute inpatient rehabilitation.    This recommendation is substantiated by the patient's current medical condition with intervention and assessment of medical issues requiring an acute level of care for patient's safety and maximum outcome. A coordinated program of care will be provided by an interdisciplinary team including physical therapy, occupational therapy, hospitalist, physiatry, rehab nursing and rehab psychology. Rehab goals include improved mobility, self-care management, strength and conditioning/endurance, pain management, bowel and bladder management, mood and affect, and safety with independent home management including caregiver training. Estimated length of stay is approximately 14 days. Rehab potential: Good. Disposition: to pre-morbid independent living setting with supportive care of patient's spouse. We will continue to follow with you in anticipation of discharge to acute inpatient rehabilitation when medically stable to do so at the discretion of his attending physician.     Thank you for allowing us to participate in his care.    Mimi Doyle M.D.  Physical Medicine and Rehabilitation

## 2019-06-26 NOTE — PROGRESS NOTES
[facility] Medicine Daily Progress Note    Date of Service  6/26/2019    Chief Complaint  63 y.o. male admitted 6/15/2019 with L shoulder pain and L heel ulcer.    Hospital Course    Hx TIIDM, COPD on 3 L HOT.  Presenting to [facility] with multiple complaints including shoulder pain following a fall and a chronic heel ulcer.  Admitted to CICU with Dx of sepsis from diabetic foot ulcer, Type II NSTEMI with Trop of 1.09.  Intubated on admission, extubated 6/17.    Interval Problem Update  POD 2 Left BKA. Denies nausea vomiting diarrhea. Complaint about lot of pain over surgical site. I saw and examined the patient today.      Consultants/Specialty  LPS  Pulmonology/Critical Care    Code Status  full    Disposition  SNF referral placed    Review of Systems  Review of Systems   Constitutional: Negative for weight loss.   HENT: Negative for congestion and nosebleeds.    Eyes: Negative for blurred vision and pain.   Respiratory: Negative for cough.    Cardiovascular: Negative for chest pain and palpitations.   Gastrointestinal: Negative for heartburn and vomiting.   Genitourinary: Negative for dysuria and urgency.   Musculoskeletal: Negative for back pain, joint pain and myalgias.        L shoulder pain, chronic   Skin: Negative for itching and rash.        R heel wound    Neurological: Negative for dizziness and headaches.   Psychiatric/Behavioral: Negative for depression.        Physical Exam  Temp:  [36.3 °C (97.3 °F)-36.7 °C (98.1 °F)] 36.3 °C (97.3 °F)  Pulse:  [64-81] 71  Resp:  [16-18] 16  BP: (109-129)/(46-68) 110/51  SpO2:  [90 %-94 %] 94 %    Physical Exam   Constitutional: He is oriented to person, place, and time. No distress.   HENT:   Head: Normocephalic and atraumatic.   Eyes: Pupils are equal, round, and reactive to light. EOM are normal. Left eye exhibits no discharge.   Neck: Normal range of motion. No thyromegaly present.   Cardiovascular: Normal rate and regular rhythm.    Pulmonary/Chest: Effort normal  and breath sounds normal. No respiratory distress.   Abdominal: Soft. Bowel sounds are normal. He exhibits no distension. There is no tenderness.   Musculoskeletal: He exhibits edema (trace). He exhibits no tenderness.   Wound vac in place L heel   Neurological: He is alert and oriented to person, place, and time. No cranial nerve deficit.   Skin: Skin is warm and dry. He is not diaphoretic. No erythema.   Psychiatric: His speech is normal.   Vitals reviewed.  Patient seen and examined today on 6/21, unchanged from 6/20.    Fluids    Intake/Output Summary (Last 24 hours) at 06/26/19 1109  Last data filed at 06/26/19 1000   Gross per 24 hour   Intake              480 ml   Output             1070 ml   Net             -590 ml       Laboratory  Recent Labs      06/26/19   0358   WBC  10.8   RBC  3.56*   HEMOGLOBIN  10.0*   HEMATOCRIT  31.5*   MCV  90.2   MCH  27.5   MCHC  30.5*   RDW  51.6*   PLATELETCT  280   MPV  9.7     Recent Labs      06/26/19   0358   SODIUM  138   POTASSIUM  4.4   CHLORIDE  101   CO2  28   GLUCOSE  189*   BUN  36*   CREATININE  1.50*   CALCIUM  8.2*                   Imaging  EC-ECHOCARDIOGRAM COMPLETE W/O CONT   Final Result      DX-CHEST-PORTABLE (1 VIEW)   Final Result         1.  Pulmonary edema and/or infiltrates are identified, which are somewhat decreased since the prior exam.      DX-ANKLE 3+ VIEWS LEFT   Final Result      Soft tissue swelling. No acute fracture identified.      DX-CHEST-PORTABLE (1 VIEW)   Final Result      1.  Improved aeration of the RIGHT lung base   2.  Otherwise no interval change in BILATERAL atelectasis or airspace disease      DX-ABDOMEN FOR TUBE PLACEMENT   Final Result      Enteric tube projects over the stomach.      DX-CHEST-LIMITED (1 VIEW)   Final Result      New right IJ line tip overlies the SVC and no pneumothorax is identified      New right basilar opacity effaces the diaphragm and could be from atelectasis or consolidation      DX-CHEST-PORTABLE (1  VIEW)   Final Result      Endotracheal tube projects at the level of the aortic arch.      Increased perihilar and bibasilar opacities may represent a combination of edema, atelectasis, pneumonia.         DX-CHEST-PORTABLE (1 VIEW)   Final Result      Increasing reticular and hazy opacity is compatible with edema      CT-SHOULDER W/O PLUS RECONS LEFT   Final Result         Prior resection of left humeral head. The residual head neck junction appears well corticated and could relate to chronic resection/deformity. There is also deformity and remodeling of the glenoid with well-corticated margin, suggesting of chronic change    as well.      No acute fracture.      There is no articulation between the proximal humerus and glenoid. The glenohumeral joint is replaced by loculated soft tissue or dense fluid, likely chronic change. The fluid is slightly less in 2018 CT.      Infection is considered less likely given the osseous cortex adjacent to the fluid is well corticated and demonstrates no rarefaction or destruction.      US-FOREIGN FILM ULTRASOUND   Final Result      OUTSIDE IMAGES-CT HEAD   Final Result      [id number] FILM X-RAY   Final Result      OUTSIDE IMAGES-DX LOWER EXTREMITY, LEFT   Final Result      [id number] FILM X-RAY   Final Result      OUTSIDE IMAGES-DX CHEST   Final Result      OUTSIDE IMAGES-DX UPPER EXTREMITY, LEFT   Final Result           Assessment/Plan  * Acute on chronic respiratory failure with hypoxia (HCC)- (present on admission)   Assessment & Plan    Extubated 6/17, doing well on 1-2L continuous. Typically only on home O2 at night. Secondary to aspiration vs CAP  - continue to wean O2 to home regimen  - RT protocol  - weaning steroids, last dose of prednisone 6/20  - increase mobilization, IS  - abx x5 days     Diabetic foot ulcer (HCC)- (present on admission)   Assessment & Plan    Infected left heel diabetic foot ulcer with group D enterococcus. No evidence of  osteomyelitis  - s/p debridement on 6/18  - POD 2 left BKA  - On IV Zosyn, anticipate 2 weeks abx  - pain control  - ID on     Septic shock due to undetermined organism (HCC)- (present on admission)   Assessment & Plan    This is sepsis (without associated acute organ dysfunction).   source is infected diabetic ulcer and pneumonia.   Improved  On zosyn IV     OZ (acute kidney injury) (HCC)- (present on admission)   Assessment & Plan    - Cr 1.5 today  - worsening  - renally dose medications  - avoid nephrotoxic agents  - trend BMP in am  - I started him on IVF       Pneumonia due to infectious organism- (present on admission)   Assessment & Plan    On Zosyn     Cardiomyopathy (HCC)- (present on admission)   Assessment & Plan    Echo LVEF 40%. No exacerbation and is euvolemic.  - continue metop and lisinopril with holding parameters     DM (diabetes mellitus), type 2, uncontrolled (CMS-[facility])- (present on admission)   Assessment & Plan    Uncontrolled, with hyperglycemia. A1C elevated at 12.2%. Blood sugars much improved over the last 24 hours  - lantus 44U BID  - resistant sliding scale and 5U TID with meals  - hypoglycemia protocol and DM diet  - DM education     Essential hypertension- (present on admission)   Assessment & Plan    Normo to hypertensive.  - continue metop, lisinopril  - labetalol IV PRN     Leucocytosis   Assessment & Plan      Improving slowly  Follow cbc in am     Left shoulder pain- (present on admission)   Assessment & Plan    No acute fracture noted on CT  Pain control with Tylenol, oxycodone and IV morphine.  Monitor respiratory status closely  PT OT  If persistent pain we will consider consulting his orthopedic surgeon      Tobacco dependence- (present on admission)   Assessment & Plan    Cont to encourage cessation     Hypokalemia- (present on admission)   Assessment & Plan    Improved  monitor and replete as needed          VTE prophylaxis: lovenox

## 2019-06-26 NOTE — PROGRESS NOTES
Infectious Disease Progress Note    Author: Rae Ortiz M.D. Date & Time of service: 2019  1:39 PM    Chief Complaint:  Follow-up for left heel infection/ulcer, septic shock    Interval History:  63 y.o.WM with history of type 2 diabetes mellitus comp located by peripheral neuropathy and diabetic foot ulcers, history of MRSA infection in  and [place] on nocturnal oxygen admitted 6/15/2019 for respiratory failure.  Found to have a left heel wound infection status post excisional debridement down to subcutaneous tissue on 2019.  Cultures growing Staphylococcus aureus.     afebrile WBC 15.1 patient transferred out of the ICU overnight.  He is doing well without any new issues but is eager to go home.  Tolerating IV antibiotics.  No diarrhea   AF WBC 15.6 patient had bedside debridement of his right heel yesterday and endured significant pain.  Wound photos reviewed and R heel with significant erythema, edema and necrotic tissue  - No fevers. Feels tired. Still on 1.5 l of O2. He is on 3l of nocturnal O2 at home  2019-no fevers.  No new issues overnight.  WBC is decreased to 12.7.  Creatinine is slightly up at 1.19   AF WBC 12.7 somnolent today-no acute events overnight   AF had BKA last night Denies SE abx   AF working with therapy-no new complaints. Pain at amputation site now controlled  Labs Reviewed. Wound reviewed    Review of Systems:  Review of Systems   Constitutional: Negative for fever.   Respiratory: Negative for shortness of breath.    Gastrointestinal: Negative for abdominal pain, diarrhea, nausea and vomiting.   Musculoskeletal: Positive for joint pain.   All other systems reviewed and are negative.      Hemodynamics:  Temp (24hrs), Av.5 °C (97.7 °F), Min:36.3 °C (97.3 °F), Max:36.7 °C (98.1 °F)  Temperature: 36.7 °C (98 °F)  Pulse  Av.8  Min: 64  Max: 117   Blood Pressure : (!) 93/46 (RN notified )       Physical Exam:  Physical Exam    Constitutional: No distress.   disheveled   Eyes: Right eye exhibits no discharge. Left eye exhibits no discharge.   Neck: Neck supple. No JVD present.   Cardiovascular: Normal rate.    Pulmonary/Chest: Effort normal. No stridor. No respiratory distress. He has no wheezes.   Abdominal: Soft. He exhibits no distension. There is no tenderness. There is no rebound and no guarding.   Musculoskeletal: He exhibits no edema.   Left BKA dressed    Right heel 1 cm X 1 cm x 0.3 cm No surrounding erythema    Left shoulder scar   Neurological: He is alert.   Skin: Skin is warm. He is not diaphoretic.   Psychiatric: He has a normal mood and affect. His behavior is normal.   Nursing note and vitals reviewed.      Meds:    Current Facility-Administered Medications:   •  NS  •  HYDROmorphone  •  insulin glargine  •  tramadol  •  insulin lispro **AND** insulin lispro **AND** Accu-Chek ACHS **AND** NOTIFY MD and PharmD **AND** glucose **AND** dextrose 10% bolus  •  aspirin  •  enoxaparin (LOVENOX) injection  •  acetaminophen  •  [DISCONTINUED] senna-docusate **AND** polyethylene glycol/lytes **AND** magnesium hydroxide **AND** bisacodyl  •  oxyCODONE immediate-release  •  ipratropium-albuterol  •  pregabalin  •  methadone  •  metoprolol  •  hydrALAZINE  •  labetalol  •  senna-docusate  •  Notify provider if pain remains uncontrolled **AND** Use the numeric rating scale (NRS-11) on regular floors and Critical-Care Pain Observation Tool (CPOT) on ICUs/Trauma to assess pain **AND** Pulse Ox (Oximetry) **AND** Pharmacy Consult Request **AND** If patient difficult to arouse and/or has respiratory depression, stop any opiates that are currently infusing and call a Rapid Response. **AND** [DISCONTINUED] oxyCODONE immediate-release **AND** [DISCONTINUED] oxyCODONE immediate-release **AND** [DISCONTINUED] morphine injection  •  Respiratory Care per Protocol  •  ondansetron  •  ondansetron    Labs:  Recent Labs      06/26/19   0358   WBC   10.8   RBC  3.56*   HEMOGLOBIN  10.0*   HEMATOCRIT  31.5*   MCV  90.2   MCH  27.5   RDW  51.6*   PLATELETCT  280   MPV  9.7   NEUTSPOLYS  68.30   LYMPHOCYTES  21.80*   MONOCYTES  8.50   EOSINOPHILS  0.70   BASOPHILS  0.30     Recent Labs      06/26/19   0358   SODIUM  138   POTASSIUM  4.4   CHLORIDE  101   CO2  28   GLUCOSE  189*   BUN  36*     Recent Labs      06/26/19   0358   ALBUMIN  2.3*   TBILIRUBIN  0.2   ALKPHOSPHAT  153*   TOTPROTEIN  6.5   ALTSGPT  20   ASTSGOT  19   CREATININE  1.50*       Imaging:  Ct-shoulder W/o Plus Recons Left    Result Date: 6/15/2019  6/15/2019 10:05 PM HISTORY/REASON FOR EXAM:  Shoulder pain, acute, persistent, xray and exam nonspecific. History of left shoulder dislocation. Sepsis. TECHNIQUE/ EXAM DESCRIPTION AND NUMBER OF VIEWS:  CT scan of the LEFT shoulder without contrast and including reconstructions. Thin-section noncontrast helical images were obtained from the clavicle through the scapula. Coronal and sagittal reconstructions were generated. Up to date radiation dose reduction adjustments have been utilized to meet ALARA standards for radiation dose reduction. COMPARISON: Radiograph 6/15/2019, 1/12/2018, CT 1/14/2018 FINDINGS: Prior resection of the left humeral head with residual head neck junction appears well corticated and could relate to chronic resection/deformity. There is also deformity and remodeling of the glenoid with well-corticated margin, suggesting of chronic change as well. There is no articulation between the proximal humerus and glenoid. The joint is replaced by loculated soft tissue or dense fluid (image 65 series 4). All rotator cuff muscle are completely atrophied and likely no rotator cuff tendon connecting to the humerus.     Prior resection of left humeral head. The residual head neck junction appears well corticated and could relate to chronic resection/deformity. There is also deformity and remodeling of the glenoid with well-corticated margin,  suggesting of chronic change  as well. No acute fracture. There is no articulation between the proximal humerus and glenoid. The glenohumeral joint is replaced by loculated soft tissue or dense fluid, likely chronic change. The fluid is slightly less in 2018 CT. Infection is considered less likely given the osseous cortex adjacent to the fluid is well corticated and demonstrates no rarefaction or destruction.    Dx-ankle 3+ Views Left    Result Date: 6/17/2019 6/17/2019 3:01 PM HISTORY/REASON FOR EXAM:  For worsening wound. Soft tissue ulceration TECHNIQUE/EXAM DESCRIPTION AND NUMBER OF VIEWS:  3 views of the LEFT ankle. COMPARISON: None. FINDINGS: Soft tissue swelling. No acute fracture identified. Ankle joint degenerative changes. Large posterior calcaneal spur at the Achilles insertion. Soft tissue swelling posterior and inferior to the calcaneus.     Soft tissue swelling. No acute fracture identified.    Dx-chest-limited (1 View)    Result Date: 6/16/2019 6/16/2019 3:37 PM HISTORY/REASON FOR EXAM: central line placement. TECHNIQUE/EXAM DESCRIPTION AND NUMBER OF VIEWS: Single portable view of the chest. COMPARISON: 1:44 PM. FINDINGS: New right IJ line tip overlies the SVC. No pneumothorax New partial right hemidiaphragm effacement with hazy basilar opacity. No other change is identified     New right IJ line tip overlies the SVC and no pneumothorax is identified New right basilar opacity effaces the diaphragm and could be from atelectasis or consolidation    Dx-chest-portable (1 View)    Result Date: 6/18/2019 6/18/2019 4:30 AM HISTORY/REASON FOR EXAM: For indication of respiratory failure; For indication of respiratory failure TECHNIQUE/EXAM DESCRIPTION:  Single AP view of the chest. COMPARISON: Yesterday FINDINGS: Endotracheal tube has been removed in the interim.  Dobbhoff tube has been removed in the interim. Cardiomegaly is observed. The mediastinal contour appears within normal limits.  The central   pulmonary vasculature appears prominent and indistinct. The lungs appear well expanded bilaterally.  Diffuse scattered hazy pulmonary parenchymal opacities are seen. No significant pleural effusions are identified. The bony structures appear age-appropriate.     1.  Pulmonary edema and/or infiltrates are identified, which are somewhat decreased since the prior exam.    Dx-chest-portable (1 View)    Result Date: 6/17/2019 6/17/2019 2:07 AM HISTORY/REASON FOR EXAM:  For indication of respiratory failure; For indication of respiratory failure TECHNIQUE/EXAM DESCRIPTION AND NUMBER OF VIEWS: Single portable view of the chest. COMPARISON: Yesterday FINDINGS: Hardware is stably positioned in its visualized extent. HEART: Stable size. LUNGS: ] Opacity is decreased. LEFT basilar opacities unchanged. PLEURA: No effusion or pneumothorax.     1.  Improved aeration of the RIGHT lung base 2.  Otherwise no interval change in BILATERAL atelectasis or airspace disease    Dx-chest-portable (1 View)    Result Date: 6/16/2019 6/16/2019 1:46 PM HISTORY/REASON FOR EXAM:  intubation. TECHNIQUE/EXAM DESCRIPTION AND NUMBER OF VIEWS: Single portable view of the chest. COMPARISON: 6/16/2019 FINDINGS: The cardiomediastinal silhouette is stable. There are increased perihilar and bibasilar airspace opacities, right greater than left. No pleural effusion or pneumothorax is seen. There is a chronic deformity of the proximal left humerus. Endotracheal tube  projects at the level of the aortic arch.     Endotracheal tube projects at the level of the aortic arch. Increased perihilar and bibasilar opacities may represent a combination of edema, atelectasis, pneumonia.     Dx-chest-portable (1 View)    Result Date: 6/16/2019 6/16/2019 12:03 PM HISTORY/REASON FOR EXAM: Cough. Desaturation TECHNIQUE/EXAM DESCRIPTION AND NUMBER OF VIEWS: Single AP view of the chest. COMPARISON: Yesterday FINDINGS: Lungs: There is some increasing reticular and hazy  indistinct opacity. Lung volumes have mildly diminished Pleura:  No pleural space process is seen. Heart and mediastinum: The cardiomediastinal contours are stable.     Increasing reticular and hazy opacity is compatible with edema    Ec-echocardiogram Complete W/o Cont    Result Date: 6/18/2019  Transthoracic Echo Report Echocardiography Laboratory CONCLUSIONS Compared to the images of the study done 1/27/18, RCA territory wall motion abnormalities are now present. Fair quality study, some off axis views. Moderately reduced left ventricular systolic function. Left ventricular ejection fraction is visually estimated to be 40%. Hypokinesis of the basal to mid inferior and inferolateral wall. Mild mitral annular calcification. Unable to estimate pulmonary artery pressure due to an inadequate tricuspid regurgitant jet. Dilated inferior vena cava with inspiratory collapse, EF:  40    % FINDINGS Left Ventricle Normal left ventricular chamber size. Mild concentric left ventricular hypertrophy. Moderately reduced left ventricular systolic function. Left ventricular ejection fraction is visually estimated to be 40%. Hypokinesis of the basal to mid inferior and inferolateral wall. Normal diastolic function. Right Ventricle Normal right ventricular size and systolic function. Right Atrium Normal right atrial size. Dilated inferior vena cava with inspiratory collapse. Left Atrium Normal left atrial size. Mitral Valve Structurally normal mitral valve without significant stenosis. Mild mitral annular calcification. Trace mitral regurgitation. Aortic Valve Structurally normal aortic valve without significant stenosis or regurgitation. Tricuspid Valve Structurally normal tricuspid valve without significant stenosis or regurgitation. Unable to estimate pulmonary artery pressure due to an inadequate tricuspid regurgitant jet. Pulmonic Valve The pulmonic valve is not well visualized. Pericardium Normal pericardium without effusion.  Aorta Normal aortic root for body surface area. Ascending aorta diameter is 3.2 cm. Mya Alegre MD (Electronically Signed) Final Date:     18 June 2019                 17:39    Dx-abdomen For Tube Placement    Result Date: 6/16/2019 6/16/2019 5:59 PM HISTORY/REASON FOR EXAM:  Line evaluation. TECHNIQUE/EXAM DESCRIPTION AND NUMBER OF VIEWS:  1 view(s) of the abdomen. COMPARISON:  9/15/2007. FINDINGS: Enteric tube projects over the stomach. There is a nonspecific bowel gas pattern. Surgical clips are seen in the right upper quadrant. There are bibasilar airspace opacities. Postsurgical and degenerative changes are seen in the lumbar spine.     Enteric tube projects over the stomach.      Micro:  Results     Procedure Component Value Units Date/Time    CULTURE TISSUE W/ GRM STAIN [[id number]]  (Abnormal)  (Susceptibility) Collected:  06/18/19 1340    Order Status:  Completed Specimen:  Tissue Updated:  06/24/19 1300     Significant Indicator POS (POS)     Source TISS     Site LEFT HEEL WOUND     Culture Result - (A)     Gram Stain Result Many WBCs.  Few Gram positive rods.  Many Gram positive cocci.   (A)     Culture Result Staphylococcus aureus  Heavy growth   (A)      Enterococcus faecalis  Moderate growth  Combination therapy with ampicillin, penicillin, or  vancomycin (for susceptible strains) plus an aminoglycoside  is usually indicated for serious enterococcal infections,  such as endocarditis unless high-level resistance to both  gentamicin and streptomycin is documented; such combinations  are predicted to result in synergistic killing of the  Enterococcus.   (A)      Streptococcus constellatus  Heavy growth   (A)    Narrative:       Surgery Specimen    Culture & Susceptibility     ENTEROCOCCUS FAECALIS     Antibiotic Sensitivity Microscan Unit Status    Ampicillin Sensitive <=2 mcg/mL Final    Method: TAIWO    Daptomycin Sensitive 2 mcg/mL Final    Method: TAIWO    Gent Synergy Sensitive <=500 mcg/mL Final     "Method: TAIWO    Penicillin Sensitive 2 mcg/mL Final    Method: TAIWO    Vancomycin Sensitive 2 mcg/mL Final    Method: TAIWO              STAPHYLOCOCCUS AUREUS     Antibiotic Sensitivity Microscan Unit Status    Ampicillin/sulbactam Sensitive <=8/4 mcg/mL Final    Method: TAIWO    Clindamycin Sensitive <=0.5 mcg/mL Final    Method: TAIWO    Daptomycin Sensitive <=0.5 mcg/mL Final    Method: TAIWO    Erythromycin Sensitive <=0.5 mcg/mL Final    Method: TAIWO    Moxifloxacin Sensitive <=0.5 mcg/mL Final    Method: TAIWO    Oxacillin Sensitive <=0.25 mcg/mL Final    Method: TAIWO    Tetracycline Sensitive <=4 mcg/mL Final    Method: TAIWO    Trimeth/Sulfa Sensitive <=0.5/9.5 mcg/mL Final    Method: TAIWO    Vancomycin Sensitive 2 mcg/mL Final    Method: TAIWO                       Anaerobic Culture [[id number]]  (Abnormal) Collected:  06/18/19 1340    Order Status:  Completed Specimen:  Tissue Updated:  06/24/19 1300     Significant Indicator POS (POS)     Source TISS     Site LEFT HEEL WOUND     Culture Result Growth noted after further incubation, see below for  organism identification.   (A)      Prevotella bivia  Heavy growth   (A)    Narrative:       Surgery Specimen    BLOOD CULTURE [[id number]] Collected:  06/15/19 2249    Order Status:  Completed Specimen:  Blood from Peripheral Updated:  06/21/19 0100     Significant Indicator NEG     Source BLD     Site PERIPHERAL     Culture Result No growth after 5 days of incubation.    Narrative:       Per Hospital Policy: Only change Specimen Src: to \"Line\" if  specified by physician order.  Left Hand    BLOOD CULTURE [[id number]] Collected:  06/15/19 2230    Order Status:  Completed Specimen:  Blood from Peripheral Updated:  06/21/19 0100     Significant Indicator NEG     Source BLD     Site PERIPHERAL     Culture Result No growth after 5 days of incubation.    Narrative:       Per Hospital Policy: Only change Specimen Src: to \"Line\" if  specified by physician order.  Right AC    "       Assessment:  Active Hospital Problems    Diagnosis   • Acute on chronic respiratory failure with hypoxia (formerly [facility]) [J96.21]   • Septic shock due to undetermined organism (formerly [facility]) [A41.9, R65.21]   • OZ (acute kidney injury) (formerly [facility]) [N17.9]   • Pneumonia due to infectious organism [J18.9]   • Aspiration into airway [T17.908A]   • Cardiomyopathy (formerly [facility]) [I42.9]   • Elevated troponin [R74.8]   • Diabetic foot ulcer (formerly [facility]) [E11.621, L97.509]   • DM (diabetes mellitus), type 2, uncontrolled (CMS-formerly [facility]) [E11.65]       Plan:  Left diabetic foot infection  Afebrile  Leukocytosis decreasing at last check  Shock resolved  s/p excisional debridement down to muscle on 6/19/2019  Operative cultures- MSSA, Efaecalis, Prevotella, and Streptococcus constellatus  S/p Left BKA-definitive treatment  Completed course of Zosyn post-op  Counseled about s/sxs infection of surgical site    Pneumonia  Status post extubation on 6/17  Status post bronchoscopy with BAL on 6/16  BAL cultures-upper respiratory sammie  Blood cxs neg    Acute kidney injury, improved  Secondary to septic shock   Avoid nephrotoxins  Renally dose antibiotics accordingly  Monitor    Type 2 diabetes mellitus, poorly controlled  Hemoglobin A1c 12.2  Keep BS under 150 to help control current infection  BS     Diabetic peripheral neuropathy  Contributing to above      Will sign off-please reconsult if needed  I have performed a physical exam and reviewed and updated ROS as of today.  In review of yesterday's note dated  6/25/2019, there are no changes except as documented above.

## 2019-06-26 NOTE — THERAPY
"Physical Therapy Treatment completed.   Bed Mobility:  Supine to Sit: Minimal Assist (at scapula for trunk to upright)  Transfers: Sit to Stand: Refused  Gait: Level Of Assist: Unable to Participate with No Equipment Needed       Plan of Care: Will benefit from Physical Therapy 4 times per week  Discharge Recommendations: Equipment: Will Continue to Assess for Equipment Needs. Post-acute therapy Discharge to a transitional care facility for continued skilled therapy services.    Pt appears most limited by generalized discomfort. He reports new onset of R shoulder discomfort, possibly overuse. Pt reports he feels he \"overdid it\" yesterday with his residual limb. Discussed mobility options with pt including squat pivot to wc (or SB) to wc as FWW use will be limited due to L shoulder injury. PT will follow while pt remains here, in-house. Recommend placement.      See \"Rehab Therapy-Acute\" Patient Summary Report for complete documentation.       "

## 2019-06-26 NOTE — CARE PLAN
Problem: Safety  Goal: Will remain free from falls  Outcome: PROGRESSING AS EXPECTED  Bed in low, locked position

## 2019-06-26 NOTE — DISCHARGE PLANNING
PMR referral from Dr. Leahy     Left BKA ongoing medical management as well as therapy need. Anticipate post acute services to facilitate a successful transition to community home single story 3 steps to enter with spouse support. RPG to consult per protocol.

## 2019-06-26 NOTE — CARE PLAN
Problem: Pain Management  Goal: Pain level will decrease to patient's comfort goal  Outcome: PROGRESSING AS EXPECTED  Pain relief with prn medication.     Problem: Respiratory:  Goal: Respiratory status will improve  Outcome: PROGRESSING SLOWER THAN EXPECTED  Patient titrated from 6L NC to 5L NC. Encourage IS.

## 2019-06-26 NOTE — DISCHARGE PLANNING
Anticipated Discharge Disposition: TBD    Action: Discussed pt's case with attending MD. Reviewed notes, [facility] referral placed and [facility] unable to accept pt as they do not accept wound vac pt's any longer. SNF referrals placed, pending acceptance. Pt is POD #2 LBKA, currently has a wound vac and ID is following. Discussed placing a rehab consult and MD is agreeable.     Discussed pt's case with Mera with [facility], she is going to cancel wound vac as d/c plan is TBD at this time and it appears pt will need post acute placement (rehab vs SNF) at this time.     Barriers to Discharge: Pending placement, workman's comp case.     Plan: As above, pending rehab consult.

## 2019-06-27 ENCOUNTER — APPOINTMENT (OUTPATIENT)
Dept: RADIOLOGY | Facility: MEDICAL CENTER | Age: 64
DRG: 853 | End: 2019-06-27
Attending: INTERNAL MEDICINE
Payer: COMMERCIAL

## 2019-06-27 PROBLEM — G93.40 ACUTE ENCEPHALOPATHY: Status: ACTIVE | Noted: 2019-06-27

## 2019-06-27 PROBLEM — E16.2 HYPOGLYCEMIA: Status: ACTIVE | Noted: 2019-06-27

## 2019-06-27 LAB
ALBUMIN SERPL BCP-MCNC: 2.5 G/DL (ref 3.2–4.9)
ALBUMIN/GLOB SERPL: 0.6 G/DL
ALP SERPL-CCNC: 159 U/L (ref 30–99)
ALT SERPL-CCNC: 18 U/L (ref 2–50)
ANION GAP SERPL CALC-SCNC: 6 MMOL/L (ref 0–11.9)
APTT PPP: 35.1 SEC (ref 24.7–36)
AST SERPL-CCNC: 20 U/L (ref 12–45)
BILIRUB SERPL-MCNC: 0.3 MG/DL (ref 0.1–1.5)
BUN SERPL-MCNC: 37 MG/DL (ref 8–22)
CALCIUM SERPL-MCNC: 8.4 MG/DL (ref 8.5–10.5)
CHLORIDE SERPL-SCNC: 105 MMOL/L (ref 96–112)
CHLORIDE UR-SCNC: 113 MMOL/L
CO2 SERPL-SCNC: 26 MMOL/L (ref 20–33)
CREAT SERPL-MCNC: 1.69 MG/DL (ref 0.5–1.4)
CREAT UR-MCNC: 56.7 MG/DL
EOSINOPHIL URNS QL MICRO: NORMAL
GLOBULIN SER CALC-MCNC: 4.3 G/DL (ref 1.9–3.5)
GLUCOSE BLD-MCNC: 113 MG/DL (ref 65–99)
GLUCOSE BLD-MCNC: 154 MG/DL (ref 65–99)
GLUCOSE BLD-MCNC: 48 MG/DL (ref 65–99)
GLUCOSE BLD-MCNC: 51 MG/DL (ref 65–99)
GLUCOSE BLD-MCNC: 76 MG/DL (ref 65–99)
GLUCOSE BLD-MCNC: 77 MG/DL (ref 65–99)
GLUCOSE BLD-MCNC: 87 MG/DL (ref 65–99)
GLUCOSE BLD-MCNC: 92 MG/DL (ref 65–99)
GLUCOSE SERPL-MCNC: 95 MG/DL (ref 65–99)
INR PPP: 1.12 (ref 0.87–1.13)
POTASSIUM SERPL-SCNC: 4.6 MMOL/L (ref 3.6–5.5)
POTASSIUM UR-SCNC: 22.6 MMOL/L
PROT SERPL-MCNC: 6.8 G/DL (ref 6–8.2)
PROT UR-MCNC: 174.7 MG/DL (ref 0–15)
PROTHROMBIN TIME: 14.7 SEC (ref 12–14.6)
SODIUM SERPL-SCNC: 137 MMOL/L (ref 135–145)
SODIUM UR-SCNC: 91 MMOL/L

## 2019-06-27 PROCEDURE — 70450 CT HEAD/BRAIN W/O DYE: CPT

## 2019-06-27 PROCEDURE — 770006 HCHG ROOM/CARE - MED/SURG/GYN SEMI*

## 2019-06-27 PROCEDURE — 82570 ASSAY OF URINE CREATININE: CPT

## 2019-06-27 PROCEDURE — 82436 ASSAY OF URINE CHLORIDE: CPT

## 2019-06-27 PROCEDURE — 85730 THROMBOPLASTIN TIME PARTIAL: CPT

## 2019-06-27 PROCEDURE — 700111 HCHG RX REV CODE 636 W/ 250 OVERRIDE (IP): Performed by: INTERNAL MEDICINE

## 2019-06-27 PROCEDURE — 36415 COLL VENOUS BLD VENIPUNCTURE: CPT

## 2019-06-27 PROCEDURE — 85610 PROTHROMBIN TIME: CPT

## 2019-06-27 PROCEDURE — 700105 HCHG RX REV CODE 258

## 2019-06-27 PROCEDURE — 71045 X-RAY EXAM CHEST 1 VIEW: CPT

## 2019-06-27 PROCEDURE — 84156 ASSAY OF PROTEIN URINE: CPT

## 2019-06-27 PROCEDURE — 700102 HCHG RX REV CODE 250 W/ 637 OVERRIDE(OP): Performed by: INTERNAL MEDICINE

## 2019-06-27 PROCEDURE — 84300 ASSAY OF URINE SODIUM: CPT

## 2019-06-27 PROCEDURE — 80053 COMPREHEN METABOLIC PANEL: CPT

## 2019-06-27 PROCEDURE — 700105 HCHG RX REV CODE 258: Performed by: INTERNAL MEDICINE

## 2019-06-27 PROCEDURE — A9270 NON-COVERED ITEM OR SERVICE: HCPCS | Performed by: INTERNAL MEDICINE

## 2019-06-27 PROCEDURE — 99233 SBSQ HOSP IP/OBS HIGH 50: CPT | Performed by: INTERNAL MEDICINE

## 2019-06-27 PROCEDURE — 84133 ASSAY OF URINE POTASSIUM: CPT

## 2019-06-27 PROCEDURE — A9270 NON-COVERED ITEM OR SERVICE: HCPCS | Performed by: HOSPITALIST

## 2019-06-27 PROCEDURE — 700102 HCHG RX REV CODE 250 W/ 637 OVERRIDE(OP): Performed by: HOSPITALIST

## 2019-06-27 PROCEDURE — 89190 NASAL SMEAR FOR EOSINOPHILS: CPT

## 2019-06-27 PROCEDURE — 82962 GLUCOSE BLOOD TEST: CPT | Mod: 91

## 2019-06-27 RX ORDER — DEXTROSE MONOHYDRATE 100 MG/ML
INJECTION, SOLUTION INTRAVENOUS
Status: COMPLETED
Start: 2019-06-27 | End: 2019-06-27

## 2019-06-27 RX ORDER — DEXTROSE MONOHYDRATE 100 MG/ML
INJECTION, SOLUTION INTRAVENOUS CONTINUOUS
Status: DISCONTINUED | OUTPATIENT
Start: 2019-06-27 | End: 2019-06-27

## 2019-06-27 RX ORDER — DEXTROSE MONOHYDRATE 50 MG/ML
INJECTION, SOLUTION INTRAVENOUS CONTINUOUS
Status: DISCONTINUED | OUTPATIENT
Start: 2019-06-27 | End: 2019-06-28

## 2019-06-27 RX ORDER — OXYCODONE HYDROCHLORIDE 5 MG/1
5 TABLET ORAL EVERY 4 HOURS PRN
Status: DISCONTINUED | OUTPATIENT
Start: 2019-06-27 | End: 2019-06-27

## 2019-06-27 RX ADMIN — PREGABALIN 300 MG: 150 CAPSULE ORAL at 16:59

## 2019-06-27 RX ADMIN — DEXTROSE MONOHYDRATE 250 ML: 100 INJECTION, SOLUTION INTRAVENOUS at 09:00

## 2019-06-27 RX ADMIN — DEXTROSE MONOHYDRATE 250 ML: 100 INJECTION, SOLUTION INTRAVENOUS at 13:15

## 2019-06-27 RX ADMIN — METOPROLOL TARTRATE 12.5 MG: 25 TABLET, FILM COATED ORAL at 05:30

## 2019-06-27 RX ADMIN — PREGABALIN 300 MG: 150 CAPSULE ORAL at 05:30

## 2019-06-27 RX ADMIN — SODIUM CHLORIDE: 9 INJECTION, SOLUTION INTRAVENOUS at 02:47

## 2019-06-27 RX ADMIN — DEXTROSE MONOHYDRATE: 50 INJECTION, SOLUTION INTRAVENOUS at 14:54

## 2019-06-27 RX ADMIN — SENNOSIDES,DOCUSATE SODIUM 2 TABLET: 8.6; 5 TABLET, FILM COATED ORAL at 05:30

## 2019-06-27 RX ADMIN — METOPROLOL TARTRATE 12.5 MG: 25 TABLET, FILM COATED ORAL at 16:59

## 2019-06-27 RX ADMIN — ENOXAPARIN SODIUM 40 MG: 100 INJECTION SUBCUTANEOUS at 05:29

## 2019-06-27 RX ADMIN — ASPIRIN 81 MG 81 MG: 81 TABLET ORAL at 05:30

## 2019-06-27 RX ADMIN — DEXTROSE MONOHYDRATE 250 ML: 100 INJECTION, SOLUTION INTRAVENOUS at 08:48

## 2019-06-27 NOTE — PROGRESS NOTES
FSBS 48  MD Armond updated. MD at bedside to assess.   D10 infusion ordered and started. Will recheck FSBS again in 15 mins   VS taken.

## 2019-06-27 NOTE — THERAPY
Attempted therapy follow up session. After discussion with RN, hold for today. Pt had a rapid response this am and not appropriate for therapy at this time. Will follow up as appropriate and able.

## 2019-06-27 NOTE — THERAPY
"Occupational Therapy Treatment completed with focus on ADLs and ADL transfers.  Functional Status:  Max A with LB dressing unable to stand EOB, severly limited by L UE pn  Plan of Care: Will benefit from Occupational Therapy 4 times per week  Discharge Recommendations:  Equipment Will Continue to Assess for Equipment Needs. Post-acute therapy Discharge to a transitional care facility for continued therapy services.    See \"Rehab Therapy-Acute\" Patient Summary Report for complete documentation.   "

## 2019-06-27 NOTE — CODE DOCUMENTATION
Dr. Dickens to bedside to evaluate pt. Dr. Dickens canceled code stroke. Pt's FSBS 51; dextrose to be given then pt to  Get CT head w/o only. Cancel CTA/prefusion study per .

## 2019-06-27 NOTE — PROGRESS NOTES
Patient remains with lethargy has yet to eat today, glucose was added to IV fluids, all insulin being held for now.  Methadone remains on hold as per Dr. Leahy.

## 2019-06-27 NOTE — PROGRESS NOTES
Patient called stroke code for increasing confusion since yesterday, and the patient was noted with some left facial drop and R side difficulties.  His glucose is 51.  He has been on narcotics.  The patient at my examination is lethargic,   Disoriented, but with no focal neurological deficits.  NIHSS score:  1a. LOC: 1  1b. LOC Questions: 1  1c. LOC Commands: 1  2. Best Gaze: 0  3. Visual Fields: 0  4. Facial Paresis: 0  5a. Motor arm left:1   5b. Motor arm right: 1  6a. Motor leg left: 1  6b. Motor leg right: 1  7. Sensory:0  8. Best Language:0   9. Limb Ataxia: 0  10. Dysarthria: 1  11. Extinction/Inattention: 0     Total NIHSS: 8.     Decision NOT to give alteplase: This is not acute stroke , this is encephalopathy, with no focal changes.  First give glucose to control hypoglycemia, then take to CT head, but not CT perfusion or CTA need it.  Minimize narcotics , consider EEG

## 2019-06-27 NOTE — PROGRESS NOTES
On call hospitalist paged for pts bladder scan of greater that 999. Pt has respiratory rate of 5-7. Pt has tremors that were not noted before. Pt also sedated.pt has slurred speech unknown if that is the baseline.   Alerted MD to all of those. Received order for dominguez and narcan if pts mentation worsens.

## 2019-06-27 NOTE — PROGRESS NOTES
Patients narcan was held this morning for low RR RN recorded rate of 5-7, now noted to between 9-10.  Patient had indwelling urinary catheter placed overnight for retention greater than 999 ml.   Patient is drowsy at this time Aox4 however noted to twitch on and off seems to favor right side discussed with Dr. Leahy. He will see the patient.

## 2019-06-27 NOTE — PROGRESS NOTES
2 RN skin check complete with  Devices in place dominguez catheter. Left knee immobilizer left leg with amputation dressing (left in place) and moon boot on RLE  Skin assessed under devices yes.  Confirmed pressure ulcers found on none.  New potential pressure ulcers noted on none.   The following interventions in place.  Turn and position q2 hours and prn, pillow support,  Waffle mattress

## 2019-06-27 NOTE — PROGRESS NOTES
See at bedside with Dr. Leahy agrees patient is confused, plan for CT head now. Discussed with Charge ADKOTA Diaz, RRT was called.

## 2019-06-27 NOTE — CARE PLAN
Problem: Infection  Goal: Will remain free from infection  Outcome: PROGRESSING SLOWER THAN EXPECTED  Patient had elevated temperature earlier.     Problem: Pain Management  Goal: Pain level will decrease to patient's comfort goal  Outcome: PROGRESSING SLOWER THAN EXPECTED  Patient was lethargic and held methadone.     Problem: Respiratory:  Goal: Respiratory status will improve  Outcome: PROGRESSING SLOWER THAN EXPECTED  Remains on 5L supplemental O2.

## 2019-06-27 NOTE — PROGRESS NOTES
[facility] Medicine Daily Progress Note    Date of Service  6/27/2019    Chief Complaint  63 y.o. male admitted 6/15/2019 with L shoulder pain and L heel ulcer.    Hospital Course    Hx TIIDM, COPD on 3 L HOT.  Presenting to [place] with multiple complaints including shoulder pain following a fall and a chronic heel ulcer.  Admitted to CICU with Dx of sepsis from diabetic foot ulcer, Type II NSTEMI with Trop of 1.09.  Intubated on admission, extubated 6/17.    Interval Problem Update  POD 3 Left BKA. He had episode of confusion last night with decrease respiratory rate likely related to narcotics. I saw and examined him today. I ordered CT head stat. His vitals were stable. Sating well. He couldn't follow command well.     Rapid response was called this am due to his worsening mentation and possible slurred speech and facial droop. Neurology was consulted too who recommended to do CT head only and do not need CTA head and neck.    Consultants/Specialty  LPS  Pulmonology/Critical Care    Code Status  full    Disposition  SNF referral placed    Review of Systems  Review of Systems   Unable to perform ROS: Mental acuity   HENT: Negative for congestion.    Skin:        R heel wound         Physical Exam  Temp:  [36.3 °C (97.3 °F)-37.4 °C (99.3 °F)] 37.4 °C (99.3 °F)  Pulse:  [71-95] 95  Resp:  [5-16] 7  BP: ()/(46-65) 132/65  SpO2:  [90 %-94 %] 93 %    Physical Exam   Constitutional: He is oriented to person, place, and time. No distress.   HENT:   Head: Normocephalic and atraumatic.   Eyes: Pupils are equal, round, and reactive to light. Conjunctivae and EOM are normal.   Neck: Normal range of motion.   Cardiovascular: Normal rate and regular rhythm.    Pulmonary/Chest: Effort normal and breath sounds normal.   Abdominal: Soft. Bowel sounds are normal. He exhibits no distension.   Musculoskeletal: He exhibits edema (trace). He exhibits no tenderness.   Wound vac in place L heel   Neurological: He is alert and  oriented to person, place, and time.   Skin: Skin is warm and dry. He is not diaphoretic. No erythema.   Psychiatric: His speech is normal.   Vitals reviewed.  Patient seen and examined today on 6/21, unchanged from 6/20.    Fluids    Intake/Output Summary (Last 24 hours) at 06/27/19 0729  Last data filed at 06/27/19 0452   Gross per 24 hour   Intake              525 ml   Output             2260 ml   Net            -1735 ml       Laboratory  Recent Labs      06/26/19   0358   WBC  10.8   RBC  3.56*   HEMOGLOBIN  10.0*   HEMATOCRIT  31.5*   MCV  90.2   MCH  27.5   MCHC  30.5*   RDW  51.6*   PLATELETCT  280   MPV  9.7     Recent Labs      06/26/19   0358  06/27/19   0023   SODIUM  138  137   POTASSIUM  4.4  4.6   CHLORIDE  101  105   CO2  28  26   GLUCOSE  189*  95   BUN  36*  37*   CREATININE  1.50*  1.69*   CALCIUM  8.2*  8.4*                   Imaging  EC-ECHOCARDIOGRAM COMPLETE W/O CONT   Final Result      DX-CHEST-PORTABLE (1 VIEW)   Final Result         1.  Pulmonary edema and/or infiltrates are identified, which are somewhat decreased since the prior exam.      DX-ANKLE 3+ VIEWS LEFT   Final Result      Soft tissue swelling. No acute fracture identified.      DX-CHEST-PORTABLE (1 VIEW)   Final Result      1.  Improved aeration of the RIGHT lung base   2.  Otherwise no interval change in BILATERAL atelectasis or airspace disease      DX-ABDOMEN FOR TUBE PLACEMENT   Final Result      Enteric tube projects over the stomach.      DX-CHEST-LIMITED (1 VIEW)   Final Result      New right IJ line tip overlies the SVC and no pneumothorax is identified      New right basilar opacity effaces the diaphragm and could be from atelectasis or consolidation      DX-CHEST-PORTABLE (1 VIEW)   Final Result      Endotracheal tube projects at the level of the aortic arch.      Increased perihilar and bibasilar opacities may represent a combination of edema, atelectasis, pneumonia.         DX-CHEST-PORTABLE (1 VIEW)   Final Result       Increasing reticular and hazy opacity is compatible with edema      CT-SHOULDER W/O PLUS RECONS LEFT   Final Result         Prior resection of left humeral head. The residual head neck junction appears well corticated and could relate to chronic resection/deformity. There is also deformity and remodeling of the glenoid with well-corticated margin, suggesting of chronic change    as well.      No acute fracture.      There is no articulation between the proximal humerus and glenoid. The glenohumeral joint is replaced by loculated soft tissue or dense fluid, likely chronic change. The fluid is slightly less in 2018 CT.      Infection is considered less likely given the osseous cortex adjacent to the fluid is well corticated and demonstrates no rarefaction or destruction.      US-FOREIGN FILM ULTRASOUND   Final Result      OUTSIDE IMAGES-CT HEAD   Final Result      [id number] FILM X-RAY   Final Result      OUTSIDE IMAGES-DX LOWER EXTREMITY, LEFT   Final Result      [id number] FILM X-RAY   Final Result      OUTSIDE IMAGES-DX CHEST   Final Result      OUTSIDE IMAGES-DX UPPER EXTREMITY, LEFT   Final Result           Assessment/Plan  * Acute on chronic respiratory failure with hypoxia (HCC)- (present on admission)   Assessment & Plan    Extubated 6/17  - initially on 1-2 L and now on 3L   - worsening  - continue to wean O2 to home regimen  - RT protocol  - increase mobilization, IS  - abx x5 days  - I ordered CXR     Acute encephalopathy   Assessment & Plan    Related to narcotics  DC IV pain meds  Hold narcotics for now until his mentation improve  He has history of chronic pain on methadone  CT head pending       OZ (acute kidney injury) (HCC)- (present on admission)   Assessment & Plan    - Cr 1.69 today  - worsening  - renally dose medications  - avoid nephrotoxic agents  - trend BMP in am  - I started him on IVF       Diabetic foot ulcer (HCC)- (present on admission)   Assessment & Plan    Infected  left heel diabetic foot ulcer with group D enterococcus. No evidence of osteomyelitis  - s/p debridement on 6/18  - POD 3 left BKA  - On IV Zosyn, anticipate 2 weeks abx  - pain control  - ID on     Septic shock due to undetermined organism (HCC)- (present on admission)   Assessment & Plan    This is sepsis (without associated acute organ dysfunction).   source is infected diabetic ulcer and pneumonia.   Improved  On zosyn IV     Pneumonia due to infectious organism- (present on admission)   Assessment & Plan    On Zosyn     Cardiomyopathy (HCC)- (present on admission)   Assessment & Plan    Echo LVEF 40%. No exacerbation and is euvolemic.  - continue metop and lisinopril with holding parameters     DM (diabetes mellitus), type 2, uncontrolled (CMS-HCC)- (present on admission)   Assessment & Plan    Uncontrolled, with hyperglycemia. A1C elevated at 12.2%. Blood sugars much improved over the last 24 hours  - lantus 44U BID  - resistant sliding scale and 5U TID with meals  - hypoglycemia protocol and DM diet  - DM education     Essential hypertension- (present on admission)   Assessment & Plan    Normo to hypertensive.  - continue metop, lisinopril  - labetalol IV PRN     Hypoglycemia   Assessment & Plan    BS 51 this am  Gave him D10  Monitor closely  Hypoglycemic protocol     Leucocytosis   Assessment & Plan      Improving slowly  Follow cbc in am     Left shoulder pain- (present on admission)   Assessment & Plan    No acute fracture noted on CT  Pain control with Tylenol, oxycodone and IV morphine.  Monitor respiratory status closely  PT OT  If persistent pain we will consider consulting his orthopedic surgeon      Tobacco dependence- (present on admission)   Assessment & Plan    Cont to encourage cessation     Hypokalemia- (present on admission)   Assessment & Plan    Improved  monitor and replete as needed        Patient is critically ill with acute encephalopathy, worsening respiratory failure and  renal failure  The vital organ system that is affected is CNS, respiration and renal  If untreated there is a high chance of deterioration into cardiopulmonary failure, renal failure and eventually death.   The critical care that I am providing today is managing rapid response team, ordering medications, labs and imaging stat, reviewing medications, discontinuing narcotics, consulting neurologist  The critical that has been undertaken is medically complex.   There has been no overlap in critical care time.   Critical Care Time not including procedures: 40 minutes  VTE prophylaxis: lovenox

## 2019-06-27 NOTE — DISCHARGE PLANNING
Would welcome updated PT/OT evals to submit to insurance.  Has declined some [place].  Insurance likely will not authorize d/t lack of ability to participate/tolerate IRF level.  If [facility] is able to participate with PT/OT then I will submit to insurance.  Msg placed to [employer].

## 2019-06-27 NOTE — CARE PLAN
Problem: Pain Management  Goal: Pain level will decrease to patient's comfort goal  Outcome: PROGRESSING AS EXPECTED  Pt has no complained of pain during this shift. Pt is sedated.     Problem: Respiratory:  Goal: Respiratory status will improve  Outcome: PROGRESSING SLOWER THAN EXPECTED  Pt has a respiratory rate of 5-7. Pt is on 5 L NC.pt can cough and deep breath. Pt unable to follow commands for IS.

## 2019-06-27 NOTE — PROGRESS NOTES
Patient was noted to have hypoglycemia was treated with dextrose.    Will repeat glucose after.    Patient was seen by neurology at bedside, CT was changed from stat to routine.    Dr. Leahy was updated with findings.

## 2019-06-28 ENCOUNTER — APPOINTMENT (OUTPATIENT)
Dept: WOUND CARE | Facility: MEDICAL CENTER | Age: 64
End: 2019-06-28
Attending: NURSE PRACTITIONER
Payer: COMMERCIAL

## 2019-06-28 LAB
ALBUMIN SERPL BCP-MCNC: 2.4 G/DL (ref 3.2–4.9)
ALBUMIN/GLOB SERPL: 0.6 G/DL
ALP SERPL-CCNC: 167 U/L (ref 30–99)
ALT SERPL-CCNC: 17 U/L (ref 2–50)
ANION GAP SERPL CALC-SCNC: 6 MMOL/L (ref 0–11.9)
AST SERPL-CCNC: 32 U/L (ref 12–45)
BILIRUB SERPL-MCNC: 0.4 MG/DL (ref 0.1–1.5)
BUN SERPL-MCNC: 20 MG/DL (ref 8–22)
CALCIUM SERPL-MCNC: 8.3 MG/DL (ref 8.5–10.5)
CHLORIDE SERPL-SCNC: 104 MMOL/L (ref 96–112)
CO2 SERPL-SCNC: 28 MMOL/L (ref 20–33)
CREAT SERPL-MCNC: 1.19 MG/DL (ref 0.5–1.4)
GLOBULIN SER CALC-MCNC: 4.1 G/DL (ref 1.9–3.5)
GLUCOSE BLD-MCNC: 111 MG/DL (ref 65–99)
GLUCOSE BLD-MCNC: 133 MG/DL (ref 65–99)
GLUCOSE BLD-MCNC: 134 MG/DL (ref 65–99)
GLUCOSE BLD-MCNC: 145 MG/DL (ref 65–99)
GLUCOSE BLD-MCNC: 164 MG/DL (ref 65–99)
GLUCOSE BLD-MCNC: 67 MG/DL (ref 65–99)
GLUCOSE BLD-MCNC: 71 MG/DL (ref 65–99)
GLUCOSE BLD-MCNC: 90 MG/DL (ref 65–99)
GLUCOSE SERPL-MCNC: 138 MG/DL (ref 65–99)
POTASSIUM SERPL-SCNC: 4.3 MMOL/L (ref 3.6–5.5)
PROT SERPL-MCNC: 6.5 G/DL (ref 6–8.2)
SODIUM SERPL-SCNC: 138 MMOL/L (ref 135–145)

## 2019-06-28 PROCEDURE — 700102 HCHG RX REV CODE 250 W/ 637 OVERRIDE(OP): Performed by: INTERNAL MEDICINE

## 2019-06-28 PROCEDURE — 700105 HCHG RX REV CODE 258: Performed by: INTERNAL MEDICINE

## 2019-06-28 PROCEDURE — 99233 SBSQ HOSP IP/OBS HIGH 50: CPT | Performed by: INTERNAL MEDICINE

## 2019-06-28 PROCEDURE — 36415 COLL VENOUS BLD VENIPUNCTURE: CPT

## 2019-06-28 PROCEDURE — 770006 HCHG ROOM/CARE - MED/SURG/GYN SEMI*

## 2019-06-28 PROCEDURE — 82962 GLUCOSE BLOOD TEST: CPT

## 2019-06-28 PROCEDURE — A9270 NON-COVERED ITEM OR SERVICE: HCPCS | Performed by: INTERNAL MEDICINE

## 2019-06-28 PROCEDURE — 80053 COMPREHEN METABOLIC PANEL: CPT

## 2019-06-28 PROCEDURE — 700111 HCHG RX REV CODE 636 W/ 250 OVERRIDE (IP): Performed by: INTERNAL MEDICINE

## 2019-06-28 PROCEDURE — 97530 THERAPEUTIC ACTIVITIES: CPT

## 2019-06-28 PROCEDURE — 700102 HCHG RX REV CODE 250 W/ 637 OVERRIDE(OP): Performed by: HOSPITALIST

## 2019-06-28 PROCEDURE — A9270 NON-COVERED ITEM OR SERVICE: HCPCS | Performed by: HOSPITALIST

## 2019-06-28 RX ORDER — TRAMADOL HYDROCHLORIDE 50 MG/1
50 TABLET ORAL EVERY 6 HOURS PRN
Status: DISCONTINUED | OUTPATIENT
Start: 2019-06-28 | End: 2019-07-02

## 2019-06-28 RX ADMIN — SENNOSIDES,DOCUSATE SODIUM 2 TABLET: 8.6; 5 TABLET, FILM COATED ORAL at 17:57

## 2019-06-28 RX ADMIN — ASPIRIN 81 MG 81 MG: 81 TABLET ORAL at 04:17

## 2019-06-28 RX ADMIN — TRAMADOL HYDROCHLORIDE 50 MG: 50 TABLET, FILM COATED ORAL at 17:57

## 2019-06-28 RX ADMIN — DEXTROSE MONOHYDRATE: 50 INJECTION, SOLUTION INTRAVENOUS at 04:56

## 2019-06-28 RX ADMIN — SENNOSIDES,DOCUSATE SODIUM 2 TABLET: 8.6; 5 TABLET, FILM COATED ORAL at 04:17

## 2019-06-28 RX ADMIN — METOPROLOL TARTRATE 12.5 MG: 25 TABLET, FILM COATED ORAL at 04:17

## 2019-06-28 RX ADMIN — METOPROLOL TARTRATE 12.5 MG: 25 TABLET, FILM COATED ORAL at 17:58

## 2019-06-28 RX ADMIN — ENOXAPARIN SODIUM 40 MG: 100 INJECTION SUBCUTANEOUS at 04:16

## 2019-06-28 RX ADMIN — PREGABALIN 300 MG: 150 CAPSULE ORAL at 17:57

## 2019-06-28 RX ADMIN — DEXTROSE MONOHYDRATE 250 ML: 100 INJECTION, SOLUTION INTRAVENOUS at 03:29

## 2019-06-28 RX ADMIN — PREGABALIN 300 MG: 150 CAPSULE ORAL at 04:17

## 2019-06-28 ASSESSMENT — ENCOUNTER SYMPTOMS
SPUTUM PRODUCTION: 0
FEVER: 0
COUGH: 0
MYALGIAS: 0
BLURRED VISION: 0
EYE DISCHARGE: 0
PALPITATIONS: 0
FOCAL WEAKNESS: 0
HEADACHES: 0
ORTHOPNEA: 0
DIARRHEA: 0
BACK PAIN: 0
VOMITING: 0
STRIDOR: 0
HEARTBURN: 0
SEIZURES: 0
WEIGHT LOSS: 0
CHILLS: 0
SHORTNESS OF BREATH: 0
ABDOMINAL PAIN: 0
NECK PAIN: 0
EYE PAIN: 0
DIZZINESS: 0
NAUSEA: 0
EYE REDNESS: 0

## 2019-06-28 ASSESSMENT — COGNITIVE AND FUNCTIONAL STATUS - GENERAL
SUGGESTED CMS G CODE MODIFIER MOBILITY: CL
STANDING UP FROM CHAIR USING ARMS: A LOT
MOBILITY SCORE: 11
WALKING IN HOSPITAL ROOM: TOTAL
MOVING FROM LYING ON BACK TO SITTING ON SIDE OF FLAT BED: UNABLE
MOVING TO AND FROM BED TO CHAIR: A LITTLE
TURNING FROM BACK TO SIDE WHILE IN FLAT BAD: A LITTLE
CLIMB 3 TO 5 STEPS WITH RAILING: TOTAL

## 2019-06-28 ASSESSMENT — GAIT ASSESSMENTS: GAIT LEVEL OF ASSIST: UNABLE TO PARTICIPATE

## 2019-06-28 NOTE — PROGRESS NOTES
[facility] Medicine Daily Progress Note    Date of Service  6/28/2019    Chief Complaint  63 y.o. male admitted 6/15/2019 with L shoulder pain and L heel ulcer.    Hospital Course    Hx TIIDM, COPD on 3 L HOT.  Presenting to [place] with multiple complaints including shoulder pain following a fall and a chronic heel ulcer.  Admitted to CICU with Dx of sepsis from diabetic foot ulcer, Type II NSTEMI with Trop of 1.09.  Intubated on admission, extubated 6/17.    Interval Problem Update  6/27 POD 3 Left BKA. He had episode of confusion last night with decrease respiratory rate likely related to narcotics. I saw and examined him today. I ordered CT head stat. His vitals were stable. Sating well. He couldn't follow command well.     Rapid response was called this am due to his worsening mentation and possible slurred speech and facial droop. Neurology was consulted too who recommended to do CT head only and do not need CTA head and neck.    6/28: RADHA 4 left BKA. His mentation is improving today. Complaint about pain. I updated him about his status including side effects of narcotics. PT/OT.    Consultants/Specialty  LPS  Pulmonology/Critical Care    Code Status  full    Disposition  SNF referral placed    Review of Systems  Review of Systems   Constitutional: Negative for chills, fever and weight loss.   HENT: Negative for congestion and nosebleeds.    Eyes: Negative for blurred vision, pain, discharge and redness.   Respiratory: Negative for cough, sputum production, shortness of breath and stridor.    Cardiovascular: Negative for chest pain, palpitations and orthopnea.   Gastrointestinal: Negative for abdominal pain, diarrhea, heartburn, nausea and vomiting.   Genitourinary: Negative for dysuria, frequency and urgency.   Musculoskeletal: Positive for joint pain. Negative for back pain, myalgias and neck pain.   Skin: Negative for itching and rash.        R heel wound    Neurological: Negative for dizziness, focal  weakness, seizures and headaches.        Physical Exam  Temp:  [36.9 °C (98.5 °F)-38.2 °C (100.7 °F)] 36.9 °C (98.5 °F)  Pulse:  [] 93  Resp:  [5-20] 12  BP: (136-156)/(60-83) 141/75  SpO2:  [91 %-94 %] 92 %    Physical Exam   Constitutional: He is oriented to person, place, and time. No distress.   HENT:   Head: Normocephalic and atraumatic.   Eyes: Pupils are equal, round, and reactive to light. Conjunctivae and EOM are normal.   Neck: Normal range of motion. Neck supple. No thyromegaly present.   Cardiovascular: Normal rate and regular rhythm.    Pulmonary/Chest: Effort normal and breath sounds normal.   Abdominal: Soft. Bowel sounds are normal. He exhibits no distension. There is no tenderness.   Musculoskeletal: He exhibits edema (trace). He exhibits no tenderness.   Wound vac in place L heel   Neurological: He is alert and oriented to person, place, and time.   Skin: Skin is warm and dry. He is not diaphoretic. No erythema.   Psychiatric: His speech is normal.   Nursing note and vitals reviewed.  Patient seen and examined today on 6/21, unchanged from 6/20.    Fluids    Intake/Output Summary (Last 24 hours) at 06/28/19 0757  Last data filed at 06/28/19 0300   Gross per 24 hour   Intake             1800 ml   Output             3200 ml   Net            -1400 ml       Laboratory  Recent Labs      06/26/19   0358   WBC  10.8   RBC  3.56*   HEMOGLOBIN  10.0*   HEMATOCRIT  31.5*   MCV  90.2   MCH  27.5   MCHC  30.5*   RDW  51.6*   PLATELETCT  280   MPV  9.7     Recent Labs      06/26/19   0358  06/27/19   0023  06/28/19   0347   SODIUM  138  137  138   POTASSIUM  4.4  4.6  4.3   CHLORIDE  101  105  104   CO2  28  26  28   GLUCOSE  189*  95  138*   BUN  36*  37*  20   CREATININE  1.50*  1.69*  1.19   CALCIUM  8.2*  8.4*  8.3*     Recent Labs      06/27/19   0935   APTT  35.1   INR  1.12               Imaging  CT-HEAD W/O   Final Result      1.  No evidence of acute territorial infarct, intracranial hemorrhage  or mass lesion.   2.  Mild diffuse cerebral substance loss.   3.  Mild microangiopathic ischemic change versus demyelination or gliosis.      DX-CHEST-PORTABLE (1 VIEW)   Final Result      1.  Increased moderate hazy bilateral parenchymal opacities which could be seen in the setting edema or infection.   2.  Interval removal of a right-sided central venous catheter.      EC-ECHOCARDIOGRAM COMPLETE W/O CONT   Final Result      DX-CHEST-PORTABLE (1 VIEW)   Final Result         1.  Pulmonary edema and/or infiltrates are identified, which are somewhat decreased since the prior exam.      DX-ANKLE 3+ VIEWS LEFT   Final Result      Soft tissue swelling. No acute fracture identified.      DX-CHEST-PORTABLE (1 VIEW)   Final Result      1.  Improved aeration of the RIGHT lung base   2.  Otherwise no interval change in BILATERAL atelectasis or airspace disease      DX-ABDOMEN FOR TUBE PLACEMENT   Final Result      Enteric tube projects over the stomach.      DX-CHEST-LIMITED (1 VIEW)   Final Result      New right IJ line tip overlies the SVC and no pneumothorax is identified      New right basilar opacity effaces the diaphragm and could be from atelectasis or consolidation      DX-CHEST-PORTABLE (1 VIEW)   Final Result      Endotracheal tube projects at the level of the aortic arch.      Increased perihilar and bibasilar opacities may represent a combination of edema, atelectasis, pneumonia.         DX-CHEST-PORTABLE (1 VIEW)   Final Result      Increasing reticular and hazy opacity is compatible with edema      CT-SHOULDER W/O PLUS RECONS LEFT   Final Result         Prior resection of left humeral head. The residual head neck junction appears well corticated and could relate to chronic resection/deformity. There is also deformity and remodeling of the glenoid with well-corticated margin, suggesting of chronic change    as well.      No acute fracture.      There is no articulation between the proximal humerus and glenoid.  The glenohumeral joint is replaced by loculated soft tissue or dense fluid, likely chronic change. The fluid is slightly less in 2018 CT.      Infection is considered less likely given the osseous cortex adjacent to the fluid is well corticated and demonstrates no rarefaction or destruction.      US-FOREIGN FILM ULTRASOUND   Final Result      OUTSIDE IMAGES-CT HEAD   Final Result      [id number] FILM X-RAY   Final Result      OUTSIDE IMAGES-DX LOWER EXTREMITY, LEFT   Final Result      [id number] FILM X-RAY   Final Result      OUTSIDE IMAGES-DX CHEST   Final Result      OUTSIDE IMAGES-DX UPPER EXTREMITY, LEFT   Final Result           Assessment/Plan  * Acute on chronic respiratory failure with hypoxia (HCC)- (present on admission)   Assessment & Plan    Extubated 6/17  - initially on 1-2 L and now on 3-4L   - worsening  - continue to wean O2 to home regimen  - RT protocol  - increase mobilization, IS         Acute encephalopathy   Assessment & Plan    Related to narcotics  improving  DC IV pain meds  Hold narcotics for now until his mentation improve  He has history of chronic pain on methadone  CT head no acute findings       OZ (acute kidney injury) (HCC)- (present on admission)   Assessment & Plan    - Cr 1.19 today  - improving  - renally dose medications  - avoid nephrotoxic agents  - trend BMP in am         Diabetic foot ulcer (HCC)- (present on admission)   Assessment & Plan    Infected left heel diabetic foot ulcer with group D enterococcus. No evidence of osteomyelitis  - s/p debridement on 6/18  - POD 4 left BKA  - On IV Zosyn, anticipate 2 weeks abx  - pain control  - ID on     Septic shock due to undetermined organism (HCC)- (present on admission)   Assessment & Plan    This is sepsis (without associated acute organ dysfunction).   source is infected diabetic ulcer and pneumonia.   Improved  On zosyn IV     Pneumonia due to infectious organism- (present on admission)   Assessment & Plan     On Zosyn     Cardiomyopathy (HCC)- (present on admission)   Assessment & Plan    Echo LVEF 40%. No exacerbation and is euvolemic.  - continue metop and lisinopril with holding parameters     DM (diabetes mellitus), type 2, uncontrolled (CMS-HCC)- (present on admission)   Assessment & Plan    Uncontrolled, with hyperglycemia. A1C elevated at 12.2%. Blood sugars much improved over the last 24 hours  - lantus 44U BID  - resistant sliding scale and 5U TID with meals  - hypoglycemia protocol and DM diet  - DM education     Essential hypertension- (present on admission)   Assessment & Plan    Normo to hypertensive.  - continue metop, lisinopril  - labetalol IV PRN     Hypoglycemia   Assessment & Plan    BS 51 this am  To initiate feeding again  Monitor closely  Hypoglycemic protocol     Leucocytosis   Assessment & Plan      Improving slowly  Follow cbc in am     Left shoulder pain- (present on admission)   Assessment & Plan    No acute fracture noted on CT  Pain control with Tylenol, oxycodone and IV morphine.  Monitor respiratory status closely  PT OT  If persistent pain we will consider consulting his orthopedic surgeon      Tobacco dependence- (present on admission)   Assessment & Plan    Cont to encourage cessation     Hypokalemia- (present on admission)   Assessment & Plan    Improved  monitor and replete as needed          VTE prophylaxis: lovenox

## 2019-06-28 NOTE — CARE PLAN
Problem: Infection  Goal: Will remain free from infection  Outcome: PROGRESSING AS EXPECTED  Afebrile today.     Problem: Pain Management  Goal: Pain level will decrease to patient's comfort goal  Outcome: PROGRESSING AS EXPECTED  Pain has mild acceptable pain at this time, holding narcotics and discussed with Dr. Leahy.

## 2019-06-28 NOTE — PROGRESS NOTES
Glucose 76, will endorse to night shift RN to continue q2 glucose checks, patient remains to lethargic for a meal at this time, nods off after a couple sips of water.

## 2019-06-28 NOTE — PROGRESS NOTES
" LIMB PRESERVATION SERVICE      HPI:  62 y/o male with DM, CAD, COPD, MRSA infection. Admitted for DFU, sepsis, shoulder pain, respiratory failure.   LPS involved for L heel ulcer. Pt reports around beginning of May 2019 developed blister after increasing his exercise. He followed up with MD in [place] and ulcer was debrided. He was placed on abx. Instructed to clean foot with betadine.The ulcer worsened with increased pain and edema. He developed nausea and vomiting. Denied fevers, chills, or purulent drainage from foot. He also had GLF about 1 wk ago. He presented to [facility] and was transferred to [facility] for further care. Found to be septic, developed acute respiratory failure and was intubated 6/15, and later extubated 6/17.      Diagnosed with DM in 2007. Checks sugars 4x/day. Averages 160-170. Takes insulin. Denies neuropathy. Seen by CDE. Does not have diabetic shoes. Wears slippers at home. Works at [employer] in [place].    SURGERY DATE: 6/18/19 by Dr. Fraser    PROCEDURE: Excisional debridement of left heel with VAC placement    SURGERY DATE: 6/24/19 by Dr. Schneider    PROCEDURE left BKA    6/20: POD # 2. Patient denies fevers, chills, nausea, vomiting.  Pain controlled.   6/27: Postop day #3 S/P left BKA.  Patient with hypoglycemia.  Code stroke called this a.m.  Patient lethargic but did awake with dressing change.  Hemovac drain previously removed. Seen by [facility]    /83   Pulse (!) 103   Temp 37.4 °C (99.3 °F) (Temporal)   Resp 12   Ht 1.803 m (5' 10.98\")   Wt 96.7 kg (213 lb 3 oz)   SpO2 91%   BMI 29.75 kg/m²     SURGICAL SITE ASSESSMENT:    Pedal Pulses:  +2 to DP R foot, +1 PT to R foot     Foot warm    Left BKA:  LELIA dressing in place and functioning.  Scant old bloody drainage noted on bandage  Applied Kerlix and Tubigrip G followed by knee immobilizer      R plantar foot  4 scattered scabs, callused areas  Heel ulcer   Yellow slough covering 85% of wound bed  Scant " drainage  No erythema or edema noted  Measures: 1 x 0.8 x 0.3 cm    Cleaned with NS and dry gauze.  Applied Aquacel silver, adhesive foam.  Heel float boot replaced      DIABETES MANAGEMENT:  Blood glucose: 76  A1c:   Lab Results   Component Value Date/Time    HBA1C 12.2 (H) 06/16/2019 11:37 AM        Diabetes education: seen patient on 6/19    INFECTION MANAGEMENT:  WBC: 10.8  Wound culture results:   Results     Procedure Component Value Units Date/Time    CULTURE TISSUE W/ GRM STAIN [[id number]]  (Abnormal)  (Susceptibility) Collected:  06/18/19 1340    Order Status:  Completed Specimen:  Tissue Updated:  06/24/19 1300     Significant Indicator POS (POS)     Source TISS     Site LEFT HEEL WOUND     Culture Result - (A)     Gram Stain Result Many WBCs.  Few Gram positive rods.  Many Gram positive cocci.   (A)     Culture Result Staphylococcus aureus  Heavy growth   (A)      Enterococcus faecalis  Moderate growth  Combination therapy with ampicillin, penicillin, or  vancomycin (for susceptible strains) plus an aminoglycoside  is usually indicated for serious enterococcal infections,  such as endocarditis unless high-level resistance to both  gentamicin and streptomycin is documented; such combinations  are predicted to result in synergistic killing of the  Enterococcus.   (A)      Streptococcus constellatus  Heavy growth   (A)    Narrative:       Surgery Specimen    Culture & Susceptibility     ENTEROCOCCUS FAECALIS     Antibiotic Sensitivity Microscan Unit Status    Ampicillin Sensitive <=2 mcg/mL Final    Method: TAIWO    Daptomycin Sensitive 2 mcg/mL Final    Method: TAIWO    Gent Synergy Sensitive <=500 mcg/mL Final    Method: TAIWO    Penicillin Sensitive 2 mcg/mL Final    Method: TAIWO    Vancomycin Sensitive 2 mcg/mL Final    Method: TAIWO              STAPHYLOCOCCUS AUREUS     Antibiotic Sensitivity Microscan Unit Status    Ampicillin/sulbactam Sensitive <=8/4 mcg/mL Final    Method: TAIWO    Clindamycin Sensitive <=0.5  "mcg/mL Final    Method: TAIWO    Daptomycin Sensitive <=0.5 mcg/mL Final    Method: TAIWO    Erythromycin Sensitive <=0.5 mcg/mL Final    Method: TAIWO    Moxifloxacin Sensitive <=0.5 mcg/mL Final    Method: TAIWO    Oxacillin Sensitive <=0.25 mcg/mL Final    Method: TAIWO    Tetracycline Sensitive <=4 mcg/mL Final    Method: TAIWO    Trimeth/Sulfa Sensitive <=0.5/9.5 mcg/mL Final    Method: TAIWO    Vancomycin Sensitive 2 mcg/mL Final    Method: TAIWO                       Anaerobic Culture [[id number]]  (Abnormal) Collected:  06/18/19 1340    Order Status:  Completed Specimen:  Tissue Updated:  06/24/19 1300     Significant Indicator POS (POS)     Source TISS     Site LEFT HEEL WOUND     Culture Result Growth noted after further incubation, see below for  organism identification.   (A)      Prevotella bivia  Heavy growth   (A)    Narrative:       Surgery Specimen    BLOOD CULTURE [[id number]] Collected:  06/15/19 2249    Order Status:  Completed Specimen:  Blood from Peripheral Updated:  06/21/19 0100     Significant Indicator NEG     Source BLD     Site PERIPHERAL     Culture Result No growth after 5 days of incubation.    Narrative:       Per Hospital Policy: Only change Specimen Src: to \"Line\" if  specified by physician order.  Left Hand    BLOOD CULTURE [[id number]] Collected:  06/15/19 2230    Order Status:  Completed Specimen:  Blood from Peripheral Updated:  06/21/19 0100     Significant Indicator NEG     Source BLD     Site PERIPHERAL     Culture Result No growth after 5 days of incubation.    Narrative:       Per Hospital Policy: Only change Specimen Src: to \"Line\" if  specified by physician order.  Right AC                 PLAN:  S/P L heel debridement followed by left BKA  Postop day #3 left BKA  LELIA dressing in place and functioning    Right heel wound with slough, without signs and symptoms of infection    Wound care: LELIA dressing to be removed on 7/1/19 and replaced with ABD pad Kerlix, Tubigrip      Antibiotics: " Completed IV antibiotic course.  ID has signed off    Weight Bearing Status: LLE nonweightbearing, WBAT RLE and offloading shoe    Offloading: Offloading shoe for RLE, knee immobilizer for LLE.  [facility] involved    PT Consult: Involved    Diabetes Education: involved    No further plans to return to the OR at this time    DISCHARGE PLAN:    Disposition: recommend SNF  From [place].     Follow-up: Suture removal 3 weeks postop.  LPS rounds rescheduled for 7/19/2019 for assessment of R foot and L BKA    Diabetic shoe and insert once ulcers has resolved to RLE.           BRETT Varner.P.R.N.    If any questions or concerns, please call [phone]

## 2019-06-28 NOTE — DISCHARGE PLANNING
Agency/Facility Name: [facility]  Spoke To: Admissions  Outcome: Left voicemail to check referral status.     Agency[facility]Facility Name: [facility]  Spoke To: Sharifa  Outcome: New referral sent per Sharifa.

## 2019-06-28 NOTE — THERAPY
"Physical Therapy Treatment completed.   Bed Mobility:  Supine to Sit: Moderate Assist  Transfers: Sit to Stand: Unable to Participate  Gait: Level Of Assist: Unable to Participate      Plan of Care: Will benefit from Physical Therapy 4 times per week  Discharge Recommendations: Equipment: Will Continue to Assess for Equipment Needs. Post-acute therapy Recommend post-acute placement for continued physical therapy services prior to discharge home. Patient can tolerate post-acute therapies at a 5x/week frequency.         See \"Rehab Therapy-Acute\" Patient Summary Report for complete documentation.     Pt pleasant and agreeable to therapy session. Reporting he is feeling okay but in a significant amount of pain today. Pt required modA for trunk to reach EOB. Here he practiced lateral scooting for pre transfer training with CGA for trunk control.Again reviewed mobility options with pt including slideboard into wc due to shoulder injury. Pt unable to trial standing or transfers today due to increased pain. At current level pt will benfeit from post acute placement prior to dc home. Will continue to follow while in house to progress mobility.   "

## 2019-06-29 LAB
GLUCOSE BLD-MCNC: 126 MG/DL (ref 65–99)
GLUCOSE BLD-MCNC: 127 MG/DL (ref 65–99)
GLUCOSE BLD-MCNC: 170 MG/DL (ref 65–99)
GLUCOSE BLD-MCNC: 174 MG/DL (ref 65–99)

## 2019-06-29 PROCEDURE — 700102 HCHG RX REV CODE 250 W/ 637 OVERRIDE(OP): Performed by: INTERNAL MEDICINE

## 2019-06-29 PROCEDURE — 82962 GLUCOSE BLOOD TEST: CPT | Mod: 91

## 2019-06-29 PROCEDURE — 700102 HCHG RX REV CODE 250 W/ 637 OVERRIDE(OP): Performed by: HOSPITALIST

## 2019-06-29 PROCEDURE — 99232 SBSQ HOSP IP/OBS MODERATE 35: CPT | Performed by: INTERNAL MEDICINE

## 2019-06-29 PROCEDURE — 700111 HCHG RX REV CODE 636 W/ 250 OVERRIDE (IP): Performed by: INTERNAL MEDICINE

## 2019-06-29 PROCEDURE — A9270 NON-COVERED ITEM OR SERVICE: HCPCS | Performed by: INTERNAL MEDICINE

## 2019-06-29 PROCEDURE — A9270 NON-COVERED ITEM OR SERVICE: HCPCS | Performed by: HOSPITALIST

## 2019-06-29 PROCEDURE — 770006 HCHG ROOM/CARE - MED/SURG/GYN SEMI*

## 2019-06-29 RX ORDER — METHADONE HYDROCHLORIDE 10 MG/1
20 TABLET ORAL 2 TIMES DAILY
Status: DISCONTINUED | OUTPATIENT
Start: 2019-06-29 | End: 2019-07-10 | Stop reason: HOSPADM

## 2019-06-29 RX ADMIN — PREGABALIN 300 MG: 150 CAPSULE ORAL at 17:33

## 2019-06-29 RX ADMIN — PREGABALIN 300 MG: 150 CAPSULE ORAL at 06:06

## 2019-06-29 RX ADMIN — INSULIN LISPRO 2 UNITS: 100 INJECTION, SOLUTION INTRAVENOUS; SUBCUTANEOUS at 13:34

## 2019-06-29 RX ADMIN — INSULIN LISPRO 2 UNITS: 100 INJECTION, SOLUTION INTRAVENOUS; SUBCUTANEOUS at 17:29

## 2019-06-29 RX ADMIN — METOPROLOL TARTRATE 12.5 MG: 25 TABLET, FILM COATED ORAL at 06:06

## 2019-06-29 RX ADMIN — ENOXAPARIN SODIUM 40 MG: 100 INJECTION SUBCUTANEOUS at 06:06

## 2019-06-29 RX ADMIN — TRAMADOL HYDROCHLORIDE 50 MG: 50 TABLET, FILM COATED ORAL at 20:53

## 2019-06-29 RX ADMIN — TRAMADOL HYDROCHLORIDE 50 MG: 50 TABLET, FILM COATED ORAL at 14:30

## 2019-06-29 RX ADMIN — METOPROLOL TARTRATE 12.5 MG: 25 TABLET, FILM COATED ORAL at 17:34

## 2019-06-29 RX ADMIN — ASPIRIN 81 MG 81 MG: 81 TABLET ORAL at 06:07

## 2019-06-29 RX ADMIN — TRAMADOL HYDROCHLORIDE 50 MG: 50 TABLET, FILM COATED ORAL at 06:12

## 2019-06-29 RX ADMIN — TRAMADOL HYDROCHLORIDE 50 MG: 50 TABLET, FILM COATED ORAL at 00:25

## 2019-06-29 RX ADMIN — METHADONE HYDROCHLORIDE 20 MG: 10 TABLET ORAL at 17:34

## 2019-06-29 ASSESSMENT — ENCOUNTER SYMPTOMS
CHILLS: 0
EYE PAIN: 0
BLURRED VISION: 0
SEIZURES: 0
COUGH: 0
ORTHOPNEA: 0
BACK PAIN: 0
MYALGIAS: 0
FOCAL WEAKNESS: 0
HEARTBURN: 0
VOMITING: 0
SPUTUM PRODUCTION: 0
DIZZINESS: 0
WEIGHT LOSS: 0
FEVER: 0
EYE REDNESS: 0
NAUSEA: 0

## 2019-06-29 NOTE — PROGRESS NOTES
2 RN skin check complete with devan RN.   Devices in place SCD to right call. Knee immobilizer to left residual limb, sx dressing in place with LELIA dressing beneath immobilizer on LLE. Nasal cannula in place.   Skin assessed under devices scd, knee immobilizer and nasal cannula, no skin breakdown noted.  Confirmed pressure ulcers found on none.  New potential pressure ulcers noted on n/a. Wound consult placed n/a.   incontinence associated dermatis found to sacrum- picture taken and LDA opened  The following interventions in place waffle overlay, silicone O2 tubing, perineal care (barrier cream/wipes), pillows in place for support and positioning, q2 turns in place.

## 2019-06-29 NOTE — CARE PLAN
Problem: Knowledge Deficit  Goal: Knowledge of disease process/condition, treatment plan, diagnostic tests, and medications will improve    Intervention: Assess knowledge level of disease process/condition, treatment plan, diagnostic tests, and medications  Pt is a q2 turn.

## 2019-06-29 NOTE — PROGRESS NOTES
Patient with 10/10 pain with methadone on hold, 20 year user.  Was previously lethargic now resolved.  Dr. Leahy started tramadol 50mg q6.  Hold methadone.  Hold all insulins today for frequent hypoglycemia, patient is eating well again.   100% breakfast  50% lunch.

## 2019-06-29 NOTE — CARE PLAN
Problem: Pain Management  Goal: Pain level will decrease to patient's comfort goal  Outcome: PROGRESSING SLOWER THAN EXPECTED  Pt reports 10/10 holding methadone at this time due to recent sedation and respiratory depression     Problem: Respiratory:  Goal: Respiratory status will improve  Outcome: PROGRESSING AS EXPECTED  respirations improved no longer having episodes of bradypnea

## 2019-06-29 NOTE — PROGRESS NOTES
[facility] Medicine Daily Progress Note    Date of Service  6/29/2019    Chief Complaint  63 y.o. male admitted 6/15/2019 with L shoulder pain and L heel ulcer.    Hospital Course    Hx TIIDM, COPD on 3 L HOT.  Presenting to [facility] with multiple complaints including shoulder pain following a fall and a chronic heel ulcer.  Admitted to CICU with Dx of sepsis from diabetic foot ulcer, Type II NSTEMI with Trop of 1.09.  Intubated on admission, extubated 6/17.    Interval Problem Update  6/27 POD 3 Left BKA. He had episode of confusion last night with decrease respiratory rate likely related to narcotics. I saw and examined him today. I ordered CT head stat. His vitals were stable. Sating well. He couldn't follow command well.     Rapid response was called this am due to his worsening mentation and possible slurred speech and facial droop. Neurology was consulted too who recommended to do CT head only and do not need CTA head and neck.    6/28: RADHA 4 left BKA. His mentation is improving today. Complaint about pain. I updated him about his status including side effects of narcotics. PT/OT.    6/29: he is feeling much better today. No more confusion. Complaint about pain at surgical site. Restarted methadone 20 mg bid today. Monitor for narcotics toxicity.  Consultants/Specialty  LPS  Pulmonology/Critical Care    Code Status  full    Disposition  SNF referral placed    Review of Systems  Review of Systems   Constitutional: Negative for chills, fever and weight loss.   HENT: Negative for congestion and nosebleeds.    Eyes: Negative for blurred vision, pain and redness.   Respiratory: Negative for cough and sputum production.    Cardiovascular: Negative for chest pain and orthopnea.   Gastrointestinal: Negative for heartburn, nausea and vomiting.   Genitourinary: Negative for dysuria, frequency and urgency.   Musculoskeletal: Positive for joint pain. Negative for back pain and myalgias.   Skin: Negative for itching and  rash.        R heel wound    Neurological: Negative for dizziness, focal weakness and seizures.        Physical Exam  Temp:  [36.2 °C (97.1 °F)-36.8 °C (98.2 °F)] 36.8 °C (98.2 °F)  Pulse:  [77-90] 88  Resp:  [16-17] 17  BP: (125-150)/(58-71) 150/70  SpO2:  [92 %-94 %] 94 %    Physical Exam   Constitutional: He is oriented to person, place, and time. No distress.   HENT:   Head: Normocephalic and atraumatic.   Eyes: Pupils are equal, round, and reactive to light. EOM are normal.   Neck: Normal range of motion. Neck supple. No thyromegaly present.   Cardiovascular: Normal rate and regular rhythm.    Pulmonary/Chest: Effort normal and breath sounds normal.   Abdominal: Soft. Bowel sounds are normal. He exhibits no distension.   Musculoskeletal: He exhibits edema (trace). He exhibits no tenderness.   Wound vac in place L heel   Neurological: He is alert and oriented to person, place, and time.   Skin: Skin is warm and dry. He is not diaphoretic. No erythema.   Psychiatric: His speech is normal.   Nursing note and vitals reviewed.  Patient seen and examined today on 6/21, unchanged from 6/20.    Fluids    Intake/Output Summary (Last 24 hours) at 06/29/19 0750  Last data filed at 06/29/19 0500   Gross per 24 hour   Intake              120 ml   Output             2200 ml   Net            -2080 ml       Laboratory      Recent Labs      06/27/19   0023  06/28/19   0347   SODIUM  137  138   POTASSIUM  4.6  4.3   CHLORIDE  105  104   CO2  26  28   GLUCOSE  95  138*   BUN  37*  20   CREATININE  1.69*  1.19   CALCIUM  8.4*  8.3*     Recent Labs      06/27/19   0935   APTT  35.1   INR  1.12               Imaging  CT-HEAD W/O   Final Result      1.  No evidence of acute territorial infarct, intracranial hemorrhage or mass lesion.   2.  Mild diffuse cerebral substance loss.   3.  Mild microangiopathic ischemic change versus demyelination or gliosis.      DX-CHEST-PORTABLE (1 VIEW)   Final Result      1.  Increased moderate hazy  bilateral parenchymal opacities which could be seen in the setting edema or infection.   2.  Interval removal of a right-sided central venous catheter.      EC-ECHOCARDIOGRAM COMPLETE W/O CONT   Final Result      DX-CHEST-PORTABLE (1 VIEW)   Final Result         1.  Pulmonary edema and/or infiltrates are identified, which are somewhat decreased since the prior exam.      DX-ANKLE 3+ VIEWS LEFT   Final Result      Soft tissue swelling. No acute fracture identified.      DX-CHEST-PORTABLE (1 VIEW)   Final Result      1.  Improved aeration of the RIGHT lung base   2.  Otherwise no interval change in BILATERAL atelectasis or airspace disease      DX-ABDOMEN FOR TUBE PLACEMENT   Final Result      Enteric tube projects over the stomach.      DX-CHEST-LIMITED (1 VIEW)   Final Result      New right IJ line tip overlies the SVC and no pneumothorax is identified      New right basilar opacity effaces the diaphragm and could be from atelectasis or consolidation      DX-CHEST-PORTABLE (1 VIEW)   Final Result      Endotracheal tube projects at the level of the aortic arch.      Increased perihilar and bibasilar opacities may represent a combination of edema, atelectasis, pneumonia.         DX-CHEST-PORTABLE (1 VIEW)   Final Result      Increasing reticular and hazy opacity is compatible with edema      CT-SHOULDER W/O PLUS RECONS LEFT   Final Result         Prior resection of left humeral head. The residual head neck junction appears well corticated and could relate to chronic resection/deformity. There is also deformity and remodeling of the glenoid with well-corticated margin, suggesting of chronic change    as well.      No acute fracture.      There is no articulation between the proximal humerus and glenoid. The glenohumeral joint is replaced by loculated soft tissue or dense fluid, likely chronic change. The fluid is slightly less in 2018 CT.      Infection is considered less likely given the osseous cortex adjacent to the  fluid is well corticated and demonstrates no rarefaction or destruction.      US-FOREIGN FILM ULTRASOUND   Final Result      OUTSIDE IMAGES-CT HEAD   Final Result      [id number] FILM X-RAY   Final Result      OUTSIDE IMAGES-DX LOWER EXTREMITY, LEFT   Final Result      [id number] FILM X-RAY   Final Result      OUTSIDE IMAGES-DX CHEST   Final Result      OUTSIDE IMAGES-DX UPPER EXTREMITY, LEFT   Final Result           Assessment/Plan  * Acute on chronic respiratory failure with hypoxia (HCC)- (present on admission)   Assessment & Plan    Extubated 6/17  - initially on 1-2 L and now on 3-4L   - worsening  - continue to wean O2 to home regimen  - RT protocol  - increase mobilization, IS         Acute encephalopathy   Assessment & Plan    Related to narcotics  improved  DC IV pain meds  Restarted methadone 20 mg bid  CT head no acute findings       OZ (acute kidney injury) ([facility])- (present on admission)   Assessment & Plan    - Cr 1.19 today  - improving  - renally dose medications  - avoid nephrotoxic agents  - trend BMP in am         Diabetic foot ulcer ([facility])- (present on admission)   Assessment & Plan    Infected left heel diabetic foot ulcer with group D enterococcus. No evidence of osteomyelitis  - s/p debridement on 6/18  - POD 5 left BKA  - completed abx  - pain control  - ID on     Septic shock due to undetermined organism ([facility])- (present on admission)   Assessment & Plan    This is sepsis (without associated acute organ dysfunction).   source is infected diabetic ulcer and pneumonia.   Improved  Completed abx     Pneumonia due to infectious organism- (present on admission)   Assessment & Plan    Completed abx     Cardiomyopathy ([facility])- (present on admission)   Assessment & Plan    Echo LVEF 40%. No exacerbation and is euvolemic.  - continue metop and lisinopril with holding parameters     DM (diabetes mellitus), type 2, uncontrolled (CMS-[facility])- (present on admission)   Assessment & Plan     Uncontrolled, with hyperglycemia. A1C elevated at 12.2%. Blood sugars much improved over the last 24 hours  - lantus 44U BID  - resistant sliding scale and 5U TID with meals  - hypoglycemia protocol and DM diet  - DM education     Essential hypertension- (present on admission)   Assessment & Plan    Normo to hypertensive.  - continue metop, lisinopril  - labetalol IV PRN     Hypoglycemia   Assessment & Plan    BS 51 this am  To initiate feeding again  Monitor closely  Hypoglycemic protocol     Leucocytosis   Assessment & Plan      Improving slowly  Follow cbc in am     Left shoulder pain- (present on admission)   Assessment & Plan    No acute fracture noted on CT  Pain control with Tylenol, oxycodone and IV morphine.  Monitor respiratory status closely  PT OT  If persistent pain we will consider consulting his orthopedic surgeon      Tobacco dependence- (present on admission)   Assessment & Plan    Cont to encourage cessation     Hypokalemia- (present on admission)   Assessment & Plan    Improved  monitor and replete as needed          VTE prophylaxis: lovenox

## 2019-06-29 NOTE — PROGRESS NOTES
2 RN skin check completed with Isa DUNCAN.  Devices in place SCD, Knee immobilizer with surgical dressing and kailey drain.   Skin assessed under devices SCD, and knee immobilizer.   Confirmed pressure ulcers found on none.  Patient noted to have moisture damage to skin applied barrier paste and ointment to protect skin, no mepilex as the patient was incontinent of stool earlier today.   New potential pressure ulcers noted on none.  The following interventions in place turn and position q2 and prn, pillow support, incontinent care, waffle mattress overlay, barrier paste/ointment/wipes prn, and encourage oob.

## 2019-06-30 LAB
ALBUMIN SERPL BCP-MCNC: 2.2 G/DL (ref 3.2–4.9)
ALBUMIN/GLOB SERPL: 0.5 G/DL
ALP SERPL-CCNC: 108 U/L (ref 30–99)
ALT SERPL-CCNC: 10 U/L (ref 2–50)
ANION GAP SERPL CALC-SCNC: 9 MMOL/L (ref 0–11.9)
AST SERPL-CCNC: 15 U/L (ref 12–45)
BILIRUB SERPL-MCNC: 0.4 MG/DL (ref 0.1–1.5)
BUN SERPL-MCNC: 20 MG/DL (ref 8–22)
CALCIUM SERPL-MCNC: 8 MG/DL (ref 8.5–10.5)
CHLORIDE SERPL-SCNC: 101 MMOL/L (ref 96–112)
CO2 SERPL-SCNC: 27 MMOL/L (ref 20–33)
CREAT SERPL-MCNC: 0.92 MG/DL (ref 0.5–1.4)
GLOBULIN SER CALC-MCNC: 4.2 G/DL (ref 1.9–3.5)
GLUCOSE BLD-MCNC: 126 MG/DL (ref 65–99)
GLUCOSE BLD-MCNC: 137 MG/DL (ref 65–99)
GLUCOSE BLD-MCNC: 173 MG/DL (ref 65–99)
GLUCOSE BLD-MCNC: 175 MG/DL (ref 65–99)
GLUCOSE SERPL-MCNC: 139 MG/DL (ref 65–99)
POTASSIUM SERPL-SCNC: 3.5 MMOL/L (ref 3.6–5.5)
PROT SERPL-MCNC: 6.4 G/DL (ref 6–8.2)
SODIUM SERPL-SCNC: 137 MMOL/L (ref 135–145)

## 2019-06-30 PROCEDURE — A9270 NON-COVERED ITEM OR SERVICE: HCPCS | Performed by: INTERNAL MEDICINE

## 2019-06-30 PROCEDURE — 770006 HCHG ROOM/CARE - MED/SURG/GYN SEMI*

## 2019-06-30 PROCEDURE — 700102 HCHG RX REV CODE 250 W/ 637 OVERRIDE(OP): Performed by: INTERNAL MEDICINE

## 2019-06-30 PROCEDURE — 82962 GLUCOSE BLOOD TEST: CPT

## 2019-06-30 PROCEDURE — 700111 HCHG RX REV CODE 636 W/ 250 OVERRIDE (IP): Performed by: INTERNAL MEDICINE

## 2019-06-30 PROCEDURE — 80053 COMPREHEN METABOLIC PANEL: CPT

## 2019-06-30 PROCEDURE — 99232 SBSQ HOSP IP/OBS MODERATE 35: CPT | Performed by: INTERNAL MEDICINE

## 2019-06-30 PROCEDURE — 700102 HCHG RX REV CODE 250 W/ 637 OVERRIDE(OP): Performed by: HOSPITALIST

## 2019-06-30 PROCEDURE — 36415 COLL VENOUS BLD VENIPUNCTURE: CPT

## 2019-06-30 PROCEDURE — A9270 NON-COVERED ITEM OR SERVICE: HCPCS | Performed by: HOSPITALIST

## 2019-06-30 RX ADMIN — METHADONE HYDROCHLORIDE 20 MG: 10 TABLET ORAL at 17:35

## 2019-06-30 RX ADMIN — TRAMADOL HYDROCHLORIDE 50 MG: 50 TABLET, FILM COATED ORAL at 02:59

## 2019-06-30 RX ADMIN — TRAMADOL HYDROCHLORIDE 50 MG: 50 TABLET, FILM COATED ORAL at 11:26

## 2019-06-30 RX ADMIN — METOPROLOL TARTRATE 12.5 MG: 25 TABLET, FILM COATED ORAL at 17:35

## 2019-06-30 RX ADMIN — PREGABALIN 300 MG: 150 CAPSULE ORAL at 05:28

## 2019-06-30 RX ADMIN — METOPROLOL TARTRATE 12.5 MG: 25 TABLET, FILM COATED ORAL at 05:27

## 2019-06-30 RX ADMIN — INSULIN LISPRO 2 UNITS: 100 INJECTION, SOLUTION INTRAVENOUS; SUBCUTANEOUS at 12:56

## 2019-06-30 RX ADMIN — TRAMADOL HYDROCHLORIDE 50 MG: 50 TABLET, FILM COATED ORAL at 17:35

## 2019-06-30 RX ADMIN — METHADONE HYDROCHLORIDE 20 MG: 10 TABLET ORAL at 05:26

## 2019-06-30 RX ADMIN — ENOXAPARIN SODIUM 40 MG: 100 INJECTION SUBCUTANEOUS at 05:29

## 2019-06-30 RX ADMIN — ASPIRIN 81 MG 81 MG: 81 TABLET ORAL at 05:26

## 2019-06-30 RX ADMIN — INSULIN LISPRO 2 UNITS: 100 INJECTION, SOLUTION INTRAVENOUS; SUBCUTANEOUS at 20:31

## 2019-06-30 RX ADMIN — PREGABALIN 300 MG: 150 CAPSULE ORAL at 17:35

## 2019-06-30 ASSESSMENT — ENCOUNTER SYMPTOMS
SEIZURES: 0
EYE REDNESS: 0
BACK PAIN: 0
HEARTBURN: 0
SPUTUM PRODUCTION: 0
MYALGIAS: 0
BLURRED VISION: 0
VOMITING: 0
EYE PAIN: 0
ORTHOPNEA: 0
WEAKNESS: 1
COUGH: 0
WEIGHT LOSS: 0
HEMOPTYSIS: 0
DIZZINESS: 0

## 2019-06-30 NOTE — CARE PLAN
Problem: Skin Integrity  Goal: Risk for impaired skin integrity will decrease  Patient offered waffle mattress but refused.  Pillows in use for support, Barrier wipes and paste in use.  Patient repositioned every two hours.

## 2019-06-30 NOTE — PROGRESS NOTES
[facility] Medicine Daily Progress Note    Date of Service  6/30/2019    Chief Complaint  63 y.o. male admitted 6/15/2019 with L shoulder pain and L heel ulcer.    Hospital Course    Hx TIIDM, COPD on 3 L HOT.  Presenting to [place] with multiple complaints including shoulder pain following a fall and a chronic heel ulcer.  Admitted to CICU with Dx of sepsis from diabetic foot ulcer, Type II NSTEMI with Trop of 1.09.  Intubated on admission, extubated 6/17.    Interval Problem Update  6/27 POD 3 Left BKA. He had episode of confusion last night with decrease respiratory rate likely related to narcotics. I saw and examined him today. I ordered CT head stat. His vitals were stable. Sating well. He couldn't follow command well.     Rapid response was called this am due to his worsening mentation and possible slurred speech and facial droop. Neurology was consulted too who recommended to do CT head only and do not need CTA head and neck.    6/28: RADHA 4 left BKA. His mentation is improving today. Complaint about pain. I updated him about his status including side effects of narcotics. PT/OT.    6/29: he is feeling much better today. No more confusion. Complaint about pain at surgical site. Restarted methadone 20 mg bid today. Monitor for narcotics toxicity.    6/30: POD 5 left BKA. Feeling better each day. No SOB. Pain is tolerable on methadone 20 mg bid. I saw and examined him today.  Consultants/Specialty  LPS  Pulmonology/Critical Care    Code Status  full    Disposition  SNF referral placed    Review of Systems  Review of Systems   Constitutional: Negative for weight loss.   HENT: Negative for congestion and nosebleeds.    Eyes: Negative for blurred vision, pain and redness.   Respiratory: Negative for cough, hemoptysis and sputum production.    Cardiovascular: Negative for chest pain and orthopnea.   Gastrointestinal: Negative for heartburn and vomiting.   Genitourinary: Negative for dysuria and frequency.    Musculoskeletal: Positive for joint pain. Negative for back pain and myalgias.   Skin: Negative for itching and rash.        R heel wound    Neurological: Positive for weakness. Negative for dizziness and seizures.        Physical Exam  Temp:  [36.3 °C (97.3 °F)-37.1 °C (98.8 °F)] 36.7 °C (98.1 °F)  Pulse:  [76-86] 86  Resp:  [17-18] 18  BP: (140-148)/(73-75) 144/74  SpO2:  [90 %-94 %] 91 %    Physical Exam   Constitutional: He is oriented to person, place, and time. No distress.   HENT:   Head: Normocephalic and atraumatic.   Eyes: Pupils are equal, round, and reactive to light. Conjunctivae and EOM are normal.   Neck: Normal range of motion. No thyromegaly present.   Cardiovascular: Normal rate and regular rhythm.    Pulmonary/Chest: Effort normal.   Abdominal: Soft. He exhibits no distension.   Musculoskeletal: He exhibits edema (trace). He exhibits no tenderness.   Wound vac in place L heel   Neurological: He is alert and oriented to person, place, and time.   Skin: Skin is warm and dry. He is not diaphoretic. No erythema.   Psychiatric: His speech is normal.   Nursing note and vitals reviewed.  Patient seen and examined today on 6/21, unchanged from 6/20.    Fluids    Intake/Output Summary (Last 24 hours) at 06/30/19 0757  Last data filed at 06/30/19 0543   Gross per 24 hour   Intake              240 ml   Output             2800 ml   Net            -2560 ml       Laboratory      Recent Labs      06/28/19   0347  06/30/19   0248   SODIUM  138  137   POTASSIUM  4.3  3.5*   CHLORIDE  104  101   CO2  28  27   GLUCOSE  138*  139*   BUN  20  20   CREATININE  1.19  0.92   CALCIUM  8.3*  8.0*     Recent Labs      06/27/19   0935   APTT  35.1   INR  1.12               Imaging  CT-HEAD W/O   Final Result      1.  No evidence of acute territorial infarct, intracranial hemorrhage or mass lesion.   2.  Mild diffuse cerebral substance loss.   3.  Mild microangiopathic ischemic change versus demyelination or gliosis.       DX-CHEST-PORTABLE (1 VIEW)   Final Result      1.  Increased moderate hazy bilateral parenchymal opacities which could be seen in the setting edema or infection.   2.  Interval removal of a right-sided central venous catheter.      EC-ECHOCARDIOGRAM COMPLETE W/O CONT   Final Result      DX-CHEST-PORTABLE (1 VIEW)   Final Result         1.  Pulmonary edema and/or infiltrates are identified, which are somewhat decreased since the prior exam.      DX-ANKLE 3+ VIEWS LEFT   Final Result      Soft tissue swelling. No acute fracture identified.      DX-CHEST-PORTABLE (1 VIEW)   Final Result      1.  Improved aeration of the RIGHT lung base   2.  Otherwise no interval change in BILATERAL atelectasis or airspace disease      DX-ABDOMEN FOR TUBE PLACEMENT   Final Result      Enteric tube projects over the stomach.      DX-CHEST-LIMITED (1 VIEW)   Final Result      New right IJ line tip overlies the SVC and no pneumothorax is identified      New right basilar opacity effaces the diaphragm and could be from atelectasis or consolidation      DX-CHEST-PORTABLE (1 VIEW)   Final Result      Endotracheal tube projects at the level of the aortic arch.      Increased perihilar and bibasilar opacities may represent a combination of edema, atelectasis, pneumonia.         DX-CHEST-PORTABLE (1 VIEW)   Final Result      Increasing reticular and hazy opacity is compatible with edema      CT-SHOULDER W/O PLUS RECONS LEFT   Final Result         Prior resection of left humeral head. The residual head neck junction appears well corticated and could relate to chronic resection/deformity. There is also deformity and remodeling of the glenoid with well-corticated margin, suggesting of chronic change    as well.      No acute fracture.      There is no articulation between the proximal humerus and glenoid. The glenohumeral joint is replaced by loculated soft tissue or dense fluid, likely chronic change. The fluid is slightly less in 2018 CT.       Infection is considered less likely given the osseous cortex adjacent to the fluid is well corticated and demonstrates no rarefaction or destruction.      US-FOREIGN FILM ULTRASOUND   Final Result      OUTSIDE IMAGES-CT HEAD   Final Result      [id number] FILM X-RAY   Final Result      OUTSIDE IMAGES-DX LOWER EXTREMITY, LEFT   Final Result      [id number] FILM X-RAY   Final Result      OUTSIDE IMAGES-DX CHEST   Final Result      OUTSIDE IMAGES-DX UPPER EXTREMITY, LEFT   Final Result           Assessment/Plan  * Acute on chronic respiratory failure with hypoxia (HCC)- (present on admission)   Assessment & Plan    Extubated 6/17  - initially on 1-2 L and now on 3-4L   - worsening  - continue to wean O2 to home regimen  - RT protocol  - increase mobilization, IS         Acute encephalopathy   Assessment & Plan    Related to narcotics  improved  DC IV pain meds  Restarted methadone 20 mg bid  CT head no acute findings       OZ (acute kidney injury) ([facility])- (present on admission)   Assessment & Plan    - Cr 0.9 today  - improving  - renally dose medications  - avoid nephrotoxic agents  - trend BMP in am         Diabetic foot ulcer ([facility])- (present on admission)   Assessment & Plan    Infected left heel diabetic foot ulcer with group D enterococcus. No evidence of osteomyelitis  - s/p debridement on 6/18  - POD 6 left BKA  - completed abx  - pain control  - ID on     Septic shock due to undetermined organism (HCC)- (present on admission)   Assessment & Plan    This is sepsis (without associated acute organ dysfunction).   source is infected diabetic ulcer and pneumonia.   Improved  Completed abx     Pneumonia due to infectious organism- (present on admission)   Assessment & Plan    Completed abx     Cardiomyopathy (HCC)- (present on admission)   Assessment & Plan    Echo LVEF 40%. No exacerbation and is euvolemic.  - continue metop and lisinopril with holding parameters     DM (diabetes mellitus), type 2,  uncontrolled (CMS-HCC)- (present on admission)   Assessment & Plan    Uncontrolled, with hyperglycemia. A1C elevated at 12.2%. Blood sugars much improved over the last 24 hours  - resistant sliding scale and 5U TID with meals  - hypoglycemia protocol and DM diet  - DM education     Essential hypertension- (present on admission)   Assessment & Plan    Normo to hypertensive.  - continue metop, lisinopril  - labetalol IV PRN     Hypoglycemia   Assessment & Plan    BS 51 this am  To initiate feeding again  Monitor closely  Hypoglycemic protocol  On SSI  Continue to hold lantus(discussed with RN)     Leucocytosis   Assessment & Plan      Improving slowly  Follow cbc in am     Left shoulder pain- (present on admission)   Assessment & Plan    No acute fracture noted on CT  Pain control with Tylenol, oxycodone and IV morphine.  Monitor respiratory status closely  PT OT  If persistent pain we will consider consulting his orthopedic surgeon      Tobacco dependence- (present on admission)   Assessment & Plan    Cont to encourage cessation     Hypokalemia- (present on admission)   Assessment & Plan    Worsening K 3.5 today  monitor and replete as needed          VTE prophylaxis: lovenox

## 2019-06-30 NOTE — CARE PLAN
Problem: Pain Management  Goal: Pain level will decrease to patient's comfort goal  Patient experiencing pain to left shoulder and left leg.  Tramadol given. Offered Tylenol in between doses however patient refused.

## 2019-07-01 LAB
GLUCOSE BLD-MCNC: 128 MG/DL (ref 65–99)
GLUCOSE BLD-MCNC: 155 MG/DL (ref 65–99)
GLUCOSE BLD-MCNC: 160 MG/DL (ref 65–99)
GLUCOSE BLD-MCNC: 218 MG/DL (ref 65–99)

## 2019-07-01 PROCEDURE — 700102 HCHG RX REV CODE 250 W/ 637 OVERRIDE(OP): Performed by: INTERNAL MEDICINE

## 2019-07-01 PROCEDURE — A9270 NON-COVERED ITEM OR SERVICE: HCPCS | Performed by: HOSPITALIST

## 2019-07-01 PROCEDURE — 99232 SBSQ HOSP IP/OBS MODERATE 35: CPT | Performed by: INTERNAL MEDICINE

## 2019-07-01 PROCEDURE — A9270 NON-COVERED ITEM OR SERVICE: HCPCS | Performed by: INTERNAL MEDICINE

## 2019-07-01 PROCEDURE — 700111 HCHG RX REV CODE 636 W/ 250 OVERRIDE (IP): Performed by: INTERNAL MEDICINE

## 2019-07-01 PROCEDURE — 700102 HCHG RX REV CODE 250 W/ 637 OVERRIDE(OP): Performed by: HOSPITALIST

## 2019-07-01 PROCEDURE — 82962 GLUCOSE BLOOD TEST: CPT

## 2019-07-01 PROCEDURE — 97530 THERAPEUTIC ACTIVITIES: CPT

## 2019-07-01 PROCEDURE — 97535 SELF CARE MNGMENT TRAINING: CPT

## 2019-07-01 PROCEDURE — 770006 HCHG ROOM/CARE - MED/SURG/GYN SEMI*

## 2019-07-01 RX ADMIN — METOPROLOL TARTRATE 12.5 MG: 25 TABLET, FILM COATED ORAL at 04:16

## 2019-07-01 RX ADMIN — TRAMADOL HYDROCHLORIDE 50 MG: 50 TABLET, FILM COATED ORAL at 16:17

## 2019-07-01 RX ADMIN — METOPROLOL TARTRATE 12.5 MG: 25 TABLET, FILM COATED ORAL at 16:16

## 2019-07-01 RX ADMIN — PREGABALIN 300 MG: 150 CAPSULE ORAL at 16:18

## 2019-07-01 RX ADMIN — PREGABALIN 300 MG: 150 CAPSULE ORAL at 04:15

## 2019-07-01 RX ADMIN — INSULIN LISPRO 2 UNITS: 100 INJECTION, SOLUTION INTRAVENOUS; SUBCUTANEOUS at 21:34

## 2019-07-01 RX ADMIN — TRAMADOL HYDROCHLORIDE 50 MG: 50 TABLET, FILM COATED ORAL at 00:47

## 2019-07-01 RX ADMIN — INSULIN LISPRO 2 UNITS: 100 INJECTION, SOLUTION INTRAVENOUS; SUBCUTANEOUS at 11:56

## 2019-07-01 RX ADMIN — INSULIN LISPRO 3 UNITS: 100 INJECTION, SOLUTION INTRAVENOUS; SUBCUTANEOUS at 16:23

## 2019-07-01 RX ADMIN — ENOXAPARIN SODIUM 40 MG: 100 INJECTION SUBCUTANEOUS at 06:00

## 2019-07-01 RX ADMIN — METHADONE HYDROCHLORIDE 20 MG: 10 TABLET ORAL at 16:16

## 2019-07-01 RX ADMIN — METHADONE HYDROCHLORIDE 20 MG: 10 TABLET ORAL at 04:14

## 2019-07-01 RX ADMIN — TRAMADOL HYDROCHLORIDE 50 MG: 50 TABLET, FILM COATED ORAL at 10:31

## 2019-07-01 RX ADMIN — ACETAMINOPHEN 650 MG: 325 TABLET, FILM COATED ORAL at 12:26

## 2019-07-01 RX ADMIN — ASPIRIN 81 MG 81 MG: 81 TABLET ORAL at 04:13

## 2019-07-01 RX ADMIN — TRAMADOL HYDROCHLORIDE 50 MG: 50 TABLET, FILM COATED ORAL at 22:27

## 2019-07-01 ASSESSMENT — COGNITIVE AND FUNCTIONAL STATUS - GENERAL
PERSONAL GROOMING: A LITTLE
DRESSING REGULAR LOWER BODY CLOTHING: A LOT
DRESSING REGULAR UPPER BODY CLOTHING: A LITTLE
DAILY ACTIVITIY SCORE: 15
SUGGESTED CMS G CODE MODIFIER DAILY ACTIVITY: CK
WALKING IN HOSPITAL ROOM: TOTAL
HELP NEEDED FOR BATHING: A LOT
EATING MEALS: A LITTLE
CLIMB 3 TO 5 STEPS WITH RAILING: TOTAL
MOBILITY SCORE: 10
STANDING UP FROM CHAIR USING ARMS: A LOT
TURNING FROM BACK TO SIDE WHILE IN FLAT BAD: A LOT
TOILETING: A LOT
MOVING TO AND FROM BED TO CHAIR: A LITTLE
MOVING FROM LYING ON BACK TO SITTING ON SIDE OF FLAT BED: UNABLE
SUGGESTED CMS G CODE MODIFIER MOBILITY: CL

## 2019-07-01 ASSESSMENT — ENCOUNTER SYMPTOMS
HEMOPTYSIS: 0
BLURRED VISION: 0
ORTHOPNEA: 0
EYE PAIN: 0
VOMITING: 0
SEIZURES: 0
DIZZINESS: 0
BACK PAIN: 0
WEAKNESS: 1
COUGH: 0
WEIGHT LOSS: 0

## 2019-07-01 ASSESSMENT — GAIT ASSESSMENTS: GAIT LEVEL OF ASSIST: UNABLE TO PARTICIPATE

## 2019-07-01 NOTE — PROGRESS NOTES
[facility] Medicine Daily Progress Note    Date of Service  7/1/2019    Chief Complaint  63 y.o. male admitted 6/15/2019 with L shoulder pain and L heel ulcer.    Hospital Course    Hx TIIDM, COPD on 3 L HOT.  Presenting to [place] with multiple complaints including shoulder pain following a fall and a chronic heel ulcer.  Admitted to CICU with Dx of sepsis from diabetic foot ulcer, Type II NSTEMI with Trop of 1.09.  Intubated on admission, extubated 6/17.    Interval Problem Update  6/27 POD 3 Left BKA. He had episode of confusion last night with decrease respiratory rate likely related to narcotics. I saw and examined him today. I ordered CT head stat. His vitals were stable. Sating well. He couldn't follow command well.     Rapid response was called this am due to his worsening mentation and possible slurred speech and facial droop. Neurology was consulted too who recommended to do CT head only and do not need CTA head and neck.    6/28: RADHA 4 left BKA. His mentation is improving today. Complaint about pain. I updated him about his status including side effects of narcotics. PT/OT.    6/29: he is feeling much better today. No more confusion. Complaint about pain at surgical site. Restarted methadone 20 mg bid today. Monitor for narcotics toxicity.    6/30: POD 5 left BKA. Feeling better each day. No SOB. Pain is tolerable on methadone 20 mg bid. I saw and examined him today.    7/1: POD 6 left BKA. Patient was seen and examined by me today. Case was discussed with RN and multidisplinary team during rounds. Denies nausea, vomiting, diarrhea.   Consultants/Specialty  LPS  Pulmonology/Critical Care    Code Status  full    Disposition  SNF referral placed    Review of Systems  Review of Systems   Constitutional: Negative for weight loss.   HENT: Negative for congestion and nosebleeds.    Eyes: Negative for blurred vision and pain.   Respiratory: Negative for cough and hemoptysis.    Cardiovascular: Negative for  chest pain and orthopnea.   Gastrointestinal: Negative for vomiting.   Genitourinary: Negative for dysuria and frequency.   Musculoskeletal: Positive for joint pain. Negative for back pain.   Skin: Negative for itching and rash.        R heel wound    Neurological: Positive for weakness. Negative for dizziness and seizures.        Physical Exam  Temp:  [36.1 °C (96.9 °F)-36.7 °C (98 °F)] 36.3 °C (97.3 °F)  Pulse:  [77-86] 80  Resp:  [16-17] 16  BP: (119-146)/(64-75) 119/64  SpO2:  [92 %-94 %] 94 %    Physical Exam   Constitutional: He is oriented to person, place, and time. No distress.   HENT:   Head: Normocephalic.   Eyes: Pupils are equal, round, and reactive to light. EOM are normal.   Neck: Normal range of motion. No thyromegaly present.   Cardiovascular: Normal rate and regular rhythm.    Pulmonary/Chest: Effort normal.   Abdominal: Soft. He exhibits no distension.   Musculoskeletal: He exhibits edema (trace). He exhibits no tenderness.   Wound vac in place L heel   Neurological: He is alert and oriented to person, place, and time.   Skin: Skin is warm and dry. He is not diaphoretic. No erythema.   Psychiatric: His speech is normal.   Nursing note and vitals reviewed.  Patient seen and examined today on 6/21, unchanged from 6/20.    Fluids    Intake/Output Summary (Last 24 hours) at 07/01/19 0739  Last data filed at 07/01/19 0100   Gross per 24 hour   Intake              120 ml   Output             1750 ml   Net            -1630 ml       Laboratory      Recent Labs      06/30/19   0248   SODIUM  137   POTASSIUM  3.5*   CHLORIDE  101   CO2  27   GLUCOSE  139*   BUN  20   CREATININE  0.92   CALCIUM  8.0*                   Imaging  CT-HEAD W/O   Final Result      1.  No evidence of acute territorial infarct, intracranial hemorrhage or mass lesion.   2.  Mild diffuse cerebral substance loss.   3.  Mild microangiopathic ischemic change versus demyelination or gliosis.      DX-CHEST-PORTABLE (1 VIEW)   Final Result       1.  Increased moderate hazy bilateral parenchymal opacities which could be seen in the setting edema or infection.   2.  Interval removal of a right-sided central venous catheter.      EC-ECHOCARDIOGRAM COMPLETE W/O CONT   Final Result      DX-CHEST-PORTABLE (1 VIEW)   Final Result         1.  Pulmonary edema and/or infiltrates are identified, which are somewhat decreased since the prior exam.      DX-ANKLE 3+ VIEWS LEFT   Final Result      Soft tissue swelling. No acute fracture identified.      DX-CHEST-PORTABLE (1 VIEW)   Final Result      1.  Improved aeration of the RIGHT lung base   2.  Otherwise no interval change in BILATERAL atelectasis or airspace disease      DX-ABDOMEN FOR TUBE PLACEMENT   Final Result      Enteric tube projects over the stomach.      DX-CHEST-LIMITED (1 VIEW)   Final Result      New right IJ line tip overlies the SVC and no pneumothorax is identified      New right basilar opacity effaces the diaphragm and could be from atelectasis or consolidation      DX-CHEST-PORTABLE (1 VIEW)   Final Result      Endotracheal tube projects at the level of the aortic arch.      Increased perihilar and bibasilar opacities may represent a combination of edema, atelectasis, pneumonia.         DX-CHEST-PORTABLE (1 VIEW)   Final Result      Increasing reticular and hazy opacity is compatible with edema      CT-SHOULDER W/O PLUS RECONS LEFT   Final Result         Prior resection of left humeral head. The residual head neck junction appears well corticated and could relate to chronic resection/deformity. There is also deformity and remodeling of the glenoid with well-corticated margin, suggesting of chronic change    as well.      No acute fracture.      There is no articulation between the proximal humerus and glenoid. The glenohumeral joint is replaced by loculated soft tissue or dense fluid, likely chronic change. The fluid is slightly less in 2018 CT.      Infection is considered less likely given  the osseous cortex adjacent to the fluid is well corticated and demonstrates no rarefaction or destruction.      US-FOREIGN FILM ULTRASOUND   Final Result      OUTSIDE IMAGES-CT HEAD   Final Result      [id number] FILM X-RAY   Final Result      OUTSIDE IMAGES-DX LOWER EXTREMITY, LEFT   Final Result      [id number] FILM X-RAY   Final Result      OUTSIDE IMAGES-DX CHEST   Final Result      OUTSIDE IMAGES-DX UPPER EXTREMITY, LEFT   Final Result           Assessment/Plan  * Acute on chronic respiratory failure with hypoxia (HCC)- (present on admission)   Assessment & Plan    Extubated 6/17  - initially on 1-2 L and now on 3-4L   - worsening  - continue to wean O2 to home regimen  - RT protocol  - increase mobilization, IS         Acute encephalopathy   Assessment & Plan    Related to narcotics  improved  DC IV pain meds  Restarted methadone 20 mg bid  CT head no acute findings       OZ (acute kidney injury) ([facility])- (present on admission)   Assessment & Plan    - Cr 0.9 today  - improving  - renally dose medications  - avoid nephrotoxic agents  - trend BMP in am         Diabetic foot ulcer ([facility])- (present on admission)   Assessment & Plan    Infected left heel diabetic foot ulcer with group D enterococcus. No evidence of osteomyelitis  - s/p debridement on 6/18  - POD 7 left BKA  - completed abx  - pain control  - ID on  - PT/OT     Septic shock due to undetermined organism ([facility])- (present on admission)   Assessment & Plan    This is sepsis (without associated acute organ dysfunction).   source is infected diabetic ulcer and pneumonia.   Improved  Completed abx     Pneumonia due to infectious organism- (present on admission)   Assessment & Plan    Completed abx     Cardiomyopathy (HCC)- (present on admission)   Assessment & Plan    Echo LVEF 40%. No exacerbation and is euvolemic.  - continue metop and lisinopril with holding parameters     DM (diabetes mellitus), type 2, uncontrolled (CMS-HCC)- (present  on admission)   Assessment & Plan    Uncontrolled, with hyperglycemia. A1C elevated at 12.2%. Blood sugars much improved over the last 24 hours  - resistant sliding scale and 5U TID with meals  - hypoglycemia protocol and DM diet  - DM education     Essential hypertension- (present on admission)   Assessment & Plan    Normo to hypertensive.  - continue metop, lisinopril  - labetalol IV PRN     Hypoglycemia   Assessment & Plan    BS 51 this am  To initiate feeding again  Monitor closely  Hypoglycemic protocol  On SSI  Continue to hold lantus(discussed with RN)     Leucocytosis   Assessment & Plan      Improving slowly  Follow cbc in am     Left shoulder pain- (present on admission)   Assessment & Plan    No acute fracture noted on CT  Pain control with Tylenol, oxycodone and IV morphine.  Monitor respiratory status closely  PT OT  If persistent pain we will consider consulting his orthopedic surgeon      Tobacco dependence- (present on admission)   Assessment & Plan    Cont to encourage cessation     Hypokalemia- (present on admission)   Assessment & Plan    Worsening K 3.5  monitor and replete as needed          VTE prophylaxis: lovenox

## 2019-07-01 NOTE — CARE PLAN
Problem: Venous Thromboembolism (VTW)/Deep Vein Thrombosis (DVT) Prevention:  Goal: Patient will participate in Venous Thrombosis (VTE)/Deep Vein Thrombosis (DVT)Prevention Measures    Intervention: Assess and monitor for anticoagulation complications  Continues on anticoagulant therapy; No visible s/s of active bleeding noted      Problem: Skin Integrity  Goal: Risk for impaired skin integrity will decrease    Intervention: Implement precautions to protect skin integrity in collaboration with the interdisciplinary team  Barrier cream applied to sacrum; heels floated and pillows in use for repositioning

## 2019-07-01 NOTE — DISCHARGE PLANNING
F/U for Rehab. Current documentation does not demonstrate tolerance for IRF level therapy regimen. Will continue monitoring progress with therapies.

## 2019-07-01 NOTE — THERAPY
"Occupational Therapy Treatment completed with focus on ADLs, ADL transfers, patient education, cognition and upper extremity function.  Functional Status: Mod A w/donning underwear in bed,  Min A supine>sit EOB, mod A w/seated slide board txf to WC, once in WC mod A to propel into bathroom, able to complete grooming seated at sink w/set up (oral care, washing face and combing hair), motivated to remain up in chair, mod A to self propel back to EOB, set up at EOB w/call light and tray table. Reviewed safety w/txfs, immobilizer wear and need to increased self advocacy as pt reports he has not been \"allowed\" to get up. RN aware of session and pts efforts. Pt was much more motivated and demonstrated increased tolerance for activity this session   Plan of Care: Will benefit from Occupational Therapy 4 times per week  Discharge Recommendations:  Equipment Will Continue to Assess for Equipment Needs. Post-acute therapy Recommend post-acute placement for additional occupational therapy services prior to discharge home. Patient can tolerate post-acute therapies at a 5x/week frequency.      See \"Rehab Therapy-Acute\" Patient Summary Report for complete documentation.     Pt seen for OT tx, pt demonstrating improved bed mobility, improved activity tolerance and improved motivation to participation in ADL's. Pt remains w/limited functional use of LUE, post op pain, and limited by mobility given new BKA. Pt will continue to benefit from acute OT and continue to recommend post acute placement prior to d/c home   "

## 2019-07-01 NOTE — PROGRESS NOTES
2 RN skin check completed with DAKOTA Cade for Mario score of 15  Devices in place: SCD to RLE, knee immobilizer, silicone nasal cannula  Skin assessed under devices: Yes, skin intact under SCD and nasal cannula. S/P BKA to LLE with immobilizer, MESHA sx incision, dressing in place.   No new potential pressure ulcers noted  Moisture associated dermatitis to sacrum, diabetic ulcer to R heel with dressing CDI. Also noted  multiple wound and scab to R foot, STARR.   The following interventions in place: Mepilex to sacrum, barrier cream and barrier wipes in use, prevalon boot in use to RLE, foam to L posterior thigh with immobilizer, pillows in use for positioning, patient able to turn self from side to side.

## 2019-07-02 LAB
GLUCOSE BLD-MCNC: 137 MG/DL (ref 65–99)
GLUCOSE BLD-MCNC: 147 MG/DL (ref 65–99)
GLUCOSE BLD-MCNC: 149 MG/DL (ref 65–99)
GLUCOSE BLD-MCNC: 156 MG/DL (ref 65–99)

## 2019-07-02 PROCEDURE — 700102 HCHG RX REV CODE 250 W/ 637 OVERRIDE(OP): Performed by: INTERNAL MEDICINE

## 2019-07-02 PROCEDURE — 97535 SELF CARE MNGMENT TRAINING: CPT

## 2019-07-02 PROCEDURE — A9270 NON-COVERED ITEM OR SERVICE: HCPCS | Performed by: INTERNAL MEDICINE

## 2019-07-02 PROCEDURE — 82962 GLUCOSE BLOOD TEST: CPT

## 2019-07-02 PROCEDURE — 770006 HCHG ROOM/CARE - MED/SURG/GYN SEMI*

## 2019-07-02 PROCEDURE — 700102 HCHG RX REV CODE 250 W/ 637 OVERRIDE(OP): Performed by: HOSPITALIST

## 2019-07-02 PROCEDURE — 99232 SBSQ HOSP IP/OBS MODERATE 35: CPT | Performed by: HOSPITALIST

## 2019-07-02 PROCEDURE — 700111 HCHG RX REV CODE 636 W/ 250 OVERRIDE (IP): Performed by: INTERNAL MEDICINE

## 2019-07-02 PROCEDURE — 97598 DBRDMT OPN WND ADDL 20CM/<: CPT

## 2019-07-02 PROCEDURE — A9270 NON-COVERED ITEM OR SERVICE: HCPCS | Performed by: HOSPITALIST

## 2019-07-02 RX ORDER — TRAMADOL HYDROCHLORIDE 50 MG/1
100 TABLET ORAL EVERY 6 HOURS PRN
Status: DISCONTINUED | OUTPATIENT
Start: 2019-07-02 | End: 2019-07-10 | Stop reason: HOSPADM

## 2019-07-02 RX ADMIN — ASPIRIN 81 MG 81 MG: 81 TABLET ORAL at 05:12

## 2019-07-02 RX ADMIN — PREGABALIN 300 MG: 150 CAPSULE ORAL at 16:55

## 2019-07-02 RX ADMIN — TRAMADOL HYDROCHLORIDE 50 MG: 50 TABLET ORAL at 02:04

## 2019-07-02 RX ADMIN — TRAMADOL HYDROCHLORIDE 100 MG: 50 TABLET ORAL at 16:11

## 2019-07-02 RX ADMIN — TRAMADOL HYDROCHLORIDE 100 MG: 50 TABLET ORAL at 09:44

## 2019-07-02 RX ADMIN — ACETAMINOPHEN 650 MG: 325 TABLET, FILM COATED ORAL at 12:42

## 2019-07-02 RX ADMIN — METHADONE HYDROCHLORIDE 20 MG: 10 TABLET ORAL at 16:54

## 2019-07-02 RX ADMIN — METOPROLOL TARTRATE 12.5 MG: 25 TABLET, FILM COATED ORAL at 16:57

## 2019-07-02 RX ADMIN — METHADONE HYDROCHLORIDE 20 MG: 10 TABLET ORAL at 05:11

## 2019-07-02 RX ADMIN — PREGABALIN 300 MG: 150 CAPSULE ORAL at 05:11

## 2019-07-02 RX ADMIN — SENNOSIDES,DOCUSATE SODIUM 2 TABLET: 8.6; 5 TABLET, FILM COATED ORAL at 05:11

## 2019-07-02 RX ADMIN — ACETAMINOPHEN 650 MG: 325 TABLET, FILM COATED ORAL at 21:16

## 2019-07-02 RX ADMIN — ENOXAPARIN SODIUM 40 MG: 100 INJECTION SUBCUTANEOUS at 05:12

## 2019-07-02 RX ADMIN — INSULIN LISPRO 2 UNITS: 100 INJECTION, SOLUTION INTRAVENOUS; SUBCUTANEOUS at 11:50

## 2019-07-02 RX ADMIN — TRAMADOL HYDROCHLORIDE 100 MG: 50 TABLET ORAL at 22:23

## 2019-07-02 RX ADMIN — METOPROLOL TARTRATE 12.5 MG: 25 TABLET, FILM COATED ORAL at 05:11

## 2019-07-02 ASSESSMENT — COGNITIVE AND FUNCTIONAL STATUS - GENERAL
DRESSING REGULAR UPPER BODY CLOTHING: A LITTLE
TOILETING: A LOT
DAILY ACTIVITIY SCORE: 15
HELP NEEDED FOR BATHING: A LOT
EATING MEALS: A LITTLE
DRESSING REGULAR LOWER BODY CLOTHING: A LOT
SUGGESTED CMS G CODE MODIFIER DAILY ACTIVITY: CK
PERSONAL GROOMING: A LITTLE

## 2019-07-02 ASSESSMENT — ENCOUNTER SYMPTOMS
NEUROLOGICAL NEGATIVE: 1
NERVOUS/ANXIOUS: 0
SEIZURES: 0
ABDOMINAL PAIN: 0
PALPITATIONS: 0
DOUBLE VISION: 0
CHILLS: 0
LOSS OF CONSCIOUSNESS: 0
BRUISES/BLEEDS EASILY: 0
HEARTBURN: 0
CONSTIPATION: 0
VOMITING: 0
COUGH: 0
HEADACHES: 0
DIARRHEA: 0
WHEEZING: 0
FEVER: 0
PSYCHIATRIC NEGATIVE: 1
HEMOPTYSIS: 0
RESPIRATORY NEGATIVE: 1
GASTROINTESTINAL NEGATIVE: 1
FOCAL WEAKNESS: 0
DIZZINESS: 0
BLOOD IN STOOL: 0
DIAPHORESIS: 0
EYES NEGATIVE: 1
CARDIOVASCULAR NEGATIVE: 1
NAUSEA: 0
CONSTITUTIONAL NEGATIVE: 1
MYALGIAS: 1

## 2019-07-02 NOTE — PROGRESS NOTES
" LIMB PRESERVATION SERVICE      HPI:  64 y/o male with DM, CAD, COPD, MRSA infection. Admitted for DFU, sepsis, shoulder pain, respiratory failure.   LPS involved for L heel ulcer. Pt reports around beginning of May 2019 developed blister after increasing his exercise. He followed up with MD in [place] and ulcer was debrided. He was placed on abx. Instructed to clean foot with betadine.The ulcer worsened with increased pain and edema. He developed nausea and vomiting. Denied fevers, chills, or purulent drainage from foot. He also had GLF about 1 wk ago. He presented to [facility] and was transferred to [facility] for further care. Found to be septic, developed acute respiratory failure and was intubated 6/15, and later extubated 6/17.      Diagnosed with DM in 2007. Checks sugars 4x/day. Averages 160-170. Takes insulin. Denies neuropathy. Seen by CDE. Does not have diabetic shoes. Wears slippers at home. 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    SURGERY DATE: 6/18/19 by Dr. Fraser  PROCEDURE: Excisional debridement of left heel with VAC placement    SURGERY DATE: 6/24/19 by Dr. Schneider  PROCEDURE left BKA    6/20: POD # 2. Patient denies fevers, chills, nausea, vomiting.  Pain controlled.   6/27: Postop day #3 S/P left BKA.  Patient with hypoglycemia.  Code stroke called this a.m.  Patient lethargic but did awake with dressing change.  Hemovac drain previously removed. Seen by [facility]  7/1: Patient alert and oriented, conversational.  States pain is controlled. LELIA functioning to left BKA.  Wearing the immobilizer.    /54   Pulse 80   Temp 36.4 °C (97.6 °F) (Temporal)   Resp 16   Ht 1.803 m (5' 10.98\")   Wt 98.2 kg (216 lb 7.9 oz)   SpO2 92%   BMI 30.21 kg/m²     SURGICAL SITE ASSESSMENT:    Pedal Pulses:  +2 to DP R foot, +1 PT to R foot     Foot warm    Left BKA:  LELIA dressing in place and functioning.  LELIA removed.  Incision with sutures well approximated.  No erythema.  Minimal to moderate " edema to stump.  Scant bleeding that subsided on its own    RN to photograph and apply dressing-ABD pad, Kerlix, Tubigrip, knee immobilizer      R plantar foot  4 scattered scabs, callused areas  Heel ulcer   Periwound callus minimal  Pale pink wound bed  Continue with current dressing orders- Aquacel silver, adhesive foam.        DIABETES MANAGEMENT:  Blood glucose: 218  A1c:   Lab Results   Component Value Date/Time    HBA1C 12.2 (H) 06/16/2019 11:37 AM        Diabetes education: seen patient on 6/19    INFECTION MANAGEMENT:  WBC: 10.8 on 6/26/2019  Wound culture results:   Results     ** No results found for the last 168 hours. **                 PLAN:  S/P L heel debridement on 6/18 followed by left BKA 6/24  Postop day #7 left BKA  LELIA dressing removed-incision well approximated    Right heel wound now with pale pink wound bed, periwound callus, no signs and symptoms of infection    Wound care: RN to apply daily dressing changes to left BKA consisting of ABD pad Kerlix, Tubigrip, knee immobilizer  Wound team to follow-up with patient for conservative sharp wound debridement to right heel ulcer    Antibiotics: Completed IV antibiotic course.  ID has signed off    Weight Bearing Status: LLE nonweightbearing, WBAT RLE in offloading shoe which is at bedside    Offloading: Offloading shoe for RLE, knee immobilizer for LLE.  [facility] involved    PT Consult: Involved, okay to start range of motion exercises to LLE while awake, sitting in chair.  Patient understands reason for knee immobilizer    Diabetes Education: involved    No further plans to return to the OR at this time    DISCHARGE PLAN:    Disposition: recommend SNF, rehab  From [place].     Follow-up: Suture removal 3 weeks postop.  LPS rounds rescheduled for 7/19/2019 for assessment of R foot and L BKA    Diabetic shoe and insert once ulcers has resolved to RLE.           Bety Ni, A.P.R.N.    If any questions or concerns, please  call [phone]

## 2019-07-02 NOTE — WOUND TEAM
"[facility]  Inpatient Services  Wound and Skin Care Progress Note    HPI, PMH, SH: Reviewed    WOUND TEAM FOLLOW UP: Per JW Humphreys with Limb Preservation Service (LPS) conservative sharp wound debridement (CSWD) to be performed on right heel ulcers.  Unit where seen by Wound Team: [id number]      SUBJECTIVE: \"What are they going to do if they can't find a place that will take me?\"    Self Report / Pain Level: Patient denied paint to right heel ulcers    OBJECTIVE: Agreeable. Concerned about where he is going to be discharged to.    WOUND TYPE, LOCATION, CHARACTERISTICS:       Wound 06/16/19 Diabetic Ulcer Heel right plantar (Active)   Wound Image      Site Assessment Red    Elizabeth-wound Assessment Callused    Margins Attached edges;Defined edges    Wound Length (cm) 1 cm    Wound Width (cm) 1 cm    Wound Depth (cm) 0.3 cm    Wound Surface Area (cm^2) 1 cm^2    Post Wound Length (cm) 0.6 cm     Post Wound Width (cm) 0.4 cm    Post Wound Depth (cm) 0.4 cm    Post Wound Surface Area (cm^2) 0.24 cm^2    Tunneling 0 cm    Undermining 0 cm    Closure None    Drainage Amount Moderate    Drainage Description Sanguineous    Non-staged Wound Description Partial thickness    Treatments Site care;Cleansed;Other (Comment)    Cleansing Normal Saline Irrigation    Periwound Protectant Not Applicable    Dressing Options Hydrofiber Silver;Nonadhesive Foam;Hypafix Tape    Dressing Cleansing/Solutions Not Applicable    Dressing Changed New    Dressing Status Clean;Dry;Intact    Dressing Change Frequency Every 48 hrs    NEXT Dressing Change  07/04/19    NEXT Weekly Photo (Inpatient Only) 07/10/19    WOUND NURSE ONLY - Odor None    WOUND NURSE ONLY - Pulses Right;DP;PT;1+    WOUND NURSE ONLY - Exposed Structures None    WOUND NURSE ONLY - Tissue Type and Percentage 100% red        INTERVENTIONS BY WOUND TEAM: Dressing removed from right heel. Heel cleaned with saline moistened gauze. Plantar lateral wound/callus " debrided revealing no wound. Plantar wound debrided revealing red tissue, bleeding. Paired down the periwound callus. All CSWD done with curette. Cleaned plantar heel wound again with saline moistened gauze. Dressed it with hydrofiber silver, non-adhesive foam, secured with hypafix tape. Patient concerned about the left residual limb dressing change, looked up the dressing care order and let patient know that nursing will be changing that dressing today.     Interdisciplinary consultation: Patient, nursing, Naseem Ni APRN with LPS    EVALUATION AND PROGRESS OF WOUND(S): Clean wound. Hydrofiber silver for antimicrobial treatment and absorption of exudate without laterally wicking to periwound. Non -adhesive foam for protection and absorption.     Rationale for changes in Plan of Care: CSWD per Naseem Ni    Factors affecting wound healing: type 2 diabetes, neuropathy  Goals: Steady decrease in size of wound.    NURSING PLAN OF CARE:    Dressing changes: Continue previous Dressing Care orders:    X    See new Dressing Care orders:   X    Skin care: See Skin Care orders:        Rectal tube care: See Rectal Tube Care orders:      Other orders:           WOUND TEAM PLAN OF CARE (X):   NPWT change 3 x week:        Dressing changes:       Follow up as needed:   X    Other:

## 2019-07-02 NOTE — THERAPY
"Physical Therapy Treatment completed.   Bed Mobility:  Supine to Sit: Minimal Assist  Transfers: Sit to Stand: Unable to Participate  Gait: Level Of Assist: Unable to Participate   Plan of Care: Will benefit from Physical Therapy 4 times per week  Discharge Recommendations: Equipment: Will Continue to Assess for Equipment Needs. Post-acute therapy Recommend post-acute placement for continued physical therapy services prior to discharge home. Patient can tolerate post-acute therapies at a 5x/week frequency.       See \"Rehab Therapy-Acute\" Patient Summary Report for complete documentation.     Pt continues to be very motivated to participate in therapy. Pt demonstrating improved strength and coordination to perform mobility. Pt was able to begin lateral scooting along bed and was able to put weight through L UE. Pt then educated on seated SB transfers. He needs MaxA for set up of transfer but for actual transfer, only required ModA. Pt w/ good understanding of head/hip relationship during transfers. Pt was able to begin short distance w/c propulsion. Pt education on importance of ROM and skin integrity/desensitization. Pt continues to be at a level in which he will benefit from post acute therapy. Pt would be able to tolerate three hours of therapy a day.  "

## 2019-07-02 NOTE — CARE PLAN
Problem: Skin Integrity  Goal: Risk for impaired skin integrity will decrease  Outcome: PROGRESSING AS EXPECTED      Problem: Psychosocial Needs:  Goal: Level of anxiety will decrease  Outcome: PROGRESSING AS EXPECTED

## 2019-07-02 NOTE — THERAPY
"Occupational Therapy Treatment completed with focus on ADLs, ADL transfers and patient education.  Functional Status:  Pt seen for OT tx. Min A supine > sit, max A to don pants in supine. Min A transfer from EOB > WC using seated SB. Pt required cues for hand placement and safety w/ weight shifting. Completed seated grooming tasks at sink w/ supv. Pt pleasant and cooperative. Pt expresses motivation to regain strength, endurance and independence in ADLs and ADL transfers. Min A transfer BTB. L UE w/ impaired ROM limited ability to use LUE during transfers and ADLs. Pt would benefit from intensive post acute therapies prior to d/c home.   Plan of Care: Will benefit from Occupational Therapy 4 times per week  Discharge Recommendations:  Equipment Will Continue to Assess for Equipment Needs. Post-acute therapy Discharge to a transitional care facility for continued skilled therapy services.    See \"Rehab Therapy-Acute\" Patient Summary Report for complete documentation.   "

## 2019-07-02 NOTE — PROGRESS NOTES
VSS. Immobilizer to LLE, drsng changed to abd, kerlex, and tubigrip. Wound photos taken. Pt rating shoulder and BLE pain high, stating not getting any relief from current regimen. Small BM this afternoon. No c/o.

## 2019-07-02 NOTE — PROGRESS NOTES
[facility] Medicine Daily Progress Note    Date of Service  7/2/2019    Chief Complaint  63 y.o. male admitted 6/15/2019 with left heel pain.    Hospital Course    Patient is a 63-year-old male who has diabetes that was uncontrolled.  The patient developed a diabetic foot ulcer on the left foot.  The patient's foot ulcer progressed into sepsis.  The patient actually had to be intubated mechanically ventilated and was getting sepsis resuscitation treatment in the ICU.  Patient initially grew out some enterococcus and then later grew out Prevotella.  Patient has completed at this point antibiotics.  Patient is been extubated and is on room air at this point in time.  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  At this point in time is postoperative day #8.  Patient is recovering at this point well and at this point we are anticipating discharging him once we have located a the best discharge plan for him.      Interval Problem Update  Currently continue at this point with pain management.  Continue with working closely with the  to discharge the patient.    Consultants/Specialty  Physiatry, infectious diseases, orthopedic surgery, pulmonary.    Code Status  Full code    Disposition  Discharge to extended care facility once bed is located for him and he is excepted.    Review of Systems  Review of Systems   Constitutional: Negative.  Negative for chills, diaphoresis and fever.   HENT: Negative.    Eyes: Negative.  Negative for double vision.   Respiratory: Negative.  Negative for cough, hemoptysis and wheezing.    Cardiovascular: Negative.  Negative for chest pain, palpitations and leg swelling.   Gastrointestinal: Negative.  Negative for abdominal pain, blood in stool, constipation, diarrhea, heartburn, nausea and vomiting.   Genitourinary: Negative.  Negative for frequency, hematuria and urgency.   Musculoskeletal: Positive for joint pain (left shoulder) and myalgias.        Pain in the left stump site 7/10    Skin: Negative.  Negative for itching and rash.   Neurological: Negative.  Negative for dizziness, focal weakness, seizures, loss of consciousness and headaches.   Endo/Heme/Allergies: Negative.  Does not bruise/bleed easily.   Psychiatric/Behavioral: Negative.  Negative for suicidal ideas. The patient is not nervous/anxious.    All other systems reviewed and are negative.       Physical Exam  Temp:  [36.4 °C (97.5 °F)-36.9 °C (98.5 °F)] 36.6 °C (97.9 °F)  Pulse:  [75-97] 97  Resp:  [15-18] 18  BP: (110-144)/(54-66) 110/66  SpO2:  [91 %-93 %] 91 %    Physical Exam   Constitutional: He is oriented to person, place, and time. He appears well-developed and well-nourished.   HENT:   Head: Normocephalic and atraumatic.   Right Ear: External ear normal.   Left Ear: External ear normal.   Nose: Nose normal.   Mouth/Throat: Oropharynx is clear and moist.   Eyes: Pupils are equal, round, and reactive to light. Conjunctivae and EOM are normal.   Neck: Normal range of motion. Neck supple. No JVD present. No thyromegaly present.   Cardiovascular: Normal rate and regular rhythm.    No murmur heard.  Pulmonary/Chest: Effort normal and breath sounds normal. He has no wheezes. He has no rales. He exhibits no tenderness.   Abdominal: Soft. Bowel sounds are normal. He exhibits distension. He exhibits no mass. There is no tenderness. There is no rebound and no guarding.   Musculoskeletal: He exhibits no edema.        Left shoulder: He exhibits decreased range of motion, tenderness and deformity.        Legs:  Lymphadenopathy:     He has no cervical adenopathy.   Neurological: He is alert and oriented to person, place, and time. He has normal reflexes. No cranial nerve deficit.   Skin: Skin is warm and dry. No rash noted. No erythema.   Psychiatric: He has a normal mood and affect. His behavior is normal. Judgment and thought content normal.   Nursing note and vitals reviewed.      Fluids    Intake/Output Summary (Last 24 hours) at  07/02/19 1313  Last data filed at 07/02/19 1152   Gross per 24 hour   Intake              440 ml   Output             1125 ml   Net             -685 ml       Laboratory      Recent Labs      06/30/19   0248   SODIUM  137   POTASSIUM  3.5*   CHLORIDE  101   CO2  27   GLUCOSE  139*   BUN  20   CREATININE  0.92   CALCIUM  8.0*                   Imaging  CT-HEAD W/O   Final Result      1.  No evidence of acute territorial infarct, intracranial hemorrhage or mass lesion.   2.  Mild diffuse cerebral substance loss.   3.  Mild microangiopathic ischemic change versus demyelination or gliosis.      DX-CHEST-PORTABLE (1 VIEW)   Final Result      1.  Increased moderate hazy bilateral parenchymal opacities which could be seen in the setting edema or infection.   2.  Interval removal of a right-sided central venous catheter.      EC-ECHOCARDIOGRAM COMPLETE W/O CONT   Final Result      DX-CHEST-PORTABLE (1 VIEW)   Final Result         1.  Pulmonary edema and/or infiltrates are identified, which are somewhat decreased since the prior exam.      DX-ANKLE 3+ VIEWS LEFT   Final Result      Soft tissue swelling. No acute fracture identified.      DX-CHEST-PORTABLE (1 VIEW)   Final Result      1.  Improved aeration of the RIGHT lung base   2.  Otherwise no interval change in BILATERAL atelectasis or airspace disease      DX-ABDOMEN FOR TUBE PLACEMENT   Final Result      Enteric tube projects over the stomach.      DX-CHEST-LIMITED (1 VIEW)   Final Result      New right IJ line tip overlies the SVC and no pneumothorax is identified      New right basilar opacity effaces the diaphragm and could be from atelectasis or consolidation      DX-CHEST-PORTABLE (1 VIEW)   Final Result      Endotracheal tube projects at the level of the aortic arch.      Increased perihilar and bibasilar opacities may represent a combination of edema, atelectasis, pneumonia.         DX-CHEST-PORTABLE (1 VIEW)   Final Result      Increasing reticular and hazy  opacity is compatible with edema      CT-SHOULDER W/O PLUS RECONS LEFT   Final Result         Prior resection of left humeral head. The residual head neck junction appears well corticated and could relate to chronic resection/deformity. There is also deformity and remodeling of the glenoid with well-corticated margin, suggesting of chronic change    as well.      No acute fracture.      There is no articulation between the proximal humerus and glenoid. The glenohumeral joint is replaced by loculated soft tissue or dense fluid, likely chronic change. The fluid is slightly less in 2018 CT.      Infection is considered less likely given the osseous cortex adjacent to the fluid is well corticated and demonstrates no rarefaction or destruction.      US-FOREIGN FILM ULTRASOUND   Final Result      OUTSIDE IMAGES-CT HEAD   Final Result      SL-[id number] FILM X-RAY   Final Result      OUTSIDE IMAGES-DX LOWER EXTREMITY, LEFT   Final Result      LC-[id number] FILM X-RAY   Final Result      OUTSIDE IMAGES-DX CHEST   Final Result      OUTSIDE IMAGES-DX UPPER EXTREMITY, LEFT   Final Result           Assessment/Plan  * Acute on chronic respiratory failure with hypoxia (HCC)- (present on admission)   Assessment & Plan    Patient initially had to be intubated.  Patient was extubated on 6/17/2019.  Right now oxygen requirements are stable.  And his respiratory status has stabilized.         Acute encephalopathy   Assessment & Plan    Patient's encephalopathy at this point has resolved.  Patient at this point is able to hold a conversation he is alert and awake and oriented 3.       OZ (acute kidney injury) (HCC)- (present on admission)   Assessment & Plan    Acute kidney injury pattern was most likely secondary to dehydration.  At this point patient's condition has stabilized.  Continue to monitor renal functions and avoid nephrotoxic medications.         Diabetic foot ulcer (HCC)- (present on admission)   Assessment &  Plan    Patient is now postoperative day #8 after left BKA.  Continue with pain control.  Previously has grown out enterococcus.  Antibiotics at this point have been completed.  He is pending at this point to discharge to an extended care facility.  Will be a difficult discharge due to insurance.     Septic shock due to undetermined organism (HCC)- (present on admission)   Assessment & Plan    Septic shock at this point has resolved.  Patient has completed antibiotics.  Source was the diabetic ulcer.  The patient's cultures grew out Prevotella bivia.      Pneumonia due to infectious organism- (present on admission)   Assessment & Plan    Resolved with antibiotic management.     SALOME (obstructive sleep apnea)   Assessment & Plan    Nocturnal oxygen support to be given.     Cardiomyopathy ([facility])- (present on admission)   Assessment & Plan    Most recent left ventricular ejection fraction is only 40%.  Continue at this point with beta-blocker and lisinopril.  Currently patient is euvolemic and patient does not need at this point diuresis.     DM (diabetes mellitus), type 2, uncontrolled (CMS-[facility])- (present on admission)   Assessment & Plan    -accus with sliding scale coverage  -diabetic diet  -diabetic education  -follow glycohemoglobin levels long term, most recent hemoglobin A1c is 12.2.  -continue with oral antihyperglycemics  -monitor for hypoglycemic episodes and adjust control if he should get low     Essential hypertension- (present on admission)   Assessment & Plan    Continue with blood pressure management optimization.  Currently blood pressure is stable at 110/66.     Hypoglycemia   Assessment & Plan    Resolved and blood sugars at this point have stabilized     Leucocytosis   Assessment & Plan    Most recent white blood cell count is 10.8.  This has slowly declined as the infection has resolved.     Chronic pain syndrome- (present on admission)   Assessment & Plan    Continue at this point with chronic  methadone treatment.  As an outpatient patient sees pain management specialist.     Left shoulder pain- (present on admission)   Assessment & Plan    Patient states that many years ago he fell onto his left shoulder.  At that point he had to undergo surgery but since then he has had no use of that shoulder.  He is able to use his hand but not the shoulder.  He has chronic pain in the shoulder.     Tobacco dependence- (present on admission)   Assessment & Plan    -nicotine replacement protocol and cessation education provided for 12 minutes, discussed options of nicotine patch, acupuncture, medical treatment with wellbutrin and chantix. Discussed other options. Code 61128     Hypokalemia- (present on admission)   Assessment & Plan    Currently 3.5 and patient is on potassium supplementation          VTE prophylaxis: Lovenox

## 2019-07-03 LAB
GLUCOSE BLD-MCNC: 105 MG/DL (ref 65–99)
GLUCOSE BLD-MCNC: 146 MG/DL (ref 65–99)
GLUCOSE BLD-MCNC: 235 MG/DL (ref 65–99)

## 2019-07-03 PROCEDURE — A9270 NON-COVERED ITEM OR SERVICE: HCPCS | Performed by: INTERNAL MEDICINE

## 2019-07-03 PROCEDURE — 700102 HCHG RX REV CODE 250 W/ 637 OVERRIDE(OP): Performed by: INTERNAL MEDICINE

## 2019-07-03 PROCEDURE — 700111 HCHG RX REV CODE 636 W/ 250 OVERRIDE (IP): Performed by: INTERNAL MEDICINE

## 2019-07-03 PROCEDURE — A9270 NON-COVERED ITEM OR SERVICE: HCPCS | Performed by: HOSPITALIST

## 2019-07-03 PROCEDURE — 99232 SBSQ HOSP IP/OBS MODERATE 35: CPT | Performed by: HOSPITALIST

## 2019-07-03 PROCEDURE — 700102 HCHG RX REV CODE 250 W/ 637 OVERRIDE(OP): Performed by: HOSPITALIST

## 2019-07-03 PROCEDURE — 770006 HCHG ROOM/CARE - MED/SURG/GYN SEMI*

## 2019-07-03 PROCEDURE — 97530 THERAPEUTIC ACTIVITIES: CPT

## 2019-07-03 PROCEDURE — 82962 GLUCOSE BLOOD TEST: CPT | Mod: 91

## 2019-07-03 RX ADMIN — TRAMADOL HYDROCHLORIDE 100 MG: 50 TABLET ORAL at 05:58

## 2019-07-03 RX ADMIN — PREGABALIN 300 MG: 150 CAPSULE ORAL at 04:07

## 2019-07-03 RX ADMIN — METOPROLOL TARTRATE 12.5 MG: 25 TABLET, FILM COATED ORAL at 16:41

## 2019-07-03 RX ADMIN — METHADONE HYDROCHLORIDE 20 MG: 10 TABLET ORAL at 16:40

## 2019-07-03 RX ADMIN — METOPROLOL TARTRATE 12.5 MG: 25 TABLET, FILM COATED ORAL at 04:09

## 2019-07-03 RX ADMIN — TRAMADOL HYDROCHLORIDE 100 MG: 50 TABLET ORAL at 14:01

## 2019-07-03 RX ADMIN — ACETAMINOPHEN 650 MG: 325 TABLET, FILM COATED ORAL at 16:41

## 2019-07-03 RX ADMIN — METHADONE HYDROCHLORIDE 20 MG: 10 TABLET ORAL at 04:07

## 2019-07-03 RX ADMIN — TRAMADOL HYDROCHLORIDE 100 MG: 50 TABLET ORAL at 21:45

## 2019-07-03 RX ADMIN — INSULIN LISPRO 3 UNITS: 100 INJECTION, SOLUTION INTRAVENOUS; SUBCUTANEOUS at 17:27

## 2019-07-03 RX ADMIN — ASPIRIN 81 MG 81 MG: 81 TABLET ORAL at 04:05

## 2019-07-03 RX ADMIN — SENNOSIDES,DOCUSATE SODIUM 2 TABLET: 8.6; 5 TABLET, FILM COATED ORAL at 16:41

## 2019-07-03 RX ADMIN — SENNOSIDES,DOCUSATE SODIUM 2 TABLET: 8.6; 5 TABLET, FILM COATED ORAL at 04:05

## 2019-07-03 RX ADMIN — PREGABALIN 300 MG: 150 CAPSULE ORAL at 16:40

## 2019-07-03 RX ADMIN — ENOXAPARIN SODIUM 40 MG: 100 INJECTION SUBCUTANEOUS at 04:05

## 2019-07-03 ASSESSMENT — COGNITIVE AND FUNCTIONAL STATUS - GENERAL
MOVING FROM LYING ON BACK TO SITTING ON SIDE OF FLAT BED: UNABLE
STANDING UP FROM CHAIR USING ARMS: A LITTLE
TURNING FROM BACK TO SIDE WHILE IN FLAT BAD: A LITTLE
WALKING IN HOSPITAL ROOM: TOTAL
CLIMB 3 TO 5 STEPS WITH RAILING: TOTAL
MOVING TO AND FROM BED TO CHAIR: A LITTLE
SUGGESTED CMS G CODE MODIFIER MOBILITY: CL
MOBILITY SCORE: 12

## 2019-07-03 ASSESSMENT — ENCOUNTER SYMPTOMS
SPEECH CHANGE: 0
FOCAL WEAKNESS: 0
NAUSEA: 0
COUGH: 0
SPUTUM PRODUCTION: 0
SEIZURES: 0
WEIGHT LOSS: 0
NEUROLOGICAL NEGATIVE: 1
CARDIOVASCULAR NEGATIVE: 1
FEVER: 0
RESPIRATORY NEGATIVE: 1
EYE DISCHARGE: 0
ABDOMINAL PAIN: 0
PSYCHIATRIC NEGATIVE: 1
EYES NEGATIVE: 1
MYALGIAS: 0
DEPRESSION: 0
EYE PAIN: 0
BACK PAIN: 0
CONSTITUTIONAL NEGATIVE: 1
GASTROINTESTINAL NEGATIVE: 1
WHEEZING: 0
PND: 0
CONSTIPATION: 0
SORE THROAT: 0
POLYDIPSIA: 0

## 2019-07-03 ASSESSMENT — LIFESTYLE VARIABLES: SUBSTANCE_ABUSE: 0

## 2019-07-03 ASSESSMENT — GAIT ASSESSMENTS: GAIT LEVEL OF ASSIST: UNABLE TO PARTICIPATE

## 2019-07-03 NOTE — CARE PLAN
Problem: Discharge Barriers/Planning  Goal: Patient's continuum of care needs will be met  Outcome: PROGRESSING AS EXPECTED      Problem: Skin Integrity  Goal: Risk for impaired skin integrity will decrease  Outcome: PROGRESSING AS EXPECTED

## 2019-07-03 NOTE — THERAPY
"Physical Therapy Treatment completed.   Bed Mobility:  Supine to Sit: Minimal Assist  Transfers: Sit to Stand: Unable to Participate  Gait: Level Of Assist: Unable to Participate   Plan of Care: Will benefit from Physical Therapy 4 times per week  Discharge Recommendations: Equipment: Will Continue to Assess for Equipment Needs. Post-acute therapy Recommend post-acute placement for continued physical therapy services prior to discharge home.       See \"Rehab Therapy-Acute\" Patient Summary Report for complete documentation.     Pt was pleasant and continues to be highly motivated to participate in therapy session. He completed slideboard transfer into wheelchair today with Candi. He was able to initiate set up but ultimately required assist for correct positioning.  Pt will benefit from continued practice with wheelchair mobility to increase his functional independence. Continue to recommend post acute therapy prior to dc home. Will continue to follow while in house.   "

## 2019-07-03 NOTE — PROGRESS NOTES
2 RN skin check completed with DAKOTA Sanches.  Devices in place: nasal cannula and knee immobilizer in place to left lower extremity.   Skin assessed under devices: No skin breakdown noted.   Foam dressing intact to right heel, daily dressing changes in place to left BKA,   The following interventions in place: Foam dressing applied to back f thigh to protect from knee immobilizer, floated right heel with pillow.

## 2019-07-03 NOTE — CARE PLAN
Problem: Communication  Goal: The ability to communicate needs accurately and effectively will improve    Intervention: Educate patient and significant other/support system about the plan of care, procedures, treatments, medications and allow for questions  Plan for MRI and arterial study, pending blood cultures, plan of care discussed with patient, patient verbalized understanding.       Problem: Skin Integrity  Goal: Risk for impaired skin integrity will decrease    Intervention: Assess and monitor skin integrity, appearance and/or temperature  LPS assessed wound to left heel, wound consult placed, pending dressing orders.

## 2019-07-03 NOTE — CARE PLAN
Problem: Discharge Barriers/Planning  Goal: Patient's continuum of care needs will be met    Intervention: Assess potential discharge barriers on admission and throughout hospital stay  Possible plan for rehab, PT notes updated, discharge planning in progress.       Problem: Mobility  Goal: Risk for activity intolerance will decrease  Outcome: PROGRESSING AS EXPECTED  Patient participated with physical therapy today, slide board transfer to wheelchair, one assist.

## 2019-07-03 NOTE — CARE PLAN
Problem: Venous Thromboembolism (VTW)/Deep Vein Thrombosis (DVT) Prevention:  Goal: Patient will participate in Venous Thrombosis (VTE)/Deep Vein Thrombosis (DVT)Prevention Measures    Intervention: Assess and monitor for anticoagulation complications  Continues on anticoagulant therapy; No visible s/s of active bleeding noted.       Problem: Pain Management  Goal: Pain level will decrease to patient's comfort goal    Intervention: Educate and implement non-pharmacologic comfort measures. Examples: relaxation, distration, play therapy, activity therapy, massage, etc.  Complained of pain to LLE; PRN Tramadol given per MAR with + effect.

## 2019-07-03 NOTE — PROGRESS NOTES
[facility] Medicine Daily Progress Note    Date of Service  7/3/2019    Chief Complaint  63 y.o. male admitted 6/15/2019 with left heel pain.    Hospital Course    Patient is a 63-year-old male who has diabetes that was uncontrolled.  The patient developed a diabetic foot ulcer on the left foot.  The patient's foot ulcer progressed into sepsis.  The patient actually had to be intubated mechanically ventilated and was getting sepsis resuscitation treatment in the ICU.  Patient initially grew out some enterococcus and then later grew out Prevotella.  Patient has completed at this point antibiotics.  Patient is been extubated and is on room air at this point in time.  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  At this point in time is postoperative day #8.  Patient is recovering at this point well and at this point we are anticipating discharging him once we have located a the best discharge plan for him.  Today patient is improved further.  He is at this point able to sit at bedside for several hours.  He says if the hand-foot hangs down he does have quite a bit of pain in the foot from gravity.  Also.  Patient will need continued pain management as well as further improvement in his ambulation.      Interval Problem Update  Continue therapy.  Continue with pain management.  Work with  closely to try and arrange discharge planning for him.    Consultants/Specialty  Physiatry, infectious diseases, orthopedic surgery, pulmonary.    Code Status  Full code    Disposition  Discharge to extended care facility once bed is located for him and he is excepted.    Review of Systems  Review of Systems   Constitutional: Negative.  Negative for fever, malaise/fatigue and weight loss.   HENT: Negative.  Negative for ear pain, nosebleeds and sore throat.    Eyes: Negative.  Negative for pain and discharge.   Respiratory: Negative.  Negative for cough, sputum production and wheezing.    Cardiovascular: Negative.  Negative for  chest pain, leg swelling and PND.   Gastrointestinal: Negative.  Negative for abdominal pain, constipation and nausea.   Genitourinary: Negative.  Negative for dysuria.   Musculoskeletal: Positive for joint pain. Negative for back pain and myalgias.   Skin: Negative.  Negative for itching.   Neurological: Negative.  Negative for speech change, focal weakness and seizures.   Endo/Heme/Allergies: Negative.  Negative for polydipsia.   Psychiatric/Behavioral: Negative.  Negative for depression and substance abuse.   All other systems reviewed and are negative.       Physical Exam  Temp:  [36.3 °C (97.4 °F)-36.8 °C (98.2 °F)] 36.7 °C (98 °F)  Pulse:  [68-75] 68  Resp:  [16-17] 17  BP: (111-131)/(54-66) 111/54  SpO2:  [92 %-97 %] 93 %    Physical Exam   Constitutional: He is oriented to person, place, and time. He appears well-developed and well-nourished. No distress.   HENT:   Head: Normocephalic and atraumatic.   Right Ear: External ear normal.   Left Ear: External ear normal.   Eyes: Pupils are equal, round, and reactive to light. Conjunctivae and EOM are normal. Right eye exhibits no discharge. Left eye exhibits no discharge.   Neck: Normal range of motion. Neck supple. No thyromegaly present.   Cardiovascular: Normal rate and regular rhythm.    Pulmonary/Chest: Effort normal and breath sounds normal. No stridor. No respiratory distress. He has no wheezes. He has no rales.   Abdominal: Soft. Bowel sounds are normal. He exhibits distension. There is no tenderness. There is no rebound.   Musculoskeletal: He exhibits no edema.        Left shoulder: He exhibits decreased range of motion, tenderness and deformity.        Legs:  Neurological: He is alert and oriented to person, place, and time. He has normal reflexes. No cranial nerve deficit. Coordination normal.   Skin: Skin is warm and dry. He is not diaphoretic. No erythema.   Psychiatric: He has a normal mood and affect. His behavior is normal. Judgment and thought  content normal.   Nursing note and vitals reviewed.      Fluids    Intake/Output Summary (Last 24 hours) at 07/03/19 1425  Last data filed at 07/03/19 1200   Gross per 24 hour   Intake              476 ml   Output             1650 ml   Net            -1174 ml       Laboratory                        Imaging  CT-HEAD W/O   Final Result      1.  No evidence of acute territorial infarct, intracranial hemorrhage or mass lesion.   2.  Mild diffuse cerebral substance loss.   3.  Mild microangiopathic ischemic change versus demyelination or gliosis.      DX-CHEST-PORTABLE (1 VIEW)   Final Result      1.  Increased moderate hazy bilateral parenchymal opacities which could be seen in the setting edema or infection.   2.  Interval removal of a right-sided central venous catheter.      EC-ECHOCARDIOGRAM COMPLETE W/O CONT   Final Result      DX-CHEST-PORTABLE (1 VIEW)   Final Result         1.  Pulmonary edema and/or infiltrates are identified, which are somewhat decreased since the prior exam.      DX-ANKLE 3+ VIEWS LEFT   Final Result      Soft tissue swelling. No acute fracture identified.      DX-CHEST-PORTABLE (1 VIEW)   Final Result      1.  Improved aeration of the RIGHT lung base   2.  Otherwise no interval change in BILATERAL atelectasis or airspace disease      DX-ABDOMEN FOR TUBE PLACEMENT   Final Result      Enteric tube projects over the stomach.      DX-CHEST-LIMITED (1 VIEW)   Final Result      New right IJ line tip overlies the SVC and no pneumothorax is identified      New right basilar opacity effaces the diaphragm and could be from atelectasis or consolidation      DX-CHEST-PORTABLE (1 VIEW)   Final Result      Endotracheal tube projects at the level of the aortic arch.      Increased perihilar and bibasilar opacities may represent a combination of edema, atelectasis, pneumonia.         DX-CHEST-PORTABLE (1 VIEW)   Final Result      Increasing reticular and hazy opacity is compatible with edema       CT-SHOULDER W/O PLUS RECONS LEFT   Final Result         Prior resection of left humeral head. The residual head neck junction appears well corticated and could relate to chronic resection/deformity. There is also deformity and remodeling of the glenoid with well-corticated margin, suggesting of chronic change    as well.      No acute fracture.      There is no articulation between the proximal humerus and glenoid. The glenohumeral joint is replaced by loculated soft tissue or dense fluid, likely chronic change. The fluid is slightly less in 2018 CT.      Infection is considered less likely given the osseous cortex adjacent to the fluid is well corticated and demonstrates no rarefaction or destruction.      US-FOREIGN FILM ULTRASOUND   Final Result      OUTSIDE IMAGES-CT HEAD   Final Result      [id number] FILM X-RAY   Final Result      OUTSIDE IMAGES-DX LOWER EXTREMITY, LEFT   Final Result      [id number] FILM X-RAY   Final Result      OUTSIDE IMAGES-DX CHEST   Final Result      OUTSIDE IMAGES-DX UPPER EXTREMITY, LEFT   Final Result           Assessment/Plan  * Acute on chronic respiratory failure with hypoxia (HCC)- (present on admission)   Assessment & Plan    Resolved.  Extubated 6/17/2019.         Acute encephalopathy   Assessment & Plan    Resolved       OZ (acute kidney injury) (HCC)- (present on admission)   Assessment & Plan    Was secondary to dehydration, now he is back to his baseline.         Diabetic foot ulcer (HCC)- (present on admission)   Assessment & Plan    Status post BKA day #9.  Continue with pain management.  Start increasing ambulation     Septic shock due to undetermined organism (HCC)- (present on admission)   Assessment & Plan    Resolved     Pneumonia due to infectious organism- (present on admission)   Assessment & Plan    Treated to resolution with antibiotics.     SALOME (obstructive sleep apnea)   Assessment & Plan    Continue with nocturnal oxygen support      Cardiomyopathy (HCC)- (present on admission)   Assessment & Plan    Left ventricular ejection fraction 40%.  Continue at this point with medication management using beta-blocker and ACE inhibitor.     DM (diabetes mellitus), type 2, uncontrolled (CMS-HCC)- (present on admission)   Assessment & Plan    -accus with sliding scale coverage continued, patient is on Humalog 5 units subcutaneously 3 times daily  -diabetic diet continued  -diabetic education  -follow glycohemoglobin levels long term, most recent hemoglobin A1c is 12.2.  -continue with oral antihyperglycemics  -monitor for hypoglycemic episodes and adjust control if he should get low     Essential hypertension- (present on admission)   Assessment & Plan    Blood pressure at this point well controlled most recent blood pressure is 111/54.     Hypoglycemia   Assessment & Plan    Monitor blood sugars     Leucocytosis   Assessment & Plan    Resolved     Chronic pain syndrome- (present on admission)   Assessment & Plan    Patient is optimized on methadone treatment     Left shoulder pain- (present on admission)   Assessment & Plan    Secondary to remote injury.     Tobacco dependence- (present on admission)   Assessment & Plan    -nicotine replacement protocol and cessation education provided for 12 minutes, discussed options of nicotine patch, acupuncture, medical treatment with wellbutrin and chantix. Discussed other options. Code 89824     Hypokalemia- (present on admission)   Assessment & Plan    Monitor potassium level and supplement orally          VTE prophylaxis: Lovenox

## 2019-07-04 LAB
GLUCOSE BLD-MCNC: 127 MG/DL (ref 65–99)
GLUCOSE BLD-MCNC: 137 MG/DL (ref 65–99)
GLUCOSE BLD-MCNC: 158 MG/DL (ref 65–99)
GLUCOSE BLD-MCNC: 170 MG/DL (ref 65–99)

## 2019-07-04 PROCEDURE — 97535 SELF CARE MNGMENT TRAINING: CPT

## 2019-07-04 PROCEDURE — A9270 NON-COVERED ITEM OR SERVICE: HCPCS | Performed by: INTERNAL MEDICINE

## 2019-07-04 PROCEDURE — 700102 HCHG RX REV CODE 250 W/ 637 OVERRIDE(OP): Performed by: INTERNAL MEDICINE

## 2019-07-04 PROCEDURE — 82962 GLUCOSE BLOOD TEST: CPT

## 2019-07-04 PROCEDURE — 97116 GAIT TRAINING THERAPY: CPT

## 2019-07-04 PROCEDURE — 99232 SBSQ HOSP IP/OBS MODERATE 35: CPT | Performed by: HOSPITALIST

## 2019-07-04 PROCEDURE — 770006 HCHG ROOM/CARE - MED/SURG/GYN SEMI*

## 2019-07-04 PROCEDURE — A9270 NON-COVERED ITEM OR SERVICE: HCPCS | Performed by: HOSPITALIST

## 2019-07-04 PROCEDURE — 700102 HCHG RX REV CODE 250 W/ 637 OVERRIDE(OP): Performed by: HOSPITALIST

## 2019-07-04 PROCEDURE — 700111 HCHG RX REV CODE 636 W/ 250 OVERRIDE (IP): Performed by: INTERNAL MEDICINE

## 2019-07-04 PROCEDURE — 97530 THERAPEUTIC ACTIVITIES: CPT

## 2019-07-04 RX ADMIN — METHADONE HYDROCHLORIDE 20 MG: 10 TABLET ORAL at 16:33

## 2019-07-04 RX ADMIN — ENOXAPARIN SODIUM 40 MG: 100 INJECTION SUBCUTANEOUS at 05:44

## 2019-07-04 RX ADMIN — PREGABALIN 300 MG: 150 CAPSULE ORAL at 05:45

## 2019-07-04 RX ADMIN — INSULIN LISPRO 2 UNITS: 100 INJECTION, SOLUTION INTRAVENOUS; SUBCUTANEOUS at 21:12

## 2019-07-04 RX ADMIN — METOPROLOL TARTRATE 12.5 MG: 25 TABLET, FILM COATED ORAL at 16:33

## 2019-07-04 RX ADMIN — TRAMADOL HYDROCHLORIDE 100 MG: 50 TABLET ORAL at 21:11

## 2019-07-04 RX ADMIN — PREGABALIN 300 MG: 150 CAPSULE ORAL at 16:33

## 2019-07-04 RX ADMIN — METHADONE HYDROCHLORIDE 20 MG: 10 TABLET ORAL at 05:43

## 2019-07-04 RX ADMIN — METOPROLOL TARTRATE 12.5 MG: 25 TABLET, FILM COATED ORAL at 05:45

## 2019-07-04 RX ADMIN — INSULIN LISPRO 2 UNITS: 100 INJECTION, SOLUTION INTRAVENOUS; SUBCUTANEOUS at 16:32

## 2019-07-04 RX ADMIN — ASPIRIN 81 MG 81 MG: 81 TABLET ORAL at 05:43

## 2019-07-04 RX ADMIN — SENNOSIDES,DOCUSATE SODIUM 2 TABLET: 8.6; 5 TABLET, FILM COATED ORAL at 05:46

## 2019-07-04 RX ADMIN — TRAMADOL HYDROCHLORIDE 100 MG: 50 TABLET ORAL at 11:16

## 2019-07-04 ASSESSMENT — COGNITIVE AND FUNCTIONAL STATUS - GENERAL
MOVING TO AND FROM BED TO CHAIR: A LITTLE
TURNING FROM BACK TO SIDE WHILE IN FLAT BAD: A LITTLE
CLIMB 3 TO 5 STEPS WITH RAILING: TOTAL
PERSONAL GROOMING: A LITTLE
TOILETING: A LOT
DRESSING REGULAR LOWER BODY CLOTHING: A LOT
HELP NEEDED FOR BATHING: A LOT
SUGGESTED CMS G CODE MODIFIER MOBILITY: CL
MOBILITY SCORE: 12
MOVING FROM LYING ON BACK TO SITTING ON SIDE OF FLAT BED: UNABLE
DAILY ACTIVITIY SCORE: 15
SUGGESTED CMS G CODE MODIFIER DAILY ACTIVITY: CK
DRESSING REGULAR UPPER BODY CLOTHING: A LITTLE
EATING MEALS: A LITTLE
STANDING UP FROM CHAIR USING ARMS: A LITTLE
WALKING IN HOSPITAL ROOM: TOTAL

## 2019-07-04 ASSESSMENT — ENCOUNTER SYMPTOMS
CARDIOVASCULAR NEGATIVE: 1
WEIGHT LOSS: 0
TREMORS: 0
GASTROINTESTINAL NEGATIVE: 1
STRIDOR: 0
HEADACHES: 0
DEPRESSION: 0
CLAUDICATION: 0
CONSTITUTIONAL NEGATIVE: 1
EYES NEGATIVE: 1
RESPIRATORY NEGATIVE: 1
TINGLING: 0
PSYCHIATRIC NEGATIVE: 1
SPUTUM PRODUCTION: 0
VOMITING: 0
BACK PAIN: 0
ORTHOPNEA: 0
BLURRED VISION: 0
DOUBLE VISION: 0
NAUSEA: 0
SENSORY CHANGE: 0
COUGH: 0
HEARTBURN: 0
SHORTNESS OF BREATH: 0
NEUROLOGICAL NEGATIVE: 1

## 2019-07-04 ASSESSMENT — GAIT ASSESSMENTS: GAIT LEVEL OF ASSIST: UNABLE TO PARTICIPATE

## 2019-07-04 ASSESSMENT — LIFESTYLE VARIABLES: SUBSTANCE_ABUSE: 0

## 2019-07-04 NOTE — PROGRESS NOTES
Report received from Verónica DUNCAN. Assumed care at 1530. A/O x4. VSS. Responds appropriately. No complaints of pain. Assessment complete. Agree with previous RN charting.   Safety precautions in place.

## 2019-07-04 NOTE — PROGRESS NOTES
2 RN skin check completed with DAKOTA Sanches.  Devices in place: nasal cannula and knee immobilizer in place to left lower extremity.   Skin assessed under devices: No skin breakdown noted.   Foam dressing intact to right heel, daily dressing changes in place to left BKA,   The following interventions in place: Foam dressing applied to back of thigh to protect from knee immobilizer, floated right heel with pillow.

## 2019-07-04 NOTE — CARE PLAN
Problem: Skin Integrity  Goal: Risk for impaired skin integrity will decrease  Patient has pillows in use for support.  Patient has a mepilex in place.  Barrier paste is in use.  Patient is repositioned and encouraged to change position.

## 2019-07-04 NOTE — DISCHARGE PLANNING
Anticipated Discharge Disposition:   IRF following    Action:   Chart review    Barriers to Discharge: ***    Plan: ***

## 2019-07-04 NOTE — DISCHARGE PLANNING
F/U for Rehab. Admission will require prior authorization from insurance. Do not anticipate agency open due to July 4 holiday. Will submit Friday.

## 2019-07-04 NOTE — CARE PLAN
Problem: Discharge Barriers/Planning  Goal: Patient's continuum of care needs will be met    Intervention: Involve patient and significant other/support system in setting and prioritizing goals for hospital stay and discharge  Rehab screening in progress, social work on board.       Problem: Skin Integrity  Goal: Risk for impaired skin integrity will decrease    Intervention: Assess and monitor skin integrity, appearance and/or temperature  Wound dressings in place.

## 2019-07-04 NOTE — DISCHARGE PLANNING
Anticipated Discharge Disposition:   IRF following    Action:   Chart review.  Discussed with Dr. Tapia during IDT rounds. Notified MD that IRF will be submitting auth tomorrow.     Barriers to Discharge:   Pending insurance auth    Plan:   Follow up with IRF.

## 2019-07-04 NOTE — THERAPY
"Occupational Therapy Treatment completed with focus on ADLs, ADL transfers and patient education.  Functional Status:  Pt seen for OT tx. Mod A to don pants. Pt able to roll and pull up over hips but requires assistance to initiate donning. Min A seated SB transfer from EOB > WC. Continues to have limited ROM of LUE. Completed seated grooming w/ setup and supv. Min A to don R boot. Pt able to use LUE to assist w/ propelling WC but limited by weakness and pain. Mod A sit > stand, squat pivot transfer BTB. Pt c/o pain. Continues to express motivation to regain strength, endurance and independence in ADLs and ADL transfers. Pt would continue to benefit from OT services while in-house.   Plan of Care: Will benefit from Occupational Therapy 4 times per week  Discharge Recommendations:  Equipment Will Continue to Assess for Equipment Needs. Post-acute therapy Discharge to a transitional care facility for continued skilled therapy services.    See \"Rehab Therapy-Acute\" Patient Summary Report for complete documentation.   "

## 2019-07-04 NOTE — PROGRESS NOTES
[facility] Medicine Daily Progress Note    Date of Service  7/4/2019    Chief Complaint  63 y.o. male admitted 6/15/2019 with left heel pain.    Hospital Course   7/2/2019-patient is a 63-year-old male who has diabetes that was uncontrolled.  The patient developed a diabetic foot ulcer on the left foot.  The patient's foot ulcer progressed into sepsis.  The patient actually had to be intubated mechanically ventilated and was getting sepsis resuscitation treatment in the ICU.  Patient initially grew out some enterococcus and then later grew out Prevotella.  Patient has completed at this point antibiotics.  Patient is been extubated and is on room air at this point in time.  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  At this point in time is postoperative day #8.  Patient is recovering at this point well and at this point we are anticipating discharging him once we have located a the best discharge plan for him.  7/3/2019-today patient is improved further.  He is at this point able to sit at bedside for several hours.  He says if the hand-foot hangs down he does have quite a bit of pain in the foot from gravity.  Also.  Patient will need continued pain management as well as further improvement in his ambulation.  7/4/2019-today patient is anxious hospital.  He is been sitting at bedside and started to work with therapies more more.  His pain is down to 6 out of 10 and continues to decline.  So far we do not have an accepting facility.      Interval Problem Update  Pain management to continue.  Monitor potassium levels and glucose levels and adjust medication management to stabilize and keep in the therapeutic range.  Continue with therapy.  Pending discharge once bed is located and insurance is approved.    Consultants/Specialty  Physiatry, infectious diseases, orthopedic surgery, pulmonary.    Code Status  Full code    Disposition  Discharge to extended care facility once bed is located for him and he is excepted.    Review  of Systems  Review of Systems   Constitutional: Negative.  Negative for malaise/fatigue and weight loss.   HENT: Negative.  Negative for congestion and hearing loss.    Eyes: Negative.  Negative for blurred vision and double vision.   Respiratory: Negative.  Negative for cough, sputum production, shortness of breath and stridor.    Cardiovascular: Negative.  Negative for chest pain, orthopnea and claudication.   Gastrointestinal: Negative.  Negative for heartburn, nausea and vomiting.   Genitourinary: Negative.  Negative for dysuria, frequency and urgency.   Musculoskeletal: Positive for joint pain (6/10). Negative for back pain.   Skin: Negative.  Negative for itching and rash.   Neurological: Negative.  Negative for tingling, tremors, sensory change and headaches.   Endo/Heme/Allergies: Negative.    Psychiatric/Behavioral: Negative.  Negative for depression, substance abuse and suicidal ideas.   All other systems reviewed and are negative.       Physical Exam  Temp:  [36.1 °C (97 °F)-36.9 °C (98.5 °F)] 36.1 °C (97 °F)  Pulse:  [73-85] 79  Resp:  [16-18] 18  BP: (126-142)/(60-66) 126/65  SpO2:  [92 %-96 %] 92 %    Physical Exam   Constitutional: He is oriented to person, place, and time. He appears well-developed and well-nourished. He is cooperative. He does not have a sickly appearance.   HENT:   Head: Normocephalic and atraumatic.   Right Ear: External ear normal.   Left Ear: External ear normal.   Eyes: Pupils are equal, round, and reactive to light. Conjunctivae and EOM are normal.   Neck: Normal range of motion. Neck supple. No JVD present.   Cardiovascular: Normal rate and regular rhythm.    Pulmonary/Chest: Effort normal and breath sounds normal. No stridor. No respiratory distress. He has no wheezes. He exhibits no tenderness.   Abdominal: Soft. Bowel sounds are normal. He exhibits distension. There is no tenderness. There is no guarding.   Musculoskeletal: He exhibits no edema or tenderness.        Left  shoulder: He exhibits decreased range of motion and deformity.        Legs:  Neurological: He is alert and oriented to person, place, and time. He has normal reflexes. No cranial nerve deficit. Coordination normal. GCS eye subscore is 4. GCS verbal subscore is 5. GCS motor subscore is 6.   Skin: Skin is warm and dry. No erythema. No pallor.   Psychiatric: He has a normal mood and affect. His behavior is normal. Judgment and thought content normal.   Nursing note and vitals reviewed.      Fluids    Intake/Output Summary (Last 24 hours) at 07/04/19 1353  Last data filed at 07/04/19 1141   Gross per 24 hour   Intake              360 ml   Output             2330 ml   Net            -1970 ml       Laboratory                        Imaging  CT-HEAD W/O   Final Result      1.  No evidence of acute territorial infarct, intracranial hemorrhage or mass lesion.   2.  Mild diffuse cerebral substance loss.   3.  Mild microangiopathic ischemic change versus demyelination or gliosis.      DX-CHEST-PORTABLE (1 VIEW)   Final Result      1.  Increased moderate hazy bilateral parenchymal opacities which could be seen in the setting edema or infection.   2.  Interval removal of a right-sided central venous catheter.      EC-ECHOCARDIOGRAM COMPLETE W/O CONT   Final Result      DX-CHEST-PORTABLE (1 VIEW)   Final Result         1.  Pulmonary edema and/or infiltrates are identified, which are somewhat decreased since the prior exam.      DX-ANKLE 3+ VIEWS LEFT   Final Result      Soft tissue swelling. No acute fracture identified.      DX-CHEST-PORTABLE (1 VIEW)   Final Result      1.  Improved aeration of the RIGHT lung base   2.  Otherwise no interval change in BILATERAL atelectasis or airspace disease      DX-ABDOMEN FOR TUBE PLACEMENT   Final Result      Enteric tube projects over the stomach.      DX-CHEST-LIMITED (1 VIEW)   Final Result      New right IJ line tip overlies the SVC and no pneumothorax is identified      New right  basilar opacity effaces the diaphragm and could be from atelectasis or consolidation      DX-CHEST-PORTABLE (1 VIEW)   Final Result      Endotracheal tube projects at the level of the aortic arch.      Increased perihilar and bibasilar opacities may represent a combination of edema, atelectasis, pneumonia.         DX-CHEST-PORTABLE (1 VIEW)   Final Result      Increasing reticular and hazy opacity is compatible with edema      CT-SHOULDER W/O PLUS RECONS LEFT   Final Result         Prior resection of left humeral head. The residual head neck junction appears well corticated and could relate to chronic resection/deformity. There is also deformity and remodeling of the glenoid with well-corticated margin, suggesting of chronic change    as well.      No acute fracture.      There is no articulation between the proximal humerus and glenoid. The glenohumeral joint is replaced by loculated soft tissue or dense fluid, likely chronic change. The fluid is slightly less in 2018 CT.      Infection is considered less likely given the osseous cortex adjacent to the fluid is well corticated and demonstrates no rarefaction or destruction.      US-FOREIGN FILM ULTRASOUND   Final Result      OUTSIDE IMAGES-CT HEAD   Final Result      [id number] FILM X-RAY   Final Result      OUTSIDE IMAGES-DX LOWER EXTREMITY, LEFT   Final Result      [id number] FILM X-RAY   Final Result      OUTSIDE IMAGES-DX CHEST   Final Result      OUTSIDE IMAGES-DX UPPER EXTREMITY, LEFT   Final Result           Assessment/Plan  * Acute on chronic respiratory failure with hypoxia (HCC)- (present on admission)   Assessment & Plan    Respiratory status currently stable continue with oxygen as needed continue RT protocol as needed         Acute encephalopathy   Assessment & Plan    Resolved       OZ (acute kidney injury) (HCC)- (present on admission)   Assessment & Plan    Resolved       Diabetic foot ulcer (HCC)- (present on admission)    Assessment & Plan    Status post BKA day #10  Pain management is better and his pain is down to 6 out of 10.  Continue with therapy, patient is sitting at bedside now several hours per day.     Septic shock due to undetermined organism ([facility])- (present on admission)   Assessment & Plan    Resolved     Pneumonia due to infectious organism- (present on admission)   Assessment & Plan    Status post antibiotic treatment     SALOME (obstructive sleep apnea)   Assessment & Plan    Nocturnal O2 support     Cardiomyopathy ([facility])- (present on admission)   Assessment & Plan    Left ventricular ejection fraction 40%.  Continue at this point with medication management using beta-blocker and ACE inhibitor.     DM (diabetes mellitus), type 2, uncontrolled (CMS-[facility])- (present on admission)   Assessment & Plan    -accus with sliding scale coverage continued, continue with 3 times daily Humalog 5 units  -diabetic diet continued  -diabetic education provided  -follow glycohemoglobin levels long term, most recent hemoglobin A1c is 12.2.  -continue with oral antihyperglycemics  -monitor for hypoglycemic episodes and adjust control if he should get low     Essential hypertension- (present on admission)   Assessment & Plan    Continue with blood pressure management to keep systolic blood pressure less than 140 diastolic under 90 most recently 126/65     Hypoglycemia   Assessment & Plan    Resolved     Leucocytosis   Assessment & Plan    Resolved     Chronic pain syndrome- (present on admission)   Assessment & Plan    Continue methadone treatment     Mucus plugging of bronchi   Assessment & Plan    Resolved     Left shoulder pain- (present on admission)   Assessment & Plan    Patient's left shoulder is frozen.  This is from a remote injury.     Tobacco dependence- (present on admission)   Assessment & Plan    -nicotine replacement protocol and cessation education provided for 12 minutes, discussed options of nicotine patch, acupuncture,  medical treatment with wellbutrin and chantix. Discussed other options. Code 19153     Hypokalemia- (present on admission)   Assessment & Plan    On supplements and monitored          VTE prophylaxis: Lovenox

## 2019-07-05 LAB
GLUCOSE BLD-MCNC: 142 MG/DL (ref 65–99)
GLUCOSE BLD-MCNC: 159 MG/DL (ref 65–99)
GLUCOSE BLD-MCNC: 181 MG/DL (ref 65–99)
GLUCOSE BLD-MCNC: 184 MG/DL (ref 65–99)
GLUCOSE BLD-MCNC: 202 MG/DL (ref 65–99)

## 2019-07-05 PROCEDURE — 700102 HCHG RX REV CODE 250 W/ 637 OVERRIDE(OP): Performed by: INTERNAL MEDICINE

## 2019-07-05 PROCEDURE — 82962 GLUCOSE BLOOD TEST: CPT | Mod: 91

## 2019-07-05 PROCEDURE — A9270 NON-COVERED ITEM OR SERVICE: HCPCS | Performed by: INTERNAL MEDICINE

## 2019-07-05 PROCEDURE — 700102 HCHG RX REV CODE 250 W/ 637 OVERRIDE(OP): Performed by: HOSPITALIST

## 2019-07-05 PROCEDURE — 770006 HCHG ROOM/CARE - MED/SURG/GYN SEMI*

## 2019-07-05 PROCEDURE — 99232 SBSQ HOSP IP/OBS MODERATE 35: CPT | Performed by: HOSPITALIST

## 2019-07-05 PROCEDURE — 700111 HCHG RX REV CODE 636 W/ 250 OVERRIDE (IP): Performed by: INTERNAL MEDICINE

## 2019-07-05 PROCEDURE — A9270 NON-COVERED ITEM OR SERVICE: HCPCS | Performed by: HOSPITALIST

## 2019-07-05 RX ADMIN — ACETAMINOPHEN 650 MG: 325 TABLET, FILM COATED ORAL at 01:01

## 2019-07-05 RX ADMIN — METOPROLOL TARTRATE 12.5 MG: 25 TABLET, FILM COATED ORAL at 04:04

## 2019-07-05 RX ADMIN — TRAMADOL HYDROCHLORIDE 100 MG: 50 TABLET ORAL at 10:46

## 2019-07-05 RX ADMIN — TRAMADOL HYDROCHLORIDE 100 MG: 50 TABLET ORAL at 23:17

## 2019-07-05 RX ADMIN — TRAMADOL HYDROCHLORIDE 100 MG: 50 TABLET ORAL at 17:11

## 2019-07-05 RX ADMIN — METOPROLOL TARTRATE 12.5 MG: 25 TABLET, FILM COATED ORAL at 17:10

## 2019-07-05 RX ADMIN — METHADONE HYDROCHLORIDE 20 MG: 10 TABLET ORAL at 17:11

## 2019-07-05 RX ADMIN — METHADONE HYDROCHLORIDE 20 MG: 10 TABLET ORAL at 04:03

## 2019-07-05 RX ADMIN — INSULIN LISPRO 2 UNITS: 100 INJECTION, SOLUTION INTRAVENOUS; SUBCUTANEOUS at 06:12

## 2019-07-05 RX ADMIN — ASPIRIN 81 MG 81 MG: 81 TABLET ORAL at 04:03

## 2019-07-05 RX ADMIN — PREGABALIN 300 MG: 150 CAPSULE ORAL at 04:03

## 2019-07-05 RX ADMIN — ENOXAPARIN SODIUM 40 MG: 100 INJECTION SUBCUTANEOUS at 04:04

## 2019-07-05 RX ADMIN — TRAMADOL HYDROCHLORIDE 100 MG: 50 TABLET ORAL at 04:03

## 2019-07-05 RX ADMIN — PREGABALIN 300 MG: 150 CAPSULE ORAL at 17:10

## 2019-07-05 RX ADMIN — INSULIN LISPRO 2 UNITS: 100 INJECTION, SOLUTION INTRAVENOUS; SUBCUTANEOUS at 21:03

## 2019-07-05 ASSESSMENT — ENCOUNTER SYMPTOMS
CHILLS: 0
EYES NEGATIVE: 1
HEARTBURN: 0
INSOMNIA: 0
BACK PAIN: 0
NEUROLOGICAL NEGATIVE: 1
MEMORY LOSS: 0
NAUSEA: 0
CARDIOVASCULAR NEGATIVE: 1
TREMORS: 0
DIAPHORESIS: 0
PSYCHIATRIC NEGATIVE: 1
FEVER: 0
TINGLING: 0
HEADACHES: 0
SEIZURES: 0
CONSTIPATION: 0
SORE THROAT: 0
SENSORY CHANGE: 0
LOSS OF CONSCIOUSNESS: 0
MYALGIAS: 1
GASTROINTESTINAL NEGATIVE: 1
BLOOD IN STOOL: 0
VOMITING: 0
DOUBLE VISION: 0
STRIDOR: 0
HEMOPTYSIS: 0
COUGH: 0
CLAUDICATION: 0
DEPRESSION: 0
RESPIRATORY NEGATIVE: 1
BLURRED VISION: 0
WHEEZING: 0
CONSTITUTIONAL NEGATIVE: 1
FLANK PAIN: 0

## 2019-07-05 ASSESSMENT — LIFESTYLE VARIABLES: SUBSTANCE_ABUSE: 0

## 2019-07-05 NOTE — PREADMISSION SCREENING NOTE
Updated Pre-Screen Assessment     Name: Travon Pollack  MRN: [id number]  : 1955    Medical Status/ Changes:     Manjinder Tapia M.D. Physician Signed Hospital Medicine Progress Notes Date of Service: 2019  7:14 AM         []Hide copied text  Hospital Medicine Daily Progress Note     Date of Service  2019     Chief Complaint  63 y.o. male admitted 6/15/2019 with left heel pain.     Hospital Course   2019-patient is a 63-year-old male who has diabetes that was uncontrolled.  The patient developed a diabetic foot ulcer on the left foot.  The patient's foot ulcer progressed into sepsis.  The patient actually had to be intubated mechanically ventilated and was getting sepsis resuscitation treatment in the ICU.  Patient initially grew out some enterococcus and then later grew out Prevotella.  Patient has completed at this point antibiotics.  Patient is been extubated and is on room air at this point in time.  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  At this point in time is postoperative day #8.  Patient is recovering at this point well and at this point we are anticipating discharging him once we have located a the best discharge plan for him.  7/3/2019-today patient is improved further.  He is at this point able to sit at bedside for several hours.  He says if the hand-foot hangs down he does have quite a bit of pain in the foot from gravity.  Also.  Patient will need continued pain management as well as further improvement in his ambulation.  2019-today patient is anxious hospital.  He is been sitting at bedside and started to work with therapies more more.  His pain is down to 6 out of 10 and continues to decline.  So far we do not have an accepting facility.  2019-today patient is complaining of severe pain that was induced by the slide board.  He refuses to try and use a slide board any further.  He says that when they got him up into the wheelchair he was also having a lot more pulsatile-like pain  in the BKA stump area.  7/6/2019-continue with pain management overnight patient's pain was worse.  Still pending discharge  7/7/2019-patient's pain overnight was worse.  We have added at this point pain management to his treatment plan.  Patient is very frustrated about [employer]. refusing his discharge.  At this point he has hired a  and he is going to have case management to help him fax information over to the  so that begin discharge options.  72,019-today patient complains of slightly improved pain.  He still upset about the fact this ovary was not accepted into rehab facility through Worker's [employer].  Patient is hiring a  to fight the Work[employer]'s [employer]. people.  Rehab is still interested in him and it is potentially possible that they will take him for rehab.       Interval Problem Update  Postoperative day #14 after BKA continue at this point with pain management and physical therapy and Occupational Therapy.  Continue with diabetic management and hypertensive management adjust medications to make sure that his blood sugars and blood pressure are stabilized.     Consultants/Specialty  Physiatry, infectious diseases, orthopedic surgery, pulmonary.     Code Status  Full code     Disposition  Discharge to extended care facility once bed is located for him and he is excepted.     Review of Systems  Review of Systems   Constitutional: Negative for fever, malaise/fatigue and weight loss.   HENT: Negative for ear pain, nosebleeds and sore throat.    Eyes: Negative for pain and discharge.   Respiratory: Negative for cough, sputum production and wheezing.    Cardiovascular: Negative for chest pain, leg swelling and PND.   Gastrointestinal: Negative for abdominal pain, constipation and nausea.   Genitourinary: Negative for dysuria.   Musculoskeletal: Positive for joint pain. Negative for back pain and myalgias.   Skin: Negative.  Negative for itching.   Neurological: Negative for speech change, focal  weakness and seizures.   Endo/Heme/Allergies: Negative for polydipsia.   Psychiatric/Behavioral: Negative for depression and substance abuse.   All other systems reviewed and are negative.        Physical Exam  Temp:  [36.1 °C (97 °F)-36.9 °C (98.5 °F)] 36.5 °C (97.7 °F)  Pulse:  [76-87] 76  Resp:  [16-18] 17  BP: (136-151)/(63-71) 143/67  SpO2:  [92 %-94 %] 93 %     Physical Exam   Constitutional: He is oriented to person, place, and time. Vital signs are normal. He appears well-developed and well-nourished. He is cooperative. He does not have a sickly appearance.   HENT:   Head: Normocephalic and atraumatic.   Right Ear: External ear normal.   Left Ear: External ear normal.   Mouth/Throat: No oropharyngeal exudate.   Eyes: Pupils are equal, round, and reactive to light. Conjunctivae and EOM are normal. Right eye exhibits no discharge. Left eye exhibits no discharge. Right pupil is round and reactive. Left pupil is round and reactive. Pupils are equal.   Neck: Normal range of motion and full passive range of motion without pain. Neck supple. No JVD present. No tracheal tenderness present. No Brudzinski's sign and no Kernig's sign noted. No thyromegaly present.   Cardiovascular: Normal rate, regular rhythm, normal heart sounds and normal pulses.   Occasional extrasystoles are present. Exam reveals no friction rub.    No murmur heard.  Pulmonary/Chest: Effort normal and breath sounds normal. He has no wheezes. Chest wall is not dull to percussion. He exhibits no mass, no tenderness and no bony tenderness.   Abdominal: Soft. Normal appearance and bowel sounds are normal. He exhibits distension. There is no hepatosplenomegaly. There is no CVA tenderness. No hernia. Hernia confirmed negative in the right inguinal area and confirmed negative in the left inguinal area.   Genitourinary: Rectum normal.   Musculoskeletal: Normal range of motion. He exhibits no edema or deformity.        Legs:  Lymphadenopathy:     He has no  cervical adenopathy.   Neurological: He is alert and oriented to person, place, and time. He has normal reflexes. He is not disoriented. He displays no atrophy and no tremor. No cranial nerve deficit. He exhibits normal muscle tone. Coordination and gait abnormal. GCS eye subscore is 4. GCS verbal subscore is 5. GCS motor subscore is 6.   Reflex Scores:       Tricep reflexes are 2+ on the right side and 2+ on the left side.       Bicep reflexes are 2+ on the right side and 2+ on the left side.       Brachioradialis reflexes are 2+ on the right side and 2+ on the left side.       Patellar reflexes are 2+ on the right side and 2+ on the left side.       Achilles reflexes are 2+ on the right side and 2+ on the left side.  Skin: Skin is warm and dry. No pallor.   Psychiatric: His speech is normal and behavior is normal. Judgment and thought content normal. His mood appears anxious. Cognition and memory are normal. He exhibits a depressed mood.   Nursing note and vitals reviewed.        Fluids     Intake/Output Summary (Last 24 hours) at 07/08/19 1308  Last data filed at 07/08/19 0800    Gross per 24 hour   Intake              480 ml   Output             1425 ml   Net             -945 ml         Laboratory                         Imaging  CT-HEAD W/O   Final Result       1.  No evidence of acute territorial infarct, intracranial hemorrhage or mass lesion.   2.  Mild diffuse cerebral substance loss.   3.  Mild microangiopathic ischemic change versus demyelination or gliosis.       DX-CHEST-PORTABLE (1 VIEW)   Final Result       1.  Increased moderate hazy bilateral parenchymal opacities which could be seen in the setting edema or infection.   2.  Interval removal of a right-sided central venous catheter.       EC-ECHOCARDIOGRAM COMPLETE W/O CONT   Final Result       DX-CHEST-PORTABLE (1 VIEW)   Final Result           1.  Pulmonary edema and/or infiltrates are identified, which are somewhat decreased since the prior exam.         DX-ANKLE 3+ VIEWS LEFT   Final Result       Soft tissue swelling. No acute fracture identified.       DX-CHEST-PORTABLE (1 VIEW)   Final Result       1.  Improved aeration of the RIGHT lung base   2.  Otherwise no interval change in BILATERAL atelectasis or airspace disease       DX-ABDOMEN FOR TUBE PLACEMENT   Final Result       Enteric tube projects over the stomach.       DX-CHEST-LIMITED (1 VIEW)   Final Result       New right IJ line tip overlies the SVC and no pneumothorax is identified       New right basilar opacity effaces the diaphragm and could be from atelectasis or consolidation       DX-CHEST-PORTABLE (1 VIEW)   Final Result       Endotracheal tube projects at the level of the aortic arch.       Increased perihilar and bibasilar opacities may represent a combination of edema, atelectasis, pneumonia.           DX-CHEST-PORTABLE (1 VIEW)   Final Result       Increasing reticular and hazy opacity is compatible with edema       CT-SHOULDER W/O PLUS RECONS LEFT   Final Result           Prior resection of left humeral head. The residual head neck junction appears well corticated and could relate to chronic resection/deformity. There is also deformity and remodeling of the glenoid with well-corticated margin, suggesting of chronic change    as well.       No acute fracture.       There is no articulation between the proximal humerus and glenoid. The glenohumeral joint is replaced by loculated soft tissue or dense fluid, likely chronic change. The fluid is slightly less in 2018 CT.       Infection is considered less likely given the osseous cortex adjacent to the fluid is well corticated and demonstrates no rarefaction or destruction.       US-FOREIGN FILM ULTRASOUND   Final Result       OUTSIDE IMAGES-CT HEAD   Final Result       GC-ELDFAGM-[id number] FILM X-RAY   Final Result       OUTSIDE IMAGES-DX LOWER EXTREMITY, LEFT   Final Result       YE-[id number]-[id number] FILM X-RAY   Final Result       OUTSIDE  IMAGES-DX CHEST   Final Result       OUTSIDE IMAGES-DX UPPER EXTREMITY, LEFT   Final Result             Assessment/Plan      * Acute on chronic respiratory failure with hypoxia (HCC)- (present on admission)   Assessment & Plan     Resolved.            Acute encephalopathy   Assessment & Plan     Resolved         OZ (acute kidney injury) (HCC)- (present on admission)   Assessment & Plan     Resolved         Diabetic foot ulcer (HCC)- (present on admission)   Assessment & Plan     Status post BKA day #14  Overnight patient states that the pain was 7 out of 10.  Continued at this point on oxycodone as needed for pain management.  Continue with physical therapy and Occupational Therapy.  Continue with wound care.  Patient does have a brace in place.  Continue with bowel protocol with him having oxycodone.         Septic shock due to undetermined organism ([facility])- (present on admission)   Assessment & Plan     Resolved      Pneumonia due to infectious organism- (present on admission)   Assessment & Plan     Resolved      SALOME (obstructive sleep apnea)   Assessment & Plan     Continue oxygen support at nighttime      Cardiomyopathy ([facility])- (present on admission)   Assessment & Plan     Most recently left ventricular ejection fraction is 40%.  Most likely combination of diabetes and hypertension.  Continue at this point with diabetic management.      DM (diabetes mellitus), type 2, uncontrolled (CMS-HCC)- (present on admission)   Assessment & Plan     Today blood sugars are worse at 160-180.  Continue at this point with Humalog 5 units subcutaneously 3 times daily with meals as well as lispro insulin with sliding scale.         Essential hypertension- (present on admission)   Assessment & Plan     Blood pressure currently controlled at 143/67.  Continue at this point with metoprolol 12.5 mg twice daily.      Hypoglycemia   Assessment & Plan     Resolved      Leucocytosis   Assessment & Plan     Resolved      Chronic pain  "syndrome- (present on admission)   Assessment & Plan     Continue with oxycodone for pain management      Mucus plugging of bronchi   Assessment & Plan     Resolved      Left shoulder pain- (present on admission)   Assessment & Plan     Patient has chronic pain in the left shoulder.  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  His arm however moves independently.      Tobacco dependence- (present on admission)   Assessment & Plan     -nicotine replacement protocol and cessation education provided for 12 minutes, discussed options of nicotine patch, acupuncture, medical treatment with wellbutrin and chantix. Discussed other options. Code 00892      Hypokalemia- (present on admission)   Assessment & Plan     Resolved            VTE prophylaxis: Lovenox                        Functional Status/ Changes:     Michelle Quiroz, OT Occupational Therapist Signed   Therapy Date of Service: 7/8/2019  3:04 PM         []Hide copied text  Occupational Therapy Treatment completed with focus on ADLs, ADL transfers, patient education and upper extremity function.  Plan of Care: Will benefit from Occupational Therapy 4 times per week  Discharge Recommendations:  Equipment Will Continue to Assess for Equipment Needs. Post-acute therapy Recommend post-acute placement for additional occupational therapy services prior to discharge home. Patient can tolerate post-acute therapies at a 5x/week frequency.     Patient seen for OT treat focused on lateral transfers and wc level ADLs paired with standing trials. Patient required Mod A LB ADLs and Mod A STS trials with FWW and SBA lateral transfers with no slide board and with wc steady and s/u with cues. Patient would benefit from skilled OT in this setting followed by post acute placement, > 3 hrs of therapy a day.      See \"Rehab Therapy-Acute\" Patient Summary Report for complete documentation.               Reviewer: Wiliam Escalona  Date: 7/8/2019  Time: " 4:28 PM  Pre-Admission Screening Form    Patient Information:   Name: Travon Pollack     MRN: [id number]       : 1955      Age: 63 y.o.   Gender: male      Race: White [7]       Marital Status:  [2]  Family Contact: RanjeetShuKimberly Velazquez        Relationship: Spouse [17]  Daughter [2]  Home Phone:              Cell Phone:   [phone]  Advanced Directives: None  Code Status:  FULL  Current Attending Provider: Manjinder Tapia M.D.  Referring Physician: Dr. Leahy     Physiatrist Consult: Dr. Mimi Doyle       Referral Date: 2019  Primary Payor Source:  [employer] [place]  Secondary Payor Source:      Medical Information:   Date of Admission to Acute Care [place]/15/2019  Room Number: T311/02  Rehabilitation Diagnosis: 05.4 Unilateral LE Below Knee Amputation (BKA)  Immunization History   Administered Date(s) Administered   • Influenza TIV (IM) 10/13/2012, 10/29/2017   • Influenza Vaccine Adult HD 2019   • Pneumococcal polysaccharide vaccine (PPSV-23) 2012     No Known Allergies  Past Medical History:   Diagnosis Date   • Dental disorder     upper and lower   • Diabetes     insulin   • Heart burn    • Indigestion    • Infectious disease staph   • MRSA (methicillin resistant Staphylococcus aureus)    • Opiate withdrawal (HCC)    • Pain 2/1/13    6/10 back   • Renal disorder     pt born with only one kidney   • Snoring      Past Surgical History:   Procedure Laterality Date   • KNEE AMPUTATION BELOW Left 2019    Procedure: AMPUTATION, BELOW KNEE;  Surgeon: Ashok Schneider M.D.;  Location: [facility];  Service: Orthopedics   • IRRIGATION & DEBRIDEMENT ORTHO  2019    Procedure: IRRIGATION AND DEBRIDEMENT, WOUND - LEFT HEEL;  Surgeon: Simon Diallo M.D.;  Location: [facility];  Service: Orthopedics   • LUMBAR FUSION POSTERIOR  3/4/2013    Performed by Nixon Zhao M.D. at [facility]   • LUMBAR LAMINECTOMY DISKECTOMY   3/4/2013    Performed by Nixon Zhao M.D. at [facility]ERY [facility]   • LUMBAR FUSION POSTERIOR  2/11/2013    Performed by Nixon Zhao M.D. at SURGERY [facility]   • HERNIA REPAIR  2012    x7 last one in 2012   • OTHER  2007    back   • OTHER      neck       History Leading to Admission, Conditions that Caused the Need for Rehab (CMS):     Onesimo Olson M.D. Physician [facility] Medicine  H&P Date of Service: 6/15/2019 10:30 PM         []Hide copied text  []Hover for attribution information  [facility] Medicine History & Physical Note     Date of Service  6/15/2019     Primary Care Physician  Jon Turner M.D.     Consultants  None     Code Status  Full code     Chief Complaint  Left shoulder pain and left heel ulcer     History of Presenting Illness  63 y.o. male the past medical history of insulin-dependent diabetes mellitus, chronic respiratory failure on 3 L of oxygen who presented 6/15/2019 with multiple complaints.  Patient reports a history of left shoulder injury after which he underwent several surgeries.  The patient had a mechanical ground-level fall 1 week ago after which he injured his left shoulder again and reported excruciating left shoulder pain.  He reports bumping his head but denied any loss of consciousness or any headache.  He reports a left heel ulcer which has been progressively worsening..  He reports worsening redness swelling and warmth with drainage over the past 4 days.  He reports fevers and chills.  He denies any chest pain, shortness of breath, nausea, vomiting abdominal pain or diarrhea.     The patient's presented to [facility], I reviewed the medical records from the transferring facility which are summarized as follows:     WBC 26.5 neutrophil predominant, hemoglobin 12.4, hematocrit 37, MCV 86, platelets 193  Sodium 130, potassium 4.4, chloride 95, CO2 27, anion gap 8, glucose 508, BUN 45, creatinine 1.1 magnesium 2.0, calcium 8, phosphorus 2.6,  total protein 6.9, albumin 3, total bili 0.7, AST 31, ALT 40, alkaline Phos 129   Troponin 0.28, lactic acid 1.1, d-dimer 1239, INR 1.3, APTT 26.7, ESR 90  Venous blood gas: pH 7.4, PCO2 36, PO2 46     UA: Nitrite negative, leukocyte esterase negative, WBC 2-5, RBC 5-10, epithelium 5-10, bacteria 1+     X-ray left calcaneus: No evidence of osteo-myelitis.     X-ray left foot: No acute fracture.  No evidence of ostium myelitis.     CT head without contrast: No acute intracranial hemorrhage     Chest x-ray: Mild increased markings at right lung base, slightly more prominent now than on 1/9/2018 and mild infectious pneumonia is not included.     X-ray left humerus: No clear evidence of an acute fracture.  There is inferior dislocation of the proximal humerus with respect to the glenoid.     Ultrasound left lower extremity: No evidence of DVT  The patient was given a 2.8 L of normal saline and gram of IV cefepime prior to transfer.     Review of Systems  Review of Systems   Constitutional: Positive for chills, fever and malaise/fatigue. Negative for diaphoresis.   HENT: Negative for hearing loss and sore throat.    Eyes: Negative for blurred vision.   Respiratory: Negative for cough, sputum production, shortness of breath and wheezing.    Cardiovascular: Negative for chest pain, palpitations and leg swelling.   Gastrointestinal: Negative for abdominal pain, blood in stool, diarrhea, nausea and vomiting.   Genitourinary: Negative for dysuria, flank pain and urgency.   Musculoskeletal: Positive for falls and joint pain. Negative for back pain, myalgias and neck pain.   Skin: Negative for rash.        Left heel ulcer   Neurological: Negative for dizziness, focal weakness, seizures and headaches.   Endo/Heme/Allergies: Does not bruise/bleed easily.   Psychiatric/Behavioral: Negative for suicidal ideas.   All other systems reviewed and are negative.        Past Medical History   has a past medical history of Dental disorder;  Diabetes (2011); Heart burn; Indigestion; Infectious disease (stap); MRSA (methicillin resistant Staphylococcus aureus); Opiate withdrawal ([facility]); Pain (2/1/13); Renal disorder; and Snoring. He also has no past medical history of CAD (coronary artery disease); COPD; Liver disease; or Seizure disorder ([facility]).     Surgical History   has a past surgical history that includes hernia repair (2012); other (2007); other; lumbar fusion posterior (2/11/2013); lumbar fusion posterior (3/4/2013); and lumbar laminectomy diskectomy (3/4/2013).      Family History  No pertinent family history     Social History   reports that he has quit smoking. His smoking use included Cigarettes. He has a 6.00 pack-year smoking history. He quit smokeless tobacco use about 6 years ago. He reports that he does not drink alcohol or use drugs.     Allergies  No Known Allergies      Assessment/Plan:  I anticipate this patient will require at least two midnights for appropriate medical management, necessitating inpatient admission.         Sepsis ([facility])- (present on admission)   Assessment & Plan     This is sepsis (without associated acute organ dysfunction).   Likely source is infected diabetic ulcer  Patient has been started on IV fluids per septic protocol  Lactate is within normal limits  Patient is started on IV Zosyn and IV vancomycin.  Follow blood and wound cultures         Left shoulder pain- (present on admission)   Assessment & Plan     No acute fracture noted on CT  Pain control with Tylenol, oxycodone and IV morphine.  Monitor respiratory status closely  PT OT  If persistent pain we will consider consulting his orthopedic surgeon       Elevated troponin- (present on admission)   Assessment & Plan     Likely type II NSTEMI in the setting of sepsis  Patient denies active chest pain  He will be given a full dose of aspirin  Continues cardiac monitoring with serial EKG and troponin  Check 2D echo      Diabetic foot ulcer ([facility])-  (present on admission)   Assessment & Plan     Infected left heel diabetic foot ulcer  Patient has been started on IV Zosyn and IV vancomycin.  Monitor for vancomycin toxicity and follow trough levels.  Wound care and LPS have been consulted  Follow wound cultures  ESR elevated.  X-ray reveals no evidence of osteomyelitis.      DM (diabetes mellitus), type 2, uncontrolled (CMS-HCC)- (present on admission)   Assessment & Plan     Uncontrolled with hyperglycemia  Continue Lantus 44 units twice daily  Start on insulin sliding scale with serial Accu-Checks  Check hemoglobin A1c  Hypoglycemic protocol in place                  VTE prophylaxis: scd    Zeus K Merary Physician Addendum Pulmonary  Consults Date of Service: 6/16/2019 12:40 PM      Expand All Collapse All    []Hide copied text  []Hover for attribution information  Critical Care Consultation     Date of consult: 6/16/2019     Referring Physician  CHRYSTAL Montes De Oca*     Reason for Consultation  Consulted for respiratory failure     History of Presenting Illness  63 y.o. male who presented 6/15/2019 with multiple complaints. Admitted to medical floor.  He had fall 1 week ago and injured shoulder of which had recent surgery.  No loc but did hit his head.  Left heel ulcer with purulent fluid.  He has a past medical history of dmii on insulin, chronic respiratory failure with smoking and on 3 L o2 at home.  Fever and chills on admission. Admitted to medical floor. REspiratory failure with hypercapnia and hypoxia and lethargic on medical floor. Transferred to ICU and uanble to pull off o2 mask or follow commands, not a adequate bipap candidate. Intubated, significant secretions on bronchosocpy.  Hypotension requiring levophed.  Has shoulder pain on movement.  MOves all extremities.  Opens eyes with physical stimuli.  Post intubation family arrived, discussed clinical situation with them.  At baseline he is able to drive a car, carry out regular daily  activities. Possible copd diagnosis in the past.       Assessment/Plan      Septic shock due to undetermined organism (HCC)- (present on admission)   Assessment & Plan     This is severe sepsis with the following associated acute organ dysfunction(s): acute respiratory failure, metabolic/septic encephalopathy.   Encephalopathy, confused, moves all extremities  Improved once on vent and sedation, following commands  Keep map > 65  On vanc and zosyn, continue for now  Significant secretions/purulence on bronchoscopy  Bal pending  Aspiration vs cap  mrsa nares  Reduce antibiotics tomorrow  Has leg wound, cultured prior  Blood cx ntd admission  No sedatives per nursing mar prior to respiratory failure except lyrica, unlikely to cause profound sedation as chronic med  Levophed map > 65  Volume resuscitated, post resuscitation us non fluid responsive, adequate  Will need post resusictation diuresis as equilibrizes         Mucus plugging of bronchi   Assessment & Plan     Thick mucus plugging of airways  More on right  Bronchoscopy with extensive lavage      Aspiration into airway   Assessment & Plan     Some thick secretions on bronchoscopy  Right worse than left  Possible aspiration      Pneumonia due to infectious organism   Assessment & Plan     Aspiration vs community  ? Cause of falls at home  Antibiotics  Possible aspiration per bronchoscopy  Family says possible aspiration in the past  Will need swallow eval post extubation      SALOME (obstructive sleep apnea)   Assessment & Plan     Contributory  Diagnosis per family in past      Left shoulder pain- (present on admission)   Assessment & Plan     Acute on chronic      Cardiomyopathy (HCC)- (present on admission)   Assessment & Plan     Bedside echo reviewed  Echo may need to be repeated  Troponin elevation  Re-evaluate tomorrow  Stressed by respiratory failure      Acute on chronic respiratory failure with hypoxia (HCC)- (present on admission)   Assessment & Plan      Secondary to septic shock  Pneumonia  Baseline o2 at home  Rt/o2 protocol  Not bipap candidate  Significant purulence bronch, bal pending  Hx smoking heavily, ? Copd diagnosis per family  Steroids scheduled  duonebs scheduled  Antibiotics  Us post resuscitation adequate volume      Elevated troponin- (present on admission)   Assessment & Plan     Likely demand ischemia  Per bedside echo adequate ef  Repeat echo if no improvement, prior 40% ef in January  Troponin to 1.11, continue to trend      Diabetic foot ulcer (HCC)- (present on admission)   Assessment & Plan     Purulent  On vanc/zosyn  cx pending      Tobacco dependence- (present on admission)   Assessment & Plan     contributory      DM (diabetes mellitus), type 2, uncontrolled (CMS-HCC)- (present on admission)   Assessment & Plan     ssi  lantus      HTN (hypertension)- (present on admission)   Assessment & Plan     Hold anti hypertensives            Discussed patient condition and risk of morbidity and/or mortality with Hospitalist, Family, RN, RT, Pharmacy, Code status disscussed and Patient.       Alberto Haines M.D. Resident Attested Hospital Medicine Procedures Date of Service: 6/16/2019  1:11 PM      Attestation signed by Zeus Reagan at 6/16/2019 7:49 PM   Reason, acute on chronic respiratory failure with hypoxia and hypercapnea and septic shock, aspiration into airway, pneumonia unspecified.    Present and participating with resident entire procedure.         []Hide copied text  []Joon for attribution information  Procedure Note     Date: 6/16/2019  Time: 1300     Procedure: Intubation     Indication: Acute respiratory failure, Hypercapnia  Consent: Emergency/implied     Procedure: A time-out was performed. Airway assessed and patient found to have Mallampati class 4.  Equipment prepared and RT/RN at bedside. Patient pre-oxygenated with 100% FiO2.  After medication delivery, a 4 blade used to acheive direct visualization. An ETT was placed with  1 attempt(s) into the trachea directly through the vocal cords. Appropriate placement confirmed by direct visualization, bilateral chest and epigastric auscultation, misting in the ETT, and ETCO2 detector. ETT secured in place at 27 cm at the teeth and patient connected to ventilator. Sedation/analgesia continued as appropriate. Patient tolerated procedure well without any difficulties and remains in care of bedside nurse and respiratory therapy. CXR will be performed to confirm appropriate placement of ETT.     Medications: Propofol and rocuronium  Complications: None  CXR: Pending, will follow for proper tube placement     Alberto Haines MD  [facility] Resident            Cosigned by: Zeus Reagan at 6/16/2019  7:49 PM     Zeus Reagan Physician Signed Pulmonary  Procedures Date of Service: 6/16/2019  1:55 PM         []Hide copied text  []Hover for attribution information  Procedures  DATE OF OPERATION: 6/16/2019     PREOPERATIVE DIAGNOSIS: purulent secretions, increase in oxygen requirement, chest x-ray infiltrate and aspiration mucus plugging     POSTOPERATIVE DIAGNOSIS: same      PROCEDURE PERFORMED: Fiberoptic bronchoscopy with bronchoalveolar lavage     SURGEON: Zeus Reagan     ANESTHESIA: Intravenous sedation, analgesia, and pharmacologic restraint      INDICATIONS: The patient is a 63 y.o. male with acute respiratory failure.      FINDINGS: Normal anatomy and Purulent secretions  RUL, RML, RLL and LLL  Minimal to moderate JAZIEL.     SPECIMEN: Bronchoalveolar lavage for cultures     PROCEDURE: Following informed consent, the patient was properly identified and optimally positioned in bed. He was preoxygenated with 100% oxygen and placed on a regular ventilatory rate. Intravenous sedation, analgesia, and pharmacologic restraint was administered.     The fiberoptic bronchoscope was advanced through the indwelling endotracheal tube.  The upper airways were suctioned. The airways were systematically and  sequentially inspected and lavaged. The pooled effluent RLL was collected in a sterile trap and submitted for cultures. Thick purulent secretions with mucus plugging from RML, RLL, RUL and LLL.  Thick green purulent secretions in right and left mainstem.  Minimal to moderate secretions JAZIEL.  Erythema present especially in Lower lobe segments and more on right.  Clear segments post bronchoscopy.  All segments including RUL, RLL, RML, JAZIEL and LLL and main airways and trachea lavaged with ns and suctioning.       The patient tolerated the procedure well. There were no apparent complications.    ____________________________________   Zeus Haines M.D. Resident Attested [facility] Medicine Procedures Date of Service: 6/16/2019  3:37 PM      Attestation signed by Zeus Reagan at 6/16/2019 7:47 PM   Present and participating with resident entire procedure.    Reason septic shock and requirement with vasopressors and acute on chronic respiratory failure with hypoxia and hypercapnea.         []Hide copied text  []Joon for attribution information  Procedure Note     Date: 6/16/2019  Time: 1520     Procedure: Central Venous Line placement  Site: RIJ vein     Indication: Venous Access, multiple medications  Consent: Informed consent obtained from patient or designated decision maker after explaining the benefits/risks of the procedure including but not limited to bleeding, infection, nerve or other deep structure injury, pneumothorax/hemothorax, arrythmia, or death. Patient or surrogate expressed understanding and agreement and signed consent which can be found in the patient's chart.     Procedure: After obtaining consent, a time-out was performed. Appropriate site confirmed with ultrasound and patient positioned, prepped, and draped in sterile fashion. All those present wearing cap and mask and those physically participating remained adhering to sterile fashion with cap, mask, gloves, and gown. 5 mL  of local anesthetic injected (1% lidocaine without epinephrine) achieving appropriate comfort level for patient. Vein localized and accessed using continuous ultrasound guidance and a 7 Fr three lumen catheter placed using Seldinger technique. Able to aspirate dark, non-pulsatile blood through each lumen and sterile saline flushed easily before capping. Line secured and dressed in sterile fashion. Patient tolerated procedure well without any difficulties and remains in care of bedside nurse. CXR will be performed to confirm appropriate placement for internal jugular or subclavian CVLs.     EBL: minimal  Complications: None  CXR: cath tip in SVC, no pneumo     Alberto H Zaki  [facility] Resident         Cosigned by: Zeus Reagan at 6/16/2019  7:47 PM     Simon Diallo M.D. Physician Signed Surgery Orthopedic  OP Report Date of Service: 6/18/2019 12:00 AM         []Hide copied text  []Hover for attribution information  DATE OF SERVICE:  06/18/2019     PREOPERATIVE DIAGNOSES:  1.  Chronic left heel wound.  2.  Diabetes mellitus.     POSTOPERATIVE DIAGNOSES:  1.  Chronic left heel wound.  2.  Diabetes mellitus.     SURGICAL PROCEDURES:  Excisional debridement of left heel (skin and   subcutaneous tissue) ? 6x3 cm and 2.5x2.5 cm.     SURGEON:  Simon Diallo MD     ASSISTANT:  Sylvester Larios DO     ANESTHESIOLOGIST:  Vincent Muñoz MD     ANESTHETIC:  General.     ESTIMATED BLOOD LOSS:  5 mL     INDICATIONS:  The patient is 63 years old.  He is diabetic.  He has had a   chronic ulcer on his left heel, admitted to the ICU recently.  He has been   seen by the limb preservation service and I have been asked to evaluate him   for surgical treatment.  He had a chronic heel wound, recommended debridement.    Risks include bleeding, persistent or worsening infection, pain, stiffness,   wound healing problems, limb loss, thromboembolic phenomena, anesthetic and   medical complications, etc.     PROCEDURES:  The patient was  "identified in the preoperative holding area and   left leg was marked.  He was taken to the operating room where general   anesthetic was administered.  He was already on intravenous antibiotics.  No   additional doses were given.  He was placed supine on the operating room   table.  The left lower extremity was prepped and draped in sterile fashion.    Timeout was held to confirm the patient's identity and correct surgical site.     There was some sloughing epidermis on the plantar aspect of the heel, which I   removed.  Underlying this was necrotic area of skin and subcutaneous tissue on   the weightbearing surface of the heel pad.  I excised this with a scalpel.     Tissue was sent for culture. No exposed bone. The wound was irrigated. VAC  sponge was placed in the wound and sealed with Ioban. Suction was set to  125 mm Hg and seal was maintained. Patient was extubated and taken to the   recovery room in stable condition.     PLAN:     1. VAC therapy  2. Heel protection  3. Antibiotics and follow cultures  4. Limb preservation service ongoing management        ____________________________________     MD Jose De Jesus DOWNING M.D. Physician Signed Surgery Orthopedic  Consults Date of Service: 6/18/2019 11:29 AM      Expand All Collapse All    []Hide copied text  []Hover for attribution information  DATE OF SERVICE:  06/18/2019     CHIEF COMPLAINT:  \"My left heel hurts.  I was having trouble breathing.\"     HISTORY OF PRESENT ILLNESS:  The patient is a 63-year-old male who reports a   left heel wound for the last 3 weeks, previously seen a doctor in [place]   who was helping him doing packing changes.  He states that at some point, he   started feeling systemically ill and presented to the hospital.  He was also   found to have pneumonia, at which point during the hospital stay, he was   intubated for respiratory distress as well as a getting tube feeds.  He was in   septic shock.  " Orthopedics was consulted for a left medial heel wound.  The   patient still has a high white count and is sick.  The patient states that he   has diabetes, which is under worker's compensation secondary to previous   infections as well as having his pancreas removed; however, he is not able to   give any other complete history.      ASSESSMENT:  A 63-year-old male with a left heel wound with no pneumonia, but   sepsis, which is not quite sure from the pneumonia or his foot.     PLAN:  The patient will benefit from surgical debridement.  We discussed with   the orthopedic trauma team today and I believe Dr. Diallo is trying to take   him back for a surgical intervention.  The patient was advised he is at risk   of possibly losing his leg given the infection, location, and likelihood of   healing.  The patient expressed understanding.  Other care, continue per the medical team.  Recommend continuing   antibiotics.        ____________________________________     MD Alyce Silva M.D. Physician Signed Infectious Disease  Consults Date of Service: 6/19/2019  9:24 AM      Expand All Collapse All    []Hide copied text  []Hover for attribution information  INFECTIOUS DISEASES INPATIENT CONSULT NOTE      Date of Service: 6/19/2019     Consult Requested By:  Arnoldo Rockwell D.O.     Reason for Consultation: Left heel diabetic foot infection     History of Present Illness:   Travon Pollack is a 63 y.o. man with history of type 2 diabetes mellitus comp located by peripheral neuropathy, history of MRSA infection in 2007 and SALOME on nocturnal oxygen admitted 6/15/2019 for respiratory failure.  Patient states that he developed a blister on the plantar aspect of his left foot approximately 3 weeks prior to admission.  At that time he saw a physician who performed some debridement.  Thereafter his wound increased in size with increasing pain with ambulation but patient denies any surrounding  erythema or drainage.  He was instructed to keep the foot wound clean using Betadine however approximately 2 weeks prior to admission he developed flulike symptoms associated with vomiting and weakness.  At that time he also was noticing subjective fevers and chills but denies any cough or shortness of breath.  He had been treated with ampicillin 3 times a day for the heel and states that the antibiotics did help.  Of note, patient also sustained a mechanical ground-level fall 1 week prior to admission injuring his left shoulder.  Patient has a history of prior left shoulder surgery.  Given his flulike symptoms and increasing left heel pain, he initially presented to [facility] in [place].  At the outside hospital he was found to have an elevated white count.  An x-ray of his left heel was negative for osteomyelitis.  He was subsequently transferred to [facility] for higher level of care.  On presentation, he was afebrile with a leukocytosis of 26.1, tachycardic and tachypneic.  He was found to be in acute kidney injury.  He was initially admitted to the medical floor however he developed acute hypoxic respiratory failure with lethargy and was transferred to the ICU, intubated and started got on mechanical ventilation.  Chest x-ray revealed new right basilar opacities.  He underwent a bronchoscopy on 6/16 with cultures growing normal upper respiratory sammie.  Patient has improved from a respiratory standpoint and was extubated on 6/17/2019.  On exam he was noted to have a worsening with osteomyelitis.  X-ray showed soft tissue swelling.  Patient was evaluated by orthopedic surgery and is now status post excisional debridement of his left foot heel down to subcutaneous tissue on 6/19.  Gram stain is positive for gram-positive rods and gram-positive cocci with cultures pending.  Cultures from admission are negative to date.  He is currently on oral linezolid and Zosyn.  Infectious disease service  consulted for further antibiotic recommendations and management for left heel diabetic foot infection.                 IMPRESSION:   1.  Severe sepsis   2.  Left heel diabetic foot infection                    3.  Acute hypoxic respiratory failure  4.  Acute kidney injury  5.  Pneumonia  6.  Leukocytosis  7.  Elevated troponins  8.  Type 2 diabetes mellitus     PLAN:   Travon Pollack is a 63 y.o. man with a history of type 2 diabetes mellitus, complicated by peripheral neuropathy and diabetic foot ulcer, and a history of MRSA infection in 2007 admitted for worsening left heel diabetic foot infection and respiratory failure.  Patient was found to be in septic shock secondary to pneumonia and diabetic foot infection.  He required intubation and mechanical ventilation however he was extubated on 6/17/2019 and is now on nasal cannula.  He is now status post excisional debridement of the left heel down to subcutaneous tissue on 6/18/2019 by Dr. Diallo.  Gram stain is positive for GPC's and G NV's with cultures pending.  Blood cultures are negative to date.  His acute kidney injury is also improving after vancomycin was discontinued.  As it appears that his wound did not go down to bone, anticipate treating the patient for soft tissue infection for approximately 2 weeks from surgery.  Continue wound VAC and care.  Continue linezolid and Zosyn (which will also treat his pneumonia) pending further cultures.  Continue supportive care by the ICU team and wean off submental oxygen as tolerated.  Further recommendations per hospital course and culture results.        Plan of care discussed with IM Dr. Blackwell. Will continue to follow     Alyce Hernandez M.D.        Anahi Pompa R.N. Diabetes Health Educator Signed   Consults Date of Service: 6/19/2019  6:27 PM         []Hide copied text  []Johnnyver for attribution information  Diabetes education: Met with pt this afternoon. Pt has hx of diabetes on insulin. Pt states he uses  "Lantus pens and regular insulin vials. Pt states he takes 44 units of Lantus twice a day and ac meals if blood sugars are over 200/240 he covers with regular insulin per sliding scale.  Discussed goals for blood sugar, insulin storage, shelf life ( he uses regular insulin longer than the 28/20 days recommended), and site rotation. He states he test his blood sugars 3 to 4 times a day. Reviewed foot care as well.  Pt was admitted with blood sugar of 351 and Hg A1c of 12.2%. Pt is currently on Lantus 44 units BID with Regular insulin sliding scale every six hours ( needs to change to ac and hs now that pt eating) with blood sugars of 174(3 units), 277 ( 7 units) and 276 ( 7 units). Now that pt eating he may benefit in changing the insulin to Humalog and adding a meal bolus.  Plan: Pt states he has his supplies at home. Pt may benefit with an updated sliding scale chart at discharge to get his blood sugars to goal. Pt needs sliding scale changed from every six hours to ac and hs now that he is eating. Pt may also need to change insulin from regular to humalog and add a meal bolus to the regime. Please call [phone] if needs change.        Dottie Breen R.N. Wound Team Signed   Wound Team Date of Service: 6/20/2019  3:43 PM         []Hide copied text  [facility] Wound & Ostomy Care  Inpatient Services  Wound and Skin Care Evaluation     Admission Date: 6/15/2019     Consult Date: 06/17/2019 @ 1515   HPI, PM, SH: Reviewed    Unit where seen by Wound Team: [id number]      WOUND CONSULT RELATED TO:  DFU's bilateral feet      SUBJECTIVE:  \"I hope this heals soon.\"       Self Report / Pain Level:  Pt premedicated prior to dressing change        OBJECTIVE:  NPWT dressing to left heel intact - no leaks or drainage noted.      WOUND TYPE, LOCATION, CHARACTERISTICS (Pressure Injuries: location, stage, POA or date identified)             Negative Pressure Wound Therapy Foot Left (Active)   NPWT Pump Mode / Pressure Setting " Continuous;125 mmHg     Dressing Type Small; veraflo     Number of Foam Pieces Used 3     Canister Changed No     VAC VeraFlo Irrigant Normal Saline     VAC VeraFlo Soak Time (mins) 10     VAC VeraFlo Instill Volume (ml) 6     VAC VeraFlo - Therapy Time (hrs) 2.5     VAC VeraFlo Pressure (mm/Hg) Continuous;125 mmHg             Wound 06/16/19 Diabetic Ulcer Heel right plantar (Active)   Wound Image       Site Assessment Pink;Bleeding     Elizabeth-wound Assessment Intact     Margins Attached edges     Wound Length (cm) 1 cm     Wound Width (cm) 1 cm     Wound Depth (cm) 0.3 cm     Wound Surface Area (cm^2) 1 cm^2     Tunneling 0 cm     Undermining 0 cm     Closure None     Drainage Amount Small     Drainage Description Serosanguineous     Non-staged Wound Description Full thickness     Treatments Cleansed;Site care     Cleansing Approved Wound Cleanser     Periwound Protectant Benzoin     Dressing Options Honey Colloid;Adhesive Foam     Dressing Cleansing/Solutions Not Applicable     Dressing Changed New     Dressing Status Clean;Dry;Intact     Dressing Change Frequency Tuesday, Thursday, Saturday     NEXT Dressing Change  06/22/19     NEXT Weekly Photo (Inpatient Only) 06/27/19     WOUND NURSE ONLY - Odor None     WOUND NURSE ONLY - Pulses Right;DP;PT;1+     WOUND NURSE ONLY - Exposed Structures None     WOUND NURSE ONLY - Tissue Type and Percentage 100% pink         Wound 06/16/19 Diabetic Ulcer Heel left medial and plantar (Active)   Wound Image          Site Assessment Yellow;White;Red     Elizabeth-wound Assessment Pink;Maceration     Margins Defined edges     Wound Length (cm) 3 cm     Wound Width (cm) 2 cm     Wound Depth (cm) 0.5 cm     Wound Surface Area (cm^2) 6 cm^2     Tunneling 0 cm     Undermining 0 cm     Closure Secondary intention     Drainage Amount Moderate     Drainage Description Sanguineous     Non-staged Wound Description Full thickness     Treatments Cleansed;Site care     Cleansing Approved Wound  Cleanser     Periwound Protectant Benzoin;Drape     Dressing Options Wound Vac     Dressing Cleansing/Solutions Normal Saline     Dressing Changed Changed     Dressing Status Clean;Dry;Intact     Dressing Change Frequency Tuesday, Thursday, Saturday     NEXT Dressing Change  06/22/19     NEXT Weekly Photo (Inpatient Only) 06/27/19     WOUND NURSE ONLY - Odor Mild;Foul     WOUND NURSE ONLY - Pulses Left;2+;DP     WOUND NURSE ONLY - Exposed Structures Adipose     WOUND NURSE ONLY - Tissue Type and Percentage mixture of yellow, white, pink/red tissue     WOUND NURSE ONLY - Time Spent with Patient (mins) 80             Vascular:     Dorsal Pedal pulses:             Left 2+, Right 2+  Posterior tib pulses:              Left: 2+, Right 1+           Lab Values:     WBC:                                              WBC   Date/Time Value Ref Range Status   06/20/2019 05:02 AM 15.1 (H) 4.8 - 10.8 K/uL Final      A1C:                                                Lab Results   Component Value Date/Time     HBA1C 12.2 (H) 06/16/2019 11:37 AM                       Culture:  Left Heel: completed on 6/18. Positive for saphy aureus, group d enterococcus species, streptococcus constellatus      INTERVENTIONS BY WOUND TEAM:  Patient seen w/ Naseem ORANTES APRN.  NPWT dressing removed from left heel. No retained foam noted. Pt noted to have moderate sanguinous bleeding on left medial heel wound. Attempted to stop bleeding w/ pressure, but was not successful. 1 piece of silver nitrate used to cauterize bleeding on left medial heel wound w/ positive results. Cleansed wound w/ wound cleanser and gauze. Naseem ORANTES APRN performed bedside debridement of bilateral heel wounds (please refer to her note). Measurements and picture done of bilateral heel wounds. Benzoin to periwound of left heel. AqAg applied to part of periwound, w/ paste ring applied to the other part of periwound. 2 pieces of veraflo foam used to fill wound. Foam secured w/ drape.  Small window cut for button from trac pad. (3 pieces of veraflo foam used total). Trac pad applied. Seal check and test cycle done. Veraflo settings set at 6 ml of fluid/soak time for 10 mins/every 2.5 hours. No leaks or drainage noted. Sacral mepilex applied under tubing for skin protection. Small piece of honey colloid cut and applied to right plantar foot wound secured w/ adhesive foam.      Dressing selection: Left heel: NPWT veraflo dressing  Right Heel: Honeycolloid, adhesive foam         Interdisciplinary consultation: Patient, Bedside RN ([place]), Naseem ESCALERA     EVALUATION: Patient is 62 y/o male with poorly controlled diabetes admitted with left shoulder pain and left DFU. Patient taken to the oR on 6/18 for excisional debridement of left heel and placement of NPWT dressing. NPWT dressing was removed and pt was switched to NPWT veraflo dressing to assist in removing adherent slough and promote granulation of tissue. Honey colloid applied to right plantar foot wound. Honey colloid applied to cleanse and autolytically debride due to its high osmolarity. As well as help lower overall wound pH, while promoting a moisture-balanced environment conducive to wound healing.     Factors affecting wound healing: DM, possible infection, tobacco abuse, age     Goals: Steady decrease in wound area and depth weekly.     NURSING PLAN OF CARE ORDERS (X):     Dressing changes: See UPDATED  Dressing Care orders: X  Skin care: See Skin Care orders:   Rectal tube care: See Rectal Tube Care orders:   Other orders:     WOUND TEAM PLAN OF CARE (X):   NPWT change 3 x week:    X    Dressing changes by wound team:       Follow up as needed:       Other (explain):      Anticipated discharge plans (X):   SNF:           Home Care:           Outpatient Wound Center:            Self Care:            Other:  TBD -Patient currently on veraflo NPWT dressing. Pt will need outpatient wound follow up.                 Ashok Schneider M.D. Physician  Signed Surgery Orthopedic  OP Report Date of Service: 6/24/2019  9:01 PM         []Hide copied text  []Hover for attribution information  DATE OF OPERATION: 6/24/2019     PREOPERATIVE DIAGNOSIS: left lower extremity osteomyelitis     POSTOPERATIVE DIAGNOSIS: Same     PROCEDURE PERFORMED: left below knee amputation     SURGEON: Ashok Schneider M.D.     ASSIST: Dr. Jose De Jesus Barrios MD, [facility]     ANESTHESIA: General     INDICATIONS: The patient is a 63 y.o.-year-old male who presents with a dysvascular lower extremity with signs of bony osteomyelitis.  Given their radiographic and exam findings, the patient is an appropriately indicated candidate for left below knee amputation.  I discussed the risks, benefits, and alternatives of surgery with the patient.  Risks discussed included failure to clear the infection, wound healing complication, need for additional surgery including more proximal amputation, neurovascular injury, blood loss, phantom limb sensation or pain, DVT/PE, and the medical risks of anesthesia (including stroke, MI, and death).  Benefits discussed included improved chance of clearance of the infection and improved function.  The patient is in agreement with the plan to proceed, and their informed consent was signed and documented in the medical record.  I met with the patient pre-operatively and marked the operative extremity with their agreement.  He was taken to the operating room.     FINDINGS: Non-viable lower extremity     WOUND CLASSIFICATION: Class IV     SPECIMEN: None     ESTIMATED BLOOD LOSS: Minimal     COMPLICATIONS: None        PROCEDURE: The patient underwent general anesthesia, and was positioned supine with all bony prominences well padded.  A well padded non-sterile tourniquet was applied the the operative extremity, and the operative extremity was prepped and draped in sterile orthopaedic fashion, using ChloraPrep.  The surgical team scrubbed in, and a procedural  pause was conducted to verify correct patient, correct extremity, presence of the surgeons initials on the operative extremity, and administration of IV antibiotics, in this case, Ancef.     Following generalized agreement, the tourniquet was inflated, and a standard below knee amputation flap was drawn.  The incision was then made, and we dissected through fascia sharply.  The anterior and lateral compartment musculature was then divided with cautery, and the anterior vascular bundle was ligated.  A traction neurolysis was performed on the nerve.     We then elevated the periosteum of the tibia proximally, and performed a transverse osteotomy of the tibia using the saw.  A napkin ring osteotomy of the fibula was performed with the saw, making our proximal cut just proximal to the tibial cut.  The amputation was then completed, and the posterior flap contoured using the amputation knife.  The residual limb was passed off.  The anterior aspect of the tibia was beveled using the saw.     We then dissected out the posterior neurovascular bundle, and ligated the vessels.  A traction neurolysis was performed on the nerve, as well as the sural nerve.  The posterior flap was debulked as needed, and the tourniquet deflated.  Hemostasis was obtained with cautery.  The wound was then irrigated with copious amounts of normal saline.  The wound was then closed over a hemovac drain, with #1 Vicryl in the fascia, a few 2-0 Vicryl in the subcutaneous tissue, and 2-0 Nylon in the skin.  Sterile dressings and a knee immobilizer were applied, and the patient was transported to the recovery room in stable condition, suffering no complications.  All counts were correct.     The use of Dr. Barrios as a surgical assistant was necessary for assistance with amputation and closure.     Post-Operative Plan:     1.  NWB operative extremity, knee immobilizer when at rest  2.  DVT prophylaxis - SCD's, chemical per medicine  3.  Remove drain  when output < 30 mL/24 hours  4.  Antibiotics - Per ID recs  5.  Discharge planning     ____________________________________   Ashok Schneider M.D.             Mimi Doyle M.D. Physician Signed Physical Medicine & Rehab  Consults Date of Service: 6/26/2019  1:04 PM   Consult Orders:   IP CONSULT FOR PHYSIATRY [[id number]] ordered by Jordan Leahy M.D. at 06/26/19 1133         []Hide copied text  [][place]ver for attribution information  Medical chart review completed.      Patient is a 63 y.o. male  with a past medical history of insulin dependent diabetes with peripheral neuropathy, chronic respiratory failure on 3 L home O2, admitted to [facility] on 6/15 as a transfer from [place], with elevated troponin to peak 1.11, sepsis, and left diabetic foot ulcer. Also complaining of left shoulder pain with history of fall.  Left humerus xray from outside hospital with inferior dislocation of proximal humerus. CT of shoulder with prior resection of left humeral head, GH joint with chronic loculated fluid collection. Patient was intubated on admission, extubated 6/17, s/p steroids. Patient had debridement on 6/18 of foot ulcer, then returned to OR on 6/24 for left transtibial amputation with Dr. Schneider. Patient followed by ID, antibiotics stopped today. He currently has hemovac, woundvac, and knee immobilizer. Plan for follow up in 7-10 days for wound check. Noted to have increased Cr today. Patient had ECHO with EF 40%. Patient was on methadone 70 mg at home prior to admission, and continues this medication.      Patient with multiple co-morbidities(including but not limited to diabetes, anemia, acute kidney injury, acute systolic CHF); with cognitive deficits and functional deficits in mobility/self-cares, and Severe de-conditioning.      Pre-morbidly, this patient lived in a single level home with Three steps to enter, with spouse. The patient was evaluated by acute care Physical Therapy and Occupational  Therapy; currently requiring minimal assistance for mobility and minimal to moderate assistance for ADLs.     6 clicks score 10 mobility, 18 ADLs     As long as patient is able to get his methadone after discharge (I will not be able to prescribe), he is a good candidate for an acute inpatient rehabilitation program with a coordinated program of care at an intensity and frequency not available at a lower level of care.      Note: if the patient continues to progress while waiting for medical clearance, and no longer requires 2 out of 3 therapy services (PT/OT/SLP), then the patient would no longer meet the criteria for acute inpatient rehabilitation.     This recommendation is substantiated by the patient's current medical condition with intervention and assessment of medical issues requiring an acute level of care for patient's safety and maximum outcome. A coordinated program of care will be provided by an interdisciplinary team including physical therapy, occupational therapy, hospitalist, physiatry, rehab nursing and rehab psychology. Rehab goals include improved mobility, self-care management, strength and conditioning/endurance, pain management, bowel and bladder management, mood and affect, and safety with independent home management including caregiver training. Estimated length of stay is approximately 14 days. Rehab potential: Good. Disposition: to pre-morbid independent living setting with supportive care of patient's spouse. We will continue to follow with you in anticipation of discharge to acute inpatient rehabilitation when medically stable to do so at the discretion of his attending physician.      Thank you for allowing us to participate in his care.     Mimi Doyle M.D.  Physical Medicine and Rehabilitation                   Co-morbidities: See above  Potential Risk - Complications: Contractures, Deep Vein Thrombosis, Malnutrition, Pain, Perceptual Impairment, Pneumonia, Pressure Ulcer and  Infection  Level of Risk: High    Ongoing Medical Management Needed (Medical/Nursing Needs):   Patient Active Problem List    Diagnosis Date Noted   • Acute encephalopathy 06/27/2019     Priority: High   • OZ (acute kidney injury) ([facility]) 06/18/2019     Priority: High   • Acute on chronic respiratory failure with hypoxia ([facility]) 01/25/2018     Priority: High   • Diabetic foot ulcer ([facility]) 01/13/2018     Priority: High   • Facial nerve paralysis 09/03/2016     Priority: High   • Septic shock due to undetermined organism ([facility]) 07/10/2015     Priority: High   • SALOME (obstructive sleep apnea) 06/16/2019     Priority: Medium   • Pneumonia due to infectious organism 06/16/2019     Priority: Medium   • Aspiration into airway 06/16/2019     Priority: Medium   • Cardiomyopathy ([facility]) 01/29/2018     Priority: Medium   • DM (diabetes mellitus), type 2, uncontrolled (CMS-HCC) 07/12/2015     Priority: Medium   • Essential hypertension 03/16/2013     Priority: Medium   • Mucus plugging of bronchi 06/16/2019     Priority: Low   • Left shoulder pain 06/15/2019     Priority: Low   • Hypokalemia 07/12/2015     Priority: Low   • Tobacco dependence 07/12/2015     Priority: Low   • Low back pain 07/10/2015     Priority: Low   • Hypoglycemia 06/27/2019   • Leucocytosis 06/23/2019   • Chronic pain syndrome 01/27/2018   • Opiate withdrawal ([facility]) 01/12/2018   • Methadone dependence ([facility]) 01/12/2018   • Acute respiratory failure with hypoxemia ([facility]) 01/12/2018   • Closed 2-part nondisplaced fracture of surgical neck of left humerus 01/12/2018   • Shingles 09/03/2016   • CVA (cerebral vascular accident) ([facility]) 09/02/2016   • Thrombocytopenia ([facility]) 07/12/2015   • Obesity 07/12/2015   • Hyponatremia 07/10/2015   • Tobacco abuse 07/10/2015   • ARF (acute renal failure) ([facility]) 07/10/2015   • DM (diabetes mellitus) (CMS-HCC) 03/16/2013   • Back pain 03/15/2013   • Fall 03/15/2013   • Spine disorder 03/04/2013   • Degeneration of lumbar or lumbosacral  "intervertebral disc 02/11/2013   • Lumbar stenosis 02/11/2013     Current Vital Signs:   Temperature:  (Q8 per RN) Pulse: 61 Respiration: 17 Blood Pressure : 124/60  Weight: 97.5 kg (214 lb 15.2 oz) Height: 180.3 cm (5' 10.98\")  Pulse Oximetry: 90 % O2 (LPM): 3      Completed Laboratory Reports:  Recent Labs      07/02/19   2123  07/03/19   0549  07/03/19   1234  07/03/19   1724  07/03/19   2148  07/04/19   0643  07/04/19   1109  07/04/19   1619  07/04/19   2107  07/05/19   0610  07/05/19   1106   POCGLUCOSE  137*  146*  202*  235*  105*  137*  127*  170*  158*  181*  142*     Additional Labs: Not Applicable    Prior Living Situation:   Housing / Facility: 1 Story House  Steps Into Home: 3  Lives with - Patient's Self Care Capacity: Spouse  Equipment Owned: Single Point Cane, Tub / Shower Seat    Prior Level of Function / Living Situation:   Physical Therapy: Prior Services: None  Housing / Facility: 1 Story House  Steps Into Home: 3  Rail:  (going to have a railing placed 'soon')  Bathroom Set up: Walk In Shower, Shower Chair  Equipment Owned: Single Point Cane, Tub / Shower Seat  Lives with - Patient's Self Care Capacity: Spouse  Bed Mobility: Independent  Transfer Status: Independent  Ambulation: Independent  Assistive Devices Used: Single Point Cane  Stairs: Independent  Current Level of Function:   Level Of Assist: Unable to Participate  Assistive Device: Front Wheel Walker  Distance (Feet): 50  Deviation: Antalgic, Step To, Bradykinetic  # of Stairs Climbed: 0  Weight Bearing Status: NWB L LE;   Comments: Poor endurance d/t previous L shoulder injury.  Supine to Sit: Minimal Assist  Sit to Supine: Supervised  Scooting: Minimal Assist  Rolling: Minimum Assist to Lt.  Skilled Intervention: Verbal Cuing, Compensatory Strategies  Comments: utilized bed rail for supine to sit. Bala hands on for balance when laterally scooting along EOB   Sit to Stand: Moderate Assist  Bed, Chair, Wheelchair Transfer: Minimal Assist " (seated SB)  Transfer Method: Slide Board  Distance Wheelchair (Feet or Distance): 75  Wheelchair Assist: Minimal Assist  Skilled Intervention: Verbal Cuing  Comments: mod A squat pivot BTB d/t pain from using SB  Sitting in Chair: Up in w/c post tx  Sitting Edge of Bed: 15 min  Standing: NT  Occupational Therapy:   Self Feeding: Independent  Grooming / Hygiene: Independent  Bathing: Independent  Dressing: Independent  Toileting: Independent  Medication Management: Independent  Laundry: Independent  Kitchen Mobility: Independent  Finances: Independent  Home Management: Independent  Shopping: Independent  Prior Level Of Mobility: Independent With Device in Home, Independent With Device in Community  Driving / Transportation: Driving Independent  Prior Services: None  Housing / Facility: [address]  Occupation (Pre-Hospital Vocational): Retired Due To Age  Current Level of Function:   Upper Body Dressing: Minimal Assist  Lower Body Dressing: Moderate Assist  Toileting: Maximal Assist  Skilled Intervention: Verbal Cuing  Speech Language Pathology:      Rehabilitation Prognosis/Potential: Good  Estimated Length of Stay: 14 days    Nursing:   Orientation : Oriented x 4  Continent    Scope/Intensity of Services Recommended:  Physical Therapy: 1.5 hr / day  5 days / week. Therapeutic Interventions Required: Maximize Endurance, Mobility, Strength and Safety  Occupational Therapy: 1 hr / day 5 days / week. Therapeutic Interventions Required: Maximize Self Care, ADLs, IADLs and Energy Conservation  Rehabilitation Nursin/7. Therapeutic Interventions Required: Monitor Pain, Skin, Wound(s), Vital Signs, Intake and Output, Labs, Safety, Family Training and LLE surgical incision care; Right plantar heel ulcer wound care; Bowel & bladder regimem; DVT Prophylaxis; ADL's.   Rehabilitation Physician: 3 - 5 days / week. Therapeutic Interventions Required: Medical Management  Respiratory Care: Daily. Therapeutic Interventions  Required: Pulmonary Toileting, O2 Weaning and Respiratory care per protocol.    Dietician: Consult. Therapeutic Interventions Required: Nutritional evaluation with recommendations to promote optimal health/healing.     Rehabilitation Goals and Plan (Expected frequency & duration of treatment in the IRF):   Return to the Community, Modified Independent Level of Care and Outpatient Support  Anticipated Date of Rehabilitation Admission: 07/05/2019  Patient/Family oriented IRF level of care/facility/plan: Yes  Patient/Family willing to participate in IRF care/facility/plan: Yes  Patient able to tolerate IRF level of care proposed: Yes  Patient has potential to benefit IRF level of care proposed: Yes  Comments: Not Applicable    Special Needs or Precautions - Medical Necessity:  Safety Concerns/Precautions:  Fall Risk / High Risk for Falls and Balance  Complex Wound Care: LLE surgical incision care; Right plantar heel ulcer wound care.  Pain Management  Weight-bearing Status: Non Weight Bearing, Left, Lower Extremity       Diet:   DIET ORDERS (Through next 24h)    Start     Ordered    06/25/19 0223  Diet Order Diabetic, Cardiac  ALL MEALS     Question Answer Comment   Diet: Diabetic    Diet: Cardiac        06/25/19 0222          Anticipated Discharge Destination / Patient/Family Goal:  Destination: Home with Assistance Support System: Spouse  Anticipated home health services: OT, PT and Nursing  Previously used HH service/ provider: Not Applicable  Anticipated DME Needs: To be determined  Outpatient Services: To be determined  Alternative resources to address additional identified needs:   N/A  Pre-Screen Completed: 7/5/2019 1:19 PM Halley Munoz R.N.

## 2019-07-05 NOTE — DISCHARGE PLANNING
Spoke with Halley from [facility] about Insurance auth for patient. Continue to follow

## 2019-07-05 NOTE — PROGRESS NOTES
[facility] Medicine Daily Progress Note    Date of Service  7/5/2019    Chief Complaint  63 y.o. male admitted 6/15/2019 with left heel pain.    Hospital Course   7/2/2019-patient is a 63-year-old male who has diabetes that was uncontrolled.  The patient developed a diabetic foot ulcer on the left foot.  The patient's foot ulcer progressed into sepsis.  The patient actually had to be intubated mechanically ventilated and was getting sepsis resuscitation treatment in the ICU.  Patient initially grew out some enterococcus and then later grew out Prevotella.  Patient has completed at this point antibiotics.  Patient is been extubated and is on room air at this point in time.  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  At this point in time is postoperative day #8.  Patient is recovering at this point well and at this point we are anticipating discharging him once we have located a the best discharge plan for him.  7/3/2019-today patient is improved further.  He is at this point able to sit at bedside for several hours.  He says if the hand-foot hangs down he does have quite a bit of pain in the foot from gravity.  Also.  Patient will need continued pain management as well as further improvement in his ambulation.  7/4/2019-today patient is anxious hospital.  He is been sitting at bedside and started to work with therapies more more.  His pain is down to 6 out of 10 and continues to decline.  So far we do not have an accepting facility.  7/5/2019-today patient is complaining of severe pain that was induced by the slide board.  He refuses to try and use a slide board any further.  He says that when they got him up into the wheelchair he was also having a lot more pulsatile-like pain in the BKA stump area.      Interval Problem Update  Continue with pain management optimization  Pending at this point discharge once a facility has been located for him working with  for this.    Consultants/Specialty  Physiatry,  infectious diseases, orthopedic surgery, pulmonary.    Code Status  Full code    Disposition  Discharge to extended care facility once bed is located for him and he is excepted.    Review of Systems  Review of Systems   Constitutional: Negative.  Negative for chills, diaphoresis and fever.   HENT: Negative.  Negative for ear pain, hearing loss, sore throat and tinnitus.    Eyes: Negative.  Negative for blurred vision and double vision.   Respiratory: Negative.  Negative for cough, hemoptysis, wheezing and stridor.    Cardiovascular: Negative.  Negative for chest pain, claudication and leg swelling.   Gastrointestinal: Negative.  Negative for blood in stool, constipation, heartburn, nausea and vomiting.   Genitourinary: Negative.  Negative for dysuria, flank pain, frequency and urgency.   Musculoskeletal: Positive for joint pain (6/10) and myalgias. Negative for back pain.   Skin: Negative.  Negative for itching and rash.   Neurological: Negative.  Negative for tingling, tremors, sensory change, seizures, loss of consciousness and headaches.   Endo/Heme/Allergies: Negative.    Psychiatric/Behavioral: Negative.  Negative for depression, memory loss, substance abuse and suicidal ideas. The patient does not have insomnia.    All other systems reviewed and are negative.       Physical Exam  Temp:  [36.2 °C (97.2 °F)-36.9 °C (98.4 °F)] 36.2 °C (97.2 °F)  Pulse:  [61-80] 61  Resp:  [16-18] 17  BP: (124-143)/(57-69) 124/60  SpO2:  [90 %-94 %] 90 %    Physical Exam   Constitutional: He is oriented to person, place, and time. He appears well-developed and well-nourished. He is cooperative. He does not have a sickly appearance. No distress.   HENT:   Head: Normocephalic and atraumatic.   Right Ear: External ear normal.   Left Ear: External ear normal.   Nose: Nose normal.   Mouth/Throat: Oropharynx is clear and moist.   Eyes: Pupils are equal, round, and reactive to light. Conjunctivae and EOM are normal. No scleral icterus.    Neck: Normal range of motion. Neck supple. No JVD present. No tracheal deviation present.   Cardiovascular: Normal rate and regular rhythm.    Pulmonary/Chest: Effort normal and breath sounds normal. No stridor. No respiratory distress. He has no wheezes. He exhibits no tenderness.   Abdominal: Soft. Bowel sounds are normal. He exhibits distension. There is no tenderness. There is no guarding.   Musculoskeletal: He exhibits no edema, tenderness or deformity.        Left shoulder: He exhibits decreased range of motion.        Legs:  Neurological: He is alert and oriented to person, place, and time. He has normal reflexes. No cranial nerve deficit. He exhibits normal muscle tone. Coordination normal. GCS eye subscore is 4. GCS verbal subscore is 5. GCS motor subscore is 6.   Skin: Skin is warm. No rash noted. He is not diaphoretic. No erythema. No pallor.   Psychiatric: He has a normal mood and affect. His behavior is normal. Judgment and thought content normal.   Nursing note and vitals reviewed.      Fluids    Intake/Output Summary (Last 24 hours) at 07/05/19 1514  Last data filed at 07/05/19 1300   Gross per 24 hour   Intake              480 ml   Output              900 ml   Net             -420 ml       Laboratory                        Imaging  CT-HEAD W/O   Final Result      1.  No evidence of acute territorial infarct, intracranial hemorrhage or mass lesion.   2.  Mild diffuse cerebral substance loss.   3.  Mild microangiopathic ischemic change versus demyelination or gliosis.      DX-CHEST-PORTABLE (1 VIEW)   Final Result      1.  Increased moderate hazy bilateral parenchymal opacities which could be seen in the setting edema or infection.   2.  Interval removal of a right-sided central venous catheter.      EC-ECHOCARDIOGRAM COMPLETE W/O CONT   Final Result      DX-CHEST-PORTABLE (1 VIEW)   Final Result         1.  Pulmonary edema and/or infiltrates are identified, which are somewhat decreased since the  prior exam.      DX-ANKLE 3+ VIEWS LEFT   Final Result      Soft tissue swelling. No acute fracture identified.      DX-CHEST-PORTABLE (1 VIEW)   Final Result      1.  Improved aeration of the RIGHT lung base   2.  Otherwise no interval change in BILATERAL atelectasis or airspace disease      DX-ABDOMEN FOR TUBE PLACEMENT   Final Result      Enteric tube projects over the stomach.      DX-CHEST-LIMITED (1 VIEW)   Final Result      New right IJ line tip overlies the SVC and no pneumothorax is identified      New right basilar opacity effaces the diaphragm and could be from atelectasis or consolidation      DX-CHEST-PORTABLE (1 VIEW)   Final Result      Endotracheal tube projects at the level of the aortic arch.      Increased perihilar and bibasilar opacities may represent a combination of edema, atelectasis, pneumonia.         DX-CHEST-PORTABLE (1 VIEW)   Final Result      Increasing reticular and hazy opacity is compatible with edema      CT-SHOULDER W/O PLUS RECONS LEFT   Final Result         Prior resection of left humeral head. The residual head neck junction appears well corticated and could relate to chronic resection/deformity. There is also deformity and remodeling of the glenoid with well-corticated margin, suggesting of chronic change    as well.      No acute fracture.      There is no articulation between the proximal humerus and glenoid. The glenohumeral joint is replaced by loculated soft tissue or dense fluid, likely chronic change. The fluid is slightly less in 2018 CT.      Infection is considered less likely given the osseous cortex adjacent to the fluid is well corticated and demonstrates no rarefaction or destruction.      US-FOREIGN FILM ULTRASOUND   Final Result      OUTSIDE IMAGES-CT HEAD   Final Result      [id number] FILM X-RAY   Final Result      OUTSIDE IMAGES-DX LOWER EXTREMITY, LEFT   Final Result      [id number] FILM X-RAY   Final Result      OUTSIDE IMAGES-DX CHEST    Final Result      OUTSIDE IMAGES-DX UPPER EXTREMITY, LEFT   Final Result           Assessment/Plan  * Acute on chronic respiratory failure with hypoxia (HCC)- (present on admission)   Assessment & Plan    Resolved and patient at this point is on room air         Acute encephalopathy   Assessment & Plan    Resolved       OZ (acute kidney injury) (HCC)- (present on admission)   Assessment & Plan    Resolved       Diabetic foot ulcer (HCC)- (present on admission)   Assessment & Plan    Status post BKA day #11  She states that yesterday his pain escalated because he had to try a slide board for transfers.  Apparently his scrotum got caught in the slide board and he had tremendous amount of pain.  He refuses to use a slide board anymore.     Septic shock due to undetermined organism (HCC)- (present on admission)   Assessment & Plan    Resolved     Pneumonia due to infectious organism- (present on admission)   Assessment & Plan    Resolved     SALOME (obstructive sleep apnea)   Assessment & Plan    Remains at this point oxygen at nighttime for his sleep apnea     Cardiomyopathy (HCC)- (present on admission)   Assessment & Plan    Left ventricular ejection fraction 40%.  Optimize medical management     DM (diabetes mellitus), type 2, uncontrolled (CMS-[facility])- (present on admission)   Assessment & Plan    -accus with sliding scale coverage continued, continue with 3 times daily Humalog 5 units, blood sugars today ranging from 142-181.  -diabetic diet continued  -diabetic education provided  -follow glycohemoglobin levels long term, most recent hemoglobin A1c is 12.2.  -continue with oral antihyperglycemics  -monitor for hypoglycemic episodes and adjust control if he should get low     Essential hypertension- (present on admission)   Assessment & Plan    Pressure at this point well controlled with a combination of metoprolol 12.5 mg twice daily and as needed labetalol or hydralazine.     Hypoglycemia   Assessment & Plan     Resolved     Leucocytosis   Assessment & Plan    Resolved     Chronic pain syndrome- (present on admission)   Assessment & Plan    On pain management     Mucus plugging of bronchi   Assessment & Plan    Resolved     Left shoulder pain- (present on admission)   Assessment & Plan    Chronic from previous injury with a frozen shoulder     Tobacco dependence- (present on admission)   Assessment & Plan    -nicotine replacement protocol and cessation education provided for 12 minutes, discussed options of nicotine patch, acupuncture, medical treatment with wellbutrin and chantix. Discussed other options. Code 61915     Hypokalemia- (present on admission)   Assessment & Plan    Resolved          VTE prophylaxis: Lovenox

## 2019-07-05 NOTE — CARE PLAN
Problem: Safety  Goal: Will remain free from falls  Outcome: PROGRESSING AS EXPECTED  Provided patient education about fall risk and importance of calling for assistance when wanting to get out of bed. Patient verbalizes understanding, and calls appropriately. Fall precautions in place: bed locked and in lowest position, patient wearing threaded footwear, call light within reach, patient refusing bed alarm     Problem: Respiratory:  Goal: Respiratory status will improve  Outcome: PROGRESSING AS EXPECTED  Assessed patient's ability to perform deep breathing exercises, patient demonstrates task appropriately. Provided education about importance of using incentive spirometry, at a minimum of ten times per hour. Patient verbalizes understanding and demonstrates task appropriately.

## 2019-07-06 LAB
GLUCOSE BLD-MCNC: 157 MG/DL (ref 65–99)
GLUCOSE BLD-MCNC: 177 MG/DL (ref 65–99)
GLUCOSE BLD-MCNC: 181 MG/DL (ref 65–99)
GLUCOSE BLD-MCNC: 182 MG/DL (ref 65–99)

## 2019-07-06 PROCEDURE — 700102 HCHG RX REV CODE 250 W/ 637 OVERRIDE(OP): Performed by: HOSPITALIST

## 2019-07-06 PROCEDURE — 770006 HCHG ROOM/CARE - MED/SURG/GYN SEMI*

## 2019-07-06 PROCEDURE — 82962 GLUCOSE BLOOD TEST: CPT | Mod: 91

## 2019-07-06 PROCEDURE — A9270 NON-COVERED ITEM OR SERVICE: HCPCS | Performed by: INTERNAL MEDICINE

## 2019-07-06 PROCEDURE — 99231 SBSQ HOSP IP/OBS SF/LOW 25: CPT | Performed by: HOSPITALIST

## 2019-07-06 PROCEDURE — A9270 NON-COVERED ITEM OR SERVICE: HCPCS | Performed by: HOSPITALIST

## 2019-07-06 PROCEDURE — 700102 HCHG RX REV CODE 250 W/ 637 OVERRIDE(OP): Performed by: INTERNAL MEDICINE

## 2019-07-06 PROCEDURE — 700111 HCHG RX REV CODE 636 W/ 250 OVERRIDE (IP): Performed by: INTERNAL MEDICINE

## 2019-07-06 RX ORDER — OXYCODONE HYDROCHLORIDE 5 MG/1
5 TABLET ORAL EVERY 6 HOURS PRN
Status: DISCONTINUED | OUTPATIENT
Start: 2019-07-06 | End: 2019-07-10 | Stop reason: HOSPADM

## 2019-07-06 RX ADMIN — INSULIN LISPRO 2 UNITS: 100 INJECTION, SOLUTION INTRAVENOUS; SUBCUTANEOUS at 12:44

## 2019-07-06 RX ADMIN — METHADONE HYDROCHLORIDE 20 MG: 10 TABLET ORAL at 05:14

## 2019-07-06 RX ADMIN — TRAMADOL HYDROCHLORIDE 100 MG: 50 TABLET ORAL at 12:49

## 2019-07-06 RX ADMIN — INSULIN LISPRO 2 UNITS: 100 INJECTION, SOLUTION INTRAVENOUS; SUBCUTANEOUS at 05:20

## 2019-07-06 RX ADMIN — ASPIRIN 81 MG 81 MG: 81 TABLET ORAL at 05:14

## 2019-07-06 RX ADMIN — INSULIN LISPRO 2 UNITS: 100 INJECTION, SOLUTION INTRAVENOUS; SUBCUTANEOUS at 17:39

## 2019-07-06 RX ADMIN — TRAMADOL HYDROCHLORIDE 100 MG: 50 TABLET ORAL at 20:20

## 2019-07-06 RX ADMIN — OXYCODONE HYDROCHLORIDE 5 MG: 5 TABLET ORAL at 16:08

## 2019-07-06 RX ADMIN — OXYCODONE HYDROCHLORIDE 5 MG: 5 TABLET ORAL at 22:37

## 2019-07-06 RX ADMIN — METOPROLOL TARTRATE 12.5 MG: 25 TABLET, FILM COATED ORAL at 05:15

## 2019-07-06 RX ADMIN — PREGABALIN 300 MG: 150 CAPSULE ORAL at 17:35

## 2019-07-06 RX ADMIN — INSULIN LISPRO 2 UNITS: 100 INJECTION, SOLUTION INTRAVENOUS; SUBCUTANEOUS at 20:37

## 2019-07-06 RX ADMIN — ENOXAPARIN SODIUM 40 MG: 100 INJECTION SUBCUTANEOUS at 05:24

## 2019-07-06 RX ADMIN — PREGABALIN 300 MG: 150 CAPSULE ORAL at 05:14

## 2019-07-06 RX ADMIN — METHADONE HYDROCHLORIDE 20 MG: 10 TABLET ORAL at 17:35

## 2019-07-06 RX ADMIN — METOPROLOL TARTRATE 12.5 MG: 25 TABLET, FILM COATED ORAL at 17:35

## 2019-07-06 ASSESSMENT — ENCOUNTER SYMPTOMS
GASTROINTESTINAL NEGATIVE: 1
HEARTBURN: 0
BLURRED VISION: 0
SHORTNESS OF BREATH: 0
ABDOMINAL PAIN: 0
WEAKNESS: 0
EYES NEGATIVE: 1
TREMORS: 0
FALLS: 0
VOMITING: 0
DEPRESSION: 0
SENSORY CHANGE: 0
SINUS PAIN: 0
SPUTUM PRODUCTION: 0
CONSTITUTIONAL NEGATIVE: 1
SORE THROAT: 0
FLANK PAIN: 0
PHOTOPHOBIA: 0
TINGLING: 0
PND: 0
INSOMNIA: 1
WEIGHT LOSS: 0
NEUROLOGICAL NEGATIVE: 1
CLAUDICATION: 0
RESPIRATORY NEGATIVE: 1
MEMORY LOSS: 0
CARDIOVASCULAR NEGATIVE: 1

## 2019-07-06 ASSESSMENT — LIFESTYLE VARIABLES: SUBSTANCE_ABUSE: 0

## 2019-07-06 NOTE — CARE PLAN
Problem: Pain Management  Goal: Pain level will decrease to patient's comfort goal  Outcome: PROGRESSING AS EXPECTED  Pain controlled with tramadol

## 2019-07-06 NOTE — PROGRESS NOTES
[facility] Medicine Daily Progress Note    Date of Service  7/6/2019    Chief Complaint  63 y.o. male admitted 6/15/2019 with left heel pain.    Hospital Course   7/2/2019-patient is a 63-year-old male who has diabetes that was uncontrolled.  The patient developed a diabetic foot ulcer on the left foot.  The patient's foot ulcer progressed into sepsis.  The patient actually had to be intubated mechanically ventilated and was getting sepsis resuscitation treatment in the ICU.  Patient initially grew out some enterococcus and then later grew out Prevotella.  Patient has completed at this point antibiotics.  Patient is been extubated and is on room air at this point in time.  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  At this point in time is postoperative day #8.  Patient is recovering at this point well and at this point we are anticipating discharging him once we have located a the best discharge plan for him.  7/3/2019-today patient is improved further.  He is at this point able to sit at bedside for several hours.  He says if the hand-foot hangs down he does have quite a bit of pain in the foot from gravity.  Also.  Patient will need continued pain management as well as further improvement in his ambulation.  7/4/2019-today patient is anxious hospital.  He is been sitting at bedside and started to work with therapies more more.  His pain is down to 6 out of 10 and continues to decline.  So far we do not have an accepting facility.  7/5/2019-today patient is complaining of severe pain that was induced by the slide board.  He refuses to try and use a slide board any further.  He says that when they got him up into the wheelchair he was also having a lot more pulsatile-like pain in the BKA stump area.  7/6/2019-continue with pain management overnight patient's pain was worse.  Still pending discharge      Interval Problem Update  Postop day #12 after BKA.  So far we still do not have an accepting facility for discharge.   Continue with pain management    Consultants/Specialty  Physiatry, infectious diseases, orthopedic surgery, pulmonary.    Code Status  Full code    Disposition  Discharge to extended care facility once bed is located for him and he is excepted.    Review of Systems  Review of Systems   Constitutional: Negative.  Negative for weight loss.   HENT: Negative.  Negative for sinus pain and sore throat.    Eyes: Negative.  Negative for blurred vision and photophobia.   Respiratory: Negative.  Negative for sputum production and shortness of breath.    Cardiovascular: Negative.  Negative for chest pain, claudication and PND.   Gastrointestinal: Negative.  Negative for abdominal pain, heartburn and vomiting.   Genitourinary: Negative.  Negative for dysuria and flank pain.   Musculoskeletal: Positive for joint pain. Negative for falls.   Skin: Negative.    Neurological: Negative.  Negative for tingling, tremors, sensory change and weakness.   Endo/Heme/Allergies: Negative.    Psychiatric/Behavioral: Negative for depression, memory loss, substance abuse and suicidal ideas. The patient has insomnia.    All other systems reviewed and are negative.       Physical Exam  Temp:  [36.4 °C (97.6 °F)-36.8 °C (98.2 °F)] 36.4 °C (97.6 °F)  Pulse:  [71-77] 74  Resp:  [16-18] 17  BP: (113-153)/(60-71) 113/66  SpO2:  [92 %-94 %] 94 %    Physical Exam   Constitutional: He is oriented to person, place, and time. Vital signs are normal. He appears well-developed and well-nourished. He is cooperative. He does not have a sickly appearance.   HENT:   Head: Normocephalic and atraumatic.   Right Ear: External ear normal.   Left Ear: External ear normal.   Nose: Nose normal.   Mouth/Throat: No oropharyngeal exudate.   Eyes: Pupils are equal, round, and reactive to light. Conjunctivae and EOM are normal. Right pupil is round and reactive. Left pupil is round and reactive. Pupils are equal.   Neck: Normal range of motion and full passive range of  motion without pain. Neck supple. No JVD present. No tracheal tenderness present. No Brudzinski's sign and no Kernig's sign noted. No thyromegaly present.   Cardiovascular: Normal rate, regular rhythm, normal heart sounds and normal pulses.   Occasional extrasystoles are present. Exam reveals no friction rub.    No murmur heard.  Pulmonary/Chest: Effort normal and breath sounds normal. No stridor. No respiratory distress. He has no rales. Chest wall is not dull to percussion. He exhibits no mass and no bony tenderness.   Abdominal: Soft. Normal appearance and bowel sounds are normal. He exhibits distension. He exhibits no mass. There is no hepatosplenomegaly. There is no tenderness. There is no CVA tenderness. No hernia. Hernia confirmed negative in the right inguinal area and confirmed negative in the left inguinal area.   Genitourinary: Rectum normal.   Musculoskeletal: Normal range of motion.        Legs:  Lymphadenopathy:     He has no cervical adenopathy.   Neurological: He is alert and oriented to person, place, and time. He has normal reflexes. He is not disoriented. He displays no tremor. No cranial nerve deficit. Coordination and gait normal. GCS eye subscore is 4. GCS verbal subscore is 5. GCS motor subscore is 6.   Reflex Scores:       Tricep reflexes are 2+ on the right side and 2+ on the left side.       Bicep reflexes are 2+ on the right side and 2+ on the left side.       Brachioradialis reflexes are 2+ on the right side and 2+ on the left side.       Patellar reflexes are 2+ on the right side and 2+ on the left side.       Achilles reflexes are 2+ on the right side and 2+ on the left side.  Skin: Skin is warm and dry. No rash noted.   Psychiatric: He has a normal mood and affect. His speech is normal and behavior is normal. Judgment and thought content normal. Cognition and memory are normal.   Nursing note and vitals reviewed.      Fluids    Intake/Output Summary (Last 24 hours) at 07/06/19  1337  Last data filed at 07/06/19 0700   Gross per 24 hour   Intake              240 ml   Output             1350 ml   Net            -1110 ml       Laboratory                        Imaging  CT-HEAD W/O   Final Result      1.  No evidence of acute territorial infarct, intracranial hemorrhage or mass lesion.   2.  Mild diffuse cerebral substance loss.   3.  Mild microangiopathic ischemic change versus demyelination or gliosis.      DX-CHEST-PORTABLE (1 VIEW)   Final Result      1.  Increased moderate hazy bilateral parenchymal opacities which could be seen in the setting edema or infection.   2.  Interval removal of a right-sided central venous catheter.      EC-ECHOCARDIOGRAM COMPLETE W/O CONT   Final Result      DX-CHEST-PORTABLE (1 VIEW)   Final Result         1.  Pulmonary edema and/or infiltrates are identified, which are somewhat decreased since the prior exam.      DX-ANKLE 3+ VIEWS LEFT   Final Result      Soft tissue swelling. No acute fracture identified.      DX-CHEST-PORTABLE (1 VIEW)   Final Result      1.  Improved aeration of the RIGHT lung base   2.  Otherwise no interval change in BILATERAL atelectasis or airspace disease      DX-ABDOMEN FOR TUBE PLACEMENT   Final Result      Enteric tube projects over the stomach.      DX-CHEST-LIMITED (1 VIEW)   Final Result      New right IJ line tip overlies the SVC and no pneumothorax is identified      New right basilar opacity effaces the diaphragm and could be from atelectasis or consolidation      DX-CHEST-PORTABLE (1 VIEW)   Final Result      Endotracheal tube projects at the level of the aortic arch.      Increased perihilar and bibasilar opacities may represent a combination of edema, atelectasis, pneumonia.         DX-CHEST-PORTABLE (1 VIEW)   Final Result      Increasing reticular and hazy opacity is compatible with edema      CT-SHOULDER W/O PLUS RECONS LEFT   Final Result         Prior resection of left humeral head. The residual head neck junction  appears well corticated and could relate to chronic resection/deformity. There is also deformity and remodeling of the glenoid with well-corticated margin, suggesting of chronic change    as well.      No acute fracture.      There is no articulation between the proximal humerus and glenoid. The glenohumeral joint is replaced by loculated soft tissue or dense fluid, likely chronic change. The fluid is slightly less in 2018 CT.      Infection is considered less likely given the osseous cortex adjacent to the fluid is well corticated and demonstrates no rarefaction or destruction.      US-FOREIGN FILM ULTRASOUND   Final Result      OUTSIDE IMAGES-CT HEAD   Final Result      [id number] FILM X-RAY   Final Result      OUTSIDE IMAGES-DX LOWER EXTREMITY, LEFT   Final Result      [id number] FILM X-RAY   Final Result      OUTSIDE IMAGES-DX CHEST   Final Result      OUTSIDE IMAGES-DX UPPER EXTREMITY, LEFT   Final Result           Assessment/Plan  * Acute on chronic respiratory failure with hypoxia (HCC)- (present on admission)   Assessment & Plan    Resolved.         Acute encephalopathy   Assessment & Plan    Resolved       OZ (acute kidney injury) (HCC)- (present on admission)   Assessment & Plan    Resolved       Diabetic foot ulcer (HCC)- (present on admission)   Assessment & Plan    Status post BKA day #12  He states that overnight the pain was much worse.  It went up to 10 out of 10 but this morning expect 6 out of 10.  He continues to try to ambulate and use his extremities.     Septic shock due to undetermined organism (HCC)- (present on admission)   Assessment & Plan    Resolved     Pneumonia due to infectious organism- (present on admission)   Assessment & Plan    Resolved     SALOME (obstructive sleep apnea)   Assessment & Plan    Nighttime oxygen support     Cardiomyopathy (HCC)- (present on admission)   Assessment & Plan    Continue with medical management patient's left ventricular ejection  fraction is only 40%     DM (diabetes mellitus), type 2, uncontrolled (CMS-HCC)- (present on admission)   Assessment & Plan    -accus with sliding scale coverage continued, continue with 3 times daily Humalog 5 units, blood sugars today ranging from 157-184  -diabetic diet continued  -diabetic education provided  -follow glycohemoglobin levels long term, most recent hemoglobin A1c is 12.2.  -continue with oral antihyperglycemics  -monitor for hypoglycemic episodes and adjust control if he should get low     Essential hypertension- (present on admission)   Assessment & Plan    Continued on metoprolol 12.5 mg daily.  Monitor blood pressure and optimize start blood pressure less than 140 diastolic under 90.  Most recent blood pressure is 113/66     Hypoglycemia   Assessment & Plan    Resolved     Leucocytosis   Assessment & Plan    Resolved     Chronic pain syndrome- (present on admission)   Assessment & Plan    Continue with pain management optimization     Mucus plugging of bronchi   Assessment & Plan    Resolved     Left shoulder pain- (present on admission)   Assessment & Plan    Secondary from previous shoulder injury continue with pain management     Tobacco dependence- (present on admission)   Assessment & Plan    -nicotine replacement protocol and cessation education provided for 12 minutes, discussed options of nicotine patch, acupuncture, medical treatment with wellbutrin and chantix. Discussed other options. Code 92615     Hypokalemia- (present on admission)   Assessment & Plan    Resolved          VTE prophylaxis: Lovenox

## 2019-07-07 LAB
GLUCOSE BLD-MCNC: 141 MG/DL (ref 65–99)
GLUCOSE BLD-MCNC: 146 MG/DL (ref 65–99)
GLUCOSE BLD-MCNC: 180 MG/DL (ref 65–99)

## 2019-07-07 PROCEDURE — A9270 NON-COVERED ITEM OR SERVICE: HCPCS | Performed by: INTERNAL MEDICINE

## 2019-07-07 PROCEDURE — 700102 HCHG RX REV CODE 250 W/ 637 OVERRIDE(OP): Performed by: HOSPITALIST

## 2019-07-07 PROCEDURE — 700102 HCHG RX REV CODE 250 W/ 637 OVERRIDE(OP): Performed by: INTERNAL MEDICINE

## 2019-07-07 PROCEDURE — A9270 NON-COVERED ITEM OR SERVICE: HCPCS | Performed by: HOSPITALIST

## 2019-07-07 PROCEDURE — 99232 SBSQ HOSP IP/OBS MODERATE 35: CPT | Performed by: HOSPITALIST

## 2019-07-07 PROCEDURE — 82962 GLUCOSE BLOOD TEST: CPT | Mod: 91

## 2019-07-07 PROCEDURE — 700111 HCHG RX REV CODE 636 W/ 250 OVERRIDE (IP): Performed by: INTERNAL MEDICINE

## 2019-07-07 PROCEDURE — 770006 HCHG ROOM/CARE - MED/SURG/GYN SEMI*

## 2019-07-07 RX ORDER — FLUTICASONE PROPIONATE 50 MCG
2 SPRAY, SUSPENSION (ML) NASAL DAILY
Status: DISCONTINUED | OUTPATIENT
Start: 2019-07-07 | End: 2019-07-10 | Stop reason: HOSPADM

## 2019-07-07 RX ORDER — ECHINACEA PURPUREA EXTRACT 125 MG
2 TABLET ORAL
Status: DISCONTINUED | OUTPATIENT
Start: 2019-07-07 | End: 2019-07-10 | Stop reason: HOSPADM

## 2019-07-07 RX ADMIN — METHADONE HYDROCHLORIDE 20 MG: 10 TABLET ORAL at 05:14

## 2019-07-07 RX ADMIN — METOPROLOL TARTRATE 12.5 MG: 25 TABLET, FILM COATED ORAL at 05:14

## 2019-07-07 RX ADMIN — INSULIN LISPRO 2 UNITS: 100 INJECTION, SOLUTION INTRAVENOUS; SUBCUTANEOUS at 16:57

## 2019-07-07 RX ADMIN — OXYCODONE HYDROCHLORIDE 5 MG: 5 TABLET ORAL at 14:58

## 2019-07-07 RX ADMIN — TRAMADOL HYDROCHLORIDE 100 MG: 50 TABLET ORAL at 12:13

## 2019-07-07 RX ADMIN — ASPIRIN 81 MG 81 MG: 81 TABLET ORAL at 05:15

## 2019-07-07 RX ADMIN — OXYCODONE HYDROCHLORIDE 5 MG: 5 TABLET ORAL at 09:22

## 2019-07-07 RX ADMIN — ENOXAPARIN SODIUM 40 MG: 100 INJECTION SUBCUTANEOUS at 05:15

## 2019-07-07 RX ADMIN — Medication 2 SPRAY: at 09:23

## 2019-07-07 RX ADMIN — FLUTICASONE PROPIONATE 100 MCG: 50 SPRAY, METERED NASAL at 13:49

## 2019-07-07 RX ADMIN — TRAMADOL HYDROCHLORIDE 100 MG: 50 TABLET ORAL at 02:41

## 2019-07-07 RX ADMIN — METOPROLOL TARTRATE 12.5 MG: 25 TABLET, FILM COATED ORAL at 16:58

## 2019-07-07 RX ADMIN — METHADONE HYDROCHLORIDE 20 MG: 10 TABLET ORAL at 17:00

## 2019-07-07 RX ADMIN — PREGABALIN 300 MG: 150 CAPSULE ORAL at 05:15

## 2019-07-07 RX ADMIN — INSULIN LISPRO 2 UNITS: 100 INJECTION, SOLUTION INTRAVENOUS; SUBCUTANEOUS at 20:27

## 2019-07-07 RX ADMIN — PREGABALIN 300 MG: 150 CAPSULE ORAL at 16:59

## 2019-07-07 RX ADMIN — TRAMADOL HYDROCHLORIDE 100 MG: 50 TABLET ORAL at 20:11

## 2019-07-07 RX ADMIN — SENNOSIDES,DOCUSATE SODIUM 2 TABLET: 8.6; 5 TABLET, FILM COATED ORAL at 20:10

## 2019-07-07 ASSESSMENT — ENCOUNTER SYMPTOMS
CARDIOVASCULAR NEGATIVE: 1
DEPRESSION: 0
NEUROLOGICAL NEGATIVE: 1
BLOOD IN STOOL: 0
EYE PAIN: 0
INSOMNIA: 1
DIAPHORESIS: 0
CONSTIPATION: 0
PALPITATIONS: 0
HEADACHES: 0
GASTROINTESTINAL NEGATIVE: 1
COUGH: 0
CONSTITUTIONAL NEGATIVE: 1
SEIZURES: 0
MYALGIAS: 0
NECK PAIN: 0
EYES NEGATIVE: 1
SHORTNESS OF BREATH: 0
RESPIRATORY NEGATIVE: 1
SPUTUM PRODUCTION: 0
EYE DISCHARGE: 0
SPEECH CHANGE: 0
DIZZINESS: 0

## 2019-07-07 NOTE — CARE PLAN
Problem: Venous Thromboembolism (VTW)/Deep Vein Thrombosis (DVT) Prevention:  Goal: Patient will participate in Venous Thrombosis (VTE)/Deep Vein Thrombosis (DVT)Prevention Measures  Outcome: PROGRESSING AS EXPECTED  SCDs in place    Problem: Pain Management  Goal: Pain level will decrease to patient's comfort goal  Outcome: PROGRESSING AS EXPECTED  Pain controlled with tramadol

## 2019-07-07 NOTE — PROGRESS NOTES
[facility] Medicine Daily Progress Note    Date of Service  7/7/2019    Chief Complaint  63 y.o. male admitted 6/15/2019 with left heel pain.    Hospital Course   7/2/2019-patient is a 63-year-old male who has diabetes that was uncontrolled.  The patient developed a diabetic foot ulcer on the left foot.  The patient's foot ulcer progressed into sepsis.  The patient actually had to be intubated mechanically ventilated and was getting sepsis resuscitation treatment in the ICU.  Patient initially grew out some enterococcus and then later grew out Prevotella.  Patient has completed at this point antibiotics.  Patient is been extubated and is on room air at this point in time.  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  At this point in time is postoperative day #8.  Patient is recovering at this point well and at this point we are anticipating discharging him once we have located a the best discharge plan for him.  7/3/2019-today patient is improved further.  He is at this point able to sit at bedside for several hours.  He says if the hand-foot hangs down he does have quite a bit of pain in the foot from gravity.  Also.  Patient will need continued pain management as well as further improvement in his ambulation.  7/4/2019-today patient is anxious hospital.  He is been sitting at bedside and started to work with therapies more more.  His pain is down to 6 out of 10 and continues to decline.  So far we do not have an accepting facility.  7/5/2019-today patient is complaining of severe pain that was induced by the slide board.  He refuses to try and use a slide board any further.  He says that when they got him up into the wheelchair he was also having a lot more pulsatile-like pain in the BKA stump area.  7/6/2019-continue with pain management overnight patient's pain was worse.  Still pending discharge  7/7/2019-patient's pain overnight was worse.  We have added at this point pain management to his treatment plan.  Patient is  very frustrated about [employer]. refusing his discharge.  At this point he has hired a  and he is going to have case management to help him fax information over to the  so that begin discharge options.      Interval Problem Update  Postop day #13 after BKA continue with pain management which seems to be worse at night and during the day.  Continue with diabetic management.  Continue with blood pressure management monitor electrolytes.  Social service consult has been requested to help him with establishing a legal interaction with [employer].    Consultants/Specialty  Physiatry, infectious diseases, orthopedic surgery, pulmonary.    Code Status  Full code    Disposition  Discharge to extended care facility once bed is located for him and he is excepted.    Review of Systems  Review of Systems   Constitutional: Negative.  Negative for diaphoresis and malaise/fatigue.   HENT: Negative.  Negative for ear pain and tinnitus.    Eyes: Negative.  Negative for pain and discharge.   Respiratory: Negative.  Negative for cough, sputum production and shortness of breath.    Cardiovascular: Negative.  Negative for chest pain and palpitations.   Gastrointestinal: Negative.  Negative for blood in stool and constipation.   Genitourinary: Negative.  Negative for frequency and urgency.   Musculoskeletal: Positive for joint pain (10/10 pain). Negative for myalgias and neck pain.   Skin: Negative.  Negative for itching and rash.   Neurological: Negative.  Negative for dizziness, speech change, seizures and headaches.   Endo/Heme/Allergies: Negative.    Psychiatric/Behavioral: Negative for depression and suicidal ideas. The patient has insomnia.    All other systems reviewed and are negative.       Physical Exam  Temp:  [36.4 °C (97.5 °F)-36.8 °C (98.3 °F)] 36.4 °C (97.5 °F)  Pulse:  [71-83] 71  Resp:  [17-18] 17  BP: (129-148)/(58-61) 129/58  SpO2:  [91 %-95 %] 95 %    Physical Exam   Constitutional: He is oriented  to person, place, and time. Vital signs are normal. He appears well-developed and well-nourished. He is cooperative. He does not have a sickly appearance. No distress.   HENT:   Head: Normocephalic and atraumatic.   Right Ear: External ear normal.   Left Ear: External ear normal.   Nose: Nose normal.   Mouth/Throat: Oropharynx is clear and moist.   Eyes: Pupils are equal, round, and reactive to light. Conjunctivae and EOM are normal. Right pupil is round and reactive. Left pupil is round and reactive. Pupils are equal.   Neck: Normal range of motion and full passive range of motion without pain. Neck supple. No tracheal tenderness present. No tracheal deviation present. No Brudzinski's sign and no Kernig's sign noted.   Cardiovascular: Normal rate, regular rhythm, normal heart sounds and normal pulses.   Occasional extrasystoles are present.   Pulmonary/Chest: Effort normal and breath sounds normal. No respiratory distress. He has no rales. Chest wall is not dull to percussion. He exhibits no mass and no bony tenderness.   Abdominal: Soft. Normal appearance and bowel sounds are normal. He exhibits distension. There is no hepatosplenomegaly. There is no tenderness. There is no rebound and no CVA tenderness. No hernia. Hernia confirmed negative in the right inguinal area and confirmed negative in the left inguinal area.   Genitourinary: Rectum normal.   Musculoskeletal: Normal range of motion. He exhibits no edema or deformity.        Legs:  Neurological: He is alert and oriented to person, place, and time. He has normal reflexes. He is not disoriented. He displays no tremor. No cranial nerve deficit. Gait normal. GCS eye subscore is 4. GCS verbal subscore is 5. GCS motor subscore is 6.   Reflex Scores:       Tricep reflexes are 2+ on the right side and 2+ on the left side.       Bicep reflexes are 2+ on the right side and 2+ on the left side.       Brachioradialis reflexes are 2+ on the right side and 2+ on the left  side.       Patellar reflexes are 2+ on the right side and 2+ on the left side.       Achilles reflexes are 2+ on the right side and 2+ on the left side.  Skin: Skin is warm and dry. No rash noted. He is not diaphoretic. No erythema.   Psychiatric: He has a normal mood and affect. His speech is normal and behavior is normal. Judgment and thought content normal. Cognition and memory are normal.   Nursing note and vitals reviewed.      Fluids    Intake/Output Summary (Last 24 hours) at 07/07/19 1226  Last data filed at 07/07/19 1139   Gross per 24 hour   Intake              240 ml   Output             1130 ml   Net             -890 ml       Laboratory                        Imaging  CT-HEAD W/O   Final Result      1.  No evidence of acute territorial infarct, intracranial hemorrhage or mass lesion.   2.  Mild diffuse cerebral substance loss.   3.  Mild microangiopathic ischemic change versus demyelination or gliosis.      DX-CHEST-PORTABLE (1 VIEW)   Final Result      1.  Increased moderate hazy bilateral parenchymal opacities which could be seen in the setting edema or infection.   2.  Interval removal of a right-sided central venous catheter.      EC-ECHOCARDIOGRAM COMPLETE W/O CONT   Final Result      DX-CHEST-PORTABLE (1 VIEW)   Final Result         1.  Pulmonary edema and/or infiltrates are identified, which are somewhat decreased since the prior exam.      DX-ANKLE 3+ VIEWS LEFT   Final Result      Soft tissue swelling. No acute fracture identified.      DX-CHEST-PORTABLE (1 VIEW)   Final Result      1.  Improved aeration of the RIGHT lung base   2.  Otherwise no interval change in BILATERAL atelectasis or airspace disease      DX-ABDOMEN FOR TUBE PLACEMENT   Final Result      Enteric tube projects over the stomach.      DX-CHEST-LIMITED (1 VIEW)   Final Result      New right IJ line tip overlies the SVC and no pneumothorax is identified      New right basilar opacity effaces the diaphragm and could be from  atelectasis or consolidation      DX-CHEST-PORTABLE (1 VIEW)   Final Result      Endotracheal tube projects at the level of the aortic arch.      Increased perihilar and bibasilar opacities may represent a combination of edema, atelectasis, pneumonia.         DX-CHEST-PORTABLE (1 VIEW)   Final Result      Increasing reticular and hazy opacity is compatible with edema      CT-SHOULDER W/O PLUS RECONS LEFT   Final Result         Prior resection of left humeral head. The residual head neck junction appears well corticated and could relate to chronic resection/deformity. There is also deformity and remodeling of the glenoid with well-corticated margin, suggesting of chronic change    as well.      No acute fracture.      There is no articulation between the proximal humerus and glenoid. The glenohumeral joint is replaced by loculated soft tissue or dense fluid, likely chronic change. The fluid is slightly less in 2018 CT.      Infection is considered less likely given the osseous cortex adjacent to the fluid is well corticated and demonstrates no rarefaction or destruction.      US-FOREIGN FILM ULTRASOUND   Final Result      OUTSIDE IMAGES-CT HEAD   Final Result      [id number] FILM X-RAY   Final Result      OUTSIDE IMAGES-DX LOWER EXTREMITY, LEFT   Final Result      [id number] FILM X-RAY   Final Result      OUTSIDE IMAGES-DX CHEST   Final Result      OUTSIDE IMAGES-DX UPPER EXTREMITY, LEFT   Final Result           Assessment/Plan  * Acute on chronic respiratory failure with hypoxia (HCC)- (present on admission)   Assessment & Plan    Resolved.         Acute encephalopathy   Assessment & Plan    Resolved       OZ (acute kidney injury) (HCC)- (present on admission)   Assessment & Plan    Resolved       Diabetic foot ulcer (HCC)- (present on admission)   Assessment & Plan    Status post BKA day #13  Once again patient states that overnight his pain was worse it was 10 out of 10.  Yesterday afternoon I  added oxycodone to his treatment management and helping.  Continue at this point with wound care, pain management optimization..     Septic shock due to undetermined organism (HCC)- (present on admission)   Assessment & Plan    Resolved     Pneumonia due to infectious organism- (present on admission)   Assessment & Plan    Resolved     SALOME (obstructive sleep apnea)   Assessment & Plan    Remains on nocturnal oxygen support.     Cardiomyopathy (HCC)- (present on admission)   Assessment & Plan    Continue with medical management.  Most recent left ventricular ejection fraction is only 40%     DM (diabetes mellitus), type 2, uncontrolled (CMS-HCC)- (present on admission)   Assessment & Plan    Most recent blood sugars are 141-181.  Patient remains on Humalog 5 units subcutaneously 3 times daily with meals, he is on a diabetic diet, has been given diabetic education, his recent A1c is 12.2       Essential hypertension- (present on admission)   Assessment & Plan    Patient with metoprolol 12.5 mg daily is stabilized on his blood pressure management most recent blood pressure is 129/58     Hypoglycemia   Assessment & Plan    Resolved     Leucocytosis   Assessment & Plan    Resolved     Chronic pain syndrome- (present on admission)   Assessment & Plan    Added oxycodone yesterday as patient's pain became intolerable.     Mucus plugging of bronchi   Assessment & Plan    Resolved     Left shoulder pain- (present on admission)   Assessment & Plan    Chronic and patient once again today states that he does have pain in that shoulder he says that will always be there as he had previous injury to that shoulder with surgical repair.  He is just happy he did not lose the arm in the process.     Tobacco dependence- (present on admission)   Assessment & Plan    -nicotine replacement protocol and cessation education provided for 12 minutes, discussed options of nicotine patch, acupuncture, medical treatment with wellbutrin and  chantix. Discussed other options. Code 41211     Hypokalemia- (present on admission)   Assessment & Plan    Resolved          VTE prophylaxis: Lovenox

## 2019-07-08 LAB
GLUCOSE BLD-MCNC: 143 MG/DL (ref 65–99)
GLUCOSE BLD-MCNC: 160 MG/DL (ref 65–99)
GLUCOSE BLD-MCNC: 171 MG/DL (ref 65–99)
GLUCOSE BLD-MCNC: 173 MG/DL (ref 65–99)
GLUCOSE BLD-MCNC: 223 MG/DL (ref 65–99)

## 2019-07-08 PROCEDURE — 700102 HCHG RX REV CODE 250 W/ 637 OVERRIDE(OP): Performed by: INTERNAL MEDICINE

## 2019-07-08 PROCEDURE — 700102 HCHG RX REV CODE 250 W/ 637 OVERRIDE(OP): Performed by: HOSPITALIST

## 2019-07-08 PROCEDURE — A9270 NON-COVERED ITEM OR SERVICE: HCPCS | Performed by: INTERNAL MEDICINE

## 2019-07-08 PROCEDURE — A9270 NON-COVERED ITEM OR SERVICE: HCPCS | Performed by: HOSPITALIST

## 2019-07-08 PROCEDURE — 700111 HCHG RX REV CODE 636 W/ 250 OVERRIDE (IP): Performed by: INTERNAL MEDICINE

## 2019-07-08 PROCEDURE — 99232 SBSQ HOSP IP/OBS MODERATE 35: CPT | Performed by: HOSPITALIST

## 2019-07-08 PROCEDURE — 97535 SELF CARE MNGMENT TRAINING: CPT

## 2019-07-08 PROCEDURE — 82962 GLUCOSE BLOOD TEST: CPT | Mod: 91

## 2019-07-08 PROCEDURE — 770006 HCHG ROOM/CARE - MED/SURG/GYN SEMI*

## 2019-07-08 PROCEDURE — 700102 HCHG RX REV CODE 250 W/ 637 OVERRIDE(OP)

## 2019-07-08 PROCEDURE — 302146

## 2019-07-08 RX ORDER — MICONAZOLE NITRATE 20 MG/G
CREAM TOPICAL 2 TIMES DAILY
Status: DISCONTINUED | OUTPATIENT
Start: 2019-07-08 | End: 2019-07-10 | Stop reason: HOSPADM

## 2019-07-08 RX ADMIN — PREGABALIN 300 MG: 150 CAPSULE ORAL at 05:34

## 2019-07-08 RX ADMIN — FLUTICASONE PROPIONATE 100 MCG: 50 SPRAY, METERED NASAL at 05:37

## 2019-07-08 RX ADMIN — TRAMADOL HYDROCHLORIDE 100 MG: 50 TABLET ORAL at 11:16

## 2019-07-08 RX ADMIN — METHADONE HYDROCHLORIDE 20 MG: 10 TABLET ORAL at 16:05

## 2019-07-08 RX ADMIN — INSULIN LISPRO 2 UNITS: 100 INJECTION, SOLUTION INTRAVENOUS; SUBCUTANEOUS at 11:18

## 2019-07-08 RX ADMIN — ENOXAPARIN SODIUM 40 MG: 100 INJECTION SUBCUTANEOUS at 05:37

## 2019-07-08 RX ADMIN — METOPROLOL TARTRATE 12.5 MG: 25 TABLET, FILM COATED ORAL at 16:05

## 2019-07-08 RX ADMIN — ASPIRIN 81 MG 81 MG: 81 TABLET ORAL at 05:35

## 2019-07-08 RX ADMIN — PREGABALIN 300 MG: 150 CAPSULE ORAL at 16:05

## 2019-07-08 RX ADMIN — METHADONE HYDROCHLORIDE 20 MG: 10 TABLET ORAL at 05:35

## 2019-07-08 RX ADMIN — TRAMADOL HYDROCHLORIDE 100 MG: 50 TABLET ORAL at 23:44

## 2019-07-08 RX ADMIN — OXYCODONE HYDROCHLORIDE 5 MG: 5 TABLET ORAL at 08:58

## 2019-07-08 RX ADMIN — OXYCODONE HYDROCHLORIDE 5 MG: 5 TABLET ORAL at 21:01

## 2019-07-08 RX ADMIN — INSULIN LISPRO 2 UNITS: 100 INJECTION, SOLUTION INTRAVENOUS; SUBCUTANEOUS at 05:41

## 2019-07-08 RX ADMIN — TRAMADOL HYDROCHLORIDE 100 MG: 50 TABLET ORAL at 03:52

## 2019-07-08 RX ADMIN — INSULIN LISPRO 3 UNITS: 100 INJECTION, SOLUTION INTRAVENOUS; SUBCUTANEOUS at 16:08

## 2019-07-08 RX ADMIN — MICONAZOLE NITRATE: 20 CREAM TOPICAL at 20:45

## 2019-07-08 RX ADMIN — SENNOSIDES,DOCUSATE SODIUM 2 TABLET: 8.6; 5 TABLET, FILM COATED ORAL at 05:35

## 2019-07-08 RX ADMIN — OXYCODONE HYDROCHLORIDE 5 MG: 5 TABLET ORAL at 00:30

## 2019-07-08 RX ADMIN — METOPROLOL TARTRATE 12.5 MG: 25 TABLET, FILM COATED ORAL at 05:34

## 2019-07-08 ASSESSMENT — ENCOUNTER SYMPTOMS
CONSTIPATION: 0
FOCAL WEAKNESS: 0
PND: 0
MYALGIAS: 0
EYE DISCHARGE: 0
SEIZURES: 0
EYE PAIN: 0
DEPRESSION: 0
COUGH: 0
NAUSEA: 0
POLYDIPSIA: 0
SPUTUM PRODUCTION: 0
WEIGHT LOSS: 0
FEVER: 0
SPEECH CHANGE: 0
SORE THROAT: 0
ABDOMINAL PAIN: 0
BACK PAIN: 0
WHEEZING: 0

## 2019-07-08 ASSESSMENT — COGNITIVE AND FUNCTIONAL STATUS - GENERAL
TOILETING: A LOT
PERSONAL GROOMING: A LITTLE
DRESSING REGULAR LOWER BODY CLOTHING: A LOT
DRESSING REGULAR UPPER BODY CLOTHING: A LITTLE
DAILY ACTIVITIY SCORE: 16
SUGGESTED CMS G CODE MODIFIER DAILY ACTIVITY: CK
HELP NEEDED FOR BATHING: A LOT

## 2019-07-08 ASSESSMENT — PATIENT HEALTH QUESTIONNAIRE - PHQ9
2. FEELING DOWN, DEPRESSED, IRRITABLE, OR HOPELESS: NOT AT ALL
SUM OF ALL RESPONSES TO PHQ9 QUESTIONS 1 AND 2: 0
1. LITTLE INTEREST OR PLEASURE IN DOING THINGS: NOT AT ALL

## 2019-07-08 ASSESSMENT — LIFESTYLE VARIABLES: SUBSTANCE_ABUSE: 0

## 2019-07-08 NOTE — PREADMISSION SCREENING NOTE
Request for Authorization fo IRF level of care with [facility]. Anticipate 14-21 day stay with a goal of returning to the community with family and outpatient support. Patient will participate in 3 hours of therapy a day 5 days a week or 15 hours of therapy over 7 days. Please refer to current levelof function under therapy note with in this summary. Supportive documentation of sepsis respiratory  failure diabetic foot infection complicated by neuropathy resulting in a LBKA.  Client is medically cleared for post acute services of inpatient rehabilitation.  Pre-Admission Screening Form    Patient Information:   Name: Travon Pollack     MRN: [id number]       : 1955      Age: 63 y.o.   Gender: male      Race: White [7]       Marital Status:  [2]  Family Contact: Shu Pollack Tawny        Relationship: Spouse [17]  Daughter [2]  Home Phone:              Cell Phone:   [phone]  Advanced Directives: None  Code Status:  FULL  Current Attending Provider: Manjinder Tapia M.D.  Referring Physician: Dr. Leahy       Physiatrist Consult: Dr. Doyle        Referral Date: 2019  Primary Payor Source:  [employer]  Secondary Payor Source:      Medical Information:   Date of Admission to Acute Care Settin/15/2019  Room Number: T311/02  Rehabilitation Diagnosis: 05.4 Unilateral LE Below Knee Amputation (BKA)left below knee amputation  Immunization History   Administered Date(s) Administered   • Influenza TIV (IM) 10/13/2012, 10/29/2017   • Influenza Vaccine Adult HD 2019   • Pneumococcal polysaccharide vaccine (PPSV-23) 2012     No Known Allergies  Past Medical History:   Diagnosis Date   • Dental disorder     upper and lower   • Diabetes     insulin   • Heart burn    • Indigestion    • Infectious disease staph   • MRSA (methicillin resistant Staphylococcus aureus)    • Opiate withdrawal (HCC)    • Pain 2/1/13    6/10 back   • Renal disorder     pt born with only one kidney   •  Snoring      Past Surgical History:   Procedure Laterality Date   • KNEE AMPUTATION BELOW Left 6/24/2019    Procedure: AMPUTATION, BELOW KNEE;  Surgeon: Ashok Schneider M.D.;  Location: [facility];  Service: Orthopedics   • IRRIGATION & DEBRIDEMENT ORTHO  6/18/2019    Procedure: IRRIGATION AND DEBRIDEMENT, WOUND - LEFT HEEL;  Surgeon: Simon Diallo M.D.;  Location: [facility];  Service: Orthopedics   • LUMBAR FUSION POSTERIOR  3/4/2013    Performed by Nixon Zhao M.D. at [facility]   • LUMBAR LAMINECTOMY DISKECTOMY  3/4/2013    Performed by Nixon Zhao M.D. at [facility]   • LUMBAR FUSION POSTERIOR  2/11/2013    Performed by Nixon Zhao M.D. at [facility]   • HERNIA REPAIR  2012    x7 last one in 2012   • OTHER  2007    back   • OTHER      neck       History Leading to Admission, Conditions that Caused the Need for Rehab (CMS):     Onesimo Olson M.D. Physician [facility]  H&P Date of Service: 6/15/2019 10:30 PM         []Hide copied text  []Johnnyver for attribution information  [facility] Medicine History & Physical Note     Date of Service  6/15/2019     Primary Care Physician  Jon Turner M.D.     Consultants  None     Code Status  Full code     Chief Complaint  Left shoulder pain and left heel ulcer     History of Presenting Illness  63 y.o. male the past medical history of insulin-dependent diabetes mellitus, chronic respiratory failure on 3 L of oxygen who presented 6/15/2019 with multiple complaints.  Patient reports a history of left shoulder injury after which he underwent several surgeries.  The patient had a mechanical ground-level fall 1 week ago after which he injured his left shoulder again and reported excruciating left shoulder pain.  He reports bumping his head but denied any loss of consciousness or any headache.  He reports a left heel ulcer which has been progressively worsening..  He reports  worsening redness swelling and warmth with drainage over the past 4 days.  He reports fevers and chills.  He denies any chest pain, shortness of breath, nausea, vomiting abdominal pain or diarrhea.     The patient's presented to [facility], I reviewed the medical records from the transferring facility which are summarized as follows:           Assessment/Plan:  I anticipate this patient will require at least two midnights for appropriate medical management, necessitating inpatient admission.         Sepsis ([facility])- (present on admission)   Assessment & Plan     This is sepsis (without associated acute organ dysfunction).   Likely source is infected diabetic ulcer  Patient has been started on IV fluids per septic protocol  Lactate is within normal limits  Patient is started on IV Zosyn and IV vancomycin.  Follow blood and wound cultures         Left shoulder pain- (present on admission)   Assessment & Plan     No acute fracture noted on CT  Pain control with Tylenol, oxycodone and IV morphine.  Monitor respiratory status closely  PT OT  If persistent pain we will consider consulting his orthopedic surgeon       Elevated troponin- (present on admission)   Assessment & Plan     Likely type II NSTEMI in the setting of sepsis  Patient denies active chest pain  He will be given a full dose of aspirin  Continues cardiac monitoring with serial EKG and troponin  Check 2D echo      Diabetic foot ulcer ([facility])- (present on admission)   Assessment & Plan     Infected left heel diabetic foot ulcer  Patient has been started on IV Zosyn and IV vancomycin.  Monitor for vancomycin toxicity and follow trough levels.  Wound care and LPS have been consulted  Follow wound cultures  ESR elevated.  X-ray reveals no evidence of osteomyelitis.      DM (diabetes mellitus), type 2, uncontrolled (CMS-HCC)- (present on admission)   Assessment & Plan     Uncontrolled with hyperglycemia  Continue Lantus 44 units twice  daily  Start on insulin sliding scale with serial Accu-Checks  Check hemoglobin A1c  Hypoglycemic protocol in place                  VTE prophylaxis: scd     I spent a total of 30 minutes of non face to face time performing additional research, reviewing medical records from transferring facility, discussing plan of care with other healthcare providers. Start time: 10 00 pm. End time: 10 30 pm     Zeus Reagan Physician Addendum Pulmonary Consults Date of Service: 6/16/2019 12:40 PM      Expand All Collapse All      Critical Care Consultation     Date of consult: 6/16/2019     Referring Physician  CHRYSTAL Montes De Oca*     Reason for Consultation  Consulted for respiratory failure      Assessment/Plan      Septic shock due to undetermined organism (HCC)- (present on admission)   Assessment & Plan     This is severe sepsis with the following associated acute organ dysfunction(s): acute respiratory failure, metabolic/septic encephalopathy.   Encephalopathy, confused, moves all extremities  Improved once on vent and sedation, following commands  Keep map > 65  On vanc and zosyn, continue for now  Significant secretions/purulence on bronchoscopy  Bal pending  Aspiration vs cap  mrsa nares  Reduce antibiotics tomorrow  Has leg wound, cultured prior  Blood cx ntd admission  No sedatives per nursing mar prior to respiratory failure except lyrica, unlikely to cause profound sedation as chronic med  Levophed map > 65  Volume resuscitated, post resuscitation us non fluid responsive, adequate  Will need post resusictation diuresis as equilibrizes         Mucus plugging of bronchi   Assessment & Plan     Thick mucus plugging of airways  More on right  Bronchoscopy with extensive lavage      Aspiration into airway   Assessment & Plan     Some thick secretions on bronchoscopy  Right worse than left  Possible aspiration      Pneumonia due to infectious organism   Assessment & Plan     Aspiration vs community  ? Cause of falls  at home  Antibiotics  Possible aspiration per bronchoscopy  Family says possible aspiration in the past  Will need swallow eval post extubation      SALOME (obstructive sleep apnea)   Assessment & Plan     Contributory  Diagnosis per family in past      Left shoulder pain- (present on admission)   Assessment & Plan     Acute on chronic      Cardiomyopathy (HCC)- (present on admission)   Assessment & Plan     Bedside echo reviewed  Echo may need to be repeated  Troponin elevation  Re-evaluate tomorrow  Stressed by respiratory failure      Acute on chronic respiratory failure with hypoxia (HCC)- (present on admission)   Assessment & Plan     Secondary to septic shock  Pneumonia  Baseline o2 at home  Rt/o2 protocol  Not bipap candidate  Significant purulence bronch, bal pending  Hx smoking heavily, ? Copd diagnosis per family  Steroids scheduled  duonebs scheduled  Antibiotics  Us post resuscitation adequate volume      Elevated troponin- (present on admission)   Assessment & Plan     Likely demand ischemia  Per bedside echo adequate ef  Repeat echo if no improvement, prior 40% ef in January  Troponin to 1.11, continue to trend      Diabetic foot ulcer (HCC)- (present on admission)   Assessment & Plan     Purulent  On vanc/zosyn  cx pending      Tobacco dependence- (present on admission)   Assessment & Plan     contributory      DM (diabetes mellitus), type 2, uncontrolled (CMS-HCC)- (present on admission)   Assessment & Plan     ssi  lantus      HTN (hypertension)- (present on admission)   Assessment & Plan     Hold anti hypertensives            Discussed patient condition and risk of morbidity and/or mortality with Hospitalist, Family, RN, RT, Pharmacy, Code status disscussed and Patient.       The patient remains critically ill.  Required intubation and on mechanical ventilator.  Extensive fluid resuscitation, on pressors for map > 65.  Critical care time = 110 minutes in directly providing and coordinating critical  "care and extensive data review.  No time overlap and excludes procedures.        Jose De Jesus Barrios M.D. Physician Signed Surgery Orthopedic Consults Date of Service: 6/18/2019 11:29 AM      Expand All Collapse All      DATE OF SERVICE:  06/18/2019     CHIEF COMPLAINT:  \"My left heel hurts.  I was having trouble breathing.\"        ASSESSMENT:  A 63-year-old male with a left heel wound with no pneumonia, but   sepsis, which is not quite sure from the pneumonia or his foot.     PLAN:  The patient will benefit from surgical debridement.  We discussed with   the orthopedic trauma team today and I believe Dr. Diallo is trying to take   him back for a surgical intervention.  The patient was advised he is at risk   of possibly losing his leg given the infection, location, and likelihood of   healing.  The patient expressed understanding.  Other care, continue per the medical team.  Recommend continuing   antibiotics.        ____________________________________     MD Alyce Silva M.D. Physician Signed Infectious Disease  Consults Date of Service: 6/19/2019  9:24 AM      Expand All Collapse All      INFECTIOUS DISEASES INPATIENT CONSULT NOTE      Date of Service: 6/19/2019     Consult Requested By:  Arnoldo Rockwell D.O.     Reason for Consultation: Left heel diabetic foot infection         IMPRESSION:   1.  Severe sepsis   2.  Left heel diabetic foot infection               3.  Acute hypoxic respiratory failure  4.  Acute kidney injury  5.  Pneumonia  6.  Leukocytosis  7.  Elevated troponins  8.  Type 2 diabetes mellitus     PLAN:   Travon Pollack is a 63 y.o. man with a history of type 2 diabetes mellitus, complicated by peripheral neuropathy and diabetic foot ulcer, and a history of MRSA infection in 2007 admitted for worsening left heel diabetic foot infection and respiratory failure.  Patient was found to be in septic shock secondary to pneumonia and diabetic foot infection.  He " required intubation and mechanical ventilation however he was extubated on 6/17/2019 and is now on nasal cannula.  He is now status post excisional debridement of the left heel down to subcutaneous tissue on 6/18/2019 by Dr. Diallo.  Gram stain is positive for GPC's and G HI's with cultures pending.  Blood cultures are negative to date.  His acute kidney injury is also improving after vancomycin was discontinued.  As it appears that his wound did not go down to bone, anticipate treating the patient for soft tissue infection for approximately 2 weeks from surgery.  Continue wound VAC and care.  Continue linezolid and Zosyn (which will also treat his pneumonia) pending further cultures.  Continue supportive care by the ICU team and wean off submental oxygen as tolerated.  Further recommendations per hospital course and culture results.     Simon Diallo M.D. Physician Signed Surgery Orthopedic  OP Report Date of Service: 6/18/2019 12:00 AM           DATE OF SERVICE:  06/18/2019     PREOPERATIVE DIAGNOSES:  1.  Chronic left heel wound.  2.  Diabetes mellitus.     POSTOPERATIVE DIAGNOSES:  1.  Chronic left heel wound.  2.  Diabetes mellitus.     SURGICAL PROCEDURES:  Excisional debridement of left heel (skin and   subcutaneous tissue) ? 6x3 cm and 2.5x2.5 cm.           Ashok Schneider M.D. Physician Signed Surgery Orthopedic OP Report Date of Service: 6/24/2019  9:01 PM           DATE OF OPERATION: 6/24/2019     PREOPERATIVE DIAGNOSIS: left lower extremity osteomyelitis     POSTOPERATIVE DIAGNOSIS: Same     PROCEDURE PERFORMED: left below knee amputation     SURGEON: Ashok Schneider M.D.     ASSIST: Dr. Jose De Jesus Barrios MD, [facility]        Mimi Doyle M.D. Physician Signed Physical Medicine & Rehab  Consults Date of Service: 6/26/2019  1:04 PM   Consult Orders:   IP CONSULT FOR PHYSIATRY [[id number]] ordered by Jordan Leahy M.D. at 06/26/19 1133           Medical chart review completed.       Patient is a 63 y.o. male  with a past medical history of insulin dependent diabetes with peripheral neuropathy, chronic respiratory failure on 3 L home O2, admitted to [facility] on 6/15 as a transfer from [place], with elevated troponin to peak 1.11, sepsis, and left diabetic foot ulcer. Also complaining of left shoulder pain with history of fall.  Left humerus xray from outside hospital with inferior dislocation of proximal humerus. CT of shoulder with prior resection of left humeral head, GH joint with chronic loculated fluid collection. Patient was intubated on admission, extubated 6/17, s/p steroids. Patient had debridement on 6/18 of foot ulcer, then returned to OR on 6/24 for left transtibial amputation with Dr. Schneider. Patient followed by ID, antibiotics stopped today. He currently has hemovac, woundvac, and knee immobilizer. Plan for follow up in 7-10 days for wound check. Noted to have increased Cr today. Patient had ECHO with EF 40%. Patient was on methadone 70 mg at home prior to admission, and continues this medication.      Patient with multiple co-morbidities(including but not limited to diabetes, anemia, acute kidney injury, acute systolic CHF); with cognitive deficits and functional deficits in mobility/self-cares, and Severe de-conditioning.      Pre-morbidly, this patient lived in a single level home with Three steps to enter, with spouse. The patient was evaluated by acute care Physical Therapy and Occupational Therapy; currently requiring minimal assistance for mobility and minimal to moderate assistance for ADLs.     6 clicks score 10 mobility, 18 ADLs     As long as patient is able to get his methadone after discharge (I will not be able to prescribe), he is a good candidate for an acute inpatient rehabilitation program with a coordinated program of care at an intensity and frequency not available at a lower level of care.      Note: if the patient continues to progress  while waiting for medical clearance, and no longer requires 2 out of 3 therapy services (PT/OT/SLP), then the patient would no longer meet the criteria for acute inpatient rehabilitation.     This recommendation is substantiated by the patient's current medical condition with intervention and assessment of medical issues requiring an acute level of care for patient's safety and maximum outcome. A coordinated program of care will be provided by an interdisciplinary team including physical therapy, occupational therapy, hospitalist, physiatry, rehab nursing and rehab psychology. Rehab goals include improved mobility, self-care management, strength and conditioning/endurance, pain management, bowel and bladder management, mood and affect, and safety with independent home management including caregiver training. Estimated length of stay is approximately 14 days. Rehab potential: Good. Disposition: to pre-morbid independent living setting with supportive care of patient's spouse. We will continue to follow with you in anticipation of discharge to acute inpatient rehabilitation when medically stable to do so at the discretion of his attending physician.      Thank you for allowing us to participate in his care.     Mimi Doyle M.D.  Physical Medicine and Rehabilitation                        Co-morbidities: Diabetes and renal disorder MRSA   Potential Risk - Complications: Contractures, Deep Vein Thrombosis, Malnutrition, Pain, Perceptual Impairment, Pneumonia, Pressure Ulcer and Urinary Tract Infection infection risk wound risk falls balance   Level of Risk: High    Ongoing Medical Management Needed (Medical/Nursing Needs):   Patient Active Problem List    Diagnosis Date Noted   • Acute encephalopathy 06/27/2019     Priority: High   • OZ (acute kidney injury) ([facility]) 06/18/2019     Priority: High   • Acute on chronic respiratory failure with hypoxia ([facility]) 01/25/2018     Priority: High   • Diabetic foot ulcer ([facility])  01/13/2018     Priority: High   • Facial nerve paralysis 09/03/2016     Priority: High   • Septic shock due to undetermined organism ([facility]) 07/10/2015     Priority: High   • SALOME (obstructive sleep apnea) 06/16/2019     Priority: Medium   • Pneumonia due to infectious organism 06/16/2019     Priority: Medium   • Aspiration into airway 06/16/2019     Priority: Medium   • Cardiomyopathy ([facility]) 01/29/2018     Priority: Medium   • DM (diabetes mellitus), type 2, uncontrolled (CMS-HCC) 07/12/2015     Priority: Medium   • Essential hypertension 03/16/2013     Priority: Medium   • Mucus plugging of bronchi 06/16/2019     Priority: Low   • Left shoulder pain 06/15/2019     Priority: Low   • Hypokalemia 07/12/2015     Priority: Low   • Tobacco dependence 07/12/2015     Priority: Low   • Low back pain 07/10/2015     Priority: Low   • Hypoglycemia 06/27/2019   • Leucocytosis 06/23/2019   • Chronic pain syndrome 01/27/2018   • Opiate withdrawal ([facility]) 01/12/2018   • Methadone dependence ([facility]) 01/12/2018   • Acute respiratory failure with hypoxemia ([facility]) 01/12/2018   • Closed 2-part nondisplaced fracture of surgical neck of left humerus 01/12/2018   • Shingles 09/03/2016   • CVA (cerebral vascular accident) ([facility]) 09/02/2016   • Thrombocytopenia ([facility]) 07/12/2015   • Obesity 07/12/2015   • Hyponatremia 07/10/2015   • Tobacco abuse 07/10/2015   • ARF (acute renal failure) ([facility]) 07/10/2015   • DM (diabetes mellitus) (CMS-HCC) 03/16/2013   • Back pain 03/15/2013   • Fall 03/15/2013   • Spine disorder 03/04/2013   • Degeneration of lumbar or lumbosacral intervertebral disc 02/11/2013   • Lumbar stenosis 02/11/2013 7/2/2019-patient is a 63-year-old male who has diabetes that was uncontrolled.  The patient developed a diabetic foot ulcer on the left foot.  The patient's foot ulcer progressed into sepsis.  The patient actually had to be intubated mechanically ventilated and was getting sepsis resuscitation treatment in the ICU.  Patient  initially grew out some enterococcus and then later grew out Prevotella.  Patient has completed at this point antibiotics.  Patient is been extubated and is on room air at this point in time.  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  At this point in time is postoperative day #8.  Patient is recovering at this point well and at this point we are anticipating discharging him once we have located a the best discharge plan for him.  7/3/2019-today patient is improved further.  He is at this point able to sit at bedside for several hours.  He says if the hand-foot hangs down he does have quite a bit of pain in the foot from gravity.  Also.  Patient will need continued pain management as well as further improvement in his ambulation.  7/4/2019-today patient is anxious hospital.  He is been sitting at bedside and started to work with therapies more more.  His pain is down to 6 out of 10 and continues to decline.  So far we do not have an accepting facility.  7/5/2019-today patient is complaining of severe pain that was induced by the slide board.  He refuses to try and use a slide board any further.  He says that when they got him up into the wheelchair he was also having a lot more pulsatile-like pain in the BKA stump area.  7/6/2019-continue with pain management overnight patient's pain was worse.  Still pending discharge  7/7/2019-patient's pain overnight was worse.  We have added at this point pain management to his treatment plan.  Patient is very frustrated about [employer]. refusing his discharge.  At this point he has hired a  and he is going to have case management to help him fax information over to the  so that begin discharge options.  [phone]-today patient complains of slightly improved pain.  He still upset about the fact this ovary was not accepted into rehab facility through Worker's Comp.  Patient is hiring a  to fight the Worker's Comp. people.  Rehab is still interested in him and it is  "potentially possible that they will take him for rehab.       Interval Problem Update  Postoperative day #14 after BKA continue at this point with pain management and physical therapy and Occupational Therapy.  Continue with diabetic management and hypertensive management adjust medications to make sure that his blood sugars and blood pressure are stabilized.  Current Vital Signs:   Temperature: 36.2 °C (97.1 °F) Pulse: 90 Respiration: 17 Blood Pressure : 130/67  Weight: 97.5 kg (214 lb 15.2 oz) Height: 180.3 cm (5' 10.98\")  Pulse Oximetry: 94 % O2 (LPM): 3      Completed Laboratory Reports:  Recent Labs      07/05/19   1637  07/05/19   2057  07/06/19   0517  07/06/19   1132  07/06/19   1610  07/06/19   2029  07/07/19   0517  07/07/19   1116  07/07/19   1633  07/07/19 2025 07/08/19   0539  07/08/19   1115  07/08/19   1604   POCGLUCOSE  159*  184*  157*  182*  177*  181*  141*  146*  180*  173*  171*  160*  223*     Additional Labs: Not Applicable    Prior Living Situation:   Housing / Facility: 1 Story House  Steps Into Home: 3  Lives with - Patient's Self Care Capacity: Spouse  Equipment Owned: Single Point Cane, Tub / Shower Seat    Prior Level of Function / Living Situation:   Physical Therapy: Prior Services: None  Housing / Facility: 1 Story House  Steps Into Home: 3  Rail:  (going to have a railing placed 'soon')  Bathroom Set up: Walk In Shower, Shower Chair  Equipment Owned: Single Point Cane, Tub / Shower Seat  Lives with - Patient's Self Care Capacity: Spouse  Bed Mobility: Independent  Transfer Status: Independent  Ambulation: Independent  Assistive Devices Used: Single Point Cane  Stairs: Independent  Current Level of Function:   Level Of Assist: Unable to Participate  Assistive Device: Front Wheel Walker  Distance (Feet): 50  Deviation: Antalgic, Step To, Bradykinetic  # of Stairs Climbed: 0  Weight Bearing Status: NWB L LE;   Comments: Poor endurance d/t previous L shoulder injury.  Supine to Sit: " Minimal Assist  Sit to Supine: Supervised  Scooting: Contact Guard Assist  Rolling: Minimum Assist to Lt.  Skilled Intervention: Verbal Cuing, Compensatory Strategies  Comments: with bed bar  Sit to Stand: Moderate Assist  Bed, Chair, Wheelchair Transfer:  (declined SPT to chair. Agreeable to lateral transfer)  Toilet Transfers:  (reviewed but did not perform at patient request)  Transfer Method:  (lateral transfers with no slide boards)  Distance Wheelchair (Feet or Distance): 75  Wheelchair Assist: Minimal Assist  Skilled Intervention: Verbal Cuing  Comments: mod A squat pivot BTB d/t pain from using SB  Sitting in Chair: left in WC after 20 minutes  Sitting Edge of Bed: < 10 minutes  Standing: < 3 minutes total  Occupational Therapy:   Self Feeding: Independent  Grooming / Hygiene: Independent  Bathing: Independent  Dressing: Independent  Toileting: Independent  Medication Management: Independent  Laundry: Independent  Kitchen Mobility: Independent  Finances: Independent  Home Management: Independent  Shopping: Independent  Prior Level Of Mobility: Independent With Device in Home, Independent With Device in Community  Driving / Transportation: Driving Independent  Prior Services: None  Housing / Facility: [address] [place] [address]  Occupation (Pre-Hospital Vocational): Retired Due To Age  Current Level of Function:   Eating: Modified Independent (messy due to L UE chronic impairment)  Bathing: Moderate Assist (light sponge EOB)  Upper Body Dressing: Minimal Assist  Lower Body Dressing: Moderate Assist (immobilizer)  Toileting: Maximal Assist (rolling in bed after wound care. Reviewed toilet transfers)  Skilled Intervention: Verbal Cuing  Speech Language Pathology:      Rehabilitation Prognosis/Potential: Good  Estimated Length of Stay: 14-21 days    Nursing:   Orientation : Oriented x 4  Continent    Scope/Intensity of Services Recommended:  Physical Therapy: 1.5 hr / day  5 days / week. Therapeutic Interventions Required:  Maximize Endurance, Mobility, Strength and Safety  Occupational Therapy: 1.5 hr / day 5 days / week. Therapeutic Interventions Required: Maximize Self Care, ADLs, IADLs and Energy Conservation  Rehabilitation Nursin/7. Therapeutic Interventions Required: Monitor Pain, Skin, Wound(s), Vital Signs, Intake and Output, Labs, Safety, Family Training and wound management  infection prevention falls prevention   Rehabilitation Physician: 3 - 5 days / week. Therapeutic Interventions Required: Medical Management  Respiratory Care: evaluate . Therapeutic Interventions Required: Pulmonary Toileting and O2 Weaning  Dietician: consult . Therapeutic Interventions Required: nutritional need   Prosthetics/Orthotics: as medically appropriate  5 days / week. Therapeutic Interventions Required: prn     Rehabilitation Goals and Plan (Expected frequency & duration of treatment in the IRF):   Return to the Community, Modified Independent Level of Care and Minimal Assist Level of Care  Anticipated Date of Rehabilitation Admission: 2019  Patient/Family oriented IRF level of care/facility/plan: Yes  Patient/Family willing to participate in IRF care/facility/plan: Yes  Patient able to tolerate IRF level of care proposed: Yes  Patient has potential to benefit IRF level of care proposed: Yes  Comments: Not Applicable    Special Needs or Precautions - Medical Necessity:  Safety Concerns/Precautions:  Fall Risk / High Risk for Falls and Balance  Complex Wound Care: post surgicall BKA  Pain Management  IV Site: Peripheral  Requires Oxygen  Weight-bearing Status: Non Weight Bearing, Left, Lower Extremity  Cardiac Precautions  Current Medications:    Current Facility-Administered Medications Ordered in Epic   Medication Dose Route Frequency Provider Last Rate Last Dose   • fluticasone (FLONASE) nasal spray 100 mcg  2 Spray Nasal DAILY Manjinder Tapia M.D.   100 mcg at 19 0537   • sodium chloride (OCEAN) 0.65 % nasal spray 2 Spray  2  Spray Nasal Q2HRS PRN Manjinder Tapia M.D.   2 [place] at 07/07/19 0923   • oxyCODONE immediate-release (ROXICODONE) tablet 5 mg  5 mg Oral Q6HRS PRN Manjinder Tapia M.D.   5 mg at 07/08/19 0858   • tramadol (ULTRAM) 50 MG tablet 100 mg  100 mg Oral Q6HRS PRN Sal Menezes D.O.   100 mg at 07/08/19 1116   • methadone (DOLOPHINE) tablet 20 mg  20 mg Oral BID Jordan Leahy M.D.   20 mg at 07/08/19 1605   • insulin lispro (HUMALOG) injection 5 Units  5 Units Subcutaneous TID AC Jordan Leahy M.D.   5 Units at 07/08/19 1608    And   • insulin lispro (HUMALOG) injection 2-9 Units  2-9 Units Subcutaneous 4X/DAY ACHS Jordan Leahy M.D.   3 Units at 07/08/19 1608    And   • glucose 4 g chewable tablet 16 g  16 g Oral Q15 MIN PRN Jordan Leahy M.D.        And   • DEXTROSE 10% BOLUS 250 mL  250 mL Intravenous Q15 MIN PRN Jordan Leahy M.D.   250 mL at 06/28/19 0329   • naloxone (NARCAN) injection 0.4 mg  0.4 mg Intravenous PRN Sal Menezes D.O.       • aspirin (ASA) chewable tab 81 mg  81 mg Oral DAILY Camilo Rockwell D.O.   81 mg at 07/08/19 0535   • enoxaparin (LOVENOX) inj 40 mg  40 mg Subcutaneous DAILY Jeremy M Gonda, M.D.   40 mg at 07/08/19 0537   • acetaminophen (TYLENOL) tablet 650 mg  650 mg Oral Q6HRS PRN SHREYA Madden.OLatha   650 mg at 07/05/19 0101   • magnesium hydroxide (MILK OF MAGNESIA) suspension 30 mL  30 mL Oral QDAY PRN Camilo Rockwell D.OLatha        And   • polyethylene glycol/lytes (MIRALAX) PACKET 1 Packet  1 Packet Oral QDAY PRN SHREYA Madden.OLatha        And   • bisacodyl (DULCOLAX) suppository 10 mg  10 mg Rectal QDAY PRN Camilo Rockwell D.O.       • ipratropium-albuterol (DUONEB) nebulizer solution  3 mL Nebulization Q4H PRN (RT) Onesimo Olson M.D.       • pregabalin (LYRICA) capsule 300 mg  300 mg Oral BID Jeremy M Gonda, M.D.   300 mg at 07/08/19 1607   • metoprolol (LOPRESSOR) tablet 12.5 mg  12.5 mg Oral TWICE DAILY Jeremy M Gonda, M.D.   12.5 mg at 07/08/19 1600    • hydrALAZINE (APRESOLINE) injection 10-20 mg  10-20 mg Intravenous Q4HRS PRN Jeremy M Gonda, M.D.   10 mg at 06/17/19 1945   • labetalol (NORMODYNE,TRANDATE) injection 10-20 mg  10-20 mg Intravenous Q4HRS PRN Jeremy M Gonda, M.D.       • senna-docusate (PERICOLACE or SENOKOT S) 8.6-50 MG per tablet 2 Tab  2 Tab Oral BID Zeus Reagan   2 Tab at 07/08/19 0535   • Pharmacy Consult Request ...Pain Management Review 1 Each  1 Each Other PHARMACY TO DOSE Zeus K Merary       • Respiratory Care per Protocol   Nebulization Continuous RT Onesimo Olson M.D.       • ondansetron (ZOFRAN) syringe/vial injection 4 mg  4 mg Intravenous Q4HRS PRN Onesimo Olson M.D.       • ondansetron (ZOFRAN ODT) dispertab 4 mg  4 mg Oral Q4HRS PRN Onesimo Olson M.D.   4 mg at 06/25/19 1452     No current [facility]-ordered outpatient prescriptions on file.     Diet:   DIET ORDERS (Through next 24h)    Start     Ordered    06/25/19 0223  Diet Order Diabetic, Cardiac  ALL MEALS     Question Answer Comment   Diet: Diabetic    Diet: Cardiac        06/25/19 0222          Anticipated Discharge Destination / Patient/Family Goal:  Destination: Home with Assistance Support System: Spouse and Family   Anticipated home health services: OT, PT, Nursing, Social Work and Aide  Previously used HH service/ provider: Not Applicable  Anticipated DME Needs: Oxygen, Walker, Wheelchair and Ramp  Outpatient Services: PT  Alternative resources to address additional identified needs:     Pre-Screen Completed: 7/8/2019 4:34 PM Wiliam Escalona

## 2019-07-08 NOTE — CARE PLAN
Problem: Communication  Goal: The ability to communicate needs accurately and effectively will improve  Outcome: PROGRESSING AS EXPECTED  Pt A&O x4. Able to verbalize needs and appropriate use of call light.       Problem: Infection  Goal: Will remain free from infection  Outcome: PROGRESSING AS EXPECTED  Hand hygiene performed before and after contact with patient. No S/S of infection. VSS.

## 2019-07-08 NOTE — CARE PLAN
Problem: Pain Management  Goal: Pain level will decrease to patient's comfort goal  Pain controlled with tramadol and oxycodone 5

## 2019-07-08 NOTE — THERAPY
"Occupational Therapy Treatment completed with focus on ADLs, ADL transfers, patient education and upper extremity function.  Plan of Care: Will benefit from Occupational Therapy 4 times per week  Discharge Recommendations:  Equipment Will Continue to Assess for Equipment Needs. Post-acute therapy Recommend post-acute placement for additional occupational therapy services prior to discharge home. Patient can tolerate post-acute therapies at a 5x/week frequency.    Patient seen for OT treat focused on lateral transfers and wc level ADLs paired with standing trials. Patient required Mod A LB ADLs and Mod A STS trials with FWW and SBA lateral transfers with no slide board and with wc steady and s/u with cues. Patient would benefit from skilled OT in this setting followed by post acute placement, > 3 hrs of therapy a day.     See \"Rehab Therapy-Acute\" Patient Summary Report for complete documentation.   "

## 2019-07-08 NOTE — PROGRESS NOTES
[facility] Medicine Daily Progress Note    Date of Service  7/8/2019    Chief Complaint  63 y.o. male admitted 6/15/2019 with left heel pain.    Hospital Course   7/2/2019-patient is a 63-year-old male who has diabetes that was uncontrolled.  The patient developed a diabetic foot ulcer on the left foot.  The patient's foot ulcer progressed into sepsis.  The patient actually had to be intubated mechanically ventilated and was getting sepsis resuscitation treatment in the ICU.  Patient initially grew out some enterococcus and then later grew out Prevotella.  Patient has completed at this point antibiotics.  Patient is been extubated and is on room air at this point in time.  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  At this point in time is postoperative day #8.  Patient is recovering at this point well and at this point we are anticipating discharging him once we have located a the best discharge plan for him.  7/3/2019-today patient is improved further.  He is at this point able to sit at bedside for several hours.  He says if the hand-foot hangs down he does have quite a bit of pain in the foot from gravity.  Also.  Patient will need continued pain management as well as further improvement in his ambulation.  7/4/2019-today patient is anxious hospital.  He is been sitting at bedside and started to work with therapies more more.  His pain is down to 6 out of 10 and continues to decline.  So far we do not have an accepting facility.  7/5/2019-today patient is complaining of severe pain that was induced by the slide board.  He refuses to try and use a slide board any further.  He says that when they got him up into the wheelchair he was also having a lot more pulsatile-like pain in the BKA stump area.  7/6/2019-continue with pain management overnight patient's pain was worse.  Still pending discharge  7/7/2019-patient's pain overnight was worse.  We have added at this point pain management to his treatment plan.  Patient is  very frustrated about [employer]. refusing his discharge.  At this point he has hired a  and he is going to have case management to help him fax information over to the  so that begin discharge options.  72,[phone]-today patient complains of slightly improved pain.  He still upset about the fact this ovary was not accepted into rehab facility through Worker's Comp.  Patient is hiring a  to fight the [employer]'s [employer]. people.  Rehab is still interested in him and it is potentially possible that they will take him for rehab.      Interval Problem Update  Postoperative day #14 after BKA continue at this point with pain management and physical therapy and Occupational Therapy.  Continue with diabetic management and hypertensive management adjust medications to make sure that his blood sugars and blood pressure are stabilized.    Consultants/Specialty  Physiatry, infectious diseases, orthopedic surgery, pulmonary.    Code Status  Full code    Disposition  Discharge to extended care facility once bed is located for him and he is excepted.    Review of Systems  Review of Systems   Constitutional: Negative for fever, malaise/fatigue and weight loss.   HENT: Negative for ear pain, nosebleeds and sore throat.    Eyes: Negative for pain and discharge.   Respiratory: Negative for cough, sputum production and wheezing.    Cardiovascular: Negative for chest pain, leg swelling and PND.   Gastrointestinal: Negative for abdominal pain, constipation and nausea.   Genitourinary: Negative for dysuria.   Musculoskeletal: Positive for joint pain. Negative for back pain and myalgias.   Skin: Negative.  Negative for itching.   Neurological: Negative for speech change, focal weakness and seizures.   Endo/Heme/Allergies: Negative for polydipsia.   Psychiatric/Behavioral: Negative for depression and substance abuse.   All other systems reviewed and are negative.       Physical Exam  Temp:  [36.1 °C (97 °F)-36.9 °C (98.5 °F)]  36.5 °C (97.7 °F)  Pulse:  [76-87] 76  Resp:  [16-18] 17  BP: (136-151)/(63-71) 143/67  SpO2:  [92 %-94 %] 93 %    Physical Exam   Constitutional: He is oriented to person, place, and time. Vital signs are normal. He appears well-developed and well-nourished. He is cooperative. He does not have a sickly appearance.   HENT:   Head: Normocephalic and atraumatic.   Right Ear: External ear normal.   Left Ear: External ear normal.   Mouth/Throat: No oropharyngeal exudate.   Eyes: Pupils are equal, round, and reactive to light. Conjunctivae and EOM are normal. Right eye exhibits no discharge. Left eye exhibits no discharge. Right pupil is round and reactive. Left pupil is round and reactive. Pupils are equal.   Neck: Normal range of motion and full passive range of motion without pain. Neck supple. No JVD present. No tracheal tenderness present. No Brudzinski's sign and no Kernig's sign noted. No thyromegaly present.   Cardiovascular: Normal rate, regular rhythm, normal heart sounds and normal pulses.   Occasional extrasystoles are present. Exam reveals no friction rub.    No murmur heard.  Pulmonary/Chest: Effort normal and breath sounds normal. He has no wheezes. Chest wall is not dull to percussion. He exhibits no mass, no tenderness and no bony tenderness.   Abdominal: Soft. Normal appearance and bowel sounds are normal. He exhibits distension. There is no hepatosplenomegaly. There is no CVA tenderness. No hernia. Hernia confirmed negative in the right inguinal area and confirmed negative in the left inguinal area.   Genitourinary: Rectum normal.   Musculoskeletal: Normal range of motion. He exhibits no edema or deformity.        Legs:  Lymphadenopathy:     He has no cervical adenopathy.   Neurological: He is alert and oriented to person, place, and time. He has normal reflexes. He is not disoriented. He displays no atrophy and no tremor. No cranial nerve deficit. He exhibits normal muscle tone. Coordination and gait  abnormal. GCS eye subscore is 4. GCS verbal subscore is 5. GCS motor subscore is 6.   Reflex Scores:       Tricep reflexes are 2+ on the right side and 2+ on the left side.       Bicep reflexes are 2+ on the right side and 2+ on the left side.       Brachioradialis reflexes are 2+ on the right side and 2+ on the left side.       Patellar reflexes are 2+ on the right side and 2+ on the left side.       Achilles reflexes are 2+ on the right side and 2+ on the left side.  Skin: Skin is warm and dry. No pallor.   Psychiatric: His speech is normal and behavior is normal. Judgment and thought content normal. His mood appears anxious. Cognition and memory are normal. He exhibits a depressed mood.   Nursing note and vitals reviewed.      Fluids    Intake/Output Summary (Last 24 hours) at 07/08/19 1308  Last data filed at 07/08/19 0800   Gross per 24 hour   Intake              480 ml   Output             1425 ml   Net             -945 ml       Laboratory                        Imaging  CT-HEAD W/O   Final Result      1.  No evidence of acute territorial infarct, intracranial hemorrhage or mass lesion.   2.  Mild diffuse cerebral substance loss.   3.  Mild microangiopathic ischemic change versus demyelination or gliosis.      DX-CHEST-PORTABLE (1 VIEW)   Final Result      1.  Increased moderate hazy bilateral parenchymal opacities which could be seen in the setting edema or infection.   2.  Interval removal of a right-sided central venous catheter.      EC-ECHOCARDIOGRAM COMPLETE W/O CONT   Final Result      DX-CHEST-PORTABLE (1 VIEW)   Final Result         1.  Pulmonary edema and/or infiltrates are identified, which are somewhat decreased since the prior exam.      DX-ANKLE 3+ VIEWS LEFT   Final Result      Soft tissue swelling. No acute fracture identified.      DX-CHEST-PORTABLE (1 VIEW)   Final Result      1.  Improved aeration of the RIGHT lung base   2.  Otherwise no interval change in BILATERAL atelectasis or airspace  disease      DX-ABDOMEN FOR TUBE PLACEMENT   Final Result      Enteric tube projects over the stomach.      DX-CHEST-LIMITED (1 VIEW)   Final Result      New right IJ line tip overlies the SVC and no pneumothorax is identified      New right basilar opacity effaces the diaphragm and could be from atelectasis or consolidation      DX-CHEST-PORTABLE (1 VIEW)   Final Result      Endotracheal tube projects at the level of the aortic arch.      Increased perihilar and bibasilar opacities may represent a combination of edema, atelectasis, pneumonia.         DX-CHEST-PORTABLE (1 VIEW)   Final Result      Increasing reticular and hazy opacity is compatible with edema      CT-SHOULDER W/O PLUS RECONS LEFT   Final Result         Prior resection of left humeral head. The residual head neck junction appears well corticated and could relate to chronic resection/deformity. There is also deformity and remodeling of the glenoid with well-corticated margin, suggesting of chronic change    as well.      No acute fracture.      There is no articulation between the proximal humerus and glenoid. The glenohumeral joint is replaced by loculated soft tissue or dense fluid, likely chronic change. The fluid is slightly less in 2018 CT.      Infection is considered less likely given the osseous cortex adjacent to the fluid is well corticated and demonstrates no rarefaction or destruction.      US-FOREIGN FILM ULTRASOUND   Final Result      OUTSIDE IMAGES-CT HEAD   Final Result      [id number] FILM X-RAY   Final Result      OUTSIDE IMAGES-DX LOWER EXTREMITY, LEFT   Final Result      [id number] FILM X-RAY   Final Result      OUTSIDE IMAGES-DX CHEST   Final Result      OUTSIDE IMAGES-DX UPPER EXTREMITY, LEFT   Final Result           Assessment/Plan  * Acute on chronic respiratory failure with hypoxia (HCC)- (present on admission)   Assessment & Plan    Resolved.         Acute encephalopathy   Assessment & Plan    Resolved        OZ (acute kidney injury) (HCC)- (present on admission)   Assessment & Plan    Resolved       Diabetic foot ulcer (HCC)- (present on admission)   Assessment & Plan    Status post BKA day #14  Overnight patient states that the pain was 7 out of 10.  Continued at this point on oxycodone as needed for pain management.  Continue with physical therapy and Occupational Therapy.  Continue with wound care.  Patient does have a brace in place.  Continue with bowel protocol with him having oxycodone.       Septic shock due to undetermined organism ([facility])- (present on admission)   Assessment & Plan    Resolved     Pneumonia due to infectious organism- (present on admission)   Assessment & Plan    Resolved     SALOME (obstructive sleep apnea)   Assessment & Plan    Continue oxygen support at nighttime     Cardiomyopathy ([facility])- (present on admission)   Assessment & Plan    Most recently left ventricular ejection fraction is 40%.  Most likely combination of diabetes and hypertension.  Continue at this point with diabetic management.     DM (diabetes mellitus), type 2, uncontrolled (CMS-[facility])- (present on admission)   Assessment & Plan    Today blood sugars are worse at 160-180.  Continue at this point with Humalog 5 units subcutaneously 3 times daily with meals as well as lispro insulin with sliding scale.       Essential hypertension- (present on admission)   Assessment & Plan    Blood pressure currently controlled at 143/67.  Continue at this point with metoprolol 12.5 mg twice daily.     Hypoglycemia   Assessment & Plan    Resolved     Leucocytosis   Assessment & Plan    Resolved     Chronic pain syndrome- (present on admission)   Assessment & Plan    Continue with oxycodone for pain management     Mucus plugging of bronchi   Assessment & Plan    Resolved     Left shoulder pain- (present on admission)   Assessment & Plan    Patient has chronic pain in the left shoulder.  He suffered an injury many years ago and since then  shoulder is frozen.  His arm however moves independently.     Tobacco dependence- (present on admission)   Assessment & Plan    -nicotine replacement protocol and cessation education provided for 12 minutes, discussed options of nicotine patch, acupuncture, medical treatment with wellbutrin and chantix. Discussed other options. Code 93467     Hypokalemia- (present on admission)   Assessment & Plan    Resolved          VTE prophylaxis: Lovenox

## 2019-07-09 LAB
GLUCOSE BLD-MCNC: 135 MG/DL (ref 65–99)
GLUCOSE BLD-MCNC: 137 MG/DL (ref 65–99)
GLUCOSE BLD-MCNC: 155 MG/DL (ref 65–99)
GLUCOSE BLD-MCNC: 159 MG/DL (ref 65–99)

## 2019-07-09 PROCEDURE — A9270 NON-COVERED ITEM OR SERVICE: HCPCS | Performed by: HOSPITALIST

## 2019-07-09 PROCEDURE — 700102 HCHG RX REV CODE 250 W/ 637 OVERRIDE(OP): Performed by: INTERNAL MEDICINE

## 2019-07-09 PROCEDURE — 700102 HCHG RX REV CODE 250 W/ 637 OVERRIDE(OP): Performed by: HOSPITALIST

## 2019-07-09 PROCEDURE — 82962 GLUCOSE BLOOD TEST: CPT | Mod: 91

## 2019-07-09 PROCEDURE — 770006 HCHG ROOM/CARE - MED/SURG/GYN SEMI*

## 2019-07-09 PROCEDURE — A9270 NON-COVERED ITEM OR SERVICE: HCPCS | Performed by: INTERNAL MEDICINE

## 2019-07-09 PROCEDURE — 700111 HCHG RX REV CODE 636 W/ 250 OVERRIDE (IP): Performed by: INTERNAL MEDICINE

## 2019-07-09 PROCEDURE — 99232 SBSQ HOSP IP/OBS MODERATE 35: CPT | Performed by: INTERNAL MEDICINE

## 2019-07-09 RX ADMIN — INSULIN LISPRO 2 UNITS: 100 INJECTION, SOLUTION INTRAVENOUS; SUBCUTANEOUS at 20:36

## 2019-07-09 RX ADMIN — ENOXAPARIN SODIUM 40 MG: 100 INJECTION SUBCUTANEOUS at 05:49

## 2019-07-09 RX ADMIN — METOPROLOL TARTRATE 12.5 MG: 25 TABLET, FILM COATED ORAL at 17:05

## 2019-07-09 RX ADMIN — OXYCODONE HYDROCHLORIDE 5 MG: 5 TABLET ORAL at 20:43

## 2019-07-09 RX ADMIN — MICONAZOLE NITRATE: 20 CREAM TOPICAL at 17:10

## 2019-07-09 RX ADMIN — TRAMADOL HYDROCHLORIDE 100 MG: 50 TABLET ORAL at 23:04

## 2019-07-09 RX ADMIN — OXYCODONE HYDROCHLORIDE 5 MG: 5 TABLET ORAL at 10:59

## 2019-07-09 RX ADMIN — PREGABALIN 300 MG: 150 CAPSULE ORAL at 05:48

## 2019-07-09 RX ADMIN — OXYCODONE HYDROCHLORIDE 5 MG: 5 TABLET ORAL at 02:47

## 2019-07-09 RX ADMIN — MICONAZOLE NITRATE: 20 CREAM TOPICAL at 05:53

## 2019-07-09 RX ADMIN — INSULIN LISPRO 2 UNITS: 100 INJECTION, SOLUTION INTRAVENOUS; SUBCUTANEOUS at 06:00

## 2019-07-09 RX ADMIN — SENNOSIDES,DOCUSATE SODIUM 2 TABLET: 8.6; 5 TABLET, FILM COATED ORAL at 05:49

## 2019-07-09 RX ADMIN — PREGABALIN 300 MG: 150 CAPSULE ORAL at 17:05

## 2019-07-09 RX ADMIN — METHADONE HYDROCHLORIDE 20 MG: 10 TABLET ORAL at 05:48

## 2019-07-09 RX ADMIN — TRAMADOL HYDROCHLORIDE 100 MG: 50 TABLET ORAL at 15:24

## 2019-07-09 RX ADMIN — METOPROLOL TARTRATE 12.5 MG: 25 TABLET, FILM COATED ORAL at 05:49

## 2019-07-09 RX ADMIN — METHADONE HYDROCHLORIDE 20 MG: 10 TABLET ORAL at 17:05

## 2019-07-09 RX ADMIN — FLUTICASONE PROPIONATE 100 MCG: 50 SPRAY, METERED NASAL at 05:53

## 2019-07-09 RX ADMIN — ASPIRIN 81 MG 81 MG: 81 TABLET ORAL at 05:48

## 2019-07-09 RX ADMIN — TRAMADOL HYDROCHLORIDE 100 MG: 50 TABLET ORAL at 05:48

## 2019-07-09 ASSESSMENT — ENCOUNTER SYMPTOMS
SPUTUM PRODUCTION: 0
WHEEZING: 0
PND: 0
CONSTIPATION: 0
BACK PAIN: 0
SORE THROAT: 0
ABDOMINAL PAIN: 0
FEVER: 0
MYALGIAS: 1
SEIZURES: 0
HEARTBURN: 0
DIZZINESS: 0
WEAKNESS: 0
DEPRESSION: 0

## 2019-07-09 ASSESSMENT — LIFESTYLE VARIABLES: SUBSTANCE_ABUSE: 0

## 2019-07-09 NOTE — WOUND TEAM
"[facility]  Inpatient Services  Wound and Skin Care Progress Note    HPI, PMH, SH: Reviewed    WOUND TEAM FOLLOW UP: New consult for buttocks  Unit where seen by Wound Team: [id number]      SUBJECTIVE: \"It hurts really bad.\"    Self Report / Pain Level: see above    OBJECTIVE: agreeable    WOUND TYPE, LOCATION, CHARACTERISTICS:      Location and type of wound:  Sacrum, buttocks, inside gluteal cleft, perianal, perineum, upper posterior thighs, upper inner thighs, groin, genitalia: Moisture Associated Skin Damage (MASD)        Periwound:     Satellite lesions  Drainage:     None  Tissue Type and %:    100% dark red, peeling in some areas  Wound Edges:    Attached  Odor:      None  Exposed structure(s):  None  S&S of Infection:    Satellite lesions indicate yeast infection  Measurements:   07/08/2019  Length:     20 cm  Width:      10 cm  Depth:     0.1 cm      INTERVENTIONS BY WOUND TEAM: Patient able to turn for assessment. MASD angry red, painful, peeling to upper posterior thighs and perineum. Ordered antifungal cream, viscopaste, and low air loss bed.    Interdisciplinary consultation: Patient, patient's wife, nursing.    EVALUATION AND PROGRESS OF WOUND(S): Miconazole is a broad spectrum antifungal, viscopaste helps hold medication to skin. Low air loss bed circulates air under patient helping to reduce MASD    Rationale for changes in Plan of Care: Worsening MASD    Factors affecting wound healing: excessive moisture, diabetic, yeast infection.  Goals: Steady decrease in size of MASD    NURSING PLAN OF CARE:    Dressing changes: Continue previous Dressing Care orders:  X      See new Dressing Care orders:       Skin care: See Skin Care orders:   X     Rectal tube care: See Rectal Tube Care orders:      Other orders:           WOUND TEAM PLAN OF CARE (X):   NPWT change 3 x week:        Dressing changes:       Follow up as needed:   X    Other:        "

## 2019-07-09 NOTE — PROGRESS NOTES
[facility] Medicine Daily Progress Note    Date of Service  7/9/2019    Chief Complaint  63 y.o. male admitted 6/15/2019 with left heel pain.    Hospital Course   7/2/2019-patient is a 63-year-old male who has diabetes that was uncontrolled.  The patient developed a diabetic foot ulcer on the left foot.  The patient's foot ulcer progressed into sepsis.  The patient actually had to be intubated mechanically ventilated and was getting sepsis resuscitation treatment in the ICU.  Patient initially grew out some enterococcus and then later grew out Prevotella.  Patient has completed at this point antibiotics.  Patient is been extubated and is on room air at this point in time.  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  At this point in time is postoperative day #8.  Patient is recovering at this point well and at this point we are anticipating discharging him once we have located a the best discharge plan for him.  7/3/2019-today patient is improved further.  He is at this point able to sit at bedside for several hours.  He says if the hand-foot hangs down he does have quite a bit of pain in the foot from gravity.  Also.  Patient will need continued pain management as well as further improvement in his ambulation.  7/4/2019-today patient is anxious hospital.  He is been sitting at bedside and started to work with therapies more more.  His pain is down to 6 out of 10 and continues to decline.  So far we do not have an accepting facility.  7/5/2019-today patient is complaining of severe pain that was induced by the slide board.  He refuses to try and use a slide board any further.  He says that when they got him up into the wheelchair he was also having a lot more pulsatile-like pain in the BKA stump area.  7/6/2019-continue with pain management overnight patient's pain was worse.  Still pending discharge  7/7/2019-patient's pain overnight was worse.  We have added at this point pain management to his treatment plan.  Patient is  very frustrated about [employer] refusing his discharge.  At this point he has hired a  and he is going to have case management to help him fax information over to the  so that begin discharge options.  72,[phone]-today patient complains of slightly improved pain.  He still upset about the fact this ovary was not accepted into rehab facility through [employer].  Patient is hiring a  to fight the [employer]. people.  Rehab is still interested in him and it is potentially possible that they will take him for rehab.      Interval Problem Update  Postop day #15  Pain is controlled  Resting, easily arousable    Consultants/Specialty  Physiatry, infectious diseases, orthopedic surgery, pulmonary.    Code Status  Full code    Disposition  Discharge to extended care facility once bed is located for him and he is excepted.    Review of Systems  Review of Systems   Constitutional: Negative for fever.   HENT: Negative for sore throat.    Respiratory: Negative for sputum production and wheezing.    Cardiovascular: Negative for chest pain, leg swelling and PND.   Gastrointestinal: Negative for abdominal pain, constipation and heartburn.   Musculoskeletal: Positive for joint pain and myalgias. Negative for back pain.   Neurological: Negative for dizziness, seizures and weakness.   Psychiatric/Behavioral: Negative for depression and substance abuse.   All other systems reviewed and are negative.       Physical Exam  Temp:  [36.4 °C (97.6 °F)-37.3 °C (99.2 °F)] 36.4 °C (97.6 °F)  Pulse:  [80-97] 97  Resp:  [16-18] 18  BP: (114-126)/(54-67) 126/65  SpO2:  [94 %-95 %] 95 %    Physical Exam   Constitutional: He is oriented to person, place, and time. Vital signs are normal. He appears well-developed. He is cooperative. He does not have a sickly appearance. No distress.   HENT:   Head: Normocephalic and atraumatic.   Right Ear: External ear normal.   Left Ear: External ear normal.   Mouth/Throat: No  oropharyngeal exudate.   Eyes: Pupils are equal, round, and reactive to light. EOM are normal. Right pupil is round and reactive. Left pupil is round and reactive. Pupils are equal.   Neck: Normal range of motion and full passive range of motion without pain. No tracheal tenderness present. No Brudzinski's sign and no Kernig's sign noted. No thyromegaly present.   Cardiovascular: Normal rate, regular rhythm and normal pulses.   Occasional extrasystoles are present.   No murmur heard.  Pulmonary/Chest: Effort normal and breath sounds normal. Chest wall is not dull to percussion. He exhibits no mass and no bony tenderness.   Abdominal: Soft. Normal appearance and bowel sounds are normal. He exhibits distension. There is no hepatosplenomegaly. There is no tenderness. There is no CVA tenderness. No hernia. Hernia confirmed negative in the right inguinal area and confirmed negative in the left inguinal area.   Genitourinary: Rectum normal.   Musculoskeletal: Normal range of motion. He exhibits no edema.        Legs:  Neurological: He is alert and oriented to person, place, and time. He has normal reflexes. He is not disoriented. He displays no atrophy and no tremor. No cranial nerve deficit. Coordination and gait abnormal. GCS eye subscore is 4. GCS verbal subscore is 5. GCS motor subscore is 6.   Reflex Scores:       Tricep reflexes are 2+ on the right side and 2+ on the left side.       Bicep reflexes are 2+ on the right side and 2+ on the left side.       Brachioradialis reflexes are 2+ on the right side and 2+ on the left side.       Patellar reflexes are 2+ on the right side and 2+ on the left side.       Achilles reflexes are 2+ on the right side and 2+ on the left side.  Skin: Skin is warm and dry.   Dressing clean dry and intact   Psychiatric: His speech is normal and behavior is normal. His mood appears anxious. Cognition and memory are normal. He exhibits a depressed mood.   Nursing note and vitals  reviewed.      Fluids    Intake/Output Summary (Last 24 hours) at 07/09/19 1621  Last data filed at 07/09/19 1000   Gross per 24 hour   Intake              360 ml   Output              525 ml   Net             -165 ml       Laboratory                        Imaging  CT-HEAD W/O   Final Result      1.  No evidence of acute territorial infarct, intracranial hemorrhage or mass lesion.   2.  Mild diffuse cerebral substance loss.   3.  Mild microangiopathic ischemic change versus demyelination or gliosis.      DX-CHEST-PORTABLE (1 VIEW)   Final Result      1.  Increased moderate hazy bilateral parenchymal opacities which could be seen in the setting edema or infection.   2.  Interval removal of a right-sided central venous catheter.      EC-ECHOCARDIOGRAM COMPLETE W/O CONT   Final Result      DX-CHEST-PORTABLE (1 VIEW)   Final Result         1.  Pulmonary edema and/or infiltrates are identified, which are somewhat decreased since the prior exam.      DX-ANKLE 3+ VIEWS LEFT   Final Result      Soft tissue swelling. No acute fracture identified.      DX-CHEST-PORTABLE (1 VIEW)   Final Result      1.  Improved aeration of the RIGHT lung base   2.  Otherwise no interval change in BILATERAL atelectasis or airspace disease      DX-ABDOMEN FOR TUBE PLACEMENT   Final Result      Enteric tube projects over the stomach.      DX-CHEST-LIMITED (1 VIEW)   Final Result      New right IJ line tip overlies the SVC and no pneumothorax is identified      New right basilar opacity effaces the diaphragm and could be from atelectasis or consolidation      DX-CHEST-PORTABLE (1 VIEW)   Final Result      Endotracheal tube projects at the level of the aortic arch.      Increased perihilar and bibasilar opacities may represent a combination of edema, atelectasis, pneumonia.         DX-CHEST-PORTABLE (1 VIEW)   Final Result      Increasing reticular and hazy opacity is compatible with edema      CT-SHOULDER W/O PLUS RECONS LEFT   Final Result          Prior resection of left humeral head. The residual head neck junction appears well corticated and could relate to chronic resection/deformity. There is also deformity and remodeling of the glenoid with well-corticated margin, suggesting of chronic change    as well.      No acute fracture.      There is no articulation between the proximal humerus and glenoid. The glenohumeral joint is replaced by loculated soft tissue or dense fluid, likely chronic change. The fluid is slightly less in 2018 CT.      Infection is considered less likely given the osseous cortex adjacent to the fluid is well corticated and demonstrates no rarefaction or destruction.      US-FOREIGN FILM ULTRASOUND   Final Result      OUTSIDE IMAGES-CT HEAD   Final Result      [id number] FILM X-RAY   Final Result      OUTSIDE IMAGES-DX LOWER EXTREMITY, LEFT   Final Result      [id number] FILM X-RAY   Final Result      OUTSIDE IMAGES-DX CHEST   Final Result      OUTSIDE IMAGES-DX UPPER EXTREMITY, LEFT   Final Result           Assessment/Plan  * Acute on chronic respiratory failure with hypoxia (HCC)- (present on admission)   Assessment & Plan    Resolved.         Acute encephalopathy   Assessment & Plan    Resolved       OZ (acute kidney injury) (HCC)- (present on admission)   Assessment & Plan    Resolved       Diabetic foot ulcer (HCC)- (present on admission)   Assessment & Plan    Status post BKA day #15    Continue oxycodone as needed for pain management.  Continue with physical therapy and Occupational Therapy.  Continue with wound care.  Patient does have a brace in place.  Continue with bowel protocol with him having oxycodone.    Pending rehab/insurance approval  Encourage activity       Septic shock due to undetermined organism (HCC)- (present on admission)   Assessment & Plan    Resolved     Pneumonia due to infectious organism- (present on admission)   Assessment & Plan    Resolved     SALOME (obstructive sleep apnea)    Assessment & Plan    Continue oxygen support at nighttime     Cardiomyopathy (HCC)- (present on admission)   Assessment & Plan    Most recently left ventricular ejection fraction is 40%.  Most likely combination of diabetes and hypertension.  Continue at this point with diabetic management.     DM (diabetes mellitus), type 2, uncontrolled (CMS-[facility])- (present on admission)   Assessment & Plan    Today blood sugars are worse at 160-180.  Continue at this point with Humalog 5 units subcutaneously 3 times daily with meals as well as lispro insulin with sliding scale.       Essential hypertension- (present on admission)   Assessment & Plan    Blood pressure currently controlled at 143/67.  Continue at this point with metoprolol 12.5 mg twice daily.     Hypoglycemia   Assessment & Plan    Resolved     Leucocytosis   Assessment & Plan    Resolved     Chronic pain syndrome- (present on admission)   Assessment & Plan    Continue with oxycodone for pain management     Mucus plugging of bronchi   Assessment & Plan    Resolved     Left shoulder pain- (present on admission)   Assessment & Plan    Patient has chronic pain in the left shoulder.  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  His arm however moves independently.     Tobacco dependence- (present on admission)   Assessment & Plan    -nicotine replacement protocol and cessation education provided for 12 minutes, discussed options of nicotine patch, acupuncture, medical treatment with wellbutrin and chantix. Discussed other options. Code 63641     Hypokalemia- (present on admission)   Assessment & Plan    Resolved          VTE prophylaxis: Lovenox

## 2019-07-10 ENCOUNTER — APPOINTMENT (OUTPATIENT)
Dept: RADIOLOGY | Facility: MEDICAL CENTER | Age: 64
DRG: 559 | End: 2019-07-10
Attending: PHYSICAL MEDICINE & REHABILITATION
Payer: COMMERCIAL

## 2019-07-10 ENCOUNTER — HOSPITAL ENCOUNTER (INPATIENT)
Facility: REHABILITATION | Age: 64
LOS: 21 days | DRG: 559 | End: 2019-07-31
Attending: PHYSICAL MEDICINE & REHABILITATION | Admitting: PHYSICAL MEDICINE & REHABILITATION
Payer: COMMERCIAL

## 2019-07-10 VITALS
TEMPERATURE: 97 F | OXYGEN SATURATION: 95 % | HEART RATE: 71 BPM | DIASTOLIC BLOOD PRESSURE: 58 MMHG | RESPIRATION RATE: 16 BRPM | SYSTOLIC BLOOD PRESSURE: 120 MMHG | HEIGHT: 71 IN | BODY MASS INDEX: 30.09 KG/M2 | WEIGHT: 214.95 LBS

## 2019-07-10 DIAGNOSIS — Z89.512 S/P BKA (BELOW KNEE AMPUTATION) UNILATERAL, LEFT (HCC): ICD-10-CM

## 2019-07-10 DIAGNOSIS — G89.29 CHRONIC BILATERAL LOW BACK PAIN WITHOUT SCIATICA: ICD-10-CM

## 2019-07-10 DIAGNOSIS — G89.4 CHRONIC PAIN SYNDROME: ICD-10-CM

## 2019-07-10 DIAGNOSIS — M54.50 CHRONIC BILATERAL LOW BACK PAIN WITHOUT SCIATICA: ICD-10-CM

## 2019-07-10 DIAGNOSIS — F11.20 METHADONE DEPENDENCE (HCC): ICD-10-CM

## 2019-07-10 LAB
GLUCOSE BLD-MCNC: 129 MG/DL (ref 65–99)
GLUCOSE BLD-MCNC: 130 MG/DL (ref 65–99)
GLUCOSE BLD-MCNC: 152 MG/DL (ref 65–99)

## 2019-07-10 PROCEDURE — 99239 HOSP IP/OBS DSCHRG MGMT >30: CPT | Performed by: INTERNAL MEDICINE

## 2019-07-10 PROCEDURE — A9270 NON-COVERED ITEM OR SERVICE: HCPCS | Performed by: INTERNAL MEDICINE

## 2019-07-10 PROCEDURE — 99223 1ST HOSP IP/OBS HIGH 75: CPT | Performed by: PHYSICAL MEDICINE & REHABILITATION

## 2019-07-10 PROCEDURE — 700102 HCHG RX REV CODE 250 W/ 637 OVERRIDE(OP): Performed by: HOSPITALIST

## 2019-07-10 PROCEDURE — 700102 HCHG RX REV CODE 250 W/ 637 OVERRIDE(OP): Performed by: PHYSICAL MEDICINE & REHABILITATION

## 2019-07-10 PROCEDURE — 700102 HCHG RX REV CODE 250 W/ 637 OVERRIDE(OP): Performed by: INTERNAL MEDICINE

## 2019-07-10 PROCEDURE — 700111 HCHG RX REV CODE 636 W/ 250 OVERRIDE (IP): Performed by: INTERNAL MEDICINE

## 2019-07-10 PROCEDURE — A9270 NON-COVERED ITEM OR SERVICE: HCPCS | Performed by: PHYSICAL MEDICINE & REHABILITATION

## 2019-07-10 PROCEDURE — A9270 NON-COVERED ITEM OR SERVICE: HCPCS | Performed by: HOSPITALIST

## 2019-07-10 PROCEDURE — 94760 N-INVAS EAR/PLS OXIMETRY 1: CPT

## 2019-07-10 PROCEDURE — 770010 HCHG ROOM/CARE - REHAB SEMI PRIVAT*

## 2019-07-10 PROCEDURE — 82962 GLUCOSE BLOOD TEST: CPT | Mod: 91

## 2019-07-10 RX ORDER — AMOXICILLIN 250 MG
2 CAPSULE ORAL 2 TIMES DAILY
Status: DISCONTINUED | OUTPATIENT
Start: 2019-07-10 | End: 2019-07-16

## 2019-07-10 RX ORDER — DIPHENHYDRAMINE HYDROCHLORIDE, ZINC ACETATE 2; .1 G/100G; G/100G
CREAM TOPICAL 3 TIMES DAILY PRN
Status: DISCONTINUED | OUTPATIENT
Start: 2019-07-10 | End: 2019-07-10 | Stop reason: HOSPADM

## 2019-07-10 RX ORDER — MICONAZOLE NITRATE 20 MG/G
CREAM TOPICAL 2 TIMES DAILY
Status: COMPLETED | OUTPATIENT
Start: 2019-07-10 | End: 2019-07-14

## 2019-07-10 RX ORDER — ASPIRIN 81 MG/1
81 TABLET, CHEWABLE ORAL DAILY
Status: DISCONTINUED | OUTPATIENT
Start: 2019-07-11 | End: 2019-07-15

## 2019-07-10 RX ORDER — ONDANSETRON 4 MG/1
4 TABLET, ORALLY DISINTEGRATING ORAL 4 TIMES DAILY PRN
Status: DISCONTINUED | OUTPATIENT
Start: 2019-07-10 | End: 2019-07-31 | Stop reason: HOSPADM

## 2019-07-10 RX ORDER — METHADONE HYDROCHLORIDE 10 MG/1
20 TABLET ORAL 2 TIMES DAILY
Qty: 8 TAB | Refills: 0 | Status: ON HOLD | OUTPATIENT
Start: 2019-07-10 | End: 2019-07-29

## 2019-07-10 RX ORDER — ALBUTEROL SULFATE 90 UG/1
2 AEROSOL, METERED RESPIRATORY (INHALATION) EVERY 4 HOURS PRN
Status: DISCONTINUED | OUTPATIENT
Start: 2019-07-10 | End: 2019-07-31 | Stop reason: HOSPADM

## 2019-07-10 RX ORDER — IPRATROPIUM BROMIDE AND ALBUTEROL SULFATE 2.5; .5 MG/3ML; MG/3ML
3 SOLUTION RESPIRATORY (INHALATION) EVERY 4 HOURS PRN
Qty: 30 BULLET | Status: ON HOLD
Start: 2019-07-10 | End: 2019-07-29

## 2019-07-10 RX ORDER — FLUTICASONE PROPIONATE 50 MCG
2 SPRAY, SUSPENSION (ML) NASAL DAILY
Qty: 16 G | Status: ON HOLD
Start: 2019-07-11 | End: 2019-07-29

## 2019-07-10 RX ORDER — BISACODYL 10 MG
10 SUPPOSITORY, RECTAL RECTAL
Status: DISCONTINUED | OUTPATIENT
Start: 2019-07-10 | End: 2019-07-31 | Stop reason: HOSPADM

## 2019-07-10 RX ORDER — ONDANSETRON 2 MG/ML
4 INJECTION INTRAMUSCULAR; INTRAVENOUS 4 TIMES DAILY PRN
Status: DISCONTINUED | OUTPATIENT
Start: 2019-07-10 | End: 2019-07-31 | Stop reason: HOSPADM

## 2019-07-10 RX ORDER — DEXTROSE MONOHYDRATE 25 G/50ML
50 INJECTION, SOLUTION INTRAVENOUS
Status: DISCONTINUED | OUTPATIENT
Start: 2019-07-10 | End: 2019-07-31 | Stop reason: HOSPADM

## 2019-07-10 RX ORDER — ALUMINA, MAGNESIA, AND SIMETHICONE 2400; 2400; 240 MG/30ML; MG/30ML; MG/30ML
20 SUSPENSION ORAL
Status: DISCONTINUED | OUTPATIENT
Start: 2019-07-10 | End: 2019-07-31 | Stop reason: HOSPADM

## 2019-07-10 RX ORDER — ASPIRIN 81 MG/1
81 TABLET, CHEWABLE ORAL DAILY
Qty: 100 TAB | Status: ON HOLD
Start: 2019-07-11 | End: 2019-07-29

## 2019-07-10 RX ORDER — ACETAMINOPHEN 325 MG/1
650 TABLET ORAL EVERY 4 HOURS PRN
Status: DISCONTINUED | OUTPATIENT
Start: 2019-07-10 | End: 2019-07-31 | Stop reason: HOSPADM

## 2019-07-10 RX ORDER — TRAMADOL HYDROCHLORIDE 50 MG/1
100 TABLET ORAL EVERY 6 HOURS PRN
Qty: 15 TAB | Refills: 0 | Status: ON HOLD | OUTPATIENT
Start: 2019-07-10 | End: 2019-07-29

## 2019-07-10 RX ORDER — ASPIRIN 81 MG/1
81 TABLET, CHEWABLE ORAL DAILY
Status: CANCELLED | OUTPATIENT
Start: 2019-07-11

## 2019-07-10 RX ORDER — ACETAMINOPHEN 325 MG/1
650 TABLET ORAL EVERY 6 HOURS PRN
Qty: 30 TAB | Refills: 0 | Status: SHIPPED | OUTPATIENT
Start: 2019-07-10

## 2019-07-10 RX ORDER — ECHINACEA PURPUREA EXTRACT 125 MG
2 TABLET ORAL
Qty: 1 BOTTLE | Refills: 3 | Status: ON HOLD | OUTPATIENT
Start: 2019-07-10 | End: 2019-07-29

## 2019-07-10 RX ORDER — OXYCODONE HYDROCHLORIDE 5 MG/1
5 TABLET ORAL EVERY 6 HOURS PRN
Qty: 15 TAB | Refills: 0 | Status: ON HOLD | OUTPATIENT
Start: 2019-07-10 | End: 2019-07-29

## 2019-07-10 RX ORDER — TRAMADOL HYDROCHLORIDE 50 MG/1
100 TABLET ORAL EVERY 6 HOURS PRN
Status: DISCONTINUED | OUTPATIENT
Start: 2019-07-10 | End: 2019-07-31 | Stop reason: HOSPADM

## 2019-07-10 RX ORDER — MICONAZOLE NITRATE 20 MG/G
1 CREAM TOPICAL 2 TIMES DAILY
Status: ON HOLD
Start: 2019-07-10 | End: 2019-07-29

## 2019-07-10 RX ORDER — LANOLIN ALCOHOL/MO/W.PET/CERES
3 CREAM (GRAM) TOPICAL NIGHTLY PRN
Status: DISCONTINUED | OUTPATIENT
Start: 2019-07-10 | End: 2019-07-31 | Stop reason: HOSPADM

## 2019-07-10 RX ORDER — TRAZODONE HYDROCHLORIDE 50 MG/1
50 TABLET ORAL
Status: DISCONTINUED | OUTPATIENT
Start: 2019-07-10 | End: 2019-07-31 | Stop reason: HOSPADM

## 2019-07-10 RX ORDER — BISACODYL 10 MG
10 SUPPOSITORY, RECTAL RECTAL
Status: CANCELLED | OUTPATIENT
Start: 2019-07-10

## 2019-07-10 RX ORDER — FLUTICASONE PROPIONATE 50 MCG
2 SPRAY, SUSPENSION (ML) NASAL DAILY
Status: CANCELLED | OUTPATIENT
Start: 2019-07-11

## 2019-07-10 RX ORDER — PREGABALIN 100 MG/1
300 CAPSULE ORAL 2 TIMES DAILY
Status: DISCONTINUED | OUTPATIENT
Start: 2019-07-10 | End: 2019-07-22

## 2019-07-10 RX ORDER — OXYCODONE HYDROCHLORIDE 5 MG/1
5 TABLET ORAL EVERY 6 HOURS PRN
Status: DISCONTINUED | OUTPATIENT
Start: 2019-07-10 | End: 2019-07-31 | Stop reason: HOSPADM

## 2019-07-10 RX ORDER — DIPHENHYDRAMINE HYDROCHLORIDE, ZINC ACETATE 2; .1 G/100G; G/100G
CREAM TOPICAL 3 TIMES DAILY PRN
Status: CANCELLED | OUTPATIENT
Start: 2019-07-10

## 2019-07-10 RX ORDER — ECHINACEA PURPUREA EXTRACT 125 MG
2 TABLET ORAL PRN
Status: DISCONTINUED | OUTPATIENT
Start: 2019-07-10 | End: 2019-07-31 | Stop reason: HOSPADM

## 2019-07-10 RX ORDER — AMOXICILLIN 250 MG
2 CAPSULE ORAL 2 TIMES DAILY
Status: CANCELLED | OUTPATIENT
Start: 2019-07-10

## 2019-07-10 RX ORDER — AMOXICILLIN 250 MG
2 CAPSULE ORAL 2 TIMES DAILY
Qty: 30 TAB | Refills: 0 | Status: ON HOLD | OUTPATIENT
Start: 2019-07-10 | End: 2019-07-29

## 2019-07-10 RX ORDER — DIPHENHYDRAMINE HYDROCHLORIDE, ZINC ACETATE 2; .1 G/100G; G/100G
1 CREAM TOPICAL 3 TIMES DAILY PRN
Status: ON HOLD
Start: 2019-07-10 | End: 2019-07-29

## 2019-07-10 RX ORDER — METHADONE HYDROCHLORIDE 10 MG/1
20 TABLET ORAL 2 TIMES DAILY
Status: CANCELLED | OUTPATIENT
Start: 2019-07-10

## 2019-07-10 RX ORDER — METHADONE HYDROCHLORIDE 5 MG/1
20 TABLET ORAL 2 TIMES DAILY
Status: DISCONTINUED | OUTPATIENT
Start: 2019-07-10 | End: 2019-07-12

## 2019-07-10 RX ORDER — POLYETHYLENE GLYCOL 3350 17 G/17G
1 POWDER, FOR SOLUTION ORAL
Status: DISCONTINUED | OUTPATIENT
Start: 2019-07-10 | End: 2019-07-31 | Stop reason: HOSPADM

## 2019-07-10 RX ORDER — POLYVINYL ALCOHOL 14 MG/ML
1 SOLUTION/ DROPS OPHTHALMIC PRN
Status: DISCONTINUED | OUTPATIENT
Start: 2019-07-10 | End: 2019-07-31 | Stop reason: HOSPADM

## 2019-07-10 RX ORDER — FLUTICASONE PROPIONATE 50 MCG
2 SPRAY, SUSPENSION (ML) NASAL DAILY
Status: DISCONTINUED | OUTPATIENT
Start: 2019-07-11 | End: 2019-07-31 | Stop reason: HOSPADM

## 2019-07-10 RX ORDER — OXYCODONE HYDROCHLORIDE 5 MG/1
5 TABLET ORAL EVERY 6 HOURS PRN
Status: CANCELLED | OUTPATIENT
Start: 2019-07-10

## 2019-07-10 RX ORDER — TRAMADOL HYDROCHLORIDE 50 MG/1
100 TABLET ORAL EVERY 6 HOURS PRN
Status: CANCELLED | OUTPATIENT
Start: 2019-07-10

## 2019-07-10 RX ORDER — DIPHENHYDRAMINE HYDROCHLORIDE, ZINC ACETATE 2; .1 G/100G; G/100G
CREAM TOPICAL 3 TIMES DAILY PRN
Status: DISCONTINUED | OUTPATIENT
Start: 2019-07-10 | End: 2019-07-10

## 2019-07-10 RX ORDER — HYDROXYZINE HYDROCHLORIDE 25 MG/1
50 TABLET, FILM COATED ORAL EVERY 6 HOURS PRN
Status: DISCONTINUED | OUTPATIENT
Start: 2019-07-10 | End: 2019-07-31 | Stop reason: HOSPADM

## 2019-07-10 RX ORDER — PREGABALIN 150 MG/1
300 CAPSULE ORAL 2 TIMES DAILY
Status: CANCELLED | OUTPATIENT
Start: 2019-07-10

## 2019-07-10 RX ORDER — BENZOCAINE/MENTHOL 6 MG-10 MG
LOZENGE MUCOUS MEMBRANE 2 TIMES DAILY
Status: DISCONTINUED | OUTPATIENT
Start: 2019-07-10 | End: 2019-07-15

## 2019-07-10 RX ORDER — POLYETHYLENE GLYCOL 3350 17 G/17G
1 POWDER, FOR SOLUTION ORAL
Status: CANCELLED | OUTPATIENT
Start: 2019-07-10

## 2019-07-10 RX ORDER — MICONAZOLE NITRATE 20 MG/G
CREAM TOPICAL 2 TIMES DAILY
Status: CANCELLED | OUTPATIENT
Start: 2019-07-10 | End: 2019-07-15

## 2019-07-10 RX ADMIN — ASPIRIN 81 MG 81 MG: 81 TABLET ORAL at 05:07

## 2019-07-10 RX ADMIN — PREGABALIN 300 MG: 100 CAPSULE ORAL at 21:28

## 2019-07-10 RX ADMIN — INSULIN LISPRO 5 UNITS: 100 INJECTION, SOLUTION INTRAVENOUS; SUBCUTANEOUS at 12:03

## 2019-07-10 RX ADMIN — SENNOSIDES AND DOCUSATE SODIUM 2 TABLET: 8.6; 5 TABLET ORAL at 21:27

## 2019-07-10 RX ADMIN — ENOXAPARIN SODIUM 40 MG: 100 INJECTION SUBCUTANEOUS at 05:06

## 2019-07-10 RX ADMIN — Medication: at 02:40

## 2019-07-10 RX ADMIN — SENNOSIDES AND DOCUSATE SODIUM 2 TABLET: 8.6; 5 TABLET ORAL at 11:33

## 2019-07-10 RX ADMIN — METHADONE HYDROCHLORIDE 20 MG: 5 TABLET ORAL at 21:27

## 2019-07-10 RX ADMIN — MICONAZOLE NITRATE: 20 CREAM TOPICAL at 21:00

## 2019-07-10 RX ADMIN — MICONAZOLE NITRATE: 20 CREAM TOPICAL at 11:33

## 2019-07-10 RX ADMIN — HYDROCORTISONE: 1 CREAM TOPICAL at 21:36

## 2019-07-10 RX ADMIN — Medication: at 09:38

## 2019-07-10 RX ADMIN — PREGABALIN 300 MG: 150 CAPSULE ORAL at 05:07

## 2019-07-10 RX ADMIN — METOPROLOL TARTRATE 12.5 MG: 25 TABLET, FILM COATED ORAL at 05:07

## 2019-07-10 RX ADMIN — METOPROLOL TARTRATE 12.5 MG: 25 TABLET ORAL at 17:47

## 2019-07-10 RX ADMIN — FLUTICASONE PROPIONATE 100 MCG: 50 SPRAY, METERED NASAL at 05:08

## 2019-07-10 RX ADMIN — OXYCODONE HYDROCHLORIDE 5 MG: 5 TABLET ORAL at 17:46

## 2019-07-10 RX ADMIN — MICONAZOLE NITRATE: 20 CREAM TOPICAL at 05:08

## 2019-07-10 RX ADMIN — OXYCODONE HYDROCHLORIDE 5 MG: 5 TABLET ORAL at 02:43

## 2019-07-10 RX ADMIN — METHADONE HYDROCHLORIDE 20 MG: 10 TABLET ORAL at 05:07

## 2019-07-10 RX ADMIN — OXYCODONE HYDROCHLORIDE 5 MG: 5 TABLET ORAL at 11:32

## 2019-07-10 RX ADMIN — INSULIN LISPRO 5 UNITS: 100 INJECTION, SOLUTION INTRAVENOUS; SUBCUTANEOUS at 17:49

## 2019-07-10 ASSESSMENT — PATIENT HEALTH QUESTIONNAIRE - PHQ9
SUM OF ALL RESPONSES TO PHQ9 QUESTIONS 1 AND 2: 0
2. FEELING DOWN, DEPRESSED, IRRITABLE, OR HOPELESS: NOT AT ALL
1. LITTLE INTEREST OR PLEASURE IN DOING THINGS: NOT AT ALL

## 2019-07-10 ASSESSMENT — LIFESTYLE VARIABLES
EVER_SMOKED: YES
ALCOHOL_USE: NO

## 2019-07-10 NOTE — H&P
REHABILITATION HISTORY AND PHYSICAL/POST ADMISSION EVALUATION    7/10/2019  3:41 PM  Travon Pollack  [id number]  Admission  7/10/2019 10:23 AM  [facility] Code/Reason for admission: 05.4 Unilateral LE Below Knee Amputation (BKA)   Etiologic diagnosis/problem: S/P BKA (below knee amputation) unilateral, left (HCC)  Chief Complaint: left residual limb pain    HPI:  Patient is a 63 y.o. male  with a past medical history of insulin dependent diabetes, chronic respiratory failure on 3 L home O2, admitted to [facility] on 6/15 as a transfer from [place], with elevated troponin to peak 1.11, sepsis, and left diabetic foot ulcer. Also complaining of left shoulder pain with history of fall.  Left humerus xray from outside hospital with inferior dislocation of proximal humerus. CT of shoulder with prior resection of left humeral head, GH joint with chronic loculated fluid collection.     Patient was intubated on admission, extubated 6/17, s/p steroids. Patient had debridement on 6/18 of foot ulcer, then returned to OR on 6/24 for left transtibial amputation with Dr. Schneider. Patient followed by ID, antibiotics completed.  Patient had ECHO with EF 40%. Patient was on methadone 70 mg at home prior to admission, which has been decreased to 20 mg BID. He has altered mental status, Head CT negative for acute stroke.     Patient current reports left residual limb pain.  Nursing concerned about fluid collection in the posterior aspect of his left residual limb which was evaluated by myself and the hospitalist at bedside.  Patient is also reporting both phantom pain and phantom sensation.  He reports the pain is very severe at night.  Patient has a long history of chronic shoulder pain with multiple surgeries to his left shoulder.  He has had limited range of motion for the past 3 years.  He has had surgeries also to his low back and his neck.  His PCP prescribes his methadone.    Patient is right-hand dominant.  Patient denies  any paresthesias.  In the last several days he has developed a rash on the right trunk which is very itchy.  He also reports he did not like using the slide board for transfers as it hurt his scrotum so he is been doing stand pivot transfers.  Patient has already talked to the prosthetists from [facility] regarding his future prosthesis.    Patient was evaluated by Rehab Medicine physician and Physical Therapy and Occupational Therapy and determined to be appropriate for acute inpatient rehab and was transferred to [facility] on 7/10/2019.     With this acute therapeutic intervention, this patient hopes to improve his functional status, and return to independent living with the supportive care of family.    REVIEW OF SYSTEMS:     A complete review of systems was performed and was negative in detail with the exception of items mentioned elsewhere in this document.    PMH:  Past Medical History:   Diagnosis Date   • Dental disorder     upper and lower   • Diabetes 2011    insulin   • Heart burn    • Indigestion    • Infectious disease staph   • MRSA (methicillin resistant Staphylococcus aureus)    • Opiate withdrawal (HCC)    • Pain 2/1/13    6/10 back   • Renal disorder     pt born with only one kidney   • Respiratory insufficiency    • Snoring        PSH:  Past Surgical History:   Procedure Laterality Date   • KNEE AMPUTATION BELOW Left 6/24/2019    Procedure: AMPUTATION, BELOW KNEE;  Surgeon: Ashok Schneider M.D.;  Location: [facility];  Service: Orthopedics   • IRRIGATION & DEBRIDEMENT ORTHO  6/18/2019    Procedure: IRRIGATION AND DEBRIDEMENT, WOUND - LEFT HEEL;  Surgeon: Simon Diallo M.D.;  Location: [facility];  Service: Orthopedics   • LUMBAR FUSION POSTERIOR  3/4/2013    Performed by Nixon Zhao M.D. at [facility]   • LUMBAR LAMINECTOMY DISKECTOMY  3/4/2013    Performed by Nixon Zhao M.D. at [facility]   • LUMBAR FUSION  POSTERIOR  2/11/2013    Performed by Nixon Zhao M.D. at SURGERY [facility]   • HERNIA REPAIR  2012    x7 last one in 2012   • OTHER  2007    back   • APPENDECTOMY     • CHOLECYSTECTOMY     • OTHER      neck   • OTHER ORTHOPEDIC SURGERY      left shoulder surgeries x 5       Family History   Problem Relation Age of Onset   • Cancer Mother         brain   • Heart Disease Father         MEDICATIONS:  Current Facility-Administered Medications   Medication Dose   • Respiratory Care per Protocol     • Pharmacy Consult Request ...Pain Management Review 1 Each  1 Each   • acetaminophen (TYLENOL) tablet 650 mg  650 mg   • artificial tears 1.4 % ophthalmic solution 1 Drop  1 Drop   • benzocaine-menthol (CEPACOL) lozenge 1 Lozenge  1 Lozenge   • mag hydrox-al hydrox-simeth (MAALOX PLUS ES or MYLANTA DS) suspension 20 mL  20 mL   • ondansetron (ZOFRAN ODT) dispertab 4 mg  4 mg    Or   • ondansetron (ZOFRAN) syringe/vial injection 4 mg  4 mg   • traZODone (DESYREL) tablet 50 mg  50 mg   • sodium chloride (OCEAN) 0.65 % nasal spray 2 Spray  2 Spray   • hydrOXYzine HCl (ATARAX) tablet 50 mg  50 mg   • melatonin tablet 3 mg  3 mg   • polyethylene glycol/lytes (MIRALAX) PACKET 1 Packet  1 Packet    And   • magnesium hydroxide (MILK OF MAGNESIA) suspension 30 mL  30 mL    And   • bisacodyl (DULCOLAX) suppository 10 mg  10 mg   • miconazole 2%-zinc oxide (DELFINO) topical cream     • insulin lispro (HUMALOG) injection 5 Units  5 Units    And   • insulin lispro (HUMALOG) injection 2-9 Units  2-9 Units    And   • glucose 4 g chewable tablet 16 g  16 g   • [START ON 7/11/2019] aspirin (ASA) chewable tab 81 mg  81 mg   • [START ON 7/11/2019] enoxaparin (LOVENOX) inj 40 mg  40 mg   • [START ON 7/11/2019] fluticasone (FLONASE) nasal spray 100 mcg  2 Spray   • methadone (DOLOPHINE) tablet 20 mg  20 mg   • metoprolol (LOPRESSOR) tablet 12.5 mg  12.5 mg   • oxyCODONE immediate-release (ROXICODONE) tablet 5 mg  5 mg   • pregabalin  "(LYRICA) capsule 300 mg  300 mg   • senna-docusate (PERICOLACE or SENOKOT S) 8.6-50 MG per tablet 2 Tab  2 Tab   • tramadol (ULTRAM) 50 MG tablet 100 mg  100 mg   • dextrose 50% (D50W) injection 50 mL  50 mL   • diphenhydrAMINE-ZnAcetate (BENADRYL ITCH) 1-0.1 % cream         ALLERGIES:  Patient has no known allergies.    PSYCHOSOCIAL HISTORY:  Patient lives in a one-story home with 3 steps to enter in [place].  Handrails are currently being installed.  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  He lives on 5 acres with his wife who is been  for 42 years.  He has 2 grown children and 3 grandchildren that do not live locally.  He quit tobacco 3 months ago and had smoked for most of his adult life, denies any alcohol or drugs.    LEVEL OF FUNCTION PRIOR TO DISABILTY:  Ambulated with a straight cane, otherwise independent, driving    LEVEL OF FUNCTION PRIOR TO ADMISSION to [facility]:  Mod assist for mobility and ADLs    CURRENT LEVEL OF FUNCTION:   Same as level of function prior to admission to [facility]    PHYSICAL EXAM:     VITAL SIGNS:   height is 1.803 m (5' 11\") and weight is 88.9 kg (196 lb). His oral temperature is 36.4 °C (97.5 °F). His blood pressure is 111/72 and his pulse is 88. His respiration is 18 and oxygen saturation is 96%.     GENERAL: No apparent distress  HEENT: Normocephalic/atraumatic, moist mucous membranes  CARDIAC: Regular rate and rhythm, normal S1, S2, no murmurs  LUNGS: Clear to auscultation, normal respiratory effort, on room air   ABDOMINAL: bowel sounds present, soft, nontender and nondistended    EXTREMITIES: Left transtibial amputation incision clean dry and intact, tender posterior large fluid collection mildly warm  MSK: Severely decreased range of motion of the left shoulder with pain  SKIN: red confluent rash on right flank    NEURO:    Mental status:  A&Ox4 (person, place, date, situation) " answers questions appropriately follows commands    Motor:  Shoulder flexors:  Right -  5/5, Left -  Pain limited  Elbow flexors:  Right -  5/5, Left -  Pain limited  Elbow extensors:  Right -  5/5, Pain limited  Symmetrical   Hip flexors:  Right -  5/5, Left -  5/5  Knee ext:  Right -  5/5, deferred  Dorsiflexors:  Right -  5/5  Plantar flexors:  Right -  5/5      Sensory:   intact to light touch through out    RADIOLOGY:         6/15/2019 10:05 PM    HISTORY/REASON FOR EXAM:  Shoulder pain, acute, persistent, xray and exam nonspecific.  History of left shoulder dislocation. Sepsis.    TECHNIQUE/ EXAM DESCRIPTION AND NUMBER OF VIEWS:  CT scan of the LEFT shoulder without contrast and including reconstructions.    Thin-section noncontrast helical images were obtained from the clavicle through the scapula. Coronal and sagittal reconstructions were generated.    Up to date radiation dose reduction adjustments have been utilized to meet ALARA standards for radiation dose reduction.    COMPARISON: Radiograph 6/15/2019, 1/12/2018, CT 1/14/2018    FINDINGS:  Prior resection of the left humeral head with residual head neck junction appears well corticated and could relate to chronic resection/deformity.    There is also deformity and remodeling of the glenoid with well-corticated margin, suggesting of chronic change as well.    There is no articulation between the proximal humerus and glenoid. The joint is replaced by loculated soft tissue or dense fluid (image 65 series 4).    All rotator cuff muscle are completely atrophied and likely no rotator cuff tendon connecting to the humerus.   Impression         Prior resection of left humeral head. The residual head neck junction appears well corticated and could relate to chronic resection/deformity. There is also deformity and remodeling of the glenoid with well-corticated margin, suggesting of chronic change as well.    No acute fracture.    There is no articulation  between the proximal humerus and glenoid. The glenohumeral joint is replaced by loculated soft tissue or dense fluid, likely chronic change. The fluid is slightly less in 2018 CT.    Infection is considered less likely given the osseous cortex adjacent to the fluid is well corticated and demonstrates no rarefaction or destruction.                      LABS:    Lab Results   Component Value Date/Time    SODIUM 137 06/30/2019 02:48 AM    POTASSIUM 3.5 (L) 06/30/2019 02:48 AM    CHLORIDE 101 06/30/2019 02:48 AM    CO2 27 06/30/2019 02:48 AM    GLUCOSE 139 (H) 06/30/2019 02:48 AM    BUN 20 06/30/2019 02:48 AM    CREATININE 0.92 06/30/2019 02:48 AM    CREATININE 0.7 09/17/2007 03:20 AM      Lab Results   Component Value Date/Time    WBC 10.8 06/26/2019 03:58 AM    RBC 3.56 (L) 06/26/2019 03:58 AM    HEMOGLOBIN 10.0 (L) 06/26/2019 03:58 AM    HEMATOCRIT 31.5 (L) 06/26/2019 03:58 AM    MCV 90.2 06/26/2019 03:58 AM    MCH 27.5 06/26/2019 03:58 AM    MCHC 30.5 (L) 06/26/2019 03:58 AM    MPV 9.7 06/26/2019 03:58 AM    NEUTSPOLYS 68.30 06/26/2019 03:58 AM    LYMPHOCYTES 21.80 (L) 06/26/2019 03:58 AM    MONOCYTES 8.50 06/26/2019 03:58 AM    EOSINOPHILS 0.70 06/26/2019 03:58 AM    BASOPHILS 0.30 06/26/2019 03:58 AM        PRIMARY REHAB DIAGNOSIS:    This patient is a 63 y.o. male admitted for acute inpatient rehabilitation with S/P BKA (below knee amputation) unilateral, left (HCC).      IMPAIRMENTS:   ADLs/IADLs  Mobility    SECONDARY DIAGNOSIS/MEDICAL CO-MORBIDITIES AFFECTING FUNCTION:    Residual limb fluid collection  Diabetes  Chronic pain  Left shoulder pain  Rash  Cardiomyopathy, EF 40%  History of one kidney  Respiratory insufficiency  Sleep apnea  Hypertension    RELEVANT CHANGES SINCE PREADMISSION EVALUATION:    Status unchanged    The patient's rehabilitation potential is Good  The patient's medical prognosis is good    PLAN:   Discussion and Recommendations, discussed with the patient and/or family:   1. The patient  requires an acute inpatient rehabilitation program with a coordinated program of care at an intensity and frequency not available at a lower level of care. This recommendation is substantiated by the patient's medical physicians who recommend that the patient's intervention and assessment of medical issues needs to be done at an acute level of care for patient's safety and maximum outcome.     2. A coordinated program of care will be supplied by an interdisciplinary team of physical therapy, occupational therapy, rehab physician, rehab nursing, and, if needed, speech therapy and rehab psychology. Rehab team presents a patient-specific rehabilitation and education program concentrating on prevention of future problems related to accessibility, mobility, skin, bowel, bladder, sexuality, and psychosocial and medical/surgical problems.     3. Need for Rehabilitation Physician: The rehab physician will be evaluating the patient on a multi-weekly basis to help coordinate the program of care. The rehab physician communicates between medical physicians, therapists, and nurses to maximize the patient's potential outcome. Specific areas in which the rehab physician will be providing daily assessment include the following:   A. Assessing the patient's heart rate and blood pressure response (vitals monitoring) to activity and making adjustments in medications or conservative measures as needed.   B. The rehab physician will be assessing the frequency at which the program can be increased to allow the patient to reach optimal functional outcome.   C. The rehab physician will also provide assessments in daily skin care, especially in light of patient's impairments in mobility.   D. The rehab physician will provide special expertise in understanding how to work with functional impairment and recommend appropriate interventions, compensatory techniques, and education that will facilitate the patient's outcome.     4. Richa FUNEZ    The rehab RN will be working with patient to carry over in room mobility and activities of daily living when the patient is not in 3 hours of skilled therapy. Rehab nursing will be working in conjunction with rehab physician to address all the medical issues above and continue to assess laboratory work and discuss abnormalities with the treating physicians, assess vitals, and response to activity, and discuss and report abnormalities with the rehab physician. Rehab RN will also continue daily skin care, supervise bladder/bowel program, instruct in medication administration, and ensure patient safety.     5. Therapies to treat at intensity and frequency of (may change after completion of evaluation by all therapeutic disciplines):       PT:  Physical therapy to address mobility, transfer, gait training and evaluation for adaptive equipment needs 1.5hour/day at least 5 days/week for the duration of the ELOS (see below)       OT:  Occupational therapy to address ADLs, self-care, home management training, functional mobility/transfers and assistive device evaluation, and community re-integration 1.5hour/day at least 5 days/week for the duration of the ELOS (see below).       6. Medical management / Rehabilitation Issues/Adverse Potential affecting function as part of rehabilitation plan.    Residual limb fluid collection  Could be post-op seroma versus hematoma  Check UA  Appreciate hospitalist assistance    Diabetes  Continue insulin    Chronic pain  Continue methadone  Continue PRN opiates    Left shoulder pain  With history of multiple surgeries, including left humeral head resection  Modalities with therapy    Rash  Groin rash - miconazole  Trunk rash - hydrocortison cream    Cardiomyopathy, EF 40%  Continue metoprolol  Appreciate hospitalist assistance    History of one kidney  Avoid nephrotoxins    Respiratory insufficiency  Sleep apnea  On 3 L at home at night  Respiratory therapy to see  patient    Hypertension  Continue metoprolol  Appreciate hospitalist assistance      I performed a complete drug regimen review and did not identify any potential clinically significant medication issues.    The patient's CODE STATUS was changed from FULL code to DNR/DNI per patient request on admission.    REHABILITATION ISSUES/ADVERSE POTENTIAL:  1.  BKA: Patient demonstrates functional deficits in strength, balance, coordination, and ADL's. Patient is admitted to [facility] for comprehensive rehabilitation therapy as described below.   Rehabilitation nursing monitors bowel and bladder control, educates on medication administration, co-morbidities and monitors patient safety.    2.  Neurostimulants: None at this time but continue to assess daily for need to initiate should status change.    3.  DVT prophylaxis:  Patient is on Lovenox for anticoagulation upon transfer. Encourage OOB. Monitor daily for signs and symptoms of DVT including but not limited to swelling and pain to prevent the development of DVT that may interfere with therapies.    4.  Pain: No issues with pain currently / Controlled with as needed oral analgesics.    5.  Nutrition/Dysphagia: Dietician monitors nutrient intake, recommend supplements prn and provide nutrition education to pt/family to promote optimal nutrition for wound healing/recovery.     6.  Bladder/bowel:  Start bowel and bladder program as described below, to prevent constipation, urinary retention (which may lead to UTI), and urinary incontinence (which will impact upon pt's functional independence).   - TV Q3h while awake with post void bladder scans, I&O cath for PVRs >400  - up to commode after meal     7.  Skin/dermal ulcer prophylaxis: Monitor for new skin conditions with q.2 h. turns as required to prevent the development of skin breakdown.     8.  Cognition/Behavior:  Psychologist Dr. Mckenzie provides adjustment counseling to illness and psychosocial  barriers that may be potential barriers to rehabilitation.     9. Respiratory therapy: RT performs O2 management prn, breathing retraining, pulmonary hygiene and bronchospasm management prn to optimize participation in therapies.    Pt was seen today for 75 min, and entire time spent in face-to-face contact was >50% in counseling and coordination of care as detailed in A/P above.        GOALS/EXPECTED LEVEL OF FUNCTION BASED ON CURRENT MEDICAL AND FUNCTIONAL STATUS (may change based on patient's medical status and rate of impairment recovery):  Transfers:   Supervision  Mobility/Gait:   Supervision  ADL's:   Supervision    DISPOSITION: Discharge to pre-morbid independent living setting with the supportive care of patient's spouse.      ELOS: 10-14 days    Mimi Doyle M.D.  Physical Medicine and Rehabilitation

## 2019-07-10 NOTE — DISCHARGE PLANNING
Anticipated Discharge Disposition: Rehab    Action: Spoke with Halley from [facility] and they have received auth from  to accept patient to Rehab. Halley is making arrangements for transport. Met with patient and he is aware and agreeable. Bedside RN aware and will update with time when known.     Barriers to Discharge: none    Plan: [facility]

## 2019-07-10 NOTE — CARE PLAN
Problem: Communication  Goal: The ability to communicate needs accurately and effectively will improve    Intervention: Educate patient and significant other/support system about the plan of care, procedures, treatments, medications and allow for questions  Plan for rehab for additional PT and OT prior to home, plan of care discussed, patient verbalized understanding.       Problem: Discharge Barriers/Planning  Goal: Patient's continuum of care needs will be met  Outcome: PROGRESSING AS EXPECTED

## 2019-07-10 NOTE — PROGRESS NOTES
Patient admitted to facility at 1020 via w/c; accompanied by hospital transport.  Patient assisted to room and positioned in bed for comfort and safety; call light within reach.  Patient assisted with stowing belongings and oriented to room and facility.  Admission assessment performed and documented in computer. Patient received and reviewed education binder. Admission paperwork completed; signed copies placed in chart.  Will continue to monitor.

## 2019-07-10 NOTE — DISCHARGE SUMMARY
Discharge Summary    CHIEF COMPLAINT ON ADMISSION  Chief Complaint   Patient presents with   • Abnormal Labs   • T-5000 GLF     left shoulder pain       Reason for Admission  EMS     CODE STATUS  Full Code    HPI & HOSPITAL COURSE  This is a 63 y.o. male here with diabetes that was uncontrolled.  The patient developed a diabetic foot ulcer on the left foot.  The patient's foot ulcer progressed into sepsis.  The patient actually had to be intubated mechanically ventilated and was getting sepsis resuscitation treatment in the ICU.  Patient initially grew out some enterococcus and then later grew out Prevotella.  Patient has completed at this point antibiotics.  Patient is been extubated and is on room air at this point in time.  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 Patient is recovering at this point well.  He is noted to have improving pain control.  He is requiring methadone as well as oxycodone and tramadol.  We have encouraged continued narcotic taper.     Patient's blood sugar appears to have improved control on 3 times a day short acting insulin coverage.  He still continues to need additional sliding scale insulin coverage.  We have encouraged continued diabetic diet.    He has been accepted by rehab and will benefit from continued wound care as well as therapy.    In prescribing controlled substances to this patient, I certify that I have obtained and reviewed the medical history of Travon Pollack. I have also made a good whit effort to obtain applicable records from other providers who have treated the patient and no other records are available at this time.     I have conducted a physical exam and documented it. I have reviewed Mr. Pollack’s prescription history as maintained by the [place] Prescription Monitoring Program.     I have assessed the patient’s risk for abuse, dependency, and addiction using the validated Opioid Risk Tool available at https://www.mdcalc.com/zljbgd-dqfq-kgpa-ort-narcotic-abuse.      Given the above, I believe the benefits of controlled substance therapy outweigh the risks. The reasons for prescribing controlled substances include non-narcotic, oral analgesic alternatives have been inadequate for pain control. Accordingly, I have discussed the risk and benefits, treatment plan, and alternative therapies with the patient.         Therefore, he is discharged in good and stable condition to an inpatient rehabilitation hospital.    The patient met 2-midnight criteria for an inpatient stay at the time of discharge.    Please refer to the chart for additional details.    FOLLOW UP ITEMS POST DISCHARGE  Discharge to skilled nursing facility with wound VAC  Follow-up with primary care provider in 2 days  Follow-up with surgery as scheduled    DISCHARGE DIAGNOSES  Principal Problem:    Acute on chronic respiratory failure with hypoxia (HCC) POA: Yes  Active Problems:    Septic shock due to undetermined organism ([facility]) POA: Yes    Diabetic foot ulcer ([facility]) POA: Yes    OZ (acute kidney injury) ([facility]) POA: Yes    Acute encephalopathy POA: Unknown    Essential hypertension POA: Yes    DM (diabetes mellitus), type 2, uncontrolled ([facility]) POA: Yes    Cardiomyopathy ([facility]) POA: Yes    SALOME (obstructive sleep apnea) POA: Unknown    Pneumonia due to infectious organism POA: Yes    Hypokalemia POA: Yes    Tobacco dependence POA: Yes    Left shoulder pain POA: Yes    Mucus plugging of bronchi POA: Unknown    Chronic pain syndrome POA: Yes    Leucocytosis POA: Unknown    Hypoglycemia POA: Unknown  Resolved Problems:    Elevated troponin POA: Yes      FOLLOW UP  Future Appointments  Date Time Provider Department Center   7/19/2019 8:00 AM Ashok Schneider M.D. [address]     No follow-up provider specified.    MEDICATIONS ON DISCHARGE     Medication List      START taking these medications      Instructions   acetaminophen 325 MG Tabs  Commonly known as:  TYLENOL   Take 2 Tabs by mouth every 6 hours as needed  (Mild Pain; (Pain scale 1-3); Temp greater than 100.5 F).  Dose:  650 mg     aspirin 81 MG Chew chewable tablet  Start taking on:  7/11/2019  Commonly known as:  ASA   Take 1 Tab by mouth every day.  Dose:  81 mg     diphenhydrAMINE cream  Commonly known as:  BENADRYL ITCH   Apply 1 Each to affected area(s) 3 times a day as needed (itch).  Dose:  1 Each     fluticasone 50 MCG/ACT nasal spray  Start taking on:  7/11/2019  Commonly known as:  FLONASE   Spray 2 Sprays in nose every day.  Dose:  2 Spray     * insulin lispro 100 UNIT/ML Soln  Commonly known as:  HUMALOG   Inject 5 Units as instructed 3 times a day before meals.  Dose:  5 Units     * insulin lispro 100 UNIT/ML Soln  Commonly known as:  HUMALOG   Inject 2-9 Units as instructed 4 Times a Day,Before Meals and at Bedtime.  Dose:  2-9 Units     ipratropium-albuterol 0.5-2.5 (3) MG/3ML nebulizer solution  Commonly known as:  DUONEB   3 mL by Nebulization route every four hours as needed for Shortness of Breath.  Dose:  3 mL     metoprolol 25 MG Tabs  Commonly known as:  LOPRESSOR   Take 0.5 Tabs by mouth 2 Times a Day.  Dose:  12.5 mg     miconazole 2%-zinc oxide 2 % Crea topical cream   Apply 1 Application to affected area(s) 2 Times a Day.  Dose:  1 Application     oxyCODONE immediate-release 5 MG Tabs  Commonly known as:  ROXICODONE   Take 1 Tab by mouth every 6 hours as needed for up to 2 days.  Dose:  5 mg     senna-docusate 8.6-50 MG Tabs  Commonly known as:  PERICOLACE or SENOKOT S   Take 2 Tabs by mouth 2 Times a Day.  Dose:  2 Tab     sodium chloride 0.65 % Soln  Commonly known as:  OCEAN   Spray 2 Sprays in nose every 2 hours as needed.  Dose:  2 Spray     tramadol 50 MG Tabs  Commonly known as:  ULTRAM   Take 2 Tabs by mouth every 6 hours as needed for up to 3 days.  Dose:  100 mg        * This list has 2 medication(s) that are the same as other medications prescribed for you. Read the directions carefully, and ask your doctor or other care provider  to review them with you.            CHANGE how you take these medications      Instructions   methadone 10 MG Tabs  What changed:  how much to take  Commonly known as:  DOLOPHINE   Take 2 Tabs by mouth 2 Times a Day for 2 days.  Dose:  20 mg        CONTINUE taking these medications      Instructions   LYRICA 300 MG capsule  Generic drug:  pregabalin   Take 300 mg by mouth 2 times a day.  Dose:  300 mg        STOP taking these medications    LANTUS 100 UNIT/ML Soln  Generic drug:  insulin glargine            Allergies  No Known Allergies    DIET  Orders Placed This Encounter   Procedures   • Diet Order Diabetic, Cardiac     Standing Status:   Standing     Number of Occurrences:   1     Order Specific Question:   Diet:     Answer:   Diabetic [3]     Order Specific Question:   Diet:     Answer:   Cardiac [6]       ACTIVITY  As tolerated and directed by rehab.  Non-weight bearing left leg    LINES, DRAINS, AND WOUNDS  This is an automated list. Peripheral IVs will be removed prior to discharge.       Wound 06/16/19 Diabetic Ulcer Heel right plantar (Active)   Wound Image   6/20/2019  3:26 PM   Site Assessment MESHA 7/9/2019  7:54 PM   [place]izabe[place]-wound Assessment [facility] 7/9/2019  7:54 PM   Margins MESHA 7/9/2019  7:54 PM   Wound Length (cm) 1 cm 6/20/2019  3:26 PM   Wound Width (cm) 1 cm 6/20/2019  3:26 PM   Wound Depth (cm) 0.3 cm 6/20/2019  3:26 PM   Wound Surface Area (cm^2) 1 cm^2 6/20/2019  3:26 PM   Post Wound Length (cm) 0.6 cm 7/2/2019 11:15 AM    Post Wound Width (cm) 0.4 cm 7/2/2019 11:15 AM   Post Wound Depth (cm) 0.4 cm 7/2/2019 11:15 AM   Post Wound Surface Area (cm^2) 0.24 cm^2 7/2/2019 11:15 AM   Tunneling 0 cm 7/2/2019 11:15 AM   Undermining 0 cm 7/2/2019 11:15 AM   Closure None 7/8/2019  1:15 AM   Drainage Amount Scant 7/9/2019 10:02 AM   Drainage Description Serosanguineous 7/9/2019 10:02 AM   Non-staged Wound Description Partial thickness 7/8/2019  1:15 AM   Treatments Cleansed;Site care 7/9/2019 10:02 AM    Cleansing Normal Saline Irrigation 7/9/2019 10:02 AM   Periwound Protectant Not Applicable 7/8/2019  1:15 AM   Dressing Options Hydrofiber Silver;Nonadhesive Foam;Hypafix Tape 7/9/2019 10:02 AM   Dressing Cleansing/Solutions Not Applicable 7/9/2019 10:02 AM   Dressing Changed Changed 7/9/2019 10:02 AM   Dressing Status Clean;Dry;Intact 7/9/2019  7:54 PM   Dressing Change Frequency Every 48 hrs 7/9/2019 10:02 AM   NEXT Dressing Change  07/11/19 7/9/2019 10:02 AM   NEXT Weekly Photo (Inpatient Only) 07/10/19 7/3/2019  9:30 PM   WOUND NURSE ONLY - Odor None 7/2/2019 11:15 AM   WOUND NURSE ONLY - Pulses Right;DP;PT;1+ 6/17/2019  3:15 PM   WOUND NURSE ONLY - Exposed Structures None 7/2/2019 11:15 AM   WOUND NURSE ONLY - Tissue Type and Percentage 100% red 7/2/2019 11:15 AM   WOUND NURSE ONLY - Time Spent with Patient (mins) 50 7/2/2019 11:15 AM       Moisture Associated Skin Damage  Sacrum;Buttock;Groin;Perineum;Thigh;Other (comment) (Active)   Drainage Amount None 7/9/2019  7:54 PM   Drainage Description Serosanguineous 7/8/2019 12:30 PM   Elizabeth-wound Assessment Excoriated;Red 7/9/2019  7:54 PM   Cleansing Dimethecone Wipes 7/8/2019 12:30 PM   Periwound Protectant Antifungal Therapy 7/9/2019  7:54 PM   Length (cm) 20 7/8/2019 12:30 PM   Width (cm) 10 7/8/2019 12:30 PM   NEXT Weekly Photo (Inpatient Only) 07/10/19 7/8/2019 12:30 PM   Containment Device Indwelling Catheter 7/5/2019  8:00 AM   WOUND NURSE ONLY - Time Spent with Patient (mins) 45 7/8/2019 12:30 PM                  MENTAL STATUS ON TRANSFER  Level of Consciousness: Alert  Orientation : Oriented x 4  Speech: Speech Clear    CONSULTATIONS  Orthopedic surgery  ID  Neurology  Physiatrist  Intensivist  Diabetic educator    PROCEDURES  Endotracheal intubation and extubation  Bronchoscopy  Central line placement  Excisional debridement of left heel on June 18  Left BKA on June 24    LABORATORY  Lab Results   Component Value Date    SODIUM 137 06/30/2019     POTASSIUM 3.5 (L) 06/30/2019    CHLORIDE 101 06/30/2019    CO2 27 06/30/2019    GLUCOSE 139 (H) 06/30/2019    BUN 20 06/30/2019    CREATININE 0.92 06/30/2019    CREATININE 0.7 09/17/2007        Lab Results   Component Value Date    WBC 10.8 06/26/2019    HEMOGLOBIN 10.0 (L) 06/26/2019    HEMATOCRIT 31.5 (L) 06/26/2019    PLATELETCT 280 06/26/2019        Total time of the discharge process exceeds 55 minutes.

## 2019-07-10 NOTE — DISCHARGE PLANNING
F/U with client at bedside to discuss IRF referral. Explained specifics of inpatient rehab, answered questions/concerns. Travon is agreeable to admission. He is aware transport scheduled 10a today. Provided [facility] contact information for any additional questions. Will follow to assist as needed with transition to H.

## 2019-07-10 NOTE — DISCHARGE PLANNING
Dr. Doyle accepting Travon to inpatient rehab. Transport scheduled 10a today. Nursing to call report to [phone]. EDWARD beltran. Will follow to assist as needed with transition to [facility].

## 2019-07-10 NOTE — DISCHARGE PLANNING
Spoke with Halley from Rehab transportation arranged for 1000. Adam completed patient and bedside RN aware. Adam with chart

## 2019-07-10 NOTE — DISCHARGE INSTRUCTIONS
Discharge Instructions    Discharged to other by medical transportation with escort. Discharged via wheelchair, hospital escort: Yes.  Special equipment needed: Wheelchair    Be sure to schedule a follow-up appointment with your primary care doctor or any specialists as instructed.     Discharge Plan:   Diet Plan: Discussed  Activity Level: Discussed  Smoking Cessation Offered: Patient Refused  Confirmed Follow up Appointment: Patient to Call and Schedule Appointment  Confirmed Symptoms Management: Discussed  Medication Reconciliation Updated: Yes  Pneumococcal Vaccine Administered/Refused: Not given - Patient refused pneumococcal vaccine  Influenza Vaccine Indication: Not indicated: Previously immunized this influenza season and > 8 years of age    I understand that a diet low in cholesterol, fat, and sodium is recommended for good health. Unless I have been given specific instructions below for another diet, I accept this instruction as my diet prescription.   Other diet: Diabetic, Cardiac Diet    Special Instructions: Discharge instructions for the Orthopedic Patient    Follow up with Primary Care Physician within 2 weeks of discharge to home, regarding:  Review of medications and diagnostic testing.  Surveillance for medical complications.  Workup and treatment of osteoporosis, if appropriate.     -Is this a Joint Replacement patient? No    -Is this patient being discharged with medication to prevent blood clots?  Yes, Aspirin Aspirin, ASA oral tablets  What is this medicine?  ASPIRIN (AS pir in) is a pain reliever. It is used to treat mild pain and fever. This medicine is also used as directed by a doctor to prevent and to treat heart attacks, to prevent strokes, and to treat arthritis or inflammation.  This medicine may be used for other purposes; ask your health care provider or pharmacist if you have questions.  COMMON BRAND NAME(S): Aspir-Low, Aspir-Elizabeth, Aspirtab, Faviola Advanced Aspirin, Faviola Aspirin,  Faviola Aspirin Extra Strength, Faviola Aspirin Plus, Faviola Extra Strength, Faviola Extra Strength Plus, Faviola Genuine Aspirin, Faviola Womens Aspirin, Bufferin, Bufferin Extra Strength, Bufferin Low Dose  What should I tell my health care provider before I take this medicine?  They need to know if you have any of these conditions:  -anemia  -asthma  -bleeding problems  -child with chickenpox, the flu, or other viral infection  -diabetes  -gout  -if you frequently drink alcohol containing drinks  -kidney disease  -liver disease  -low level of vitamin K  -lupus  -smoke tobacco  -stomach ulcers or other problems  -an unusual or allergic reaction to aspirin, tartrazine dye, other medicines, dyes, or preservatives  -pregnant or trying to get pregnant  -breast-feeding  How should I use this medicine?  Take this medicine by mouth with a glass of water. Follow the directions on the package or prescription label. You can take this medicine with or without food. If it upsets your stomach, take it with food. Do not take your medicine more often than directed.  Talk to your pediatrician regarding the use of this medicine in children. While this drug may be prescribed for children as young as 12 years of age for selected conditions, precautions do apply. Children and teenagers should not use this medicine to treat chicken pox or flu symptoms unless directed by a doctor.  Patients over 65 years old may have a stronger reaction and need a smaller dose.  Overdosage: If you think you have taken too much of this medicine contact a poison control center or emergency room at once.  NOTE: This medicine is only for you. Do not share this medicine with others.  What if I miss a dose?  If you are taking this medicine on a regular schedule and miss a dose, take it as soon as you can. If it is almost time for your next dose, take only that dose. Do not take double or extra doses.  What may interact with this medicine?  Do not take this medicine  with any of the following medications:  -cidofovir  -ketorolac  -probenecid  This medicine may also interact with the following medications:  -alcohol  -alendronate  -bismuth subsalicylate  -flavocoxid  -herbal supplements like feverfew, garlic, ana, ginkgo biloba, horse chestnut  -medicines for diabetes or glaucoma like acetazolamide, methazolamide  -medicines for gout  -medicines that treat or prevent blood clots like enoxaparin, heparin, ticlopidine, warfarin  -other aspirin and aspirin-like medicines  -NSAIDs, medicines for pain and inflammation, like ibuprofen or naproxen  -pemetrexed  -sulfinpyrazone  -varicella live vaccine  This list may not describe all possible interactions. Give your health care provider a list of all the medicines, herbs, non-prescription drugs, or dietary supplements you use. Also tell them if you smoke, drink alcohol, or use illegal drugs. Some items may interact with your medicine.  What should I watch for while using this medicine?  If you are treating yourself for pain, tell your doctor or health care professional if the pain lasts more than 10 days, if it gets worse, or if there is a new or different kind of pain. Tell your doctor if you see redness or swelling. Also, check with your doctor if you have a fever that lasts for more than 3 days. Only take this medicine to prevent heart attacks or blood clotting if prescribed by your doctor or health care professional.  Do not take aspirin or aspirin-like medicines with this medicine. Too much aspirin can be dangerous. Always read the labels carefully.  This medicine can irritate your stomach or cause bleeding problems. Do not smoke cigarettes or drink alcohol while taking this medicine. Do not lie down for 30 minutes after taking this medicine to prevent irritation to your throat.  If you are scheduled for any medical or dental procedure, tell your healthcare provider that you are taking this medicine. You may need to stop taking  this medicine before the procedure.  This medicine may be used to treat migraines. If you take migraine medicines for 10 or more days a month, your migraines may get worse. Keep a diary of headache days and medicine use. Contact your healthcare professional if your migraine attacks occur more frequently.  What side effects may I notice from receiving this medicine?  Side effects that you should report to your doctor or health care professional as soon as possible:  -allergic reactions like skin rash, itching or hives, swelling of the face, lips, or tongue  -breathing problems  -changes in hearing, ringing in the ears  -confusion  -general ill feeling or flu-like symptoms  -pain on swallowing  -redness, blistering, peeling or loosening of the skin, including inside the mouth or nose  -signs and symptoms of bleeding such as bloody or black, tarry stools; red or dark-brown urine; spitting up blood or brown material that looks like coffee grounds; red spots on the skin; unusual bruising or bleeding from the eye, gums, or nose  -trouble passing urine or change in the amount of urine  -unusually weak or tired  -yellowing of the eyes or skin  Side effects that usually do not require medical attention (report to your doctor or health care professional if they continue or are bothersome):  -diarrhea or constipation  -headache  -nausea, vomiting  -stomach gas, heartburn  This list may not describe all possible side effects. Call your doctor for medical advice about side effects. You may report side effects to FDA at 1-693-FDA-5867.  Where should I keep my medicine?  Keep out of the reach of children.  Store at room temperature between 15 and 30 degrees C (59 and 86 degrees F). Protect from heat and moisture. Do not use this medicine if it has a strong vinegar smell. Throw away any unused medicine after the expiration date.  NOTE: This sheet is a summary. It may not cover all possible information. If you have questions about  this medicine, talk to your doctor, pharmacist, or health care provider.  © 2018 Elsevier/Gold Standard (2014-08-19 11:30:31)      · Is patient discharged on Warfarin / Coumadin?   No     Living With an Amputation  The most common causes of amputation from the hip down include:  · Diseases that:  ¨ Reduce the blood flow to an area of your body.  ¨ Decrease your body's ability to fight infection.  · Traumatic injuries that cause significant damage to body tissues.  · Birth defects.  · Cancerous lumps (malignant tumors).  Amputation above the hip is usually the result of trauma or birth defect. Disease is a less common cause.  Living with an amputation can be challenging, but you can still live a long, productive life.  WHAT ARE THE COMMON CHALLENGES OF LIVING WITH AN AMPUTATION?  The most common challenges are mobility and self-care. With some new habits, though, it is often possible to do all of the activities you used to do.  You may be able to use a device that substitutes for your limb (prosthesis). The prosthesis helps you adapt more quickly to these challenges. Your health care provider can help select a prosthesis to meet your needs. A person who helps you choose and fits you with a prosthesis (prosthetist) may also help.  A rehabilitation program can also help you gain mobility and self-reliance. Your rehabilitation team may include:  · Physicians.  · Physical and occupational therapists.  · Prosthetists.  · Nurses.  · Social workers.  · Psychologists.  · Dietitians.  Your rehabilitation team will help you with all aspects of recovery and returning to work, home, sports, and your community. You will learn to:  · Get around safely.  · Adjust your home.  · Exercise.  · Use a prosthetic.  · Work through emotional challenges.  · Connect with other people who have gone through the same experience.  Additional challenges may include:  · Grieving period.  · Body image issues.  · Lifestyle issues, such as  sex.  · Maintaining a healthy weight.  These issues are normal. Discuss these with your rehabilitation team.  WHEN CAN I RETURN TO MY REGULAR ACTIVITIES?   Returning to your normal activities is part of healing. Changes can often be made to equipment that allow you to return to a sport or hobby. Some companies design special equipment for this. Discuss all of your leisure interests with your health care provider and prosthetist.  WHEN CAN I RETURN TO WORK?  When you are ready to return to work, your therapists can perform job site evaluations and make recommendations to help you perform your job. You may not be able to return to your same job. Your local Office of Vocational Rehabilitation can assist you in job retraining.  FOR MORE INFORMATION:  Visit these online resources. You can find tips on everything from getting dressed and using bathrooms to driving and travel considerations. These resources can also connect you to a network of emotional support, activities, and innovations. You may also search the Internet to find a local support group.  · Amputee Coalition: http://www.amputee-coalition.org/ensuring-fall-safety/http://www.amputee-coalition.org/ensuring-fall-safety/  · Amputee Support Groups: http://amputee.supportgroups.com/http://amputee.supportgroups.com  · Daily Strength: http://www.dailystrength.org/c/Amputees/support-grouphttp://www.dailystrength.org/c/Amputees/support-group  · National Amputee Foundation: http://www.nationalamputation.org/http://www.nationalamputation.org  · National Center on Health, Physical Activity and Disability: http://www.nchpad.org/http://www.nchpad.org  · Disabled Sports USA: http://www.disabledsportsusa.orghttp://www.disabledsportsusa.org  · American Academy of Orthotists and Prosthetists: http://www.oandp.org/http://www.oandp.org     This information is not intended to replace advice given to you by your health care provider. Make sure you discuss any questions you have  with your health care provider.     Document Released: 09/09/2003 Document Revised: 01/08/2016 Document Reviewed: 05/05/2015  [employer] Interactive Patient Education ©2016 Elsevier Inc.      Knee Immobilizer  A knee immobilizer, also known as a knee brace, supports your knee and keeps you from bending it. The knee brace may be a splint or a cast. Wear your knee brace and remove the knee brace as told by your doctor.  Follow these instructions at home:  · Use powder (such as baby or talcum powder) to help with sweat and rubbing.  · Adjust the brace as often as needed while wearing it. It should be firm but not tight. Signs that the brace is too tight include:  ¨ Puffiness (swelling).  ¨ Numbness.  ¨ Color change in your foot or ankle.  ¨ Increased pain.  · While resting, keep your leg above the level of your heart. Use pillows for support.  · Remove the knee brace to bathe and sleep.  Get help right away if:  · There is more pain or puffiness in the knee, foot, or ankle.  · You have problems because of the brace or the brace breaks.  This information is not intended to replace advice given to you by your health care provider. Make sure you discuss any questions you have with your health care provider.  Document Released: 09/26/2009 Document Revised: 05/25/2017 Document Reviewed: 08/11/2014  [employer] Interactive Patient Education © 2017 Elsevier Inc.        Depression / Suicide Risk    As you are discharged from this [facility] facility, it is important to learn how to keep safe from harming yourself.    Recognize the warning signs:  · Abrupt changes in personality, positive or negative- including increase in energy   · Giving away possessions  · Change in eating patterns- significant weight changes-  positive or negative  · Change in sleeping patterns- unable to sleep or sleeping all the time   · Unwillingness or inability to communicate  · Depression  · Unusual sadness, discouragement and loneliness  · Talk of  wanting to die  · Neglect of personal appearance   · Rebelliousness- reckless behavior  · Withdrawal from people/activities they love  · Confusion- inability to concentrate     If you or a loved one observes any of these behaviors or has concerns about self-harm, here's what you can do:  · Talk about it- your feelings and reasons for harming yourself  · Remove any means that you might use to hurt yourself (examples: pills, rope, extension cords, firearm)  · Get professional help from the community (Mental Health, Substance Abuse, psychological counseling)  · Do not be alone:Call your Safe Contact- someone whom you trust who will be there for you.  · Call your local CRISIS HOTLINE 035-7753 or 296-949-6012  · Call your local Children's Mobile Crisis Response Team [place] (460) 356-9472 or www.[employer]  · Call the toll free National Suicide Prevention Hotlines   · National Suicide Prevention Lifeline 974-213-UTVX (0630)  · National Hope Line Network 800-SUICIDE (651-5877)

## 2019-07-10 NOTE — FLOWSHEET NOTE
"   07/10/19 1107   Type of Assessment   Assessment Yes   Patient History   Pulmonary Diagnosis COPD   Surgical Procedures L BKA   Home O2 Yes   Oxygen liter flow 3   Oxygen at night only Yes   Home Treatments/Frequency Yes   MDI 1/Frequency Alb PRN   COPD Risk Screening   Do you have a history of COPD? Yes  (pt states he is not sure who diagnosed him)   Smoking History   Have you ever smoked Yes   Have you smoked in the last 12 months Yes   Have you quit smoking Yes   Confirm Quit Date 05/10/19  (told him 1 year before he is a \"non-smoker\")   Level Of Consciousness   Level of Consciousness Alert   Respiratory WDL   Respiratory (WDL) X   Chest Exam   Respiration 18   Pulse 88   Breath Sounds   RLL Breath Sounds Diminished   LLL Breath Sounds Diminished   Oximetry   #Pulse Oximetry (Single Determination) Yes   Oxygen   Home O2 Use Prior To Admission? Yes   Home O2 LPM Flow 3 LPM   Home O2 Delivery Method Nasal Cannula   Home O2 Frequency of Use At Sleep   Pulse Oximetry 96 %   O2 (LPM) 3   O2 Daily Delivery Respiratory  Silicone Nasal Cannula   Protocol Pathways   Protocol Pathways Yes   Bronchodilator Protocol   Med Order With RCP No   Is this an exacerbation of COPD/Asthma? No   Bronchodilator Indications History / Diagnosis   Breath Sounds Criteria 0    Benefit Criteria 1    Pulse Criteria 0    Respiratory Rate Criteria 1    S.O.B. Criteria 1    Criteria Total 3   Point Values 0-3 - PRN   Bronchodilator Goals/Outcome Patient at Stable Baseline   O2 Protocol   O2 Protocol Indications Room Air SpO2 Less Than 90%   PRN oxygen initiated for: SpO2 Greater Than 92% with Documented Desaturation During Activity of Dyspnea   O2 Protocol Goals/Outcome Return to home Oxygen therapy baseline;SpO2 greater than 90% with exertion     "

## 2019-07-11 ENCOUNTER — APPOINTMENT (OUTPATIENT)
Dept: RADIOLOGY | Facility: REHABILITATION | Age: 64
DRG: 559 | End: 2019-07-11
Attending: PHYSICAL MEDICINE & REHABILITATION
Payer: COMMERCIAL

## 2019-07-11 ENCOUNTER — APPOINTMENT (OUTPATIENT)
Dept: RADIOLOGY | Facility: MEDICAL CENTER | Age: 64
DRG: 559 | End: 2019-07-11
Attending: PHYSICAL MEDICINE & REHABILITATION
Payer: COMMERCIAL

## 2019-07-11 LAB
25(OH)D3 SERPL-MCNC: 12 NG/ML (ref 30–100)
ALBUMIN SERPL BCP-MCNC: 2.6 G/DL (ref 3.2–4.9)
ALBUMIN/GLOB SERPL: 0.5 G/DL
ALP SERPL-CCNC: 109 U/L (ref 30–99)
ALT SERPL-CCNC: 12 U/L (ref 2–50)
ANION GAP SERPL CALC-SCNC: 7 MMOL/L (ref 0–11.9)
AST SERPL-CCNC: 18 U/L (ref 12–45)
BASOPHILS # BLD AUTO: 0.5 % (ref 0–1.8)
BASOPHILS # BLD: 0.04 K/UL (ref 0–0.12)
BILIRUB SERPL-MCNC: 0.4 MG/DL (ref 0.1–1.5)
BUN SERPL-MCNC: 30 MG/DL (ref 8–22)
CALCIUM SERPL-MCNC: 9.5 MG/DL (ref 8.5–10.5)
CHLORIDE SERPL-SCNC: 101 MMOL/L (ref 96–112)
CO2 SERPL-SCNC: 29 MMOL/L (ref 20–33)
CREAT SERPL-MCNC: 1.23 MG/DL (ref 0.5–1.4)
EOSINOPHIL # BLD AUTO: 0.35 K/UL (ref 0–0.51)
EOSINOPHIL NFR BLD: 4.6 % (ref 0–6.9)
ERYTHROCYTE [DISTWIDTH] IN BLOOD BY AUTOMATED COUNT: 49 FL (ref 35.9–50)
FOLATE SERPL-MCNC: 6.2 NG/ML
GLOBULIN SER CALC-MCNC: 5 G/DL (ref 1.9–3.5)
GLUCOSE BLD-MCNC: 114 MG/DL (ref 65–99)
GLUCOSE BLD-MCNC: 123 MG/DL (ref 65–99)
GLUCOSE BLD-MCNC: 134 MG/DL (ref 65–99)
GLUCOSE BLD-MCNC: 143 MG/DL (ref 65–99)
GLUCOSE BLD-MCNC: 156 MG/DL (ref 65–99)
GLUCOSE SERPL-MCNC: 125 MG/DL (ref 65–99)
HCT VFR BLD AUTO: 33 % (ref 42–52)
HGB BLD-MCNC: 10.6 G/DL (ref 14–18)
IMM GRANULOCYTES # BLD AUTO: 0.02 K/UL (ref 0–0.11)
IMM GRANULOCYTES NFR BLD AUTO: 0.3 % (ref 0–0.9)
IRON SATN MFR SERPL: 18 % (ref 15–55)
IRON SERPL-MCNC: 38 UG/DL (ref 50–180)
LYMPHOCYTES # BLD AUTO: 2.19 K/UL (ref 1–4.8)
LYMPHOCYTES NFR BLD: 28.7 % (ref 22–41)
MAGNESIUM SERPL-MCNC: 1.7 MG/DL (ref 1.5–2.5)
MCH RBC QN AUTO: 28.3 PG (ref 27–33)
MCHC RBC AUTO-ENTMCNC: 32.1 G/DL (ref 33.7–35.3)
MCV RBC AUTO: 88 FL (ref 81.4–97.8)
MONOCYTES # BLD AUTO: 0.87 K/UL (ref 0–0.85)
MONOCYTES NFR BLD AUTO: 11.4 % (ref 0–13.4)
NEUTROPHILS # BLD AUTO: 4.15 K/UL (ref 1.82–7.42)
NEUTROPHILS NFR BLD: 54.5 % (ref 44–72)
NRBC # BLD AUTO: 0 K/UL
NRBC BLD-RTO: 0 /100 WBC
PLATELET # BLD AUTO: 278 K/UL (ref 164–446)
PMV BLD AUTO: 9.9 FL (ref 9–12.9)
POTASSIUM SERPL-SCNC: 4.3 MMOL/L (ref 3.6–5.5)
PROT SERPL-MCNC: 7.6 G/DL (ref 6–8.2)
RBC # BLD AUTO: 3.75 M/UL (ref 4.7–6.1)
SODIUM SERPL-SCNC: 137 MMOL/L (ref 135–145)
TIBC SERPL-MCNC: 209 UG/DL (ref 250–450)
VIT B12 SERPL-MCNC: 304 PG/ML (ref 211–911)
WBC # BLD AUTO: 7.6 K/UL (ref 4.8–10.8)

## 2019-07-11 PROCEDURE — 99233 SBSQ HOSP IP/OBS HIGH 50: CPT | Performed by: PHYSICAL MEDICINE & REHABILITATION

## 2019-07-11 PROCEDURE — 97530 THERAPEUTIC ACTIVITIES: CPT

## 2019-07-11 PROCEDURE — 700111 HCHG RX REV CODE 636 W/ 250 OVERRIDE (IP): Performed by: PHYSICAL MEDICINE & REHABILITATION

## 2019-07-11 PROCEDURE — 83735 ASSAY OF MAGNESIUM: CPT

## 2019-07-11 PROCEDURE — 97166 OT EVAL MOD COMPLEX 45 MIN: CPT

## 2019-07-11 PROCEDURE — 85025 COMPLETE CBC W/AUTO DIFF WBC: CPT

## 2019-07-11 PROCEDURE — 82962 GLUCOSE BLOOD TEST: CPT

## 2019-07-11 PROCEDURE — A9270 NON-COVERED ITEM OR SERVICE: HCPCS | Performed by: PHYSICAL MEDICINE & REHABILITATION

## 2019-07-11 PROCEDURE — 76882 US LMTD JT/FCL EVL NVASC XTR: CPT | Mod: LT

## 2019-07-11 PROCEDURE — 94760 N-INVAS EAR/PLS OXIMETRY 1: CPT

## 2019-07-11 PROCEDURE — 36415 COLL VENOUS BLD VENIPUNCTURE: CPT

## 2019-07-11 PROCEDURE — 97161 PT EVAL LOW COMPLEX 20 MIN: CPT

## 2019-07-11 PROCEDURE — 80053 COMPREHEN METABOLIC PANEL: CPT

## 2019-07-11 PROCEDURE — 83550 IRON BINDING TEST: CPT

## 2019-07-11 PROCEDURE — 97535 SELF CARE MNGMENT TRAINING: CPT

## 2019-07-11 PROCEDURE — 82306 VITAMIN D 25 HYDROXY: CPT

## 2019-07-11 PROCEDURE — 82607 VITAMIN B-12: CPT

## 2019-07-11 PROCEDURE — 700102 HCHG RX REV CODE 250 W/ 637 OVERRIDE(OP): Performed by: PHYSICAL MEDICINE & REHABILITATION

## 2019-07-11 PROCEDURE — 82746 ASSAY OF FOLIC ACID SERUM: CPT

## 2019-07-11 PROCEDURE — 99223 1ST HOSP IP/OBS HIGH 75: CPT | Mod: AI | Performed by: HOSPITALIST

## 2019-07-11 PROCEDURE — 770010 HCHG ROOM/CARE - REHAB SEMI PRIVAT*

## 2019-07-11 PROCEDURE — 83540 ASSAY OF IRON: CPT

## 2019-07-11 RX ORDER — FERROUS SULFATE 325(65) MG
325 TABLET ORAL
Status: DISCONTINUED | OUTPATIENT
Start: 2019-07-12 | End: 2019-07-31 | Stop reason: HOSPADM

## 2019-07-11 RX ORDER — ASCORBIC ACID 500 MG
500 TABLET ORAL
Status: DISCONTINUED | OUTPATIENT
Start: 2019-07-12 | End: 2019-07-31 | Stop reason: HOSPADM

## 2019-07-11 RX ADMIN — OXYCODONE HYDROCHLORIDE 5 MG: 5 TABLET ORAL at 02:49

## 2019-07-11 RX ADMIN — ENOXAPARIN SODIUM 40 MG: 100 INJECTION SUBCUTANEOUS at 08:01

## 2019-07-11 RX ADMIN — FLUTICASONE PROPIONATE 100 MCG: 50 SPRAY, METERED NASAL at 08:01

## 2019-07-11 RX ADMIN — OXYCODONE HYDROCHLORIDE 5 MG: 5 TABLET ORAL at 18:47

## 2019-07-11 RX ADMIN — INSULIN LISPRO 5 UNITS: 100 INJECTION, SOLUTION INTRAVENOUS; SUBCUTANEOUS at 07:29

## 2019-07-11 RX ADMIN — PREGABALIN 300 MG: 100 CAPSULE ORAL at 20:01

## 2019-07-11 RX ADMIN — SENNOSIDES AND DOCUSATE SODIUM 2 TABLET: 8.6; 5 TABLET ORAL at 20:01

## 2019-07-11 RX ADMIN — MICONAZOLE NITRATE: 20 CREAM TOPICAL at 20:06

## 2019-07-11 RX ADMIN — ASPIRIN 81 MG 81 MG: 81 TABLET ORAL at 08:01

## 2019-07-11 RX ADMIN — HYDROCORTISONE: 1 CREAM TOPICAL at 08:01

## 2019-07-11 RX ADMIN — PREGABALIN 300 MG: 100 CAPSULE ORAL at 08:01

## 2019-07-11 RX ADMIN — METOPROLOL TARTRATE 12.5 MG: 25 TABLET ORAL at 05:50

## 2019-07-11 RX ADMIN — VITAMIN D, TAB 1000IU (100/BT) 4000 UNITS: 25 TAB at 10:48

## 2019-07-11 RX ADMIN — INSULIN LISPRO 5 UNITS: 100 INJECTION, SOLUTION INTRAVENOUS; SUBCUTANEOUS at 11:27

## 2019-07-11 RX ADMIN — HYDROCORTISONE: 1 CREAM TOPICAL at 20:06

## 2019-07-11 RX ADMIN — MICONAZOLE NITRATE: 20 CREAM TOPICAL at 08:01

## 2019-07-11 RX ADMIN — OXYCODONE HYDROCHLORIDE 5 MG: 5 TABLET ORAL at 12:28

## 2019-07-11 RX ADMIN — METHADONE HYDROCHLORIDE 20 MG: 5 TABLET ORAL at 08:01

## 2019-07-11 RX ADMIN — METOPROLOL TARTRATE 12.5 MG: 25 TABLET ORAL at 17:12

## 2019-07-11 RX ADMIN — METHADONE HYDROCHLORIDE 20 MG: 5 TABLET ORAL at 20:00

## 2019-07-11 ASSESSMENT — BRIEF INTERVIEW FOR MENTAL STATUS (BIMS)
ASKED TO RECALL BED: NO, COULD NOT RECALL
ASKED TO RECALL BLUE: YES, NO CUE REQUIRED
WHAT MONTH IS IT: ACCURATE WITHIN 5 DAYS
WHAT YEAR IS IT: CORRECT
BIMS SUMMARY SCORE: 11
ASKED TO RECALL SOCK: NO, COULD NOT RECALL
WHAT DAY OF THE WEEK IS IT: CORRECT
INITIAL REPETITION OF BED BLUE SOCK - FIRST ATTEMPT: 3

## 2019-07-11 ASSESSMENT — ACTIVITIES OF DAILY LIVING (ADL): TOILETING: INDEPENDENT

## 2019-07-11 NOTE — CARE PLAN
Problem: Bowel/Gastric:  Goal: Normal bowel function is maintained or improved  Outcome: NOT MET  Patient educated on fluid intake, diet, bowel medications and mobilization to maximize the potential for regular bowel movements while taking opiates or opiate derivatives for pain. Patient engages in learning and verbalizes understanding.

## 2019-07-11 NOTE — FLOWSHEET NOTE
07/11/19 0822   Events/Summary/Plan   Events/Summary/Plan O2 spot check   Chest Exam   Respiration 16   Pulse 81   Oximetry   #Pulse Oximetry (Single Determination) Yes   Oxygen   Home O2 Use Prior To Admission? Yes   Home O2 LPM Flow 3 LPM   Home O2 Delivery Method Nasal Cannula   Home O2 Frequency of Use At Sleep   Pulse Oximetry 91 %   O2 (LPM) 3   O2 Daily Delivery Respiratory  Silicone Nasal Cannula

## 2019-07-11 NOTE — THERAPY
"Occupational Therapy   Initial Evaluation     Patient Name: Travon Pollack  Age:  63 y.o., Sex:  male  Medical Record #: [id number]  Today's Date: 7/11/2019     Subjective    \"He's the friend that is going to build me a ramp and rails at home.\" pt reported about friend who was present at start of session    Pt semi-fowlers in bed upon arrival with friend present, who left shortly after to get clothes for pt. Pt agreeable to OT session, pleasant and cooperative throughout.      Objective     07/11/19 1031   Prior Living Situation   Prior Services Home-Independent   Housing / Facility 1 Story House  (mobile home )   Steps Into Home 4   Steps In Home 0   Rail None   Elevator No   Bathroom Set up Walk In Shower;Bathtub / Shower Combination;Shower Glass Doors  (plans to get shower chair; has hand-held shower head)   Equipment Owned Single Point Cane   Lives with - Patient's Self Care Capacity Spouse   Comments Pt plans to get ramp/rails put in at front of home prior to d/c    Prior Level of ADL Function   Self Feeding Independent   Grooming / Hygiene Independent   Bathing Independent   Dressing Independent   Toileting Independent   Prior Level of IADL Function   Medication Management Other (Comments)  (wife handles this at home )   Laundry Independent   Kitchen Mobility Independent   Finances Other (Comments)  (wife handles this at home )   Home Management Other (Comments)   Shopping Independent   Prior Level Of Mobility Independent With Device in Community;Independent With Device in Home   Driving / Transportation Driving Independent   Occupation (Pre-Hospital Vocational) Retired Due To Age   Leisure Interests Movies / Theatre  (music and going to casino with wife)   Comments Wife does majority of cooking/cleaning at home but pt assists as needed; pt did all the shopping, wife handles the finances and medications.    IRF-ASHLEY:  Prior Functioning: Everyday Activities   Self Care Independent   Indoor Mobility (Ambulation) " "Independent   Stairs Independent   Functional Cognition Independent   Prior Device Use None of the given options  (SPC)   Pain   Intervention Distraction;Shower   Pain 0 - 10 Group   Location Generalized   Location Orientation Left   Pain Rating Scale (NPRS) 4   Description Aching   Comfort Goal Comfort at Rest;Comfort with Movement;Perform Activity   Therapist Pain Assessment Post Activity Pain Same as Prior to Activity   Cognition    Orientation Level Oriented x 4   Level of Consciousness Alert   Ability To Follow Commands 2 Step   Safety Awareness Impulsive   IRF-ASHLEY:  Cognitive Pattern Assessment   Cognitive Pattern Assessment Used BIMS   IRF-ASHLEY:  Brief Interview for Mental Status (BIMS)   Repetition of Three Words (First Attempt) 3   Temporal Orientation: Able to Report the Correct Year Correct   Temporal Orientation: Able to Report the Correct Month Accurate within 5 days   Temporal Orientation: Able to Report the Correct Day of the Week Correct   Able to Recall \"Sock\" No, could not recall   Able to Recall \"Blue\" Yes, no cue required   Able to Recall \"Bed\" No, could not recall   BIMS Summary Score 11   Vision Screen   Vision Not tested  (wears reading glasses)   Passive ROM Upper Body   Passive ROM Upper Body X   Comments RUE WFL; LUE not tested 2/2 pain during movement    Active ROM Upper Body   Active ROM Upper Body  X   Dominant Hand Right   Comments RUE WFL; L shoulder flexion ~25 degrees; elbow flexion WFL; wrist/hand WFL   Strength Upper Body   Upper Body Strength  X   Comments RUE grossly 4+/5; LUE not assessed 2/2 pain during movement at the shoulder   Sensation Upper Body   Upper Extremity Sensation  X   Comments L hand intermittent numbness/tingling 2/2 diabetic neuropathy    Upper Body Muscle Tone   Upper Body Muscle Tone  WDL   Balance Assessment   Sitting Balance (Static) Fair   Sitting Balance (Dynamic) Fair -   Standing Balance (Static) Poor   Standing Balance (Dynamic) Poor -   Weight Shift " Sitting Fair   Weight Shift Standing Poor   Bed Mobility    Supine to Sit Minimal Assist  (use of bed rail going to L side: HOB at 30 degrees )   Sit to Stand Moderate Assist   Scooting Contact Guard Assist   Coordination Upper Body   Coordination WDL   IRF-ASHLEY:  Eating   Assistance Needed Set-up / clean-up;Supervision   CARE Score 4   Discharge Goal:  Assistance Needed Independent;Adaptive equipment   Discharge Goal:  Score 6   IRF-ASHLEY:  Oral Hygiene   Assistance Needed Set-up / clean-up;Supervision;Verbal cue   CARE Score 4   Discharge Goal:  Assistance Needed Independent;Adaptive equipment   Discharge Goal:  Score 6   IRF-ASHLEY:  Shower/Bathe Self   Assistance Needed Physical assistance   Physical Assistance Level 25%-49%   CARE Score 3   Discharge Goal:  Assistance Needed Supervision   Discharge Goal Score 4   IRF-ASHLEY:  Upper Body Dressing   Assistance Needed Physical assistance   Physical Assistance Level 25%-49%   CARE Score 3   Discharge Goal:  Assistance Needed Supervision   Dischage Goal:  Score 4   IRF-ASHLEY:  Lower Body Dressing   Assistance Needed Physical assistance   Physical Assistance Level 75% or more   CARE Score 2   Discharge Goal:  Assistance Needed Supervision   Discharge Goal:  Score 4   IRF ASHLEY:  Putting On/Taking Off Footwear   Assistance Needed Physical assistance   Physical Assistance Level 75% or more   CARE Score 2   Discharge Goal:  Assistance Needed Supervision   Discharge Goal:  Score 4   IRF-ASHLEY:  Toileting Hygiene   Assistance Needed Physical assistance   Physical Assistance Level 75% or more   CARE Score 2   Discharge Goal:  Assistance Needed Supervision;Set-up / clean-up   Discharge Goal:  Score 4   IRF-ASHLEY:  Toilet Transfer   Assistance Needed Physical assistance   Physical Assistance Level 50%-74%   CARE Score 2   Discharge Goal:  Assistance Needed Supervision   Discahrge Goal:  Score 4   IRF-ASHLEY:  Hearing, Speech, and Vision   Expression of Ideas and Wants 4   Understanding Verbal  and Non-Verbal Content 4   Problem List   Problem List Decreased Active Daily Living Skills;Decreased Homemaking Skills;Decreased Upper Extremity Strength Left;Decreased Upper Extremity AROM Left;Decreased Upper Extremity PROM Left;Decreased Functional Mobility;Decreased Activity Tolerance;Safety Awareness Deficits / Cognition;Impaired Sensation Left Upper Extremity;Impaired Postural Control / Balance   Precautions   Precautions Non Weight Bearing Left Lower Extremity;Fall Risk;Other (See Comments);Immobilizer Left Lower Extremity   Comments R heel ulcer, Chronic L shoulder weakness   Current Discharge Plan   Current Discharge Plan Return to Prior Living Situation   Benefit    Therapy Benefit Patient Would Benefit from Inpatient Rehab Occupational Therapy to Maximize [place] with ADLs, IADLs and Functional Mobility.   Interdisciplinary Plan of Care Collaboration   Patient Position at End of Therapy Seated;Other (Comments)  (in dining room for lunch )   OT Total Time Spent   OT Individual Total Time Spent (Mins) 60   OT Charge Group   OT Self Care / ADL 1   OT Evaluation OT Evaluation Mod       FIM Eating Score:  5 - Standby Prompting/Supervision or Set-up  Eating Description:  Increased time, Set-up of equipment or meal/tube feeding    FIM Grooming Score:  5 - Standby Prompting/Supervision or Set-up  Grooming Description:  Increased time, Set-up of equipment, Initial preparation for task (brushed hair seated in w/c at sink side)    FIM Bathing Score:  4 - Minimal Assistance  Bathing Description:  Grab bar, Tub bench, Hand held shower, Increased time, Supervision for safety, Verbal cueing, Set-up of equipment, Initial preparation for task, Requires minimal contact (pt required CGA while washing bottom in seated position and assist to wash R foot )    FIM Upper Body Dressin - Minimal Assistance  Upper Body Dressing Description:  Increased time, Verbal cueing, Set-up of equipment, Initial preparation for  task, Requires minimal contact (assist needed to pull shirt down in back while seated in w/c )    FIM Lower Body Dressing Score:  2 - Max Assistance  Lower Body Dressing Description:  Increased time, Supervision for safety, Verbal cueing, Set-up of equipment, Initial preparation for task, Requires minimal contact (pt required assist to doff pants/underwear seated on tub bench- leaning side to side; assist to thread brief/shorts seated in w/c and mod a for STS and max a to don pants/brief over hips in standing. Assist to doff/don sock on RLE)    FIM Toileting Body Dressin - Max Assistance  Toileting Description:  Grab bar, Increased time, Supervision for safety, Verbal cueing, Set-up of equipment, Initial preparation for task      FIM Toilet Transfer Score:  3 - Moderate Assistance  Toilet Transfer Description:  Grab bar, Increased time, Supervision for safety, Verbal cueing, Set-up of equipment, Initial preparation for task (squat pivot on RLE from w/c <> toilet with use of grab bar)    FIM Tub/Shower Transfers Score:  3 - Moderate Assistance  Tub/Shower Transfers Description:  Grab bar, Increased time, Supervision for safety, Verbal cueing, Set-up of equipment, Initial preparation for task (squat pivot xfer from w/c <>tub bench with use of grab bar)    FIM Comprehension Score:  6 - Modified Independent  Comprehension Description:  Glasses (for reading )    FIM Expression Score:  5 - Stand-by Prompting/Supervision or Set-up  Expression Description:       FIM Social Interaction Score:  6 - Modified Independent  Social Interaction Description:  Increased time    FIM Problem Solving Score:  5 - Standby Prompting/Supervision or Set-up  Problem Solving Description:  Verbal cueing, Increased time    Assessment  Patient is 63 y.o. male with a diagnosis of L BKA with residual limb pain. Pt with a past medical history of insulin dependent diabetes, chronic respiratory failure on 3 L home O2, admitted to [facility]  center on 6/15 as a transfer from [place], with elevated troponin to peak 1.11, sepsis, and left diabetic foot ulcer. Also complaining of left shoulder pain with history of fall.  Left humerus xray from outside hospital with inferior dislocation of proximal humerus. CT of shoulder with prior resection of left humeral head, GH joint with chronic loculated fluid collection     Pt's PLOF was independent with all ADL/IADL's and functional mobility using SPC- was independent with driving. Pt lives with wife in single level home, 4 OJSSE, plans to build a ramp with rails prior to d/c. Pt has walk-in shower with shower door and hand-held shower head, plans to get shower chair prior to d/c. Pt currently presents with phantom limb pain, diabetic ulcer on R heel, limited mobility of LUE 2/2 previous injuries, decreased sitting/standing balance, increased need for assist with ADL/IADL and functional mobility tasks. Pt will benefit from skilled OT services to address these concerns in order to return home safely with wife.     Plan  Recommend Occupational Therapy  minutes per day 5-7 days per week for 2 weeks for the following treatments:  OT Group Therapy, OT Self Care/ADL, OT Cognitive Skill Dev, OT Community Reintegration, OT Manual Ther Technique, OT Neuro Re-Ed/Balance, OT Sensory Int Techniques, OT Therapeutic Activity, OT Evaluation and OT Therapeutic Exercise.    Goals:  Long term and short term goals have been discussed with patient and they are in agreement.         Occupational Therapy Goals           Problem: Dressing     Dates: Start: 07/11/19       Goal: STG-Within one week, patient will dress UB     Dates: Start: 07/11/19       Description: 1) Individualized Goal:  With set-up a using AE/AD as needed   2) Interventions:  OT Group Therapy, OT Self Care/ADL, OT Cognitive Skill Dev, OT Community Reintegration, OT Manual Ther Technique, OT Neuro Re-Ed/Balance, OT Sensory Int Techniques, OT Therapeutic  Activity, OT Evaluation and OT Therapeutic Exercise           Goal: STG-Within one week, patient will dress LB     Dates: Start: 07/11/19       Description: 1) Individualized Goal:  With min a using AE/AD as needed   2) Interventions:  OT Group Therapy, OT Self Care/ADL, OT Cognitive Skill Dev, OT Community Reintegration, OT Manual Ther Technique, OT Neuro Re-Ed/Balance, OT Sensory Int Techniques, OT Therapeutic Activity, OT Evaluation and OT Therapeutic Exercise               Problem: Functional Transfers     Dates: Start: 07/11/19       Goal: STG-Within one week, patient will transfer to toilet     Dates: Start: 07/11/19       Description: 1) Individualized Goal:  With min a using AE/AD as needed   2) Interventions:  OT Group Therapy, OT Self Care/ADL, OT Cognitive Skill Dev, OT Community Reintegration, OT Manual Ther Technique, OT Neuro Re-Ed/Balance, OT Sensory Int Techniques, OT Therapeutic Activity, OT Evaluation and OT Therapeutic Exercise             Problem: OT Long Term Goals     Dates: Start: 07/11/19       Goal: LTG-By discharge, patient will complete basic self care tasks     Dates: Start: 07/11/19       Description: 1) Individualized Goal:  With supervision using AE/AD as needed   2) Interventions:  OT Group Therapy, OT Self Care/ADL, OT Cognitive Skill Dev, OT Community Reintegration, OT Manual Ther Technique, OT Neuro Re-Ed/Balance, OT Sensory Int Techniques, OT Therapeutic Activity, OT Evaluation and OT Therapeutic Exercise           Goal: LTG-By discharge, patient will perform bathroom transfers     Dates: Start: 07/11/19       Description: 1) Individualized Goal:  With supervision using AE/AD as needed   2) Interventions:  OT Group Therapy, OT Self Care/ADL, OT Cognitive Skill Dev, OT Community Reintegration, OT Manual Ther Technique, OT Neuro Re-Ed/Balance, OT Sensory Int Techniques, OT Therapeutic Activity, OT Evaluation and OT Therapeutic Exercise           Goal: LTG-By discharge, patient will  complete basic home management     Dates: Start: 07/11/19       Description: 1) Individualized Goal:  With supervision using AE/AD as needed   2) Interventions:  OT Group Therapy, OT Self Care/ADL, OT Cognitive Skill Dev, OT Community Reintegration, OT Manual Ther Technique, OT Neuro Re-Ed/Balance, OT Sensory Int Techniques, OT Therapeutic Activity, OT Evaluation and OT Therapeutic Exercise             Problem: Toileting     Dates: Start: 07/11/19       Goal: STG-Within one week, patient will complete toileting tasks     Dates: Start: 07/11/19       Description: 1) Individualized Goal:  With min a using AE/AD as needed   2) Interventions:  OT Group Therapy, OT Self Care/ADL, OT Cognitive Skill Dev, OT Community Reintegration, OT Manual Ther Technique, OT Neuro Re-Ed/Balance, OT Sensory Int Techniques, OT Therapeutic Activity, OT Evaluation and OT Therapeutic Exercise

## 2019-07-11 NOTE — DISCHARGE PLANNING
Call placed to Dr. Schneider's office to schedule appointment for patient f/u for left residual limb pain and fluid collection. Message left for triage MA requesting an appointment.

## 2019-07-11 NOTE — PROGRESS NOTES
"Rehab Progress Note     Date of Service: 7/11/2019  Chief Complaint: follow up left BKA    Interval Events (Subjective)    Patient is seen and examined.  Reports continued severe pain which is throbbing in nature in the left residual limb.  He is very concerned about this and the fluid collection.  Ultrasound results are pending.  Is moving his bowels and urinating without any issue.    Objective:  VITAL SIGNS: /66   Pulse 81   Temp 36.7 °C (98 °F) (Oral)   Resp 16   Ht 1.803 m (5' 11\")   Wt 88.9 kg (196 lb)   SpO2 91%   BMI 27.34 kg/m²   Gen: alert, no apparent distress  CV: regular rate and rhythm, no murmurs  Resp: clear to ascultation bilaterally, normal respiratory effort, on O2  GI: soft, non-tender abdomen, bowel sounds present  Msk: left transtibial amputation site with posterior tender fluctuant fluid collection    Recent Results (from the past 72 hour(s))   ACCU-CHEK GLUCOSE    Collection Time: 07/08/19  4:04 PM   Result Value Ref Range    Glucose - Accu-Ck 223 (H) 65 - 99 mg/dL   ACCU-CHEK GLUCOSE    Collection Time: 07/08/19  8:46 PM   Result Value Ref Range    Glucose - Accu-Ck 143 (H) 65 - 99 mg/dL   ACCU-CHEK GLUCOSE    Collection Time: 07/09/19  5:56 AM   Result Value Ref Range    Glucose - Accu-Ck 155 (H) 65 - 99 mg/dL   ACCU-CHEK GLUCOSE    Collection Time: 07/09/19 10:59 AM   Result Value Ref Range    Glucose - Accu-Ck 135 (H) 65 - 99 mg/dL   ACCU-CHEK GLUCOSE    Collection Time: 07/09/19  4:25 PM   Result Value Ref Range    Glucose - Accu-Ck 137 (H) 65 - 99 mg/dL   ACCU-CHEK GLUCOSE    Collection Time: 07/09/19  8:33 PM   Result Value Ref Range    Glucose - Accu-Ck 159 (H) 65 - 99 mg/dL   ACCU-CHEK GLUCOSE    Collection Time: 07/10/19  5:11 AM   Result Value Ref Range    Glucose - Accu-Ck 130 (H) 65 - 99 mg/dL   ACCU-CHEK GLUCOSE    Collection Time: 07/10/19 11:14 AM   Result Value Ref Range    Glucose - Accu-Ck 152 (H) 65 - 99 mg/dL   ACCU-CHEK GLUCOSE    Collection Time: 07/10/19  " 5:33 PM   Result Value Ref Range    Glucose - Accu-Ck 129 (H) 65 - 99 mg/dL   ACCU-CHEK GLUCOSE    Collection Time: 07/10/19  9:26 PM   Result Value Ref Range    Glucose - Accu-Ck 134 (H) 65 - 99 mg/dL   CBC with Differential    Collection Time: 07/11/19  5:24 AM   Result Value Ref Range    WBC 7.6 4.8 - 10.8 K/uL    RBC 3.75 (L) 4.70 - 6.10 M/uL    Hemoglobin 10.6 (L) 14.0 - 18.0 g/dL    Hematocrit 33.0 (L) 42.0 - 52.0 %    MCV 88.0 81.4 - 97.8 fL    MCH 28.3 27.0 - 33.0 pg    MCHC 32.1 (L) 33.7 - 35.3 g/dL    RDW 49.0 35.9 - 50.0 fL    Platelet Count 278 164 - 446 K/uL    MPV 9.9 9.0 - 12.9 fL    Neutrophils-Polys 54.50 44.00 - 72.00 %    Lymphocytes 28.70 22.00 - 41.00 %    Monocytes 11.40 0.00 - 13.40 %    Eosinophils 4.60 0.00 - 6.90 %    Basophils 0.50 0.00 - 1.80 %    Immature Granulocytes 0.30 0.00 - 0.90 %    Nucleated RBC 0.00 /100 WBC    Neutrophils (Absolute) 4.15 1.82 - 7.42 K/uL    Lymphs (Absolute) 2.19 1.00 - 4.80 K/uL    Monos (Absolute) 0.87 (H) 0.00 - 0.85 K/uL    Eos (Absolute) 0.35 0.00 - 0.51 K/uL    Baso (Absolute) 0.04 0.00 - 0.12 K/uL    Immature Granulocytes (abs) 0.02 0.00 - 0.11 K/uL    NRBC (Absolute) 0.00 K/uL   Comp Metabolic Panel (CMP)    Collection Time: 07/11/19  5:24 AM   Result Value Ref Range    Sodium 137 135 - 145 mmol/L    Potassium 4.3 3.6 - 5.5 mmol/L    Chloride 101 96 - 112 mmol/L    Co2 29 20 - 33 mmol/L    Anion Gap 7.0 0.0 - 11.9    Glucose 125 (H) 65 - 99 mg/dL    Bun 30 (H) 8 - 22 mg/dL    Creatinine 1.23 0.50 - 1.40 mg/dL    Calcium 9.5 8.5 - 10.5 mg/dL    AST(SGOT) 18 12 - 45 U/L    ALT(SGPT) 12 2 - 50 U/L    Alkaline Phosphatase 109 (H) 30 - 99 U/L    Total Bilirubin 0.4 0.1 - 1.5 mg/dL    Albumin 2.6 (L) 3.2 - 4.9 g/dL    Total Protein 7.6 6.0 - 8.2 g/dL    Globulin 5.0 (H) 1.9 - 3.5 g/dL    A-G Ratio 0.5 g/dL   Magnesium    Collection Time: 07/11/19  5:24 AM   Result Value Ref Range    Magnesium 1.7 1.5 - 2.5 mg/dL   Vitamin D, 25-hydroxy (blood)    Collection  Time: 07/11/19  5:24 AM   Result Value Ref Range    25-Hydroxy   Vitamin D 25 12 (L) 30 - 100 ng/mL   Folate    Collection Time: 07/11/19  5:24 AM   Result Value Ref Range    Folate -Folic Acid 6.2 >4.0 ng/mL   Iron/Total Iron Bind    Collection Time: 07/11/19  5:24 AM   Result Value Ref Range    Iron 38 (L) 50 - 180 ug/dL    Total Iron Binding 209 (L) 250 - 450 ug/dL    % Saturation 18 15 - 55 %   Vitamin B12    Collection Time: 07/11/19  5:24 AM   Result Value Ref Range    Vitamin B12 -True Cobalamin 304 211 - 911 pg/mL   ESTIMATED GFR    Collection Time: 07/11/19  5:24 AM   Result Value Ref Range    GFR If African American >60 >60 mL/min/1.73 m 2    GFR If Non African American 59 (A) >60 mL/min/1.73 m 2   ACCU-CHEK GLUCOSE    Collection Time: 07/11/19  7:26 AM   Result Value Ref Range    Glucose - Accu-Ck 123 (H) 65 - 99 mg/dL       Current Facility-Administered Medications   Medication Frequency   • vitamin D (cholecalciferol) tablet 4,000 Units DAILY   • Respiratory Care per Protocol Continuous RT   • Pharmacy Consult Request ...Pain Management Review 1 Each PHARMACY TO DOSE   • acetaminophen (TYLENOL) tablet 650 mg Q4HRS PRN   • artificial tears 1.4 % ophthalmic solution 1 Drop PRN   • benzocaine-menthol (CEPACOL) lozenge 1 Lozenge Q2HRS PRN   • mag hydrox-al hydrox-simeth (MAALOX PLUS ES or MYLANTA DS) suspension 20 mL Q2HRS PRN   • ondansetron (ZOFRAN ODT) dispertab 4 mg 4X/DAY PRN    Or   • ondansetron (ZOFRAN) syringe/vial injection 4 mg 4X/DAY PRN   • traZODone (DESYREL) tablet 50 mg QHS PRN   • sodium chloride (OCEAN) 0.65 % nasal spray 2 Spray PRN   • hydrOXYzine HCl (ATARAX) tablet 50 mg Q6HRS PRN   • melatonin tablet 3 mg HS PRN   • polyethylene glycol/lytes (MIRALAX) PACKET 1 Packet QDAY PRN    And   • magnesium hydroxide (MILK OF MAGNESIA) suspension 30 mL QDAY PRN    And   • bisacodyl (DULCOLAX) suppository 10 mg QDAY PRN   • miconazole 2%-zinc oxide (DELFINO) topical cream BID   • insulin lispro  (HUMALOG) injection 5 Units TID AC    And   • insulin lispro (HUMALOG) injection 2-9 Units 4X/DAY ACHS    And   • glucose 4 g chewable tablet 16 g Q15 MIN PRN   • aspirin (ASA) chewable tab 81 mg DAILY   • enoxaparin (LOVENOX) inj 40 mg DAILY   • fluticasone (FLONASE) nasal spray 100 mcg DAILY   • methadone (DOLOPHINE) tablet 20 mg BID   • metoprolol (LOPRESSOR) tablet 12.5 mg TWICE DAILY   • oxyCODONE immediate-release (ROXICODONE) tablet 5 mg Q6HRS PRN   • pregabalin (LYRICA) capsule 300 mg BID   • senna-docusate (PERICOLACE or SENOKOT S) 8.6-50 MG per tablet 2 Tab BID   • tramadol (ULTRAM) 50 MG tablet 100 mg Q6HRS PRN   • dextrose 50% (D50W) injection 50 mL Q15 MIN PRN   • diphenhydrAMINE-ZnAcetate (BENADRYL ITCH) 1-0.1 % cream TID PRN   • albuterol inhaler 2 Puff Q4HRS PRN   • hydrocortisone 1 % cream BID       Orders Placed This Encounter   Procedures   • Diet Order Diabetic, Cardiac     Standing Status:   Standing     Number of Occurrences:   1     Order Specific Question:   Diet:     Answer:   Diabetic [3]     Order Specific Question:   Diet:     Answer:   Cardiac [6]     Order Specific Question:   Consistency/Fluid modifications:     Answer:   Thin Liquids [3]       Assessment:  Active Hospital Problems    Diagnosis   • *S/P BKA (below knee amputation) unilateral, left (HCC)   • Acute on chronic respiratory failure with hypoxia (HCC)   • SALOME (obstructive sleep apnea)   • Cardiomyopathy (HCC)   • DM (diabetes mellitus), type 2, uncontrolled (CMS-HCC)   • Essential hypertension   • Left shoulder pain   • Chronic pain syndrome   • Methadone dependence (HCC)     This patient is a 63 y.o. male admitted for acute inpatient rehabilitation with S/P BKA (below knee amputation) unilateral, left (HCC).       Medical Decision Making and Plan:    Left TRINITY Schneider 6/24  Continue full rehab program  PT/OT, 1 hr each discipline  Outpatient follow up with surgery, have asked CM to call surgeon and schedule  asap    Residual limb fluid collection  Post-op seroma versus hematoma  US with 3 distinct fluid collections  Most likely seroma due to stable hemoglobin  Schedule with orthopedics asap    Normocytic anemia  Likely post-operative  Monitor    Diabetes  Continue insulin  Appreciate hospitalist assistance    Chronic pain  Narcotic dependence/tolerance  Continue methadone, prescribed by his PCP  Dose decreased from 35 mg BID at home, to 20 mg BID due to encephalopathy (stroke code was called)  Continue PRN opiates     Left shoulder pain  With history of multiple surgeries, including left humeral head resection  Modalities with therapy  Continue pain management     Rash  Groin rash - miconazole  Trunk rash - hydrocortison cream     Cardiomyopathy, EF 40%  Continue metoprolol  Appreciate hospitalist assistance     History of one kidney  Avoid nephrotoxins     Respiratory insufficiency  Sleep apnea  On 3 L at home at night  Respiratory therapy to see patient     Hypertension  Continue metoprolol  Appreciate hospitalist assistance    Vit D deficiency, 12  Start supplementation    DVT prophylaxis  Lovenox    Total time:  35 minutes.  I spent greater than 50% of the time for patient care, counseling, and coordination on this date, including patient face-to face time, unit/floor time with review of records/pertinent lab data and studies, as well as discussing diagnostic evaluation/work up, planned therapeutic interventions, and future disposition of care, as per the interval events/subjective and the assessment and plan as noted above.    Mimi Doyle M.D.   Physical Medicine and Rehabilitation

## 2019-07-11 NOTE — THERAPY
"Occupational Therapy  Daily Treatment     Patient Name: Travon Pollack  Age:  63 y.o., Sex:  male  Medical Record #: [id number]  Today's Date: 7/11/2019     Precautions  Precautions: Non Weight Bearing Left Lower Extremity, Fall Risk, Other (See Comments), Immobilizer Left Lower Extremity  Comments: R heel ulcer, Chronic L shoulder weakness         Subjective    \"When do I get my prosthetic?\"      Objective    Discussion about rehab progression, OT POC, goals that must be met prior to fitting for a prosthetic which include the importance of stretching at hip/knee, wrapping and desensitize of residual limb. Initiated HEP: light self-massage of residual limb, checking the skin of the R foot and residual limb for signs of infection, and ensure the wraps/immobilizer are secured and knee is extended.      07/11/19 1331   Interdisciplinary Plan of Care Collaboration   Patient Position at End of Therapy Call Light within Reach;Tray Table within Reach;In Bed   OT Total Time Spent   OT Individual Total Time Spent (Mins) 30   OT Charge Group   OT Self Care / ADL 2       Assessment    Pt is highly motivated, described being overwhelmed, but is \"ready to do what he has to do\". Pt verbalized understanding of education and HEP - pt able to repeat back his 'homework' with no assist    Plan    Cont overall strength/endurance and standing balance to improve ADL's and functional mobility    "

## 2019-07-11 NOTE — CARE PLAN
Problem: Skin Integrity  Goal: Risk for impaired skin integrity will decrease  Patient educated to change positions to minimize the potential of skin break down and possible further complications of open wounds due to pressure, when sitting or laying for periods over half of an hour. Patient engaged in education and verbalizes understanding.

## 2019-07-11 NOTE — CARE PLAN
Problem: Skin Integrity  Goal: Risk for impaired skin integrity will decrease  Outcome: NOT MET  Patient educated to change positions to minimize the potential of skin break down and possible further complications of open wounds due to pressure, when sitting or laying for periods over half of an hour. Patient engaged in education and verbalizes understanding. Patient c/o itching to right trunk area - prn hydrocortisone applied patient advised to not scratch area.

## 2019-07-11 NOTE — THERAPY
Physical Therapy   Daily Treatment     Patient Name: Travon Pollack  Age:  63 y.o., Sex:  male  Medical Record #: [id number]  Today's Date: 7/11/2019     Precautions  Precautions: Non Weight Bearing Left Lower Extremity, Immobilizer Left Lower Extremity, Fall Risk, Other (See Comments)  Comments: R heel ulcer, Chronic L shoulder weakness    Subjective    Pt received in bed, his spouse is present and supportive. Pt and his spouse have questions about using a knee scooter for in home mobility.      Objective       07/11/19 1431   Precautions   Precautions Non Weight Bearing Left Lower Extremity;Immobilizer Left Lower Extremity;Fall Risk;Other (See Comments)   Comments R heel ulcer, Chronic L shoulder weakness   Vitals   Pulse 93   Patient BP Position Sitting   Blood Pressure  122/66   Pulse Oximetry 91 %   O2 (LPM) 2   O2 Delivery Nasal Cannula   Cognition    Level of Consciousness Alert   Bed Mobility    Supine to Sit Contact Guard Assist   Sit to Supine Stand by Assist   Sit to Stand Contact Guard Assist  (in // bars)   Neuro-Muscular Treatments   Neuro-Muscular Treatments Sequencing   Interdisciplinary Plan of Care Collaboration   IDT Collaboration with  Family / Caregiver   Patient Position at End of Therapy In Bed;Call Light within Reach;Tray Table within Reach;Phone within Reach   Collaboration Comments pt's spouse Jayshree present    PT Total Time Spent   PT Individual Total Time Spent (Mins) 30   PT Charge Group   PT Therapeutic Activities 2       FIM Bed/Chair/Wheelchair Transfers Score: 3 - Moderate Assistance  Bed/Chair/Wheelchair Transfers Description:   (sit->supine SBA , supine->sit at EOB (to pt's L) CGA, Squat-pivot with reach to pt's R Mod A, VCs for sequencing)    FIM Walking Score:  1 - Total Assistance  Walking Description:   (attempted 1 step on R LE in // bars iwth marce folow and B UE support however d/t pt's L shoulder instability/pain unable to offload to take 1 step)    FIM Wheelchair Score:  5 -  Standby Prompting/Supervision or Set-up  Wheelchair Description:   (B UE propulsion technique on inside surfaces x150')    PT answered questions re: DME needs, that knee scooter will likely not be the best option for in home mobility, need for ramp to enter home.    This PT contacted [employer] re: obtaining L IPOP, no answer received at this time.    Assessment    Pt unable to ambulate on R LE with B UE support in // bars d/t L shoulder weakness/pain- unable to offload through UEs to step with R LE.    Plan    Educate pt in positioning for BKA including prone/desensitization/B LE strengthening- provide handouts, progress functional transfers (initiated with squat-pivot with reach on eval), standing tolerance in // bars

## 2019-07-11 NOTE — DISCHARGE PLANNING
Received call back from [facility] with appointment time with JW Hess at [facility] - July 17th at 3PM, 2:30PM check in. Dr. Doyle updated.

## 2019-07-11 NOTE — CONSULTS
HOSPITAL MEDICINE CONSULTATION    Requesting Physician:  Dr. Doyle    Reason for Consult:  CM, HTN, DM, S/P BKA    History of Present Illness:  The patient is a 63-year-old  male with past medical history significant for hypertension and diabetes.  He was admitted to [facility] on 6/15/19 as a transfer from an outlying hospital for a higher level of care.  He had a progressively worsening left foot infection with sepsis syndrome.  The patient was admitted to the intensive care unit with ventilator dependent respiratory failure.  He underwent irrigation and debridement of the left foot on 6/18/19 and returned to the operating room on 6/24/19 for definitive below knee amputation by Dr. Schneider.  He has now completed antibiotics per Infectious Diseases consultation.  Echocardiogram revealed ejection fraction of 40%.  Due to his ongoing functional debility, the patient was transferred to [facility] on 7/10/19.  Hospital Medicine consultation is requested to assist in the management of this patient's cardiomyopathy, hypertension, and diabetes.    Review of Systems:  Review of Systems   Constitutional: Negative for chills and fever.   HENT: Negative.    Eyes: Negative.    Respiratory: Negative for cough and shortness of breath.    Cardiovascular: Negative for chest pain and palpitations.   Gastrointestinal: Negative for abdominal pain, nausea and vomiting.   Genitourinary: Negative.    Musculoskeletal:        Wound pain   Skin: Negative for itching and rash.       Allergies:  No Known Allergies    Medications:    Current Facility-Administered Medications:   •  hydrocortisone 1 % cream, , Topical, BID PRN, Mimi Doyle M.D.  •  miconazole 2%-zinc oxide (DELFINO) topical cream, , Topical, BID, Mimi Doyle M.D.  •  metoprolol (LOPRESSOR) tablet 12.5 mg, 12.5 mg, Oral, DAILY, Judie Harry M.D., 12.5 mg at 07/15/19 0746  •  methadone (DOLOPHINE) tablet 20 mg, 20 mg,  Oral, BID, Mimi Doyle M.D., 20 mg at 07/15/19 0748  •  vitamin D (cholecalciferol) tablet 4,000 Units, 4,000 Units, Oral, DAILY, Mimi Doyle M.D., 4,000 Units at 07/15/19 0747  •  ascorbic acid tablet 500 mg, 500 mg, Oral, QDAY with Breakfast, Judie Harry M.D., 500 mg at 07/15/19 0748  •  ferrous sulfate tablet 325 mg, 325 mg, Oral, QDAY with Breakfast, Judie Harry M.D., 325 mg at 07/15/19 0748  •  [DISCONTINUED] insulin lispro (HUMALOG) injection 5 Units, 5 Units, Subcutaneous, TID AC, 5 Units at 07/11/19 1127 **AND** insulin lispro (HUMALOG) injection 2-12 Units, 2-12 Units, Subcutaneous, 4X/DAY ACHS, Stopped at 07/15/19 1700 **AND** Accu-Chek ACHS, , , Q AC AND BEDTIME(S) **AND** NOTIFY MD and PharmD, , , Once **AND** glucose 4 g chewable tablet 16 g, 16 g, Oral, Q15 MIN PRN **AND** [DISCONTINUED] DEXTROSE 10% BOLUS 250 mL, 250 mL, Intravenous, Q15 MIN PRN, Mimi Doyle M.D.  •  Respiratory Care per Protocol, , Nebulization, Continuous RT, Mimi Doyle M.D.  •  Pharmacy Consult Request ...Pain Management Review 1 Each, 1 Each, Other, PHARMACY TO DOSE, Mimi Doyle M.D.  •  acetaminophen (TYLENOL) tablet 650 mg, 650 mg, Oral, Q4HRS PRN, Mimi Doyle M.D.  •  artificial tears 1.4 % ophthalmic solution 1 Drop, 1 Drop, Both Eyes, PRN, Mimi Doyle M.D.  •  benzocaine-menthol (CEPACOL) lozenge 1 Lozenge, 1 Lozenge, Mouth/Throat, Q2HRS PRN, Mimi Doyle M.D.  •  mag hydrox-al hydrox-simeth (MAALOX PLUS ES or MYLANTA DS) suspension 20 mL, 20 mL, Oral, Q2HRS PRN, Mimi Doyle M.D., 20 mL at 07/14/19 0151  •  ondansetron (ZOFRAN ODT) dispertab 4 mg, 4 mg, Oral, 4X/DAY PRN **OR** ondansetron (ZOFRAN) syringe/vial injection 4 mg, 4 mg, Intramuscular, 4X/DAY PRN, Mimi Doyle M.D.  •  traZODone (DESYREL) tablet 50 mg, 50 mg, Oral, QHS PRN, Mimi Doyle M.D.  •  sodium chloride (OCEAN) 0.65 % nasal spray 2 Spray, 2 Spray, Nasal, PRN, Mimi Doyle M.D.  •   hydrOXYzine HCl (ATARAX) tablet 50 mg, 50 mg, Oral, Q6HRS PRN, Mimi Doyle M.D.  •  melatonin tablet 3 mg, 3 mg, Oral, HS PRN, Mimi Doyle M.D.  •  polyethylene glycol/lytes (MIRALAX) PACKET 1 Packet, 1 Packet, Oral, QDAY PRN **AND** magnesium hydroxide (MILK OF MAGNESIA) suspension 30 mL, 30 mL, Oral, QDAY PRN **AND** bisacodyl (DULCOLAX) suppository 10 mg, 10 mg, Rectal, QDAY PRN, Mimi Doyle M.D.  •  fluticasone (FLONASE) nasal spray 100 mcg, 2 Spray, Nasal, DAILY, Mimi Doyle M.D., 100 mcg at 07/15/19 0749  •  oxyCODONE immediate-release (ROXICODONE) tablet 5 mg, 5 mg, Oral, Q6HRS PRN, Mimi Doyle M.D., 5 mg at 07/15/19 1543  •  pregabalin (LYRICA) capsule 300 mg, 300 mg, Oral, BID, Mimi Doyle M.D., 300 mg at 07/15/19 0746  •  senna-docusate (PERICOLACE or SENOKOT S) 8.6-50 MG per tablet 2 Tab, 2 Tab, Oral, BID, Mimi Doyle M.D., 2 Tab at 07/14/19 2047  •  tramadol (ULTRAM) 50 MG tablet 100 mg, 100 mg, Oral, Q6HRS PRN, Mimi Doyle M.D.  •  dextrose 50% (D50W) injection 50 mL, 50 mL, Intravenous, Q15 MIN PRN, Mimi Doyle M.D.  •  diphenhydrAMINE-ZnAcetate (BENADRYL ITCH) 1-0.1 % cream, , Topical, TID PRN, Mimi Doyle M.D.  •  albuterol inhaler 2 Puff, 2 Puff, Inhalation, Q4HRS PRN, Mimi J. Yanira, M.D.    Past Medical/Surgical History:  Past Medical History:   Diagnosis Date   • Dental disorder     upper and lower   • Diabetes 2011    insulin   • Heart burn    • Indigestion    • Infectious disease staph   • MRSA (methicillin resistant Staphylococcus aureus)    • Opiate withdrawal (HCC)    • Pain 2/1/13    6/10 back   • Renal disorder     pt born with only one kidney   • Respiratory insufficiency    • Snoring      Past Surgical History:   Procedure Laterality Date   • KNEE AMPUTATION BELOW Left 6/24/2019    Procedure: AMPUTATION, BELOW KNEE;  Surgeon: Ashok Schneider M.D.;  Location: SURGERY [facility];  Service: Orthopedics   •  IRRIGATION & DEBRIDEMENT ORTHO  6/18/2019    Procedure: IRRIGATION AND DEBRIDEMENT, WOUND - LEFT HEEL;  Surgeon: Simon Diallo M.D.;  Location: SURGERY [facility];  Service: Orthopedics   • LUMBAR FUSION POSTERIOR  3/4/2013    Performed by Nixon Zhao M.D. at SURGERY [facility]   • LUMBAR LAMINECTOMY DISKECTOMY  3/4/2013    Performed by Nixon Zhao M.D. at SURGERY [facility]   • LUMBAR FUSION POSTERIOR  2/11/2013    Performed by Nixon Zhao M.D. at SURGERY [facility]   • HERNIA REPAIR  2012    x7 last one in 2012   • OTHER  2007    back   • APPENDECTOMY     • CHOLECYSTECTOMY     • OTHER      neck   • OTHER ORTHOPEDIC SURGERY      left shoulder surgeries x 5       Social History:  Social History     Social History   • Marital status:      Spouse name: N/A   • Number of children: N/A   • Years of education: N/A     Occupational History   • Not on file.     Social History Main Topics   • Smoking status: Former Smoker     Packs/day: 0.40     Years: 15.00     Types: Cigarettes   • Smokeless tobacco: Former User     Quit date: 2/21/2013   • Alcohol use No   • Drug use: No   • Sexual activity: Not on file     Other Topics Concern   • Not on file     Social History Narrative   • No narrative on file       Family History  Family History   Problem Relation Age of Onset   • Cancer Mother         brain   • Heart Disease Father        Physical Examination:   Vitals:    07/14/19 1850 07/15/19 0652 07/15/19 1320 07/15/19 1327   BP: 120/56 126/69 111/69    Pulse: 66 74 83 72   Resp: 18 18 18 18   Temp: 36.2 °C (97.1 °F) 36.6 °C (97.9 °F) 36.7 °C (98.1 °F)    TempSrc: Temporal Temporal Temporal    SpO2: 92% 91% 92% 92%   Weight:       Height:           Physical Exam   Constitutional: He is oriented to person, place, and time. No distress.   HENT:   Head: Normocephalic and atraumatic.   Right Ear: External ear normal.   Left Ear: External ear normal.   Eyes: Conjunctivae and EOM are normal.  Right eye exhibits no discharge. Left eye exhibits no discharge.   Neck: Normal range of motion. Neck supple. No tracheal deviation present.   Cardiovascular: Normal rate and regular rhythm.    Pulmonary/Chest: No stridor. No respiratory distress. He has no wheezes.   Decreased BS   Abdominal: Soft. Bowel sounds are normal. He exhibits no distension. There is no tenderness.   Musculoskeletal: He exhibits tenderness.   Left BKA stump dependent bogginess, incision C/D/I w/o induration   Neurological: He is alert and oriented to person, place, and time.   Skin: Skin is warm and dry. He is not diaphoretic.   Vitals reviewed.      Laboratory Data:  Recent Labs      07/13/19   0521  07/15/19   0542   WBC  7.2  7.9   RBC  3.61*  3.25*   HEMOGLOBIN  9.8*  8.9*   HEMATOCRIT  32.7*  29.6*   MCV  90.6  91.1   MCH  27.1  27.4   MCHC  30.0*  30.1*   RDW  51.2*  51.5*   PLATELETCT  261  244   MPV  9.8  10.4     Recent Labs      07/13/19   0521  07/15/19   0542   SODIUM  138  134*   POTASSIUM  4.1  4.0   CHLORIDE  102  99   CO2  28  27   GLUCOSE  142*  144*   BUN  38*  34*   CREATININE  1.22  1.14   CALCIUM  8.6  8.5         Impressions/Recommendations:  Acute on chronic respiratory failure with hypoxia (HCC)  S/P VDRF    Essential hypertension  Observe blood pressure trends on Metoprolol    DM (diabetes mellitus), type 2, uncontrolled (CMS-[facility])  HbA1c 12.2  Adjust SSI  Discontinue scheduled premeal insulin    Cardiomyopathy ([facility])  Echo EF 40%  Monitor for volume overload    S/P BKA (below knee amputation) unilateral, left ([facility])  2/2 diabetic foot infection/sepsis  S/P left BKA on 6/24/19 by Dr. Schneider  Completed antibiotics per ID prior to Rehab admission  Wound care  Recommend U/S R/O fluid collection given bogginess of stump    Anemia  Vit B12 and Folate levels normal  Fe 38, start Fe and Vit C  Follow H/H    Vitamin D deficiency  Vit D level 12  On supplementation    DNR    Discussed with Dr. Doyle.  Thank you for the  opportunity to assist in this patient's care.  We will continue to follow along with you.

## 2019-07-12 LAB
GLUCOSE BLD-MCNC: 139 MG/DL (ref 65–99)
GLUCOSE BLD-MCNC: 152 MG/DL (ref 65–99)
GLUCOSE BLD-MCNC: 170 MG/DL (ref 65–99)

## 2019-07-12 PROCEDURE — A9270 NON-COVERED ITEM OR SERVICE: HCPCS | Performed by: HOSPITALIST

## 2019-07-12 PROCEDURE — A9270 NON-COVERED ITEM OR SERVICE: HCPCS | Performed by: PHYSICAL MEDICINE & REHABILITATION

## 2019-07-12 PROCEDURE — 97530 THERAPEUTIC ACTIVITIES: CPT

## 2019-07-12 PROCEDURE — 99232 SBSQ HOSP IP/OBS MODERATE 35: CPT | Performed by: HOSPITALIST

## 2019-07-12 PROCEDURE — 97535 SELF CARE MNGMENT TRAINING: CPT

## 2019-07-12 PROCEDURE — 94760 N-INVAS EAR/PLS OXIMETRY 1: CPT

## 2019-07-12 PROCEDURE — 97110 THERAPEUTIC EXERCISES: CPT

## 2019-07-12 PROCEDURE — 700111 HCHG RX REV CODE 636 W/ 250 OVERRIDE (IP): Performed by: PHYSICAL MEDICINE & REHABILITATION

## 2019-07-12 PROCEDURE — 82962 GLUCOSE BLOOD TEST: CPT | Mod: 91

## 2019-07-12 PROCEDURE — 700102 HCHG RX REV CODE 250 W/ 637 OVERRIDE(OP): Performed by: PHYSICAL MEDICINE & REHABILITATION

## 2019-07-12 PROCEDURE — 770010 HCHG ROOM/CARE - REHAB SEMI PRIVAT*

## 2019-07-12 PROCEDURE — 700102 HCHG RX REV CODE 250 W/ 637 OVERRIDE(OP): Performed by: HOSPITALIST

## 2019-07-12 PROCEDURE — 99231 SBSQ HOSP IP/OBS SF/LOW 25: CPT | Performed by: PHYSICAL MEDICINE & REHABILITATION

## 2019-07-12 RX ORDER — METHADONE HYDROCHLORIDE 10 MG/1
20 TABLET ORAL 2 TIMES DAILY
Status: DISCONTINUED | OUTPATIENT
Start: 2019-07-12 | End: 2019-07-31 | Stop reason: HOSPADM

## 2019-07-12 RX ADMIN — PREGABALIN 300 MG: 100 CAPSULE ORAL at 19:57

## 2019-07-12 RX ADMIN — METHADONE HYDROCHLORIDE 20 MG: 10 TABLET ORAL at 19:58

## 2019-07-12 RX ADMIN — VITAMIN D, TAB 1000IU (100/BT) 4000 UNITS: 25 TAB at 07:59

## 2019-07-12 RX ADMIN — ASPIRIN 81 MG 81 MG: 81 TABLET ORAL at 07:59

## 2019-07-12 RX ADMIN — FLUTICASONE PROPIONATE 100 MCG: 50 SPRAY, METERED NASAL at 08:03

## 2019-07-12 RX ADMIN — METHADONE HYDROCHLORIDE 20 MG: 5 TABLET ORAL at 07:59

## 2019-07-12 RX ADMIN — OXYCODONE HYDROCHLORIDE 5 MG: 5 TABLET ORAL at 06:30

## 2019-07-12 RX ADMIN — OXYCODONE HYDROCHLORIDE 5 MG: 5 TABLET ORAL at 14:30

## 2019-07-12 RX ADMIN — HYDROCORTISONE: 1 CREAM TOPICAL at 08:03

## 2019-07-12 RX ADMIN — HYDROCORTISONE: 1 CREAM TOPICAL at 20:03

## 2019-07-12 RX ADMIN — METOPROLOL TARTRATE 12.5 MG: 25 TABLET ORAL at 06:30

## 2019-07-12 RX ADMIN — OXYCODONE HYDROCHLORIDE AND ACETAMINOPHEN 500 MG: 500 TABLET ORAL at 07:59

## 2019-07-12 RX ADMIN — ENOXAPARIN SODIUM 40 MG: 100 INJECTION SUBCUTANEOUS at 07:59

## 2019-07-12 RX ADMIN — PREGABALIN 300 MG: 100 CAPSULE ORAL at 07:59

## 2019-07-12 RX ADMIN — FERROUS SULFATE TAB 325 MG (65 MG ELEMENTAL FE) 325 MG: 325 (65 FE) TAB at 07:59

## 2019-07-12 RX ADMIN — MICONAZOLE NITRATE: 20 CREAM TOPICAL at 08:03

## 2019-07-12 RX ADMIN — MICONAZOLE NITRATE: 20 CREAM TOPICAL at 20:03

## 2019-07-12 RX ADMIN — SENNOSIDES AND DOCUSATE SODIUM 2 TABLET: 8.6; 5 TABLET ORAL at 19:58

## 2019-07-12 NOTE — THERAPY
Occupational Therapy  Daily Treatment     Patient Name: Travon Pollack  Age:  63 y.o., Sex:  male  Medical Record #: [id number]  Today's Date: 7/12/2019     Precautions  Precautions: (P) Non Weight Bearing Left Lower Extremity, Weight Bearing As Tolerated Left Upper Extremity, Immobilizer Left Lower Extremity  Comments: R heel ulcer, Chronic L shoulder weakness         Subjective    Pt was in bed and agreeable to therapy. Physician present during supine>sit>SqPT: bed>W/C.     Objective       07/12/19 1031   Precautions   Precautions Non Weight Bearing Left Lower Extremity;Weight Bearing As Tolerated Left Upper Extremity;Immobilizer Left Lower Extremity   Cognition    Orientation Level Oriented x 4   Level of Consciousness Alert   Ability To Follow Commands 2 Step   Safety Awareness Impulsive   Passive ROM Upper Body   Passive ROM Upper Body X   Rt Shoulder Flexion Degrees (pain with movement due to humeral head fx)   Rt Shoulder Abduction Degrees (pain with movement due to humeral head fx)   Active ROM Upper Body   Active ROM Upper Body  X  (pain in L shoulder with movement due to humeral head fx)   Dominant Hand Right   Strength Upper Body   Upper Body Strength  X   Balance   Sitting Balance (Static) Fair +   Sitting Balance (Dynamic) Fair +   Weight Shift Sitting Good  (to the right only d/t LUE limitations)   Bed Mobility    Supine to Sit Stand by Assist   Scooting Stand by Assist   Skilled Intervention Verbal Cuing   Interdisciplinary Plan of Care Collaboration   IDT Collaboration with  Physician   Patient Position at End of Therapy Seated  (in dining room)   OT Total Time Spent   OT Individual Total Time Spent (Mins) 60   OT Charge Group   OT Therapy Activity 1   OT Therapeutic Exercise  3         Assessment    Pt was provided education on W/C use and transfers. Dysem was placed under the seat to prevent sliding. Pt's knee immobilizer was repositioned as it had slid down. Pt completed weight bearing exercises to  the R to facilitated weight bearing for functional transfers. Pt demonstrated fair-good dynamic seated balance. Pt completed RUE exercises seated EOM with a 6 # weight. Pt completed AROM elbow flex./ext with no weight.    Plan    Cont overall strength/endurance and standing balance to improve ADL's and functional mobility

## 2019-07-12 NOTE — CARE PLAN
Problem: Infection  Goal: Will remain free from infection  Patient educated to change positions to minimize the potential of skin break down and possible further complications of open wounds due to pressure, when sitting or laying for periods over half of an hour. Patient engaged in education and verbalizes understanding.

## 2019-07-12 NOTE — THERAPY
Physical Therapy   Daily Treatment     Patient Name: Travon Pollack  Age:  63 y.o., Sex:  male  Medical Record #: [id number]  Today's Date: 7/12/2019     Precautions  Precautions: Non Weight Bearing Left Lower Extremity, Weight Bearing As Tolerated Left Upper Extremity, Immobilizer Left Lower Extremity  Comments: R heel ulcer, Chronic L shoulder weakness    Subjective    Pt was supine in bed upon arrival and agreeable to treatment.      Objective       07/12/19 1531   Precautions   Precautions Non Weight Bearing Left Lower Extremity;Weight Bearing As Tolerated Left Upper Extremity;Immobilizer Left Lower Extremity   Supine Lower Body Exercise   Hip Abduction 1 set of 15;Bilateral   Straight Leg Raises Front;1 set of 15;Bilateral   Heel Slide 1 set of 15;Bilateral   Ankle Pumps 1 set of 15;Right   Other Exercises Quad and glut sets x 15 reps with 5 sec hold   Interdisciplinary Plan of Care Collaboration   Patient Position at End of Therapy In Bed;Call Light within Reach;Tray Table within Reach;Phone within Reach   PT Total Time Spent   PT Individual Total Time Spent (Mins) 30   PT Charge Group   PT Therapeutic Exercise 1   PT Therapeutic Activities 1       Pt given supine HEP.  Education provided on residual limb wrapping and positioning.     Assessment    Pt verbalized all education.  Pt demonstrated good effort and form with ther ex.     Plan    Educate pt in positioning for BKA including prone/desensitization/B LE strengthening- provide handouts, progress functional transfers (initiated with squat-pivot with reach on eval), standing tolerance in // bars

## 2019-07-12 NOTE — REHAB-PHARMACY IDT TEAM NOTE
Pharmacy   Pharmacy  Antibiotics/Antifungals/Antivirals:  Medication:      Active Orders     None        Route:        NA  Stop Date:  NA  Reason:      NA  Antihypertensives/Cardiac:  Medication:    Orders (72h ago through future)    Start     Ordered    07/10/19 1800  metoprolol (LOPRESSOR) tablet 12.5 mg  TWICE DAILY      07/10/19 1030        Patient Vitals for the past 24 hrs:   BP Pulse   07/12/19 1400 117/65 76   07/12/19 0845 - 74   07/12/19 0630 122/72 75   07/11/19 1940 126/72 83   07/11/19 1711 138/90 78       Anticoagulation:  Medication: Aspirin, Lovenox    Other key medications: A review of the medication list reveals no issues at this time. Patient is currently on antihypertensive(s). Recommend home blood pressure monitoring/recording if antihypertensive(s) regimen(s) continue. Patient is currently on diabetic medication(s) and/or Insulin(s). Recommend home blood glucose monitoring/recording if these regimen(s) continue.    Section completed by: Alessio Starkey [facility]

## 2019-07-12 NOTE — CARE PLAN
Problem: Skin Integrity  Goal: Risk for impaired skin integrity will decrease  Wound care nurse came today and assessed patient's heel and buttocks. Dressing change orders were updated.  Left residual limp incision appears pink, approximated. Sutures in place. Area under incision appears edematous-boggy (fluid accumulation like). Physiatrist and Hospitalist aware. Incision was cleansed and residual limp was wrapped with rolling gauze and covered with Tubigrip dressing.      Problem: Safety Patient uses call light appropriately. Bed locked and in the lowest position. Pt's room near to the nurse charting room.

## 2019-07-12 NOTE — PROGRESS NOTES
"Rehab Progress Note     Date of Service: 7/12/2019  Chief Complaint: follow up left BKA    Interval Events (Subjective)    Is seen and examined in his room today.  He is about to start occupational therapy.  We discussed that his orthopedic surgeon has made an appointment next Wednesday.  We discussed the need to continue wrapping the residual limb.  Patient reports he continues to be painful.  He does a transfer to the wheelchair with standby assistance.  Understands the plan of care for management of his limb fluid collection.  He has no new complaints.    Objective:  VITAL SIGNS: /72   Pulse 74   Temp 36.6 °C (97.9 °F) (Temporal)   Resp 16   Ht 1.803 m (5' 11\")   Wt 88.9 kg (196 lb)   SpO2 92%   BMI 27.34 kg/m²   Gen: alert, no apparent distress  CV: regular rate and rhythm, no murmurs, no peripheral edema  Resp: clear to ascultation bilaterally, normal respiratory effort  GI: soft, non-tender abdomen, bowel sounds present  Neuro: notable for left transtibial amputation    Recent Results (from the past 72 hour(s))   ACCU-CHEK GLUCOSE    Collection Time: 07/09/19  4:25 PM   Result Value Ref Range    Glucose - Accu-Ck 137 (H) 65 - 99 mg/dL   ACCU-CHEK GLUCOSE    Collection Time: 07/09/19  8:33 PM   Result Value Ref Range    Glucose - Accu-Ck 159 (H) 65 - 99 mg/dL   ACCU-CHEK GLUCOSE    Collection Time: 07/10/19  5:11 AM   Result Value Ref Range    Glucose - Accu-Ck 130 (H) 65 - 99 mg/dL   ACCU-CHEK GLUCOSE    Collection Time: 07/10/19 11:14 AM   Result Value Ref Range    Glucose - Accu-Ck 152 (H) 65 - 99 mg/dL   ACCU-CHEK GLUCOSE    Collection Time: 07/10/19  5:33 PM   Result Value Ref Range    Glucose - Accu-Ck 129 (H) 65 - 99 mg/dL   ACCU-CHEK GLUCOSE    Collection Time: 07/10/19  9:26 PM   Result Value Ref Range    Glucose - Accu-Ck 134 (H) 65 - 99 mg/dL   CBC with Differential    Collection Time: 07/11/19  5:24 AM   Result Value Ref Range    WBC 7.6 4.8 - 10.8 K/uL    RBC 3.75 (L) 4.70 - 6.10 M/uL "    Hemoglobin 10.6 (L) 14.0 - 18.0 g/dL    Hematocrit 33.0 (L) 42.0 - 52.0 %    MCV 88.0 81.4 - 97.8 fL    MCH 28.3 27.0 - 33.0 pg    MCHC 32.1 (L) 33.7 - 35.3 g/dL    RDW 49.0 35.9 - 50.0 fL    Platelet Count 278 164 - 446 K/uL    MPV 9.9 9.0 - 12.9 fL    Neutrophils-Polys 54.50 44.00 - 72.00 %    Lymphocytes 28.70 22.00 - 41.00 %    Monocytes 11.40 0.00 - 13.40 %    Eosinophils 4.60 0.00 - 6.90 %    Basophils 0.50 0.00 - 1.80 %    Immature Granulocytes 0.30 0.00 - 0.90 %    Nucleated RBC 0.00 /100 WBC    Neutrophils (Absolute) 4.15 1.82 - 7.42 K/uL    Lymphs (Absolute) 2.19 1.00 - 4.80 K/uL    Monos (Absolute) 0.87 (H) 0.00 - 0.85 K/uL    Eos (Absolute) 0.35 0.00 - 0.51 K/uL    Baso (Absolute) 0.04 0.00 - 0.12 K/uL    Immature Granulocytes (abs) 0.02 0.00 - 0.11 K/uL    NRBC (Absolute) 0.00 K/uL   Comp Metabolic Panel (CMP)    Collection Time: 07/11/19  5:24 AM   Result Value Ref Range    Sodium 137 135 - 145 mmol/L    Potassium 4.3 3.6 - 5.5 mmol/L    Chloride 101 96 - 112 mmol/L    Co2 29 20 - 33 mmol/L    Anion Gap 7.0 0.0 - 11.9    Glucose 125 (H) 65 - 99 mg/dL    Bun 30 (H) 8 - 22 mg/dL    Creatinine 1.23 0.50 - 1.40 mg/dL    Calcium 9.5 8.5 - 10.5 mg/dL    AST(SGOT) 18 12 - 45 U/L    ALT(SGPT) 12 2 - 50 U/L    Alkaline Phosphatase 109 (H) 30 - 99 U/L    Total Bilirubin 0.4 0.1 - 1.5 mg/dL    Albumin 2.6 (L) 3.2 - 4.9 g/dL    Total Protein 7.6 6.0 - 8.2 g/dL    Globulin 5.0 (H) 1.9 - 3.5 g/dL    A-G Ratio 0.5 g/dL   Magnesium    Collection Time: 07/11/19  5:24 AM   Result Value Ref Range    Magnesium 1.7 1.5 - 2.5 mg/dL   Vitamin D, 25-hydroxy (blood)    Collection Time: 07/11/19  5:24 AM   Result Value Ref Range    25-Hydroxy   Vitamin D 25 12 (L) 30 - 100 ng/mL   Folate    Collection Time: 07/11/19  5:24 AM   Result Value Ref Range    Folate -Folic Acid 6.2 >4.0 ng/mL   Iron/Total Iron Bind    Collection Time: 07/11/19  5:24 AM   Result Value Ref Range    Iron 38 (L) 50 - 180 ug/dL    Total Iron Binding 209  (L) 250 - 450 ug/dL    % Saturation 18 15 - 55 %   Vitamin B12    Collection Time: 07/11/19  5:24 AM   Result Value Ref Range    Vitamin B12 -True Cobalamin 304 211 - 911 pg/mL   ESTIMATED GFR    Collection Time: 07/11/19  5:24 AM   Result Value Ref Range    GFR If African American >60 >60 mL/min/1.73 m 2    GFR If Non African American 59 (A) >60 mL/min/1.73 m 2   ACCU-CHEK GLUCOSE    Collection Time: 07/11/19  7:26 AM   Result Value Ref Range    Glucose - Accu-Ck 123 (H) 65 - 99 mg/dL   ACCU-CHEK GLUCOSE    Collection Time: 07/11/19 11:21 AM   Result Value Ref Range    Glucose - Accu-Ck 156 (H) 65 - 99 mg/dL   ACCU-CHEK GLUCOSE    Collection Time: 07/11/19  5:07 PM   Result Value Ref Range    Glucose - Accu-Ck 114 (H) 65 - 99 mg/dL   ACCU-CHEK GLUCOSE    Collection Time: 07/11/19  8:23 PM   Result Value Ref Range    Glucose - Accu-Ck 143 (H) 65 - 99 mg/dL   ACCU-CHEK GLUCOSE    Collection Time: 07/12/19  7:28 AM   Result Value Ref Range    Glucose - Accu-Ck 139 (H) 65 - 99 mg/dL   ACCU-CHEK GLUCOSE    Collection Time: 07/12/19 11:01 AM   Result Value Ref Range    Glucose - Accu-Ck 170 (H) 65 - 99 mg/dL       Current Facility-Administered Medications   Medication Frequency   • methadone (DOLOPHINE) tablet 20 mg BID   • vitamin D (cholecalciferol) tablet 4,000 Units DAILY   • ascorbic acid tablet 500 mg QDAY with Breakfast   • ferrous sulfate tablet 325 mg QDAY with Breakfast   • insulin lispro (HUMALOG) injection 2-12 Units 4X/DAY ACHS    And   • glucose 4 g chewable tablet 16 g Q15 MIN PRN   • Respiratory Care per Protocol Continuous RT   • Pharmacy Consult Request ...Pain Management Review 1 Each PHARMACY TO DOSE   • acetaminophen (TYLENOL) tablet 650 mg Q4HRS PRN   • artificial tears 1.4 % ophthalmic solution 1 Drop PRN   • benzocaine-menthol (CEPACOL) lozenge 1 Lozenge Q2HRS PRN   • mag hydrox-al hydrox-simeth (MAALOX PLUS ES or MYLANTA DS) suspension 20 mL Q2HRS PRN   • ondansetron (ZOFRAN ODT) dispertab 4 mg  4X/DAY PRN    Or   • ondansetron (ZOFRAN) syringe/vial injection 4 mg 4X/DAY PRN   • traZODone (DESYREL) tablet 50 mg QHS PRN   • sodium chloride (OCEAN) 0.65 % nasal spray 2 Spray PRN   • hydrOXYzine HCl (ATARAX) tablet 50 mg Q6HRS PRN   • melatonin tablet 3 mg HS PRN   • polyethylene glycol/lytes (MIRALAX) PACKET 1 Packet QDAY PRN    And   • magnesium hydroxide (MILK OF MAGNESIA) suspension 30 mL QDAY PRN    And   • bisacodyl (DULCOLAX) suppository 10 mg QDAY PRN   • miconazole 2%-zinc oxide (DELFINO) topical cream BID   • aspirin (ASA) chewable tab 81 mg DAILY   • enoxaparin (LOVENOX) inj 40 mg DAILY   • fluticasone (FLONASE) nasal spray 100 mcg DAILY   • metoprolol (LOPRESSOR) tablet 12.5 mg TWICE DAILY   • oxyCODONE immediate-release (ROXICODONE) tablet 5 mg Q6HRS PRN   • pregabalin (LYRICA) capsule 300 mg BID   • senna-docusate (PERICOLACE or SENOKOT S) 8.6-50 MG per tablet 2 Tab BID   • tramadol (ULTRAM) 50 MG tablet 100 mg Q6HRS PRN   • dextrose 50% (D50W) injection 50 mL Q15 MIN PRN   • diphenhydrAMINE-ZnAcetate (BENADRYL ITCH) 1-0.1 % cream TID PRN   • albuterol inhaler 2 Puff Q4HRS PRN   • hydrocortisone 1 % cream BID       Orders Placed This Encounter   Procedures   • Diet Order Diabetic, Cardiac     Standing Status:   Standing     Number of Occurrences:   1     Order Specific Question:   Diet:     Answer:   Diabetic [3]     Order Specific Question:   Diet:     Answer:   Cardiac [6]     Order Specific Question:   Consistency/Fluid modifications:     Answer:   Thin Liquids [3]       Assessment:  Active Hospital Problems    Diagnosis   • *S/P BKA (below knee amputation) unilateral, left (HCC)   • Acute on chronic respiratory failure with hypoxia (HCC)   • SALOME (obstructive sleep apnea)   • Cardiomyopathy (HCC)   • DM (diabetes mellitus), type 2, uncontrolled (CMS-HCC)   • Essential hypertension   • Left shoulder pain   • Chronic pain syndrome   • Methadone dependence (HCC)     This patient is a 63 y.o. male  admitted for acute inpatient rehabilitation with S/P BKA (below knee amputation) unilateral, left (HCC).       Medical Decision Making and Plan:    Left BKA  Dr. Schneider 6/24  Continue full rehab program  PT/OT, 1 hr each discipline  Outpatient follow up with surgery, apt next Weds, can remove sutures there    Residual limb fluid collection  Post-op seroma versus hematoma  US with 3 distinct fluid collections  Most likely seroma due to stable hemoglobin  Schedule with orthopedics - has apt next Weds    Normocytic anemia  Likely post-operative  Monitor    Diabetes  Continue insulin  Appreciate hospitalist assistance    Chronic pain  Narcotic dependence/tolerance  Continue methadone, prescribed by his PCP  Dose decreased from 35 mg BID at home, to 20 mg BID due to encephalopathy (stroke code was called)  Continue PRN opiates     Left shoulder pain  With history of multiple surgeries, including left humeral head resection  Modalities with therapy  Continue pain management     Rash  Groin rash - miconazole  Trunk rash - hydrocortison cream     Cardiomyopathy, EF 40%  Continue metoprolol  Appreciate hospitalist assistance     History of one kidney  Avoid nephrotoxins     Respiratory insufficiency  Sleep apnea  On 3 L at home at night  Respiratory therapy to see patient      Hypertension  Continue metoprolol  Appreciate hospitalist assistance    Vit D deficiency, 12  Continue supplementation    DVT prophylaxis  Lovenox    Total time:  17 minutes.  I spent greater than 50% of the time for patient care, counseling, and coordination on this date, including patient face-to face time, unit/floor time with review of records/pertinent lab data and studies, as well as discussing diagnostic evaluation/work up, planned therapeutic interventions, and future disposition of care, as per the interval events/subjective and the assessment and plan as noted above.    I have performed a physical exam, reviewed and updated ROS, as well as  the assessment and plan today 7/12/2019. In review of note from 7/11/2019 there are no new changes except as documented above.        Mimi Doyle M.D.   Physical Medicine and Rehabilitation

## 2019-07-12 NOTE — THERAPY
Physical Therapy   Daily Treatment     Patient Name: Travon Pollack  Age:  63 y.o., Sex:  male  Medical Record #: [id number]  Today's Date: 7/12/2019     Precautions  Precautions: Non Weight Bearing Left Lower Extremity, Immobilizer Left Lower Extremity, Fall Risk, Other (See Comments)  Comments: R heel ulcer, Chronic L shoulder weakness    Subjective    Pt was seated in w/c upon arrival and agreeable to treatment.  Pt reported continued phantom pain with concern about the fluid collection in his residual limb.      Objective       07/12/19 0831   Precautions   Precautions Non Weight Bearing Left Lower Extremity;Immobilizer Left Lower Extremity;Fall Risk;Other (See Comments)   Bed Mobility    Sit to Supine Stand by Assist   Scooting Stand by Assist   Interdisciplinary Plan of Care Collaboration   Patient Position at End of Therapy In Bed;Call Light within Reach;Tray Table within Reach;Phone within Reach   PT Total Time Spent   PT Individual Total Time Spent (Mins) 60   PT Charge Group   PT Therapeutic Activities 4       FIM Bed/Chair/Wheelchair Transfers Score: 3 - Moderate Assistance  Bed/Chair/Wheelchair Transfers Description:  Adaptive equipment, Increased time, Supervision for safety, Verbal cueing, Set-up of equipment (Bed mobility with SBA: SQPT with mod A)    Completed transfer training with focus on sequencing and safety.  Education completed on POC/goals, desensitization and mirror therapy techniques to manage phantom limb pain, other pain management strategies, positioning of residual limb in bed and W/C with addition of pad on residual leg rest for pain management, prevention of secondary complications.  Reviewed amputee education in booklet with booklet given to pt.  Reviewed with pt hospital course and hx of shoulder injuries.  Completed IPOP consult with [employer].    Discussion with MD on residual limb wrapping: MD stated okay to begin residual limb wrapping to pt's tolerance and okay to elevate LLE in  bed.    Assessment    Pt verbalized all education, but reinforcement will be needed.  Pt with continued VCing needed for sequencing and safety with transfers.    Plan    Educate pt in positioning for BKA including prone/desensitization/B LE strengthening- provide handouts, progress functional transfers (initiated with squat-pivot with reach on eval), standing tolerance in // bars

## 2019-07-12 NOTE — FLOWSHEET NOTE
07/12/19 0845   Events/Summary/Plan   Events/Summary/Plan O2 spot check on Room air   Chest Exam   Respiration 16   Pulse 74   Oximetry   #Pulse Oximetry (Single Determination) Yes   Oxygen   Home O2 Use Prior To Admission? Yes   Home O2 LPM Flow 3 LPM   Home O2 Delivery Method Nasal Cannula   Home O2 Frequency of Use At Sleep   Pulse Oximetry 92 %   O2 (LPM) 3   O2 Daily Delivery Respiratory  Silicone Nasal Cannula   Room Air Challenge Fail  (Room air SpO2=85%)

## 2019-07-12 NOTE — CARE PLAN
Problem: Bowel/Gastric:  Goal: Normal bowel function is maintained or improved  Patient educated on fluid intake, diet, bowel medications and mobilization to maximize the potential for regular bowel movements while taking opiates or opiate derivatives for pain. Patient engages in learning and verbalizes understanding.

## 2019-07-12 NOTE — REHAB-CM IDT TEAM NOTE
Case Management    DC Planning  DC destination/dispostion:  To single level home in [place], [place], with support of wife.    Referrals: None at this time.    DC Needs: DME for mobility and safety. Has SPC. Oxygen concentrator.   MD f/u appointments - PCP, Dr. Schneider. OP therapy. No HH services the [place] area.     Barriers to discharge:  Functional deficits; Left residual limb pain, fluid collection. Lives 15 miles from [place], [place]. No HH provides service for that area.     Strengths: Motivated. Supportive wife.     Section completed by:  Anahi Mccall R.N.

## 2019-07-12 NOTE — DISCHARGE PLANNING
CASE MANAGEMENT INITIAL ASSESSMENT    Admit Date:  7/10/2019     I met with patient to discuss role of case management / discharge planning / team conference.   Patient is a  63 y.o. male transferred from [facility]. He was inpatient from 6/15 to 7/10/2019.  Patient was admitted to [facility] on 7/10/2019.  The admitting physician is Dr. Mimi Doyle.     Diagnosis: 05.4 unilateral LE below knee ampution (bka)  S/P BKA (below knee amputation) ([facility])    Co-morbidities:   Patient Active Problem List    Diagnosis Date Noted   • Acute encephalopathy 06/27/2019     Priority: High   • OZ (acute kidney injury) ([facility]) 06/18/2019     Priority: High   • Acute on chronic respiratory failure with hypoxia ([facility]) 01/25/2018     Priority: High   • Diabetic foot ulcer ([facility]) 01/13/2018     Priority: High   • Facial nerve paralysis 09/03/2016     Priority: High   • Septic shock due to undetermined organism ([facility]) 07/10/2015     Priority: High   • SALOME (obstructive sleep apnea) 06/16/2019     Priority: Medium   • Pneumonia due to infectious organism 06/16/2019     Priority: Medium   • Aspiration into airway 06/16/2019     Priority: Medium   • Cardiomyopathy ([facility]) 01/29/2018     Priority: Medium   • DM (diabetes mellitus), type 2, uncontrolled (CMS-[facility]) 07/12/2015     Priority: Medium   • Essential hypertension 03/16/2013     Priority: Medium   • Mucus plugging of bronchi 06/16/2019     Priority: Low   • Left shoulder pain 06/15/2019     Priority: Low   • Hypokalemia 07/12/2015     Priority: Low   • Tobacco dependence 07/12/2015     Priority: Low   • Low back pain 07/10/2015     Priority: Low   • S/P BKA (below knee amputation) unilateral, left ([facility]) 07/10/2019   • Hypoglycemia 06/27/2019   • Leucocytosis 06/23/2019   • Chronic pain syndrome 01/27/2018   • Opiate withdrawal ([facility]) 01/12/2018   • Methadone dependence ([facility]) 01/12/2018   • Acute respiratory failure with hypoxemia ([facility]) 01/12/2018   • Closed 2-part nondisplaced  fracture of surgical neck of left humerus 01/12/2018   • Shingles 09/03/2016   • CVA (cerebral vascular accident) ([facility]) 09/02/2016   • Thrombocytopenia ([facility]) 07/12/2015   • Obesity 07/12/2015   • Hyponatremia 07/10/2015   • Tobacco abuse 07/10/2015   • ARF (acute renal failure) ([facility]) 07/10/2015   • DM (diabetes mellitus) (CMS-HCC) 03/16/2013   • Back pain 03/15/2013   • Fall 03/15/2013   • Spine disorder 03/04/2013   • Degeneration of lumbar or lumbosacral intervertebral disc 02/11/2013   • Lumbar stenosis 02/11/2013     Prior Living Situation:  Housing / Facility: [address]  Lives with - Patient's Self Care Capacity: Spouse    Prior Level of Function:  Medication Management: Requires Assist  Finances: Requires Assist  Home Management: Requires Assist  Shopping: Independent  Prior Level Of Mobility: Independent With Device in Community, Independent With Device in Home  Driving / Transportation: Driving Independent    Support Systems:  Primary : Shu Pollack, Spouse, [phone]  Other support systems: TBD  Advance Directives: No    Previous Services Utilized:   Equipment Owned: Single Point Cane, has oxygen concentrator through [facility]  Prior Services: Home-Independent    Other Information:  Occupation (Pre-Hospital Vocational): Retired Due To Disability  Pharmacy: [facility] in [place], [place]    Additional Case Management Questions:  Have you ever received case management services for yourself or a family member? no    Do you feel you have and an understanding of what services  provide? yes    Do you have any additional questions regarding case management?   no      CASE MANAGEMENT PLAN OF CARE   Individualized Goals:   1. Improve functional status to discharge home with wife  2. OP therapy in [place], [place] to be ordered if needed   3. MD f/u appointments in place. Has DME needed and home modifications done.    Barriers:   1. Functional deficits  2. Left residual limb pain,  fluid collection     Primary Payor Source: [email or web address]  Primary Care Practitioner : Dr. Turner  Other MDs: Dr. Schneider, Orthpedic Surgery;     Patient / Family Goal:  Patient / Family Goal: To get home and get back on good foot.  DC Disposition: To single level home in [place], [place], with support of wife.  DC Needs: DME for mobility and safety. Has SPC. Oxygen concentrator.   MD f/u appointments - PCP, Dr. Schneider. OP therapy. No HH services the [place] area.   Strengths: Motivated. Supportive wife.        Plan:  1. Continue to follow patient through hospitalization and provide discharge planning in collaboration with patient, family, physicians and ancillary services.     2. Utilize community resources to ensure a safe discharge.

## 2019-07-12 NOTE — CARE PLAN
Problem: Skin Integrity  Goal: Risk for impaired skin integrity will decrease  Left residual limp incision appears pink, approximated. Sutures in place. Area under incision appears edematous-boggy (fluid accumulation like). Physiatrist and Hospitalist aware. Incision was cleansed and residual limp was wrapped with rolling gauze and covered with Tubigrip dressing.    Yeast infection on Buttocks. Buttocks appear red, peeling and with small dots around it. Jennifer cream is being applied as ordered.   Rash on lateral side of abdomen appear to be resolving. Hydrocortisone cream is being applied as ordered.  Pt. Has also a red area, fungus-like on left underarm. Jennifer cream was applied. Will continue to monitor.      Problem: Pain Management  Goal: Pain level will decrease to patient's comfort goal  Pt. c/o left leg pain. Pt. on scheduled Methadone and PRN Oxycodone.

## 2019-07-12 NOTE — WOUND TEAM
"[facility]  Inpatient Services  Initial Wound and Skin Care Evaluation    Admission Date: 7/10/2019     Consult Date: 7/10/19   HPI, PMH, SH: Reviewed    Unit where seen by Wound Team: [id number]     WOUND CONSULT RELATED TO:  Evaluation of right heel ulceration and fungal rash buttocks     SUBJECTIVE:  \"I took a shower this afternoon and they haven't had time to put the dressing back on\"      Self Report / Pain Level:  denies       OBJECTIVE:  L BKA incision is wrapped with gauze and dry and intact.  Per photo it is well approximated with sutures.  Only concern is edema at stump.  Tubigrip E double layer has been applied and patient is complaining that his knee is sore due to edema going upward.  Posterior knee doesn't appear swollen.  Will communicate to LPS team    WOUND TYPE, LOCATION, CHARACTERISTICS (Pressure Injuries: location, stage, POA or date identified)      Wound 07/10/19 Diabetic Ulcer Heel;Foot DM ulceration plantar surface right heel (Active)   Site Assessment Clean;Pink 7/11/2019  5:15 PM   Elizabeth-wound Assessment Clean;Intact 7/11/2019  5:15 PM   Margins Attached edges 7/11/2019  5:15 PM   Wound Length (cm) 1 cm 7/11/2019  5:15 PM   Wound Width (cm) 1 cm 7/11/2019  5:15 PM   Wound Depth (cm) 0.1 cm 7/11/2019  5:15 PM   Wound Surface Area (cm^2) 1 cm^2 7/11/2019  5:15 PM   Tunneling 0 cm 7/11/2019  5:15 PM   Undermining 0 cm 7/11/2019  5:15 PM   Closure Secondary intention 7/11/2019  5:15 PM   Drainage Amount None 7/11/2019  5:15 PM   Non-staged Wound Description Full thickness 7/11/2019  5:15 PM   Treatments Cleansed;Site care 7/11/2019  5:15 PM   Cleansing Normal Saline Irrigation 7/11/2019  5:15 PM   Periwound Protectant Not Applicable 7/11/2019  5:15 PM   Dressing Options Honey Colloid;Nonadhesive Foam 7/11/2019  5:15 PM   Dressing Cleansing/Solutions Not Applicable 7/11/2019  5:15 PM   Dressing Changed New 7/11/2019  5:15 PM   Dressing Status Intact 7/11/2019  5:15 PM   Dressing " Change Frequency Every 72 hrs 7/11/2019  5:15 PM   NEXT Dressing Change  07/14/19 7/11/2019  5:15 PM   NEXT Weekly Photo (Inpatient Only) 07/14/19 7/11/2019  5:15 PM   WOUND NURSE ONLY - Odor None 7/11/2019  5:15 PM   WOUND NURSE ONLY - Exposed Structures None 7/11/2019  5:15 PM   WOUND NURSE ONLY - Tissue Type and Percentage pink 100% 7/11/2019  5:15 PM     Lab Values:    WBC:       WBC   Date/Time Value Ref Range Status   07/11/2019 05:24 AM 7.6 4.8 - 10.8 K/uL Final     A1C:      Lab Results   Component Value Date/Time    HBA1C 12.2 (H) 06/16/2019 11:37 AM           Culture:  NA    INTERVENTIONS BY WOUND TEAM:  Patient seen by wound team and LPS team when patient at [facility].  CSWD done plantar right heel ulceration.  Today wound is dry and clean.  Measurements taken and honey colloid dressing applied and secured with non adhesive foam and hypafix tape.  Dressing not removed from L BKA stump; see above.  Antifungal cream applied to buttocks and perineum.  Discussed with bedside RN.    Dressing selection:  Honey colloid, non adhesive foam         Interdisciplinary consultation: Patient, Bedside RN    EVALUATION: clean appearing wound right foot and honey colloid should moisten wound to promote contraction and healing    Factors affecting wound healing: DM  Goals: Steady decrease in wound area and depth weekly.    NURSING PLAN OF CARE ORDERS (X):    Dressing changes: See Dressing Care orders: X  Skin care: See Skin Care orders: X  Rectal tube care: See Rectal Tube Care orders:   Other orders:    RSKIN: CURRENT (X) ORDERED (O):   Q shift Mario:  X  Q shift pressure point assessments:  X  Pressure redistribution mattress   X         NUHA          Bariatric NUHA         Bariatric foam           Heel float boots       Moves self in bed   Float Heels off Bed with Pillows               Barrier wipes         Barrier Cream         Barrier paste          Sacral silicone dressing         Silicone O2 tubing         Anchorfast          Cannula fixation Device (Tender )          Gray Foam Ear protectors           Trach with Optifoam split foam                 Waffle cushion        Waffle Overlay         Rectal tube or BMS         Antifungal tx      Interdry          Reposition q 2 hours  See above      Up to chair    X    Ambulate      PT/OT        Dietician        Diabetes Education      PO  X   TF     TPN     NPO   # days   Other        WOUND TEAM PLAN OF CARE (X):   NPWT change 3 x week:        Dressing changes by wound team:       Follow up as needed:  X weekly       Other (explain):     Anticipated discharge plans (X):   SNF:           Home Care:           Outpatient Wound Center:            Self Care:            Other:   TBD with ongoing wound care

## 2019-07-13 LAB
ANION GAP SERPL CALC-SCNC: 8 MMOL/L (ref 0–11.9)
BUN SERPL-MCNC: 38 MG/DL (ref 8–22)
CALCIUM SERPL-MCNC: 8.6 MG/DL (ref 8.5–10.5)
CHLORIDE SERPL-SCNC: 102 MMOL/L (ref 96–112)
CO2 SERPL-SCNC: 28 MMOL/L (ref 20–33)
CREAT SERPL-MCNC: 1.22 MG/DL (ref 0.5–1.4)
ERYTHROCYTE [DISTWIDTH] IN BLOOD BY AUTOMATED COUNT: 51.2 FL (ref 35.9–50)
GLUCOSE BLD-MCNC: 116 MG/DL (ref 65–99)
GLUCOSE BLD-MCNC: 135 MG/DL (ref 65–99)
GLUCOSE BLD-MCNC: 155 MG/DL (ref 65–99)
GLUCOSE BLD-MCNC: 202 MG/DL (ref 65–99)
GLUCOSE SERPL-MCNC: 142 MG/DL (ref 65–99)
HCT VFR BLD AUTO: 32.7 % (ref 42–52)
HGB BLD-MCNC: 9.8 G/DL (ref 14–18)
MCH RBC QN AUTO: 27.1 PG (ref 27–33)
MCHC RBC AUTO-ENTMCNC: 30 G/DL (ref 33.7–35.3)
MCV RBC AUTO: 90.6 FL (ref 81.4–97.8)
PLATELET # BLD AUTO: 261 K/UL (ref 164–446)
PMV BLD AUTO: 9.8 FL (ref 9–12.9)
POTASSIUM SERPL-SCNC: 4.1 MMOL/L (ref 3.6–5.5)
RBC # BLD AUTO: 3.61 M/UL (ref 4.7–6.1)
SODIUM SERPL-SCNC: 138 MMOL/L (ref 135–145)
WBC # BLD AUTO: 7.2 K/UL (ref 4.8–10.8)

## 2019-07-13 PROCEDURE — 700102 HCHG RX REV CODE 250 W/ 637 OVERRIDE(OP): Performed by: HOSPITALIST

## 2019-07-13 PROCEDURE — 85027 COMPLETE CBC AUTOMATED: CPT

## 2019-07-13 PROCEDURE — 82962 GLUCOSE BLOOD TEST: CPT | Mod: 91

## 2019-07-13 PROCEDURE — 770010 HCHG ROOM/CARE - REHAB SEMI PRIVAT*

## 2019-07-13 PROCEDURE — 36415 COLL VENOUS BLD VENIPUNCTURE: CPT

## 2019-07-13 PROCEDURE — A9270 NON-COVERED ITEM OR SERVICE: HCPCS | Performed by: PHYSICAL MEDICINE & REHABILITATION

## 2019-07-13 PROCEDURE — 80048 BASIC METABOLIC PNL TOTAL CA: CPT

## 2019-07-13 PROCEDURE — 99232 SBSQ HOSP IP/OBS MODERATE 35: CPT | Performed by: HOSPITALIST

## 2019-07-13 PROCEDURE — 700102 HCHG RX REV CODE 250 W/ 637 OVERRIDE(OP): Performed by: PHYSICAL MEDICINE & REHABILITATION

## 2019-07-13 PROCEDURE — 94760 N-INVAS EAR/PLS OXIMETRY 1: CPT

## 2019-07-13 PROCEDURE — 700111 HCHG RX REV CODE 636 W/ 250 OVERRIDE (IP): Performed by: PHYSICAL MEDICINE & REHABILITATION

## 2019-07-13 PROCEDURE — A9270 NON-COVERED ITEM OR SERVICE: HCPCS | Performed by: HOSPITALIST

## 2019-07-13 RX ADMIN — OXYCODONE HYDROCHLORIDE 5 MG: 5 TABLET ORAL at 17:27

## 2019-07-13 RX ADMIN — PREGABALIN 300 MG: 100 CAPSULE ORAL at 08:02

## 2019-07-13 RX ADMIN — HYDROCORTISONE: 1 CREAM TOPICAL at 20:31

## 2019-07-13 RX ADMIN — FERROUS SULFATE TAB 325 MG (65 MG ELEMENTAL FE) 325 MG: 325 (65 FE) TAB at 08:02

## 2019-07-13 RX ADMIN — VITAMIN D, TAB 1000IU (100/BT) 4000 UNITS: 25 TAB at 08:01

## 2019-07-13 RX ADMIN — METHADONE HYDROCHLORIDE 20 MG: 10 TABLET ORAL at 20:31

## 2019-07-13 RX ADMIN — METHADONE HYDROCHLORIDE 20 MG: 10 TABLET ORAL at 08:06

## 2019-07-13 RX ADMIN — OXYCODONE HYDROCHLORIDE 5 MG: 5 TABLET ORAL at 00:56

## 2019-07-13 RX ADMIN — MICONAZOLE NITRATE: 20 CREAM TOPICAL at 08:10

## 2019-07-13 RX ADMIN — SENNOSIDES AND DOCUSATE SODIUM 2 TABLET: 8.6; 5 TABLET ORAL at 08:02

## 2019-07-13 RX ADMIN — FLUTICASONE PROPIONATE 100 MCG: 50 SPRAY, METERED NASAL at 08:10

## 2019-07-13 RX ADMIN — OXYCODONE HYDROCHLORIDE 5 MG: 5 TABLET ORAL at 05:59

## 2019-07-13 RX ADMIN — ENOXAPARIN SODIUM 40 MG: 100 INJECTION SUBCUTANEOUS at 08:02

## 2019-07-13 RX ADMIN — MICONAZOLE NITRATE: 20 CREAM TOPICAL at 20:31

## 2019-07-13 RX ADMIN — OXYCODONE HYDROCHLORIDE AND ACETAMINOPHEN 500 MG: 500 TABLET ORAL at 08:02

## 2019-07-13 RX ADMIN — PREGABALIN 300 MG: 100 CAPSULE ORAL at 20:31

## 2019-07-13 RX ADMIN — ASPIRIN 81 MG 81 MG: 81 TABLET ORAL at 08:02

## 2019-07-13 RX ADMIN — HYDROCORTISONE: 1 CREAM TOPICAL at 08:10

## 2019-07-13 NOTE — CARE PLAN
Problem: Skin Integrity  Goal: Risk for impaired skin integrity will decrease  Jennifer applied to buttocks. Red, excoriated, flaking. Painful but felt better after cream applied.     Problem: Pain Management  Goal: Pain level will decrease to patient's comfort goal   07/13/19 1241   OTHER   Pain Rating Scale (NPRS) 8   Non Verbal Scale  Calm   Comfort Goal Comfort at Rest;Comfort with Movement;Perform Activity     Pt medicated per MAR with good results this morning.

## 2019-07-13 NOTE — THERAPY
"Occupational Therapy  Daily Treatment     Patient Name: Travon Pollack  Age:  63 y.o., Sex:  male  Medical Record #: [id number]  Today's Date: 7/12/2019     Precautions  Precautions: (P) Non Weight Bearing Left Lower Extremity, Weight Bearing As Tolerated Left Upper Extremity, Immobilizer Left Lower Extremity  Comments: (P) R heel ulcer, R stable shoe, L shoulder weakness and pain from prior fracture.         Subjective    \"This feels good.\"  Referring to aqua cycle     07/12/19 1631   Precautions   Precautions Non Weight Bearing Left Lower Extremity;Weight Bearing As Tolerated Left Upper Extremity;Immobilizer Left Lower Extremity   Comments R heel ulcer, R stable shoe, L shoulder weakness and pain from prior fracture.   Interdisciplinary Plan of Care Collaboration   Patient Position at End of Therapy (mod I in w.c. propelling to dining room)   OT Total Time Spent   OT Individual Total Time Spent (Mins) 45   OT Charge Group   OT Self Care / ADL 2   OT Therapeutic Exercise  1        Objective    Pt propelled self from room to back gym 150 feet with supervision for safety, stump board re-fit and adjusted, aquacycle seated level 1 15 min for promotion of healing and increased BUE strength and endurance, within pain limits of L shoulder SBA transfer from bed to chair      Assessment    Pt tolerated UBE well with L shoulder, endurance is a limiter    Plan    Cont to progress ADLs, IADLs, and standing tolerance.    "

## 2019-07-13 NOTE — CARE PLAN
Problem: Skin Integrity  Goal: Risk for impaired skin integrity will decrease  Outcome: NOT MET  Patient educated to change positions to minimize the potential of skin break down and possible further complications of open wounds due to pressure, when sitting or laying for periods over half of an hour. Patient engaged in education and verbalizes understanding.

## 2019-07-13 NOTE — CARE PLAN
Problem: Knowledge Deficit  Goal: Knowledge of disease process/condition, treatment plan, diagnostic tests, and medications will improve  Patient educated to importance of completing tasks that he is able to complete himself. Educated to discuss difficulties with OT and/or PT to help him find solutions to difficult tasks. Patient verbalized understanding.

## 2019-07-13 NOTE — PROGRESS NOTES
Patient refused blood pressure medication this morning at 0600. Patient reports that ever since he has been taking that medication he feels light headed and fatigued. Patient educated to parameters set by MD. Patient verbalizes that he understands however feels that even the small dose lowers his blood pressure too much for him to function properly.     Patient awake periodically throughout the night with complaints of pain. Patient is pleasant and calm.

## 2019-07-14 LAB
GLUCOSE BLD-MCNC: 127 MG/DL (ref 65–99)
GLUCOSE BLD-MCNC: 141 MG/DL (ref 65–99)
GLUCOSE BLD-MCNC: 148 MG/DL (ref 65–99)
GLUCOSE BLD-MCNC: 150 MG/DL (ref 65–99)
GLUCOSE BLD-MCNC: 173 MG/DL (ref 65–99)
GLUCOSE BLD-MCNC: 176 MG/DL (ref 65–99)

## 2019-07-14 PROCEDURE — A9270 NON-COVERED ITEM OR SERVICE: HCPCS | Performed by: HOSPITALIST

## 2019-07-14 PROCEDURE — 700102 HCHG RX REV CODE 250 W/ 637 OVERRIDE(OP): Performed by: PHYSICAL MEDICINE & REHABILITATION

## 2019-07-14 PROCEDURE — 97530 THERAPEUTIC ACTIVITIES: CPT

## 2019-07-14 PROCEDURE — 97535 SELF CARE MNGMENT TRAINING: CPT

## 2019-07-14 PROCEDURE — 94760 N-INVAS EAR/PLS OXIMETRY 1: CPT

## 2019-07-14 PROCEDURE — 97116 GAIT TRAINING THERAPY: CPT

## 2019-07-14 PROCEDURE — 99232 SBSQ HOSP IP/OBS MODERATE 35: CPT | Performed by: HOSPITALIST

## 2019-07-14 PROCEDURE — 700111 HCHG RX REV CODE 636 W/ 250 OVERRIDE (IP): Performed by: PHYSICAL MEDICINE & REHABILITATION

## 2019-07-14 PROCEDURE — 82962 GLUCOSE BLOOD TEST: CPT

## 2019-07-14 PROCEDURE — A9270 NON-COVERED ITEM OR SERVICE: HCPCS | Performed by: PHYSICAL MEDICINE & REHABILITATION

## 2019-07-14 PROCEDURE — 770010 HCHG ROOM/CARE - REHAB SEMI PRIVAT*

## 2019-07-14 PROCEDURE — 700102 HCHG RX REV CODE 250 W/ 637 OVERRIDE(OP): Performed by: HOSPITALIST

## 2019-07-14 PROCEDURE — 97110 THERAPEUTIC EXERCISES: CPT

## 2019-07-14 RX ADMIN — PREGABALIN 300 MG: 100 CAPSULE ORAL at 20:46

## 2019-07-14 RX ADMIN — SENNOSIDES AND DOCUSATE SODIUM 2 TABLET: 8.6; 5 TABLET ORAL at 20:47

## 2019-07-14 RX ADMIN — METOPROLOL TARTRATE 12.5 MG: 25 TABLET ORAL at 07:58

## 2019-07-14 RX ADMIN — ENOXAPARIN SODIUM 40 MG: 100 INJECTION SUBCUTANEOUS at 07:56

## 2019-07-14 RX ADMIN — VITAMIN D, TAB 1000IU (100/BT) 4000 UNITS: 25 TAB at 07:55

## 2019-07-14 RX ADMIN — FLUTICASONE PROPIONATE 100 MCG: 50 SPRAY, METERED NASAL at 07:57

## 2019-07-14 RX ADMIN — OXYCODONE HYDROCHLORIDE 5 MG: 5 TABLET ORAL at 08:05

## 2019-07-14 RX ADMIN — OXYCODONE HYDROCHLORIDE 5 MG: 5 TABLET ORAL at 14:15

## 2019-07-14 RX ADMIN — ALUMINUM HYDROXIDE, MAGNESIUM HYDROXIDE, AND DIMETHICONE 20 ML: 400; 400; 40 SUSPENSION ORAL at 01:51

## 2019-07-14 RX ADMIN — METHADONE HYDROCHLORIDE 20 MG: 10 TABLET ORAL at 20:46

## 2019-07-14 RX ADMIN — PREGABALIN 300 MG: 100 CAPSULE ORAL at 07:55

## 2019-07-14 RX ADMIN — OXYCODONE HYDROCHLORIDE 5 MG: 5 TABLET ORAL at 02:02

## 2019-07-14 RX ADMIN — METHADONE HYDROCHLORIDE 20 MG: 10 TABLET ORAL at 07:55

## 2019-07-14 RX ADMIN — MICONAZOLE NITRATE: 20 CREAM TOPICAL at 07:59

## 2019-07-14 RX ADMIN — MICONAZOLE NITRATE: 20 CREAM TOPICAL at 20:53

## 2019-07-14 RX ADMIN — FERROUS SULFATE TAB 325 MG (65 MG ELEMENTAL FE) 325 MG: 325 (65 FE) TAB at 07:55

## 2019-07-14 RX ADMIN — OXYCODONE HYDROCHLORIDE AND ACETAMINOPHEN 500 MG: 500 TABLET ORAL at 07:56

## 2019-07-14 RX ADMIN — ASPIRIN 81 MG 81 MG: 81 TABLET ORAL at 07:56

## 2019-07-14 RX ADMIN — HYDROCORTISONE: 1 CREAM TOPICAL at 07:57

## 2019-07-14 RX ADMIN — HYDROCORTISONE: 1 CREAM TOPICAL at 20:53

## 2019-07-14 NOTE — THERAPY
"Occupational Therapy  Daily Treatment     Patient Name: Travon Pollack  Age:  63 y.o., Sex:  male  Medical Record #: [id number]  Today's Date: 2019     Precautions  Precautions: Non Weight Bearing Left Lower Extremity, Weight Bearing As Tolerated Left Upper Extremity, Immobilizer Left Lower Extremity  Comments: R heel ulcer, R stable shoe, L shoulder weakness and pain from prior fracture.    Safety   ADL Safety : Requires Supervision for Safety, Requires Cueing for Safety, Impaired Insight into Safety    Subjective    \"this arm is useless, its just hanging there\"     Objective    Lateral scoot bed > w/c CGA, v/c's for safety     19 0831   Safety    ADL Safety  Requires Supervision for Safety;Requires Cueing for Safety;Impaired Insight into Safety   Sitting Upper Body Exercises   Upper Extremity Bike Level 5 Resistance  (BUE motomed, 10 min, 0 RB, 1.31 mile)   Interdisciplinary Plan of Care Collaboration   Patient Position at End of Therapy Seated;Call Light within Reach;Tray Table within Reach   OT Total Time Spent   OT Individual Total Time Spent (Mins) 60   OT Charge Group   OT Self Care / ADL 3   OT Therapeutic Exercise  1         FIM Grooming Score:  5 - Standby Prompting/Supervision or Set-up  Grooming Description:  Increased time, Supervision for safety, Verbal cueing, Set-up of equipment    FIM Upper Body Dressin - Standby Prompting/Supervision or Set-up  Upper Body Dressing Description:  Increased time, Supervision for safety, Verbal cueing, Set-up of equipment    FIM Lower Body Dressing Score:  4 - Minimal Assistance  Lower Body Dressing Description:  Increased time, Supervision for safety, Verbal cueing, Set-up of equipment (assist to pull shorts over hips when rolling in bed)      Assessment    Pt completed dressing supine at bed - with Min A, v/c's for safety/slow pace, requires supplemental O2     Plan    Cont overall strength/endurance and standing balance to improve ADL's and functional " mobility

## 2019-07-14 NOTE — CARE PLAN
Problem: Communication  Goal: The ability to communicate needs accurately and effectively will improve  Outcome: PROGRESSING AS EXPECTED  Patient uses call light consistently and appropriately this shift.  Waits for assistance when needed and does not attempt self transfer.  Able to verbalize needs.      Problem: Safety  Goal: Will remain free from falls  Outcome: PROGRESSING AS EXPECTED  Patient has remained free of falls during this shift. Uses call light appropriately to get help with transfers and does not attempt to transfer without assistance.       Intervention: Assess risk factors for falls  [employer] fall assessment completed (see flow sheets). Patient is a minimal fall risk.  Intervention: Implement fall precautions   07/14/19 0246   OTHER   Environmental Precautions Personal Belongings, Wastebasket, Call Bell etc. in Easy Reach;Transferred to Stronger Side;Communication Sign for Patients & Families;Bed in Low Position;Report Given to Other Health Care Providers Regarding Fall Risk;Mobility Assessed & Appropriate Sign Placed   Bedrails Bedrails Closest to Bathroom Down

## 2019-07-14 NOTE — PROGRESS NOTES
0715: Bedside report received, assumed care for this patient.  Patient is A&O x 4.  VSS.  Communication board updated, call light and belongings are within reach.  Bed is in low position. Patient appears in no distress, and has no complaints of SOB and 10/10 of pain.  Will be medicated per MAR.  Plan of care discussed and agreed upon with patient.  Dressings for BKA to be changed and photos taken today, overdue.

## 2019-07-14 NOTE — FLOWSHEET NOTE
07/13/19 1724   Events/Summary/Plan   Events/Summary/Plan O2 spot check   Chest Exam   Respiration 16   Pulse 76   Oximetry   #Pulse Oximetry (Single Determination) Yes   Oxygen   Home O2 Use Prior To Admission? Yes   Home O2 LPM Flow 3 LPM   Home O2 Delivery Method Nasal Cannula   Home O2 Frequency of Use At Sleep   Pulse Oximetry 96 %   O2 (LPM) 3   O2 Daily Delivery Respiratory  Silicone Nasal Cannula

## 2019-07-14 NOTE — WOUND TEAM
Pt seen by wound team for left residual limb incision.  Incision is CDI with a nickel size area of dry dark brown tissue without fluctuance or kalyn erythema.  Covered with non stick, rolled gauze and tubi F.  Replaced brace.      Nursing to continue with order placed for heel, buttocks and left residual limb.

## 2019-07-14 NOTE — THERAPY
"Occupational Therapy  Daily Treatment     Patient Name: Travon Pollack  Age:  63 y.o., Sex:  male  Medical Record #: [id number]  Today's Date: 7/14/2019     Precautions  Precautions: Non Weight Bearing Left Lower Extremity, Fall Risk, Other (See Comments)  Comments: IPOP L residual limb; chronic L shoulder weakness/pain    Safety   ADL Safety : Requires Supervision for Safety, Requires Cueing for Safety, Impaired Insight into Safety    Subjective    \"My muscles are really burning.\"      Objective       07/14/19 1101   Precautions   Precautions Non Weight Bearing Left Lower Extremity   Comments IPOP left residual limb   Pain   Intervention Distraction;Repositioned   Pain 0 - 10 Group   Location Leg   Location Orientation Left   Pain Rating Scale (NPRS) 4   Description Aching   Comfort Goal Comfort at Rest;Comfort with Movement;Perform Activity   Therapist Pain Assessment Post Activity Pain Same as Prior to Activity   Non Verbal Descriptors   Non Verbal Scale  Calm   Cognition    Level of Consciousness Alert   Sitting Upper Body Exercises   Chest Press 2 sets of 15;Right ;Weight (See Comments for lbs)  (3 lb )   Front Arm Raise 2 sets of 15;Right ;Weight (See Comments for lbs)  (3 lb )   Internal Shoulder Rotation 2 sets of 10;Right ;Weight (See Comments for lbs)  (3 lb)   External Shoulder Rotation 2 sets of 15;Right ;Weight (See Comments for lbs)  (6 lb )   Bicep Curls 2 sets of 15;Right ;Weight (See Comments for lbs)  (10 lb first set, 6 lb second set)   Comments bicep curls and internal rotation performed with LUE wihtout weights    Interdisciplinary Plan of Care Collaboration   Patient Position at End of Therapy Seated;Call Light within Reach;Tray Table within Reach;Phone within Reach   OT Total Time Spent   OT Individual Total Time Spent (Mins) 30   OT Charge Group   OT Therapeutic Exercise  2     Assessment    Pt tolerated session well. Pt motivated to get stronger but c/o RUE muscles burning throughout. Weights " needed to be lowered throughout 2/2 fatigue. Pt limited by L BKA and limited mobility/strength in L shoulder/pain during movement.      Plan    Cont overall strength/endurance and standing balance to improve ADL's and functional mobility

## 2019-07-14 NOTE — THERAPY
"Physical Therapy   Daily Treatment     Patient Name: Travon Pollack  Age:  63 y.o., Sex:  male  Medical Record #: [id number]  Today's Date: 7/14/2019     Precautions  Precautions: (P) Non Weight Bearing Left Lower Extremity  Comments: (P) IPOP left residual limb    Subjective    Patient concerned about nursing wound check on right heel and left residual limb     Objective       07/14/19 1001   Precautions   Precautions Non Weight Bearing Left Lower Extremity   Comments IPOP left residual limb   PT Total Time Spent   PT Individual Total Time Spent (Mins) 30   PT Charge Group   PT Therapeutic Activities 2     Added padding to IPOP for patient comfort (c/o \"leaving a rash on upper thigh)    Assessment    Left knee able to reach full extension    Plan    Reinforce BKA education; assess use of knee scooter for short term home mobility (compare to FWW, w/c)- consider left shoulder function, home accessibility, patient motivation?    "

## 2019-07-14 NOTE — THERAPY
"Physical Therapy   Daily Treatment     Patient Name: Travon Pollack  Age:  63 y.o., Sex:  male  Medical Record #: [id number]  Today's Date: 7/14/2019     Precautions  Precautions: Non Weight Bearing Left Lower Extremity, Fall Risk, Other (See Comments)  Comments: IPOP L residual limb; chronic L shoulder weakness/pain    Subjective    Pt received in bed in room, agreeable to PT session.     Objective       07/14/19 1431   Precautions   Precautions Non Weight Bearing Left Lower Extremity;Fall Risk;Other (See Comments)   Comments IPOP L residual limb; chronic L shoulder weakness/pain   Vitals   Pulse 81   Pulse Oximetry 92 %   O2 (LPM) 2   O2 Delivery Nasal Cannula   Cognition    Level of Consciousness Alert   Supine Lower Body Exercise   Other Exercises proning x10 min with pillow under pelvis: in prone pt completed glut sets x10 reps with 3-5\" hold; L hip extension 2x10 reps   Bed Mobility    Supine to Sit Stand by Assist  (to pt's L)   Sit to Supine Stand by Assist   Sit to Stand Minimal Assist   Rolling Minimal Assist to Rt.  (Min A for L UE management during rolling to prone)   Interdisciplinary Plan of Care Collaboration   Patient Position at End of Therapy Seated;Self Releasing Lap Belt Applied;Call Light within Reach;Tray Table within Reach;Phone within Reach   PT Total Time Spent   PT Individual Total Time Spent (Mins) 60   PT Charge Group   PT Gait Training 1   PT Therapeutic Exercise 2   PT Therapeutic Activities 1       FIM Bed/Chair/Wheelchair Transfers Score: 4 - Minimal Assistance  Bed/Chair/Wheelchair Transfers Description:   (sit<>supine SBA with increased time, squat-pivot with reach to R CGA and cues for safety)    FIM Walking Score:  1 - Total Assistance  Walking Description:   (pt amb x5 hops in // bars with MIn A+wc follow; pt then ambulated x3 hops with lizandro FWW (d/t increased stability) with Min A+wc follow)    Prone x10-12 min, pillow used under pelvis to improve back comfort. PT reviewed " desensitization with pt.    PT requested that pt's spouse take pictures and measurements of bedroom, home entry, and bedroom doorways to facilitate determination of DME for DC.    Assessment    Pt limited by L shoulder pain/weakness during gait in both // bars and with FWW. Increased time to complete all activities d/t pt with impaired attention.    Plan    Progress gait with lizandro FWW as pt able, obtain R heel offloading shoe (size L), progress mat program, functional transfer training

## 2019-07-15 PROBLEM — D64.9 ANEMIA: Status: ACTIVE | Noted: 2019-07-15

## 2019-07-15 PROBLEM — E55.9 VITAMIN D DEFICIENCY: Status: ACTIVE | Noted: 2019-07-15

## 2019-07-15 PROBLEM — R79.89 AZOTEMIA: Status: ACTIVE | Noted: 2019-07-15

## 2019-07-15 LAB
ANION GAP SERPL CALC-SCNC: 8 MMOL/L (ref 0–11.9)
BUN SERPL-MCNC: 34 MG/DL (ref 8–22)
CALCIUM SERPL-MCNC: 8.5 MG/DL (ref 8.5–10.5)
CHLORIDE SERPL-SCNC: 99 MMOL/L (ref 96–112)
CO2 SERPL-SCNC: 27 MMOL/L (ref 20–33)
CREAT SERPL-MCNC: 1.14 MG/DL (ref 0.5–1.4)
ERYTHROCYTE [DISTWIDTH] IN BLOOD BY AUTOMATED COUNT: 51.5 FL (ref 35.9–50)
GLUCOSE BLD-MCNC: 130 MG/DL (ref 65–99)
GLUCOSE BLD-MCNC: 141 MG/DL (ref 65–99)
GLUCOSE BLD-MCNC: 158 MG/DL (ref 65–99)
GLUCOSE BLD-MCNC: 223 MG/DL (ref 65–99)
GLUCOSE SERPL-MCNC: 144 MG/DL (ref 65–99)
HCT VFR BLD AUTO: 29.6 % (ref 42–52)
HGB BLD-MCNC: 8.9 G/DL (ref 14–18)
MCH RBC QN AUTO: 27.4 PG (ref 27–33)
MCHC RBC AUTO-ENTMCNC: 30.1 G/DL (ref 33.7–35.3)
MCV RBC AUTO: 91.1 FL (ref 81.4–97.8)
PLATELET # BLD AUTO: 244 K/UL (ref 164–446)
PMV BLD AUTO: 10.4 FL (ref 9–12.9)
POTASSIUM SERPL-SCNC: 4 MMOL/L (ref 3.6–5.5)
RBC # BLD AUTO: 3.25 M/UL (ref 4.7–6.1)
SODIUM SERPL-SCNC: 134 MMOL/L (ref 135–145)
WBC # BLD AUTO: 7.9 K/UL (ref 4.8–10.8)

## 2019-07-15 PROCEDURE — 97530 THERAPEUTIC ACTIVITIES: CPT

## 2019-07-15 PROCEDURE — 99232 SBSQ HOSP IP/OBS MODERATE 35: CPT | Performed by: HOSPITALIST

## 2019-07-15 PROCEDURE — 97110 THERAPEUTIC EXERCISES: CPT

## 2019-07-15 PROCEDURE — 770010 HCHG ROOM/CARE - REHAB SEMI PRIVAT*

## 2019-07-15 PROCEDURE — A9270 NON-COVERED ITEM OR SERVICE: HCPCS | Performed by: HOSPITALIST

## 2019-07-15 PROCEDURE — 97116 GAIT TRAINING THERAPY: CPT

## 2019-07-15 PROCEDURE — 700102 HCHG RX REV CODE 250 W/ 637 OVERRIDE(OP): Performed by: HOSPITALIST

## 2019-07-15 PROCEDURE — A9270 NON-COVERED ITEM OR SERVICE: HCPCS | Performed by: PHYSICAL MEDICINE & REHABILITATION

## 2019-07-15 PROCEDURE — 82962 GLUCOSE BLOOD TEST: CPT | Mod: 91

## 2019-07-15 PROCEDURE — 99233 SBSQ HOSP IP/OBS HIGH 50: CPT | Performed by: PHYSICAL MEDICINE & REHABILITATION

## 2019-07-15 PROCEDURE — 85027 COMPLETE CBC AUTOMATED: CPT

## 2019-07-15 PROCEDURE — 80048 BASIC METABOLIC PNL TOTAL CA: CPT

## 2019-07-15 PROCEDURE — 700102 HCHG RX REV CODE 250 W/ 637 OVERRIDE(OP): Performed by: PHYSICAL MEDICINE & REHABILITATION

## 2019-07-15 PROCEDURE — 94760 N-INVAS EAR/PLS OXIMETRY 1: CPT

## 2019-07-15 PROCEDURE — 97535 SELF CARE MNGMENT TRAINING: CPT

## 2019-07-15 PROCEDURE — 36415 COLL VENOUS BLD VENIPUNCTURE: CPT

## 2019-07-15 RX ORDER — BENZOCAINE/MENTHOL 6 MG-10 MG
LOZENGE MUCOUS MEMBRANE 2 TIMES DAILY PRN
Status: DISCONTINUED | OUTPATIENT
Start: 2019-07-15 | End: 2019-07-31 | Stop reason: HOSPADM

## 2019-07-15 RX ORDER — MICONAZOLE NITRATE 20 MG/G
CREAM TOPICAL 2 TIMES DAILY
Status: DISCONTINUED | OUTPATIENT
Start: 2019-07-15 | End: 2019-07-31 | Stop reason: HOSPADM

## 2019-07-15 RX ADMIN — METHADONE HYDROCHLORIDE 20 MG: 10 TABLET ORAL at 20:22

## 2019-07-15 RX ADMIN — OXYCODONE HYDROCHLORIDE 5 MG: 5 TABLET ORAL at 15:43

## 2019-07-15 RX ADMIN — OXYCODONE HYDROCHLORIDE AND ACETAMINOPHEN 500 MG: 500 TABLET ORAL at 07:48

## 2019-07-15 RX ADMIN — PREGABALIN 300 MG: 100 CAPSULE ORAL at 20:23

## 2019-07-15 RX ADMIN — FLUTICASONE PROPIONATE 100 MCG: 50 SPRAY, METERED NASAL at 07:49

## 2019-07-15 RX ADMIN — OXYCODONE HYDROCHLORIDE 5 MG: 5 TABLET ORAL at 07:48

## 2019-07-15 RX ADMIN — OXYCODONE HYDROCHLORIDE 5 MG: 5 TABLET ORAL at 01:40

## 2019-07-15 RX ADMIN — METHADONE HYDROCHLORIDE 20 MG: 10 TABLET ORAL at 07:48

## 2019-07-15 RX ADMIN — MELATONIN TAB 3 MG 3 MG: 3 TAB at 23:27

## 2019-07-15 RX ADMIN — SENNOSIDES AND DOCUSATE SODIUM 2 TABLET: 8.6; 5 TABLET ORAL at 20:23

## 2019-07-15 RX ADMIN — METOPROLOL TARTRATE 12.5 MG: 25 TABLET ORAL at 07:46

## 2019-07-15 RX ADMIN — MICONAZOLE NITRATE: 20 CREAM TOPICAL at 20:27

## 2019-07-15 RX ADMIN — OXYCODONE HYDROCHLORIDE 5 MG: 5 TABLET ORAL at 21:31

## 2019-07-15 RX ADMIN — PREGABALIN 300 MG: 100 CAPSULE ORAL at 07:46

## 2019-07-15 RX ADMIN — FERROUS SULFATE TAB 325 MG (65 MG ELEMENTAL FE) 325 MG: 325 (65 FE) TAB at 07:48

## 2019-07-15 RX ADMIN — ASPIRIN 81 MG 81 MG: 81 TABLET ORAL at 07:46

## 2019-07-15 RX ADMIN — VITAMIN D, TAB 1000IU (100/BT) 4000 UNITS: 25 TAB at 07:47

## 2019-07-15 RX ADMIN — HYDROCORTISONE: 1 CREAM TOPICAL at 07:49

## 2019-07-15 ASSESSMENT — ENCOUNTER SYMPTOMS
EYES NEGATIVE: 1
CHILLS: 0
VOMITING: 0
FEVER: 0
COUGH: 0
PALPITATIONS: 0
NAUSEA: 0
ABDOMINAL PAIN: 0
SHORTNESS OF BREATH: 0

## 2019-07-15 NOTE — THERAPY
"Physical Therapy   Daily Treatment     Patient Name: Travon Pollack  Age:  63 y.o., Sex:  male  Medical Record #: [id number]  Today's Date: 7/15/2019     Precautions  Precautions: (P) Non Weight Bearing Left Lower Extremity, Immobilizer Left Lower Extremity, Fall Risk, Other (See Comments)  Comments: (P) IPOP L residual limb; chronic L shoulder weakness/pain    Subjective    Pt received in bed eating breakfast, not yet dressed. \"I got breakfast at 10 after.\"     Objective       07/15/19 0831   Precautions   Precautions Non Weight Bearing Left Lower Extremity;Immobilizer Left Lower Extremity;Fall Risk;Other (See Comments)   Comments IPOP L residual limb; chronic L shoulder weakness/pain   Cognition    Level of Consciousness Alert   Supine Lower Body Exercise   Other Exercises PT assisted pt to prone, pt proning x20 min (following end of treatment session)   Bed Mobility    Supine to Sit Supervised   Sit to Supine Supervised   Scooting Supervised   Rolling (Min A for L UE management)   Interdisciplinary Plan of Care Collaboration   IDT Collaboration with  Other (See Comments)   Patient Position at End of Therapy In Bed;Call Light within Reach;Other (Comments)   Collaboration Comments attempted to obtain appropriate size offloading shoe from central supply   PT Total Time Spent   PT Individual Total Time Spent (Mins) 30   PT Charge Group   PT Therapeutic Activities 2     PT assisted pt in setup for dressing, provided pt with shirt and shorts. Pt attempted to don shorts seated at EOB, PT advised pt in donning shorts in supine with use of bridge and rolling both sides at which pt able to complete lower body dressing with SBA. Pt donned t-shirt seated EOB with Mod I.     Assessment    Pt requires assist to position in prone d/t L shoulder pain/weakness.    Plan    Progress gait with [facility] as pt able, obtain R heel offloading shoe (size L), progress mat program, functional transfer training    "

## 2019-07-15 NOTE — FLOWSHEET NOTE
07/15/19 1327   Events/Summary/Plan   Events/Summary/Plan 02 spot check.     Respiratory WDL   Respiratory (WDL) X   Chest Exam   Respiration 18   Pulse 72   Oximetry   #Pulse Oximetry (Single Determination) Yes   Oxygen   Home O2 Use Prior To Admission? Yes   Home O2 LPM Flow 3 LPM   Home O2 Delivery Method Nasal Cannula   Home O2 Frequency of Use At Sleep   Pulse Oximetry 92 %   O2 (LPM) 3   O2 Daily Delivery Respiratory  Silicone Nasal Cannula

## 2019-07-15 NOTE — PROGRESS NOTES
"Rehab Progress Note     Date of Service: 7/15/2019  Chief Complaint: follow up left BKA    Interval Events (Subjective)    Patient seen and examined in his room today.  He is about ready to start occupational therapy.  He continues to complain of severe throbbing left residual limb pain worse at night.  He does thinks maybe is mildly better.  Hemoglobin mildly lower so his Lovenox has been discontinued today.  Case was discussed with hospitalist.  Patient also continues to have left shoulder pain chronic. RN reports rash on right flank has resolved.     Objective:  VITAL SIGNS: /69   Pulse 74   Temp 36.6 °C (97.9 °F) (Temporal)   Resp 18   Ht 1.803 m (5' 11\")   Wt 89 kg (196 lb 3.4 oz)   SpO2 91%   BMI 27.37 kg/m²   Gen: alert, no apparent distress  CV: regular rate and rhythm, no murmurs, no peripheral edema  Resp: clear to ascultation bilaterally, normal respiratory effort  GI: soft, non-tender abdomen, bowel sounds present  Neuro: notable for left transtibial amputation    Recent Results (from the past 72 hour(s))   ACCU-CHEK GLUCOSE    Collection Time: 07/12/19 11:01 AM   Result Value Ref Range    Glucose - Accu-Ck 170 (H) 65 - 99 mg/dL   ACCU-CHEK GLUCOSE    Collection Time: 07/12/19  5:16 PM   Result Value Ref Range    Glucose - Accu-Ck 116 (H) 65 - 99 mg/dL   ACCU-CHEK GLUCOSE    Collection Time: 07/12/19  8:08 PM   Result Value Ref Range    Glucose - Accu-Ck 152 (H) 65 - 99 mg/dL   CBC WITHOUT DIFFERENTIAL    Collection Time: 07/13/19  5:21 AM   Result Value Ref Range    WBC 7.2 4.8 - 10.8 K/uL    RBC 3.61 (L) 4.70 - 6.10 M/uL    Hemoglobin 9.8 (L) 14.0 - 18.0 g/dL    Hematocrit 32.7 (L) 42.0 - 52.0 %    MCV 90.6 81.4 - 97.8 fL    MCH 27.1 27.0 - 33.0 pg    MCHC 30.0 (L) 33.7 - 35.3 g/dL    RDW 51.2 (H) 35.9 - 50.0 fL    Platelet Count 261 164 - 446 K/uL    MPV 9.8 9.0 - 12.9 fL   Basic Metabolic Panel    Collection Time: 07/13/19  5:21 AM   Result Value Ref Range    Sodium 138 135 - 145 mmol/L "    Potassium 4.1 3.6 - 5.5 mmol/L    Chloride 102 96 - 112 mmol/L    Co2 28 20 - 33 mmol/L    Glucose 142 (H) 65 - 99 mg/dL    Bun 38 (H) 8 - 22 mg/dL    Creatinine 1.22 0.50 - 1.40 mg/dL    Calcium 8.6 8.5 - 10.5 mg/dL    Anion Gap 8.0 0.0 - 11.9   ESTIMATED GFR    Collection Time: 07/13/19  5:21 AM   Result Value Ref Range    GFR If African American >60 >60 mL/min/1.73 m 2    GFR If Non African American 59 (A) >60 mL/min/1.73 m 2   ACCU-CHEK GLUCOSE    Collection Time: 07/13/19  7:59 AM   Result Value Ref Range    Glucose - Accu-Ck 135 (H) 65 - 99 mg/dL   ACCU-CHEK GLUCOSE    Collection Time: 07/13/19 11:24 AM   Result Value Ref Range    Glucose - Accu-Ck 155 (H) 65 - 99 mg/dL   ACCU-CHEK GLUCOSE    Collection Time: 07/13/19  5:25 PM   Result Value Ref Range    Glucose - Accu-Ck 148 (H) 65 - 99 mg/dL   ACCU-CHEK GLUCOSE    Collection Time: 07/13/19  8:05 PM   Result Value Ref Range    Glucose - Accu-Ck 202 (H) 65 - 99 mg/dL   ACCU-CHEK GLUCOSE    Collection Time: 07/14/19  1:55 AM   Result Value Ref Range    Glucose - Accu-Ck 141 (H) 65 - 99 mg/dL   ACCU-CHEK GLUCOSE    Collection Time: 07/14/19  7:52 AM   Result Value Ref Range    Glucose - Accu-Ck 127 (H) 65 - 99 mg/dL   ACCU-CHEK GLUCOSE    Collection Time: 07/14/19 11:04 AM   Result Value Ref Range    Glucose - Accu-Ck 173 (H) 65 - 99 mg/dL   ACCU-CHEK GLUCOSE    Collection Time: 07/14/19  5:16 PM   Result Value Ref Range    Glucose - Accu-Ck 176 (H) 65 - 99 mg/dL   ACCU-CHEK GLUCOSE    Collection Time: 07/14/19  8:51 PM   Result Value Ref Range    Glucose - Accu-Ck 150 (H) 65 - 99 mg/dL   CBC WITHOUT DIFFERENTIAL    Collection Time: 07/15/19  5:42 AM   Result Value Ref Range    WBC 7.9 4.8 - 10.8 K/uL    RBC 3.25 (L) 4.70 - 6.10 M/uL    Hemoglobin 8.9 (L) 14.0 - 18.0 g/dL    Hematocrit 29.6 (L) 42.0 - 52.0 %    MCV 91.1 81.4 - 97.8 fL    MCH 27.4 27.0 - 33.0 pg    MCHC 30.1 (L) 33.7 - 35.3 g/dL    RDW 51.5 (H) 35.9 - 50.0 fL    Platelet Count 244 164 - 446  K/uL    MPV 10.4 9.0 - 12.9 fL   Basic Metabolic Panel    Collection Time: 07/15/19  5:42 AM   Result Value Ref Range    Sodium 134 (L) 135 - 145 mmol/L    Potassium 4.0 3.6 - 5.5 mmol/L    Chloride 99 96 - 112 mmol/L    Co2 27 20 - 33 mmol/L    Glucose 144 (H) 65 - 99 mg/dL    Bun 34 (H) 8 - 22 mg/dL    Creatinine 1.14 0.50 - 1.40 mg/dL    Calcium 8.5 8.5 - 10.5 mg/dL    Anion Gap 8.0 0.0 - 11.9   ESTIMATED GFR    Collection Time: 07/15/19  5:42 AM   Result Value Ref Range    GFR If African American >60 >60 mL/min/1.73 m 2    GFR If Non African American >60 >60 mL/min/1.73 m 2       Current Facility-Administered Medications   Medication Frequency   • metoprolol (LOPRESSOR) tablet 12.5 mg DAILY   • methadone (DOLOPHINE) tablet 20 mg BID   • vitamin D (cholecalciferol) tablet 4,000 Units DAILY   • ascorbic acid tablet 500 mg QDAY with Breakfast   • ferrous sulfate tablet 325 mg QDAY with Breakfast   • insulin lispro (HUMALOG) injection 2-12 Units 4X/DAY ACHS    And   • glucose 4 g chewable tablet 16 g Q15 MIN PRN   • Respiratory Care per Protocol Continuous RT   • Pharmacy Consult Request ...Pain Management Review 1 Each PHARMACY TO DOSE   • acetaminophen (TYLENOL) tablet 650 mg Q4HRS PRN   • artificial tears 1.4 % ophthalmic solution 1 Drop PRN   • benzocaine-menthol (CEPACOL) lozenge 1 Lozenge Q2HRS PRN   • mag hydrox-al hydrox-simeth (MAALOX PLUS ES or MYLANTA DS) suspension 20 mL Q2HRS PRN   • ondansetron (ZOFRAN ODT) dispertab 4 mg 4X/DAY PRN    Or   • ondansetron (ZOFRAN) syringe/vial injection 4 mg 4X/DAY PRN   • traZODone (DESYREL) tablet 50 mg QHS PRN   • sodium chloride (OCEAN) 0.65 % nasal spray 2 Spray PRN   • hydrOXYzine HCl (ATARAX) tablet 50 mg Q6HRS PRN   • melatonin tablet 3 mg HS PRN   • polyethylene glycol/lytes (MIRALAX) PACKET 1 Packet QDAY PRN    And   • magnesium hydroxide (MILK OF MAGNESIA) suspension 30 mL QDAY PRN    And   • bisacodyl (DULCOLAX) suppository 10 mg QDAY PRN   • aspirin (ASA)  chewable tab 81 mg DAILY   • enoxaparin (LOVENOX) inj 40 mg DAILY   • fluticasone (FLONASE) nasal spray 100 mcg DAILY   • oxyCODONE immediate-release (ROXICODONE) tablet 5 mg Q6HRS PRN   • pregabalin (LYRICA) capsule 300 mg BID   • senna-docusate (PERICOLACE or SENOKOT S) 8.6-50 MG per tablet 2 Tab BID   • tramadol (ULTRAM) 50 MG tablet 100 mg Q6HRS PRN   • dextrose 50% (D50W) injection 50 mL Q15 MIN PRN   • diphenhydrAMINE-ZnAcetate (BENADRYL ITCH) 1-0.1 % cream TID PRN   • albuterol inhaler 2 Puff Q4HRS PRN   • hydrocortisone 1 % cream BID       Orders Placed This Encounter   Procedures   • Diet Order Diabetic, Cardiac     Standing Status:   Standing     Number of Occurrences:   1     Order Specific Question:   Diet:     Answer:   Diabetic [3]     Order Specific Question:   Diet:     Answer:   Cardiac [6]     Order Specific Question:   Consistency/Fluid modifications:     Answer:   Thin Liquids [3]       Assessment:  Active Hospital Problems    Diagnosis   • *S/P BKA (below knee amputation) unilateral, left ([facility])   • Acute on chronic respiratory failure with hypoxia ([facility])   • SALOME (obstructive sleep apnea)   • Cardiomyopathy ([facility])   • DM (diabetes mellitus), type 2, uncontrolled ([facility])   • Essential hypertension   • Left shoulder pain   • Chronic pain syndrome   • Methadone dependence ([facility])     This patient is a 63 y.o. male admitted for acute inpatient rehabilitation with S/P BKA (below knee amputation) unilateral, left ([facility]).    I led and attended the weekly conference today, and agree with the IDT conference documentation and plan of care as noted below.    Date of conference: 7/15/2019    Goals and barriers: See IDT note.    Biggest barriers: pain in limb, poor sleep last night, left shoulder chronic issues,     Therapy update 7/15/2019  Supervision eating  Supervision grooming  Min assist bathing  Supervision upper body dressing  Min assist lower body dressing  Max assist toileting  Min assistbladder  Min  assist bowel  Min assist bed/chair transfer  Mod assist toilet transfer  Mod assist tub/shower transfer  Total assist ambulation  Supervision wheelchair propulsion  Not tested stairs  Modified Independent comprehension  Supervision expression  Modified Independent social interaction  Supervision problem solving  Supervision memory    CM/social support: To single level home in [place], [place], with support of wife.    Anticipated DC date: 7/29/2019    Outpatient: PT    Equip: knee scooter, grab bars    Follow up: PCP, surgery    Medical Decision Making and Plan:    Left BKA  Dr. Schneider 6/24  Continue full rehab program  PT/OT, 1 hr each discipline   Outpatient follow up with surgery, apt Weds, can remove sutures there    Residual limb fluid collection  Post-op seroma versus hematoma  US with 3 distinct fluid collections  Most likely seroma, though hemoglobin slightly lower, d/c Lovenox and apirin  Schedule with orthopedics - has apt Weds    Normocytic anemia  Likely post-operative  Monitor, mild drop, see above  Recheck in am    History of elevated troponin  Discontinue aspirin due to decreased Hgb    Diabetes  Continue insulin  Appreciate hospitalist assistance    Chronic pain  Narcotic dependence/tolerance  Continue methadone, prescribed by his PCP  Dose decreased from 35 mg BID at home, to 20 mg BID due to encephalopathy (stroke code was called)  Continue PRN opiates     Left shoulder pain  With history of multiple surgeries, including left humeral head resection  Modalities with therapy  Continue pain management     Rashes  Groin/buttock rash - miconazole, continues  Trunk rash - hydrocortison cream, resolved     Cardiomyopathy, EF 40%  Continue metoprolol  Appreciate hospitalist assistance     History of one kidney  Avoid nephrotoxins     Respiratory insufficiency  Sleep apnea  On 3 L at home at night  Respiratory therapy to see patient      Hypertension  Continue metoprolol  Appreciate hospitalist  assistance    Vit D deficiency, 12  Continue supplementation    DVT prophylaxis  Lovenox    Total time:  40 minutes.  I spent greater than 50% of the time for patient care, counseling, and coordination on this date, including patient face-to face time, unit/floor time with review of records/pertinent lab data and studies, as well as discussing diagnostic evaluation/work up, planned therapeutic interventions, and future disposition of care, as per the interval events/subjective and the assessment and plan as noted above.        Mimi Doyle M.D.   Physical Medicine and Rehabilitation

## 2019-07-15 NOTE — REHAB-CM IDT TEAM NOTE
Case Management    DC Planning    DC destination/dispostion:  Patient lives in a mobile home with his wife. There are 5 entry steps.     Referrals: Anticoagulation follow up.     DC Needs: Patient has wheelchair, fww and shower seat. Patient will likely need home health. Needs follow up with PCP. Patient has follow up scheduled with cardiology and cardiac surgery.    Barriers to discharge: Slow progress.      Strengths: Good home support.     Section completed by:  Summer Tapia R.N.

## 2019-07-15 NOTE — PROGRESS NOTES
0715: Bedside report received, assumed care for this patient.  Patient is A&O x 4.  VSS.  Communication board updated, call light and belongings are within reach.  Bed is in low position. Patient appears in no distress, and has no complaints of SOB and 0/10 of pain.  Will be medicated per MAR.  Plan of care discussed and agreed upon with patient. Patient awake all night talking with roommate and did not sleep.

## 2019-07-15 NOTE — THERAPY
Physical Therapy   Daily Treatment     Patient Name: Travon Pollack  Age:  63 y.o., Sex:  male  Medical Record #: [id number]  Today's Date: 7/15/2019     Precautions  Precautions: Non Weight Bearing Left Lower Extremity, Immobilizer Left Lower Extremity, Fall Risk  Comments: IPOP L residual limb; chronic L shoulder weakness/pain    Subjective    Pt resting in bed, willing to participate     Objective       07/15/19 1001   Precautions   Precautions Non Weight Bearing Left Lower Extremity;Immobilizer Left Lower Extremity;Fall Risk   Supine Lower Body Exercise   Bridges Two Legged;2 sets of 15  (modified with bolster to LBKA)   Hip Abduction Side Lying;2 sets of 15   Straight Leg Raises 2 sets of 15;Left   Other Exercises quad sets/ glut sets/ adductor squeezes 2 x 15 with 5 sec SIMONE holds.    PT Total Time Spent   PT Individual Total Time Spent (Mins) 30   PT Charge Group   PT Therapeutic Exercise 2     Light touch and deep pressure desensitization to L residual limb as tolerated.       Assessment    Pt able to direct transfer sequencing / wc parts set-up. Requires manual cues for L hip abd in sidelying for proper form.     Plan    Progress gait with lizandro LEVY as pt able, obtain R heel offloading shoe (size L), progress mat program, functional transfer training

## 2019-07-15 NOTE — REHAB-OT IDT TEAM NOTE
Occupational Therapy   Activities of Daily Living  Eating Initial:  5 - Standby Prompting/Supervision or Set-up  Eating Current:  6 - Modified Independent   Eating Description:  Increased time  Grooming Initial:  5 - Standby Prompting/Supervision or Set-up  Grooming Current:  6 - Modified Independent   Grooming Description:  Increased time  Bathing Initial:  4 - Minimal Assistance  Bathing Current:  5 - Standby Prompting/Supervision or Set-up   Bathing Description:  Grab bar, Tub bench, Hand held shower, Increased time, Supervision for safety, Verbal cueing, Set-up of equipment (seated on fold-down bench)  Upper Body Dressing Initial:  4 - Minimal Assistance  Upper Body Dressing Current:  6 - Modified Independent   Upper Body Dressing Description:  Increased time  Lower Body Dressing Initial:  2 - Max Assistance  Lower Body Dressing Current:  5 - Standby Prompting/Supervsion or Set-up   Lower Body Dressing Description:  5 - Standby Prompting/Supervsion or Set-up  Toileting Initial:  2 - Max Assistance  Toileting Current:  2 - Max Assistance   Toileting Description:  Grab bar, Increased time, Supervision for safety, Verbal cueing, Set-up of equipment, Initial preparation for task  Toilet Transfer Initial:  3 - Moderate Assistance  Toilet Transfer Current:  4 - Minimal Assistance   Toilet Transfer Description:  4 - Minimal Assistance  Tub / Shower Transfer Initial:  3 - Moderate Assistance  Tub / Shower Transfer Current:  4 - Minimal Assistance   Tub / Shower Transfer Description:  Grab bar, Increased time, Supervision for safety, Verbal cueing, Set-up of equipment (CGA lateral scoot with GB)  IADL:  N/A  Family Training/Education:  TBD  DME/DC Recommendations:  TBD - shower seat, grab bars at toilet and shower    Strengths:  Able to follow instructions, Alert and oriented, Independent PLOF, Making steady progress towards goals, Motivated for self care and independence, Pleasant and cooperative, Supportive family and  Willingly participates in therapeutic activities  Barriers:  Generalized weakness, Home accessibility, Poor balance, Pressure ulcer and Other: requires O2 during activities, L chronic shoulder weakness/pain     # of short term goals set= 3    # of short term goals met= 3/4          Occupational Therapy Goals           Problem: Dressing     Dates: Start: 07/11/19       Goal: STG-Within one week, patient will dress LB     Dates: Start: 07/15/19       Description: 1) Individualized Goal: With supervision a using AE/AD as needed   2) Interventions: OT Group Therapy, OT Self Care/ADL, OT Cognitive Skill Dev, OT Community Reintegration, OT Manual Ther Technique, OT Neuro Re-Ed/Balance, OT Sensory Int Techniques, OT Therapeutic Activity, OT Evaluation and OT Therapeutic Exercise                Problem: Functional Transfers     Dates: Start: 07/11/19       Goal: STG-Within one week, patient will transfer to toilet     Dates: Start: 07/15/19       Description: 1) Individualized Goal: With supervision a using AE/AD as needed   2) Interventions: OT Group Therapy, OT Self Care/ADL, OT Cognitive Skill Dev, OT Community Reintegration, OT Manual Ther Technique, OT Neuro Re-Ed/Balance, OT Sensory Int Techniques, OT Therapeutic Activity, OT Evaluation and OT Therapeutic Exercise              Problem: OT Long Term Goals     Dates: Start: 07/11/19       Goal: LTG-By discharge, patient will complete basic self care tasks     Dates: Start: 07/11/19       Description: 1) Individualized Goal:  With supervision using AE/AD as needed   2) Interventions:  OT Group Therapy, OT Self Care/ADL, OT Cognitive Skill Dev, OT Community Reintegration, OT Manual Ther Technique, OT Neuro Re-Ed/Balance, OT Sensory Int Techniques, OT Therapeutic Activity, OT Evaluation and OT Therapeutic Exercise           Goal: LTG-By discharge, patient will perform bathroom transfers     Dates: Start: 07/11/19       Description: 1) Individualized Goal:  With supervision  using AE/AD as needed   2) Interventions:  OT Group Therapy, OT Self Care/ADL, OT Cognitive Skill Dev, OT Community Reintegration, OT Manual Ther Technique, OT Neuro Re-Ed/Balance, OT Sensory Int Techniques, OT Therapeutic Activity, OT Evaluation and OT Therapeutic Exercise           Goal: LTG-By discharge, patient will complete basic home management     Dates: Start: 07/11/19       Description: 1) Individualized Goal:  With supervision using AE/AD as needed   2) Interventions:  OT Group Therapy, OT Self Care/ADL, OT Cognitive Skill Dev, OT Community Reintegration, OT Manual Ther Technique, OT Neuro Re-Ed/Balance, OT Sensory Int Techniques, OT Therapeutic Activity, OT Evaluation and OT Therapeutic Exercise             Problem: Toileting     Dates: Start: 07/11/19       Goal: STG-Within one week, patient will complete toileting tasks     Dates: Start: 07/11/19       Description: 1) Individualized Goal:  With min a using AE/AD as needed   2) Interventions:  OT Group Therapy, OT Self Care/ADL, OT Cognitive Skill Dev, OT Community Reintegration, OT Manual Ther Technique, OT Neuro Re-Ed/Balance, OT Sensory Int Techniques, OT Therapeutic Activity, OT Evaluation and OT Therapeutic Exercise     Note:     Goal Note filed on 07/15/19 1142 by Nara Ojeda MS,OTR/L    Goal: STG-Within one week, patient will complete toileting tasks  Outcome: NOT MET  Varied assist - Mod A                      Section completed by:  Nara Ojeda MS,OTR/L

## 2019-07-15 NOTE — REHAB-COLLABORATIVE ONGOING IDT TEAM NOTE
Weekly Interdisciplinary Team Conference Note    Weekly Interdisciplinary Team Conference # 1  Date:  7/15/2019    Clinicians present and reporting at team conference include the following:   MD: Mimi Doyle MD   RN:  Janis De La Garza RN   PT:   Azalia Martínez, PT, DPT  OT:  Nara Ojeda, MS, OTR/L   ST:  None  CM:  Anahi Mccall RN  REC:  None  RT:  None  RPh:  Alessio Starkey, [facility]  Other:  Kassandra Small, Medical Student  All reporting clinicians have a working knowledge of this patient's plan of care.    Targeted DC Date:  7/29/2019     Medical    Patient Active Problem List    Diagnosis Date Noted   • Acute encephalopathy 06/27/2019     Priority: High   • OZ (acute kidney injury) ([facility]) 06/18/2019     Priority: High   • Acute on chronic respiratory failure with hypoxia ([facility]) 01/25/2018     Priority: High   • Diabetic foot ulcer ([facility]) 01/13/2018     Priority: High   • Facial nerve paralysis 09/03/2016     Priority: High   • Septic shock due to undetermined organism ([facility]) 07/10/2015     Priority: High   • SALOME (obstructive sleep apnea) 06/16/2019     Priority: Medium   • Pneumonia due to infectious organism 06/16/2019     Priority: Medium   • Aspiration into airway 06/16/2019     Priority: Medium   • Cardiomyopathy ([facility]) 01/29/2018     Priority: Medium   • DM (diabetes mellitus), type 2, uncontrolled (CMS-[facility]) 07/12/2015     Priority: Medium   • Essential hypertension 03/16/2013     Priority: Medium   • Mucus plugging of bronchi 06/16/2019     Priority: Low   • Left shoulder pain 06/15/2019     Priority: Low   • Hypokalemia 07/12/2015     Priority: Low   • Tobacco dependence 07/12/2015     Priority: Low   • Low back pain 07/10/2015     Priority: Low   • S/P BKA (below knee amputation) unilateral, left ([facility]) 07/10/2019   • Hypoglycemia 06/27/2019   • Leucocytosis 06/23/2019   • Chronic pain syndrome 01/27/2018   • Opiate withdrawal ([facility]) 01/12/2018   • Methadone dependence ([facility]) 01/12/2018   • Acute respiratory  failure with hypoxemia ([facility]) 01/12/2018   • Closed 2-part nondisplaced fracture of surgical neck of left humerus 01/12/2018   • Shingles 09/03/2016   • CVA (cerebral vascular accident) ([facility]) 09/02/2016   • Thrombocytopenia ([facility]) 07/12/2015   • Obesity 07/12/2015   • Hyponatremia 07/10/2015   • Tobacco abuse 07/10/2015   • ARF (acute renal failure) ([facility]) 07/10/2015   • DM (diabetes mellitus) (CMS-HCC) 03/16/2013   • Back pain 03/15/2013   • Fall 03/15/2013   • Spine disorder 03/04/2013   • Degeneration of lumbar or lumbosacral intervertebral disc 02/11/2013   • Lumbar stenosis 02/11/2013     Results     Procedure Component Value Ref Range Date/Time    ACCU-CHEK GLUCOSE [[id number]]  (Abnormal) Collected:  07/15/19 1130    Order Status:  Completed Lab Status:  Final result Updated:  07/15/19 1156     Glucose - Accu-Ck 158 (H) 65 - 99 mg/dL     ACCU-CHEK GLUCOSE [[id number]]  (Abnormal) Collected:  07/15/19 0744    Order Status:  Completed Lab Status:  Final result Updated:  07/15/19 0912     Glucose - Accu-Ck 130 (H) 65 - 99 mg/dL     ESTIMATED GFR [[id number]] Collected:  07/15/19 0542    Order Status:  Completed Lab Status:  Final result Updated:  07/15/19 0716     GFR If African American >60 >60 mL/min/1.73 m 2      GFR If Non African American >60 >60 mL/min/1.73 m 2     Basic Metabolic Panel [[id number]]  (Abnormal) Collected:  07/15/19 0542    Order Status:  Completed Lab Status:  Final result Updated:  07/15/19 0716    Specimen:  Blood      Sodium 134 (L) 135 - 145 mmol/L      Potassium 4.0 3.6 - 5.5 mmol/L      Chloride 99 96 - 112 mmol/L      Co2 27 20 - 33 mmol/L      Glucose 144 (H) 65 - 99 mg/dL      Bun 34 (H) 8 - 22 mg/dL      Creatinine 1.14 0.50 - 1.40 mg/dL      Calcium 8.5 8.5 - 10.5 mg/dL      Anion Gap 8.0 0.0 - 11.9     CBC WITHOUT DIFFERENTIAL [[id number]]  (Abnormal) Collected:  07/15/19 0542    Order Status:  Completed Lab Status:  Final result Updated:  07/15/19 0657    Specimen:  Blood      WBC 7.9  4.8 - 10.8 K/uL      RBC 3.25 (L) 4.70 - 6.10 M/uL      Hemoglobin 8.9 (L) 14.0 - 18.0 g/dL      Hematocrit 29.6 (L) 42.0 - 52.0 %      MCV 91.1 81.4 - 97.8 fL      MCH 27.4 27.0 - 33.0 pg      MCHC 30.1 (L) 33.7 - 35.3 g/dL      RDW 51.5 (H) 35.9 - 50.0 fL      Platelet Count 244 164 - 446 K/uL      MPV 10.4 9.0 - 12.9 fL     ACCU-CHEK GLUCOSE [[id number]]  (Abnormal) Collected:  07/14/19 2051    Order Status:  Completed Lab Status:  Final result Updated:  07/14/19 2107     Glucose - Accu-Ck 150 (H) 65 - 99 mg/dL     ACCU-CHEK GLUCOSE [[id number]]  (Abnormal) Collected:  07/14/19 1716    Order Status:  Completed Lab Status:  Final result Updated:  07/14/19 1754     Glucose - Accu-Ck 176 (H) 65 - 99 mg/dL            Nursing  Diet/Nutrition:  Diabetic, Cardiac and Thin Liquids  Medication Administration:  Whole with Liquid Wash  % consumed at meals in last 24 hours:  Consumed % of meals per documentation.  Meal/Snack Consumption for the past 24 hrs:   Oral Nutrition   07/14/19 1230 Lunch;Between 50-75% Consumed   07/14/19 0900 Breakfast;Between % Consumed       Snack schedule:  HS    Appetite:  Good  Fluid Intake/Output in past 24 hours: In: 1200 [P.O.:1200]  Out: 845   Admit Weight:  Weight: 88.9 kg (196 lb)  Weight Last 7 Days   [88.9 kg (196 lb)-89 kg (196 lb 3.4 oz)] 89 kg (196 lb 3.4 oz) (07/14 1629)    Pain Issues:    Location:  Leg (07/14 2040)  Left (07/14 2040)         Severity: 4:10  Scheduled pain medications:  Methadone 20 mg BID, Lyrica 300 mg BID     PRN pain medications used in last 24 hours:  oxycodone immediate release (ROXICODONE)    Non Pharmacologic Interventions:  relaxation, repositioned and rest  Effectiveness of pain management plan:  good=patient states acceptable comfort after interventions    Bowel:    Bowel Assist Initial Score:  4 - Minimal Assistance  Bowel Assist Current Score:  4 - Minimal Assistance  Bowl Accidents in last 7 days:  0  Last bowel movement: 07/14/19  Stool  Description: Other (Comment) (patient stated large bm)     Usual bowel pattern:  every other day  Scheduled bowel medications:  senna-docusate (PERICOLACE or SENOKOT S)   PRN bowel medications used in last 24 hours:  None  Nursing Interventions:  Increased time, PRN medication, Scheduled medication, Supervision, Set-up  Effectiveness of bowel program:   good=regular, predictable, controlled emptying of bowel  Bladder:    Bladder Assist Initial Score:  4 - Minimal Assistance  Bladder Assist Current Score:  4 - Minimal Assistance  Bladder Accidents in last 7 days:  0  PVR range for past 24-48 hours: -- ()  Intermittent Catheterization:  N/A  Medications affecting bladder:  None    Time void schedule/voiding pattern:  Voiding every 2-4 hours  Interventions:  Increased time, Supervision, Verbal cueing, Urinal  Effectiveness of bladder training:  Good=regular, predictable, emptying of bladder, patient initiates time voiding      Diabetes:  Blood Sugar Frequency:  Before meals and at bedtime    Range of BS for last 48 hours:   Recent Labs      07/13/19   0759  07/13/19   1124  07/13/19   1725  07/13/19 2005 07/14/19   0155  07/14/19   0752  07/14/19   1104  07/14/19   1716  07/14/19   2051   POCGLUCOSE  135*  155*  148*  202*  141*  127*  173*  176*  150*     Scheduled diabetic medications:  None  Sliding scale usage in past 24 hours:  Yes  Total Short acting insulin in the past 24 hours:  Humalog 8 units  Total Long acting insulin in the past 24 hours:  None    Wound:         Patient Lines/Drains/Airways Status    Active Current Wounds     Name: Placement date: Placement time: Site: Days:    Wound 07/10/19 Diabetic Ulcer Heel;Foot DM ulceration plantar surface right heel 07/10/19   1027      4    Moisture Associated Skin Damage Anterior;Posterior Buttock;Groin 07/10/19   1027    4                   Interventions:  Dressing changes daily  Effectiveness of intervention:  wound is improving     Sleep/wake cycle:    Average hours slept:  Sleeps 3-4 hours without waking  Sleep medication usage:  None    Patient/Family Training/Education:  Diabetes Management and Diet/Nutrition  Discharge Supply Recommendations:  Glucometer and Strips  Strengths: Alert and oriented, Willingly participates in therapeutic activities, Able to follow instructions, Supportive family, Pleasant and cooperative, Effective communication skills and Good carryover of learning   Barriers:   Generalized weakness            Nursing Problems           Problem: Bowel/Gastric:     Goal: Normal bowel function is maintained or improved           Goal: Will not experience complications related to bowel motility             Problem: Communication     Goal: The ability to communicate needs accurately and effectively will improve             Problem: Discharge Barriers/Planning     Goal: Patient's continuum of care needs will be met             Problem: Infection     Goal: Will remain free from infection             Problem: Knowledge Deficit     Goal: Knowledge of disease process/condition, treatment plan, diagnostic tests, and medications will improve           Goal: Knowledge of the prescribed therapeutic regimen will improve             Problem: Pain Management     Goal: Pain level will decrease to patient's comfort goal     Flowsheet:     Taken at 07/13/19 1241    Pain Rating Scale (NPRS) 8 - Awful, hard to do anything by Avis Iverson R.N.    Non Verbal Scale  Calm by Avis Iverson R.N.    Comfort Goal Comfort at Rest;Comfort with Movement;Perform Activity by Avis Iverson R.N.                  Problem: Respiratory:     Goal: Respiratory status will improve             Problem: Safety     Goal: Will remain free from injury           Goal: Will remain free from falls             Problem: Skin Integrity     Goal: Risk for impaired skin integrity will decrease             Problem: Venous Thromboembolism (VTW)/Deep Vein Thrombosis (DVT) Prevention:      Goal: Patient will participate in Venous Thrombosis (VTE)/Deep Vein Thrombosis (DVT)Prevention Measures                  Long Term Goals:   At discharge patient will be able to function safely at home with support.    Section completed by:  Daniela Yanez L.P.N.           Respiratory Therapy    Pt has been requiring 2-3 lpm during the day and his home baseline is 3 lpm at night.  His SATS during the day are in the low 90's even at rest.  Section completed by:  Earnestine Ya, RRT       Mobility  Bed mobility:   SBA (increased time, bedrail, when moving to pt's L)  Bed /Chair/Wheelchair Transfer Initial:  2 - Max Assistance  Bed /Chair/Wheelchair Transfer Current:  4 - Minimal Assistance   Bed/Chair/Wheelchair Transfer Description:   (sit<>supine SBA with increased time, squat-pivot with reach to R CGA and cues for safety)  Walk Initial:  0 - Not tested,unsafe activity  Walk Current:  1 - Total Assistance   Walk Description:   (pt amb x5 hops in // bars with MIn A+wc follow; pt then ambulated x3 hops with lizandro FWW (d/t increased stability) with Min A+wc follow)  Wheelchair Initial:  1 - Total Assistance  Wheelchair Current:  5 - Standby Prompting/Supervision or Set-up   Wheelchair Description:   (B UE propulsion technique on inside surfaces x150')  Stairs Initial:  0 - Not tested,unsafe activity  Stairs Current: 0 - Not tested,unsafe activity   Stairs Description:    Patient/Family Training/Education:  Anticipated DME needs, gait training with FWW, wc safety  DME/DC Recommendations:  Ramp to enter home, grab bars in bathroom (shower and by toilet, vs 3-in-1 BSC over toilet), manual wc with L residual limb board, FWW  Strengths:  Able to follow instructions, Alert and oriented, Effective communication skills, Independent PLOF, Making steady progress towards goals, Motivated for self care and independence, Pleasant and cooperative, Supportive family and Willingly participates in therapeutic  activities  Barriers:   Other: L shoulder pain/weakness, impaired standing tolerance, R heel ulcer, generalized weakness, decreased endurance  # of short term goals set= 3  # of short term goals met=0/3       Physical Therapy Problems           Problem: Mobility     Dates: Start: 07/11/19       Goal: STG-Within one week, patient will ambulate household distance     Dates: Start: 07/11/19   Expected End: 07/18/19       Description: 1) Individualized goal:  >20' with FWW and Min A  2) Interventions:  PT Group Therapy, PT Prosthetic Training, PT Gait Training, PT Self Care/Home Eval, PT Therapeutic Exercises, PT Neuro Re-Ed/Balance, PT Therapeutic Activity and PT Evaluation               Problem: Mobility Transfers     Dates: Start: 07/11/19       Goal: STG-Within one week, patient will transfer bed to chair     Dates: Start: 07/11/19   Expected End: 07/18/19       Description: 1) Individualized goal:  SBA squat-pivot with reach to R, level surface  2) Interventions:  PT Group Therapy, PT Prosthetic Training, PT Gait Training, PT Self Care/Home Eval, PT Therapeutic Exercises, PT Neuro Re-Ed/Balance, PT Therapeutic Activity and PT Evaluation           Note:     Goal Note filed on 07/15/19 1312 by Azalia Martínez DPT    Goal: STG-Within one week, patient will transfer bed to chair  Outcome: NOT MET  CGA                Goal: STG-Within one week, patient will move supine to sit     Dates: Start: 07/11/19   Expected End: 07/18/19       Description: 1) Individualized goal:  CGA on std queen bed with bedrail  2) Interventions:  PT Group Therapy, PT Prosthetic Training, PT Gait Training, PT Self Care/Home Eval, PT Therapeutic Exercises, PT Neuro Re-Ed/Balance, PT Therapeutic Activity and PT Evaluation                   Problem: PT-Long Term Goals     Dates: Start: 07/11/19       Goal: LTG-By discharge, patient will propel wheelchair     Dates: Start: 07/11/19   Expected End: 08/01/19       Description: 1) Individualized  goal:  Inside and outside surfaces with Mod I  2) Interventions: PT Group Therapy, PT Prosthetic Training, PT Gait Training, PT Self Care/Home Eval, PT Therapeutic Exercises, PT Neuro Re-Ed/Balance, PT Therapeutic Activity and PT Evaluation                 Goal: LTG-By discharge, patient will ambulate     Dates: Start: 07/11/19   Expected End: 08/01/19       Description: 1) Individualized goal:  >100' with FWW and SPV, hop-to pattern on R LE  2) Interventions:  PT Group Therapy, PT Prosthetic Training, PT Gait Training, PT Self Care/Home Eval, PT Therapeutic Exercises, PT Neuro Re-Ed/Balance, PT Therapeutic Activity and PT Evaluation                 Goal: LTG-By discharge, patient will ambulate up/down 4-6 stairs     Dates: Start: 07/11/19   Expected End: 08/01/19       Description: 1) Individualized goal:  B HRs, hop-to pattern, CGA, to safely enter/exit home if ramp not yet installed  2) Interventions:  PT Group Therapy, PT Prosthetic Training, PT Gait Training, PT Self Care/Home Eval, PT Therapeutic Exercises, PT Neuro Re-Ed/Balance, PT Therapeutic Activity and PT Evaluation                 Goal: LTG-By discharge, patient will transfer in/out of a car     Dates: Start: 07/11/19   Expected End: 08/01/19       Description: 1) Individualized goal:  SPV from wc level  2) Interventions:  PT Group Therapy, PT Prosthetic Training, PT Gait Training, PT Self Care/Home Eval, PT Therapeutic Exercises, PT Neuro Re-Ed/Balance, PT Therapeutic Activity and PT Evaluation                         Section completed by:  DALIA LinT       Activities of Daily Living  Eating Initial:  5 - Standby Prompting/Supervision or Set-up  Eating Current:  6 - Modified Independent   Eating Description:  Increased time  Grooming Initial:  5 - Standby Prompting/Supervision or Set-up  Grooming Current:  6 - Modified Independent   Grooming Description:  Increased time  Bathing Initial:  4 - Minimal Assistance  Bathing Current:  5 - Standby  Prompting/Supervision or Set-up   Bathing Description:  Grab bar, Tub bench, Hand held shower, Increased time, Supervision for safety, Verbal cueing, Set-up of equipment (seated on fold-down bench)  Upper Body Dressing Initial:  4 - Minimal Assistance  Upper Body Dressing Current:  6 - Modified Independent   Upper Body Dressing Description:  Increased time  Lower Body Dressing Initial:  2 - Max Assistance  Lower Body Dressing Current:  5 - Standby Prompting/Supervsion or Set-up   Lower Body Dressing Description:  5 - Standby Prompting/Supervsion or Set-up  Toileting Initial:  2 - Max Assistance  Toileting Current:  2 - Max Assistance   Toileting Description:  Grab bar, Increased time, Supervision for safety, Verbal cueing, Set-up of equipment, Initial preparation for task  Toilet Transfer Initial:  3 - Moderate Assistance  Toilet Transfer Current:  4 - Minimal Assistance   Toilet Transfer Description:  4 - Minimal Assistance  Tub / Shower Transfer Initial:  3 - Moderate Assistance  Tub / Shower Transfer Current:  4 - Minimal Assistance   Tub / Shower Transfer Description:  Grab bar, Increased time, Supervision for safety, Verbal cueing, Set-up of equipment (CGA lateral scoot with GB)  IADL:  N/A  Family Training/Education:  TBD  DME/DC Recommendations:  TBD - shower seat, grab bars at toilet and shower    Strengths:  Able to follow instructions, Alert and oriented, Independent PLOF, Making steady progress towards goals, Motivated for self care and independence, Pleasant and cooperative, Supportive family and Willingly participates in therapeutic activities  Barriers:  Generalized weakness, Home accessibility, Poor balance, Pressure ulcer and Other: requires O2 during activities, L chronic shoulder weakness/pain     # of short term goals set= 3    # of short term goals met= 3/4          Occupational Therapy Goals           Problem: Dressing     Dates: Start: 07/11/19       Goal: STG-Within one week, patient will  dress LB     Dates: Start: 07/15/19       Description: 1) Individualized Goal: With supervision a using AE/AD as needed   2) Interventions: OT Group Therapy, OT Self Care/ADL, OT Cognitive Skill Dev, OT Community Reintegration, OT Manual Ther Technique, OT Neuro Re-Ed/Balance, OT Sensory Int Techniques, OT Therapeutic Activity, OT Evaluation and OT Therapeutic Exercise                Problem: Functional Transfers     Dates: Start: 07/11/19       Goal: STG-Within one week, patient will transfer to toilet     Dates: Start: 07/15/19       Description: 1) Individualized Goal: With supervision a using AE/AD as needed   2) Interventions: OT Group Therapy, OT Self Care/ADL, OT Cognitive Skill Dev, OT Community Reintegration, OT Manual Ther Technique, OT Neuro Re-Ed/Balance, OT Sensory Int Techniques, OT Therapeutic Activity, OT Evaluation and OT Therapeutic Exercise              Problem: OT Long Term Goals     Dates: Start: 07/11/19       Goal: LTG-By discharge, patient will complete basic self care tasks     Dates: Start: 07/11/19       Description: 1) Individualized Goal:  With supervision using AE/AD as needed   2) Interventions:  OT Group Therapy, OT Self Care/ADL, OT Cognitive Skill Dev, OT Community Reintegration, OT Manual Ther Technique, OT Neuro Re-Ed/Balance, OT Sensory Int Techniques, OT Therapeutic Activity, OT Evaluation and OT Therapeutic Exercise           Goal: LTG-By discharge, patient will perform bathroom transfers     Dates: Start: 07/11/19       Description: 1) Individualized Goal:  With supervision using AE/AD as needed   2) Interventions:  OT Group Therapy, OT Self Care/ADL, OT Cognitive Skill Dev, OT Community Reintegration, OT Manual Ther Technique, OT Neuro Re-Ed/Balance, OT Sensory Int Techniques, OT Therapeutic Activity, OT Evaluation and OT Therapeutic Exercise           Goal: LTG-By discharge, patient will complete basic home management     Dates: Start: 07/11/19       Description: 1)  Individualized Goal:  With supervision using AE/AD as needed   2) Interventions:  OT Group Therapy, OT Self Care/ADL, OT Cognitive Skill Dev, OT Community Reintegration, OT Manual Ther Technique, OT Neuro Re-Ed/Balance, OT Sensory Int Techniques, OT Therapeutic Activity, OT Evaluation and OT Therapeutic Exercise             Problem: Toileting     Dates: Start: 07/11/19       Goal: STG-Within one week, patient will complete toileting tasks     Dates: Start: 07/11/19       Description: 1) Individualized Goal:  With min a using AE/AD as needed   2) Interventions:  OT Group Therapy, OT Self Care/ADL, OT Cognitive Skill Dev, OT Community Reintegration, OT Manual Ther Technique, OT Neuro Re-Ed/Balance, OT Sensory Int Techniques, OT Therapeutic Activity, OT Evaluation and OT Therapeutic Exercise     Note:     Goal Note filed on 07/15/19 1142 by Nara Ojeda MS,OTR/L    Goal: STG-Within one week, patient will complete toileting tasks  Outcome: NOT MET  Varied assist - Mod A                      Section completed by:  Nara Ojeda MS,OTR/L             REHAB-Pharmacy IDT Team Note by Alessio Starkey RPH at 7/12/2019  4:48 PM  Version 1 of 1    Author:  Alessio Starkey RPH Service:  (none) Author Type:  Pharmacist    Filed:  7/12/2019  4:49 PM Date of Service:  7/12/2019  4:48 PM Status:  Signed    :  Alessio Starkey RPH (Pharmacist)         Pharmacy   Pharmacy  Antibiotics/Antifungals/Antivirals:  Medication:      Active Orders     None        Route:        NA  Stop Date:  NA  Reason:      NA  Antihypertensives/Cardiac:  Medication:    Orders (72h ago through future)    Start     Ordered    07/10/19 1800  metoprolol (LOPRESSOR) tablet 12.5 mg  TWICE DAILY      07/10/19 1030        Patient Vitals for the past 24 hrs:   BP Pulse   07/12/19 1400 117/65 76   07/12/19 0845 - 74   07/12/19 0630 122/72 75   07/11/19 1940 126/72 83   07/11/19 1711 138/90 78       Anticoagulation:  Medication: Aspirin,  Lovenox    Other key medications: A review of the medication list reveals no issues at this time. Patient is currently on antihypertensive(s). Recommend home blood pressure monitoring/recording if antihypertensive(s) regimen(s) continue. Patient is currently on diabetic medication(s) and/or Insulin(s). Recommend home blood glucose monitoring/recording if these regimen(s) continue.    Section completed by: Alessio Starkey [facility][[email or web address]]     Attribution Key     AW.1 - Alessio Starkey [facility] on 7/12/2019  4:48 PM                    DC Planning  DC destination/dispostion:  To single level home in [place], [place], with support of wife.    Referrals: None at this time.    DC Needs: DME for mobility and safety. Has SPC. Oxygen concentrator.   MD f/u appointments - PCP, Dr. Schneider. OP therapy. No HH services the [place] area.     Barriers to discharge:  Functional deficits; Left residual limb pain, fluid collection. Lives 15 miles from [place], [place]. No HH provides service for that area.     Strengths: Motivated. Supportive wife.     Section completed by:  Anahi Mccall R.N.    Summary from Meeting: Needs grab bars installed in home, preferably prior to discharging home. Wife is unable to get this done. Needs wc, 3 in 1 BSC, tub transfer bench; OP PT, F/U with PCP, Dr. Schneider appointment this Wednesday.   Physician Summary  Mimi Doyle MD participated and led team conference discussion.

## 2019-07-15 NOTE — REHAB-NURSING IDT TEAM NOTE
Nursing   Nursing  Diet/Nutrition:  Diabetic, Cardiac and Thin Liquids  Medication Administration:  Whole with Liquid Wash  % consumed at meals in last 24 hours:  Consumed % of meals per documentation.  Meal/Snack Consumption for the past 24 hrs:   Oral Nutrition   07/14/19 1230 Lunch;Between 50-75% Consumed   07/14/19 0900 Breakfast;Between % Consumed       Snack schedule:  HS    Appetite:  Good  Fluid Intake/Output in past 24 hours: In: 1200 [P.O.:1200]  Out: 845   Admit Weight:  Weight: 88.9 kg (196 lb)  Weight Last 7 Days   [88.9 kg (196 lb)-89 kg (196 lb 3.4 oz)] 89 kg (196 lb 3.4 oz) (07/14 1629)    Pain Issues:    Location:  Leg (07/14 2040)  Left (07/14 2040)         Severity: 4:10  Scheduled pain medications:  Methadone 20 mg BID, Lyrica 300 mg BID     PRN pain medications used in last 24 hours:  oxycodone immediate release (ROXICODONE)    Non Pharmacologic Interventions:  relaxation, repositioned and rest  Effectiveness of pain management plan:  good=patient states acceptable comfort after interventions    Bowel:    Bowel Assist Initial Score:  4 - Minimal Assistance  Bowel Assist Current Score:  4 - Minimal Assistance  Bowl Accidents in last 7 days:  0  Last bowel movement: 07/14/19  Stool Description: Other (Comment) (patient stated large bm)     Usual bowel pattern:  every other day  Scheduled bowel medications:  senna-docusate (PERICOLACE or SENOKOT S)   PRN bowel medications used in last 24 hours:  None  Nursing Interventions:  Increased time, PRN medication, Scheduled medication, Supervision, Set-up  Effectiveness of bowel program:   good=regular, predictable, controlled emptying of bowel  Bladder:    Bladder Assist Initial Score:  4 - Minimal Assistance  Bladder Assist Current Score:  4 - Minimal Assistance  Bladder Accidents in last 7 days:  0  PVR range for past 24-48 hours: -- ()  Intermittent Catheterization:  N/A  Medications affecting bladder:  None    Time void schedule/voiding  pattern:  Voiding every 2-4 hours  Interventions:  Increased time, Supervision, Verbal cueing, Urinal  Effectiveness of bladder training:  Good=regular, predictable, emptying of bladder, patient initiates time voiding      Diabetes:  Blood Sugar Frequency:  Before meals and at bedtime    Range of BS for last 48 hours:   Recent Labs      07/13/19   0759  07/13/19   1124  07/13/19   1725  07/13/19   2005  07/14/19   0155  07/14/19   0752  07/14/19   1104  07/14/19   1716  07/14/19   2051   POCGLUCOSE  135*  155*  148*  202*  141*  127*  173*  176*  150*     Scheduled diabetic medications:  None  Sliding scale usage in past 24 hours:  Yes  Total Short acting insulin in the past 24 hours:  Humalog 8 units  Total Long acting insulin in the past 24 hours:  None    Wound:         Patient Lines/Drains/Airways Status    Active Current Wounds     Name: Placement date: Placement time: Site: Days:    Wound 07/10/19 Diabetic Ulcer Heel;Foot DM ulceration plantar surface right heel 07/10/19   1027      4    Moisture Associated Skin Damage Anterior;Posterior Buttock;Groin 07/10/19   1027    4                   Interventions:  Dressing changes daily  Effectiveness of intervention:  wound is improving     Sleep/wake cycle:   Average hours slept:  Sleeps 3-4 hours without waking  Sleep medication usage:  None    Patient/Family Training/Education:  Diabetes Management and Diet/Nutrition  Discharge Supply Recommendations:  Glucometer and Strips  Strengths: Alert and oriented, Willingly participates in therapeutic activities, Able to follow instructions, Supportive family, Pleasant and cooperative, Effective communication skills and Good carryover of learning   Barriers:   Generalized weakness            Nursing Problems           Problem: Bowel/Gastric:     Goal: Normal bowel function is maintained or improved           Goal: Will not experience complications related to bowel motility             Problem: Communication     Goal: The  ability to communicate needs accurately and effectively will improve             Problem: Discharge Barriers/Planning     Goal: Patient's continuum of care needs will be met             Problem: Infection     Goal: Will remain free from infection             Problem: Knowledge Deficit     Goal: Knowledge of disease process/condition, treatment plan, diagnostic tests, and medications will improve           Goal: Knowledge of the prescribed therapeutic regimen will improve             Problem: Pain Management     Goal: Pain level will decrease to patient's comfort goal     Flowsheet:     Taken at 07/13/19 1241    Pain Rating Scale (NPRS) 8 - Awful, hard to do anything by Avis Iverson R.N.    Non Verbal Scale  Calm by Avis Iverson R.N.    Comfort Goal Comfort at Rest;Comfort with Movement;Perform Activity by Avis Iverson R.N.                  Problem: Respiratory:     Goal: Respiratory status will improve             Problem: Safety     Goal: Will remain free from injury           Goal: Will remain free from falls             Problem: Skin Integrity     Goal: Risk for impaired skin integrity will decrease             Problem: Venous Thromboembolism (VTW)/Deep Vein Thrombosis (DVT) Prevention:     Goal: Patient will participate in Venous Thrombosis (VTE)/Deep Vein Thrombosis (DVT)Prevention Measures                  Long Term Goals:   At discharge patient will be able to function safely at home with support.    Section completed by:  Daniela Yanez L.P.N.

## 2019-07-15 NOTE — REHAB-PT IDT TEAM NOTE
Physical Therapy   Mobility  Bed mobility:   SBA (increased time, bedrail, when moving to pt's L)  Bed /Chair/Wheelchair Transfer Initial:  2 - Max Assistance  Bed /Chair/Wheelchair Transfer Current:  4 - Minimal Assistance   Bed/Chair/Wheelchair Transfer Description:   (sit<>supine SBA with increased time, squat-pivot with reach to R CGA and cues for safety)  Walk Initial:  0 - Not tested,unsafe activity  Walk Current:  1 - Total Assistance   Walk Description:   (pt amb x5 hops in // bars with MIn A+wc follow; pt then ambulated x3 hops with lizandro FWW (d/t increased stability) with Min A+wc follow)  Wheelchair Initial:  1 - Total Assistance  Wheelchair Current:  5 - Standby Prompting/Supervision or Set-up   Wheelchair Description:   (B UE propulsion technique on inside surfaces x150')  Stairs Initial:  0 - Not tested,unsafe activity  Stairs Current: 0 - Not tested,unsafe activity   Stairs Description:    Patient/Family Training/Education:  Anticipated DME needs, gait training with FWW, wc safety  DME/DC Recommendations:  Ramp to enter home, grab bars in bathroom (shower and by toilet, vs 3-in-1 BSC over toilet), manual wc with L residual limb board, FWW  Strengths:  Able to follow instructions, Alert and oriented, Effective communication skills, Independent PLOF, Making steady progress towards goals, Motivated for self care and independence, Pleasant and cooperative, Supportive family and Willingly participates in therapeutic activities  Barriers:   Other: L shoulder pain/weakness, impaired standing tolerance, R heel ulcer, generalized weakness, decreased endurance  # of short term goals set= 3  # of short term goals met=0/3       Physical Therapy Problems           Problem: Mobility     Dates: Start: 07/11/19       Goal: STG-Within one week, patient will ambulate household distance     Dates: Start: 07/11/19   Expected End: 07/18/19       Description: 1) Individualized goal:  >20' with FWW and Min A  2)  Interventions:  PT Group Therapy, PT Prosthetic Training, PT Gait Training, PT Self Care/Home Eval, PT Therapeutic Exercises, PT Neuro Re-Ed/Balance, PT Therapeutic Activity and PT Evaluation               Problem: Mobility Transfers     Dates: Start: 07/11/19       Goal: STG-Within one week, patient will transfer bed to chair     Dates: Start: 07/11/19   Expected End: 07/18/19       Description: 1) Individualized goal:  SBA squat-pivot with reach to R, level surface  2) Interventions:  PT Group Therapy, PT Prosthetic Training, PT Gait Training, PT Self Care/Home Eval, PT Therapeutic Exercises, PT Neuro Re-Ed/Balance, PT Therapeutic Activity and PT Evaluation           Note:     Goal Note filed on 07/15/19 1312 by Azalia Martínez DPT    Goal: STG-Within one week, patient will transfer bed to chair  Outcome: NOT MET  CGA                Goal: STG-Within one week, patient will move supine to sit     Dates: Start: 07/11/19   Expected End: 07/18/19       Description: 1) Individualized goal:  CGA on std queen bed with bedrail  2) Interventions:  PT Group Therapy, PT Prosthetic Training, PT Gait Training, PT Self Care/Home Eval, PT Therapeutic Exercises, PT Neuro Re-Ed/Balance, PT Therapeutic Activity and PT Evaluation                   Problem: PT-Long Term Goals     Dates: Start: 07/11/19       Goal: LTG-By discharge, patient will propel wheelchair     Dates: Start: 07/11/19   Expected End: 08/01/19       Description: 1) Individualized goal:  Inside and outside surfaces with Mod I  2) Interventions: PT Group Therapy, PT Prosthetic Training, PT Gait Training, PT Self Care/Home Eval, PT Therapeutic Exercises, PT Neuro Re-Ed/Balance, PT Therapeutic Activity and PT Evaluation                 Goal: LTG-By discharge, patient will ambulate     Dates: Start: 07/11/19   Expected End: 08/01/19       Description: 1) Individualized goal:  >100' with FWW and SPV, hop-to pattern on R LE  2) Interventions:  PT Group Therapy, PT  Prosthetic Training, PT Gait Training, PT Self Care/Home Eval, PT Therapeutic Exercises, PT Neuro Re-Ed/Balance, PT Therapeutic Activity and PT Evaluation                 Goal: LTG-By discharge, patient will ambulate up/down 4-6 stairs     Dates: Start: 07/11/19   Expected End: 08/01/19       Description: 1) Individualized goal:  B HRs, hop-to pattern, CGA, to safely enter/exit home if ramp not yet installed  2) Interventions:  PT Group Therapy, PT Prosthetic Training, PT Gait Training, PT Self Care/Home Eval, PT Therapeutic Exercises, PT Neuro Re-Ed/Balance, PT Therapeutic Activity and PT Evaluation                 Goal: LTG-By discharge, patient will transfer in/out of a car     Dates: Start: 07/11/19   Expected End: 08/01/19       Description: 1) Individualized goal:  SPV from wc level  2) Interventions:  PT Group Therapy, PT Prosthetic Training, PT Gait Training, PT Self Care/Home Eval, PT Therapeutic Exercises, PT Neuro Re-Ed/Balance, PT Therapeutic Activity and PT Evaluation                         Section completed by:  Azalia Martínez DPT

## 2019-07-15 NOTE — FLOWSHEET NOTE
07/14/19 1611   Events/Summary/Plan   Events/Summary/Plan 02 spot check.  Pt still requiring 02 during the day.; will wean as tolerated.   Respiratory WDL   Respiratory (WDL) X   Chest Exam   Respiration 18   Pulse 74   Oximetry   #Pulse Oximetry (Single Determination) Yes   Oxygen   Home O2 Use Prior To Admission? Yes   Home O2 LPM Flow 3 LPM   Home O2 Delivery Method Nasal Cannula   Home O2 Frequency of Use At Sleep   Pulse Oximetry 93 %   O2 (LPM) 3   O2 Daily Delivery Respiratory  Silicone Nasal Cannula

## 2019-07-15 NOTE — THERAPY
Occupational Therapy  Daily Treatment     Patient Name: Travon Pollack  Age:  63 y.o., Sex:  male  Medical Record #: [id number]  Today's Date: 7/15/2019     Precautions  Precautions: Non Weight Bearing Left Lower Extremity, Immobilizer Left Lower Extremity, Fall Risk  Comments: IPOP L residual limb; chronic L shoulder weakness/pain    Safety   ADL Safety : Requires Supervision for Safety  Bathroom Safety: Requires Supervision for Safety  Comments: SBA with ADL's and CGA-close SBA with bathroom transfers    Subjective     Objective       07/15/19 1401   Precautions   Precautions Non Weight Bearing Left Lower Extremity;Immobilizer Left Lower Extremity;Fall Risk   Comments IPOP L residual limb; chronic L shoulder weakness/pain   Vitals   O2 Delivery None (Room Air)   Pain   Intervention Declines   Pain 0 - 10 Group   Pain Rating Scale (NPRS) 0   Non Verbal Descriptors   Non Verbal Scale  Calm   Cognition    Level of Consciousness Alert   IADL Treatments   Meal Preparation Pt engaged in meal prep task of making muffins. Pt required increased time to read instructions and gather materials. Pt demonstrated functional mobility in w/c to navigate kitchen. Pt required assistance to reach items placed higher in cabinets. Pt required verbal reminders to lock brakes while at the counter mixing and while placing muffins in oven.    Interdisciplinary Plan of Care Collaboration   Patient Position at End of Therapy Seated;Phone within Reach;Tray Table within Reach;Call Light within Reach       Assessment    Pt alert and cooperative w/ tx session. Pt engaged in IADL task of cooking muffins in the kitchen. Pt demonstrated functional mobility in w/c to navigate kitchen, however required increased time. Practice will assist with increased efficiency. Pt required VQs for safety (locking brakes and while placing muffins in oven). Pt required increased time to read instructions (slightly un-organized).     Plan    Cont to address functional  transfers/mobility, endurance, strength, balance for completion of ADLs/IADLs

## 2019-07-15 NOTE — CARE PLAN
Problem: Communication  Goal: The ability to communicate needs accurately and effectively will improve  Patient alert and oriented x 4,    Problem: Safety  Goal: Will remain free from falls  Pt uses call light consistently and appropriately. Waits for assistance does not attempt self transfer this shift. Able to verbalize needs.

## 2019-07-15 NOTE — CARE PLAN
Problem: Mobility  Goal: STG-Within one week, patient will ambulate household distance  1) Individualized goal:  >20' with FWW and Min A  2) Interventions:  PT Group Therapy, PT Prosthetic Training, PT Gait Training, PT Self Care/Home Eval, PT Therapeutic Exercises, PT Neuro Re-Ed/Balance, PT Therapeutic Activity and PT Evaluation     Outcome: NOT MET      Problem: Mobility Transfers  Goal: STG-Within one week, patient will transfer bed to chair  1) Individualized goal:  SBA squat-pivot with reach to R, level surface  2) Interventions:  PT Group Therapy, PT Prosthetic Training, PT Gait Training, PT Self Care/Home Eval, PT Therapeutic Exercises, PT Neuro Re-Ed/Balance, PT Therapeutic Activity and PT Evaluation         Outcome: NOT MET  CGA  Goal: STG-Within one week, patient will move supine to sit  1) Individualized goal:  CGA on std queen bed with bedrail  2) Interventions:  PT Group Therapy, PT Prosthetic Training, PT Gait Training, PT Self Care/Home Eval, PT Therapeutic Exercises, PT Neuro Re-Ed/Balance, PT Therapeutic Activity and PT Evaluation         Outcome: NOT MET

## 2019-07-15 NOTE — REHAB-RESPIRATORY IDT TEAM NOTE
Respiratory Therapy   Respiratory Therapy    Pt has been requiring 2-3 lpm during the day and his home baseline is 3 lpm at night.  His SATS during the day are in the low 90's even at rest.  Section completed by:  Earnestine Ya, RRT

## 2019-07-15 NOTE — CARE PLAN
Problem: Dressing  Goal: STG-Within one week, patient will dress UB  1) Individualized Goal:  With set-up a using AE/AD as needed   2) Interventions:  OT Group Therapy, OT Self Care/ADL, OT Cognitive Skill Dev, OT Community Reintegration, OT Manual Ther Technique, OT Neuro Re-Ed/Balance, OT Sensory Int Techniques, OT Therapeutic Activity, OT Evaluation and OT Therapeutic Exercise   Outcome: MET Date Met: 07/15/19    Goal: STG-Within one week, patient will dress LB  1) Individualized Goal:  With min a using AE/AD as needed   2) Interventions:  OT Group Therapy, OT Self Care/ADL, OT Cognitive Skill Dev, OT Community Reintegration, OT Manual Ther Technique, OT Neuro Re-Ed/Balance, OT Sensory Int Techniques, OT Therapeutic Activity, OT Evaluation and OT Therapeutic Exercise     Outcome: MET Date Met: 07/15/19      Problem: Toileting  Goal: STG-Within one week, patient will complete toileting tasks  1) Individualized Goal:  With min a using AE/AD as needed   2) Interventions:  OT Group Therapy, OT Self Care/ADL, OT Cognitive Skill Dev, OT Community Reintegration, OT Manual Ther Technique, OT Neuro Re-Ed/Balance, OT Sensory Int Techniques, OT Therapeutic Activity, OT Evaluation and OT Therapeutic Exercise   Outcome: NOT MET  Varied assist - Mod A    Problem: Functional Transfers  Goal: STG-Within one week, patient will transfer to toilet  1) Individualized Goal:  With min a using AE/AD as needed   2) Interventions:  OT Group Therapy, OT Self Care/ADL, OT Cognitive Skill Dev, OT Community Reintegration, OT Manual Ther Technique, OT Neuro Re-Ed/Balance, OT Sensory Int Techniques, OT Therapeutic Activity, OT Evaluation and OT Therapeutic Exercise   Outcome: MET Date Met: 07/15/19

## 2019-07-15 NOTE — CARE PLAN
Problem: Pain Management  Goal: Pain level will decrease to patient's comfort goal   07/15/19 0140   OTHER   Pain Rating Scale (NPRS) 7   Therapist Pain Assessment Post Activity Pain Same as Prior to Activity   Comfort Goal Comfort with Movement;Perform Activity;Sleep Comfortably   Left leg pain.  Medicated with Roxicodone 5 mg per prn order.   0300 Patient states pain medication was effective.

## 2019-07-15 NOTE — THERAPY
"Occupational Therapy  Daily Treatment     Patient Name: Travon Pollack  Age:  63 y.o., Sex:  male  Medical Record #: [id number]  Today's Date: 7/15/2019     Precautions  Precautions: Non Weight Bearing Left Lower Extremity, Immobilizer Left Lower Extremity, Fall Risk, Other (See Comments)  Comments: IPOP L residual limb; chronic L shoulder weakness/pain    Safety   ADL Safety : Requires Supervision for Safety  Bathroom Safety: Requires Supervision for Safety  Comments: SBA with ADL's and CGA-close SBA with bathroom transfers    Subjective    \"thank you for your help\"     Objective       07/15/19 1031   Safety    ADL Safety  Requires Supervision for Safety   Bathroom Safety Requires Supervision for Safety   Comments SBA with ADL's and CGA-close SBA with bathroom transfers   Interdisciplinary Plan of Care Collaboration   Patient Position at End of Therapy Seated;Call Light within Reach;Tray Table within Reach   OT Total Time Spent   OT Individual Total Time Spent (Mins) 60   OT Charge Group   OT Self Care / ADL 4       FIM Eating Score:  6 - Modified Independent  Eating Description:  Increased time    FIM Grooming Score:  6 - Modified Independent  Grooming Description:  Increased time    FIM Bathing Score:  5 - Standby Prompting/Supervision or Set-up  Bathing Description:       FIM Upper Body Dressin - Modified Independent  Upper Body Dressing Description:  Increased time    FIM Lower Body Dressing Score:  5 - Standby Prompting/Supervsion or Set-up  Lower Body Dressing Description:  Increased time, Supervision for safety, Verbal cueing, Set-up of equipment (stand at sink to pull shorts up, no LOB)    FIM Toilet Transfer Score:  4 - Minimal Assistance  Toilet Transfer Description:  Grab bar, Increased time, Supervision for safety, Verbal cueing, Set-up of equipment (CGA squat pivot with GB)    FIM Tub/Shower Transfers Score:  4 - Minimal Assistance  Tub/Shower Transfers Description:  Grab bar, Increased time, " Supervision for safety, Verbal cueing, Set-up of equipment (CGA lateral scoot with GB)      Assessment    Pt completed shower routine at A overall using w/c, shower bench and GB's. Began discussion re: bathroom DME and pt with questions about work compensation coverage - will need to follow up     Plan    Cont overall strength/endurance and standing balance to improve ADL's and functional mobility

## 2019-07-16 LAB
BASOPHILS # BLD AUTO: 0.5 % (ref 0–1.8)
BASOPHILS # BLD: 0.04 K/UL (ref 0–0.12)
EOSINOPHIL # BLD AUTO: 0.25 K/UL (ref 0–0.51)
EOSINOPHIL NFR BLD: 3 % (ref 0–6.9)
ERYTHROCYTE [DISTWIDTH] IN BLOOD BY AUTOMATED COUNT: 49.3 FL (ref 35.9–50)
GLUCOSE BLD-MCNC: 126 MG/DL (ref 65–99)
GLUCOSE BLD-MCNC: 141 MG/DL (ref 65–99)
GLUCOSE BLD-MCNC: 160 MG/DL (ref 65–99)
GLUCOSE BLD-MCNC: 166 MG/DL (ref 65–99)
HCT VFR BLD AUTO: 29.5 % (ref 42–52)
HGB BLD-MCNC: 9.5 G/DL (ref 14–18)
IMM GRANULOCYTES # BLD AUTO: 0.02 K/UL (ref 0–0.11)
IMM GRANULOCYTES NFR BLD AUTO: 0.2 % (ref 0–0.9)
LYMPHOCYTES # BLD AUTO: 2.06 K/UL (ref 1–4.8)
LYMPHOCYTES NFR BLD: 25.1 % (ref 22–41)
MCH RBC QN AUTO: 28.1 PG (ref 27–33)
MCHC RBC AUTO-ENTMCNC: 32.2 G/DL (ref 33.7–35.3)
MCV RBC AUTO: 87.3 FL (ref 81.4–97.8)
MONOCYTES # BLD AUTO: 0.87 K/UL (ref 0–0.85)
MONOCYTES NFR BLD AUTO: 10.6 % (ref 0–13.4)
NEUTROPHILS # BLD AUTO: 4.96 K/UL (ref 1.82–7.42)
NEUTROPHILS NFR BLD: 60.6 % (ref 44–72)
NRBC # BLD AUTO: 0 K/UL
NRBC BLD-RTO: 0 /100 WBC
PLATELET # BLD AUTO: 239 K/UL (ref 164–446)
PMV BLD AUTO: 9.9 FL (ref 9–12.9)
RBC # BLD AUTO: 3.38 M/UL (ref 4.7–6.1)
WBC # BLD AUTO: 8.2 K/UL (ref 4.8–10.8)

## 2019-07-16 PROCEDURE — 97535 SELF CARE MNGMENT TRAINING: CPT

## 2019-07-16 PROCEDURE — 97530 THERAPEUTIC ACTIVITIES: CPT

## 2019-07-16 PROCEDURE — 99232 SBSQ HOSP IP/OBS MODERATE 35: CPT | Performed by: HOSPITALIST

## 2019-07-16 PROCEDURE — 97116 GAIT TRAINING THERAPY: CPT

## 2019-07-16 PROCEDURE — 82962 GLUCOSE BLOOD TEST: CPT | Mod: 91

## 2019-07-16 PROCEDURE — 700102 HCHG RX REV CODE 250 W/ 637 OVERRIDE(OP): Performed by: PHYSICAL MEDICINE & REHABILITATION

## 2019-07-16 PROCEDURE — 700102 HCHG RX REV CODE 250 W/ 637 OVERRIDE(OP): Performed by: HOSPITALIST

## 2019-07-16 PROCEDURE — 97110 THERAPEUTIC EXERCISES: CPT

## 2019-07-16 PROCEDURE — 99232 SBSQ HOSP IP/OBS MODERATE 35: CPT | Performed by: PHYSICAL MEDICINE & REHABILITATION

## 2019-07-16 PROCEDURE — 85025 COMPLETE CBC W/AUTO DIFF WBC: CPT

## 2019-07-16 PROCEDURE — A9270 NON-COVERED ITEM OR SERVICE: HCPCS | Performed by: PHYSICAL MEDICINE & REHABILITATION

## 2019-07-16 PROCEDURE — A9270 NON-COVERED ITEM OR SERVICE: HCPCS | Performed by: HOSPITALIST

## 2019-07-16 PROCEDURE — 770010 HCHG ROOM/CARE - REHAB SEMI PRIVAT*

## 2019-07-16 RX ORDER — AMOXICILLIN 250 MG
2 CAPSULE ORAL 2 TIMES DAILY PRN
Status: DISCONTINUED | OUTPATIENT
Start: 2019-07-16 | End: 2019-07-31 | Stop reason: HOSPADM

## 2019-07-16 RX ADMIN — PREGABALIN 300 MG: 100 CAPSULE ORAL at 20:37

## 2019-07-16 RX ADMIN — PREGABALIN 300 MG: 100 CAPSULE ORAL at 07:58

## 2019-07-16 RX ADMIN — METOPROLOL TARTRATE 12.5 MG: 25 TABLET ORAL at 07:59

## 2019-07-16 RX ADMIN — MICONAZOLE NITRATE: 20 CREAM TOPICAL at 08:06

## 2019-07-16 RX ADMIN — SENNOSIDES AND DOCUSATE SODIUM 2 TABLET: 8.6; 5 TABLET ORAL at 08:00

## 2019-07-16 RX ADMIN — MELATONIN TAB 3 MG 3 MG: 3 TAB at 23:36

## 2019-07-16 RX ADMIN — OXYCODONE HYDROCHLORIDE 5 MG: 5 TABLET ORAL at 06:02

## 2019-07-16 RX ADMIN — FERROUS SULFATE TAB 325 MG (65 MG ELEMENTAL FE) 325 MG: 325 (65 FE) TAB at 07:59

## 2019-07-16 RX ADMIN — METHADONE HYDROCHLORIDE 20 MG: 10 TABLET ORAL at 07:59

## 2019-07-16 RX ADMIN — VITAMIN D, TAB 1000IU (100/BT) 4000 UNITS: 25 TAB at 08:00

## 2019-07-16 RX ADMIN — OXYCODONE HYDROCHLORIDE 5 MG: 5 TABLET ORAL at 23:36

## 2019-07-16 RX ADMIN — FLUTICASONE PROPIONATE 100 MCG: 50 SPRAY, METERED NASAL at 08:05

## 2019-07-16 RX ADMIN — MICONAZOLE NITRATE: 20 CREAM TOPICAL at 20:51

## 2019-07-16 RX ADMIN — METHADONE HYDROCHLORIDE 20 MG: 10 TABLET ORAL at 20:38

## 2019-07-16 RX ADMIN — OXYCODONE HYDROCHLORIDE AND ACETAMINOPHEN 500 MG: 500 TABLET ORAL at 07:58

## 2019-07-16 ASSESSMENT — ENCOUNTER SYMPTOMS
VOMITING: 0
CHILLS: 0
NERVOUS/ANXIOUS: 0
ABDOMINAL PAIN: 0
DIARRHEA: 0
NAUSEA: 0
FEVER: 0
SHORTNESS OF BREATH: 0

## 2019-07-16 NOTE — PROGRESS NOTES
Hospital Medicine Daily Progress Note      Chief Complaint  CM, HTN, DM, S/P BKA    Interval Problem Update  Pt c/o broken BKA brace.  Otherwise, doing well.  Labs reviewed.    Review of Systems  Review of Systems   Constitutional: Negative for chills and fever.   HENT: Negative.    Eyes: Negative.    Respiratory: Negative for cough and shortness of breath.    Cardiovascular: Negative for chest pain and palpitations.   Gastrointestinal: Negative for abdominal pain, nausea and vomiting.   Genitourinary: Negative.    Musculoskeletal:        Wound pain   Skin: Negative for itching and rash.        Physical Exam  Temp:  [36.6 °C (97.9 °F)-36.7 °C (98.1 °F)] 36.7 °C (98.1 °F)  Pulse:  [72-83] 72  Resp:  [18] 18  BP: (111-126)/(69) 111/69  SpO2:  [91 %-92 %] 92 %    Physical Exam   Constitutional: He is oriented to person, place, and time. No distress.   HENT:   Head: Normocephalic and atraumatic.   Right Ear: External ear normal.   Left Ear: External ear normal.   Eyes: Conjunctivae and EOM are normal. Right eye exhibits no discharge. Left eye exhibits no discharge.   Neck: Normal range of motion. Neck supple. No tracheal deviation present.   Cardiovascular: Normal rate and regular rhythm.    Pulmonary/Chest: No stridor. No respiratory distress. He has no wheezes.   Decreased BS   Abdominal: Soft. Bowel sounds are normal. He exhibits no distension. There is no tenderness.   Musculoskeletal: He exhibits tenderness.   Left BKA stump wrapped, brace w/ small crack   Neurological: He is alert and oriented to person, place, and time.   Skin: Skin is warm and dry. He is not diaphoretic.   Vitals reviewed.      Fluids    Intake/Output Summary (Last 24 hours) at 07/15/19 2018  Last data filed at 07/15/19 1200   Gross per 24 hour   Intake              520 ml   Output             1100 ml   Net             -580 ml       Laboratory  Recent Labs      07/13/19   0521  07/15/19   0542   WBC  7.2  7.9   RBC  3.61*  3.25*   HEMOGLOBIN   9.8*  8.9*   HEMATOCRIT  32.7*  29.6*   MCV  90.6  91.1   MCH  27.1  27.4   MCHC  30.0*  30.1*   RDW  51.2*  51.5*   PLATELETCT  261  244   MPV  9.8  10.4     Recent Labs      07/13/19   0521  07/15/19   0542   SODIUM  138  134*   POTASSIUM  4.1  4.0   CHLORIDE  102  99   CO2  28  27   GLUCOSE  142*  144*   BUN  38*  34*   CREATININE  1.22  1.14   CALCIUM  8.6  8.5                   Assessment/Plan  * S/P BKA (below knee amputation) unilateral, left (HCC)- (present on admission)   Assessment & Plan    2/2 diabetic foot infection/sepsis  S/P left BKA on 6/24/19 by Dr. Schneider  Completed antibiotics per ID prior to Rehab admission  Wound care  U/S shows fluid collection x 3  Monitor for bleeding and infection  Ortho F/U pending     Acute on chronic respiratory failure with hypoxia ([facility])- (present on admission)   Assessment & Plan    S/P VDRF     Cardiomyopathy ([facility])- (present on admission)   Assessment & Plan    Echo EF 40%  Monitor for volume overload     DM (diabetes mellitus), type 2, uncontrolled (CMS-HCC)- (present on admission)   Assessment & Plan    HbA1c 12.2  On SSI  Consider Lantus     Essential hypertension- (present on admission)   Assessment & Plan    Observe blood pressure trends on Metoprolol     Azotemia   Assessment & Plan    Encourage PO fluids  Renal function stable     Vitamin D deficiency   Assessment & Plan    Vit D level 12  On supplementation     Anemia   Assessment & Plan    Vit B12 and Folate levels normal  Fe 38, continue Fe and Vit C  Agree w/ discontinuing DVT chemoprophylaxis given slow downtrend of H/H     DNR    Reviewed w/ Dr. Doyle

## 2019-07-16 NOTE — PROGRESS NOTES
Hospital Medicine Daily Progress Note      Chief Complaint:  Hypertension  Diabetes    Interval History:  No significant events or changes since last visit    Review of Systems  Review of Systems   Constitutional: Negative for chills and fever.   Respiratory: Negative for shortness of breath.    Cardiovascular: Negative for chest pain.   Gastrointestinal: Negative for abdominal pain, diarrhea, nausea and vomiting.   Psychiatric/Behavioral: The patient is not nervous/anxious.         Physical Exam  Temp:  [36.3 °C (97.3 °F)-36.7 °C (98.1 °F)] 36.3 °C (97.3 °F)  Pulse:  [72-89] 89  Resp:  [18] 18  BP: (100-124)/(60-71) 124/71  SpO2:  [92 %-94 %] 94 %    Physical Exam   Constitutional: He is oriented to person, place, and time. He appears well-nourished.   HENT:   Head: Atraumatic.   Eyes: Pupils are equal, round, and reactive to light. Conjunctivae are normal.   Neck: Normal range of motion. Neck supple.   Cardiovascular: Normal rate and regular rhythm.    No murmur heard.  Pulmonary/Chest: No respiratory distress. He has no wheezes.   Abdominal: Soft. Bowel sounds are normal. He exhibits no distension. There is no tenderness.   Musculoskeletal: He exhibits tenderness.   Left BKA stump wrapped, brace w/ small crack   Neurological: He is alert and oriented to person, place, and time.   Skin: Skin is warm and dry. No rash noted.   Nursing note and vitals reviewed.      Fluids    Intake/Output Summary (Last 24 hours) at 07/16/19 0801  Last data filed at 07/16/19 0030   Gross per 24 hour   Intake             1070 ml   Output              550 ml   Net              520 ml       Laboratory  Recent Labs      07/15/19   0542   WBC  7.9   RBC  3.25*   HEMOGLOBIN  8.9*   HEMATOCRIT  29.6*   MCV  91.1   MCH  27.4   MCHC  30.1*   RDW  51.5*   PLATELETCT  244   MPV  10.4     Recent Labs      07/15/19   0542   SODIUM  134*   POTASSIUM  4.0   CHLORIDE  99   CO2  27   GLUCOSE  144*   BUN  34*   CREATININE  1.14   CALCIUM  8.5                    Assessment/Plan  * S/P BKA (below knee amputation) unilateral, left (HCC)- (present on admission)   Assessment & Plan    S/P left BKA 2nd to diabetic foot on 6/24/19  S/P antibiotics per ID prior to Rehab admission  External wound healing well  Wound care  U/S shows fluid collection x 3  Ortho F/U pending     Acute on chronic respiratory failure with hypoxia (HCC)- (present on admission)   Assessment & Plan    Currently resolved  S/P VDRF     Cardiomyopathy (HCC)- (present on admission)   Assessment & Plan    Echo: EF 40%     DM (diabetes mellitus), type 2, uncontrolled (CMS-HCC)- (present on admission)   Assessment & Plan    Hba1c: 12.2 (6/16/19)  -176 (hit 223 x 1)  On SSI  Consider starting low dose Lantus  Note: home med includes Lantus 44 units bid  Will monitor another day before adjusting med (7/16)     Essential hypertension- (present on admission)   Assessment & Plan    BP ok  On Lopressor: 12.5 mg bid  Monitor     Azotemia   Assessment & Plan    Bun: 38 (7/13) --> 24 (7/15)  Encourage fluids intake  Monitor     Vitamin D deficiency   Assessment & Plan    Vit D: 12  On supplements     Anemia   Assessment & Plan    Hb: 10.6 (7/11) --> 9.8 (7/13) --> 8.9 (7/15)  Fe: 38, sats 18% (7/11)  Vit B12 and Folate: wnl  On Fe supplements  Off DVT prophylaxis 2nd to slow downtrend of H&H

## 2019-07-16 NOTE — PROGRESS NOTES
"Rehab Progress Note     Date of Service: 7/16/2019  Chief Complaint: follow up left BKA    Interval Events (Subjective)    Patient seen and examined in his room today.  He has just finished his therapy session.  He continues to have pain in his residual limb.  Advised he will have follow-up tomorrow with orthopedic surgeon.  Reports he is having some loose stools and would like to cut back on the bowel medications.  Otherwise patient has no complaints.    Objective:  VITAL SIGNS: /71   Pulse 89   Temp 36.3 °C (97.3 °F) (Temporal)   Resp 18   Ht 1.803 m (5' 11\")   Wt 89 kg (196 lb 3.4 oz)   SpO2 94%   BMI 27.37 kg/m²   Gen: alert, no apparent distress  CV: regular rate and rhythm, no murmurs, no peripheral edema  Resp: clear to ascultation bilaterally, normal respiratory effort  GI: soft, non-tender abdomen, bowel sounds present  Neuro: notable for left transtibial amputation    Recent Results (from the past 72 hour(s))   ACCU-CHEK GLUCOSE    Collection Time: 07/13/19  5:25 PM   Result Value Ref Range    Glucose - Accu-Ck 148 (H) 65 - 99 mg/dL   ACCU-CHEK GLUCOSE    Collection Time: 07/13/19  8:05 PM   Result Value Ref Range    Glucose - Accu-Ck 202 (H) 65 - 99 mg/dL   ACCU-CHEK GLUCOSE    Collection Time: 07/14/19  1:55 AM   Result Value Ref Range    Glucose - Accu-Ck 141 (H) 65 - 99 mg/dL   ACCU-CHEK GLUCOSE    Collection Time: 07/14/19  7:52 AM   Result Value Ref Range    Glucose - Accu-Ck 127 (H) 65 - 99 mg/dL   ACCU-CHEK GLUCOSE    Collection Time: 07/14/19 11:04 AM   Result Value Ref Range    Glucose - Accu-Ck 173 (H) 65 - 99 mg/dL   ACCU-CHEK GLUCOSE    Collection Time: 07/14/19  5:16 PM   Result Value Ref Range    Glucose - Accu-Ck 176 (H) 65 - 99 mg/dL   ACCU-CHEK GLUCOSE    Collection Time: 07/14/19  8:51 PM   Result Value Ref Range    Glucose - Accu-Ck 150 (H) 65 - 99 mg/dL   CBC WITHOUT DIFFERENTIAL    Collection Time: 07/15/19  5:42 AM   Result Value Ref Range    WBC 7.9 4.8 - 10.8 K/uL    " RBC 3.25 (L) 4.70 - 6.10 M/uL    Hemoglobin 8.9 (L) 14.0 - 18.0 g/dL    Hematocrit 29.6 (L) 42.0 - 52.0 %    MCV 91.1 81.4 - 97.8 fL    MCH 27.4 27.0 - 33.0 pg    MCHC 30.1 (L) 33.7 - 35.3 g/dL    RDW 51.5 (H) 35.9 - 50.0 fL    Platelet Count 244 164 - 446 K/uL    MPV 10.4 9.0 - 12.9 fL   Basic Metabolic Panel    Collection Time: 07/15/19  5:42 AM   Result Value Ref Range    Sodium 134 (L) 135 - 145 mmol/L    Potassium 4.0 3.6 - 5.5 mmol/L    Chloride 99 96 - 112 mmol/L    Co2 27 20 - 33 mmol/L    Glucose 144 (H) 65 - 99 mg/dL    Bun 34 (H) 8 - 22 mg/dL    Creatinine 1.14 0.50 - 1.40 mg/dL    Calcium 8.5 8.5 - 10.5 mg/dL    Anion Gap 8.0 0.0 - 11.9   ESTIMATED GFR    Collection Time: 07/15/19  5:42 AM   Result Value Ref Range    GFR If African American >60 >60 mL/min/1.73 m 2    GFR If Non African American >60 >60 mL/min/1.73 m 2   ACCU-CHEK GLUCOSE    Collection Time: 07/15/19  7:44 AM   Result Value Ref Range    Glucose - Accu-Ck 130 (H) 65 - 99 mg/dL   ACCU-CHEK GLUCOSE    Collection Time: 07/15/19 11:30 AM   Result Value Ref Range    Glucose - Accu-Ck 158 (H) 65 - 99 mg/dL   ACCU-CHEK GLUCOSE    Collection Time: 07/15/19  5:24 PM   Result Value Ref Range    Glucose - Accu-Ck 141 (H) 65 - 99 mg/dL   ACCU-CHEK GLUCOSE    Collection Time: 07/15/19  8:30 PM   Result Value Ref Range    Glucose - Accu-Ck 223 (H) 65 - 99 mg/dL   ACCU-CHEK GLUCOSE    Collection Time: 07/16/19  8:04 AM   Result Value Ref Range    Glucose - Accu-Ck 160 (H) 65 - 99 mg/dL   CBC WITH DIFFERENTIAL    Collection Time: 07/16/19  9:50 AM   Result Value Ref Range    WBC 8.2 4.8 - 10.8 K/uL    RBC 3.38 (L) 4.70 - 6.10 M/uL    Hemoglobin 9.5 (L) 14.0 - 18.0 g/dL    Hematocrit 29.5 (L) 42.0 - 52.0 %    MCV 87.3 81.4 - 97.8 fL    MCH 28.1 27.0 - 33.0 pg    MCHC 32.2 (L) 33.7 - 35.3 g/dL    RDW 49.3 35.9 - 50.0 fL    Platelet Count 239 164 - 446 K/uL    MPV 9.9 9.0 - 12.9 fL    Neutrophils-Polys 60.60 44.00 - 72.00 %    Lymphocytes 25.10 22.00 - 41.00  %    Monocytes 10.60 0.00 - 13.40 %    Eosinophils 3.00 0.00 - 6.90 %    Basophils 0.50 0.00 - 1.80 %    Immature Granulocytes 0.20 0.00 - 0.90 %    Nucleated RBC 0.00 /100 WBC    Neutrophils (Absolute) 4.96 1.82 - 7.42 K/uL    Lymphs (Absolute) 2.06 1.00 - 4.80 K/uL    Monos (Absolute) 0.87 (H) 0.00 - 0.85 K/uL    Eos (Absolute) 0.25 0.00 - 0.51 K/uL    Baso (Absolute) 0.04 0.00 - 0.12 K/uL    Immature Granulocytes (abs) 0.02 0.00 - 0.11 K/uL    NRBC (Absolute) 0.00 K/uL   ACCU-CHEK GLUCOSE    Collection Time: 07/16/19 11:14 AM   Result Value Ref Range    Glucose - Accu-Ck 141 (H) 65 - 99 mg/dL       Current Facility-Administered Medications   Medication Frequency   • hydrocortisone 1 % cream BID PRN   • miconazole 2%-zinc oxide (DELFINO) topical cream BID   • metoprolol (LOPRESSOR) tablet 12.5 mg DAILY   • methadone (DOLOPHINE) tablet 20 mg BID   • vitamin D (cholecalciferol) tablet 4,000 Units DAILY   • ascorbic acid tablet 500 mg QDAY with Breakfast   • ferrous sulfate tablet 325 mg QDAY with Breakfast   • insulin lispro (HUMALOG) injection 2-12 Units 4X/DAY ACHS    And   • glucose 4 g chewable tablet 16 g Q15 MIN PRN   • Respiratory Care per Protocol Continuous RT   • Pharmacy Consult Request ...Pain Management Review 1 Each PHARMACY TO DOSE   • acetaminophen (TYLENOL) tablet 650 mg Q4HRS PRN   • artificial tears 1.4 % ophthalmic solution 1 Drop PRN   • benzocaine-menthol (CEPACOL) lozenge 1 Lozenge Q2HRS PRN   • mag hydrox-al hydrox-simeth (MAALOX PLUS ES or MYLANTA DS) suspension 20 mL Q2HRS PRN   • ondansetron (ZOFRAN ODT) dispertab 4 mg 4X/DAY PRN    Or   • ondansetron (ZOFRAN) syringe/vial injection 4 mg 4X/DAY PRN   • traZODone (DESYREL) tablet 50 mg QHS PRN   • sodium chloride (OCEAN) 0.65 % nasal spray 2 Spray PRN   • hydrOXYzine HCl (ATARAX) tablet 50 mg Q6HRS PRN   • melatonin tablet 3 mg HS PRN   • polyethylene glycol/lytes (MIRALAX) PACKET 1 Packet QDAY PRN    And   • magnesium hydroxide (MILK OF  MAGNESIA) suspension 30 mL QDAY PRN    And   • bisacodyl (DULCOLAX) suppository 10 mg QDAY PRN   • fluticasone (FLONASE) nasal spray 100 mcg DAILY   • oxyCODONE immediate-release (ROXICODONE) tablet 5 mg Q6HRS PRN   • pregabalin (LYRICA) capsule 300 mg BID   • senna-docusate (PERICOLACE or SENOKOT S) 8.6-50 MG per tablet 2 Tab BID   • tramadol (ULTRAM) 50 MG tablet 100 mg Q6HRS PRN   • dextrose 50% (D50W) injection 50 mL Q15 MIN PRN   • diphenhydrAMINE-ZnAcetate (BENADRYL ITCH) 1-0.1 % cream TID PRN   • albuterol inhaler 2 Puff Q4HRS PRN       Orders Placed This Encounter   Procedures   • Diet Order Diabetic, Cardiac     Standing Status:   Standing     Number of Occurrences:   1     Order Specific Question:   Diet:     Answer:   Diabetic [3]     Order Specific Question:   Diet:     Answer:   Cardiac [6]     Order Specific Question:   Consistency/Fluid modifications:     Answer:   Thin Liquids [3]       Assessment:  Active Hospital Problems    Diagnosis   • *S/P BKA (below knee amputation) unilateral, left (HCC)   • Acute on chronic respiratory failure with hypoxia ([facility])   • SALOME (obstructive sleep apnea)   • Cardiomyopathy (HCC)   • DM (diabetes mellitus), type 2, uncontrolled (CMS-HCC)   • Essential hypertension   • Left shoulder pain   • Chronic pain syndrome   • Methadone dependence (HCC)     This patient is a 63 y.o. male admitted for acute inpatient rehabilitation with S/P BKA (below knee amputation) unilateral, left (HCC).    I led and attended the weekly conference, and agree with the IDT conference documentation and plan of care as noted below.    Date of conference: 7/15/2019    Goals and barriers: See IDT note.    Biggest barriers: pain in limb, poor sleep last night, left shoulder chronic issues,     CM/social support: To single level home in [place], [place], with support of wife.    Anticipated DC date: 7/29/2019    Outpatient: PT    Equip: knee oneida johnson    Follow up: PCP, surgery    Medical  Decision Making and Plan:    Left BKA  Dr. Schneider 6/24  Continue full rehab program  PT/OT, 1 hr each discipline   Outpatient follow up with surgery, apt Weds, can remove sutures there    Residual limb fluid collection  Post-op seroma versus hematoma  US with 3 distinct fluid collections  Most likely seroma, though hemoglobin slightly lower, d/c Lovenox and apirin, hemoglobin improved today  Schedule with orthopedics - has apt Weds    Normocytic anemia  Likely post-operative  Monitor, stable/higher on recheck today    History of elevated troponin  Discontinue aspirin due to decreased Hgb    Diabetes  Continue insulin  Appreciate hospitalist assistance    Chronic pain  Narcotic dependence/tolerance  Continue methadone, prescribed by his PCP  Dose decreased from 35 mg BID at home, to 20 mg BID due to encephalopathy (stroke code was called)  Continue PRN opiates - using 3-4 5 mg tabs daily     Left shoulder pain  With history of multiple surgeries, including left humeral head resection  Modalities with therapy  Continue pain management     Rashes  Groin/buttock rash - miconazole, continues  Trunk rash - hydrocortison cream, resolved     Cardiomyopathy, EF 40%  Continue metoprolol  Appreciate hospitalist assistance     History of one kidney  Avoid nephrotoxins     Respiratory insufficiency  Sleep apnea  On 3 L at home at night  Respiratory therapy to see patient      Hypertension  Continue metoprolol  Appreciate hospitalist assistance    Vit D deficiency, 12  Continue supplementation    DVT prophylaxis  Discontinued Lovenox due to fluid collection in residual limb and anemia    Total time:  25 minutes.  I spent greater than 50% of the time for patient care, counseling, and coordination on this date, including patient face-to face time, unit/floor time with review of records/pertinent lab data and studies, as well as discussing diagnostic evaluation/work up, planned therapeutic interventions, and future disposition of  care, as per the interval events/subjective and the assessment and plan as noted above.    I have performed a physical exam, reviewed and updated ROS, as well as the assessment and plan today 7/16/2019. In review of note from 7/15/2019 there are no new changes except as documented above.      Mimi Doyle M.D.   Physical Medicine and Rehabilitation

## 2019-07-16 NOTE — PROGRESS NOTES
Hospital Medicine Daily Progress Note      Chief Complaint  CM, HTN, DM, S/P BKA    Interval Problem Update  Dizziness better.  No real change in stump pain.  FSBS range high.    Review of Systems  Review of Systems   Constitutional: Negative for chills and fever.   HENT: Negative.    Eyes: Negative.    Respiratory: Negative for cough and shortness of breath.    Cardiovascular: Negative for chest pain and palpitations.   Gastrointestinal: Negative for abdominal pain, nausea and vomiting.   Genitourinary: Negative.    Musculoskeletal:        Wound pain   Skin: Negative for itching and rash.        Physical Exam  Temp:  [36.6 °C (97.9 °F)-36.7 °C (98.1 °F)] 36.7 °C (98.1 °F)  Pulse:  [72-83] 72  Resp:  [18] 18  BP: (111-126)/(69) 111/69  SpO2:  [91 %-92 %] 92 %    Physical Exam   Constitutional: He is oriented to person, place, and time. No distress.   HENT:   Head: Normocephalic and atraumatic.   Right Ear: External ear normal.   Left Ear: External ear normal.   Eyes: Conjunctivae and EOM are normal. Right eye exhibits no discharge. Left eye exhibits no discharge.   Neck: Normal range of motion. Neck supple. No tracheal deviation present.   Cardiovascular: Normal rate and regular rhythm.    Pulmonary/Chest: No stridor. No respiratory distress. He has no wheezes.   Decreased BS   Abdominal: Soft. Bowel sounds are normal. He exhibits no distension. There is no tenderness.   Musculoskeletal: He exhibits tenderness.   Left BKA stump wrapped and braced   Neurological: He is alert and oriented to person, place, and time.   Skin: Skin is warm and dry. He is not diaphoretic.   Vitals reviewed.      Fluids    Intake/Output Summary (Last 24 hours) at 07/15/19 2012  Last data filed at 07/15/19 1200   Gross per 24 hour   Intake              520 ml   Output             1100 ml   Net             -580 ml       Laboratory  Recent Labs      07/13/19   0521  07/15/19   0542   WBC  7.2  7.9   RBC  3.61*  3.25*   HEMOGLOBIN  9.8*  8.9*    HEMATOCRIT  32.7*  29.6*   MCV  90.6  91.1   MCH  27.1  27.4   MCHC  30.0*  30.1*   RDW  51.2*  51.5*   PLATELETCT  261  244   MPV  9.8  10.4     Recent Labs      07/13/19   0521  07/15/19   0542   SODIUM  138  134*   POTASSIUM  4.1  4.0   CHLORIDE  102  99   CO2  28  27   GLUCOSE  142*  144*   BUN  38*  34*   CREATININE  1.22  1.14   CALCIUM  8.6  8.5                   Assessment/Plan  * S/P BKA (below knee amputation) unilateral, left (HCC)- (present on admission)   Assessment & Plan    2/2 diabetic foot infection/sepsis  S/P left BKA on 6/24/19 by Dr. Schneider  Completed antibiotics per ID prior to Rehab admission  Wound care  U/S shows fluid collection x 3  Monitor for bleeding and infection  Ortho F/U pending     Acute on chronic respiratory failure with hypoxia ([facility])- (present on admission)   Assessment & Plan    S/P VDRF     Cardiomyopathy ([facility])- (present on admission)   Assessment & Plan    Echo EF 40%  Monitor for volume overload     DM (diabetes mellitus), type 2, uncontrolled (CMS-[facility])- (present on admission)   Assessment & Plan    HbA1c 12.2  On SSI  May need long-acting insulin     Essential hypertension- (present on admission)   Assessment & Plan    Decreased Metoprolol for symptomatic hypotension     Azotemia   Assessment & Plan    Encourage PO fluids  Check F/U labs in am     Vitamin D deficiency   Assessment & Plan    Vit D level 12  On supplementation     Anemia   Assessment & Plan    Vit B12 and Folate levels normal  Fe 38, continue Fe and Vit C  Follow H/H     DNR

## 2019-07-16 NOTE — DISCHARGE PLANNING
Met with patient following Team Conference to relay results and inform of anticipated discharge date of 7/29/2019. We discussed recommendation for therapies post discharge. Patient lives in [place], [place]. Gus [employer] goes to [facility], but they cannot provide services under workers compensation. Patient states he does not have transportation to get to outpatient therapy. Patient has an  for his case with workers' comp but doesn't have a name of a . I will f/u as there is an extensive list of DME patient is going to need.   An opportunity for questions and discussion was provided.

## 2019-07-16 NOTE — ASSESSMENT & PLAN NOTE
S/P left BKA 2nd to diabetic foot on 6/24/19  S/P antibiotics per ID prior to Rehab admission  External wound healing well  Wound care  U/S shows fluid collection x 3  Saw Ortho on 7/17 with no additional recommendations  F/U U/S now shows only 2 fluid collections and decreasing in size

## 2019-07-16 NOTE — PROGRESS NOTES
Hospital Medicine Daily Progress Note      Chief Complaint  CM, HTN, DM, S/P BKA    Interval Problem Update  No overnight events.  Pain about the same.  U/S results noted.    Review of Systems  Review of Systems   Constitutional: Negative for chills and fever.   HENT: Negative.    Eyes: Negative.    Respiratory: Negative for cough and shortness of breath.    Cardiovascular: Negative for chest pain and palpitations.   Gastrointestinal: Negative for abdominal pain, nausea and vomiting.   Genitourinary: Negative.    Musculoskeletal:        Wound pain   Skin: Negative for itching and rash.        Physical Exam  Temp:  [36.6 °C (97.9 °F)-36.7 °C (98.1 °F)] 36.7 °C (98.1 °F)  Pulse:  [72-83] 72  Resp:  [18] 18  BP: (111-126)/(69) 111/69  SpO2:  [91 %-92 %] 92 %    Physical Exam   Constitutional: He is oriented to person, place, and time. No distress.   HENT:   Head: Normocephalic and atraumatic.   Right Ear: External ear normal.   Left Ear: External ear normal.   Eyes: Conjunctivae and EOM are normal. Right eye exhibits no discharge. Left eye exhibits no discharge.   Neck: Normal range of motion. Neck supple. No tracheal deviation present.   Cardiovascular: Normal rate and regular rhythm.    Pulmonary/Chest: No stridor. No respiratory distress. He has no wheezes.   Decreased BS   Abdominal: Soft. Bowel sounds are normal. He exhibits no distension. There is no tenderness.   Musculoskeletal: He exhibits tenderness.   Left BKA stump wrapped   Neurological: He is alert and oriented to person, place, and time.   Skin: Skin is warm and dry. He is not diaphoretic.   Vitals reviewed.      Fluids    Intake/Output Summary (Last 24 hours) at 07/15/19 1959  Last data filed at 07/15/19 1200   Gross per 24 hour   Intake              520 ml   Output             1100 ml   Net             -580 ml       Laboratory  Recent Labs      07/13/19   0521  07/15/19   0542   WBC  7.2  7.9   RBC  3.61*  3.25*   HEMOGLOBIN  9.8*  8.9*   HEMATOCRIT   32.7*  29.6*   MCV  90.6  91.1   MCH  27.1  27.4   MCHC  30.0*  30.1*   RDW  51.2*  51.5*   PLATELETCT  261  244   MPV  9.8  10.4     Recent Labs      07/13/19   0521  07/15/19   0542   SODIUM  138  134*   POTASSIUM  4.1  4.0   CHLORIDE  102  99   CO2  28  27   GLUCOSE  142*  144*   BUN  38*  34*   CREATININE  1.22  1.14   CALCIUM  8.6  8.5                   Assessment/Plan  * S/P BKA (below knee amputation) unilateral, left (HCC)- (present on admission)   Assessment & Plan    2/2 diabetic foot infection/sepsis  S/P left BKA on 6/24/19 by Dr. Schneider  Completed antibiotics per ID prior to Rehab admission  Wound care  U/S shows fluid collection x 3, needs Ortho F/U     Acute on chronic respiratory failure with hypoxia ([facility])- (present on admission)   Assessment & Plan    S/P VDRF     Cardiomyopathy ([facility])- (present on admission)   Assessment & Plan    Echo EF 40%  Monitor for volume overload  Check F/U labs     DM (diabetes mellitus), type 2, uncontrolled (CMS-[facility])- (present on admission)   Assessment & Plan    HbA1c 12.2  Observe serum glucose trends on SSI     Essential hypertension- (present on admission)   Assessment & Plan    Observe blood pressure trends on Metoprolol     Vitamin D deficiency   Assessment & Plan    Vit D level 12  On supplementation     Anemia   Assessment & Plan    Vit B12 and Folate levels normal  Fe 38, continue Fe and Vit C  Follow H/H     DNR

## 2019-07-16 NOTE — THERAPY
"Physical Therapy   Daily Treatment     Patient Name: Travon Pollack  Age:  63 y.o., Sex:  male  Medical Record #: [id number]  Today's Date: 7/16/2019     Precautions  Precautions: (P) Non Weight Bearing Left Lower Extremity, Immobilizer Left Lower Extremity, Fall Risk, Other (See Comments)  Comments: (P) IPOP L residual limb; chronic L shoulder weakness/pain    Subjective    Pt received bed, agreeable to session.     Objective       07/16/19 1431   Precautions   Precautions Non Weight Bearing Left Lower Extremity;Immobilizer Left Lower Extremity;Fall Risk;Other (See Comments)   Comments IPOP L residual limb; chronic L shoulder weakness/pain   Cognition    Level of Consciousness Alert   Interdisciplinary Plan of Care Collaboration   IDT Collaboration with  Nursing;Family / Caregiver   Patient Position at End of Therapy In Bed;Call Light within Reach;Tray Table within Reach;Phone within Reach;Family / Friend in Room   Collaboration Comments pt's spouse present during session; RN wrapped incision with gauze   PT Total Time Spent   PT Individual Total Time Spent (Mins) 30   PT Charge Group   PT Therapeutic Activities 2       Treatment session focused on pt edu with use of explanation, demo and handouts. PT reviewed desensitization with pt then he completed firm massage of L residual limb x2-3 minutes. PT educated pt in gentle tapping as well.   PT reviewed positioning of L residual limb to reduce risk of contractures. Extensive edu provided.  PT initiated pt edu in wrapping of residual limb: visual demo provided, pt did not attempt wrapping of limb today.  PT advised that pt read \"pain\" section of amputee book.    Assessment    Pt receptive to edu. Need to confirm pt compliance with HEP.    Plan    Review HEP for s/p BKA ROM, positioning, wrapping, desensitization and strengthening; uneven transfers; progress standing tolerance with FWW vs // bars; progress gait with FWW vs knee scooter (pt has narrow doorways in home and " wc will not fit through bathroom door)

## 2019-07-16 NOTE — DISCHARGE PLANNING
Call placed to case management for workers' compensation requesting a return call to coordinate post acute needs.

## 2019-07-16 NOTE — THERAPY
"Physical Therapy   Daily Treatment     Patient Name: Travon Pollack  Age:  63 y.o., Sex:  male  Medical Record #: [id number]  Today's Date: 7/16/2019     Precautions  Precautions: Non Weight Bearing Left Lower Extremity, Immobilizer Left Lower Extremity, Fall Risk, Other (See Comments)  Comments: IPOP L residual limb; chronic L shoulder weakness/pain    Subjective    Pt received in bed, just finishing breakfast. States \"I slept from about 1 to 5:30.\"      Objective       07/16/19 0831   Precautions   Precautions Non Weight Bearing Left Lower Extremity;Immobilizer Left Lower Extremity;Fall Risk;Other (See Comments)   Comments IPOP L residual limb; chronic L shoulder weakness/pain   Cognition    Level of Consciousness Alert   Sitting Lower Body Exercises   Long Arc Quad 3 sets of 10;Right   Other Exercises L quads sets x10 reps   Standing Lower Body Exercises   Hip Extension 3 sets of 10;Left   Hip Abduction 3 sets of 10;Left   Mini Squat Partial  (R SL squats x5 reps)   Other Exercises all standing ther ex completed in // bars with intermittent seated rest breaks, stance time up to 3.5 min   Bed Mobility    Supine to Sit Modified Independent   Sit to Stand Contact Guard Assist  (in // bars, instruction in STS sequence)   Neuro-Muscular Treatments   Neuro-Muscular Treatments Verbal Cuing;Sequencing   Interdisciplinary Plan of Care Collaboration   IDT Collaboration with  Certified Nursing Assistant   Patient Position at End of Therapy Seated;Other (Comments)   Collaboration Comments tx of care to CNA   PT Total Time Spent   PT Individual Total Time Spent (Mins) 60   PT Charge Group   PT Gait Training 1   PT Therapeutic Exercise 2   PT Therapeutic Activities 1       FIM Bed/Chair/Wheelchair Transfers Score: 4 - Minimal Assistance  Bed/Chair/Wheelchair Transfers Description:   (supine->sit SPV; squat-pivot with reach (uphilll tx) to pt's R Min A)    FIM Walking Score:  1 - Total Assistance  Walking Description:   (5 hops " with lizandro FWW and CGA, wc follow for safety. Pt demos difficulty clearning R foot during swing)    FIM Wheelchair Score:  6 - Modified Independent  Wheelchair Description:   (Mod I in manual wc on inside surfaces)    FIM Stairs Score:  0 - Not tested,unsafe activity  Stairs Description:       PT reviewed PT POC and goals with pt.    Assessment    Pt with limited ambulation distance with FWW d/t fatigue and L shoulder weakness/pain during WB.     Plan    Review HEP for s/p BKA ROM, positioning, wrapping, desensitization and strengthening; uneven transfers; progress standing tolerance with FWW vs // bars; progress gait with FWW vs knee scooter (pt has narrow doorways in home and wc will not fit through bathroom door)

## 2019-07-16 NOTE — CARE PLAN
Problem: Communication  Goal: The ability to communicate needs accurately and effectively will improve  Patient alert and oriented x 4,Pt uses call light consistently and appropriately. Waits for assistance does not attempt self transfer this shift. Able to verbalize needs.    Problem: Skin Integrity  Goal: Risk for impaired skin integrity will decrease  Buttocks reddened but blanching.  Jennifer cream applied per orders.     Problem: Pain Management  Goal: Pain level will decrease to patient's comfort goal   07/15/19 2130   OTHER   Pain Rating Scale (NPRS) 9   Non Verbal Scale  Calm;Unlabored Breathing   Comfort Goal Comfort with Movement;Perform Activity;Sleep Comfortably;Comfort at Rest   Complained of Left leg pain.  Medicated with Oxycodone 5 mg per prn order.   2300 Patient states pain medication was effective.

## 2019-07-16 NOTE — THERAPY
Occupational Therapy  Daily Treatment     Patient Name: Travon Pollack  Age:  63 y.o., Sex:  male  Medical Record #: [id number]  Today's Date: 7/16/2019     Precautions  Precautions: Non Weight Bearing Left Lower Extremity, Immobilizer Left Lower Extremity, Fall Risk, Other (See Comments)  Comments: IPOP L residual limb; chronic L shoulder weakness/pain    Safety   ADL Safety : Requires Supervision for Safety  Bathroom Safety: Requires Supervision for Safety  Comments: SBA with ADL's and CGA-close SBA with bathroom transfers    Subjective    Pt seated in w/c in room upon arrival. Cooperative w/ OT session.      Objective       07/16/19 1001   Precautions   Precautions Non Weight Bearing Left Lower Extremity;Immobilizer Left Lower Extremity;Fall Risk;Other (See Comments)   Comments IPOP L residual limb; chronic L shoulder weakness/pain   Vitals   O2 Delivery Nasal Cannula   Pain   Intervention Declines   Pain 0 - 10 Group   Pain Rating Scale (NPRS) 0   Non Verbal Descriptors   Non Verbal Scale  Calm   Cognition    Level of Consciousness Alert   Interdisciplinary Plan of Care Collaboration   Patient Position at End of Therapy Edge of Bed;Call Light within Reach;Tray Table within Reach;Phone within Reach     Tub Transfer - Pt practiced tub transfer w/ tub transfer bench. Pt educated on appropriate way to transfer and the difference w/ a tub transfer bench vs the walk in shower w/ a shower chair. Pt was educated on appropriate bathroom mobility w/ w/c and future mobility w/ prosthetic.     Assessment    Pt engaged in tub transfer w/ tub bench to practice for set up at home. Pt was receptive to education on tub transfer bench and safe/appropraite mobility/transfers in the bathroom. Pt educated on grab bars in bathroom to aid in safe transfers to/from tub and toilet.      Plan    Cont to address endurance, balance, strength, and functional mobility for completion of ADLs/IADLs

## 2019-07-16 NOTE — PROGRESS NOTES
Hospital Medicine Daily Progress Note      Chief Complaint  CM, HTN, DM, S/P BKA    Interval Problem Update  Pt c/o dizziness and low blood pressure.  Labs reviewed.    Review of Systems  Review of Systems   Constitutional: Negative for chills and fever.   HENT: Negative.    Eyes: Negative.    Respiratory: Negative for cough and shortness of breath.    Cardiovascular: Negative for chest pain and palpitations.   Gastrointestinal: Negative for abdominal pain, nausea and vomiting.   Genitourinary: Negative.    Musculoskeletal:        Wound pain   Skin: Negative for itching and rash.        Physical Exam  Temp:  [36.6 °C (97.9 °F)-36.7 °C (98.1 °F)] 36.7 °C (98.1 °F)  Pulse:  [72-83] 72  Resp:  [18] 18  BP: (111-126)/(69) 111/69  SpO2:  [91 %-92 %] 92 %    Physical Exam   Constitutional: He is oriented to person, place, and time. No distress.   HENT:   Head: Normocephalic and atraumatic.   Right Ear: External ear normal.   Left Ear: External ear normal.   Eyes: Conjunctivae and EOM are normal. Right eye exhibits no discharge. Left eye exhibits no discharge.   Neck: Normal range of motion. Neck supple. No tracheal deviation present.   Cardiovascular: Normal rate and regular rhythm.    Pulmonary/Chest: No stridor. No respiratory distress. He has no wheezes.   Decreased BS   Abdominal: Soft. Bowel sounds are normal. He exhibits no distension. There is no tenderness.   Musculoskeletal: He exhibits tenderness.   Left BKA stump wrapped and braced   Neurological: He is alert and oriented to person, place, and time.   Skin: Skin is warm and dry. He is not diaphoretic.   Vitals reviewed.      Fluids    Intake/Output Summary (Last 24 hours) at 07/15/19 2008  Last data filed at 07/15/19 1200   Gross per 24 hour   Intake              520 ml   Output             1100 ml   Net             -580 ml       Laboratory  Recent Labs      07/13/19   0521  07/15/19   0542   WBC  7.2  7.9   RBC  3.61*  3.25*   HEMOGLOBIN  9.8*  8.9*    HEMATOCRIT  32.7*  29.6*   MCV  90.6  91.1   MCH  27.1  27.4   MCHC  30.0*  30.1*   RDW  51.2*  51.5*   PLATELETCT  261  244   MPV  9.8  10.4     Recent Labs      07/13/19   0521  07/15/19   0542   SODIUM  138  134*   POTASSIUM  4.1  4.0   CHLORIDE  102  99   CO2  28  27   GLUCOSE  142*  144*   BUN  38*  34*   CREATININE  1.22  1.14   CALCIUM  8.6  8.5                   Assessment/Plan  * S/P BKA (below knee amputation) unilateral, left (HCC)- (present on admission)   Assessment & Plan    2/2 diabetic foot infection/sepsis  S/P left BKA on 6/24/19 by Dr. Schneider  Completed antibiotics per ID prior to Rehab admission  Wound care  U/S shows fluid collection x 3  Monitor for bleeding and infection  Ortho F/U pending     Acute on chronic respiratory failure with hypoxia ([facility])- (present on admission)   Assessment & Plan    S/P VDRF     Cardiomyopathy ([facility])- (present on admission)   Assessment & Plan    Echo EF 40%  Monitor for volume overload     DM (diabetes mellitus), type 2, uncontrolled (CMS-[facility])- (present on admission)   Assessment & Plan    HbA1c 12.2  Continue to observe serum glucose trends on SSI     Essential hypertension- (present on admission)   Assessment & Plan    Decrease Metoprolol for symptomatic hypotension     Azotemia   Assessment & Plan    Encourage PO fluids     Vitamin D deficiency   Assessment & Plan    Vit D level 12  On supplementation     Anemia   Assessment & Plan    Vit B12 and Folate levels normal  Fe 38, continue Fe and Vit C  Follow H/H     DNR

## 2019-07-16 NOTE — PROGRESS NOTES
Received shift report and assumed care of patient.  Patient awake, calm and stable, currently positioned in bed for comfort and safety; call light within reach.  Reports pain and discomfort at this time. See Mar for details, Will continue to monitor.

## 2019-07-16 NOTE — THERAPY
"Occupational Therapy  Daily Treatment     Patient Name: Travon Pollack  Age:  63 y.o., Sex:  male  Medical Record #: [id number]  Today's Date: 7/16/2019     Precautions  Precautions: Non Weight Bearing Left Lower Extremity, Immobilizer Left Lower Extremity, Fall Risk, Other (See Comments)  Comments: IPOP L residual limb; chronic L shoulder weakness/pain    Safety   ADL Safety : Requires Supervision for Safety  Bathroom Safety: Requires Supervision for Safety  Comments: SBA with ADL's and CGA-close SBA with bathroom transfers    Subjective    \"I wish I can get rid of this thing\" re: LUE     Objective       07/16/19 1301   Sitting Upper Body Exercises   Chest Press 3 sets of 10;Bilateral;Weight (See Comments for lbs)  (20# equalizer, LUE requires assist )   Shoulder Extension 3 sets of 10;Bilateral;Weight (See Comments for lbs)  (20# equalizer, LUE requires assist)   Bilateral Row 3 sets of 10;Bilateral;Weight (See Comments for lbs)  (30# equalizer)   Tricep Press 3 sets of 10;Bilateral;Weight (See Comments for lbs)  (30# rickshaw)   Upper Extremity Bike Level 5 Resistance  (BUE motomed, 10 min, 1.32 mile, 0 RB, 52/48% symmetry)   Interdisciplinary Plan of Care Collaboration   Patient Position at End of Therapy In Bed;Call Light within Reach;Tray Table within Reach   OT Total Time Spent   OT Individual Total Time Spent (Mins) 60   OT Charge Group   OT Therapeutic Exercise  4       Assessment    Pt completed UB therex to the best of their abilities with no complaints of pain. Pt with ALEJANDRO spasms during exercise    Plan    Cont overall strength/endurance and standing balance to improve ADL's and functional mobility    "

## 2019-07-17 LAB
GLUCOSE BLD-MCNC: 143 MG/DL (ref 65–99)
GLUCOSE BLD-MCNC: 147 MG/DL (ref 65–99)
GLUCOSE BLD-MCNC: 161 MG/DL (ref 65–99)
GLUCOSE BLD-MCNC: 177 MG/DL (ref 65–99)

## 2019-07-17 PROCEDURE — 700102 HCHG RX REV CODE 250 W/ 637 OVERRIDE(OP): Performed by: PHYSICAL MEDICINE & REHABILITATION

## 2019-07-17 PROCEDURE — 94760 N-INVAS EAR/PLS OXIMETRY 1: CPT

## 2019-07-17 PROCEDURE — 770010 HCHG ROOM/CARE - REHAB SEMI PRIVAT*

## 2019-07-17 PROCEDURE — 97535 SELF CARE MNGMENT TRAINING: CPT

## 2019-07-17 PROCEDURE — 99232 SBSQ HOSP IP/OBS MODERATE 35: CPT | Performed by: HOSPITALIST

## 2019-07-17 PROCEDURE — 700102 HCHG RX REV CODE 250 W/ 637 OVERRIDE(OP): Performed by: HOSPITALIST

## 2019-07-17 PROCEDURE — 97110 THERAPEUTIC EXERCISES: CPT

## 2019-07-17 PROCEDURE — 82962 GLUCOSE BLOOD TEST: CPT | Mod: 91

## 2019-07-17 PROCEDURE — A9270 NON-COVERED ITEM OR SERVICE: HCPCS | Performed by: PHYSICAL MEDICINE & REHABILITATION

## 2019-07-17 PROCEDURE — 97530 THERAPEUTIC ACTIVITIES: CPT

## 2019-07-17 PROCEDURE — A9270 NON-COVERED ITEM OR SERVICE: HCPCS | Performed by: HOSPITALIST

## 2019-07-17 PROCEDURE — 99231 SBSQ HOSP IP/OBS SF/LOW 25: CPT | Performed by: PHYSICAL MEDICINE & REHABILITATION

## 2019-07-17 RX ADMIN — FERROUS SULFATE TAB 325 MG (65 MG ELEMENTAL FE) 325 MG: 325 (65 FE) TAB at 08:24

## 2019-07-17 RX ADMIN — PREGABALIN 300 MG: 100 CAPSULE ORAL at 08:23

## 2019-07-17 RX ADMIN — METHADONE HYDROCHLORIDE 20 MG: 10 TABLET ORAL at 22:11

## 2019-07-17 RX ADMIN — OXYCODONE HYDROCHLORIDE 5 MG: 5 TABLET ORAL at 05:22

## 2019-07-17 RX ADMIN — METOPROLOL TARTRATE 12.5 MG: 25 TABLET ORAL at 08:24

## 2019-07-17 RX ADMIN — OXYCODONE HYDROCHLORIDE AND ACETAMINOPHEN 500 MG: 500 TABLET ORAL at 08:23

## 2019-07-17 RX ADMIN — OXYCODONE HYDROCHLORIDE 5 MG: 5 TABLET ORAL at 17:39

## 2019-07-17 RX ADMIN — MICONAZOLE NITRATE: 20 CREAM TOPICAL at 22:27

## 2019-07-17 RX ADMIN — PREGABALIN 300 MG: 100 CAPSULE ORAL at 22:11

## 2019-07-17 RX ADMIN — VITAMIN D, TAB 1000IU (100/BT) 4000 UNITS: 25 TAB at 08:23

## 2019-07-17 RX ADMIN — MELATONIN TAB 3 MG 3 MG: 3 TAB at 22:12

## 2019-07-17 RX ADMIN — METHADONE HYDROCHLORIDE 20 MG: 10 TABLET ORAL at 08:23

## 2019-07-17 RX ADMIN — MICONAZOLE NITRATE: 20 CREAM TOPICAL at 08:25

## 2019-07-17 RX ADMIN — FLUTICASONE PROPIONATE 100 MCG: 50 SPRAY, METERED NASAL at 08:25

## 2019-07-17 ASSESSMENT — ENCOUNTER SYMPTOMS
PALPITATIONS: 0
HEADACHES: 0
DIZZINESS: 0
HALLUCINATIONS: 0
SHORTNESS OF BREATH: 0
VOMITING: 0
NAUSEA: 0
BLURRED VISION: 0
FEVER: 0

## 2019-07-17 NOTE — THERAPY
"Occupational Therapy  Daily Treatment     Patient Name: Travon Pollack  Age:  63 y.o., Sex:  male  Medical Record #: [id number]  Today's Date: 2019     Precautions  Precautions: Non Weight Bearing Left Lower Extremity, Immobilizer Left Lower Extremity, Fall Risk, Other (See Comments)  Comments: IPOP L residual limb; chronic L shoulder weakness/pain    Safety   ADL Safety : Requires Supervision for Safety, Requires Cueing for Safety  Bathroom Safety: Requires Supervision for Safety, Requires Cuing for Safety  Comments: SBA with ADL's and CGA-close SBA with bathroom transfers    Subjective    \"I am going to put a motor on the scooter\"     Objective       19 0831   Safety    ADL Safety  Requires Supervision for Safety;Requires Cueing for Safety   Bathroom Safety Requires Supervision for Safety;Requires Cuing for Safety   Interdisciplinary Plan of Care Collaboration   Patient Position at End of Therapy Call Light within Reach;Tray Table within Reach;Edge of Bed   OT Total Time Spent   OT Individual Total Time Spent (Mins) 60   OT Charge Group   OT Self Care / ADL 4       FIM Eating Score:  6 - Modified Independent  Eating Description:  Increased time    FIM Grooming Score:  6 - Modified Independent  Grooming Description:  Increased time    FIM Bathing Score:  5 - Standby Prompting/Supervision or Set-up  Bathing Description:       FIM Upper Body Dressin - Independent  Upper Body Dressing Description:       FIM Lower Body Dressing Score:  4 - Minimal Assistance  Lower Body Dressing Description:  Increased time, Supervision for safety, Verbal cueing, Set-up of equipment (CGA when standing to don brief, shorts don in bed SBA)     FIM Tub/Shower Transfers Score:  5 - Standby Prompting/Supervision or Set-up  Tub/Shower Transfers Description:  Grab bar, Increased time, Supervision for safety, Set-up of equipment      Assessment    Pt completed shower routine at SBA - Min A overall using w/c, shower bench, GB's, pt " also demo improve squat pivot functional transfers.    Plan    Cont overall strength/endurance and standing balance to improve ADL's and functional mobility

## 2019-07-17 NOTE — DISCHARGE PLANNING
Received call back from Ranjit Felder,  for workers comp. [phone] Fax [phone]. Talked at length regarding patient needs and LOS. He directed me to send him orders for the DME needed for patient and therapies. HH was recommended for patient, but [facility] is the HH agency that services [place] and notified us that they don't take workers comp. Provided [facility]  number to Ranjit to see if they could do a letter of agreement with them.  If not, Ranjit will set up OP PT and can provide transportation for physician and therapy appointments.  Faxed DME needed to Ranjit.   Fax number for continued stay auth (currently have 14 days approved and pt will be here a total of 19) [phone].

## 2019-07-17 NOTE — PROGRESS NOTES
Received shift report and assumed care of patient.  Patient awake, calm and stable, currently positioned in bed for comfort and safety; call light within reach.  reports pain and discomfort at this time. See Mar for details,  Will continue to monitor and provide relief a ordered.

## 2019-07-17 NOTE — FLOWSHEET NOTE
07/17/19 1131   Events/Summary/Plan   Events/Summary/Plan 02 spot check, not yet able to wean daytime 02   Respiratory WDL   Respiratory (WDL) X   Chest Exam   Respiration 18   Pulse 82   Oximetry   #Pulse Oximetry (Single Determination) Yes   Oxygen   Home O2 Use Prior To Admission? Yes   Home O2 LPM Flow 3 LPM   Home O2 Delivery Method Nasal Cannula   Home O2 Frequency of Use At Sleep   Pulse Oximetry 92 %   O2 (LPM) 3   O2 Daily Delivery Respiratory  Silicone Nasal Cannula

## 2019-07-17 NOTE — THERAPY
"Physical Therapy   Daily Treatment     Patient Name: Travon Pollack  Age:  63 y.o., Sex:  male  Medical Record #: [id number]  Today's Date: 7/17/2019     Precautions  Precautions: (P) Non Weight Bearing Left Lower Extremity, Immobilizer Left Lower Extremity, Fall Risk, Other (See Comments)  Comments: (P) IPOP L residual limb; chronic L shoulder weakness/pain    Subjective    Pt received seated in wc, agreeable to session.     Objective       07/17/19 1231   Precautions   Precautions Non Weight Bearing Left Lower Extremity;Immobilizer Left Lower Extremity;Fall Risk;Other (See Comments)   Comments IPOP L residual limb; chronic L shoulder weakness/pain   Vitals   O2 (LPM) 3   O2 Delivery Nasal Cannula   Cognition    Level of Consciousness Alert   Supine Lower Body Exercise   Bridges Two Legged;3 sets of 10  (modified DLS on bolster)   Other Exercises sidelying L hip ext 3x10 reps; passive sidelying L psoas stretch 2x40\"   Bed Mobility    Supine to Sit Modified Independent   Sit to Supine Modified Independent   Scooting Modified Independent   Interdisciplinary Plan of Care Collaboration   IDT Collaboration with  Occupational Therapist   Patient Position at End of Therapy In Bed;Call Light within Reach;Tray Table within Reach;Phone within Reach;Other (Comments)   Collaboration Comments tx of care to OT   PT Total Time Spent   PT Individual Total Time Spent (Mins) 30   PT Charge Group   PT Therapeutic Exercise 1   PT Therapeutic Activities 1       FIM Bed/Chair/Wheelchair Transfers Score: 5 - Standby Prompting/Supervision or Set-up  Bed/Chair/Wheelchair Transfers Description:   (squat-pivot tx to L EOB->wc SPV; lateral scoot tx to R SPV; supine<>sit Mod I. Trialed use of bedrail during tx wc->bed, pt completed SPT with bedrail Mod I)    Assessed for Mod I wc<>bed transfers. Pt verbalizes and demos good safety awareness and understanding of transfers. Pt would benefit from another day of SPV during transfers due to new L " BKA.    Assessment    Pt at SPV level for bed<>wc transfers.    Plan    Continue to Assess for Mod I at wc level in room; progress gait with FWW; standing tolerance on R LE; review s/p BKA positioning, desensitization, ROM, and strengthenign

## 2019-07-17 NOTE — PROGRESS NOTES
"Rehab Progress Note     Date of Service: 7/17/2019  Chief Complaint: follow up left BKA    Interval Events (Subjective)    Patient seen and examined in his room today.  He just did a transfer from his bed to the wheelchair with physical therapy requiring contact-guard assist.  He does have a boot for his right lower extremity that is offloading on his heel.  He does have an appointment with orthopedics at 2:00 this afternoon hopefully for suture removal as well as management of his postoperative fluid collection.  Patient has no new complaints.  He continues to work hard despite the chronic pain in his left shoulder.    Objective:  VITAL SIGNS: /60   Pulse 80   Temp 37 °C (98.6 °F) (Temporal)   Resp 18   Ht 1.803 m (5' 11\")   Wt 89 kg (196 lb 3.4 oz)   SpO2 92%   BMI 27.37 kg/m²   Gen: alert, no apparent distress  CV: regular rate and rhythm, no murmurs, no peripheral edema  Resp: clear to ascultation bilaterally, normal respiratory effort  GI: soft, non-tender abdomen, bowel sounds present  Neuro: notable for left transtibial amputation    Recent Results (from the past 72 hour(s))   ACCU-CHEK GLUCOSE    Collection Time: 07/14/19  5:16 PM   Result Value Ref Range    Glucose - Accu-Ck 176 (H) 65 - 99 mg/dL   ACCU-CHEK GLUCOSE    Collection Time: 07/14/19  8:51 PM   Result Value Ref Range    Glucose - Accu-Ck 150 (H) 65 - 99 mg/dL   CBC WITHOUT DIFFERENTIAL    Collection Time: 07/15/19  5:42 AM   Result Value Ref Range    WBC 7.9 4.8 - 10.8 K/uL    RBC 3.25 (L) 4.70 - 6.10 M/uL    Hemoglobin 8.9 (L) 14.0 - 18.0 g/dL    Hematocrit 29.6 (L) 42.0 - 52.0 %    MCV 91.1 81.4 - 97.8 fL    MCH 27.4 27.0 - 33.0 pg    MCHC 30.1 (L) 33.7 - 35.3 g/dL    RDW 51.5 (H) 35.9 - 50.0 fL    Platelet Count 244 164 - 446 K/uL    MPV 10.4 9.0 - 12.9 fL   Basic Metabolic Panel    Collection Time: 07/15/19  5:42 AM   Result Value Ref Range    Sodium 134 (L) 135 - 145 mmol/L    Potassium 4.0 3.6 - 5.5 mmol/L    Chloride 99 96 - " 112 mmol/L    Co2 27 20 - 33 mmol/L    Glucose 144 (H) 65 - 99 mg/dL    Bun 34 (H) 8 - 22 mg/dL    Creatinine 1.14 0.50 - 1.40 mg/dL    Calcium 8.5 8.5 - 10.5 mg/dL    Anion Gap 8.0 0.0 - 11.9   ESTIMATED GFR    Collection Time: 07/15/19  5:42 AM   Result Value Ref Range    GFR If African American >60 >60 mL/min/1.73 m 2    GFR If Non African American >60 >60 mL/min/1.73 m 2   ACCU-CHEK GLUCOSE    Collection Time: 07/15/19  7:44 AM   Result Value Ref Range    Glucose - Accu-Ck 130 (H) 65 - 99 mg/dL   ACCU-CHEK GLUCOSE    Collection Time: 07/15/19 11:30 AM   Result Value Ref Range    Glucose - Accu-Ck 158 (H) 65 - 99 mg/dL   ACCU-CHEK GLUCOSE    Collection Time: 07/15/19  5:24 PM   Result Value Ref Range    Glucose - Accu-Ck 141 (H) 65 - 99 mg/dL   ACCU-CHEK GLUCOSE    Collection Time: 07/15/19  8:30 PM   Result Value Ref Range    Glucose - Accu-Ck 223 (H) 65 - 99 mg/dL   ACCU-CHEK GLUCOSE    Collection Time: 07/16/19  8:04 AM   Result Value Ref Range    Glucose - Accu-Ck 160 (H) 65 - 99 mg/dL   CBC WITH DIFFERENTIAL    Collection Time: 07/16/19  9:50 AM   Result Value Ref Range    WBC 8.2 4.8 - 10.8 K/uL    RBC 3.38 (L) 4.70 - 6.10 M/uL    Hemoglobin 9.5 (L) 14.0 - 18.0 g/dL    Hematocrit 29.5 (L) 42.0 - 52.0 %    MCV 87.3 81.4 - 97.8 fL    MCH 28.1 27.0 - 33.0 pg    MCHC 32.2 (L) 33.7 - 35.3 g/dL    RDW 49.3 35.9 - 50.0 fL    Platelet Count 239 164 - 446 K/uL    MPV 9.9 9.0 - 12.9 fL    Neutrophils-Polys 60.60 44.00 - 72.00 %    Lymphocytes 25.10 22.00 - 41.00 %    Monocytes 10.60 0.00 - 13.40 %    Eosinophils 3.00 0.00 - 6.90 %    Basophils 0.50 0.00 - 1.80 %    Immature Granulocytes 0.20 0.00 - 0.90 %    Nucleated RBC 0.00 /100 WBC    Neutrophils (Absolute) 4.96 1.82 - 7.42 K/uL    Lymphs (Absolute) 2.06 1.00 - 4.80 K/uL    Monos (Absolute) 0.87 (H) 0.00 - 0.85 K/uL    Eos (Absolute) 0.25 0.00 - 0.51 K/uL    Baso (Absolute) 0.04 0.00 - 0.12 K/uL    Immature Granulocytes (abs) 0.02 0.00 - 0.11 K/uL    NRBC  (Absolute) 0.00 K/uL   ACCU-CHEK GLUCOSE    Collection Time: 07/16/19 11:14 AM   Result Value Ref Range    Glucose - Accu-Ck 141 (H) 65 - 99 mg/dL   ACCU-CHEK GLUCOSE    Collection Time: 07/16/19  5:17 PM   Result Value Ref Range    Glucose - Accu-Ck 126 (H) 65 - 99 mg/dL   ACCU-CHEK GLUCOSE    Collection Time: 07/16/19  8:43 PM   Result Value Ref Range    Glucose - Accu-Ck 166 (H) 65 - 99 mg/dL   ACCU-CHEK GLUCOSE    Collection Time: 07/17/19  7:27 AM   Result Value Ref Range    Glucose - Accu-Ck 147 (H) 65 - 99 mg/dL   ACCU-CHEK GLUCOSE    Collection Time: 07/17/19 11:24 AM   Result Value Ref Range    Glucose - Accu-Ck 161 (H) 65 - 99 mg/dL       Current Facility-Administered Medications   Medication Frequency   • senna-docusate (PERICOLACE or SENOKOT S) 8.6-50 MG per tablet 2 Tab BID PRN   • hydrocortisone 1 % cream BID PRN   • miconazole 2%-zinc oxide (DELFINO) topical cream BID   • metoprolol (LOPRESSOR) tablet 12.5 mg DAILY   • methadone (DOLOPHINE) tablet 20 mg BID   • vitamin D (cholecalciferol) tablet 4,000 Units DAILY   • ascorbic acid tablet 500 mg QDAY with Breakfast   • ferrous sulfate tablet 325 mg QDAY with Breakfast   • insulin lispro (HUMALOG) injection 2-12 Units 4X/DAY ACHS    And   • glucose 4 g chewable tablet 16 g Q15 MIN PRN   • Respiratory Care per Protocol Continuous RT   • Pharmacy Consult Request ...Pain Management Review 1 Each PHARMACY TO DOSE   • acetaminophen (TYLENOL) tablet 650 mg Q4HRS PRN   • artificial tears 1.4 % ophthalmic solution 1 Drop PRN   • benzocaine-menthol (CEPACOL) lozenge 1 Lozenge Q2HRS PRN   • mag hydrox-al hydrox-simeth (MAALOX PLUS ES or MYLANTA DS) suspension 20 mL Q2HRS PRN   • ondansetron (ZOFRAN ODT) dispertab 4 mg 4X/DAY PRN    Or   • ondansetron (ZOFRAN) syringe/vial injection 4 mg 4X/DAY PRN   • traZODone (DESYREL) tablet 50 mg QHS PRN   • sodium chloride (OCEAN) 0.65 % nasal spray 2 Spray PRN   • hydrOXYzine HCl (ATARAX) tablet 50 mg Q6HRS PRN   • melatonin  tablet 3 mg HS PRN   • polyethylene glycol/lytes (MIRALAX) PACKET 1 Packet QDAY PRN    And   • magnesium hydroxide (MILK OF MAGNESIA) suspension 30 mL QDAY PRN    And   • bisacodyl (DULCOLAX) suppository 10 mg QDAY PRN   • fluticasone (FLONASE) nasal spray 100 mcg DAILY   • oxyCODONE immediate-release (ROXICODONE) tablet 5 mg Q6HRS PRN   • pregabalin (LYRICA) capsule 300 mg BID   • tramadol (ULTRAM) 50 MG tablet 100 mg Q6HRS PRN   • dextrose 50% (D50W) injection 50 mL Q15 MIN PRN   • diphenhydrAMINE-ZnAcetate (BENADRYL ITCH) 1-0.1 % cream TID PRN   • albuterol inhaler 2 Puff Q4HRS PRN       Orders Placed This Encounter   Procedures   • Diet Order Diabetic, Cardiac     Standing Status:   Standing     Number of Occurrences:   1     Order Specific Question:   Diet:     Answer:   Diabetic [3]     Order Specific Question:   Diet:     Answer:   Cardiac [6]     Order Specific Question:   Consistency/Fluid modifications:     Answer:   Thin Liquids [3]       Assessment:  Active Hospital Problems    Diagnosis   • *S/P BKA (below knee amputation) unilateral, left (HCC)   • Acute on chronic respiratory failure with hypoxia (HCC)   • SALOME (obstructive sleep apnea)   • Cardiomyopathy (HCC)   • DM (diabetes mellitus), type 2, uncontrolled (CMS-HCC)   • Essential hypertension   • Left shoulder pain   • Chronic pain syndrome   • Methadone dependence (HCC)     This patient is a 63 y.o. male admitted for acute inpatient rehabilitation with S/P BKA (below knee amputation) unilateral, left (HCC).    I led and attended the weekly conference, and agree with the IDT conference documentation and plan of care as noted below.    Date of conference: 7/15/2019    Goals and barriers: See IDT note.    Biggest barriers: pain in limb, poor sleep last night, left shoulder chronic issues    Admission FIM 66 --> 79 (7/16)    CM/social support: To single level home in [place], [place], with support of wife.    Anticipated DC date: 7/29/2019    Outpatient:  PT    Equip: knee scooter, grab bars    Follow up: PCP, surgery    Medical Decision Making and Plan:    Left BKA  Dr. Schneidre 6/24  Continue full rehab program  PT/OT, 1 hr each discipline   Outpatient follow up with surgery, apt today, can remove sutures there    Residual limb fluid collection  Post-op seroma versus hematoma  US with 3 distinct fluid collections  Most likely seroma, though hemoglobin slightly lower, d/c Lovenox and apirin, hemoglobin improved  Schedule with orthopedics - has apt today    Normocytic anemia  Likely post-operative  Monitor, stable/higher on recheck    History of elevated troponin  Discontinued aspirin due to decreased Hgb    Diabetes  Continue insulin  Appreciate hospitalist assistance    Chronic pain  Narcotic dependence/tolerance  Continue methadone, prescribed by his PCP  Dose decreased from 35 mg BID at home, to 20 mg BID due to encephalopathy (stroke code was called)  Continue PRN opiates - using 3-4 5 mg tabs daily     Left shoulder pain  With history of multiple surgeries, including left humeral head resection  Modalities with therapy  Continue pain management     Rashes  Groin/buttock rash - miconazole, continues  Trunk rash - hydrocortison cream, resolved     Cardiomyopathy, EF 40%  Continue metoprolol  Appreciate hospitalist assistance     History of one kidney  Avoid nephrotoxins     Respiratory insufficiency  Sleep apnea  On 3 L at home at night  Respiratory therapy to see patient      Hypertension  Continue metoprolol  Appreciate hospitalist assistance    Vit D deficiency, 12  Continue supplementation    DVT prophylaxis  Discontinued Lovenox due to fluid collection in residual limb and anemia    Total time:  18 minutes.  I spent greater than 50% of the time for patient care, counseling, and coordination on this date, including patient face-to face time, unit/floor time with review of records/pertinent lab data and studies, as well as discussing diagnostic evaluation/work  up, planned therapeutic interventions, and future disposition of care, as per the interval events/subjective and the assessment and plan as noted above.    I have performed a physical exam, reviewed and updated ROS, as well as the assessment and plan today 7/17/2019. In review of note from 7/16/2019 there are no new changes except as documented above.      Mimi Doyle M.D.   Physical Medicine and Rehabilitation

## 2019-07-17 NOTE — THERAPY
Physical Therapy   Daily Treatment     Patient Name: Travon Pollack  Age:  63 y.o., Sex:  male  Medical Record #: [id number]  Today's Date: 7/17/2019     Precautions  Precautions: (P) Non Weight Bearing Left Lower Extremity, Immobilizer Left Lower Extremity, Fall Risk, Other (See Comments)  Comments: (P) IPOP L residual limb; chronic L shoulder weakness/pain    Subjective    Pt received seated EOB, reports he just took a shower. Agreeable to PT session.     Objective       07/17/19 0931   Precautions   Precautions Non Weight Bearing Left Lower Extremity;Immobilizer Left Lower Extremity;Fall Risk;Other (See Comments)   Comments IPOP L residual limb; chronic L shoulder weakness/pain   Cognition    Level of Consciousness Alert   Standing Lower Body Exercises   Hip Extension 3 sets of 10;Left   Hip Abduction 3 sets of 10;Left   Other Exercises stance in // bars with B UE support   Bed Mobility    Supine to Sit Modified Independent  (increased time)   Sit to Stand Contact Guard Assist  (in // bars)   Neuro-Muscular Treatments   Neuro-Muscular Treatments Sequencing;Tactile Cuing;Verbal Cuing   Interdisciplinary Plan of Care Collaboration   Patient Position at End of Therapy Seated;Call Light within Reach;Tray Table within Reach;Phone within Reach   Collaboration Comments on wall O2 in room   PT Total Time Spent   PT Individual Total Time Spent (Mins) 60   PT Charge Group   PT Therapeutic Exercise 1   PT Therapeutic Activities 3     PT wrapped residual limb with ace wrap and assisted pt in donning IPOP correctly.    Pt completed lower body dressing supine in bed with Mod I, upper body dressed in sitting Mod I.    EOB->wc to pt's L (downhill tx) with SBA. Pt demos and verbalizes good safety awareness during tx.    Inside and outside manual wc mobility training. PT educated pt in semi-circular propulsion technique. Pt able to partially perform technique with hand over hand guidance and verbal instruction on ADA ramps and  sidewalks. Over curb cutouts pt requires Mod A to maintain direction.    Pt edu re: need for wc at this time, typical progression to prosthetic limb.    Assessment    Pt requires assist for manual wc mob over uneven surfaces including curb cutouts. Standing tolerance in // bars is limited by L shoulder weakness and R LE fatigue.    Plan    Assess for Mod I at wc level in room; progress gait with FWW; standing tolerance on R LE; review s/p BKA positioning, desensitization, ROM, and strengthenign

## 2019-07-17 NOTE — THERAPY
"Occupational Therapy  Daily Treatment     Patient Name: Travon Pollack  Age:  63 y.o., Sex:  male  Medical Record #: [id number]  Today's Date: 7/17/2019     Precautions  Precautions: Non Weight Bearing Left Lower Extremity, Immobilizer Left Lower Extremity, Fall Risk, Other (See Comments)  Comments: IPOP L residual limb; chronic L shoulder weakness/pain    Safety   ADL Safety : Requires Supervision for Safety, Requires Cueing for Safety  Bathroom Safety: Requires Supervision for Safety, Requires Cuing for Safety  Comments: SBA with ADL's and CGA-close SBA with bathroom transfers    Subjective    \"If it wasn't for this arm, I could do it\" re: independence with toileting     Objective       07/17/19 1301   Interdisciplinary Plan of Care Collaboration   Patient Position at End of Therapy Seated;Call Light within Reach;Tray Table within Reach   OT Total Time Spent   OT Individual Total Time Spent (Mins) 30   OT Charge Group   OT Self Care / ADL 2     Edu provided on compensatory strategies for toileting and toilet transfer - natalia. Strategies for clothing management. Pt able to practice new strategies but cont to require assist for clothing mgmt d/t impaired balance when standing at the GB. Discussed breaking down the task to a squat at the GB to manage clothing first, then complete stand pivot to toilet, but pt did not like the idea of a bare bottom on the w/c seat - discussed towel/pad down on w/c seat     FIM Toileting:  3 - Moderate Assistance  Toileting Description:  Grab bar, Increased time, Supervision for safety, Verbal cueing, Set-up of equipment    FIM Toilet Transfer Score:  4 - Minimal Assistance  Toilet Transfer Description:  Grab bar, Increased time, Supervision for safety, Verbal cueing, Set-up of equipment (CGA stand pivot w/c<>toilet with GB)      Assessment    Pt verbalized/demo understanding of toileting/toilet transfer compensatory strategies, but cont to require assist d/t poor standing balance and " impaired functional use of LUE.    Plan    Cont overall strength/endurance and standing balance to improve ADL's and functional mobility

## 2019-07-17 NOTE — CARE PLAN
Problem: Pain Management  Goal: Pain level will decrease to patient's comfort goal   07/16/19 2300   OTHER   Pain Rating Scale (NPRS) 8   Non Verbal Scale  Calm;Unlabored Breathing   Comfort Goal Comfort with Movement;Perform Activity;Sleep Comfortably;Comfort at Rest   Complained of lower back and left leg pain.  Medicated with Roxicodone 5 mg per prn order.      Problem: Respiratory:  Goal: Respiratory status will improve  Lungs sounds clear.  Respirations even and unlabored.  Non productive cough noted.

## 2019-07-17 NOTE — PROGRESS NOTES
Hospital Medicine Daily Progress Note      Chief Complaint:  Hypertension  Diabetes    Interval History:  No significant events or changes since last visit    Review of Systems  Review of Systems   Constitutional: Negative for fever.   Eyes: Negative for blurred vision.   Respiratory: Negative for shortness of breath.    Cardiovascular: Negative for palpitations.   Gastrointestinal: Negative for nausea and vomiting.   Neurological: Negative for dizziness and headaches.   Psychiatric/Behavioral: Negative for hallucinations.        Physical Exam  Temp:  [36.7 °C (98.1 °F)-36.8 °C (98.2 °F)] 36.7 °C (98.1 °F)  Pulse:  [76-81] 76  Resp:  [18-20] 18  BP: (120-135)/(58-70) 124/58  SpO2:  [90 %-93 %] 90 %    Physical Exam   Constitutional: He is oriented to person, place, and time.   HENT:   Mouth/Throat: Oropharynx is clear and moist.   Eyes: No scleral icterus.   Cardiovascular: Normal rate and regular rhythm.    No murmur heard.  Pulmonary/Chest: Breath sounds normal. No stridor.   Abdominal: Soft. He exhibits no distension. There is no tenderness.   Musculoskeletal: He exhibits tenderness.   Left BKA stump wrapped, brace w/ small crack   Neurological: He is alert and oriented to person, place, and time.   Skin: Skin is warm and dry. No rash noted. He is not diaphoretic.   Nursing note and vitals reviewed.      Fluids    Intake/Output Summary (Last 24 hours) at 07/17/19 0741  Last data filed at 07/17/19 0628   Gross per 24 hour   Intake             1160 ml   Output             3000 ml   Net            -1840 ml       Laboratory  Recent Labs      07/15/19   0542  07/16/19   0950   WBC  7.9  8.2   RBC  3.25*  3.38*   HEMOGLOBIN  8.9*  9.5*   HEMATOCRIT  29.6*  29.5*   MCV  91.1  87.3   MCH  27.4  28.1   MCHC  30.1*  32.2*   RDW  51.5*  49.3   PLATELETCT  244  239   MPV  10.4  9.9     Recent Labs      07/15/19   0542   SODIUM  134*   POTASSIUM  4.0   CHLORIDE  99   CO2  27   GLUCOSE  144*   BUN  34*   CREATININE  1.14    CALCIUM  8.5                   Assessment/Plan  * S/P BKA (below knee amputation) unilateral, left (HCC)- (present on admission)   Assessment & Plan    S/P left BKA 2nd to diabetic foot on 6/24/19  S/P antibiotics per ID prior to Rehab admission  External wound healing well  Wound care  U/S shows fluid collection x 3  Ortho F/U pending     Acute on chronic respiratory failure with hypoxia (HCC)- (present on admission)   Assessment & Plan    Currently resolved  S/P VDRF     Cardiomyopathy (HCC)- (present on admission)   Assessment & Plan    Echo: EF 40%     DM (diabetes mellitus), type 2, uncontrolled (CMS-[facility])- (present on admission)   Assessment & Plan    Hba1c: 12.2 (6/16/19)  -176 (hit 223 x 1)  Currently not on any diabetic meds  Consider starting low dose Lantus soon  Note: home med includes Lantus 44 units bid  Will monitor another day before adjusting med (7/17)     Essential hypertension- (present on admission)   Assessment & Plan    BP ok  On Lopressor: 12.5 mg bid  Monitor     Azotemia   Assessment & Plan    Bun: 38 (7/13) --> 24 (7/15)  Encourage fluids intake  Monitor     Vitamin D deficiency   Assessment & Plan    Vit D: 12  On supplements     Anemia   Assessment & Plan    Hb: 10.6 (7/11) --> 9.8 (7/13) --> 8.9 (7/15) --> 9.5 (7/16)  Fe: 38, sats 18% (7/11)  Vit B12 and Folate: wnl  On Fe supplements  Off DVT prophylaxis 2nd to slow downtrend of H&H

## 2019-07-18 LAB
GLUCOSE BLD-MCNC: 108 MG/DL (ref 65–99)
GLUCOSE BLD-MCNC: 119 MG/DL (ref 65–99)
GLUCOSE BLD-MCNC: 176 MG/DL (ref 65–99)

## 2019-07-18 PROCEDURE — 97535 SELF CARE MNGMENT TRAINING: CPT

## 2019-07-18 PROCEDURE — 700102 HCHG RX REV CODE 250 W/ 637 OVERRIDE(OP): Performed by: PHYSICAL MEDICINE & REHABILITATION

## 2019-07-18 PROCEDURE — 97110 THERAPEUTIC EXERCISES: CPT

## 2019-07-18 PROCEDURE — 97116 GAIT TRAINING THERAPY: CPT

## 2019-07-18 PROCEDURE — 99231 SBSQ HOSP IP/OBS SF/LOW 25: CPT | Performed by: PHYSICAL MEDICINE & REHABILITATION

## 2019-07-18 PROCEDURE — 94760 N-INVAS EAR/PLS OXIMETRY 1: CPT

## 2019-07-18 PROCEDURE — 700102 HCHG RX REV CODE 250 W/ 637 OVERRIDE(OP): Performed by: HOSPITALIST

## 2019-07-18 PROCEDURE — 97530 THERAPEUTIC ACTIVITIES: CPT

## 2019-07-18 PROCEDURE — A9270 NON-COVERED ITEM OR SERVICE: HCPCS | Performed by: HOSPITALIST

## 2019-07-18 PROCEDURE — 82962 GLUCOSE BLOOD TEST: CPT | Mod: 91

## 2019-07-18 PROCEDURE — 99232 SBSQ HOSP IP/OBS MODERATE 35: CPT | Performed by: HOSPITALIST

## 2019-07-18 PROCEDURE — A9270 NON-COVERED ITEM OR SERVICE: HCPCS | Performed by: PHYSICAL MEDICINE & REHABILITATION

## 2019-07-18 PROCEDURE — 770010 HCHG ROOM/CARE - REHAB SEMI PRIVAT*

## 2019-07-18 PROCEDURE — 97533 SENSORY INTEGRATION: CPT

## 2019-07-18 RX ADMIN — VITAMIN D, TAB 1000IU (100/BT) 4000 UNITS: 25 TAB at 08:08

## 2019-07-18 RX ADMIN — TRAZODONE HYDROCHLORIDE 50 MG: 50 TABLET ORAL at 23:17

## 2019-07-18 RX ADMIN — MICONAZOLE NITRATE: 20 CREAM TOPICAL at 08:09

## 2019-07-18 RX ADMIN — TRAMADOL HYDROCHLORIDE 100 MG: 50 TABLET ORAL at 16:41

## 2019-07-18 RX ADMIN — OXYCODONE HYDROCHLORIDE 5 MG: 5 TABLET ORAL at 06:27

## 2019-07-18 RX ADMIN — FLUTICASONE PROPIONATE 100 MCG: 50 SPRAY, METERED NASAL at 08:07

## 2019-07-18 RX ADMIN — METOPROLOL TARTRATE 12.5 MG: 25 TABLET ORAL at 08:09

## 2019-07-18 RX ADMIN — METHADONE HYDROCHLORIDE 20 MG: 10 TABLET ORAL at 08:09

## 2019-07-18 RX ADMIN — METHADONE HYDROCHLORIDE 20 MG: 10 TABLET ORAL at 20:24

## 2019-07-18 RX ADMIN — MICONAZOLE NITRATE: 20 CREAM TOPICAL at 20:36

## 2019-07-18 RX ADMIN — FERROUS SULFATE TAB 325 MG (65 MG ELEMENTAL FE) 325 MG: 325 (65 FE) TAB at 08:09

## 2019-07-18 RX ADMIN — OXYCODONE HYDROCHLORIDE AND ACETAMINOPHEN 500 MG: 500 TABLET ORAL at 08:09

## 2019-07-18 RX ADMIN — PREGABALIN 300 MG: 100 CAPSULE ORAL at 08:08

## 2019-07-18 RX ADMIN — OXYCODONE HYDROCHLORIDE 5 MG: 5 TABLET ORAL at 11:58

## 2019-07-18 RX ADMIN — PREGABALIN 300 MG: 100 CAPSULE ORAL at 20:24

## 2019-07-18 ASSESSMENT — ENCOUNTER SYMPTOMS
FEVER: 0
COUGH: 0
DIARRHEA: 0
DIZZINESS: 0
BLURRED VISION: 0
NERVOUS/ANXIOUS: 0

## 2019-07-18 NOTE — PROGRESS NOTES
Hospital Medicine Daily Progress Note      Chief Complaint:  Hypertension  Diabetes    Interval History:  No significant events or changes since last visit    Review of Systems  Review of Systems   Constitutional: Negative for fever.   Eyes: Negative for blurred vision.   Respiratory: Negative for cough.    Cardiovascular: Negative for chest pain.   Gastrointestinal: Negative for diarrhea.   Musculoskeletal: Negative for joint pain.   Neurological: Negative for dizziness.   Psychiatric/Behavioral: The patient is not nervous/anxious.         Physical Exam  Temp:  [36.2 °C (97.1 °F)-37 °C (98.6 °F)] 36.4 °C (97.6 °F)  Pulse:  [75-84] 75  Resp:  [16-20] 16  BP: (117-124)/(59-68) 124/68  SpO2:  [92 %-93 %] 93 %    Physical Exam   Constitutional: He is oriented to person, place, and time. No distress.   HENT:   Mouth/Throat: No oropharyngeal exudate.   Eyes: EOM are normal.   Neck: No JVD present. No tracheal deviation present.   Cardiovascular: Normal rate and regular rhythm.    No murmur heard.  Pulmonary/Chest: Breath sounds normal.   Abdominal: Soft. Bowel sounds are normal.   Musculoskeletal: He exhibits tenderness.   Left BKA stump wrapped, brace w/ small crack   Neurological: He is alert and oriented to person, place, and time.   Skin: Skin is warm and dry. No rash noted.   Nursing note and vitals reviewed.      Fluids    Intake/Output Summary (Last 24 hours) at 07/18/19 0806  Last data filed at 07/18/19 0626   Gross per 24 hour   Intake              720 ml   Output             1470 ml   Net             -750 ml       Laboratory  Recent Labs      07/16/19   0950   WBC  8.2   RBC  3.38*   HEMOGLOBIN  9.5*   HEMATOCRIT  29.5*   MCV  87.3   MCH  28.1   MCHC  32.2*   RDW  49.3   PLATELETCT  239   MPV  9.9                       Assessment/Plan  * S/P BKA (below knee amputation) unilateral, left (HCC)- (present on admission)   Assessment & Plan    S/P left BKA 2nd to diabetic foot on 6/24/19  S/P antibiotics per ID prior  to Rehab admission  External wound healing well  Wound care  U/S shows fluid collection x 3  Saw Ortho on 7/17     Acute on chronic respiratory failure with hypoxia (HCC)- (present on admission)   Assessment & Plan    Currently resolved  S/P VDRF     Cardiomyopathy (HCC)- (present on admission)   Assessment & Plan    Echo: EF 40%     DM (diabetes mellitus), type 2, uncontrolled (CMS-HCC)- (present on admission)   Assessment & Plan    Hba1c: 12.2 (6/16/19)  -177  Currently not on any diabetic meds  Consider starting low dose Lantus soon  Note: home med includes Lantus 44 units bid  Cont to monitor for now     Essential hypertension- (present on admission)   Assessment & Plan    BP ok  On Lopressor: 12.5 mg bid  Monitor     Azotemia   Assessment & Plan    Bun: 38 (7/13) --> 34 (7/15)  Encourage fluids intake  Monitor     Vitamin D deficiency   Assessment & Plan    Vit D: 12  On supplements     Anemia   Assessment & Plan    Hb: 10.6 (7/11) --> 9.8 (7/13) --> 8.9 (7/15) --> 9.5 (7/16)  Fe: 38, sats 18% (7/11)  Vit B12 and Folate: wnl  On Fe supplements  Off DVT prophylaxis 2nd to slow downtrend of H&H

## 2019-07-18 NOTE — CARE PLAN
Problem: Skin Integrity  Goal: Risk for impaired skin integrity will decrease  Coccyx and buttocks red but blanching.  Jennifer cream applied.      Problem: Pain Management  Goal: Pain level will decrease to patient's comfort goal  Medicated with scheduled medications.  Patient states Methadone is what he takes at home and it works better than any other medications for pain relief.   2330 assisted to bathroom.  Denies pain at this time.

## 2019-07-18 NOTE — DISCHARGE PLANNING
Spoke with workers comp , Ranjit, re: FRANCISCO. WC and FWW will be delivered here. The rest of the items will be delivered to patient's home. Making an additional request to get coverage for grab bars for toilet and in shower. Call to Ranjit and left message clarifying if ramp will be provided.

## 2019-07-18 NOTE — THERAPY
"Occupational Therapy  Daily Treatment     Patient Name: Travon Pollack  Age:  63 y.o., Sex:  male  Medical Record #: [id number]  Today's Date: 2019     Precautions  Precautions: Non Weight Bearing Left Lower Extremity, Immobilizer Left Lower Extremity, Fall Risk, Other (See Comments)  Comments: IPOP L residual limb; chronic L shoulder weakness/pain    Safety   ADL Safety : Requires Supervision for Safety, Requires Cueing for Safety  Bathroom Safety: Requires Supervision for Safety, Requires Cuing for Safety  Comments: SBA with ADL's and CGA-close SBA with bathroom transfers    Subjective    \"Im confused because they want me to straighten my leg but then they want me to elevate it\"      Objective    Desensitize techniques over L residual limb - using vibration, wash cloth, massage. Pt tolerated fairly well, reported shooting pain over tibial bone, but able to tolerate.     Discussed the items that workers comp will cover/not cover. They will cover all but the grab bars. Collaborated with CM - who reports that she is currently working on providing additional forms to workers comp  to try to get the grab bars covered.    Discussed surgeons notes from recent visit re: edema in residual limb, they advised pt to massage and elevate LLE. However edu pt re: being mindful of LLE positioning in order to prevent knee flexion contracture with elevation. Pt verbalized understanding.     19 0992   Interdisciplinary Plan of Care Collaboration   Patient Position at End of Therapy Call Light within Reach;Tray Table within Reach;Seated;Self Releasing Lap Belt Applied   OT Total Time Spent   OT Individual Total Time Spent (Mins) 60   OT Charge Group   OT Self Care / ADL 1   OT Sensory Integration Techniques 3       FIM Grooming Score:  6 - Modified Independent  Grooming Description:  Increased time    FIM Upper Body Dressin - Independent  Upper Body Dressing Description:         Assessment    Pt cont to be " compliant with desensitize techniques and able to tolerate various sensations fairly well. Pt with intermittent shooting pain and reported phantom sensation at night but it is tolerable and able to participate.    Plan    Cont overall strength/endurance and standing balance to improve ADL's and functional mobility

## 2019-07-18 NOTE — PROGRESS NOTES
"Rehab Progress Note     Date of Service: 7/18/2019  Chief Complaint: follow up left BKA    Interval Events (Subjective)    Patient seen and examined in his room today.  He saw the orthopedic surgery physician's assistant yesterday.  He had some of his sutures removed.  Plan to return to clinic next week for removal of the remaining sutures.  No interventions for the edema just compression for the swelling.  Patient is slightly frustrated by how long he had a weight clinic and the fact that he did not see a surgeon.  He continues to make progress with therapy however and is on track for discharge planned July 29.  Physical therapy has been trialing a knee scooter due to his left shoulder limitations and using a walker.    Objective:  VITAL SIGNS: /68   Pulse 74   Temp 36.4 °C (97.6 °F) (Oral)   Resp 16   Ht 1.803 m (5' 11\")   Wt 89 kg (196 lb 3.4 oz)   SpO2 94%   BMI 27.37 kg/m²   Gen: alert, no apparent distress  CV: regular rate and rhythm, no murmurs, no peripheral edema  Resp: clear to ascultation bilaterally, normal respiratory effort  GI: soft, non-tender abdomen, bowel sounds present  Msk: left transtibial amputation, decreased ROM of left shoulder    Recent Results (from the past 72 hour(s))   ACCU-CHEK GLUCOSE    Collection Time: 07/15/19  5:24 PM   Result Value Ref Range    Glucose - Accu-Ck 141 (H) 65 - 99 mg/dL   ACCU-CHEK GLUCOSE    Collection Time: 07/15/19  8:30 PM   Result Value Ref Range    Glucose - Accu-Ck 223 (H) 65 - 99 mg/dL   ACCU-CHEK GLUCOSE    Collection Time: 07/16/19  8:04 AM   Result Value Ref Range    Glucose - Accu-Ck 160 (H) 65 - 99 mg/dL   CBC WITH DIFFERENTIAL    Collection Time: 07/16/19  9:50 AM   Result Value Ref Range    WBC 8.2 4.8 - 10.8 K/uL    RBC 3.38 (L) 4.70 - 6.10 M/uL    Hemoglobin 9.5 (L) 14.0 - 18.0 g/dL    Hematocrit 29.5 (L) 42.0 - 52.0 %    MCV 87.3 81.4 - 97.8 fL    MCH 28.1 27.0 - 33.0 pg    MCHC 32.2 (L) 33.7 - 35.3 g/dL    RDW 49.3 35.9 - 50.0 fL    " Platelet Count 239 164 - 446 K/uL    MPV 9.9 9.0 - 12.9 fL    Neutrophils-Polys 60.60 44.00 - 72.00 %    Lymphocytes 25.10 22.00 - 41.00 %    Monocytes 10.60 0.00 - 13.40 %    Eosinophils 3.00 0.00 - 6.90 %    Basophils 0.50 0.00 - 1.80 %    Immature Granulocytes 0.20 0.00 - 0.90 %    Nucleated RBC 0.00 /100 WBC    Neutrophils (Absolute) 4.96 1.82 - 7.42 K/uL    Lymphs (Absolute) 2.06 1.00 - 4.80 K/uL    Monos (Absolute) 0.87 (H) 0.00 - 0.85 K/uL    Eos (Absolute) 0.25 0.00 - 0.51 K/uL    Baso (Absolute) 0.04 0.00 - 0.12 K/uL    Immature Granulocytes (abs) 0.02 0.00 - 0.11 K/uL    NRBC (Absolute) 0.00 K/uL   ACCU-CHEK GLUCOSE    Collection Time: 07/16/19 11:14 AM   Result Value Ref Range    Glucose - Accu-Ck 141 (H) 65 - 99 mg/dL   ACCU-CHEK GLUCOSE    Collection Time: 07/16/19  5:17 PM   Result Value Ref Range    Glucose - Accu-Ck 126 (H) 65 - 99 mg/dL   ACCU-CHEK GLUCOSE    Collection Time: 07/16/19  8:43 PM   Result Value Ref Range    Glucose - Accu-Ck 166 (H) 65 - 99 mg/dL   ACCU-CHEK GLUCOSE    Collection Time: 07/17/19  7:27 AM   Result Value Ref Range    Glucose - Accu-Ck 147 (H) 65 - 99 mg/dL   ACCU-CHEK GLUCOSE    Collection Time: 07/17/19 11:24 AM   Result Value Ref Range    Glucose - Accu-Ck 161 (H) 65 - 99 mg/dL   ACCU-CHEK GLUCOSE    Collection Time: 07/17/19  5:20 PM   Result Value Ref Range    Glucose - Accu-Ck 143 (H) 65 - 99 mg/dL   ACCU-CHEK GLUCOSE    Collection Time: 07/17/19 10:15 PM   Result Value Ref Range    Glucose - Accu-Ck 177 (H) 65 - 99 mg/dL   ACCU-CHEK GLUCOSE    Collection Time: 07/18/19  7:30 AM   Result Value Ref Range    Glucose - Accu-Ck 119 (H) 65 - 99 mg/dL   ACCU-CHEK GLUCOSE    Collection Time: 07/18/19 11:23 AM   Result Value Ref Range    Glucose - Accu-Ck 176 (H) 65 - 99 mg/dL       Current Facility-Administered Medications   Medication Frequency   • senna-docusate (PERICOLACE or SENOKOT S) 8.6-50 MG per tablet 2 Tab BID PRN   • hydrocortisone 1 % cream BID PRN   •  miconazole 2%-zinc oxide (DELFINO) topical cream BID   • metoprolol (LOPRESSOR) tablet 12.5 mg DAILY   • methadone (DOLOPHINE) tablet 20 mg BID   • vitamin D (cholecalciferol) tablet 4,000 Units DAILY   • ascorbic acid tablet 500 mg QDAY with Breakfast   • ferrous sulfate tablet 325 mg QDAY with Breakfast   • insulin lispro (HUMALOG) injection 2-12 Units 4X/DAY ACHS    And   • glucose 4 g chewable tablet 16 g Q15 MIN PRN   • Respiratory Care per Protocol Continuous RT   • Pharmacy Consult Request ...Pain Management Review 1 Each PHARMACY TO DOSE   • acetaminophen (TYLENOL) tablet 650 mg Q4HRS PRN   • artificial tears 1.4 % ophthalmic solution 1 Drop PRN   • benzocaine-menthol (CEPACOL) lozenge 1 Lozenge Q2HRS PRN   • mag hydrox-al hydrox-simeth (MAALOX PLUS ES or MYLANTA DS) suspension 20 mL Q2HRS PRN   • ondansetron (ZOFRAN ODT) dispertab 4 mg 4X/DAY PRN    Or   • ondansetron (ZOFRAN) syringe/vial injection 4 mg 4X/DAY PRN   • traZODone (DESYREL) tablet 50 mg QHS PRN   • sodium chloride (OCEAN) 0.65 % nasal spray 2 Spray PRN   • hydrOXYzine HCl (ATARAX) tablet 50 mg Q6HRS PRN   • melatonin tablet 3 mg HS PRN   • polyethylene glycol/lytes (MIRALAX) PACKET 1 Packet QDAY PRN    And   • magnesium hydroxide (MILK OF MAGNESIA) suspension 30 mL QDAY PRN    And   • bisacodyl (DULCOLAX) suppository 10 mg QDAY PRN   • fluticasone (FLONASE) nasal spray 100 mcg DAILY   • oxyCODONE immediate-release (ROXICODONE) tablet 5 mg Q6HRS PRN   • pregabalin (LYRICA) capsule 300 mg BID   • tramadol (ULTRAM) 50 MG tablet 100 mg Q6HRS PRN   • dextrose 50% (D50W) injection 50 mL Q15 MIN PRN   • diphenhydrAMINE-ZnAcetate (BENADRYL ITCH) 1-0.1 % cream TID PRN   • albuterol inhaler 2 Puff Q4HRS PRN       Orders Placed This Encounter   Procedures   • Diet Order Diabetic, Cardiac     Standing Status:   Standing     Number of Occurrences:   1     Order Specific Question:   Diet:     Answer:   Diabetic [3]     Order Specific Question:   Diet:      Answer:   Cardiac [6]     Order Specific Question:   Consistency/Fluid modifications:     Answer:   Thin Liquids [3]       Assessment:  Active Hospital Problems    Diagnosis   • *S/P BKA (below knee amputation) unilateral, left (HCC)   • Acute on chronic respiratory failure with hypoxia ([facility])   • SALOME (obstructive sleep apnea)   • Cardiomyopathy ([facility])   • DM (diabetes mellitus), type 2, uncontrolled ([facility])   • Essential hypertension   • Left shoulder pain   • Chronic pain syndrome   • Methadone dependence ([facility])     This patient is a 63 y.o. male admitted for acute inpatient rehabilitation with S/P BKA (below knee amputation) unilateral, left (HCC).    I led and attended the weekly conference, and agree with the IDT conference documentation and plan of care as noted below.    Date of conference: 7/15/2019    Goals and barriers: See IDT note.    Biggest barriers: pain in limb, poor sleep last night, left shoulder chronic issues    Admission FIM 66 --> 79 (7/16)    CM/social support: To single level home in [place], [place], with support of wife.    Anticipated DC date: 7/29/2019    Outpatient: PT    Equip: knee scooter grab bars    Follow up: PCP, surgery    Medical Decision Making and Plan:    Left BKA  Dr. Schneider 6/24  Continue full rehab program  PT/OT, 1 hr each discipline   Outpatient follow up with surgery, s/p partial suture removal  Return to clinic next week  Continue compression    Residual limb fluid collection  Post-op seroma versus hematoma  US with 3 distinct fluid collections  Most likely seroma, though hemoglobin slightly lower, d/c Lovenox and apirin, hemoglobin improved  Schedule with orthopedics - no intervention other than compression    Normocytic anemia  Likely post-operative  Monitor, stable/higher on recheck    History of elevated troponin  Discontinued aspirin due to decreased Hgb    Diabetes  Continue insulin  Appreciate hospitalist assistance    Chronic pain  Narcotic  dependence/tolerance  Continue methadone, prescribed by his PCP  Dose decreased from 35 mg BID at home, to 20 mg BID due to encephalopathy (stroke code was called)  Continue PRN opiates - using 3-4 5 mg tabs daily     Left shoulder pain  With history of multiple surgeries, including left humeral head resection  Modalities with therapy  Continue pain management     Rashes  Groin/buttock rash - miconazole, continues  Trunk rash - hydrocortison cream, resolved     Cardiomyopathy, EF 40%  Continue metoprolol  Appreciate hospitalist assistance     History of one kidney  Avoid nephrotoxins     Respiratory insufficiency  Sleep apnea  On 3 L at home at night  Respiratory therapy to see patient      Hypertension  Continue metoprolol  Appreciate hospitalist assistance    Vit D deficiency, 12  Continue supplementation    DVT prophylaxis  Discontinued Lovenox due to fluid collection in residual limb and anemia    Total time:  16 minutes.  I spent greater than 50% of the time for patient care, counseling, and coordination on this date, including patient face-to face time, unit/floor time with review of records/pertinent lab data and studies, as well as discussing diagnostic evaluation/work up, planned therapeutic interventions, and future disposition of care, as per the interval events/subjective and the assessment and plan as noted above.    I have performed a physical exam, reviewed and updated ROS, as well as the assessment and plan today 7/18/2019. In review of note from 7/17/2019 there are no new changes except as documented above.            Mimi Doyle M.D.   Physical Medicine and Rehabilitation

## 2019-07-18 NOTE — THERAPY
Physical Therapy   Daily Treatment     Patient Name: Travon Pollack  Age:  63 y.o., Sex:  male  Medical Record #: [id number]  Today's Date: 7/18/2019     Precautions  Precautions: (P) Non Weight Bearing Left Lower Extremity, Immobilizer Left Lower Extremity, Fall Risk, Other (See Comments)  Comments: (P) IPOP L residual limb; chronic L shoulder weakness/pain    Subjective    Pt expressing frustration re: L shoulder weakness and difficulty ambulating.     Objective       07/18/19 1431   Precautions   Precautions Non Weight Bearing Left Lower Extremity;Immobilizer Left Lower Extremity;Fall Risk;Other (See Comments)   Comments IPOP L residual limb; chronic L shoulder weakness/pain   Cognition    Level of Consciousness Alert   Bed Mobility    Sit to Stand Minimal Assist  (in // bars)   Neuro-Muscular Treatments   Neuro-Muscular Treatments Facilitation;Sequencing;Tactile Cuing;Verbal Cuing   Interdisciplinary Plan of Care Collaboration   Patient Position at End of Therapy In Bed;Call Light within Reach;Tray Table within Reach;Phone within Reach   PT Total Time Spent   PT Individual Total Time Spent (Mins) 30   PT Charge Group   PT Gait Training 1   PT Therapeutic Activities 1       FIM Walking Score:  1 - Total Assistance  Walking Description:   (pt amb x4-5' in // bars with min A, // bars in lowered postion, wc follow. Distance limited by pt fatigue)    PT educated pt in L residual limb wrapping, following demo pt attempted to wrap limb in long sitting in bed however d/t L shoulder weakness and limited ROM pt unable to functionally use L hand to assist with wrapping therefore PT completed for pt. Pt frustrated by his inability to wrap limb.    Discussed knee scooter vs FWW with pt, pt feels that his L knee isn't ready for WB yet.    Assessment    Pt with limited standing tolerance 2/2 R LE fatigue and L shoulder weakness.    Plan    Continue to Assess for Mod I at wc level in room; progress gait with FWW vs //bars; standing  tolerance on R LE; review s/p BKA positioning, desensitization, ROM, and strengthening

## 2019-07-18 NOTE — THERAPY
"Occupational Therapy  Daily Treatment     Patient Name: Travon Pollack  Age:  63 y.o., Sex:  male  Medical Record #: [id number]  Today's Date: 7/18/2019     Precautions  Precautions: Non Weight Bearing Left Lower Extremity, Immobilizer Left Lower Extremity, Fall Risk, Other (See Comments)  Comments: IPOP L residual limb; chronic L shoulder weakness/pain    Safety   ADL Safety : Requires Supervision for Safety, Requires Cueing for Safety  Bathroom Safety: Requires Supervision for Safety, Requires Cuing for Safety  Comments: SBA with ADL's and CGA-close SBA with bathroom transfers    Subjective    \"I am getting better\" re: toileting and managing own clothing     Objective    Edu/reviewed the compensatory strategies for toileting and toilet transfer - natalia. Strategies for clothing management. Pt able to practice new strategies but cont to require assist for clothing mgmt natalia. Pulling shorts back up. Discussed breaking down the task to a squat at the GB to manage clothing first, then complete stand pivot to toilet.     07/18/19 1401   Interdisciplinary Plan of Care Collaboration   Patient Position at End of Therapy Seated;Call Light within Reach;Tray Table within Reach;Phone within Reach   OT Total Time Spent   OT Individual Total Time Spent (Mins) 30   OT Charge Group   OT Self Care / ADL 2     FIM Toileting:  3 - Moderate Assistance  Toileting Description:  Grab bar, Increased time, Supervision for safety, Verbal cueing, Set-up of equipment     FIM Toilet Transfer Score:  4 - Minimal Assistance  Toilet Transfer Description:  Grab bar, Increased time, Supervision for safety, Verbal cueing, Set-up of equipment (CGA stand pivot w/c<>toilet with GB)    Assessment    Pt cont to demo improve progress with toileting and toilet transfer task with less physical assist     Plan    Cont overall strength/endurance and standing balance to improve ADL's and functional mobility    "

## 2019-07-18 NOTE — PROGRESS NOTES
Received shift report and assumed care of patient.  Patient awake, calm and stable, currently positioned in bed for comfort and safety; call light within reach.  Reports pain and discomfort at this time. See Mar for details  Will continue to monitor.

## 2019-07-18 NOTE — THERAPY
"Physical Therapy   Daily Treatment     Patient Name: Travon Pollack  Age:  63 y.o., Sex:  male  Medical Record #: [id number]  Today's Date: 7/18/2019     Precautions  Precautions: Non Weight Bearing Left Lower Extremity, Immobilizer Left Lower Extremity, Fall Risk, Other (See Comments)  Comments: IPOP L residual limb; chronic L shoulder weakness/pain    Subjective    Pt states the appointment yesterday to surgeon didn't go well. States he saw the PA and \"she didn't have any answers.\" Pt states he was told to elevate the residual limb to help with the fluid.      Objective       07/18/19 1031   Precautions   Precautions Non Weight Bearing Left Lower Extremity;Immobilizer Left Lower Extremity;Fall Risk;Other (See Comments)   Comments IPOP L residual limb; chronic L shoulder weakness/pain   Vitals   O2 (LPM) 3   O2 Delivery Nasal Cannula   Pain 0 - 10 Group   Therapist Pain Assessment Prior to Activity;During Activity;10   Cognition    Level of Consciousness Alert   Supine Lower Body Exercise   Other Exercises prone on mat x15 min. Pt completed x30 glut sets and 3x10 L hip ext in prone, sidelying passive L hip flexor stretch 2x30\"   Bed Mobility    Supine to Sit Modified Independent   Sit to Supine Modified Independent   Scooting Modified Independent   Interdisciplinary Plan of Care Collaboration   Patient Position at End of Therapy Seated;Other (Comments);Self Releasing Lap Belt Applied   Collaboration Comments in dining room for lunch   PT Total Time Spent   PT Individual Total Time Spent (Mins) 60   PT Charge Group   PT Gait Training 1   PT Therapeutic Exercise 2   PT Therapeutic Activities 1     PT wrapped residual limb with ace-wrap for shaping. Cautioned pt against positions that would allow hip flexion contracture to develop.     FIM Bed/Chair/Wheelchair Transfers Score: 5 - Standby Prompting/Supervision or Set-up  Bed/Chair/Wheelchair Transfers Description:       FIM Walking Score:  1 - Total Assistance  Walking " Description:  Walker, Extra time, Oxygen, Safety concerns, Verbal cueing, Requires 2 people to assist, Requires wheelchair follow (pt took 5-6 small hops fwd with lizandro FWW and CGA x2 with wc follow for safety)    FIM Wheelchair Score:  6 - Modified Independent  Wheelchair Description:   (Mod I on inside surfaces in manual wc)      Assessment    Gait with FWW limited by pt's L shoulder pain/weakness, as well as R LE fatigue during SLS.     Plan    Continue to Assess for Mod I at wc level in room; progress gait with FWW; standing tolerance on R LE; review s/p BKA positioning, desensitization, ROM, and strengthening

## 2019-07-18 NOTE — FLOWSHEET NOTE
07/18/19 1314   Events/Summary/Plan   Events/Summary/Plan O2 spot check   Chest Exam   Respiration 16   Pulse 74   Oximetry   #Pulse Oximetry (Single Determination) Yes   Oxygen   Home O2 Use Prior To Admission? Yes   Home O2 LPM Flow 3 LPM   Home O2 Delivery Method Nasal Cannula   Home O2 Frequency of Use At Sleep   Pulse Oximetry 94 %   O2 (LPM) 3   O2 Daily Delivery Respiratory  Silicone Nasal Cannula   Room Air Challenge Fail  (Room air SpO2=87%)

## 2019-07-19 ENCOUNTER — APPOINTMENT (OUTPATIENT)
Dept: WOUND CARE | Facility: MEDICAL CENTER | Age: 64
End: 2019-07-19
Payer: COMMERCIAL

## 2019-07-19 LAB
GLUCOSE BLD-MCNC: 132 MG/DL (ref 65–99)
GLUCOSE BLD-MCNC: 168 MG/DL (ref 65–99)
GLUCOSE BLD-MCNC: 182 MG/DL (ref 65–99)
GLUCOSE BLD-MCNC: 226 MG/DL (ref 65–99)

## 2019-07-19 PROCEDURE — 94760 N-INVAS EAR/PLS OXIMETRY 1: CPT

## 2019-07-19 PROCEDURE — 82962 GLUCOSE BLOOD TEST: CPT

## 2019-07-19 PROCEDURE — 97533 SENSORY INTEGRATION: CPT

## 2019-07-19 PROCEDURE — 99232 SBSQ HOSP IP/OBS MODERATE 35: CPT | Performed by: PHYSICAL MEDICINE & REHABILITATION

## 2019-07-19 PROCEDURE — 700102 HCHG RX REV CODE 250 W/ 637 OVERRIDE(OP)

## 2019-07-19 PROCEDURE — 700102 HCHG RX REV CODE 250 W/ 637 OVERRIDE(OP): Performed by: PHYSICAL MEDICINE & REHABILITATION

## 2019-07-19 PROCEDURE — A9270 NON-COVERED ITEM OR SERVICE: HCPCS | Performed by: PHYSICAL MEDICINE & REHABILITATION

## 2019-07-19 PROCEDURE — A9270 NON-COVERED ITEM OR SERVICE: HCPCS | Performed by: HOSPITALIST

## 2019-07-19 PROCEDURE — 97535 SELF CARE MNGMENT TRAINING: CPT

## 2019-07-19 PROCEDURE — 97530 THERAPEUTIC ACTIVITIES: CPT

## 2019-07-19 PROCEDURE — 99232 SBSQ HOSP IP/OBS MODERATE 35: CPT | Performed by: HOSPITALIST

## 2019-07-19 PROCEDURE — 770010 HCHG ROOM/CARE - REHAB SEMI PRIVAT*

## 2019-07-19 PROCEDURE — 97110 THERAPEUTIC EXERCISES: CPT

## 2019-07-19 PROCEDURE — 700102 HCHG RX REV CODE 250 W/ 637 OVERRIDE(OP): Performed by: HOSPITALIST

## 2019-07-19 RX ADMIN — OXYCODONE HYDROCHLORIDE 5 MG: 5 TABLET ORAL at 11:32

## 2019-07-19 RX ADMIN — INSULIN LISPRO 2 UNITS: 100 INJECTION, SOLUTION INTRAVENOUS; SUBCUTANEOUS at 22:10

## 2019-07-19 RX ADMIN — ACETAMINOPHEN 650 MG: 325 TABLET, FILM COATED ORAL at 11:32

## 2019-07-19 RX ADMIN — TRAZODONE HYDROCHLORIDE 50 MG: 50 TABLET ORAL at 23:08

## 2019-07-19 RX ADMIN — FERROUS SULFATE TAB 325 MG (65 MG ELEMENTAL FE) 325 MG: 325 (65 FE) TAB at 08:35

## 2019-07-19 RX ADMIN — METOPROLOL TARTRATE 12.5 MG: 25 TABLET ORAL at 08:36

## 2019-07-19 RX ADMIN — METHADONE HYDROCHLORIDE 20 MG: 10 TABLET ORAL at 08:35

## 2019-07-19 RX ADMIN — FLUTICASONE PROPIONATE 100 MCG: 50 SPRAY, METERED NASAL at 09:58

## 2019-07-19 RX ADMIN — VITAMIN D, TAB 1000IU (100/BT) 4000 UNITS: 25 TAB at 08:35

## 2019-07-19 RX ADMIN — PREGABALIN 300 MG: 100 CAPSULE ORAL at 08:35

## 2019-07-19 RX ADMIN — MICONAZOLE NITRATE: 20 CREAM TOPICAL at 09:00

## 2019-07-19 RX ADMIN — OXYCODONE HYDROCHLORIDE AND ACETAMINOPHEN 500 MG: 500 TABLET ORAL at 08:35

## 2019-07-19 RX ADMIN — METHADONE HYDROCHLORIDE 20 MG: 10 TABLET ORAL at 21:57

## 2019-07-19 RX ADMIN — MICONAZOLE NITRATE: 20 CREAM TOPICAL at 22:12

## 2019-07-19 RX ADMIN — PREGABALIN 300 MG: 100 CAPSULE ORAL at 21:57

## 2019-07-19 RX ADMIN — POLYETHYLENE GLYCOL 3350 1 PACKET: 17 POWDER, FOR SOLUTION ORAL at 21:57

## 2019-07-19 ASSESSMENT — ENCOUNTER SYMPTOMS
SHORTNESS OF BREATH: 0
DIARRHEA: 0
FEVER: 0
VOMITING: 0
CHILLS: 0
ABDOMINAL PAIN: 0
NAUSEA: 0
NERVOUS/ANXIOUS: 0

## 2019-07-19 NOTE — CARE PLAN
Problem: Safety  Goal: Will remain free from injury  Patient educated on the importance of using call light for assistance, patient verbalized understanding. Call light and belongings within reach, bed in lowest and locked position, bed rails up x2, and non skid socks on. Hourly rounding in place.    Problem: Skin Integrity  Goal: Risk for impaired skin integrity will decrease  Pt with red buttocks, esteban cream applied. Pt educated on the importance of repositioning day and night as well as educated on other factors that affect skin breakdown, pt verbalized understanding.     Problem: Pain Management  Goal: Pain level will decrease to patient's comfort goal  Pt c/o 9/10 pain to left BKA. Pt given scheduled pain medication, prn meds available if needed. Pt now asleep, will continue to monitor.     Problem: GLYCEMIA IMBALANCE  Goal: Clinical indication of glycemia balance is achieved    Intervention: MONITOR BLOOD GLUCOSE LEVELS AS ORDERED  Patient's FSBS at HS= 182. Patient given 2 units of humalog per sliding scale. HS snack provided. No s/s of hyperglycemia noted at this time. Will continue to assess for s/s of hyper/hypoglycemia.

## 2019-07-19 NOTE — THERAPY
Physical Therapy   Daily Treatment     Patient Name: Travon Pollack  Age:  63 y.o., Sex:  male  Medical Record #: [id number]  Today's Date: 7/19/2019     Precautions  Precautions: Non Weight Bearing Left Lower Extremity, Immobilizer Left Lower Extremity, Fall Risk, Other (See Comments)  Comments: IPOP L residual limb; chronic L shoulder weakness/pain    Subjective    Pt has questions about hypersensitivity on his residual limb       Objective       07/19/19 1301   Bed Mobility    Supine to Sit Modified Independent   Scooting Modified Independent   Rolling Modified Independent   Interdisciplinary Plan of Care Collaboration   Patient Position at End of Therapy Seated  (handoff OT)   PT Total Time Spent   PT Individual Total Time Spent (Mins) 30   PT Charge Group   PT Therapeutic Activities 2     Pt edu on desensitization techniques - practiced tapping, pressure, massage, rubbing with limb protector off - just soft dressing on. Edu on trying on bare skin as well    Assessment    Pt agreeable to all edu and continues to demo mod I level transfer ability      Plan    Continue to Assess for Mod I at wc level in room; progress gait with FWW vs //bars; standing tolerance on R LE; review s/p BKA positioning, desensitization, ROM, and strengthening

## 2019-07-19 NOTE — PROGRESS NOTES
Hospital Medicine Daily Progress Note      Chief Complaint:  Hypertension  Diabetes    Interval History:  No significant events or changes since last visit    Review of Systems  Review of Systems   Constitutional: Negative for chills and fever.   Respiratory: Negative for shortness of breath.    Cardiovascular: Negative for chest pain.   Gastrointestinal: Negative for abdominal pain, diarrhea, nausea and vomiting.   Psychiatric/Behavioral: The patient is not nervous/anxious.         Physical Exam  Temp:  [36.2 °C (97.2 °F)-36.7 °C (98.1 °F)] 36.2 °C (97.2 °F)  Pulse:  [72-80] 80  Resp:  [16-18] 18  BP: (104-128)/(58-60) 128/60  SpO2:  [93 %-94 %] 93 %    Physical Exam   Constitutional: He is oriented to person, place, and time. He appears well-nourished.   HENT:   Head: Atraumatic.   Eyes: Pupils are equal, round, and reactive to light. Conjunctivae are normal.   Neck: Normal range of motion. Neck supple.   Cardiovascular: Normal rate and regular rhythm.    Pulmonary/Chest: Breath sounds normal.   Abdominal: Soft. Bowel sounds are normal.   Musculoskeletal: He exhibits tenderness.   Left BKA stump wrapped, brace w/ small crack   Neurological: He is alert and oriented to person, place, and time.   Skin: Skin is warm and dry.   Nursing note and vitals reviewed.      Fluids    Intake/Output Summary (Last 24 hours) at 07/19/19 0740  Last data filed at 07/18/19 2230   Gross per 24 hour   Intake              960 ml   Output             1350 ml   Net             -390 ml       Laboratory  Recent Labs      07/16/19   0950   WBC  8.2   RBC  3.38*   HEMOGLOBIN  9.5*   HEMATOCRIT  29.5*   MCV  87.3   MCH  28.1   MCHC  32.2*   RDW  49.3   PLATELETCT  239   MPV  9.9                       Assessment/Plan  * S/P BKA (below knee amputation) unilateral, left (HCC)- (present on admission)   Assessment & Plan    S/P left BKA 2nd to diabetic foot on 6/24/19  S/P antibiotics per ID prior to Rehab admission  External wound healing  well  Wound care  U/S shows fluid collection x 3  Saw Ortho on 7/17     Acute on chronic respiratory failure with hypoxia (HCC)- (present on admission)   Assessment & Plan    Currently resolved  S/P VDRF     Cardiomyopathy (HCC)- (present on admission)   Assessment & Plan    Echo: EF 40%     DM (diabetes mellitus), type 2, uncontrolled (CMS-HCC)- (present on admission)   Assessment & Plan    Hba1c: 12.2 (6/16/19)  -182  Currently not on any diabetic meds  Note: home med includes Lantus 44 units bid  Cont to monitor for now     Essential hypertension- (present on admission)   Assessment & Plan    BP ok  On Lopressor: 12.5 mg bid  Monitor     Azotemia   Assessment & Plan    Bun: 38 (7/13) --> 34 (7/15)  Encourage fluids intake  Monitor     Vitamin D deficiency   Assessment & Plan    Vit D: 12  On supplements     Anemia   Assessment & Plan    Hb: 10.6 (7/11) --> 9.8 (7/13) --> 8.9 (7/15) --> 9.5 (7/16)  Fe: 38, sats 18% (7/11)  Vit B12 and Folate: wnl  On Fe supplements

## 2019-07-19 NOTE — THERAPY
"Occupational Therapy  Daily Treatment     Patient Name: Travon Pollack  Age:  63 y.o., Sex:  male  Medical Record #: [id number]  Today's Date: 2019     Precautions  Precautions: (P) Non Weight Bearing Left Lower Extremity, Immobilizer Left Lower Extremity, Fall Risk, Other (See Comments)  Comments: (P) IPOP L residual limb; chronic L shoulder weakness/pain    Safety   ADL Safety : (P) Requires Supervision for Safety  Bathroom Safety: (P) Requires Supervision for Safety  Comments: (P) Close SBA during functional transfers/bathroom ADLs     Subjective    \"I'd like to take a shower this morning.\"      Objective       19 0831   Precautions   Precautions Non Weight Bearing Left Lower Extremity;Immobilizer Left Lower Extremity;Fall Risk;Other (See Comments)   Comments IPOP L residual limb; chronic L shoulder weakness/pain   Safety    ADL Safety  Requires Supervision for Safety   Bathroom Safety Requires Supervision for Safety   Comments Close SBA during functional transfers/bathroom ADLs    Vitals   O2 (LPM) 3   O2 Delivery Nasal Cannula   Pain 0 - 10 Group   Therapist Pain Assessment Post Activity Pain Same as Prior to Activity;0  (No pain noted during ADL pursuits)   Bed Mobility    Supine to Sit Modified Independent   Interdisciplinary Plan of Care Collaboration   IDT Collaboration with  Nursing   Patient Position at End of Therapy Seated;Self Releasing Lap Belt Applied   Collaboration Comments Nursing notified of missing chair alarm.   OT Total Time Spent   OT Individual Total Time Spent (Mins) 60   OT Charge Group   Charges Yes   OT Self Care / ADL 4       FIM Eating Score:  6 - Modified Independent  Eating Description:  Increased time    FIM Grooming Score:  6 - Modified Independent  Grooming Description:  Increased time (hair/oral care seated at sinkside)    FIM Bathing Score:  5 - Standby Prompting/Supervision or Set-up  Bathing Description:       FIM Upper Body Dressin - Independent  Upper Body " Dressing Description:       FIM Lower Body Dressing Score:  4 - Minimal Assistance  Lower Body Dressing Description:  Increased time, Supervision for safety (Assist donning R foot orthotic; pants donned/doffed from supported seating position)    FIM Toileting Body Dressing:     Toileting Description:       FIM Bed/Chair/Wheelchair Transfers Score: 5 - Standby Prompting/Supervision or Set-up  Bed/Chair/Wheelchair Transfers Description:  Increased time, Supervision for safety, Set-up of equipment (w/c set-up)    FIM Toilet Transfer Score:     Toilet Transfer Description:       FIM Tub/Shower Transfers Score:  5 - Standby Prompting/Supervision or Set-up  Tub/Shower Transfers Description:  Increased time, Grab bar, Supervision for safety (close SBA lateral scoot transfer w/c <> tub bench)      Assessment    Pt attended to bathing/dressing tasks with full compliance with NWB LLE precautions and good insight re: functional deficits/safety awareness.  Close SBA for lateral scoot transfers in shower environment with intermittent physical assist provided to assist pt in donning R foot orthotic and wrap L left during showering activity.  No pain or LOB observed during session. Pt limited by balance deficits, L shoulder limitations from previous incident, diminished feeling secondary to hx of neuropathy, and intermittent pain in residual limb.     Plan    Cont' strengthening, balance building, endurance building, and compensatory strategies to increase overall safety and independence with ADL/IADL pursuits.  Cont' desensitization techniques to assist in managing residual limb pain.

## 2019-07-19 NOTE — THERAPY
"Occupational Therapy  Daily Treatment     Patient Name: Travon Pollack  Age:  63 y.o., Sex:  male  Medical Record #: [id number]  Today's Date: 7/19/2019     Precautions  Precautions: (P) Non Weight Bearing Left Lower Extremity, Immobilizer Left Lower Extremity, Fall Risk, Other (See Comments)  Comments: (P) IPOP L residual limb; chronic L shoulder weakness/pain    Safety   ADL Safety : Requires Supervision for Safety  Bathroom Safety: Requires Supervision for Safety  Comments: Close SBA during functional transfers/bathroom ADLs     Subjective    \"I wish they would have asked me if I wanted my leg taken off or not, I would have said no.\"      Objective       07/19/19 1331   Precautions   Precautions Non Weight Bearing Left Lower Extremity;Immobilizer Left Lower Extremity;Fall Risk;Other (See Comments)   Comments IPOP L residual limb; chronic L shoulder weakness/pain   Pain   Intervention Distraction;Education  (mirror therapy )   Pain 0 - 10 Group   Location Leg   Location Orientation Left   Pain Rating Scale (NPRS) 6   Description Aching   Comfort Goal Comfort at Rest;Comfort with Movement;Sleep Comfortably   Therapist Pain Assessment Post Activity Pain Same as Prior to Activity;0   Non Verbal Descriptors   Non Verbal Scale  Calm   Cognition    Level of Consciousness Alert   Bed Mobility    Comments SBA for lateral scoot xfer from w/c <>EOM    Interdisciplinary Plan of Care Collaboration   Patient Position at End of Therapy Seated;Self Releasing Lap Belt Applied  (wheeled himself back to room )   OT Total Time Spent   OT Individual Total Time Spent (Mins) 30   OT Charge Group   OT Sensory Integration Techniques 2     Pt sitting at EOM, educated pt on mirror therapy to assist with phantom limb pain. Increased time taken to educate pt on use of mirror therapy and how it works to retrain the brain. Pt completed multiple exercises with RLE while looking in the mirror. Pt reported that his LLE felt weird and he could feel " himself trying to move his L leg like his RLE.     Assessment    Pt tolerated session well. Pt completed xfer from w/c <>EOM with SBA. Pt engaged in mirror therapy for phantom limb pain for LLE seated at EOM. Pt reported a weird sensation in LLE during exercises.     Plan    Cont' strengthening, balance building, endurance building, and compensatory strategies to increase overall safety and independence with ADL/IADL pursuits.  Cont' desensitization techniques to assist in managing residual limb pain.

## 2019-07-19 NOTE — PROGRESS NOTES
"Rehab Progress Note     Date of Service: 7/19/2019  Chief Complaint: follow up left BKA    Interval Events (Subjective)    Patient seen and examined in the therapy gym today.  He is about to work with physical therapy.  He reports they tried a knee scooter on the left lower extremity but this was on effective due to pain in his residual limb.  He has no new complaints to me but does later complained to nursing in the hospitalist that he is having intermittent jerking of his right arm and left leg for which the hospitalist is going to look at his medications.    Objective:  VITAL SIGNS: /63   Pulse 79   Temp 36.2 °C (97.2 °F) (Temporal)   Resp 18   Ht 1.803 m (5' 11\")   Wt 89 kg (196 lb 3.4 oz)   SpO2 93%   BMI 27.37 kg/m²   Gen: alert, no apparent distress  CV: regular rate and rhythm, no murmurs, no peripheral edema  Resp: clear to ascultation bilaterally, normal respiratory effort  GI: soft, non-tender abdomen, bowel sounds present  Msk: Left transtibial amputation    Recent Results (from the past 72 hour(s))   ACCU-CHEK GLUCOSE    Collection Time: 07/16/19  5:17 PM   Result Value Ref Range    Glucose - Accu-Ck 126 (H) 65 - 99 mg/dL   ACCU-CHEK GLUCOSE    Collection Time: 07/16/19  8:43 PM   Result Value Ref Range    Glucose - Accu-Ck 166 (H) 65 - 99 mg/dL   ACCU-CHEK GLUCOSE    Collection Time: 07/17/19  7:27 AM   Result Value Ref Range    Glucose - Accu-Ck 147 (H) 65 - 99 mg/dL   ACCU-CHEK GLUCOSE    Collection Time: 07/17/19 11:24 AM   Result Value Ref Range    Glucose - Accu-Ck 161 (H) 65 - 99 mg/dL   ACCU-CHEK GLUCOSE    Collection Time: 07/17/19  5:20 PM   Result Value Ref Range    Glucose - Accu-Ck 143 (H) 65 - 99 mg/dL   ACCU-CHEK GLUCOSE    Collection Time: 07/17/19 10:15 PM   Result Value Ref Range    Glucose - Accu-Ck 177 (H) 65 - 99 mg/dL   ACCU-CHEK GLUCOSE    Collection Time: 07/18/19  7:30 AM   Result Value Ref Range    Glucose - Accu-Ck 119 (H) 65 - 99 mg/dL   ACCU-CHEK GLUCOSE    " Collection Time: 07/18/19 11:23 AM   Result Value Ref Range    Glucose - Accu-Ck 176 (H) 65 - 99 mg/dL   ACCU-CHEK GLUCOSE    Collection Time: 07/18/19  5:16 PM   Result Value Ref Range    Glucose - Accu-Ck 108 (H) 65 - 99 mg/dL   ACCU-CHEK GLUCOSE    Collection Time: 07/18/19  8:28 PM   Result Value Ref Range    Glucose - Accu-Ck 182 (H) 65 - 99 mg/dL   ACCU-CHEK GLUCOSE    Collection Time: 07/19/19  7:36 AM   Result Value Ref Range    Glucose - Accu-Ck 132 (H) 65 - 99 mg/dL       Current Facility-Administered Medications   Medication Frequency   • senna-docusate (PERICOLACE or SENOKOT S) 8.6-50 MG per tablet 2 Tab BID PRN   • hydrocortisone 1 % cream BID PRN   • miconazole 2%-zinc oxide (DELFINO) topical cream BID   • metoprolol (LOPRESSOR) tablet 12.5 mg DAILY   • methadone (DOLOPHINE) tablet 20 mg BID   • vitamin D (cholecalciferol) tablet 4,000 Units DAILY   • ascorbic acid tablet 500 mg QDAY with Breakfast   • ferrous sulfate tablet 325 mg QDAY with Breakfast   • insulin lispro (HUMALOG) injection 2-12 Units 4X/DAY ACHS    And   • glucose 4 g chewable tablet 16 g Q15 MIN PRN   • Respiratory Care per Protocol Continuous RT   • Pharmacy Consult Request ...Pain Management Review 1 Each PHARMACY TO DOSE   • acetaminophen (TYLENOL) tablet 650 mg Q4HRS PRN   • artificial tears 1.4 % ophthalmic solution 1 Drop PRN   • benzocaine-menthol (CEPACOL) lozenge 1 Lozenge Q2HRS PRN   • mag hydrox-al hydrox-simeth (MAALOX PLUS ES or MYLANTA DS) suspension 20 mL Q2HRS PRN   • ondansetron (ZOFRAN ODT) dispertab 4 mg 4X/DAY PRN    Or   • ondansetron (ZOFRAN) syringe/vial injection 4 mg 4X/DAY PRN   • traZODone (DESYREL) tablet 50 mg QHS PRN   • sodium chloride (OCEAN) 0.65 % nasal spray 2 Spray PRN   • hydrOXYzine HCl (ATARAX) tablet 50 mg Q6HRS PRN   • melatonin tablet 3 mg HS PRN   • polyethylene glycol/lytes (MIRALAX) PACKET 1 Packet QDAY PRN    And   • magnesium hydroxide (MILK OF MAGNESIA) suspension 30 mL QDAY PRN    And   •  bisacodyl (DULCOLAX) suppository 10 mg QDAY PRN   • fluticasone (FLONASE) nasal spray 100 mcg DAILY   • oxyCODONE immediate-release (ROXICODONE) tablet 5 mg Q6HRS PRN   • pregabalin (LYRICA) capsule 300 mg BID   • tramadol (ULTRAM) 50 MG tablet 100 mg Q6HRS PRN   • dextrose 50% (D50W) injection 50 mL Q15 MIN PRN   • diphenhydrAMINE-ZnAcetate (BENADRYL ITCH) 1-0.1 % cream TID PRN   • albuterol inhaler 2 Puff Q4HRS PRN       Orders Placed This Encounter   Procedures   • Diet Order Diabetic, Cardiac     Standing Status:   Standing     Number of Occurrences:   1     Order Specific Question:   Diet:     Answer:   Diabetic [3]     Order Specific Question:   Diet:     Answer:   Cardiac [6]     Order Specific Question:   Consistency/Fluid modifications:     Answer:   Thin Liquids [3]       Assessment:  Active Hospital Problems    Diagnosis   • *S/P BKA (below knee amputation) unilateral, left (HCC)   • Acute on chronic respiratory failure with hypoxia ([facility])   • SALOME (obstructive sleep apnea)   • Cardiomyopathy ([facility])   • DM (diabetes mellitus), type 2, uncontrolled (CMS-HCC)   • Essential hypertension   • Left shoulder pain   • Chronic pain syndrome   • Methadone dependence ([facility])     This patient is a 63 y.o. male admitted for acute inpatient rehabilitation with S/P BKA (below knee amputation) unilateral, left (HCC).    I led and attended the weekly conference, and agree with the IDT conference documentation and plan of care as noted below.    Date of conference: 7/15/2019    Goals and barriers: See IDT note.    Biggest barriers: pain in limb, poor sleep last night, left shoulder chronic issues    Admission FIM 66 --> 79 (7/16) --> 88 (7/18)    CM/social support: To single level home in [place], [place], with support of wife.    Anticipated DC date: 7/29/2019    Outpatient: PT    Equip: VICTOR M flores    Follow up: PCP, surgery    Medical Decision Making and Plan:    Left BKBRETT Schneider 6/24  Continue full rehab  program  PT/OT, 1 hr each discipline   Outpatient follow up with surgery, s/p partial suture removal  Return to clinic next week - apt on Weds  Continue compression/edema management    Residual limb fluid collection  Post-op seroma versus hematoma  US with 3 distinct fluid collections  Most likely seroma, though hemoglobin slightly lower, d/c Lovenox and apirin, hemoglobin improved  Scheduled with orthopedics - no intervention other than compression    Normocytic anemia  Likely post-operative  Monitor, stable/higher on recheck    History of elevated troponin  Discontinued aspirin due to decreased Hgb    Diabetes  Continue insulin  Appreciate hospitalist assistance    Chronic pain  Narcotic dependence/tolerance  Continue methadone, prescribed by his PCP  Dose decreased from 35 mg BID at home, to 20 mg BID due to encephalopathy (stroke code was called)  Continue PRN opiates - using 3-4 5 mg tabs daily     Left shoulder pain  With history of multiple surgeries, including left humeral head resection  Modalities with therapy  Continue pain management     Arm/leg jerking  Dr Shaw to look at medications/side effects    Rashes  Groin/buttock rash - miconazole, continues  Trunk rash - hydrocortison cream, resolved     Cardiomyopathy, EF 40%  Continue metoprolol  Appreciate hospitalist assistance     History of one kidney  Avoid nephrotoxins     Respiratory insufficiency  Sleep apnea  On 3 L at home at night  Respiratory therapy to see patient      Hypertension  Continue metoprolol  Appreciate hospitalist assistance    Vit D deficiency, 12  Continue supplementation    DVT prophylaxis  Discontinued Lovenox due to fluid collection in residual limb and anemia    Total time:  28 minutes.  I spent greater than 50% of the time for patient care, counseling, and coordination on this date, including patient face-to face time, unit/floor time with review of records/pertinent lab data and studies, as well as discussing diagnostic  evaluation/work up, planned therapeutic interventions, and future disposition of care, as per the interval events/subjective and the assessment and plan as noted above.    I have performed a physical exam, reviewed and updated ROS, as well as the assessment and plan today 7/19/2019. In review of note from 7/18/2019 there are no new changes except as documented above.    Mimi Doyle M.D.   Physical Medicine and Rehabilitation

## 2019-07-19 NOTE — THERAPY
Physical Therapy   Daily Treatment     Patient Name: Travon Pollack  Age:  63 y.o., Sex:  male  Medical Record #: [id number]  Today's Date: 7/19/2019     Precautions  Precautions: Non Weight Bearing Left Lower Extremity, Immobilizer Left Lower Extremity, Fall Risk, Other (See Comments)  Comments: IPOP L residual limb; chronic L shoulder weakness/pain    Subjective    Pt reports he is concerned about fluid in his residual limb     Objective       07/19/19 1031   Sitting Lower Body Exercises   Long Arc Quad 3 sets of 15;Left   Standing Lower Body Exercises   Hamstring Curl 3 sets of 15;Left   Hip Extension 3 sets of 15;Left   Hip Abduction 3 sets of 15;Left   Marching 3 sets of 15  (L only)   Other Exercises SLR anterior 3x 15   Bed Mobility    Sit to Stand Supervised  (BUE support from // bars, repeated practice for strength)   Interdisciplinary Plan of Care Collaboration   Patient Position at End of Therapy Seated  (cafeteria)   PT Total Time Spent   PT Individual Total Time Spent (Mins) 60   PT Charge Group   PT Therapeutic Exercise 2   PT Therapeutic Activities 2       FIM Wheelchair Score:  6 - Modified Independent  Wheelchair Description:       Pt edu on importance of positioning residual limb in elevated position to improve fluid return - pt found with leg off of residual limb board.    Assessment    Pt cont to be limited by L shoulder pain and standing tolerance but performs LLE exercise well this session. Pt agreeable to edu on limb positioning for improved fluid return.    Plan    Continue to Assess for Mod I at wc level in room; progress gait with FWW vs //bars; standing tolerance on R LE; review s/p BKA positioning, desensitization, ROM, and strengthening

## 2019-07-19 NOTE — DISCHARGE PLANNING
The following items to be delivered here per VICTOR M Mcintosh :  victor m  FWW    Ramp, grab bars, bedrail - requested those using req for auth form to [facility]. Faxed request.    VICTOR M Lucas DME coordinator [phone], requested information on vendors that will deliver to a [place] facility (rehab) and service a [place] patient. Sangeeta Alvarez, Coordinator, will call her with that info on Monday.

## 2019-07-20 LAB
ANION GAP SERPL CALC-SCNC: 7 MMOL/L (ref 0–11.9)
BUN SERPL-MCNC: 36 MG/DL (ref 8–22)
CALCIUM SERPL-MCNC: 8.5 MG/DL (ref 8.5–10.5)
CHLORIDE SERPL-SCNC: 100 MMOL/L (ref 96–112)
CO2 SERPL-SCNC: 30 MMOL/L (ref 20–33)
CREAT SERPL-MCNC: 1.28 MG/DL (ref 0.5–1.4)
ERYTHROCYTE [DISTWIDTH] IN BLOOD BY AUTOMATED COUNT: 50.8 FL (ref 35.9–50)
GLUCOSE BLD-MCNC: 152 MG/DL (ref 65–99)
GLUCOSE BLD-MCNC: 153 MG/DL (ref 65–99)
GLUCOSE BLD-MCNC: 154 MG/DL (ref 65–99)
GLUCOSE BLD-MCNC: 205 MG/DL (ref 65–99)
GLUCOSE SERPL-MCNC: 131 MG/DL (ref 65–99)
HCT VFR BLD AUTO: 29.2 % (ref 42–52)
HGB BLD-MCNC: 8.9 G/DL (ref 14–18)
MAGNESIUM SERPL-MCNC: 1.8 MG/DL (ref 1.5–2.5)
MCH RBC QN AUTO: 27.4 PG (ref 27–33)
MCHC RBC AUTO-ENTMCNC: 30.5 G/DL (ref 33.7–35.3)
MCV RBC AUTO: 89.8 FL (ref 81.4–97.8)
PHOSPHATE SERPL-MCNC: 4.9 MG/DL (ref 2.5–4.5)
PLATELET # BLD AUTO: 244 K/UL (ref 164–446)
PMV BLD AUTO: 9.9 FL (ref 9–12.9)
POTASSIUM SERPL-SCNC: 4.2 MMOL/L (ref 3.6–5.5)
RBC # BLD AUTO: 3.25 M/UL (ref 4.7–6.1)
SODIUM SERPL-SCNC: 137 MMOL/L (ref 135–145)
WBC # BLD AUTO: 7.5 K/UL (ref 4.8–10.8)

## 2019-07-20 PROCEDURE — A9270 NON-COVERED ITEM OR SERVICE: HCPCS | Performed by: HOSPITALIST

## 2019-07-20 PROCEDURE — 700102 HCHG RX REV CODE 250 W/ 637 OVERRIDE(OP): Performed by: PHYSICAL MEDICINE & REHABILITATION

## 2019-07-20 PROCEDURE — 83735 ASSAY OF MAGNESIUM: CPT

## 2019-07-20 PROCEDURE — 80048 BASIC METABOLIC PNL TOTAL CA: CPT

## 2019-07-20 PROCEDURE — 700102 HCHG RX REV CODE 250 W/ 637 OVERRIDE(OP): Performed by: HOSPITALIST

## 2019-07-20 PROCEDURE — A9270 NON-COVERED ITEM OR SERVICE: HCPCS | Performed by: PHYSICAL MEDICINE & REHABILITATION

## 2019-07-20 PROCEDURE — 770010 HCHG ROOM/CARE - REHAB SEMI PRIVAT*

## 2019-07-20 PROCEDURE — 99232 SBSQ HOSP IP/OBS MODERATE 35: CPT | Performed by: HOSPITALIST

## 2019-07-20 PROCEDURE — 82962 GLUCOSE BLOOD TEST: CPT

## 2019-07-20 PROCEDURE — 85027 COMPLETE CBC AUTOMATED: CPT

## 2019-07-20 PROCEDURE — 36415 COLL VENOUS BLD VENIPUNCTURE: CPT

## 2019-07-20 PROCEDURE — 84100 ASSAY OF PHOSPHORUS: CPT

## 2019-07-20 PROCEDURE — 94760 N-INVAS EAR/PLS OXIMETRY 1: CPT

## 2019-07-20 RX ADMIN — OXYCODONE HYDROCHLORIDE 5 MG: 5 TABLET ORAL at 19:34

## 2019-07-20 RX ADMIN — ACETAMINOPHEN 650 MG: 325 TABLET, FILM COATED ORAL at 12:02

## 2019-07-20 RX ADMIN — MICONAZOLE NITRATE: 20 CREAM TOPICAL at 10:26

## 2019-07-20 RX ADMIN — MICONAZOLE NITRATE: 20 CREAM TOPICAL at 21:12

## 2019-07-20 RX ADMIN — PREGABALIN 300 MG: 100 CAPSULE ORAL at 08:48

## 2019-07-20 RX ADMIN — POLYETHYLENE GLYCOL 3350 1 PACKET: 17 POWDER, FOR SOLUTION ORAL at 21:48

## 2019-07-20 RX ADMIN — METHADONE HYDROCHLORIDE 20 MG: 10 TABLET ORAL at 08:48

## 2019-07-20 RX ADMIN — FERROUS SULFATE TAB 325 MG (65 MG ELEMENTAL FE) 325 MG: 325 (65 FE) TAB at 08:48

## 2019-07-20 RX ADMIN — METHADONE HYDROCHLORIDE 20 MG: 10 TABLET ORAL at 21:07

## 2019-07-20 RX ADMIN — VITAMIN D, TAB 1000IU (100/BT) 4000 UNITS: 25 TAB at 08:48

## 2019-07-20 RX ADMIN — PREGABALIN 300 MG: 100 CAPSULE ORAL at 21:07

## 2019-07-20 RX ADMIN — FLUTICASONE PROPIONATE 100 MCG: 50 SPRAY, METERED NASAL at 10:29

## 2019-07-20 RX ADMIN — OXYCODONE HYDROCHLORIDE 5 MG: 5 TABLET ORAL at 12:02

## 2019-07-20 RX ADMIN — METOPROLOL TARTRATE 12.5 MG: 25 TABLET ORAL at 08:49

## 2019-07-20 RX ADMIN — OXYCODONE HYDROCHLORIDE AND ACETAMINOPHEN 500 MG: 500 TABLET ORAL at 08:48

## 2019-07-20 ASSESSMENT — ENCOUNTER SYMPTOMS
FEVER: 0
HALLUCINATIONS: 0
VOMITING: 0
NAUSEA: 0
HEADACHES: 0
BLURRED VISION: 0
PALPITATIONS: 0
DIZZINESS: 0
SHORTNESS OF BREATH: 0

## 2019-07-20 NOTE — CARE PLAN
Problem: Bowel/Gastric:  Goal: Normal bowel function is maintained or improved  Outcome: PROGRESSING AS EXPECTED  Pt reports some slight constipation. PRN Miralax given. Will monitor for results.     Problem: GLYCEMIA IMBALANCE  Goal: Clinical indication of glycemia balance is achieved  Outcome: PROGRESSING AS EXPECTED  FSBG 168 this evening. 2 units of humalog given per sliding scale. No s/s of hypoglycemia noted.

## 2019-07-20 NOTE — FLOWSHEET NOTE
07/19/19 1704   Events/Summary/Plan   Events/Summary/Plan O2 spot check   Chest Exam   Respiration 16   Pulse 73   Oximetry   #Pulse Oximetry (Single Determination) Yes   Oxygen   Home O2 Use Prior To Admission? Yes   Home O2 LPM Flow 3 LPM   Home O2 Delivery Method Nasal Cannula   Home O2 Frequency of Use At Sleep   Pulse Oximetry 96 %   O2 (LPM) 3   O2 Daily Delivery Respiratory  Silicone Nasal Cannula

## 2019-07-20 NOTE — FLOWSHEET NOTE
07/20/19 1350   Events/Summary/Plan   Events/Summary/Plan 02 check   Non-Invasive Resp Device Site Inspection Completed Intact   Interdisciplinary Plan of Care-Goals (Indications)   Bronchodilator Indications History / Diagnosis   Respiratory WDL   Respiratory (WDL) X   Chest Exam   Respiration 18   Pulse 81   Oximetry   #Pulse Oximetry (Single Determination) Yes   Oxygen   Home O2 Use Prior To Admission? Yes   Home O2 LPM Flow 3 LPM   Home O2 Delivery Method Nasal Cannula   Home O2 Frequency of Use At Sleep   Pulse Oximetry 92 %   O2 (LPM) 2   O2 Daily Delivery Respiratory  Nasal Cannula

## 2019-07-20 NOTE — PROGRESS NOTES
Hospital Medicine Daily Progress Note      Chief Complaint:  Hypertension  Diabetes    Interval History:  No significant events or changes since last visit    Review of Systems  Review of Systems   Constitutional: Negative for fever.   Eyes: Negative for blurred vision.   Respiratory: Negative for shortness of breath.    Cardiovascular: Negative for palpitations.   Gastrointestinal: Negative for nausea and vomiting.   Neurological: Negative for dizziness and headaches.   Psychiatric/Behavioral: Negative for hallucinations.        Physical Exam  Temp:  [36.7 °C (98.1 °F)] 36.7 °C (98.1 °F)  Pulse:  [73-86] 86  Resp:  [16-18] 18  BP: (114-123)/(62-63) 114/62  SpO2:  [96 %] 96 %    Physical Exam   Constitutional: He is oriented to person, place, and time.   HENT:   Mouth/Throat: Oropharynx is clear and moist.   Eyes: No scleral icterus.   Cardiovascular: Normal rate and regular rhythm.    Pulmonary/Chest: No stridor. He has no wheezes. He has no rales.   Abdominal: Soft. Bowel sounds are normal.   Musculoskeletal: He exhibits tenderness.   Left BKA stump wrapped, brace w/ small crack   Neurological: He is alert and oriented to person, place, and time.   Skin: Skin is warm and dry. He is not diaphoretic.   Nursing note and vitals reviewed.      Fluids    Intake/Output Summary (Last 24 hours) at 07/20/19 0748  Last data filed at 07/20/19 0500   Gross per 24 hour   Intake              540 ml   Output              500 ml   Net               40 ml       Laboratory  Recent Labs      07/20/19   0504   WBC  7.5   RBC  3.25*   HEMOGLOBIN  8.9*   HEMATOCRIT  29.2*   MCV  89.8   MCH  27.4   MCHC  30.5*   RDW  50.8*   PLATELETCT  244   MPV  9.9     Recent Labs      07/20/19   0504   SODIUM  137   POTASSIUM  4.2   CHLORIDE  100   CO2  30   GLUCOSE  131*   BUN  36*   CREATININE  1.28   CALCIUM  8.5                   Assessment/Plan  * S/P BKA (below knee amputation) unilateral, left (HCC)- (present on admission)   Assessment & Plan     S/P left BKA 2nd to diabetic foot on 6/24/19  S/P antibiotics per ID prior to Rehab admission  External wound healing well  Wound care  U/S shows fluid collection x 3  Saw Ortho on 7/17     Acute on chronic respiratory failure with hypoxia (HCC)- (present on admission)   Assessment & Plan    Currently resolved  S/P VDRF     Cardiomyopathy (HCC)- (present on admission)   Assessment & Plan    Echo: EF 40%     DM (diabetes mellitus), type 2, uncontrolled (CMS-HCC)- (present on admission)   Assessment & Plan    Hba1c: 12.2 (6/16/19)  -182 (hit 226 x 1)  Currently not on any diabetic meds  Note: home med includes Lantus 44 units bid  Cont to monitor for now     Essential hypertension- (present on admission)   Assessment & Plan    BP ok  On Lopressor: 12.5 mg bid  Monitor     Azotemia   Assessment & Plan    Bun: 38 (7/13) --> 34 (7/15) --> 36 (7/20)  Will re-encourage fluids intake  Monitor     Vitamin D deficiency   Assessment & Plan    Vit D: 12  On supplements     Anemia   Assessment & Plan    Hb: 8.9 (stable)  Fe: 38, sats 18% (7/11)  Vit B12 and Folate: wnl  On Fe supplements

## 2019-07-20 NOTE — REHAB-CM IDT TEAM NOTE
Case Management    DC Planning  DC destination/dispostion:  To single level home in [place], [place], with support of wife.     Referrals: Request for grab bars, bed rail, ramp to workers' comp. Approval rec'd for wc, FWW, tub transfer bench, 3 in 1 BSC.      DC Needs: Has SPC. Oxygen concentrator.   MD f/u appointments - PCP, Dr. Schneider. OP PT.      Barriers to discharge:  Functional deficits; Left residual limb pain, fluid collection. Lives 15 miles from [place], [place]. No  provides service for that area for workers' comp.     Strengths: Motivated. Supportive wife.      Section completed by:  Anahi Mccall R.N.

## 2019-07-20 NOTE — REHAB-PHARMACY IDT TEAM NOTE
Pharmacy   Pharmacy  Antibiotics/Antifungals/Antivirals:  Medication:      Active Orders     None        Route:        NA  Stop Date:  NA  Reason:      NA  Antihypertensives/Cardiac:  Medication:    Orders (72h ago through future)    Start     Ordered    07/14/19 0900  metoprolol (LOPRESSOR) tablet 12.5 mg  DAILY      07/13/19 1041        Patient Vitals for the past 24 hrs:   BP Pulse   07/19/19 1704 - 73   07/19/19 0836 123/63 79   07/19/19 0630 (!) 99/59 75   07/18/19 1900 128/60 80       Anticoagulation:  Medication: None                                    Other key medications: A review of the medication list reveals no issues at this time. Patient is currently on antihypertensive(s). Recommend home blood pressure monitoring/recording if antihypertensive(s) regimen(s) continue. Patient is currently on diabetic medication(s) and/or Insulin(s). Recommend home blood glucose monitoring/recording if these regimen(s) continue.    Section completed by: Alessio Starkey [facility]

## 2019-07-21 LAB
GLUCOSE BLD-MCNC: 132 MG/DL (ref 65–99)
GLUCOSE BLD-MCNC: 155 MG/DL (ref 65–99)
GLUCOSE BLD-MCNC: 169 MG/DL (ref 65–99)
GLUCOSE BLD-MCNC: 171 MG/DL (ref 65–99)

## 2019-07-21 PROCEDURE — 94760 N-INVAS EAR/PLS OXIMETRY 1: CPT

## 2019-07-21 PROCEDURE — 82962 GLUCOSE BLOOD TEST: CPT | Mod: 91

## 2019-07-21 PROCEDURE — 97530 THERAPEUTIC ACTIVITIES: CPT

## 2019-07-21 PROCEDURE — 97110 THERAPEUTIC EXERCISES: CPT

## 2019-07-21 PROCEDURE — 700102 HCHG RX REV CODE 250 W/ 637 OVERRIDE(OP): Performed by: HOSPITALIST

## 2019-07-21 PROCEDURE — A9270 NON-COVERED ITEM OR SERVICE: HCPCS | Performed by: PHYSICAL MEDICINE & REHABILITATION

## 2019-07-21 PROCEDURE — A9270 NON-COVERED ITEM OR SERVICE: HCPCS | Performed by: HOSPITALIST

## 2019-07-21 PROCEDURE — 99232 SBSQ HOSP IP/OBS MODERATE 35: CPT | Performed by: HOSPITALIST

## 2019-07-21 PROCEDURE — 770010 HCHG ROOM/CARE - REHAB SEMI PRIVAT*

## 2019-07-21 PROCEDURE — 700102 HCHG RX REV CODE 250 W/ 637 OVERRIDE(OP): Performed by: PHYSICAL MEDICINE & REHABILITATION

## 2019-07-21 RX ADMIN — TRAMADOL HYDROCHLORIDE 100 MG: 50 TABLET ORAL at 07:31

## 2019-07-21 RX ADMIN — FLUTICASONE PROPIONATE 100 MCG: 50 SPRAY, METERED NASAL at 08:46

## 2019-07-21 RX ADMIN — VITAMIN D, TAB 1000IU (100/BT) 4000 UNITS: 25 TAB at 08:38

## 2019-07-21 RX ADMIN — FERROUS SULFATE TAB 325 MG (65 MG ELEMENTAL FE) 325 MG: 325 (65 FE) TAB at 08:38

## 2019-07-21 RX ADMIN — PREGABALIN 300 MG: 100 CAPSULE ORAL at 22:00

## 2019-07-21 RX ADMIN — METOPROLOL TARTRATE 12.5 MG: 25 TABLET ORAL at 08:45

## 2019-07-21 RX ADMIN — OXYCODONE HYDROCHLORIDE AND ACETAMINOPHEN 500 MG: 500 TABLET ORAL at 08:39

## 2019-07-21 RX ADMIN — PREGABALIN 300 MG: 100 CAPSULE ORAL at 08:38

## 2019-07-21 RX ADMIN — MICONAZOLE NITRATE: 20 CREAM TOPICAL at 22:02

## 2019-07-21 RX ADMIN — METHADONE HYDROCHLORIDE 20 MG: 10 TABLET ORAL at 22:00

## 2019-07-21 RX ADMIN — METHADONE HYDROCHLORIDE 20 MG: 10 TABLET ORAL at 08:39

## 2019-07-21 RX ADMIN — TRAZODONE HYDROCHLORIDE 50 MG: 50 TABLET ORAL at 22:00

## 2019-07-21 RX ADMIN — OXYCODONE HYDROCHLORIDE 5 MG: 5 TABLET ORAL at 06:01

## 2019-07-21 RX ADMIN — MICONAZOLE NITRATE: 20 CREAM TOPICAL at 11:07

## 2019-07-21 ASSESSMENT — ENCOUNTER SYMPTOMS
COUGH: 0
DIZZINESS: 0
BLURRED VISION: 0
DIARRHEA: 0
NERVOUS/ANXIOUS: 0
FEVER: 0

## 2019-07-21 NOTE — PROGRESS NOTES
Hospital Medicine Daily Progress Note      Chief Complaint:  Hypertension  Diabetes    Interval History:  No significant events or changes since last visit    Review of Systems  Review of Systems   Constitutional: Negative for fever.   Eyes: Negative for blurred vision.   Respiratory: Negative for cough.    Cardiovascular: Negative for chest pain.   Gastrointestinal: Negative for diarrhea.   Musculoskeletal: Negative for joint pain.   Neurological: Negative for dizziness.   Psychiatric/Behavioral: The patient is not nervous/anxious.         Physical Exam  Temp:  [36.4 °C (97.6 °F)-36.7 °C (98.1 °F)] 36.7 °C (98 °F)  Pulse:  [78-91] 87  Resp:  [16-19] 16  BP: (110-136)/(58-72) 136/68  SpO2:  [91 %-93 %] 91 %    Physical Exam   Constitutional: He is oriented to person, place, and time. No distress.   HENT:   Mouth/Throat: No oropharyngeal exudate.   Eyes: EOM are normal.   Neck: No JVD present. No tracheal deviation present.   Cardiovascular: Normal rate and regular rhythm.    Pulmonary/Chest: He has no wheezes. He has no rales.   Abdominal: Soft. He exhibits no distension. There is no tenderness.   Musculoskeletal: He exhibits tenderness.   Left BKA stump wrapped, brace w/ small crack   Neurological: He is alert and oriented to person, place, and time.   Skin: Skin is warm and dry.   Nursing note and vitals reviewed.      Fluids    Intake/Output Summary (Last 24 hours) at 07/21/19 0756  Last data filed at 07/21/19 0700   Gross per 24 hour   Intake             1080 ml   Output              475 ml   Net              605 ml       Laboratory  Recent Labs      07/20/19   0504   WBC  7.5   RBC  3.25*   HEMOGLOBIN  8.9*   HEMATOCRIT  29.2*   MCV  89.8   MCH  27.4   MCHC  30.5*   RDW  50.8*   PLATELETCT  244   MPV  9.9     Recent Labs      07/20/19   0504   SODIUM  137   POTASSIUM  4.2   CHLORIDE  100   CO2  30   GLUCOSE  131*   BUN  36*   CREATININE  1.28   CALCIUM  8.5                   Assessment/Plan  * S/P BKA (below  knee amputation) unilateral, left (HCC)- (present on admission)   Assessment & Plan    S/P left BKA 2nd to diabetic foot on 6/24/19  S/P antibiotics per ID prior to Rehab admission  External wound healing well  Wound care  U/S shows fluid collection x 3  Saw Ortho on 7/17     Acute on chronic respiratory failure with hypoxia (HCC)- (present on admission)   Assessment & Plan    Currently resolved  S/P VDRF     Cardiomyopathy (HCC)- (present on admission)   Assessment & Plan    Echo: EF 40%     DM (diabetes mellitus), type 2, uncontrolled (CMS-HCC)- (present on admission)   Assessment & Plan    Hba1c: 12.2 (6/16/19)  BS recently 154-205  Currently not on any diabetic meds  Consider low dose Lantus soon  Note: home med includes Lantus 44 units bid  Will monitor another day before adjusting meds     Essential hypertension- (present on admission)   Assessment & Plan    BP ok  On Lopressor: 12.5 mg bid  Monitor     Azotemia   Assessment & Plan    Bun: 38 (7/13) --> 34 (7/15) --> 36 (7/20)  Cont to encourage fluid intake  Monitor     Vitamin D deficiency   Assessment & Plan    Vit D: 12  On supplements     Anemia   Assessment & Plan    Hb: 8.9 (stable)  Fe: 38, sats 18% (7/11)  Vit B12 and Folate: wnl  On Fe supplements

## 2019-07-21 NOTE — THERAPY
"Physical Therapy   Daily Treatment     Patient Name: Travon Pollack  Age:  63 y.o., Sex:  male  Medical Record #: [id number]  Today's Date: 7/21/2019     Precautions  Precautions: Non Weight Bearing Left Lower Extremity, Immobilizer Left Lower Extremity, Fall Risk, Other (See Comments)  Comments: IPOP L residual limb; chronic L shoulder weakness/pain    Subjective    Pt received in wc in room, expressing frustration over not being able to walk. \"I need to work harder.\"     Objective       07/21/19 0931   Precautions   Precautions Non Weight Bearing Left Lower Extremity;Immobilizer Left Lower Extremity;Fall Risk;Other (See Comments)   Comments IPOP L residual limb; chronic L shoulder weakness/pain   Cognition    Level of Consciousness Alert   Sitting Lower Body Exercises   Sit to Stand (x5 reps in // bars with SBA)   Other Exercises B glut set x30 reps with 5\" hold, L quad sets x15 reps with 5\" hold   Standing Lower Body Exercises   Hip Extension 3 sets of 10;Left   Hip Abduction Left;3 sets of 10   Heel Rise 3 sets of 10;Right  (stance on R LE)   Mini Squat Partial;3 sets of 10  (stance on R LE in // bars)   Other Exercises completed in // bars, seated rest break taken between every 3 sets   Bed Mobility    Sit to Stand Stand by Assist  (in // bars)   Neuro-Muscular Treatments   Neuro-Muscular Treatments Sequencing;Verbal Cuing   Interdisciplinary Plan of Care Collaboration   Patient Position at End of Therapy Seated;Self Releasing Lap Belt Applied;Call Light within Reach;Tray Table within Reach;Phone within Reach   PT Total Time Spent   PT Individual Total Time Spent (Mins) 60   PT Charge Group   PT Therapeutic Exercise 2   PT Therapeutic Activities 2       FIM Wheelchair Score:  6 - Modified Independent  Wheelchair Description:   (Mod I inside surfaces in manual wc)    PT wrapped L residual limb and advised pt on use of IPOP. Also reviewed HEP, POC, PT goals.    Assessment    Pt demos improved stance time on R LE " during standing therex. L shoulder weakness/pain and R LE fatigue continue to be barriers to ambulation with FWW.,    Plan    Continue to Assess for Mod I at wc level in room; progress gait with FWW vs //bars; standing tolerance on R LE; review s/p BKA positioning, desensitization, ROM, and strengthening

## 2019-07-21 NOTE — CARE PLAN
"Problem: Bowel/Gastric:  Goal: Normal bowel function is maintained or improved  Outcome: PROGRESSING AS EXPECTED  Pt requesting miralax again tonight due to feeling constipated. Pt may benefit from scheduled miralax if he continues to ask for this daily.     Problem: GLYCEMIA IMBALANCE  Goal: Clinical indication of glycemia balance is achieved  Outcome: PROGRESSING AS EXPECTED  HS blood sugar was 153. Pt refused evening insulin stating \"that's a waste of a syringe for 3 points over.\" Explained sliding scale and insulin importance to pt but still refused. No s/s of hypogylcemia/hyperglycemia noted.       "

## 2019-07-21 NOTE — FLOWSHEET NOTE
07/21/19 1641   Events/Summary/Plan   Events/Summary/Plan 02 spot check   Interdisciplinary Plan of Care-Goals (Indications)   Bronchodilator Indications History / Diagnosis   Interdisciplinary Plan of Care-Outcomes    Bronchodilator Outcome Patient at Stable Baseline   Respiratory WDL   Respiratory (WDL) X   Chest Exam   Respiration 16   Pulse 88   Oximetry   #Pulse Oximetry (Single Determination) Yes   Oxygen   Home O2 Use Prior To Admission? Yes   Home O2 LPM Flow 3 LPM   Home O2 Delivery Method Nasal Cannula   Home O2 Frequency of Use At Sleep   Pulse Oximetry 92 %   O2 (LPM) 3   O2 Daily Delivery Respiratory  Silicone Nasal Cannula

## 2019-07-21 NOTE — REHAB-OT IDT TEAM NOTE
Occupational Therapy   Activities of Daily Living  Eating Initial:  5 - Standby Prompting/Supervision or Set-up  Eating Current:  6 - Modified Independent   Eating Description:  Increased time  Grooming Initial:  5 - Standby Prompting/Supervision or Set-up  Grooming Current:  6 - Modified Independent   Grooming Description:  Increased time (hair/oral care seated at sinkside)  Bathing Initial:  4 - Minimal Assistance  Bathing Current:  5 - Standby Prompting/Supervision or Set-up   Bathing Description:  Grab bar, Increased time, Supervision for safety (Close SBA during standing kalyn/buttock care)  Upper Body Dressing Initial:  4 - Minimal Assistance  Upper Body Dressing Current:  7 - Independent   Upper Body Dressing Description:  Increased time  Lower Body Dressing Initial:  2 - Max Assistance  Lower Body Dressing Current:  4 - Minimal Assistance   Lower Body Dressing Description:  4 - Minimal Assistance  Toileting Initial:  2 - Max Assistance  Toileting Current:  3 - Moderate Assistance   Toileting Description:  Grab bar, Increased time, Supervision for safety, Verbal cueing, Set-up of equipment  Toilet Transfer Initial:  3 - Moderate Assistance  Toilet Transfer Current:  4 - Minimal Assistance   Toilet Transfer Description:  4 - Minimal Assistance  Tub / Shower Transfer Initial:  3 - Moderate Assistance  Tub / Shower Transfer Current:  5 - Standby Prompting/Supervision or Set-up   Tub / Shower Transfer Description:  Increased time, Grab bar, Supervision for safety (close SBA lateral scoot transfer w/c <> tub bench)  IADL:  N/A  Family Training/Education:  TBD  DME/DC Recommendations: tub transfer bench, grab bars at toilet and shower, 3-in-1 commode     Strengths:  Able to follow instructions, Alert and oriented, Independent PLOF, Making steady progress towards goals, Motivated for self care and independence, Pleasant and cooperative, Supportive family and Willingly participates in therapeutic activities  Barriers:   Generalized weakness, Home accessibility, Poor balance, Pressure ulcer and Other: requires O2 during activities, L chronic shoulder weakness/pain   # of short term goals set= 3  # of short term goals met= 0/3          Occupational Therapy Goals           Problem: Dressing     Dates: Start: 07/11/19       Goal: STG-Within one week, patient will dress LB     Dates: Start: 07/15/19       Description: 1) Individualized Goal: With supervision a using AE/AD as needed   2) Interventions: OT Group Therapy, OT Self Care/ADL, OT Cognitive Skill Dev, OT Community Reintegration, OT Manual Ther Technique, OT Neuro Re-Ed/Balance, OT Sensory Int Techniques, OT Therapeutic Activity, OT Evaluation and OT Therapeutic Exercise        Note:     Goal Note filed on 07/21/19 1503 by Nara Ojeda MS,OTR/L    Goal: STG-Within one week, patient will dress LB  Outcome: NOT MET  Min A                  Problem: Functional Transfers     Dates: Start: 07/11/19       Goal: STG-Within one week, patient will transfer to toilet     Dates: Start: 07/15/19       Description: 1) Individualized Goal: With supervision a using AE/AD as needed   2) Interventions: OT Group Therapy, OT Self Care/ADL, OT Cognitive Skill Dev, OT Community Reintegration, OT Manual Ther Technique, OT Neuro Re-Ed/Balance, OT Sensory Int Techniques, OT Therapeutic Activity, OT Evaluation and OT Therapeutic Exercise      Note:     Goal Note filed on 07/21/19 1503 by Nara Ojeda MS,OTR/L    Goal: STG-Within one week, patient will transfer to toilet  Outcome: NOT MET  CGA - SBA                  Problem: OT Long Term Goals     Dates: Start: 07/11/19       Goal: LTG-By discharge, patient will complete basic self care tasks     Dates: Start: 07/11/19       Description: 1) Individualized Goal:  With supervision using AE/AD as needed   2) Interventions:  OT Group Therapy, OT Self Care/ADL, OT Cognitive Skill Dev, OT Community Reintegration, OT Manual Ther Technique, OT  Neuro Re-Ed/Balance, OT Sensory Int Techniques, OT Therapeutic Activity, OT Evaluation and OT Therapeutic Exercise           Goal: LTG-By discharge, patient will perform bathroom transfers     Dates: Start: 07/11/19       Description: 1) Individualized Goal:  With supervision using AE/AD as needed   2) Interventions:  OT Group Therapy, OT Self Care/ADL, OT Cognitive Skill Dev, OT Community Reintegration, OT Manual Ther Technique, OT Neuro Re-Ed/Balance, OT Sensory Int Techniques, OT Therapeutic Activity, OT Evaluation and OT Therapeutic Exercise           Goal: LTG-By discharge, patient will complete basic home management     Dates: Start: 07/11/19       Description: 1) Individualized Goal:  With supervision using AE/AD as needed   2) Interventions:  OT Group Therapy, OT Self Care/ADL, OT Cognitive Skill Dev, OT Community Reintegration, OT Manual Ther Technique, OT Neuro Re-Ed/Balance, OT Sensory Int Techniques, OT Therapeutic Activity, OT Evaluation and OT Therapeutic Exercise             Problem: Toileting     Dates: Start: 07/11/19       Goal: STG-Within one week, patient will complete toileting tasks     Dates: Start: 07/11/19       Description: 1) Individualized Goal:  With min a using AE/AD as needed   2) Interventions:  OT Group Therapy, OT Self Care/ADL, OT Cognitive Skill Dev, OT Community Reintegration, OT Manual Ther Technique, OT Neuro Re-Ed/Balance, OT Sensory Int Techniques, OT Therapeutic Activity, OT Evaluation and OT Therapeutic Exercise     Note:     Goal Note filed on 07/21/19 1503 by Nara Ojeda MS,OTR/L    Goal: STG-Within one week, patient will complete toileting tasks  Outcome: NOT MET  Mod A                      Section completed by:  Nara Ojeda MS,OTR/L

## 2019-07-21 NOTE — CARE PLAN
Problem: Dressing  Goal: STG-Within one week, patient will dress LB  1) Individualized Goal: With supervision a using AE/AD as needed   2) Interventions: OT Group Therapy, OT Self Care/ADL, OT Cognitive Skill Dev, OT Community Reintegration, OT Manual Ther Technique, OT Neuro Re-Ed/Balance, OT Sensory Int Techniques, OT Therapeutic Activity, OT Evaluation and OT Therapeutic Exercise      Outcome: NOT MET  Min A    Problem: Toileting  Goal: STG-Within one week, patient will complete toileting tasks  1) Individualized Goal:  With min a using AE/AD as needed   2) Interventions:  OT Group Therapy, OT Self Care/ADL, OT Cognitive Skill Dev, OT Community Reintegration, OT Manual Ther Technique, OT Neuro Re-Ed/Balance, OT Sensory Int Techniques, OT Therapeutic Activity, OT Evaluation and OT Therapeutic Exercise   Outcome: NOT MET  Mod A    Problem: Functional Transfers  Goal: STG-Within one week, patient will transfer to toilet  1) Individualized Goal: With supervision a using AE/AD as needed   2) Interventions: OT Group Therapy, OT Self Care/ADL, OT Cognitive Skill Dev, OT Community Reintegration, OT Manual Ther Technique, OT Neuro Re-Ed/Balance, OT Sensory Int Techniques, OT Therapeutic Activity, OT Evaluation and OT Therapeutic Exercise    Outcome: NOT MET  CGA - SBA

## 2019-07-22 PROBLEM — R25.3 JERKING: Status: ACTIVE | Noted: 2019-07-22

## 2019-07-22 LAB
GLUCOSE BLD-MCNC: 140 MG/DL (ref 65–99)
GLUCOSE BLD-MCNC: 145 MG/DL (ref 65–99)
GLUCOSE BLD-MCNC: 152 MG/DL (ref 65–99)
GLUCOSE BLD-MCNC: 156 MG/DL (ref 65–99)
GLUCOSE BLD-MCNC: 172 MG/DL (ref 65–99)

## 2019-07-22 PROCEDURE — 99232 SBSQ HOSP IP/OBS MODERATE 35: CPT | Performed by: HOSPITALIST

## 2019-07-22 PROCEDURE — 94760 N-INVAS EAR/PLS OXIMETRY 1: CPT

## 2019-07-22 PROCEDURE — 97110 THERAPEUTIC EXERCISES: CPT

## 2019-07-22 PROCEDURE — 97535 SELF CARE MNGMENT TRAINING: CPT

## 2019-07-22 PROCEDURE — 82962 GLUCOSE BLOOD TEST: CPT

## 2019-07-22 PROCEDURE — 700102 HCHG RX REV CODE 250 W/ 637 OVERRIDE(OP): Performed by: PHYSICAL MEDICINE & REHABILITATION

## 2019-07-22 PROCEDURE — A9270 NON-COVERED ITEM OR SERVICE: HCPCS | Performed by: PHYSICAL MEDICINE & REHABILITATION

## 2019-07-22 PROCEDURE — 700102 HCHG RX REV CODE 250 W/ 637 OVERRIDE(OP): Performed by: HOSPITALIST

## 2019-07-22 PROCEDURE — A9270 NON-COVERED ITEM OR SERVICE: HCPCS | Performed by: HOSPITALIST

## 2019-07-22 PROCEDURE — 97530 THERAPEUTIC ACTIVITIES: CPT

## 2019-07-22 PROCEDURE — 99233 SBSQ HOSP IP/OBS HIGH 50: CPT | Performed by: PHYSICAL MEDICINE & REHABILITATION

## 2019-07-22 PROCEDURE — 97116 GAIT TRAINING THERAPY: CPT

## 2019-07-22 PROCEDURE — 770010 HCHG ROOM/CARE - REHAB SEMI PRIVAT*

## 2019-07-22 RX ORDER — PREGABALIN 100 MG/1
200 CAPSULE ORAL 2 TIMES DAILY
Status: DISCONTINUED | OUTPATIENT
Start: 2019-07-22 | End: 2019-07-26

## 2019-07-22 RX ADMIN — METHADONE HYDROCHLORIDE 20 MG: 10 TABLET ORAL at 20:22

## 2019-07-22 RX ADMIN — FLUTICASONE PROPIONATE 100 MCG: 50 SPRAY, METERED NASAL at 08:24

## 2019-07-22 RX ADMIN — OXYCODONE HYDROCHLORIDE AND ACETAMINOPHEN 500 MG: 500 TABLET ORAL at 08:26

## 2019-07-22 RX ADMIN — MICONAZOLE NITRATE: 20 CREAM TOPICAL at 20:25

## 2019-07-22 RX ADMIN — FERROUS SULFATE TAB 325 MG (65 MG ELEMENTAL FE) 325 MG: 325 (65 FE) TAB at 08:26

## 2019-07-22 RX ADMIN — PREGABALIN 300 MG: 100 CAPSULE ORAL at 08:25

## 2019-07-22 RX ADMIN — PREGABALIN 200 MG: 100 CAPSULE ORAL at 20:22

## 2019-07-22 RX ADMIN — MICONAZOLE NITRATE: 20 CREAM TOPICAL at 08:24

## 2019-07-22 RX ADMIN — TRAMADOL HYDROCHLORIDE 100 MG: 50 TABLET ORAL at 04:43

## 2019-07-22 RX ADMIN — METOPROLOL TARTRATE 12.5 MG: 25 TABLET ORAL at 08:25

## 2019-07-22 RX ADMIN — METHADONE HYDROCHLORIDE 20 MG: 10 TABLET ORAL at 08:25

## 2019-07-22 RX ADMIN — TRAZODONE HYDROCHLORIDE 50 MG: 50 TABLET ORAL at 20:34

## 2019-07-22 RX ADMIN — VITAMIN D, TAB 1000IU (100/BT) 4000 UNITS: 25 TAB at 08:26

## 2019-07-22 ASSESSMENT — ENCOUNTER SYMPTOMS
SHORTNESS OF BREATH: 0
CHILLS: 0
NERVOUS/ANXIOUS: 0
DIARRHEA: 0
FEVER: 0
NAUSEA: 0
ABDOMINAL PAIN: 0
VOMITING: 0

## 2019-07-22 ASSESSMENT — PATIENT HEALTH QUESTIONNAIRE - PHQ9
2. FEELING DOWN, DEPRESSED, IRRITABLE, OR HOPELESS: NOT AT ALL
1. LITTLE INTEREST OR PLEASURE IN DOING THINGS: NOT AT ALL
SUM OF ALL RESPONSES TO PHQ9 QUESTIONS 1 AND 2: 0

## 2019-07-22 NOTE — CARE PLAN
Problem: Bowel/Gastric:  Goal: Normal bowel function is maintained or improved  Outcome: PROGRESSING AS EXPECTED  Pt is having daily bowel movements. No complaints of constipation or abdominal pain tonight.     Problem: Pain Management  Goal: Pain level will decrease to patient's comfort goal  Outcome: PROGRESSING AS EXPECTED  Pt reports pain in left leg and complains of jerking of leg. Medicated with scheduled lyrica and methadone. PRN medication also available if needed.

## 2019-07-22 NOTE — THERAPY
Physical Therapy   Daily Treatment     Patient Name: Travon Pollack  Age:  63 y.o., Sex:  male  Medical Record #: [id number]  Today's Date: 7/22/2019     Precautions  Precautions: (P) Non Weight Bearing Left Lower Extremity, Immobilizer Left Lower Extremity, Fall Risk, Other (See Comments)  Comments: (P) IPOP L residual limb; chronic L shoulder weakness/pain    Subjective    Pt received in bed, agreeable to PT session.     Objective       07/22/19 1340   Precautions   Precautions Non Weight Bearing Left Lower Extremity;Immobilizer Left Lower Extremity;Fall Risk;Other (See Comments)   Comments IPOP L residual limb; chronic L shoulder weakness/pain   Cognition    Level of Consciousness Alert   Bed Mobility    Supine to Sit Modified Independent   Sit to Supine Modified Independent   Interdisciplinary Plan of Care Collaboration   Patient Position at End of Therapy In Bed;Call Light within Reach;Tray Table within Reach;Phone within Reach   PT Total Time Spent   PT Individual Total Time Spent (Mins) 30   PT Charge Group   PT Gait Training 1   PT Therapeutic Activities 1   Pt on 3L O2 via nc throughout session.    FIM Bed/Chair/Wheelchair Transfers Score: 5 - Standby Prompting/Supervision or Set-up     FIM Walking Score:  1 - Total Assistance  Walking Description:   (pt amb x40' with knee scooter and CGA/Min A, wc follow for safety)    Desensitization to L residual limb x5 minutes.    Assessment    Pt increased ambulation distance with knee scooter to 40'. PT re-educated pt on use of knee scooter only as means to access bedroom and bathroom which pt states manual wc will not fit through. Transfers with SBA.    Plan    Reassess knee scooter for bathroom mobility needs. Continue residual limb care, standing tolerance, and endurance. Assess in room mod I safety from wc level.

## 2019-07-22 NOTE — ASSESSMENT & PLAN NOTE
Has a hx of mild UE small jerks at home for about 11 years  However, recently his UE jerks are much more pronounced  I.E. Pt is holding phone in hand to ear and then will jerk so much that the phone flies off  No tremors at rest  No tremors on movement  No tremors holding out arms in front  ? 2nd to Lyrica: has been on since 2004                             pt has had mild jerks starting about 2008 after Lyrica dose increased                            will decrease Lyrica from 300 bid to 200 mg bid (pt agrees and would like to try)  Suggest f/u with Neurology if symptoms not improved

## 2019-07-22 NOTE — CARE PLAN
Problem: Mobility  Goal: STG-Within one week, patient will ambulate household distance  1) Individualized goal:  >20' with FWW and Min A  2) Interventions:  PT Group Therapy, PT Prosthetic Training, PT Gait Training, PT Self Care/Home Eval, PT Therapeutic Exercises, PT Neuro Re-Ed/Balance, PT Therapeutic Activity and PT Evaluation     Outcome: NOT MET      Problem: Mobility Transfers  Goal: STG-Within one week, patient will transfer bed to chair  1) Individualized goal:  SBA squat-pivot with reach to R, level surface  2) Interventions:  PT Group Therapy, PT Prosthetic Training, PT Gait Training, PT Self Care/Home Eval, PT Therapeutic Exercises, PT Neuro Re-Ed/Balance, PT Therapeutic Activity and PT Evaluation         Outcome: MET Date Met: 07/22/19    Goal: STG-Within one week, patient will move supine to sit  1) Individualized goal:  CGA on std queen bed with bedrail  2) Interventions:  PT Group Therapy, PT Prosthetic Training, PT Gait Training, PT Self Care/Home Eval, PT Therapeutic Exercises, PT Neuro Re-Ed/Balance, PT Therapeutic Activity and PT Evaluation         Outcome: MET Date Met: 07/22/19

## 2019-07-22 NOTE — THERAPY
"Physical Therapy   Daily Treatment     Patient Name: Travon Pollack  Age:  63 y.o., Sex:  male  Medical Record #: [id number]  Today's Date: 7/22/2019     Precautions  Precautions: (P) Non Weight Bearing Left Lower Extremity, Immobilizer Left Lower Extremity, Fall Risk  Comments: (P) IPOP L residual limb; chronic L shoulder weakness/pain    Subjective    Patient reported \"severe\" nerve pain, generalized. Patient reported eagerness to obtain prosthetic. Patient was pleased to have wc gloves, which solved thumb discomfort during wc propulsion.      Objective       07/22/19 1031   Precautions   Precautions Non Weight Bearing Left Lower Extremity;Immobilizer Left Lower Extremity;Fall Risk   Comments IPOP L residual limb; chronic L shoulder weakness/pain   Sitting Lower Body Exercises   Nustep Resistance Level 1  (10 min RUE/RLE SPV 0.22 miles)   Standing Lower Body Exercises   Hip Extension 2 sets of 10;Left   Hip Abduction 2 sets of 10;Left   Heel Rise 2 sets of 10;Right   Mini Squat Partial;2 sets of 10  (RLE, BUE support)   Other Exercises Hip flexion 2 x 10 LLE   Bed Mobility    Sit to Stand Stand by Assist  (// bars)   Neuro-Muscular Treatments   Neuro-Muscular Treatments Weight Shift Left;Weight Shift Right;Verbal Cuing;Tapping;Sequencing;Tactile Cuing;Postural Facilitation;Postural Changes   Comments Postural awareness during dynamic sitting and standing.  NuStep transfer toward right CGA-SBA.   Stump wrapping using 2 Ace wraps and patellar lock, with figure 8 method.  Standing 2.5 min x 2 CGA/SBA, dynamic. Patient provided with wc gloves to address thumb discomfort with propulsion.    Interdisciplinary Plan of Care Collaboration   IDT Collaboration with  Physical Therapist;Nursing   Patient Position at End of Therapy Seated   Collaboration Comments POC, assessment of blood sugar during session.  RN notified of c/o nerve pain \"3-4x worse\" overall.    PT Total Time Spent   PT Individual Total Time Spent (Mins) 60   PT " Charge Group   PT Therapeutic Exercise 2   PT Therapeutic Activities 2     Patient instructed on scap squeezes as HEP for posture.     FIM Wheelchair Score:  6 - Modified Independent  Wheelchair Description:   (Indoors >150 ft)    Assessment    Patient was limited by dec activity tolerance, impaired standing tolerance, L shoulder impairment, impaired postural awareness,  and nerve pain.     Plan    Reassess knee scooter for bathroom mobility needs. Continue residual limb care, standing tolerance, and endurance. Assess in room mod I safety from wc level.

## 2019-07-22 NOTE — THERAPY
Occupational Therapy  Daily Treatment     Patient Name: Travon Pollack  Age:  63 y.o., Sex:  male  Medical Record #: [id number]  Today's Date: 7/22/2019     Precautions  Precautions: (P) Non Weight Bearing Left Lower Extremity, Immobilizer Left Lower Extremity, Fall Risk, Other (See Comments)  Comments: (P) IPOP L residual limb; chronic L shoulder weakness/pain        Subjective    Pt received in w/c and agreeable to therapy.      Objective       07/22/19 1001   Precautions   Precautions Non Weight Bearing Left Lower Extremity;Immobilizer Left Lower Extremity;Fall Risk;Other (See Comments)   Comments IPOP L residual limb; chronic L shoulder weakness/pain   Sitting Upper Body Exercises   Bilateral Row 3 sets of 15;Bilateral;Weight (See Comments for lbs)  (35# weighted pulleys)   Tricep Press 3 sets of 15;Bilateral;Weight (See Comments for lbs)  (Rickshaw facing fwd with 35#)   Upper Extremity Bike Level 5 Resistance  (BUE MotoMed x8 min, 0 RB, .95 mi)   Interdisciplinary Plan of Care Collaboration   IDT Collaboration with  Nursing;Physical Therapist   Patient Position at End of Therapy Seated;Self Releasing Lap Belt Applied;Other (Comments)   Collaboration Comments RN for wound care; transfer of care to PT   OT Total Time Spent   OT Individual Total Time Spent (Mins) 30   OT Charge Group   OT Therapeutic Exercise  2       Assessment    Pt tolerated session well and completed BUE therex with no c/o pain.     Plan    Cont overall strength/endurance and standing balance to improve ADL's and functional mobility

## 2019-07-22 NOTE — FLOWSHEET NOTE
07/22/19 0819   Events/Summary/Plan   Events/Summary/Plan 02 spot check.  Unable to wean oxygen so far.   Interdisciplinary Plan of Care-Goals (Indications)   Bronchodilator Indications History / Diagnosis   Interdisciplinary Plan of Care-Outcomes    Bronchodilator Outcome Patient at Stable Baseline   Respiratory WDL   Respiratory (WDL) X   Chest Exam   Respiration 16   Pulse 80   Oximetry   #Pulse Oximetry (Single Determination) Yes   Oxygen   Home O2 Use Prior To Admission? Yes   Home O2 LPM Flow 3 LPM   Home O2 Delivery Method Nasal Cannula   Home O2 Frequency of Use At Sleep   Pulse Oximetry 91 %   O2 (LPM) 3   O2 Daily Delivery Respiratory  Silicone Nasal Cannula

## 2019-07-22 NOTE — REHAB-RESPIRATORY IDT TEAM NOTE
Respiratory Therapy   Respiratory Therapy    Pt has been on 3 lpm oxygen 24/7 since admission.  His baseline is 3 lpm at night which his PCP ordered after having a home oximetry study.   Pt has a history of smoking and his COPD diagnosis may be from this (?)  He denies having PFTs or a CAT scan of his chest. He also denies getting SOB even when working on his property at home.  We have not yet been successful in weaning his daytime oxygen from 3 lpm.    Section completed by:  Earnestine Ya, RRT

## 2019-07-22 NOTE — THERAPY
"Occupational Therapy  Daily Treatment     Patient Name: Travon Pollack  Age:  63 y.o., Sex:  male  Medical Record #: [id number]  Today's Date: 2019     Precautions  Precautions: Non Weight Bearing Left Lower Extremity, Immobilizer Left Lower Extremity, Fall Risk, Other (See Comments)  Comments: IPOP L residual limb; chronic L shoulder weakness/pain    Safety   ADL Safety : Requires Supervision for Safety  Bathroom Safety: Requires Supervision for Safety  Comments: Close SBA during functional transfers/bathroom ADLs     Subjective    \"I think I will stand at the sink counter at home when pulling my pants up\"     Objective       19 0831   Safety    ADL Safety  Requires Supervision for Safety   Bathroom Safety Requires Supervision for Safety   Comments Close SBA during functional transfers/bathroom ADLs    Interdisciplinary Plan of Care Collaboration   Patient Position at End of Therapy Seated;Call Light within Reach;Tray Table within Reach   OT Total Time Spent   OT Individual Total Time Spent (Mins) 60   OT Charge Group   OT Self Care / ADL 4       FIM Eating Score:  6 - Modified Independent  Eating Description:  Increased time    FIM Grooming Score:  6 - Modified Independent  Grooming Description:  Increased time    FIM Bathing Score:  6 - Modified Independent  Bathing Description:       FIM Upper Body Dressin - Independent  Upper Body Dressing Description:       FIM Lower Body Dressing Score:  5 - Standby Prompting/Supervsion or Set-up (standing at sink counter with no LOB)  Lower Body Dressing Description:  Supervision for safety    FIM Tub/Shower Transfers Score:  4 - Minimal Assistance  Tub/Shower Transfers Description:  Grab bar, Increased time, Supervision for safety, Verbal cueing, Set-up of equipment (lateral scoot w/c<>fold down bench with GB)      Assessment    Pt completed shower routine at SBA overall using w/c. Pt cont to demo improve ADL performance     Plan    Cont overall " strength/endurance and standing balance to improve ADL's and functional mobility

## 2019-07-22 NOTE — REHAB-COLLABORATIVE ONGOING IDT TEAM NOTE
Weekly Interdisciplinary Team Conference Note    Weekly Interdisciplinary Team Conference # 2  Date:  7/22/2019    Clinicians present and reporting at team conference include the following:   MD: Mimi Doyle MD   RN:  Alexander Malin RN    PT:   Azalia Martínez, PT, DPT  OT:  Nara Ojeda MS, OTR/L   ST:  None  CM:  Anahi Mccall RN  REC:  None  RT:  None  RPh:  Meryl Salinas, [facility]  Other:   None  All reporting clinicians have a working knowledge of this patient's plan of care.      Targeted DC Date:  7/29/2019     Medical    Patient Active Problem List    Diagnosis Date Noted   • Acute encephalopathy 06/27/2019     Priority: High   • OZ (acute kidney injury) ([facility]) 06/18/2019     Priority: High   • Acute on chronic respiratory failure with hypoxia ([facility]) 01/25/2018     Priority: High   • Diabetic foot ulcer ([facility]) 01/13/2018     Priority: High   • Facial nerve paralysis 09/03/2016     Priority: High   • Septic shock due to undetermined organism ([facility]) 07/10/2015     Priority: High   • SALOME (obstructive sleep apnea) 06/16/2019     Priority: Medium   • Pneumonia due to infectious organism 06/16/2019     Priority: Medium   • Aspiration into airway 06/16/2019     Priority: Medium   • Cardiomyopathy ([facility]) 01/29/2018     Priority: Medium   • DM (diabetes mellitus), type 2, uncontrolled (CMS-[facility]) 07/12/2015     Priority: Medium   • Essential hypertension 03/16/2013     Priority: Medium   • Mucus plugging of bronchi 06/16/2019     Priority: Low   • Left shoulder pain 06/15/2019     Priority: Low   • Hypokalemia 07/12/2015     Priority: Low   • Tobacco dependence 07/12/2015     Priority: Low   • Low back pain 07/10/2015     Priority: Low   • Anemia 07/15/2019   • Vitamin D deficiency 07/15/2019   • Azotemia 07/15/2019   • S/P BKA (below knee amputation) unilateral, left ([facility]) 07/10/2019   • Hypoglycemia 06/27/2019   • Leucocytosis 06/23/2019   • Chronic pain syndrome 01/27/2018   • Opiate withdrawal ([facility]) 01/12/2018   • Methadone  dependence ([facility]) 01/12/2018   • Acute respiratory failure with hypoxemia ([facility]) 01/12/2018   • Closed 2-part nondisplaced fracture of surgical neck of left humerus 01/12/2018   • Shingles 09/03/2016   • CVA (cerebral vascular accident) ([facility]) 09/02/2016   • Thrombocytopenia ([facility]) 07/12/2015   • Obesity 07/12/2015   • Hyponatremia 07/10/2015   • Tobacco abuse 07/10/2015   • ARF (acute renal failure) ([facility]) 07/10/2015   • DM (diabetes mellitus) (CMS-HCC) 03/16/2013   • Back pain 03/15/2013   • Fall 03/15/2013   • Spine disorder 03/04/2013   • Degeneration of lumbar or lumbosacral intervertebral disc 02/11/2013   • Lumbar stenosis 02/11/2013     Results     Procedure Component Value Ref Range Date/Time    ACCU-CHEK GLUCOSE [[id number]]  (Abnormal) Collected:  07/22/19 1109    Order Status:  Completed Lab Status:  Final result Updated:  07/22/19 1123     Glucose - Accu-Ck 152 (H) 65 - 99 mg/dL     ACCU-CHEK GLUCOSE [[id number]]  (Abnormal) Collected:  07/19/19 1120    Order Status:  Completed Lab Status:  Final result Updated:  07/22/19 0958     Glucose - Accu-Ck 172 (H) 65 - 99 mg/dL      Comment: Coverage given  Followed orders         ACCU-CHEK GLUCOSE [[id number]]  (Abnormal) Collected:  07/22/19 0736    Order Status:  Completed Lab Status:  Final result Updated:  07/22/19 0751     Glucose - Accu-Ck 140 (H) 65 - 99 mg/dL     ACCU-CHEK GLUCOSE [[id number]]  (Abnormal) Collected:  07/21/19 1955    Order Status:  Completed Lab Status:  Final result Updated:  07/21/19 2048     Glucose - Accu-Ck 169 (H) 65 - 99 mg/dL     ACCU-CHEK GLUCOSE [[id number]]  (Abnormal) Collected:  07/21/19 1658    Order Status:  Completed Lab Status:  Final result Updated:  07/21/19 1719     Glucose - Accu-Ck 132 (H) 65 - 99 mg/dL      Comment: Followed orders              Nursing  Diet/Nutrition:  Diabetic, Cardiac and Thin Liquids  Medication Administration:  Whole with Liquid Wash  % consumed at meals in last 24 hours:  Consumed % of meals  per documentation.  Meal/Snack Consumption for the past 24 hrs:   Oral Nutrition   07/21/19 2200 Snack;Between 25-50% Consumed   07/21/19 1815 Dinner;Between % Consumed   07/21/19 1200 Lunch;Between % Consumed   07/21/19 0845 Breakfast;Between % Consumed       Snack schedule:    Supplement:  Other: N/A  Appetite:  Good  Fluid Intake/Output in past 24 hours: In: 780 [P.O.:780]  Out: 475   Admit Weight:  Weight: 88.9 kg (196 lb)  Weight Last 7 Days   [89.4 kg (197 lb 1.6 oz)] 89.4 kg (197 lb 1.6 oz) (07/21 1100)    Pain Issues:    Location:  Leg (07/21 2148)  Left (07/21 2148)         Severity:  Moderate   Scheduled pain medications:  Methadone and Lyrica  PRN pain medications used in last 24 hours:  oxycodone immediate release (ROXICODONE)  and tramadol (Ultram)   Non Pharmacologic Interventions:  distraction, relaxation, repositioned and rest  Effectiveness of pain management plan:  good=patient states acceptable comfort after interventions    Bowel:    Bowel Assist Initial Score:  4 - Minimal Assistance  Bowel Assist Current Score:  4 - Minimal Assistance  Bowl Accidents in last 7 days:  0  Last bowel movement: 07/21/19  Stool Description: Medium, Soft     Usual bowel pattern:  daily  Scheduled bowel medications:  None  PRN bowel medications used in last 24 hours:  polyethylene glycol/lytes (MIRALAX)   Nursing Interventions:  Increased time, Scheduled medication, Supervision  Effectiveness of bowel program:   fair=sometimes needs prn bowel meds for constipation  Bladder:    Bladder Assist Initial Score:  4 - Minimal Assistance  Bladder Assist Current Score:  4 - Minimal Assistance  Bladder Accidents in last 7 days:  0  PVR range for past 24-48 hours: -- ()  Intermittent Catheterization:  N/A  Medications affecting bladder:  None    Time void schedule/voiding pattern:  Voiding every 2-4 hours  Interventions:  Increased time, Verbal cueing, Emptying of device, Urinal  Effectiveness of bladder  training:  Good=regular, predictable, emptying of bladder, patient initiates time voiding    Diabetes:  Blood Sugar Frequency:  Before meals and at bedtime    Range of BS for last 48 hours:   Recent Labs      07/20/19   0724  07/20/19   1133  07/20/19   1732  07/20/19   2111  07/21/19   0741  07/21/19   1122  07/21/19   1658  07/21/19   1955   POCGLUCOSE  154*  205*  152*  153*  155*  171*  132*  169*     Scheduled diabetic medications:  insulin lispro (HUMALOG) injection   Sliding scale usage in past 24 hours:  Yes  Total Short acting insulin in the past 24 hours:  Humalog 4 units (Pt has been refusing insulin when blood sugar is in the 150s)  Total Long acting insulin in the past 24 hours:  None    Wound:         Patient Lines/Drains/Airways Status    Active Current Wounds     Name: Placement date: Placement time: Site: Days:    Wound 07/10/19 Diabetic Ulcer Heel;Foot DM ulceration plantar surface right heel 07/10/19   1027      11    Incision 7/10/19 Knee        Wound Inner Right Ankle 7/22/2019        Moisture Associated Skin Damage Anterior;Posterior Buttock;Groin 07/10/19   1027    11                   Interventions:  Assess for s/s of infection, Q72 hr dressing change on heel, Daily dressing change on BKA, Jennifer cream to buttocks, wound consult ordered for new wound found on right ankle.  Effectiveness of intervention:  wound is improving     Sleep/wake cycle:   Average hours slept:  Sleeps 4-6 hours without waking  Sleep medication usage:  Other Trazodone    Patient/Family Training/Education:  Diabetes Management, Diet/Nutrition, Fall Prevention, General Self Care, Pain Management, Safe Transfers, Safety, Skin Care and Wound Care  Discharge Supply Recommendations:  Glucometer and Strips and Wound Care Supplies  Strengths: Willingly participates in therapeutic activities, Able to follow instructions, Supportive family, Pleasant and cooperative and Motivated for self care and independence   Barriers:    Generalized weakness and Limited mobility            Nursing Problems           Problem: Bowel/Gastric:     Goal: Normal bowel function is maintained or improved           Goal: Will not experience complications related to bowel motility             Problem: Communication     Goal: The ability to communicate needs accurately and effectively will improve             Problem: Discharge Barriers/Planning     Goal: Patient's continuum of care needs will be met             Problem: GLYCEMIA IMBALANCE     Goal: Clinical indication of glycemia balance is achieved             Problem: Infection     Goal: Will remain free from infection             Problem: Knowledge Deficit     Goal: Knowledge of disease process/condition, treatment plan, diagnostic tests, and medications will improve           Goal: Knowledge of the prescribed therapeutic regimen will improve             Problem: Pain Management     Goal: Pain level will decrease to patient's comfort goal     Flowsheet:     Taken at 07/17/19 2217    Comfort Goal Comfort with Movement;Perform Activity;Sleep Comfortably;Comfort at Rest by Daniela Yanez, L.P.N.    Non Verbal Scale  Calm;Unlabored Breathing by Daniela Yanez, L.P.N.    Pain Rating Scale (NPRS) 6 - Hard to ignore, avoid usual activities by Daniela Yanez, L.P.N.    Taken at 07/16/19 2300    Comfort Goal Comfort with Movement;Perform Activity;Sleep Comfortably;Comfort at Rest by Daniela Yanez, L.P.N.    Non Verbal Scale  Calm;Unlabored Breathing by Daniela Yanez, L.P.N.    Pain Rating Scale (NPRS) 8 - Awful, hard to do anything by Daniela Yanez, L.P.N.                  Problem: Respiratory:     Goal: Respiratory status will improve             Problem: Safety     Goal: Will remain free from injury           Goal: Will remain free from falls             Problem: Skin Integrity     Goal: Risk for impaired skin integrity will decrease             Problem: Venous Thromboembolism  (VTW)/Deep Vein Thrombosis (DVT) Prevention:     Goal: Patient will participate in Venous Thrombosis (VTE)/Deep Vein Thrombosis (DVT)Prevention Measures                  Long Term Goals:   At discharge patient will be able to function safely at home and in the community with support.    Section completed by:  Maria G Radford R.N.           Respiratory Therapy    Pt has been on 3 lpm oxygen 24/7 since admission.  His baseline is 3 lpm at night which his PCP ordered after having a home oximetry study.   Pt has a history of smoking and his COPD diagnosis may be from this (?)  He denies having PFTs or a CAT scan of his chest. He also denies getting SOB even when working on his property at home.  We have not yet been successful in weaning his daytime oxygen from 3 lpm.    Section completed by:  Earnestine Ya, RRT       Mobility  Bed mobility:   Mod I  Bed /Chair/Wheelchair Transfer Initial:  2 - Max Assistance  Bed /Chair/Wheelchair Transfer Current:  5 - Standby Prompting/Supervision or Set-up   Bed/Chair/Wheelchair Transfer Description:  Increased time, Supervision for safety, Set-up of equipment (w/c set-up)  Walk Initial:  0 - Not tested,unsafe activity  Walk Current:  1 - Total Assistance   Walk Description:   (pt amb x4-5' in // bars with min A, // bars in lowered postion, wc follow. Distance limited by pt fatigue)  Wheelchair Initial:  1 - Total Assistance  Wheelchair Current:  6 - Modified Independent   Wheelchair Description:   (Indoors >150 ft)  Stairs Initial:  0 - Not tested,unsafe activity  Stairs Current: 0 - Not tested,unsafe activity   Stairs Description:    Patient/Family Training/Education:  Ongoing to include post BKA ROM, strengthening, desensitization. Transfers, gait with FWW vs knee scooter.  DME/DC Recommendations:  HH PT, ramp to enter home, grab bars in bathroom in shower and by toilet, manual wc with residual limb board R footrest, ambulation device.  Strengths:  Able to follow  instructions, Alert and oriented, Effective communication skills, Good carryover of learning, Independent PLOF, Making steady progress towards goals, Motivated for self care and independence, Pleasant and cooperative, Supportive family and Willingly participates in therapeutic activities  Barriers:   Other: L shoulder pain/weakness, impaired standing tolerance, impaired endurance, O2 needs  # of short term goals set= 3 STGs from last week  # of short term goals met=2/3 met, 1 added       Physical Therapy Problems           Problem: Mobility     Dates: Start: 07/11/19       Goal: STG-Within one week, patient will ambulate household distance     Dates: Start: 07/11/19   Expected End: 07/18/19       Description: 1) Individualized goal:  >20' with FWW and Min A  2) Interventions:  PT Group Therapy, PT Prosthetic Training, PT Gait Training, PT Self Care/Home Eval, PT Therapeutic Exercises, PT Neuro Re-Ed/Balance, PT Therapeutic Activity and PT Evaluation               Problem: Mobility Transfers     Dates: Start: 07/11/19       Goal: STG-Within one week, patient will transfer bed to chair     Dates: Start: 07/22/19   Expected End: 07/29/19       Description: 1) Individualized goal:  Mod I in room, will require assist of 2nd person for O2 line management  2) Interventions:  PT Group Therapy, PT Prosthetic Training, PT Gait Training, PT Self Care/Home Eval, PT Therapeutic Exercises, PT Neuro Re-Ed/Balance, PT Therapeutic Activity and PT Evaluation               Problem: PT-Long Term Goals     Dates: Start: 07/11/19       Goal: LTG-By discharge, patient will propel wheelchair     Dates: Start: 07/11/19   Expected End: 08/01/19       Description: 1) Individualized goal:  Inside and outside surfaces with Mod I  2) Interventions: PT Group Therapy, PT Prosthetic Training, PT Gait Training, PT Self Care/Home Eval, PT Therapeutic Exercises, PT Neuro Re-Ed/Balance, PT Therapeutic Activity and PT Evaluation                 Goal:  LTG-By discharge, patient will ambulate     Dates: Start: 07/11/19   Expected End: 08/01/19       Description: 1) Individualized goal:  >100' with FWW and SPV, hop-to pattern on R LE  2) Interventions:  PT Group Therapy, PT Prosthetic Training, PT Gait Training, PT Self Care/Home Eval, PT Therapeutic Exercises, PT Neuro Re-Ed/Balance, PT Therapeutic Activity and PT Evaluation                 Goal: LTG-By discharge, patient will ambulate up/down 4-6 stairs     Dates: Start: 07/11/19   Expected End: 08/01/19       Description: 1) Individualized goal:  B HRs, hop-to pattern, CGA, to safely enter/exit home if ramp not yet installed  2) Interventions:  PT Group Therapy, PT Prosthetic Training, PT Gait Training, PT Self Care/Home Eval, PT Therapeutic Exercises, PT Neuro Re-Ed/Balance, PT Therapeutic Activity and PT Evaluation                 Goal: LTG-By discharge, patient will transfer in/out of a car     Dates: Start: 07/11/19   Expected End: 08/01/19       Description: 1) Individualized goal:  SPV from wc level  2) Interventions:  PT Group Therapy, PT Prosthetic Training, PT Gait Training, PT Self Care/Home Eval, PT Therapeutic Exercises, PT Neuro Re-Ed/Balance, PT Therapeutic Activity and PT Evaluation                         Section completed by:  Azalia Martínez DPT       Activities of Daily Living  Eating Initial:  5 - Standby Prompting/Supervision or Set-up  Eating Current:  6 - Modified Independent   Eating Description:  Increased time  Grooming Initial:  5 - Standby Prompting/Supervision or Set-up  Grooming Current:  6 - Modified Independent   Grooming Description:  Increased time (hair/oral care seated at sinkside)  Bathing Initial:  4 - Minimal Assistance  Bathing Current:  5 - Standby Prompting/Supervision or Set-up   Bathing Description:  Grab bar, Increased time, Supervision for safety (Close SBA during standing kalyn/buttock care)  Upper Body Dressing Initial:  4 - Minimal Assistance  Upper Body Dressing  Current:  7 - Independent   Upper Body Dressing Description:  Increased time  Lower Body Dressing Initial:  2 - Max Assistance  Lower Body Dressing Current:  4 - Minimal Assistance   Lower Body Dressing Description:  4 - Minimal Assistance  Toileting Initial:  2 - Max Assistance  Toileting Current:  3 - Moderate Assistance   Toileting Description:  Grab bar, Increased time, Supervision for safety, Verbal cueing, Set-up of equipment  Toilet Transfer Initial:  3 - Moderate Assistance  Toilet Transfer Current:  4 - Minimal Assistance   Toilet Transfer Description:  4 - Minimal Assistance  Tub / Shower Transfer Initial:  3 - Moderate Assistance  Tub / Shower Transfer Current:  5 - Standby Prompting/Supervision or Set-up   Tub / Shower Transfer Description:  Increased time, Grab bar, Supervision for safety (close SBA lateral scoot transfer w/c <> tub bench)  IADL:  N/A  Family Training/Education:  TBD  DME/DC Recommendations: tub transfer bench, grab bars at toilet and shower, 3-in-1 commode     Strengths:  Able to follow instructions, Alert and oriented, Independent PLOF, Making steady progress towards goals, Motivated for self care and independence, Pleasant and cooperative, Supportive family and Willingly participates in therapeutic activities  Barriers:  Generalized weakness, Home accessibility, Poor balance, Pressure ulcer and Other: requires O2 during activities, L chronic shoulder weakness/pain   # of short term goals set= 3  # of short term goals met= 0/3          Occupational Therapy Goals           Problem: Dressing     Dates: Start: 07/11/19       Goal: STG-Within one week, patient will dress LB     Dates: Start: 07/15/19       Description: 1) Individualized Goal: With supervision a using AE/AD as needed   2) Interventions: OT Group Therapy, OT Self Care/ADL, OT Cognitive Skill Dev, OT Community Reintegration, OT Manual Ther Technique, OT Neuro Re-Ed/Balance, OT Sensory Int Techniques, OT Therapeutic Activity,  OT Evaluation and OT Therapeutic Exercise        Note:     Goal Note filed on 07/21/19 1503 by Nara Ojeda, MS,OTR/L    Goal: STG-Within one week, patient will dress LB  Outcome: NOT MET  Min A                  Problem: Functional Transfers     Dates: Start: 07/11/19       Goal: STG-Within one week, patient will transfer to toilet     Dates: Start: 07/15/19       Description: 1) Individualized Goal: With supervision a using AE/AD as needed   2) Interventions: OT Group Therapy, OT Self Care/ADL, OT Cognitive Skill Dev, OT Community Reintegration, OT Manual Ther Technique, OT Neuro Re-Ed/Balance, OT Sensory Int Techniques, OT Therapeutic Activity, OT Evaluation and OT Therapeutic Exercise      Note:     Goal Note filed on 07/21/19 1503 by Nara Ojeda, MS,OTR/L    Goal: STG-Within one week, patient will transfer to toilet  Outcome: NOT MET  CGA - SBA                  Problem: OT Long Term Goals     Dates: Start: 07/11/19       Goal: LTG-By discharge, patient will complete basic self care tasks     Dates: Start: 07/11/19       Description: 1) Individualized Goal:  With supervision using AE/AD as needed   2) Interventions:  OT Group Therapy, OT Self Care/ADL, OT Cognitive Skill Dev, OT Community Reintegration, OT Manual Ther Technique, OT Neuro Re-Ed/Balance, OT Sensory Int Techniques, OT Therapeutic Activity, OT Evaluation and OT Therapeutic Exercise           Goal: LTG-By discharge, patient will perform bathroom transfers     Dates: Start: 07/11/19       Description: 1) Individualized Goal:  With supervision using AE/AD as needed   2) Interventions:  OT Group Therapy, OT Self Care/ADL, OT Cognitive Skill Dev, OT Community Reintegration, OT Manual Ther Technique, OT Neuro Re-Ed/Balance, OT Sensory Int Techniques, OT Therapeutic Activity, OT Evaluation and OT Therapeutic Exercise           Goal: LTG-By discharge, patient will complete basic home management     Dates: Start: 07/11/19       Description: 1)  Individualized Goal:  With supervision using AE/AD as needed   2) Interventions:  OT Group Therapy, OT Self Care/ADL, OT Cognitive Skill Dev, OT Community Reintegration, OT Manual Ther Technique, OT Neuro Re-Ed/Balance, OT Sensory Int Techniques, OT Therapeutic Activity, OT Evaluation and OT Therapeutic Exercise             Problem: Toileting     Dates: Start: 07/11/19       Goal: STG-Within one week, patient will complete toileting tasks     Dates: Start: 07/11/19       Description: 1) Individualized Goal:  With min a using AE/AD as needed   2) Interventions:  OT Group Therapy, OT Self Care/ADL, OT Cognitive Skill Dev, OT Community Reintegration, OT Manual Ther Technique, OT Neuro Re-Ed/Balance, OT Sensory Int Techniques, OT Therapeutic Activity, OT Evaluation and OT Therapeutic Exercise     Note:     Goal Note filed on 07/21/19 1503 by Nara Ojeda MS,OTR/L    Goal: STG-Within one week, patient will complete toileting tasks  Outcome: NOT MET  Mod A                      Section completed by:  Nara Ojeda MS,OTR/L             REHAB-Pharmacy IDT Team Note by Alessio Starkey RPH at 7/19/2019  5:28 PM  Version 1 of 1    Author:  Alessio Starkey RPH Service:  (none) Author Type:  Pharmacist    Filed:  7/19/2019  5:29 PM Date of Service:  7/19/2019  5:28 PM Status:  Signed    :  Alessio Starkey RPH (Pharmacist)         Pharmacy   Pharmacy  Antibiotics/Antifungals/Antivirals:  Medication:      Active Orders     None        Route:        NA  Stop Date:  NA  Reason:      NA  Antihypertensives/Cardiac:  Medication:    Orders (72h ago through future)    Start     Ordered    07/14/19 0900  metoprolol (LOPRESSOR) tablet 12.5 mg  DAILY      07/13/19 1041        Patient Vitals for the past 24 hrs:   BP Pulse   07/19/19 1704 - 73   07/19/19 0836 123/63 79   07/19/19 0630 (!) 99/59 75   07/18/19 1900 128/60 80       Anticoagulation:  Medication: None                                    Other key  medications: A review of the medication list reveals no issues at this time. Patient is currently on antihypertensive(s). Recommend home blood pressure monitoring/recording if antihypertensive(s) regimen(s) continue. Patient is currently on diabetic medication(s) and/or Insulin(s). Recommend home blood glucose monitoring/recording if these regimen(s) continue.    Section completed by: Alessio Starkey [facility][[email or web address]]     Attribution Key     AW.1 - Alessio Starkey [facility] on 7/19/2019  5:28 PM                    DC Planning  DC destination/dispostion:  To single level home in [place], [place], with support of wife.     Referrals: Request for grab bars, bed rail, ramp to workers' comp. Approval rec'd for wc, FWW, tub transfer bench, 3 in 1 BSC.      DC Needs: Has SPC. Oxygen concentrator.   MD f/u appointments - PCP, Dr. Schneider. OP PT.      Barriers to discharge:  Functional deficits; Left residual limb pain, fluid collection. Lives 15 miles from [place], [place]. No  provides service for that area for [employer] comp.     Strengths: Motivated. Supportive wife.      Section completed by:  Anahi Mccall R.N.    Summary from Meeting: Needs knee walker, anticipate oxygen needed 24 hours/day now rather than just at night.   Physician Summary  Mimi Doyle MD participated and led team conference discussion.

## 2019-07-22 NOTE — REHAB-PT IDT TEAM NOTE
Physical Therapy   Mobility  Bed mobility:   Mod I  Bed /Chair/Wheelchair Transfer Initial:  2 - Max Assistance  Bed /Chair/Wheelchair Transfer Current:  5 - Standby Prompting/Supervision or Set-up   Bed/Chair/Wheelchair Transfer Description:  Increased time, Supervision for safety, Set-up of equipment (w/c set-up)  Walk Initial:  0 - Not tested,unsafe activity  Walk Current:  1 - Total Assistance   Walk Description:   (pt amb x4-5' in // bars with min A, // bars in lowered postion, wc follow. Distance limited by pt fatigue)  Wheelchair Initial:  1 - Total Assistance  Wheelchair Current:  6 - Modified Independent   Wheelchair Description:   (Indoors >150 ft)  Stairs Initial:  0 - Not tested,unsafe activity  Stairs Current: 0 - Not tested,unsafe activity   Stairs Description:    Patient/Family Training/Education:  Ongoing to include post BKA ROM, strengthening, desensitization. Transfers, gait with FWW vs knee scooter.  DME/DC Recommendations:  HH PT, ramp to enter home, grab bars in bathroom in shower and by toilet, manual wc with residual limb board R footrest, ambulation device.  Strengths:  Able to follow instructions, Alert and oriented, Effective communication skills, Good carryover of learning, Independent PLOF, Making steady progress towards goals, Motivated for self care and independence, Pleasant and cooperative, Supportive family and Willingly participates in therapeutic activities  Barriers:   Other: L shoulder pain/weakness, impaired standing tolerance, impaired endurance, O2 needs  # of short term goals set= 3 STGs from last week  # of short term goals met=2/3 met, 1 added       Physical Therapy Problems           Problem: Mobility     Dates: Start: 07/11/19       Goal: STG-Within one week, patient will ambulate household distance     Dates: Start: 07/11/19   Expected End: 07/18/19       Description: 1) Individualized goal:  >20' with FWW and Min A  2) Interventions:  PT Group Therapy, PT Prosthetic  Training, PT Gait Training, PT Self Care/Home Eval, PT Therapeutic Exercises, PT Neuro Re-Ed/Balance, PT Therapeutic Activity and PT Evaluation               Problem: Mobility Transfers     Dates: Start: 07/11/19       Goal: STG-Within one week, patient will transfer bed to chair     Dates: Start: 07/22/19   Expected End: 07/29/19       Description: 1) Individualized goal:  Mod I in room, will require assist of 2nd person for O2 line management  2) Interventions:  PT Group Therapy, PT Prosthetic Training, PT Gait Training, PT Self Care/Home Eval, PT Therapeutic Exercises, PT Neuro Re-Ed/Balance, PT Therapeutic Activity and PT Evaluation               Problem: PT-Long Term Goals     Dates: Start: 07/11/19       Goal: LTG-By discharge, patient will propel wheelchair     Dates: Start: 07/11/19   Expected End: 08/01/19       Description: 1) Individualized goal:  Inside and outside surfaces with Mod I  2) Interventions: PT Group Therapy, PT Prosthetic Training, PT Gait Training, PT Self Care/Home Eval, PT Therapeutic Exercises, PT Neuro Re-Ed/Balance, PT Therapeutic Activity and PT Evaluation                 Goal: LTG-By discharge, patient will ambulate     Dates: Start: 07/11/19   Expected End: 08/01/19       Description: 1) Individualized goal:  >100' with FWW and SPV, hop-to pattern on R LE  2) Interventions:  PT Group Therapy, PT Prosthetic Training, PT Gait Training, PT Self Care/Home Eval, PT Therapeutic Exercises, PT Neuro Re-Ed/Balance, PT Therapeutic Activity and PT Evaluation                 Goal: LTG-By discharge, patient will ambulate up/down 4-6 stairs     Dates: Start: 07/11/19   Expected End: 08/01/19       Description: 1) Individualized goal:  B HRs, hop-to pattern, CGA, to safely enter/exit home if ramp not yet installed  2) Interventions:  PT Group Therapy, PT Prosthetic Training, PT Gait Training, PT Self Care/Home Eval, PT Therapeutic Exercises, PT Neuro Re-Ed/Balance, PT Therapeutic Activity and PT  Evaluation                 Goal: LTG-By discharge, patient will transfer in/out of a car     Dates: Start: 07/11/19   Expected End: 08/01/19       Description: 1) Individualized goal:  SPV from  level  2) Interventions:  PT Group Therapy, PT Prosthetic Training, PT Gait Training, PT Self Care/Home Eval, PT Therapeutic Exercises, PT Neuro Re-Ed/Balance, PT Therapeutic Activity and PT Evaluation                         Section completed by:  Azalia Martínez DPT

## 2019-07-22 NOTE — PROGRESS NOTES
"Rehab Progress Note     Date of Service: 7/22/2019  Chief Complaint: follow up left BKA    Interval Events (Subjective)    Patient seen and examined in his room today.  He continues to report residual limb pain but he is bearing with it.  He also reports continued phantom sensation and pain.  He has questions about what he is going to need to be ready for his discharge home next Monday.  Advised him we will discuss this in today's team conference.    Objective:  VITAL SIGNS: /54   Pulse 80   Temp 36.5 °C (97.7 °F) (Oral)   Resp 16   Ht 1.803 m (5' 11\")   Wt 89.4 kg (197 lb 1.6 oz)   SpO2 91%   BMI 27.49 kg/m²   Gen: alert, no apparent distress  CV: regular rate and rhythm, no murmurs, no peripheral edema  Resp: clear to ascultation bilaterally, normal respiratory effort  GI: soft, non-tender abdomen, bowel sounds present  Neuro: notable for left transtibial amputation    Recent Results (from the past 72 hour(s))   ACCU-CHEK GLUCOSE    Collection Time: 07/19/19  5:11 PM   Result Value Ref Range    Glucose - Accu-Ck 226 (H) 65 - 99 mg/dL   ACCU-CHEK GLUCOSE    Collection Time: 07/19/19 10:04 PM   Result Value Ref Range    Glucose - Accu-Ck 168 (H) 65 - 99 mg/dL   Basic Metabolic Panel    Collection Time: 07/20/19  5:04 AM   Result Value Ref Range    Sodium 137 135 - 145 mmol/L    Potassium 4.2 3.6 - 5.5 mmol/L    Chloride 100 96 - 112 mmol/L    Co2 30 20 - 33 mmol/L    Glucose 131 (H) 65 - 99 mg/dL    Bun 36 (H) 8 - 22 mg/dL    Creatinine 1.28 0.50 - 1.40 mg/dL    Calcium 8.5 8.5 - 10.5 mg/dL    Anion Gap 7.0 0.0 - 11.9   MAGNESIUM    Collection Time: 07/20/19  5:04 AM   Result Value Ref Range    Magnesium 1.8 1.5 - 2.5 mg/dL   PHOSPHORUS    Collection Time: 07/20/19  5:04 AM   Result Value Ref Range    Phosphorus 4.9 (H) 2.5 - 4.5 mg/dL   CBC WITHOUT DIFFERENTIAL    Collection Time: 07/20/19  5:04 AM   Result Value Ref Range    WBC 7.5 4.8 - 10.8 K/uL    RBC 3.25 (L) 4.70 - 6.10 M/uL    Hemoglobin 8.9 " (L) 14.0 - 18.0 g/dL    Hematocrit 29.2 (L) 42.0 - 52.0 %    MCV 89.8 81.4 - 97.8 fL    MCH 27.4 27.0 - 33.0 pg    MCHC 30.5 (L) 33.7 - 35.3 g/dL    RDW 50.8 (H) 35.9 - 50.0 fL    Platelet Count 244 164 - 446 K/uL    MPV 9.9 9.0 - 12.9 fL   ESTIMATED GFR    Collection Time: 07/20/19  5:04 AM   Result Value Ref Range    GFR If African American >60 >60 mL/min/1.73 m 2    GFR If Non  56 (A) >60 mL/min/1.73 m 2   ACCU-CHEK GLUCOSE    Collection Time: 07/20/19  7:24 AM   Result Value Ref Range    Glucose - Accu-Ck 154 (H) 65 - 99 mg/dL   ACCU-CHEK GLUCOSE    Collection Time: 07/20/19 11:33 AM   Result Value Ref Range    Glucose - Accu-Ck 205 (H) 65 - 99 mg/dL   ACCU-CHEK GLUCOSE    Collection Time: 07/20/19  5:32 PM   Result Value Ref Range    Glucose - Accu-Ck 152 (H) 65 - 99 mg/dL   ACCU-CHEK GLUCOSE    Collection Time: 07/20/19  9:11 PM   Result Value Ref Range    Glucose - Accu-Ck 153 (H) 65 - 99 mg/dL   ACCU-CHEK GLUCOSE    Collection Time: 07/21/19  7:41 AM   Result Value Ref Range    Glucose - Accu-Ck 155 (H) 65 - 99 mg/dL   ACCU-CHEK GLUCOSE    Collection Time: 07/21/19 11:22 AM   Result Value Ref Range    Glucose - Accu-Ck 171 (H) 65 - 99 mg/dL   ACCU-CHEK GLUCOSE    Collection Time: 07/21/19  4:58 PM   Result Value Ref Range    Glucose - Accu-Ck 132 (H) 65 - 99 mg/dL   ACCU-CHEK GLUCOSE    Collection Time: 07/21/19  7:55 PM   Result Value Ref Range    Glucose - Accu-Ck 169 (H) 65 - 99 mg/dL   ACCU-CHEK GLUCOSE    Collection Time: 07/22/19  7:36 AM   Result Value Ref Range    Glucose - Accu-Ck 140 (H) 65 - 99 mg/dL   ACCU-CHEK GLUCOSE    Collection Time: 07/22/19 11:09 AM   Result Value Ref Range    Glucose - Accu-Ck 152 (H) 65 - 99 mg/dL       Current Facility-Administered Medications   Medication Frequency   • senna-docusate (PERICOLACE or SENOKOT S) 8.6-50 MG per tablet 2 Tab BID PRN   • hydrocortisone 1 % cream BID PRN   • miconazole 2%-zinc oxide (DELFINO) topical cream BID   • metoprolol  (LOPRESSOR) tablet 12.5 mg DAILY   • methadone (DOLOPHINE) tablet 20 mg BID   • vitamin D (cholecalciferol) tablet 4,000 Units DAILY   • ascorbic acid tablet 500 mg QDAY with Breakfast   • ferrous sulfate tablet 325 mg QDAY with Breakfast   • insulin lispro (HUMALOG) injection 2-12 Units 4X/DAY ACHS    And   • glucose 4 g chewable tablet 16 g Q15 MIN PRN   • Respiratory Care per Protocol Continuous RT   • Pharmacy Consult Request ...Pain Management Review 1 Each PHARMACY TO DOSE   • acetaminophen (TYLENOL) tablet 650 mg Q4HRS PRN   • artificial tears 1.4 % ophthalmic solution 1 Drop PRN   • benzocaine-menthol (CEPACOL) lozenge 1 Lozenge Q2HRS PRN   • mag hydrox-al hydrox-simeth (MAALOX PLUS ES or MYLANTA DS) suspension 20 mL Q2HRS PRN   • ondansetron (ZOFRAN ODT) dispertab 4 mg 4X/DAY PRN    Or   • ondansetron (ZOFRAN) syringe/vial injection 4 mg 4X/DAY PRN   • traZODone (DESYREL) tablet 50 mg QHS PRN   • sodium chloride (OCEAN) 0.65 % nasal spray 2 Spray PRN   • hydrOXYzine HCl (ATARAX) tablet 50 mg Q6HRS PRN   • melatonin tablet 3 mg HS PRN   • polyethylene glycol/lytes (MIRALAX) PACKET 1 Packet QDAY PRN    And   • magnesium hydroxide (MILK OF MAGNESIA) suspension 30 mL QDAY PRN    And   • bisacodyl (DULCOLAX) suppository 10 mg QDAY PRN   • fluticasone (FLONASE) nasal spray 100 mcg DAILY   • oxyCODONE immediate-release (ROXICODONE) tablet 5 mg Q6HRS PRN   • pregabalin (LYRICA) capsule 300 mg BID   • tramadol (ULTRAM) 50 MG tablet 100 mg Q6HRS PRN   • dextrose 50% (D50W) injection 50 mL Q15 MIN PRN   • diphenhydrAMINE-ZnAcetate (BENADRYL ITCH) 1-0.1 % cream TID PRN   • albuterol inhaler 2 Puff Q4HRS PRN       Orders Placed This Encounter   Procedures   • Diet Order Diabetic, Cardiac     Standing Status:   Standing     Number of Occurrences:   1     Order Specific Question:   Diet:     Answer:   Diabetic [3]     Order Specific Question:   Diet:     Answer:   Cardiac [6]     Order Specific Question:    Consistency/Fluid modifications:     Answer:   Thin Liquids [3]       Assessment:  Active Hospital Problems    Diagnosis   • *S/P BKA (below knee amputation) unilateral, left ([facility])   • Acute on chronic respiratory failure with hypoxia ([facility])   • SALOME (obstructive sleep apnea)   • Cardiomyopathy ([facility])   • DM (diabetes mellitus), type 2, uncontrolled ([facility])   • Essential hypertension   • Left shoulder pain   • Chronic pain syndrome   • Methadone dependence ([facility])     This patient is a 63 y.o. male admitted for acute inpatient rehabilitation with S/P BKA (below knee amputation) unilateral, left ([facility]).    I led and attended the weekly conference, and agree with the IDT conference documentation and plan of care as noted below.    Date of conference: 7/22/2019    Goals and barriers: See IDT note.    Biggest barriers: right foot wounds, left BKA, pain management, oxygen tubing management    Therapy update 7/22/2019  Modified Independent eating  Modified Independent grooming  Modified Independent bathing  Independent upper body dressing  Supervision lower body dressing  Mod assist toileting  Min assistbladder  Min assist bowel  Supervision bed/chair transfer  Min assist toilet transfer  Min assist tub/shower transfer  Total assist ambulation  Modified Independent wheelchair propulsion  Not tested stairs  Modified Independent comprehension  Modified Independent expression  Modified Independent social interaction  Supervision problem solving  Supervision memory    Admission FIM 66 --> 79 (7/16) --> 88 (7/18)    CM/social support: To single level home in [place], [place], with support of wife.    Anticipated DC date: 7/29/2019    Outpatient: PT    Equip: grab bars, WC, ramp, knee scooter    Follow up: PCP, surgery    Medical Decision Making and Plan:    Left BKA  Dr. Schneider 6/24  Continue full rehab program  PT/OT, 1 hr each discipline   Outpatient follow up with surgery, s/p partial suture removal  Return to clinic this week -  apt on Weds - for rest of suture removal  Continue compression/edema management    Residual limb fluid collection  Post-op seroma versus hematoma  US with 3 distinct fluid collections  Most likely seroma, though hemoglobin slightly lower, d/c Lovenox and apirin, hemoglobin improved  Scheduled with orthopedics - no intervention other than compression    Normocytic anemia  Likely post-operative  Monitor, stable/higher on recheck    History of elevated troponin  Discontinued aspirin due to decreased Hgb    Diabetes  Continue insulin  Appreciate hospitalist assistance    Chronic pain  Narcotic dependence/tolerance  Continue methadone, prescribed by his PCP  Continue Lyrica  Dose decreased from 35 mg BID at home, to 20 mg BID due to encephalopathy (stroke code was called)  Continue PRN opiates - using 3-4 5 mg tabs daily     Left shoulder pain  With history of multiple surgeries, including left humeral head resection  Modalities with therapy  Continue pain management     Arm/leg jerking  Dr Shaw to look at medications/side effects  Decreasing dose of Lyrica to see if this is the culprit    Rashes  Groin/buttock rash - miconazole, continues  Trunk rash - hydrocortison cream, resolved     Cardiomyopathy, EF 40%  Continue metoprolol  Appreciate hospitalist assistance     History of one kidney  Avoid nephrotoxins     Respiratory insufficiency  Sleep apnea  On 3 L at home at night  Respiratory therapy to see patient, unable to titrate during day  Will need 24/7 at discharge     Hypertension  Continue metoprolol  Appreciate hospitalist assistance    Vit D deficiency, 12  Continue supplementation    DVT prophylaxis  Discontinued Lovenox due to fluid collection in residual limb and anemia    Total time:  40 minutes.  I spent greater than 50% of the time for patient care, counseling, and coordination on this date, including patient face-to face time, unit/floor time with review of records/pertinent lab data and studies, as well  as discussing diagnostic evaluation/work up, planned therapeutic interventions, and future disposition of care, as per the interval events/subjective and the assessment and plan as noted above.      Mimi Doyle M.D.   Physical Medicine and Rehabilitation

## 2019-07-22 NOTE — REHAB-NURSING IDT TEAM NOTE
Nursing   Nursing  Diet/Nutrition:  Diabetic, Cardiac and Thin Liquids  Medication Administration:  Whole with Liquid Wash  % consumed at meals in last 24 hours:  Consumed % of meals per documentation.  Meal/Snack Consumption for the past 24 hrs:   Oral Nutrition   07/21/19 2200 Snack;Between 25-50% Consumed   07/21/19 1815 Dinner;Between % Consumed   07/21/19 1200 Lunch;Between % Consumed   07/21/19 0845 Breakfast;Between % Consumed       Snack schedule:    Supplement:  Other: N/A  Appetite:  Good  Fluid Intake/Output in past 24 hours: In: 780 [P.O.:780]  Out: 475   Admit Weight:  Weight: 88.9 kg (196 lb)  Weight Last 7 Days   [89.4 kg (197 lb 1.6 oz)] 89.4 kg (197 lb 1.6 oz) (07/21 1100)    Pain Issues:    Location:  Leg (07/21 2148)  Left (07/21 2148)         Severity:  Moderate   Scheduled pain medications:  Methadone and Lyrica  PRN pain medications used in last 24 hours:  oxycodone immediate release (ROXICODONE)  and tramadol (Ultram)   Non Pharmacologic Interventions:  distraction, relaxation, repositioned and rest  Effectiveness of pain management plan:  good=patient states acceptable comfort after interventions    Bowel:    Bowel Assist Initial Score:  4 - Minimal Assistance  Bowel Assist Current Score:  4 - Minimal Assistance  Bowl Accidents in last 7 days:  0  Last bowel movement: 07/21/19  Stool Description: Medium, Soft     Usual bowel pattern:  daily  Scheduled bowel medications:  None  PRN bowel medications used in last 24 hours:  polyethylene glycol/lytes (MIRALAX)   Nursing Interventions:  Increased time, Scheduled medication, Supervision  Effectiveness of bowel program:   fair=sometimes needs prn bowel meds for constipation  Bladder:    Bladder Assist Initial Score:  4 - Minimal Assistance  Bladder Assist Current Score:  4 - Minimal Assistance  Bladder Accidents in last 7 days:  0  PVR range for past 24-48 hours: -- ()  Intermittent Catheterization:  N/A  Medications  affecting bladder:  None    Time void schedule/voiding pattern:  Voiding every 2-4 hours  Interventions:  Increased time, Verbal cueing, Emptying of device, Urinal  Effectiveness of bladder training:  Good=regular, predictable, emptying of bladder, patient initiates time voiding    Diabetes:  Blood Sugar Frequency:  Before meals and at bedtime    Range of BS for last 48 hours:   Recent Labs      07/20/19   0724  07/20/19   1133  07/20/19   1732  07/20/19   2111  07/21/19   0741  07/21/19   1122  07/21/19   1658  07/21/19   1955   POCGLUCOSE  154*  205*  152*  153*  155*  171*  132*  169*     Scheduled diabetic medications:  insulin lispro (HUMALOG) injection   Sliding scale usage in past 24 hours:  Yes  Total Short acting insulin in the past 24 hours:  Humalog 4 units (Pt has been refusing insulin when blood sugar is in the 150s)  Total Long acting insulin in the past 24 hours:  None    Wound:         Patient Lines/Drains/Airways Status    Active Current Wounds     Name: Placement date: Placement time: Site: Days:    Wound 07/10/19 Diabetic Ulcer Heel;Foot DM ulceration plantar surface right heel 07/10/19   1027      11    Incision 7/10/19 Knee        Wound Inner Right Ankle 7/22/2019        Moisture Associated Skin Damage Anterior;Posterior Buttock;Groin 07/10/19   1027    11                   Interventions:  Assess for s/s of infection, Q72 hr dressing change on heel, Daily dressing change on BKA, Jennifer cream to buttocks, wound consult ordered for new wound found on right ankle.  Effectiveness of intervention:  wound is improving     Sleep/wake cycle:   Average hours slept:  Sleeps 4-6 hours without waking  Sleep medication usage:  Other Trazodone    Patient/Family Training/Education:  Diabetes Management, Diet/Nutrition, Fall Prevention, General Self Care, Pain Management, Safe Transfers, Safety, Skin Care and Wound Care  Discharge Supply Recommendations:  Glucometer and Strips and Wound Care Supplies  Strengths:  Willingly participates in therapeutic activities, Able to follow instructions, Supportive family, Pleasant and cooperative and Motivated for self care and independence   Barriers:   Generalized weakness and Limited mobility            Nursing Problems           Problem: Bowel/Gastric:     Goal: Normal bowel function is maintained or improved           Goal: Will not experience complications related to bowel motility             Problem: Communication     Goal: The ability to communicate needs accurately and effectively will improve             Problem: Discharge Barriers/Planning     Goal: Patient's continuum of care needs will be met             Problem: GLYCEMIA IMBALANCE     Goal: Clinical indication of glycemia balance is achieved             Problem: Infection     Goal: Will remain free from infection             Problem: Knowledge Deficit     Goal: Knowledge of disease process/condition, treatment plan, diagnostic tests, and medications will improve           Goal: Knowledge of the prescribed therapeutic regimen will improve             Problem: Pain Management     Goal: Pain level will decrease to patient's comfort goal     Flowsheet:     Taken at 07/17/19 2217    Comfort Goal Comfort with Movement;Perform Activity;Sleep Comfortably;Comfort at Rest by Daniela Yanez, L.P.N.    Non Verbal Scale  Calm;Unlabored Breathing by Daniela Yanez, L.P.N.    Pain Rating Scale (NPRS) 6 - Hard to ignore, avoid usual activities by Daniela Yanez, L.P.N.    Taken at 07/16/19 2300    Comfort Goal Comfort with Movement;Perform Activity;Sleep Comfortably;Comfort at Rest by Daniela Yanez, L.P.N.    Non Verbal Scale  Calm;Unlabored Breathing by Daniela Yanez, L.P.N.    Pain Rating Scale (NPRS) 8 - Awful, hard to do anything by Daniela Yanez, L.P.N.                  Problem: Respiratory:     Goal: Respiratory status will improve             Problem: Safety     Goal: Will remain free from injury            Goal: Will remain free from falls             Problem: Skin Integrity     Goal: Risk for impaired skin integrity will decrease             Problem: Venous Thromboembolism (VTW)/Deep Vein Thrombosis (DVT) Prevention:     Goal: Patient will participate in Venous Thrombosis (VTE)/Deep Vein Thrombosis (DVT)Prevention Measures                  Long Term Goals:   At discharge patient will be able to function safely at home and in the community with support.    Section completed by:  Maria G Radford R.N.

## 2019-07-22 NOTE — PROGRESS NOTES
Hospital Medicine Daily Progress Note      Chief Complaint:  Hypertension  Diabetes    Interval History:  No significant events or changes since last visit    Review of Systems  Review of Systems   Constitutional: Negative for chills and fever.   Respiratory: Negative for shortness of breath.    Cardiovascular: Negative for chest pain.   Gastrointestinal: Negative for abdominal pain, diarrhea, nausea and vomiting.   Psychiatric/Behavioral: The patient is not nervous/anxious.         Physical Exam  Temp:  [36.3 °C (97.3 °F)-36.5 °C (97.7 °F)] 36.5 °C (97.7 °F)  Pulse:  [73-93] 79  Resp:  [16-18] 16  BP: (110-132)/(54-92) 124/54  SpO2:  [90 %-94 %] 94 %    Physical Exam   Constitutional: He is oriented to person, place, and time. He appears well-nourished.   HENT:   Head: Atraumatic.   Eyes: Pupils are equal, round, and reactive to light. Conjunctivae are normal.   Neck: Normal range of motion. Neck supple.   Cardiovascular: Normal rate and regular rhythm.    No murmur heard.  Pulmonary/Chest: He has no wheezes. He has no rales.   Abdominal: Soft. He exhibits no distension. There is no tenderness.   Musculoskeletal: He exhibits tenderness.   Left BKA stump wrapped, brace w/ small crack   Neurological: He is alert and oriented to person, place, and time.   Skin: Skin is warm and dry. No rash noted.   Nursing note and vitals reviewed.      Fluids    Intake/Output Summary (Last 24 hours) at 07/22/19 0815  Last data filed at 07/22/19 0400   Gross per 24 hour   Intake              940 ml   Output              400 ml   Net              540 ml       Laboratory  Recent Labs      07/20/19   0504   WBC  7.5   RBC  3.25*   HEMOGLOBIN  8.9*   HEMATOCRIT  29.2*   MCV  89.8   MCH  27.4   MCHC  30.5*   RDW  50.8*   PLATELETCT  244   MPV  9.9     Recent Labs      07/20/19   0504   SODIUM  137   POTASSIUM  4.2   CHLORIDE  100   CO2  30   GLUCOSE  131*   BUN  36*   CREATININE  1.28   CALCIUM  8.5                   Assessment/Plan  *  S/P BKA (below knee amputation) unilateral, left (HCC)- (present on admission)   Assessment & Plan    S/P left BKA 2nd to diabetic foot on 6/24/19  S/P antibiotics per ID prior to Rehab admission  External wound healing well  Wound care  U/S shows fluid collection x 3  Saw Ortho on 7/17     Acute on chronic respiratory failure with hypoxia (HCC)- (present on admission)   Assessment & Plan    Currently resolved  S/P VDRF     Cardiomyopathy (HCC)- (present on admission)   Assessment & Plan    Echo: EF 40%     DM (diabetes mellitus), type 2, uncontrolled (CMS-HCC)- (present on admission)   Assessment & Plan    Hba1c: 12.2 (6/16/19)  BS recently 132-171  Currently not on any diabetic meds  Consider low dose Lantus soon  Note: home med includes Lantus 44 units bid  Will monitor another day before adjusting meds     Essential hypertension- (present on admission)   Assessment & Plan    BP ok  On Lopressor: 12.5 mg bid  Monitor     Jerking   Assessment & Plan    Has a hx of mild UE small jerks at home for about 11 years  However, recently his UE jerks are much more pronounced  I.E. Pt is holding phone in hand to ear and then will jerk so much that the phone flies off  No tremors at rest  No tremors on movement  No tremors holding out arms in front  ? 2nd to Lyrica: has been on since 2004                             pt has had mild jerks starting about 2008 after Lyrica dose increased                            will decrease Lyrica from 300 bid to 200 mg bid (pt agrees and would like to try)  Suggest f/u with Neurology if symptoms not improved     Azotemia   Assessment & Plan    Bun: 38 (7/13) --> 34 (7/15) --> 36 (7/20)  Cont to encourage fluid intake  Monitor     Vitamin D deficiency   Assessment & Plan    Vit D: 12  On supplements     Anemia   Assessment & Plan    Hb: 8.9 (stable)  Fe: 38, sats 18% (7/11)  Vit B12 and Folate: wnl  On Fe supplements

## 2019-07-23 LAB
ANION GAP SERPL CALC-SCNC: 6 MMOL/L (ref 0–11.9)
BUN SERPL-MCNC: 27 MG/DL (ref 8–22)
CALCIUM SERPL-MCNC: 8.7 MG/DL (ref 8.5–10.5)
CHLORIDE SERPL-SCNC: 101 MMOL/L (ref 96–112)
CO2 SERPL-SCNC: 29 MMOL/L (ref 20–33)
CREAT SERPL-MCNC: 0.97 MG/DL (ref 0.5–1.4)
ERYTHROCYTE [DISTWIDTH] IN BLOOD BY AUTOMATED COUNT: 50.1 FL (ref 35.9–50)
GLUCOSE BLD-MCNC: 142 MG/DL (ref 65–99)
GLUCOSE BLD-MCNC: 151 MG/DL (ref 65–99)
GLUCOSE BLD-MCNC: 156 MG/DL (ref 65–99)
GLUCOSE BLD-MCNC: 166 MG/DL (ref 65–99)
GLUCOSE SERPL-MCNC: 162 MG/DL (ref 65–99)
HCT VFR BLD AUTO: 30.4 % (ref 42–52)
HGB BLD-MCNC: 9.5 G/DL (ref 14–18)
MAGNESIUM SERPL-MCNC: 1.7 MG/DL (ref 1.5–2.5)
MCH RBC QN AUTO: 28 PG (ref 27–33)
MCHC RBC AUTO-ENTMCNC: 31.3 G/DL (ref 33.7–35.3)
MCV RBC AUTO: 89.7 FL (ref 81.4–97.8)
PHOSPHATE SERPL-MCNC: 4.6 MG/DL (ref 2.5–4.5)
PLATELET # BLD AUTO: 223 K/UL (ref 164–446)
PMV BLD AUTO: 9.4 FL (ref 9–12.9)
POTASSIUM SERPL-SCNC: 4.1 MMOL/L (ref 3.6–5.5)
RBC # BLD AUTO: 3.39 M/UL (ref 4.7–6.1)
SODIUM SERPL-SCNC: 136 MMOL/L (ref 135–145)
WBC # BLD AUTO: 6.6 K/UL (ref 4.8–10.8)

## 2019-07-23 PROCEDURE — 97110 THERAPEUTIC EXERCISES: CPT

## 2019-07-23 PROCEDURE — 80048 BASIC METABOLIC PNL TOTAL CA: CPT

## 2019-07-23 PROCEDURE — A9270 NON-COVERED ITEM OR SERVICE: HCPCS | Performed by: HOSPITALIST

## 2019-07-23 PROCEDURE — 700102 HCHG RX REV CODE 250 W/ 637 OVERRIDE(OP): Performed by: HOSPITALIST

## 2019-07-23 PROCEDURE — 99232 SBSQ HOSP IP/OBS MODERATE 35: CPT | Performed by: HOSPITALIST

## 2019-07-23 PROCEDURE — A9270 NON-COVERED ITEM OR SERVICE: HCPCS | Performed by: PHYSICAL MEDICINE & REHABILITATION

## 2019-07-23 PROCEDURE — 36415 COLL VENOUS BLD VENIPUNCTURE: CPT

## 2019-07-23 PROCEDURE — 94760 N-INVAS EAR/PLS OXIMETRY 1: CPT

## 2019-07-23 PROCEDURE — 97116 GAIT TRAINING THERAPY: CPT

## 2019-07-23 PROCEDURE — 97530 THERAPEUTIC ACTIVITIES: CPT

## 2019-07-23 PROCEDURE — 700102 HCHG RX REV CODE 250 W/ 637 OVERRIDE(OP): Performed by: PHYSICAL MEDICINE & REHABILITATION

## 2019-07-23 PROCEDURE — 770010 HCHG ROOM/CARE - REHAB SEMI PRIVAT*

## 2019-07-23 PROCEDURE — 99232 SBSQ HOSP IP/OBS MODERATE 35: CPT | Performed by: PHYSICAL MEDICINE & REHABILITATION

## 2019-07-23 PROCEDURE — 83735 ASSAY OF MAGNESIUM: CPT

## 2019-07-23 PROCEDURE — 84100 ASSAY OF PHOSPHORUS: CPT

## 2019-07-23 PROCEDURE — 85027 COMPLETE CBC AUTOMATED: CPT

## 2019-07-23 PROCEDURE — 97535 SELF CARE MNGMENT TRAINING: CPT

## 2019-07-23 PROCEDURE — 82962 GLUCOSE BLOOD TEST: CPT | Mod: 91

## 2019-07-23 RX ORDER — SEVELAMER CARBONATE 800 MG/1
800 TABLET, FILM COATED ORAL
Status: DISCONTINUED | OUTPATIENT
Start: 2019-07-24 | End: 2019-07-31 | Stop reason: HOSPADM

## 2019-07-23 RX ADMIN — OXYCODONE HYDROCHLORIDE 5 MG: 5 TABLET ORAL at 05:53

## 2019-07-23 RX ADMIN — FERROUS SULFATE TAB 325 MG (65 MG ELEMENTAL FE) 325 MG: 325 (65 FE) TAB at 07:51

## 2019-07-23 RX ADMIN — METHADONE HYDROCHLORIDE 20 MG: 10 TABLET ORAL at 21:06

## 2019-07-23 RX ADMIN — METOPROLOL TARTRATE 12.5 MG: 25 TABLET ORAL at 07:51

## 2019-07-23 RX ADMIN — METHADONE HYDROCHLORIDE 20 MG: 10 TABLET ORAL at 07:51

## 2019-07-23 RX ADMIN — PREGABALIN 200 MG: 100 CAPSULE ORAL at 21:06

## 2019-07-23 RX ADMIN — MICONAZOLE NITRATE: 20 CREAM TOPICAL at 21:07

## 2019-07-23 RX ADMIN — PREGABALIN 200 MG: 100 CAPSULE ORAL at 07:51

## 2019-07-23 RX ADMIN — FLUTICASONE PROPIONATE 100 MCG: 50 SPRAY, METERED NASAL at 07:51

## 2019-07-23 RX ADMIN — OXYCODONE HYDROCHLORIDE 5 MG: 5 TABLET ORAL at 16:04

## 2019-07-23 RX ADMIN — VITAMIN D, TAB 1000IU (100/BT) 4000 UNITS: 25 TAB at 07:51

## 2019-07-23 RX ADMIN — OXYCODONE HYDROCHLORIDE AND ACETAMINOPHEN 500 MG: 500 TABLET ORAL at 07:51

## 2019-07-23 RX ADMIN — MICONAZOLE NITRATE: 20 CREAM TOPICAL at 07:51

## 2019-07-23 ASSESSMENT — ENCOUNTER SYMPTOMS
ABDOMINAL PAIN: 0
VOMITING: 0
EYES NEGATIVE: 1
FEVER: 0
SHORTNESS OF BREATH: 0
COUGH: 0
PALPITATIONS: 0
CHILLS: 0
NAUSEA: 0

## 2019-07-23 NOTE — THERAPY
"Occupational Therapy  Daily Treatment     Patient Name: Travon Pollack  Age:  63 y.o., Sex:  male  Medical Record #: [id number]  Today's Date: 7/23/2019     Precautions  Precautions: Non Weight Bearing Left Lower Extremity, Immobilizer Left Lower Extremity, Fall Risk, Other (See Comments)  Comments: IPOP L residual limb; chronic L shoulder weakness/pain    Safety   ADL Safety : Modified Independent  Bathroom Safety: Requires Supervision for Safety  Comments: Close SBA during functional transfers/bathroom ADLs     Subjective    \"I have no butt left, my butt is so raw\"     Objective    Lateral leans from w/c to mat in order to pressure relief x2 min each side     07/23/19 1431   Sitting Upper Body Exercises   Upper Extremity Bike Level 5 Resistance  (10 min, 0 RB, 1.32 mile)   Interdisciplinary Plan of Care Collaboration   Patient Position at End of Therapy Seated;Call Light within Reach;Tray Table within Reach   OT Total Time Spent   OT Individual Total Time Spent (Mins) 30   OT Charge Group   OT Therapeutic Exercise  2       Assessment    Pt completed UB therex to the best of their abilities with no complaints of pain in UE's however had complaints of pain in buttocks    Plan    Cont overall strength/endurance and standing balance to improve ADL's and functional mobility; IADL's    "

## 2019-07-23 NOTE — THERAPY
Physical Therapy   Daily Treatment     Patient Name: Travon Pollack  Age:  63 y.o., Sex:  male  Medical Record #: [id number]  Today's Date: 7/23/2019     Precautions  Precautions: Non Weight Bearing Left Lower Extremity, Immobilizer Left Lower Extremity, Fall Risk, Other (See Comments)  Comments: IPOP L residual limb; chronic L shoulder weakness/pain    Subjective    Pt in bed sleeping.     Objective       07/23/19 1301   Precautions   Precautions Non Weight Bearing Left Lower Extremity;Immobilizer Left Lower Extremity;Fall Risk;Other (See Comments)   Comments IPOP L residual limb; chronic L shoulder weakness/pain   Vitals   Pulse 82   Pulse Oximetry 92 %   O2 (LPM) 3   O2 Delivery Nasal Cannula   Cognition    Level of Consciousness Alert   Standing Lower Body Exercises   Heel Rise 3 sets of 10;Right   Mini Squat Partial;3 sets of 10  (stance on R LE in // bars)   Bed Mobility    Sit to Stand Contact Guard Assist  (// bars)   Interdisciplinary Plan of Care Collaboration   Patient Position at End of Therapy In Bed;Call Light within Reach;Tray Table within Reach;Phone within Reach   PT Total Time Spent   PT Individual Total Time Spent (Mins) 60   PT Charge Group   PT Gait Training 1   PT Therapeutic Exercise 1   PT Therapeutic Activities 2       FIM Walking Score:  1 - Total Assistance  Walking Description:   (pt amb x50' and x55' with knee scooter and CGA,  follow for safety.  R LE weakness/knee instability caused limited distance)    PT re-wrapped L residual limb with ace-wrap and assisted pt in donning IPOP.    Assessment    Pt increased his distance on knee scooter today. R knee instability/feeling of knee buckling limits pt's ambulation distance.    Plan    Reassess knee scooter for bathroom mobility needs. Continue residual limb care, standing tolerance, and endurance. Assess in room mod I safety from wc level.

## 2019-07-23 NOTE — CARE PLAN
Problem: Safety  Goal: Will remain free from falls    Intervention: Implement fall precautions  Use of call light encouraged to make needs known.Bed in low position, non skid socks/shoes on when out of bed.Call light within reach.Will continue to monitor and assess needs and safety.      Problem: Pain Management  Goal: Pain level will decrease to patient's comfort goal    Intervention: Follow pain managment plan developed in collaboration with patient and Interdisciplinary Team  Pain is manageable with scheduled medication at this time.Repositioned with pillows for comfort.Will continue to monitor and assess pain level and medicate as needed.      Problem: GLYCEMIA IMBALANCE  Goal: Clinical indication of glycemia balance is achieved    Intervention: MONITOR BLOOD GLUCOSE LEVELS AS ORDERED  FSBS 145, coverage not needed.HS snack given.Will continue to monitor and assess for s/sx of hyper/hypoglycemia.

## 2019-07-23 NOTE — PROGRESS NOTES
"Rehab Progress Note     Date of Service: 7/23/2019  Chief Complaint: follow up left BKA    Interval Events (Subjective)    Patient seen and examined in the hallway today.  He is getting ready to start his occupational therapy session.  Patient reports he was able to use the knee scooter yesterday with PT.  He plans on using this just for short distances in the home to get into his bathroom.  He has no new complaints today.    Objective:  VITAL SIGNS: /58   Pulse 75   Temp 36.6 °C (97.8 °F) (Temporal)   Resp 20   Ht 1.803 m (5' 11\")   Wt 89.4 kg (197 lb 1.6 oz)   SpO2 92%   BMI 27.49 kg/m²   Gen: alert, no apparent distress  CV: regular rate and rhythm, no murmurs, no peripheral edema  Resp: clear to ascultation bilaterally, normal respiratory effort, on 3 L O2  GI: soft, non-tender abdomen, bowel sounds present  Msk: left transtibial amputation    Recent Results (from the past 72 hour(s))   ACCU-CHEK GLUCOSE    Collection Time: 07/20/19  5:32 PM   Result Value Ref Range    Glucose - Accu-Ck 152 (H) 65 - 99 mg/dL   ACCU-CHEK GLUCOSE    Collection Time: 07/20/19  9:11 PM   Result Value Ref Range    Glucose - Accu-Ck 153 (H) 65 - 99 mg/dL   ACCU-CHEK GLUCOSE    Collection Time: 07/21/19  7:41 AM   Result Value Ref Range    Glucose - Accu-Ck 155 (H) 65 - 99 mg/dL   ACCU-CHEK GLUCOSE    Collection Time: 07/21/19 11:22 AM   Result Value Ref Range    Glucose - Accu-Ck 171 (H) 65 - 99 mg/dL   ACCU-CHEK GLUCOSE    Collection Time: 07/21/19  4:58 PM   Result Value Ref Range    Glucose - Accu-Ck 132 (H) 65 - 99 mg/dL   ACCU-CHEK GLUCOSE    Collection Time: 07/21/19  7:55 PM   Result Value Ref Range    Glucose - Accu-Ck 169 (H) 65 - 99 mg/dL   ACCU-CHEK GLUCOSE    Collection Time: 07/22/19  7:36 AM   Result Value Ref Range    Glucose - Accu-Ck 140 (H) 65 - 99 mg/dL   ACCU-CHEK GLUCOSE    Collection Time: 07/22/19 11:09 AM   Result Value Ref Range    Glucose - Accu-Ck 152 (H) 65 - 99 mg/dL   ACCU-CHEK GLUCOSE    " Collection Time: 07/22/19  5:05 PM   Result Value Ref Range    Glucose - Accu-Ck 156 (H) 65 - 99 mg/dL   ACCU-CHEK GLUCOSE    Collection Time: 07/22/19  8:21 PM   Result Value Ref Range    Glucose - Accu-Ck 145 (H) 65 - 99 mg/dL   CBC WITHOUT DIFFERENTIAL    Collection Time: 07/23/19  6:35 AM   Result Value Ref Range    WBC 6.6 4.8 - 10.8 K/uL    RBC 3.39 (L) 4.70 - 6.10 M/uL    Hemoglobin 9.5 (L) 14.0 - 18.0 g/dL    Hematocrit 30.4 (L) 42.0 - 52.0 %    MCV 89.7 81.4 - 97.8 fL    MCH 28.0 27.0 - 33.0 pg    MCHC 31.3 (L) 33.7 - 35.3 g/dL    RDW 50.1 (H) 35.9 - 50.0 fL    Platelet Count 223 164 - 446 K/uL    MPV 9.4 9.0 - 12.9 fL   Basic Metabolic Panel    Collection Time: 07/23/19  6:35 AM   Result Value Ref Range    Sodium 136 135 - 145 mmol/L    Potassium 4.1 3.6 - 5.5 mmol/L    Chloride 101 96 - 112 mmol/L    Co2 29 20 - 33 mmol/L    Glucose 162 (H) 65 - 99 mg/dL    Bun 27 (H) 8 - 22 mg/dL    Creatinine 0.97 0.50 - 1.40 mg/dL    Calcium 8.7 8.5 - 10.5 mg/dL    Anion Gap 6.0 0.0 - 11.9   MAGNESIUM    Collection Time: 07/23/19  6:35 AM   Result Value Ref Range    Magnesium 1.7 1.5 - 2.5 mg/dL   PHOSPHORUS    Collection Time: 07/23/19  6:35 AM   Result Value Ref Range    Phosphorus 4.6 (H) 2.5 - 4.5 mg/dL   ESTIMATED GFR    Collection Time: 07/23/19  6:35 AM   Result Value Ref Range    GFR If African American >60 >60 mL/min/1.73 m 2    GFR If Non African American >60 >60 mL/min/1.73 m 2   ACCU-CHEK GLUCOSE    Collection Time: 07/23/19  7:23 AM   Result Value Ref Range    Glucose - Accu-Ck 156 (H) 65 - 99 mg/dL       Current Facility-Administered Medications   Medication Frequency   • pregabalin (LYRICA) capsule 200 mg BID   • senna-docusate (PERICOLACE or SENOKOT S) 8.6-50 MG per tablet 2 Tab BID PRN   • hydrocortisone 1 % cream BID PRN   • miconazole 2%-zinc oxide (DELFINO) topical cream BID   • metoprolol (LOPRESSOR) tablet 12.5 mg DAILY   • methadone (DOLOPHINE) tablet 20 mg BID   • vitamin D (cholecalciferol) tablet  4,000 Units DAILY   • ascorbic acid tablet 500 mg QDAY with Breakfast   • ferrous sulfate tablet 325 mg QDAY with Breakfast   • insulin lispro (HUMALOG) injection 2-12 Units 4X/DAY ACHS    And   • glucose 4 g chewable tablet 16 g Q15 MIN PRN   • Respiratory Care per Protocol Continuous RT   • Pharmacy Consult Request ...Pain Management Review 1 Each PHARMACY TO DOSE   • acetaminophen (TYLENOL) tablet 650 mg Q4HRS PRN   • artificial tears 1.4 % ophthalmic solution 1 Drop PRN   • benzocaine-menthol (CEPACOL) lozenge 1 Lozenge Q2HRS PRN   • mag hydrox-al hydrox-simeth (MAALOX PLUS ES or MYLANTA DS) suspension 20 mL Q2HRS PRN   • ondansetron (ZOFRAN ODT) dispertab 4 mg 4X/DAY PRN    Or   • ondansetron (ZOFRAN) syringe/vial injection 4 mg 4X/DAY PRN   • traZODone (DESYREL) tablet 50 mg QHS PRN   • sodium chloride (OCEAN) 0.65 % nasal spray 2 Spray PRN   • hydrOXYzine HCl (ATARAX) tablet 50 mg Q6HRS PRN   • melatonin tablet 3 mg HS PRN   • polyethylene glycol/lytes (MIRALAX) PACKET 1 Packet QDAY PRN    And   • magnesium hydroxide (MILK OF MAGNESIA) suspension 30 mL QDAY PRN    And   • bisacodyl (DULCOLAX) suppository 10 mg QDAY PRN   • fluticasone (FLONASE) nasal spray 100 mcg DAILY   • oxyCODONE immediate-release (ROXICODONE) tablet 5 mg Q6HRS PRN   • tramadol (ULTRAM) 50 MG tablet 100 mg Q6HRS PRN   • dextrose 50% (D50W) injection 50 mL Q15 MIN PRN   • diphenhydrAMINE-ZnAcetate (BENADRYL ITCH) 1-0.1 % cream TID PRN   • albuterol inhaler 2 Puff Q4HRS PRN       Orders Placed This Encounter   Procedures   • Diet Order Diabetic, Cardiac     Standing Status:   Standing     Number of Occurrences:   1     Order Specific Question:   Diet:     Answer:   Diabetic [3]     Order Specific Question:   Diet:     Answer:   Cardiac [6]     Order Specific Question:   Consistency/Fluid modifications:     Answer:   Thin Liquids [3]       Assessment:  Active Hospital Problems    Diagnosis   • *S/P BKA (below knee amputation) unilateral,  left (HCC)   • Acute on chronic respiratory failure with hypoxia (HCC)   • SALOME (obstructive sleep apnea)   • Cardiomyopathy (HCC)   • DM (diabetes mellitus), type 2, uncontrolled (CMS-HCC)   • Essential hypertension   • Left shoulder pain   • Chronic pain syndrome   • Methadone dependence (HCC)     This patient is a 63 y.o. male admitted for acute inpatient rehabilitation with S/P BKA (below knee amputation) unilateral, left (HCC).    I led and attended the weekly conference, and agree with the IDT conference documentation and plan of care as noted below.    Date of conference: 7/22/2019    Goals and barriers: See IDT note.    Biggest barriers: right foot wounds, left BKA, pain management, oxygen tubing management    Admission FIM 66 --> 79 (7/16) --> 88 (7/18)    CM/social support: To single level home in [place], [place], with support of wife.    Anticipated DC date: 7/29/2019    Outpatient: PT    Equip: grab bars, WC, ramp, knee scooter    Follow up: PCP, surgery    Medical Decision Making and Plan:    Left BKA  Dr. Schneider 6/24  Continue full rehab program  PT/OT, 1 hr each discipline   Outpatient follow up with surgery, s/p partial suture removal  Return to clinic this week - apt on Weds - for rest of suture removal  Continue compression/edema management    Residual limb fluid collection  Post-op seroma versus hematoma  US with 3 distinct fluid collections  Most likely seroma, though hemoglobin slightly lower, d/c Lovenox and apirin, hemoglobin improved  Scheduled with orthopedics - no intervention other than compression    Right heel ulcer  Continue wound care  Off loading boot    Normocytic anemia  Iron deficient  Likely post-operative  Monitor, stable/higher on recheck  Continue Vit C and ferrous sulfate    History of elevated troponin  Discontinued aspirin due to decreased Hgb    Diabetes  Continue insulin  Appreciate hospitalist assistance    Chronic pain  Narcotic dependence/tolerance  Continue methadone,  prescribed by his PCP  Dose decreased from 35 mg BID at home, to 20 mg BID due to encephalopathy (stroke code was called)  Continue PRN opiates - using 3-4 5 mg tabs daily  Continue Lyrica     Left shoulder pain  With history of multiple surgeries, including left humeral head resection  Modalities with therapy  Continue pain management     Arm/leg jerking  Dr Shaw to look at medications/side effects  Decreasing dose of Lyrica to see if this is the culprit    Rashes  Groin/buttock rash - miconazole, continues  Trunk rash - hydrocortison cream, resolved     Cardiomyopathy, EF 40%  Continue metoprolol  Appreciate hospitalist assistance     History of one kidney  Azotemia  Avoid nephrotoxins  Increase oral fluids    Hyperphosphatemia  Appreciate hospitalist assistance     Respiratory insufficiency  Sleep apnea  On 3 L at home at night  Respiratory therapy to see patient, unable to titrate during day  Will need 24/7 at discharge     Hypertension  Continue metoprolol  Appreciate hospitalist assistance    Vit D deficiency, 12  Continue supplementation    DVT prophylaxis  Discontinued Lovenox due to fluid collection in residual limb and anemia  Increase mobility  Monitor for edema    Total time:  25 minutes.  I spent greater than 50% of the time for patient care, counseling, and coordination on this date, including patient face-to face time, unit/floor time with review of records/pertinent lab data and studies, as well as discussing diagnostic evaluation/work up, planned therapeutic interventions, and future disposition of care, as per the interval events/subjective and the assessment and plan as noted above.    I have performed a physical exam, reviewed and updated ROS, as well as the assessment and plan today 7/23/2019. In review of note from 7/24/2019 there are no new changes except as documented above.    Mimi Doyle M.D.   Physical Medicine and Rehabilitation

## 2019-07-23 NOTE — FLOWSHEET NOTE
07/23/19 1446   Events/Summary/Plan   Events/Summary/Plan 02 spot check   Interdisciplinary Plan of Care-Goals (Indications)   Bronchodilator Indications History / Diagnosis   Interdisciplinary Plan of Care-Outcomes    Bronchodilator Outcome Patient at Stable Baseline   Respiratory WDL   Respiratory (WDL) X   Chest Exam   Respiration 20   Pulse 79   Oximetry   #Pulse Oximetry (Single Determination) Yes   Oxygen   Home O2 Use Prior To Admission? Yes   Home O2 LPM Flow 3 LPM   Home O2 Delivery Method Nasal Cannula   Home O2 Frequency of Use At Sleep   Pulse Oximetry 91 %   O2 (LPM) 3   O2 Daily Delivery Respiratory  Silicone Nasal Cannula

## 2019-07-23 NOTE — THERAPY
Occupational Therapy  Daily Treatment     Patient Name: Travon Pollack  Age:  63 y.o., Sex:  male  Medical Record #: [id number]  Today's Date: 2019     Precautions  Precautions: Non Weight Bearing Left Lower Extremity, Immobilizer Left Lower Extremity, Fall Risk, Other (See Comments)  Comments: IPOP L residual limb; chronic L shoulder weakness/pain    Safety   ADL Safety : Modified Independent  Bathroom Safety: Requires Supervision for Safety  Comments: Close SBA during functional transfers/bathroom ADLs     Subjective    Pt in bed upon entering room, agreeable to therapy     Objective    Functional w/c mobility back gym<>room, Mod I however requires increase time and frequent RB's.     UB strength/hip flexor stretch at mat in prone - pt able to roll supine<>prone with Min A d/t limited LUE movement, supine > EOM SBA     19 0931   Safety    ADL Safety  Modified Independent   Bathroom Safety Requires Supervision for Safety   Interdisciplinary Plan of Care Collaboration   Patient Position at End of Therapy Seated;Call Light within Reach;Tray Table within Reach   OT Total Time Spent   OT Individual Total Time Spent (Mins) 60   OT Charge Group   OT Self Care / ADL 2   OT Therapeutic Exercise  2       FIM Grooming Score:  6 - Modified Independent  Grooming Description:  Increased time    FIM Upper Body Dressin - Independent  Upper Body Dressing Description:         Assessment    Pt cont to demo SBA functional transfers, requires set-up assist for O2 management. Pt able to tolerate prone stretch with minimal complaints of pain    Plan    Cont overall strength/endurance and standing balance to improve ADL's and functional mobility

## 2019-07-23 NOTE — CARE PLAN
Problem: Safety  Goal: Will remain free from injury  Pt uses call light consistently and appropriately. Waits for assistance does not attempt self transfer this shift. Able to verbalize needs.       Problem: Skin Integrity  Goal: Risk for impaired skin integrity will decrease  Left residual limp incision appears dusky and approximated. Sutures in place (both lateral sides). Area under incision appears edematous-boggy (fluid accumulation like). Area was cleansed and new ABD pad was placed in addition to kerlix and Tubigrip.  Per PT Left leg immobilizer only to be used when patient is out of bed.

## 2019-07-23 NOTE — DISCHARGE PLANNING
Met with patient following Team Conference, updated him regarding request for DME. Patient stated his son is here until the end of the month to make sure patient makes the transition home smoothly. Opportunity for questions/discussion provided.

## 2019-07-23 NOTE — THERAPY
"Physical Therapy   Daily Treatment     Patient Name: Travon Pollack  Age:  63 y.o., Sex:  male  Medical Record #: [id number]  Today's Date: 7/23/2019     Precautions  Precautions: Non Weight Bearing Left Lower Extremity, Immobilizer Left Lower Extremity, Fall Risk, Other (See Comments)  Comments: IPOP L residual limb; chronic L shoulder weakness/pain    Subjective    Pt received in gym in , agreeable to PT session.     Objective       07/23/19 1501   Precautions   Precautions Non Weight Bearing Left Lower Extremity;Immobilizer Left Lower Extremity;Fall Risk;Other (See Comments)   Comments IPOP L residual limb; chronic L shoulder weakness/pain   Vitals   O2 (LPM) 3   Cognition    Level of Consciousness Alert   Standing Lower Body Exercises   Other Exercises prone x18 min, pt completed 3x10 glut sets and 3x10 prone hip ext. Sidelying passive L psoas stretch 2x30\"   Bed Mobility    Supine to Sit Supervised  (on mat table)   Sit to Supine Modified Independent   Sit to Stand (lateral scoot tx Mod I)   Interdisciplinary Plan of Care Collaboration   IDT Collaboration with  Nursing   Patient Position at End of Therapy Seated;Edge of Bed;Call Light within Reach;Tray Table within Reach;Phone within Reach   Collaboration Comments re: Pt to wear IPOP when OOB but not necessary when sleeping   PT Total Time Spent   PT Individual Total Time Spent (Mins) 30   PT Charge Group   PT Therapeutic Exercise 1   PT Therapeutic Activities 1       FIM Bed/Chair/Wheelchair Transfers Score: 6 - Modified Independent  Bed/Chair/Wheelchair Transfers Description:   (Mod I lateral scoot tx wc->mat table, sit->sidelying->prone Mod I)      Assessment    Pt able to position in prone with Mod I from sitting position at EOM, slighlty increased time needed.    Plan    Reassess knee scooter for bathroom mobility needs. Continue residual limb care, standing tolerance, and endurance. Assess in room mod I safety from wc level.        "

## 2019-07-24 PROBLEM — E83.39 HYPERPHOSPHATEMIA: Status: ACTIVE | Noted: 2019-07-24

## 2019-07-24 LAB
GLUCOSE BLD-MCNC: 136 MG/DL (ref 65–99)
GLUCOSE BLD-MCNC: 148 MG/DL (ref 65–99)
GLUCOSE BLD-MCNC: 153 MG/DL (ref 65–99)
GLUCOSE BLD-MCNC: 174 MG/DL (ref 65–99)

## 2019-07-24 PROCEDURE — 770010 HCHG ROOM/CARE - REHAB SEMI PRIVAT*

## 2019-07-24 PROCEDURE — 94760 N-INVAS EAR/PLS OXIMETRY 1: CPT

## 2019-07-24 PROCEDURE — A9270 NON-COVERED ITEM OR SERVICE: HCPCS | Performed by: PHYSICAL MEDICINE & REHABILITATION

## 2019-07-24 PROCEDURE — A9270 NON-COVERED ITEM OR SERVICE: HCPCS | Performed by: HOSPITALIST

## 2019-07-24 PROCEDURE — 82962 GLUCOSE BLOOD TEST: CPT

## 2019-07-24 PROCEDURE — 700102 HCHG RX REV CODE 250 W/ 637 OVERRIDE(OP): Performed by: HOSPITALIST

## 2019-07-24 PROCEDURE — 97110 THERAPEUTIC EXERCISES: CPT

## 2019-07-24 PROCEDURE — 700102 HCHG RX REV CODE 250 W/ 637 OVERRIDE(OP): Performed by: PHYSICAL MEDICINE & REHABILITATION

## 2019-07-24 PROCEDURE — 99232 SBSQ HOSP IP/OBS MODERATE 35: CPT | Performed by: HOSPITALIST

## 2019-07-24 PROCEDURE — 99231 SBSQ HOSP IP/OBS SF/LOW 25: CPT | Performed by: PHYSICAL MEDICINE & REHABILITATION

## 2019-07-24 PROCEDURE — 97535 SELF CARE MNGMENT TRAINING: CPT

## 2019-07-24 PROCEDURE — 97530 THERAPEUTIC ACTIVITIES: CPT

## 2019-07-24 RX ADMIN — MICONAZOLE NITRATE: 20 CREAM TOPICAL at 20:04

## 2019-07-24 RX ADMIN — SEVELAMER CARBONATE 800 MG: 800 TABLET, FILM COATED ORAL at 17:23

## 2019-07-24 RX ADMIN — FERROUS SULFATE TAB 325 MG (65 MG ELEMENTAL FE) 325 MG: 325 (65 FE) TAB at 08:02

## 2019-07-24 RX ADMIN — METOPROLOL TARTRATE 12.5 MG: 25 TABLET ORAL at 08:02

## 2019-07-24 RX ADMIN — OXYCODONE HYDROCHLORIDE 5 MG: 5 TABLET ORAL at 07:14

## 2019-07-24 RX ADMIN — TRAZODONE HYDROCHLORIDE 50 MG: 50 TABLET ORAL at 20:09

## 2019-07-24 RX ADMIN — FLUTICASONE PROPIONATE 100 MCG: 50 SPRAY, METERED NASAL at 08:03

## 2019-07-24 RX ADMIN — SEVELAMER CARBONATE 800 MG: 800 TABLET, FILM COATED ORAL at 08:02

## 2019-07-24 RX ADMIN — METHADONE HYDROCHLORIDE 20 MG: 10 TABLET ORAL at 20:05

## 2019-07-24 RX ADMIN — MICONAZOLE NITRATE: 20 CREAM TOPICAL at 08:03

## 2019-07-24 RX ADMIN — OXYCODONE HYDROCHLORIDE AND ACETAMINOPHEN 500 MG: 500 TABLET ORAL at 08:02

## 2019-07-24 RX ADMIN — OXYCODONE HYDROCHLORIDE 5 MG: 5 TABLET ORAL at 17:23

## 2019-07-24 RX ADMIN — PREGABALIN 200 MG: 100 CAPSULE ORAL at 20:05

## 2019-07-24 RX ADMIN — PREGABALIN 200 MG: 100 CAPSULE ORAL at 08:02

## 2019-07-24 RX ADMIN — METHADONE HYDROCHLORIDE 20 MG: 10 TABLET ORAL at 08:02

## 2019-07-24 RX ADMIN — TRAZODONE HYDROCHLORIDE 50 MG: 50 TABLET ORAL at 00:20

## 2019-07-24 RX ADMIN — VITAMIN D, TAB 1000IU (100/BT) 4000 UNITS: 25 TAB at 08:02

## 2019-07-24 ASSESSMENT — ENCOUNTER SYMPTOMS
SHORTNESS OF BREATH: 0
COUGH: 0
EYES NEGATIVE: 1
NAUSEA: 0
FEVER: 0
PALPITATIONS: 0
VOMITING: 0
ABDOMINAL PAIN: 0
CHILLS: 0

## 2019-07-24 NOTE — PROGRESS NOTES
Hospital Medicine Daily Progress Note      Chief Complaint  CM, HTN, DM, S/P BKA    Interval Problem Update  No new complaints today.    Review of Systems  Review of Systems   Constitutional: Negative for chills and fever.   HENT: Negative.    Eyes: Negative.    Respiratory: Negative for cough and shortness of breath.    Cardiovascular: Negative for chest pain and palpitations.   Gastrointestinal: Negative for abdominal pain, nausea and vomiting.   Genitourinary: Negative.    Musculoskeletal:        Wound pain   Skin: Negative for itching and rash.        Physical Exam  Temp:  [36.5 °C (97.7 °F)-36.8 °C (98.3 °F)] 36.8 °C (98.3 °F)  Pulse:  [70-78] 78  Resp:  [18-20] 18  BP: (126-127)/(61-70) 126/63  SpO2:  [91 %-95 %] 95 %    Physical Exam   Constitutional: He is oriented to person, place, and time. No distress.   HENT:   Head: Normocephalic and atraumatic.   Right Ear: External ear normal.   Left Ear: External ear normal.   Eyes: Conjunctivae and EOM are normal. Right eye exhibits no discharge. Left eye exhibits no discharge.   Neck: Normal range of motion. Neck supple. No tracheal deviation present.   Cardiovascular: Normal rate and regular rhythm.    Pulmonary/Chest: No stridor. No respiratory distress. He has no wheezes.   Decreased BS   Abdominal: Soft. Bowel sounds are normal. He exhibits no distension. There is no tenderness.   Musculoskeletal: He exhibits tenderness.   Left BKA stump w/ brace   Neurological: He is alert and oriented to person, place, and time.   Skin: Skin is warm and dry. He is not diaphoretic.   Vitals reviewed.      Fluids    Intake/Output Summary (Last 24 hours) at 07/24/19 1511  Last data filed at 07/24/19 1400   Gross per 24 hour   Intake              700 ml   Output              975 ml   Net             -275 ml       Laboratory  Recent Labs      07/23/19   0635   WBC  6.6   RBC  3.39*   HEMOGLOBIN  9.5*   HEMATOCRIT  30.4*   MCV  89.7   MCH  28.0   MCHC  31.3*   RDW  50.1*    PLATELETCT  223   MPV  9.4     Recent Labs      07/23/19   0635   SODIUM  136   POTASSIUM  4.1   CHLORIDE  101   CO2  29   GLUCOSE  162*   BUN  27*   CREATININE  0.97   CALCIUM  8.7                   Assessment/Plan  * S/P BKA (below knee amputation) unilateral, left (HCC)- (present on admission)   Assessment & Plan    2/2 diabetic foot infection/sepsis  S/P left BKA on 6/24/19 by Dr. Schneider  Completed antibiotics per ID prior to Rehab admission  U/S shows fluid collection x 3, had Ortho F/U w/ no additional recommendations  Continue wound care and monitor for bleeding and/or infection     Acute on chronic respiratory failure with hypoxia (HCC)- (present on admission)   Assessment & Plan    S/P VDRF     Cardiomyopathy ([facility])- (present on admission)   Assessment & Plan    Echo EF 40%  Monitor for volume overload     DM (diabetes mellitus), type 2, uncontrolled (CMS-HCC)- (present on admission)   Assessment & Plan    HbA1c 12.2  Fairly well controlled on SSI alone  Start Metformin w/ goal of no insulin on discharge  Outpt meds include Lantus 44 units bid     Essential hypertension- (present on admission)   Assessment & Plan    Continue low dose Metoprolol     Hyperphosphatemia   Assessment & Plan    Continue Sevelamer for elevated phosphorus levels     Azotemia   Assessment & Plan    Renal function improving  Continue to encourage PO fluids     Vitamin D deficiency   Assessment & Plan    Vit D level 12  On supplementation     Anemia   Assessment & Plan    Vit B12 and Folate levels normal  Continue Fe and Vit C for low iron stores     DNR

## 2019-07-24 NOTE — CARE PLAN
Problem: Safety  Goal: Will remain free from injury  Patient uses call light appropriately. Bed locked and in the lowest position. Non skid socks in place. Hourly rounding in place.     Problem: Skin Integrity  Goal: Risk for impaired skin integrity will decrease  Pt. went to his surgeon f/u appointment today. Left BKA with new dressing in place.  Right heel wound was cleansed and new dressing was applied as ordered.

## 2019-07-24 NOTE — PROGRESS NOTES
Hospital Medicine Daily Progress Note      Chief Complaint  CM, HTN, DM, S/P BKA    Interval Problem Update  Doing well.  Stump seems less boggy.    Review of Systems  Review of Systems   Constitutional: Negative for chills and fever.   HENT: Negative.    Eyes: Negative.    Respiratory: Negative for cough and shortness of breath.    Cardiovascular: Negative for chest pain and palpitations.   Gastrointestinal: Negative for abdominal pain, nausea and vomiting.   Genitourinary: Negative.    Musculoskeletal:        Wound pain   Skin: Negative for itching and rash.        Physical Exam  Temp:  [36.5 °C (97.7 °F)-36.8 °C (98.3 °F)] 36.8 °C (98.3 °F)  Pulse:  [70-78] 78  Resp:  [18-20] 18  BP: (126-127)/(61-70) 126/63  SpO2:  [91 %-95 %] 95 %    Physical Exam   Constitutional: He is oriented to person, place, and time. No distress.   HENT:   Head: Normocephalic and atraumatic.   Right Ear: External ear normal.   Left Ear: External ear normal.   Eyes: Conjunctivae and EOM are normal. Right eye exhibits no discharge. Left eye exhibits no discharge.   Neck: Normal range of motion. Neck supple. No tracheal deviation present.   Cardiovascular: Normal rate and regular rhythm.    Pulmonary/Chest: No stridor. No respiratory distress. He has no wheezes.   Decreased BS   Abdominal: Soft. Bowel sounds are normal. He exhibits no distension. There is no tenderness.   Musculoskeletal: He exhibits tenderness.   Left BKA stump wrapped   Neurological: He is alert and oriented to person, place, and time.   Skin: Skin is warm and dry. He is not diaphoretic.   Vitals reviewed.      Fluids    Intake/Output Summary (Last 24 hours) at 07/24/19 1503  Last data filed at 07/24/19 1400   Gross per 24 hour   Intake              700 ml   Output              975 ml   Net             -275 ml       Laboratory  Recent Labs      07/23/19   0635   WBC  6.6   RBC  3.39*   HEMOGLOBIN  9.5*   HEMATOCRIT  30.4*   MCV  89.7   MCH  28.0   MCHC  31.3*   RDW  50.1*    PLATELETCT  223   MPV  9.4     Recent Labs      07/23/19   0635   SODIUM  136   POTASSIUM  4.1   CHLORIDE  101   CO2  29   GLUCOSE  162*   BUN  27*   CREATININE  0.97   CALCIUM  8.7                   Assessment/Plan  * S/P BKA (below knee amputation) unilateral, left (HCC)- (present on admission)   Assessment & Plan    2/2 diabetic foot infection/sepsis  S/P left BKA on 6/24/19 by Dr. Schneider  Completed antibiotics per ID prior to Rehab admission  U/S shows fluid collection x 3, had Ortho F/U w/o additional recommendations  Continue wound care and monitor for bleeding and/or infection     Acute on chronic respiratory failure with hypoxia (HCC)- (present on admission)   Assessment & Plan    S/P VDRF     Cardiomyopathy ([facility])- (present on admission)   Assessment & Plan    Echo EF 40%  Monitor for volume overload     DM (diabetes mellitus), type 2, uncontrolled (CMS-HCC)- (present on admission)   Assessment & Plan    HbA1c 12.2  Fairly well controlled on SSI alone  Continue to observe serum glucose trends  Outpt meds include Lantus 44 units bid     Essential hypertension- (present on admission)   Assessment & Plan    Continue low dose Metoprolol     Hyperphosphatemia   Assessment & Plan    Start Sevelamer for elevated phosphorus levels     Azotemia   Assessment & Plan    Renal function improving  Continue to encourage PO fluids     Vitamin D deficiency   Assessment & Plan    Vit D level 12  On supplementation     Anemia   Assessment & Plan    Vit B12 and Folate levels normal  Continue Fe and Vit C for low iron stores     DNR

## 2019-07-24 NOTE — DISCHARGE PLANNING
Spoke with Ranjit Felder  [facility], regarding insurance auth for DME requested. We are both coordinating with Angela at [email or web address] to get additional days authorized and equipment authorized. All DME except oxygen will be covered by .   ? If knee board is a purchase item  Wound care nurse saw patient and recommended 2x/wk dressing changes. Patient can do these and/or patient's wife.    can provide transport and payment to the see Dr. Schneider monthly to evaluate wound on right heel. Patient to be seen in [facility]. Call placed to [facility] and left message requesting information needed to get this monthly visit scheduled.

## 2019-07-24 NOTE — PROGRESS NOTES
"Rehab Progress Note     Date of Service: 7/24/2019  Chief Complaint: follow up left BKA    Interval Events (Subjective)    Patient seen and examined in his room today. He is discouraged after his outpatient orthopedic appointment today, as they said he has a new fluid collection. They did not take out his sutures, and they want to see him again next week prior to him returning home. He has an appointment on the 30th. He reports pain in the residual limb is about the same.    Objective:  VITAL SIGNS: /63   Pulse 78   Temp 36.8 °C (98.3 °F) (Temporal)   Resp 18   Ht 1.803 m (5' 11\")   Wt 89.4 kg (197 lb 1.6 oz)   SpO2 95%   BMI 27.49 kg/m²   .Gen: alert, no apparent distress  CV: regular rate and rhythm, no murmurs, no peripheral edema  Resp: clear to ascultation bilaterally, normal respiratory effort  GI: soft, non-tender abdomen, bowel sounds present  Msk: left transtibial amputation    Recent Results (from the past 72 hour(s))   ACCU-CHEK GLUCOSE    Collection Time: 07/21/19  4:58 PM   Result Value Ref Range    Glucose - Accu-Ck 132 (H) 65 - 99 mg/dL   ACCU-CHEK GLUCOSE    Collection Time: 07/21/19  7:55 PM   Result Value Ref Range    Glucose - Accu-Ck 169 (H) 65 - 99 mg/dL   ACCU-CHEK GLUCOSE    Collection Time: 07/22/19  7:36 AM   Result Value Ref Range    Glucose - Accu-Ck 140 (H) 65 - 99 mg/dL   ACCU-CHEK GLUCOSE    Collection Time: 07/22/19 11:09 AM   Result Value Ref Range    Glucose - Accu-Ck 152 (H) 65 - 99 mg/dL   ACCU-CHEK GLUCOSE    Collection Time: 07/22/19  5:05 PM   Result Value Ref Range    Glucose - Accu-Ck 156 (H) 65 - 99 mg/dL   ACCU-CHEK GLUCOSE    Collection Time: 07/22/19  8:21 PM   Result Value Ref Range    Glucose - Accu-Ck 145 (H) 65 - 99 mg/dL   CBC WITHOUT DIFFERENTIAL    Collection Time: 07/23/19  6:35 AM   Result Value Ref Range    WBC 6.6 4.8 - 10.8 K/uL    RBC 3.39 (L) 4.70 - 6.10 M/uL    Hemoglobin 9.5 (L) 14.0 - 18.0 g/dL    Hematocrit 30.4 (L) 42.0 - 52.0 %    MCV 89.7 " 81.4 - 97.8 fL    MCH 28.0 27.0 - 33.0 pg    MCHC 31.3 (L) 33.7 - 35.3 g/dL    RDW 50.1 (H) 35.9 - 50.0 fL    Platelet Count 223 164 - 446 K/uL    MPV 9.4 9.0 - 12.9 fL   Basic Metabolic Panel    Collection Time: 07/23/19  6:35 AM   Result Value Ref Range    Sodium 136 135 - 145 mmol/L    Potassium 4.1 3.6 - 5.5 mmol/L    Chloride 101 96 - 112 mmol/L    Co2 29 20 - 33 mmol/L    Glucose 162 (H) 65 - 99 mg/dL    Bun 27 (H) 8 - 22 mg/dL    Creatinine 0.97 0.50 - 1.40 mg/dL    Calcium 8.7 8.5 - 10.5 mg/dL    Anion Gap 6.0 0.0 - 11.9   MAGNESIUM    Collection Time: 07/23/19  6:35 AM   Result Value Ref Range    Magnesium 1.7 1.5 - 2.5 mg/dL   PHOSPHORUS    Collection Time: 07/23/19  6:35 AM   Result Value Ref Range    Phosphorus 4.6 (H) 2.5 - 4.5 mg/dL   ESTIMATED GFR    Collection Time: 07/23/19  6:35 AM   Result Value Ref Range    GFR If African American >60 >60 mL/min/1.73 m 2    GFR If Non African American >60 >60 mL/min/1.73 m 2   ACCU-CHEK GLUCOSE    Collection Time: 07/23/19  7:23 AM   Result Value Ref Range    Glucose - Accu-Ck 156 (H) 65 - 99 mg/dL   ACCU-CHEK GLUCOSE    Collection Time: 07/23/19 11:09 AM   Result Value Ref Range    Glucose - Accu-Ck 166 (H) 65 - 99 mg/dL   ACCU-CHEK GLUCOSE    Collection Time: 07/23/19  5:10 PM   Result Value Ref Range    Glucose - Accu-Ck 151 (H) 65 - 99 mg/dL   ACCU-CHEK GLUCOSE    Collection Time: 07/23/19  8:15 PM   Result Value Ref Range    Glucose - Accu-Ck 142 (H) 65 - 99 mg/dL   ACCU-CHEK GLUCOSE    Collection Time: 07/24/19  7:16 AM   Result Value Ref Range    Glucose - Accu-Ck 153 (H) 65 - 99 mg/dL   ACCU-CHEK GLUCOSE    Collection Time: 07/24/19 12:58 PM   Result Value Ref Range    Glucose - Accu-Ck 148 (H) 65 - 99 mg/dL       Current Facility-Administered Medications   Medication Frequency   • sevelamer carbonate (RENVELA) tablet 800 mg TID WITH MEALS   • pregabalin (LYRICA) capsule 200 mg BID   • senna-docusate (PERICOLACE or SENOKOT S) 8.6-50 MG per tablet 2 Tab  BID PRN   • hydrocortisone 1 % cream BID PRN   • miconazole 2%-zinc oxide (DELFINO) topical cream BID   • metoprolol (LOPRESSOR) tablet 12.5 mg DAILY   • methadone (DOLOPHINE) tablet 20 mg BID   • vitamin D (cholecalciferol) tablet 4,000 Units DAILY   • ascorbic acid tablet 500 mg QDAY with Breakfast   • ferrous sulfate tablet 325 mg QDAY with Breakfast   • insulin lispro (HUMALOG) injection 2-12 Units 4X/DAY ACHS    And   • glucose 4 g chewable tablet 16 g Q15 MIN PRN   • Respiratory Care per Protocol Continuous RT   • Pharmacy Consult Request ...Pain Management Review 1 Each PHARMACY TO DOSE   • acetaminophen (TYLENOL) tablet 650 mg Q4HRS PRN   • artificial tears 1.4 % ophthalmic solution 1 Drop PRN   • benzocaine-menthol (CEPACOL) lozenge 1 Lozenge Q2HRS PRN   • mag hydrox-al hydrox-simeth (MAALOX PLUS ES or MYLANTA DS) suspension 20 mL Q2HRS PRN   • ondansetron (ZOFRAN ODT) dispertab 4 mg 4X/DAY PRN    Or   • ondansetron (ZOFRAN) syringe/vial injection 4 mg 4X/DAY PRN   • traZODone (DESYREL) tablet 50 mg QHS PRN   • sodium chloride (OCEAN) 0.65 % nasal spray 2 Spray PRN   • hydrOXYzine HCl (ATARAX) tablet 50 mg Q6HRS PRN   • melatonin tablet 3 mg HS PRN   • polyethylene glycol/lytes (MIRALAX) PACKET 1 Packet QDAY PRN    And   • magnesium hydroxide (MILK OF MAGNESIA) suspension 30 mL QDAY PRN    And   • bisacodyl (DULCOLAX) suppository 10 mg QDAY PRN   • fluticasone (FLONASE) nasal spray 100 mcg DAILY   • oxyCODONE immediate-release (ROXICODONE) tablet 5 mg Q6HRS PRN   • tramadol (ULTRAM) 50 MG tablet 100 mg Q6HRS PRN   • dextrose 50% (D50W) injection 50 mL Q15 MIN PRN   • diphenhydrAMINE-ZnAcetate (BENADRYL ITCH) 1-0.1 % cream TID PRN   • albuterol inhaler 2 Puff Q4HRS PRN       Orders Placed This Encounter   Procedures   • Diet Order Diabetic, Cardiac     Standing Status:   Standing     Number of Occurrences:   1     Order Specific Question:   Diet:     Answer:   Diabetic [3]     Order Specific Question:   Diet:      Answer:   Cardiac [6]     Order Specific Question:   Consistency/Fluid modifications:     Answer:   Thin Liquids [3]       Assessment:  Active Hospital Problems    Diagnosis   • *S/P BKA (below knee amputation) unilateral, left ([facility])   • Acute on chronic respiratory failure with hypoxia ([facility])   • SALOME (obstructive sleep apnea)   • Cardiomyopathy ([facility])   • DM (diabetes mellitus), type 2, uncontrolled (CMS-[facility])   • Essential hypertension   • Left shoulder pain   • Chronic pain syndrome   • Methadone dependence ([facility])     This patient is a 63 y.o. male admitted for acute inpatient rehabilitation with S/P BKA (below knee amputation) unilateral, left (HCC).    I led and attended the weekly conference, and agree with the IDT conference documentation and plan of care as noted below.    Date of conference: 7/22/2019    Goals and barriers: See IDT note.    Biggest barriers: right foot wounds, left BKA, pain management, oxygen tubing management    Admission FIM 66 --> 79 (7/16) --> 88 (7/18)    CM/social support: To single level home in [place], [place], with support of wife.    Anticipated DC date: 7/29/2019    Outpatient: PT    Equip: grab bars, WC, ramp, knee scooter    Follow up: PCP, surgery    Medical Decision Making and Plan:    Left BKA  Dr. Schneider 6/24  Continue full rehab program  PT/OT, 1 hr each discipline   Outpatient follow up with surgery, s/p partial suture removal  Return to clinic today - still not ready for suture removal, needs to return to clinic on 30th  Continue compression/edema management    Residual limb fluid collection  Post-op seroma versus hematoma  US with 3 distinct fluid collections  Most likely seroma, though hemoglobin slightly lower, d/c Lovenox and apirin, hemoglobin improved  Scheduled with orthopedics - no intervention other than compression    Right heel ulcer  Continue wound care, appreciate assistance  Off loading boot    Normocytic anemia  Iron deficient  Likely  post-operative  Monitor, stable/higher on recheck  Continue Vit C and ferrous sulfate    History of elevated troponin  Discontinued aspirin due to decreased Hgb    Diabetes  Continue insulin  Appreciate hospitalist assistance    Chronic pain  Narcotic dependence/tolerance  Continue methadone, prescribed by his PCP  Dose decreased from 35 mg BID at home, to 20 mg BID due to encephalopathy (stroke code was called)  Continue PRN opiates - using 3-4 5 mg tabs daily  Continue Lyrica     Left shoulder pain  With history of multiple surgeries, including left humeral head resection  Modalities with therapy  Continue pain management     Arm/leg jerking  Dr Shaw to look at medications/side effects  Decreasing dose of Lyrica to see if this is the culprit    Rashes  Groin/buttock rash - miconazole, continues  Trunk rash - hydrocortison cream, resolved     Cardiomyopathy, EF 40%  Continue metoprolol  Appreciate hospitalist assistance     History of one kidney  Azotemia  Avoid nephrotoxins  Increase oral fluids    Hyperphosphatemia  Appreciate hospitalist assistance     Respiratory insufficiency  Sleep apnea  On 3 L at home at night  Respiratory therapy to see patient, unable to titrate during day  Will need 24/7 at discharge     Hypertension  Continue metoprolol  Appreciate hospitalist assistance    Vit D deficiency, 12  Continue supplementation    DVT prophylaxis  Discontinued Lovenox due to fluid collection in residual limb and anemia  Increase mobility  Monitor for edema    Total time:  16 minutes.  I spent greater than 50% of the time for patient care, counseling, and coordination on this date, including patient face-to face time, unit/floor time with review of records/pertinent lab data and studies, as well as discussing diagnostic evaluation/work up, planned therapeutic interventions, and future disposition of care, as per the interval events/subjective and the assessment and plan as noted above.    I have performed a  physical exam, reviewed and updated ROS, as well as the assessment and plan today 7/24/2019. In review of note from 7/23/2019 there are no new changes except as documented above.          Mimi Doyle M.D.   Physical Medicine and Rehabilitation

## 2019-07-24 NOTE — THERAPY
"Physical Therapy   Daily Treatment     Patient Name: Travon Pollack  Age:  63 y.o., Sex:  male  Medical Record #: [id number]  Today's Date: 7/24/2019     Precautions  Precautions: (P) Non Weight Bearing Right Lower Extremity, Immobilizer Left Lower Extremity, Fall Risk, Other (See Comments)  Comments: (P) IPOP L residual limb; chronic L shoulder weakness/pain    Subjective    Pt received in bed sleeping, agreeable to PT session upon being woken. States that the PA at surgeon's office states he probably has a hematoma on anterior aspect of residual limb.     Objective       07/24/19 1431   Precautions   Precautions Non Weight Bearing Right Lower Extremity;Immobilizer Left Lower Extremity;Fall Risk;Other (See Comments)   Comments IPOP L residual limb; chronic L shoulder weakness/pain   Cognition    Level of Consciousness Alert   Supine Lower Body Exercise   Other Exercises sidelying L hip abduction and ext 3x10 reps each; prone glut sets 3x15 reps and B prone hip ext 3x10 reps. Prone on mat x15 min (pillow used under L residual limb for last 5 minutes- pt tolerated well)   Sitting Lower Body Exercises   Other Exercises STS from EOM to FWW x5 trials, duration of 45-60\" each   Bed Mobility    Supine to Sit Modified Independent   Sit to Supine Modified Independent   Sit to Stand Supervised   Interdisciplinary Plan of Care Collaboration   Patient Position at End of Therapy Seated;Edge of Bed;Call Light within Reach;Tray Table within Reach;Phone within Reach   PT Total Time Spent   PT Individual Total Time Spent (Mins) 90   PT Charge Group   PT Therapeutic Exercise 3   PT Therapeutic Activities 3       FIM Bed/Chair/Wheelchair Transfers Score: 5 - Standby Prompting/Supervision or Set-up  Bed/Chair/Wheelchair Transfers Description:       FIM Walking Score:  1 - Total Assistance  Walking Description:       FIM Wheelchair Score:  6 - Modified Independent  Wheelchair Description:       FIM Stairs Score:  0 - Not tested,unsafe " activity  Stairs Description:       RN dressed R heel ulcer at start of session.  PT wrapped L residual limb with ace-wrap at start of session.    Pt called his spouse and son during session- PT talked to pt's son Uri and requested that he measure the entry door, bathroom doorway, bedroom doorway, and hallway widths so that we can determine if wc will fit.    Assessment    Pt's standing tolerance is limited by L shoulder weakness/pain during WB with FWW.     Plan    Continue residual limb care, standing tolerance, and endurance. Assess in room mod I safety from wc level, pending pt's ability to self-manage O2. FWW vs knee scooter (with IPOP doffed) for bedroom<>bathroom mobility in pt's home as wc will not likely fit according to pt.

## 2019-07-24 NOTE — FLOWSHEET NOTE
07/24/19 0933   Events/Summary/Plan   Events/Summary/Plan 02 spot check at rest and RA trial   Respiratory WDL   Respiratory (WDL) X   Chest Exam   Respiration 18   Pulse 72   Oximetry   #Pulse Oximetry (Single Determination) Yes   Oxygen   Home O2 Use Prior To Admission? Yes   Home O2 LPM Flow 3 LPM   Home O2 Delivery Method Nasal Cannula   Home O2 Frequency of Use At Sleep   Pulse Oximetry 91 %   O2 (LPM) 3   O2 Daily Delivery Respiratory  Silicone Nasal Cannula   Room Air Challenge Fail  (RA after 3 mins 85%)

## 2019-07-24 NOTE — WOUND TEAM
"[facility]  Inpatient Services  Wound and Skin Care Progress Note    Admission Date: 7/10/2019     Consult Date: 7/10/19   HPI, PMH, SH: Reviewed    Unit where seen by Wound Team: [id number]     WOUND /FOLLOW UP CONSULT RELATED TO:  Re evaluation of right heel ulceration and fungal rash buttocks     SUBJECTIVE:  \"They said it was healed\"      Self Report / Pain Level:  denies       OBJECTIVE:  L BKA incision is wrapped with gauze and dry and intact.  Patient to see Dr. Schneider today for remaining suture removal.  Buttocks skin much improved with rash and skin is intact; continues with antifungal barrier paste; see below for right foot wound; patient had just showered and wound is STARR.    WOUND TYPE, LOCATION, CHARACTERISTICS (Pressure Injuries: location, stage, POA or date identified)      Wound 07/10/19 Diabetic Ulcer Heel;Foot DM ulceration plantar surface right heel (Active)   Wound Image       7/24/2019  8:25 AM   Site Assessment Clean;Red 7/24/2019  8:25 AM   Elizabeth-wound Assessment Clean;Intact 7/24/2019  8:25 AM   Margins Attached edges 7/24/2019  8:25 AM   Wound Length (cm) 0.5 cm 7/24/2019  8:25 AM   Wound Width (cm) 0.3 cm 7/24/2019  8:25 AM   Wound Depth (cm) 0.5 cm 7/24/2019  8:25 AM   Wound Surface Area (cm^2) 0.15 cm^2 7/24/2019  8:25 AM   Tunneling 0 cm 7/24/2019  8:25 AM   Undermining 0 cm 7/24/2019  8:25 AM   Closure Secondary intention 7/24/2019  8:25 AM   Drainage Amount Scant 7/24/2019  8:25 AM   Drainage Description Serosanguineous 7/24/2019  8:25 AM   Non-staged Wound Description Full thickness 7/24/2019  8:25 AM   Treatments Cleansed;Site care 7/24/2019  8:25 AM   Cleansing Approved Wound Cleanser 7/24/2019  8:25 AM   Periwound Protectant Not Applicable 7/24/2019  7:55 AM   Dressing Options Hydrofiber Silver;Nonadhesive Foam 7/24/2019  8:25 AM   Dressing Cleansing/Solutions Not Applicable 7/24/2019  8:25 AM   Dressing Changed New 7/24/2019  8:25 AM   Dressing Status Intact 7/24/2019 "  8:25 AM   Dressing Change Frequency Every 72 hrs 7/24/2019  8:25 AM   NEXT Dressing Change  07/26/19 7/24/2019  8:25 AM   NEXT Weekly Photo (Inpatient Only) 07/31/19 7/24/2019  8:25 AM   WOUND NURSE ONLY - Odor None 7/24/2019  8:25 AM   WOUND NURSE ONLY - Exposed Structures None 7/24/2019  8:25 AM   WOUND NURSE ONLY - Tissue Type and Percentage red 100% 7/24/2019  8:25 AM   WOUND NURSE ONLY - Time Spent with Patient (mins) 60 7/24/2019  8:25 AM        Lab Values:    WBC:       WBC   Date/Time Value Ref Range Status   07/23/2019 06:35 AM 6.6 4.8 - 10.8 K/uL Final     A1C:      Lab Results   Component Value Date/Time    HBA1C 12.2 (H) 06/16/2019 11:37 AM           Culture:  NA    INTERVENTIONS BY WOUND TEAM:  Using forceps and scalpel peeled off loose brown thin eschar revealing red wound bed.  Cleansed with NS and measurements and photo taken.  Filled wound with silver hydrofiber and non adhesive foam securing with hypafix tape.  Instructed patient regarding care as unclear if home health will be seeing patient post discharge.  Also discussed discharge with  who will explore possibility of patient FU at LPS rounds as outpatient.  Patient is following up with PCP as well.  Patient verbalized understanding of all information.  Above discussed with Dr. Doyle as well.     Dressing selection:  Silver hydrofiber, non adhesive foam, hypafix tape        Interdisciplinary consultation: Patient, Bedside RN, case management, Dr. Doyle    EVALUATION: clean appearing wound right foot that is free of slough and should progress with silver hydrofiber and off loading; buttocks skin intact    Factors affecting wound healing: DM  Goals: Steady decrease in wound area and depth weekly.    NURSING PLAN OF CARE ORDERS (X):    Dressing changes: See Dressing Care orders: X  Skin care: See Skin Care orders: X  Rectal tube care: See Rectal Tube Care orders:   Other orders:    WOUND TEAM PLAN OF CARE (X):   NPWT change 3 x week:         Dressing changes by wound team:       Follow up as needed:  X weekly       Other (explain):     Anticipated discharge plans (X):   SNF:           Home Care:   X possible       Outpatient Wound Center:            Self Care:  X with ongoing wound care          Other:

## 2019-07-24 NOTE — THERAPY
"Occupational Therapy  Daily Treatment     Patient Name: Travon Pollack  Age:  63 y.o., Sex:  male  Medical Record #: [id number]  Today's Date: 2019     Precautions  Precautions: Non Weight Bearing Left Lower Extremity, Immobilizer Left Lower Extremity, Fall Risk, Other (See Comments)  Comments: IPOP L residual limb; chronic L shoulder weakness/pain    Safety   ADL Safety : Requires Supervision for Safety  Bathroom Safety: Requires Supervision for Safety  Comments: SBA when standing for clothing mgmt    Subjective    \"I can't do all of this if the equipment is not at my house when I discharge\" re: shower     Objective    Dry tub transfer using w/c<>tub transfer bench supervision - Mod I, pt will have to use knee scooter to get into bathroom and tub. Pt reports that the knee scooter will be much easier compared to the w/c for bathroom transfers     19 0701   Safety    ADL Safety  Requires Supervision for Safety   Bathroom Safety Requires Supervision for Safety   Comments SBA when standing for clothing mgmt   Interdisciplinary Plan of Care Collaboration   Patient Position at End of Therapy Seated;Call Light within Reach;Tray Table within Reach   OT Total Time Spent   OT Individual Total Time Spent (Mins) 90   OT Charge Group   OT Self Care / ADL 6       FIM Eating Score:  7 - Independent  Eating Description:       FIM Grooming Score:  7 - Independent  Grooming Description:       FIM Bathing Score:  6 - Modified Independent  Bathing Description:       FIM Upper Body Dressin - Independent  Upper Body Dressing Description:       FIM Lower Body Dressing Score:  5 - Standby Prompting/Supervsion or Set-up  Lower Body Dressing Description:  Increased time, Supervision for safety, Verbal cueing, Set-up of equipment (SBA - stand at GB to pull pants up)    FIM Toileting Body Dressin - Minimal Assistance  Toileting Description:  Grab bar, Increased time, Supervision for safety, Verbal cueing, Set-up of " equipment    FIM Toilet Transfer Score:  5 - Standby Prompting/Supervision or Set-up  Toilet Transfer Description:  Grab bar, Increased time, Supervision for safety, Verbal cueing, Set-up of equipment (stand pivot w/c<>toilet with GB)    FIM Tub/Shower Transfers Score:  5 - Standby Prompting/Supervision or Set-up  Tub/Shower Transfers Description:  Grab bar, Increased time, Supervision for safety, Verbal cueing, Set-up of equipment (stand pivot w/c<>fold down bench with GB)      Assessment    Pt completed shower routine at A overall using w/c, shower bench, GB's. Pt's functional performance was impacted by impaired standing balance, but is demo improvement    Plan    Toilet/tub transfer with Knee scooter, kitchen mobility at w/c level

## 2019-07-24 NOTE — CARE PLAN
Problem: Safety  Goal: Will remain free from injury  Patient educated on the importance of using call light for assistance, patient verbalized understanding. Call light and belongings within reach, bed in lowest and locked position, bed rails up x2, and non skid socks on. Hourly rounding in place.    Problem: Skin Integrity  Goal: Risk for impaired skin integrity will decrease  Dressings to BLE are clean, dry and intact. Buttocks remains red, nystatin cream applied. Patient educated on importance of offloading pressure from buttocks to prevent skin breakdown, patient needs continued education and frequent reminders.      Problem: GLYCEMIA IMBALANCE  Goal: Clinical indication of glycemia balance is achieved  Patient's FSBS at HS= 142. No per sliding scale. No s/s of hyperglycemia noted at this time. HS snack provided. Will continue to assess for s/s of hyper/hypoglycemia.

## 2019-07-25 LAB
GLUCOSE BLD-MCNC: 128 MG/DL (ref 65–99)
GLUCOSE BLD-MCNC: 147 MG/DL (ref 65–99)
GLUCOSE BLD-MCNC: 157 MG/DL (ref 65–99)
GLUCOSE BLD-MCNC: 184 MG/DL (ref 65–99)

## 2019-07-25 PROCEDURE — 700102 HCHG RX REV CODE 250 W/ 637 OVERRIDE(OP): Performed by: PHYSICAL MEDICINE & REHABILITATION

## 2019-07-25 PROCEDURE — 700102 HCHG RX REV CODE 250 W/ 637 OVERRIDE(OP): Performed by: HOSPITALIST

## 2019-07-25 PROCEDURE — 97530 THERAPEUTIC ACTIVITIES: CPT

## 2019-07-25 PROCEDURE — 82962 GLUCOSE BLOOD TEST: CPT | Mod: 91

## 2019-07-25 PROCEDURE — A9270 NON-COVERED ITEM OR SERVICE: HCPCS | Performed by: HOSPITALIST

## 2019-07-25 PROCEDURE — 770010 HCHG ROOM/CARE - REHAB SEMI PRIVAT*

## 2019-07-25 PROCEDURE — 97535 SELF CARE MNGMENT TRAINING: CPT

## 2019-07-25 PROCEDURE — 97110 THERAPEUTIC EXERCISES: CPT

## 2019-07-25 PROCEDURE — 99231 SBSQ HOSP IP/OBS SF/LOW 25: CPT | Performed by: PHYSICAL MEDICINE & REHABILITATION

## 2019-07-25 PROCEDURE — A9270 NON-COVERED ITEM OR SERVICE: HCPCS | Performed by: PHYSICAL MEDICINE & REHABILITATION

## 2019-07-25 PROCEDURE — 94760 N-INVAS EAR/PLS OXIMETRY 1: CPT

## 2019-07-25 PROCEDURE — 99232 SBSQ HOSP IP/OBS MODERATE 35: CPT | Performed by: HOSPITALIST

## 2019-07-25 RX ORDER — INSULIN GLARGINE 100 [IU]/ML
10 INJECTION, SOLUTION SUBCUTANEOUS EVERY EVENING
Status: DISCONTINUED | OUTPATIENT
Start: 2019-07-25 | End: 2019-07-27

## 2019-07-25 RX ADMIN — PREGABALIN 200 MG: 100 CAPSULE ORAL at 20:17

## 2019-07-25 RX ADMIN — INSULIN GLARGINE 10 UNITS: 100 INJECTION, SOLUTION SUBCUTANEOUS at 20:15

## 2019-07-25 RX ADMIN — MELATONIN TAB 3 MG 3 MG: 3 TAB at 20:25

## 2019-07-25 RX ADMIN — OXYCODONE HYDROCHLORIDE 5 MG: 5 TABLET ORAL at 13:16

## 2019-07-25 RX ADMIN — SEVELAMER CARBONATE 800 MG: 800 TABLET, FILM COATED ORAL at 11:25

## 2019-07-25 RX ADMIN — MICONAZOLE NITRATE: 20 CREAM TOPICAL at 07:56

## 2019-07-25 RX ADMIN — METOPROLOL TARTRATE 12.5 MG: 25 TABLET ORAL at 07:52

## 2019-07-25 RX ADMIN — SEVELAMER CARBONATE 800 MG: 800 TABLET, FILM COATED ORAL at 17:13

## 2019-07-25 RX ADMIN — VITAMIN D, TAB 1000IU (100/BT) 4000 UNITS: 25 TAB at 07:52

## 2019-07-25 RX ADMIN — METHADONE HYDROCHLORIDE 20 MG: 10 TABLET ORAL at 07:52

## 2019-07-25 RX ADMIN — FERROUS SULFATE TAB 325 MG (65 MG ELEMENTAL FE) 325 MG: 325 (65 FE) TAB at 07:52

## 2019-07-25 RX ADMIN — OXYCODONE HYDROCHLORIDE 5 MG: 5 TABLET ORAL at 00:15

## 2019-07-25 RX ADMIN — METHADONE HYDROCHLORIDE 20 MG: 10 TABLET ORAL at 20:17

## 2019-07-25 RX ADMIN — TRAZODONE HYDROCHLORIDE 50 MG: 50 TABLET ORAL at 20:25

## 2019-07-25 RX ADMIN — MICONAZOLE NITRATE: 20 CREAM TOPICAL at 20:17

## 2019-07-25 RX ADMIN — PREGABALIN 200 MG: 100 CAPSULE ORAL at 07:52

## 2019-07-25 RX ADMIN — SEVELAMER CARBONATE 800 MG: 800 TABLET, FILM COATED ORAL at 07:52

## 2019-07-25 RX ADMIN — FLUTICASONE PROPIONATE 100 MCG: 50 SPRAY, METERED NASAL at 07:56

## 2019-07-25 RX ADMIN — OXYCODONE HYDROCHLORIDE AND ACETAMINOPHEN 500 MG: 500 TABLET ORAL at 07:52

## 2019-07-25 ASSESSMENT — ENCOUNTER SYMPTOMS
ABDOMINAL PAIN: 0
COUGH: 0
VOMITING: 0
CHILLS: 0
EYES NEGATIVE: 1
FEVER: 0
SHORTNESS OF BREATH: 0
PALPITATIONS: 0
NAUSEA: 0

## 2019-07-25 NOTE — PROGRESS NOTES
"Rehab Progress Note     Date of Service: 7/25/2019  Chief Complaint: follow up left BKA    Interval Events (Subjective)    Patient seen and examined in the therapy gym today.  Reports severe pain in his left residual limb yesterday which she thinks is because it may been wrapped too tight.  Still having difficulty getting the ramp in place at his home for his plan to discharge next week.  He is interested in having his wife come to stay in the transitional living apartment prior to his discharge.  We will need to show the wife how to wrap the residual limb.  Patient has no other complaints today.    Objective:  VITAL SIGNS: /80   Pulse 87   Temp 36.6 °C (97.9 °F) (Temporal)   Resp 18   Ht 1.803 m (5' 11\")   Wt 89.4 kg (197 lb 1.6 oz)   SpO2 95%   BMI 27.49 kg/m²   Gen: alert, no apparent distress  CV: regular rate and rhythm, no murmurs, no peripheral edema  Resp: clear to ascultation bilaterally, normal respiratory effort  GI: soft, non-tender abdomen, bowel sounds present  Neuro: notable for left transtibial amputation    Recent Results (from the past 72 hour(s))   ACCU-CHEK GLUCOSE    Collection Time: 07/22/19  5:05 PM   Result Value Ref Range    Glucose - Accu-Ck 156 (H) 65 - 99 mg/dL   ACCU-CHEK GLUCOSE    Collection Time: 07/22/19  8:21 PM   Result Value Ref Range    Glucose - Accu-Ck 145 (H) 65 - 99 mg/dL   CBC WITHOUT DIFFERENTIAL    Collection Time: 07/23/19  6:35 AM   Result Value Ref Range    WBC 6.6 4.8 - 10.8 K/uL    RBC 3.39 (L) 4.70 - 6.10 M/uL    Hemoglobin 9.5 (L) 14.0 - 18.0 g/dL    Hematocrit 30.4 (L) 42.0 - 52.0 %    MCV 89.7 81.4 - 97.8 fL    MCH 28.0 27.0 - 33.0 pg    MCHC 31.3 (L) 33.7 - 35.3 g/dL    RDW 50.1 (H) 35.9 - 50.0 fL    Platelet Count 223 164 - 446 K/uL    MPV 9.4 9.0 - 12.9 fL   Basic Metabolic Panel    Collection Time: 07/23/19  6:35 AM   Result Value Ref Range    Sodium 136 135 - 145 mmol/L    Potassium 4.1 3.6 - 5.5 mmol/L    Chloride 101 96 - 112 mmol/L    Co2 29 " 20 - 33 mmol/L    Glucose 162 (H) 65 - 99 mg/dL    Bun 27 (H) 8 - 22 mg/dL    Creatinine 0.97 0.50 - 1.40 mg/dL    Calcium 8.7 8.5 - 10.5 mg/dL    Anion Gap 6.0 0.0 - 11.9   MAGNESIUM    Collection Time: 07/23/19  6:35 AM   Result Value Ref Range    Magnesium 1.7 1.5 - 2.5 mg/dL   PHOSPHORUS    Collection Time: 07/23/19  6:35 AM   Result Value Ref Range    Phosphorus 4.6 (H) 2.5 - 4.5 mg/dL   ESTIMATED GFR    Collection Time: 07/23/19  6:35 AM   Result Value Ref Range    GFR If African American >60 >60 mL/min/1.73 m 2    GFR If Non African American >60 >60 mL/min/1.73 m 2   ACCU-CHEK GLUCOSE    Collection Time: 07/23/19  7:23 AM   Result Value Ref Range    Glucose - Accu-Ck 156 (H) 65 - 99 mg/dL   ACCU-CHEK GLUCOSE    Collection Time: 07/23/19 11:09 AM   Result Value Ref Range    Glucose - Accu-Ck 166 (H) 65 - 99 mg/dL   ACCU-CHEK GLUCOSE    Collection Time: 07/23/19  5:10 PM   Result Value Ref Range    Glucose - Accu-Ck 151 (H) 65 - 99 mg/dL   ACCU-CHEK GLUCOSE    Collection Time: 07/23/19  8:15 PM   Result Value Ref Range    Glucose - Accu-Ck 142 (H) 65 - 99 mg/dL   ACCU-CHEK GLUCOSE    Collection Time: 07/24/19  7:16 AM   Result Value Ref Range    Glucose - Accu-Ck 153 (H) 65 - 99 mg/dL   ACCU-CHEK GLUCOSE    Collection Time: 07/24/19 12:58 PM   Result Value Ref Range    Glucose - Accu-Ck 148 (H) 65 - 99 mg/dL   ACCU-CHEK GLUCOSE    Collection Time: 07/24/19  5:15 PM   Result Value Ref Range    Glucose - Accu-Ck 136 (H) 65 - 99 mg/dL   ACCU-CHEK GLUCOSE    Collection Time: 07/24/19  8:03 PM   Result Value Ref Range    Glucose - Accu-Ck 174 (H) 65 - 99 mg/dL   ACCU-CHEK GLUCOSE    Collection Time: 07/25/19  7:26 AM   Result Value Ref Range    Glucose - Accu-Ck 128 (H) 65 - 99 mg/dL       Current Facility-Administered Medications   Medication Frequency   • sevelamer carbonate (RENVELA) tablet 800 mg TID WITH MEALS   • pregabalin (LYRICA) capsule 200 mg BID   • senna-docusate (PERICOLACE or SENOKOT S) 8.6-50 MG per  tablet 2 Tab BID PRN   • hydrocortisone 1 % cream BID PRN   • miconazole 2%-zinc oxide (DELFINO) topical cream BID   • metoprolol (LOPRESSOR) tablet 12.5 mg DAILY   • methadone (DOLOPHINE) tablet 20 mg BID   • vitamin D (cholecalciferol) tablet 4,000 Units DAILY   • ascorbic acid tablet 500 mg QDAY with Breakfast   • ferrous sulfate tablet 325 mg QDAY with Breakfast   • insulin lispro (HUMALOG) injection 2-12 Units 4X/DAY ACHS    And   • glucose 4 g chewable tablet 16 g Q15 MIN PRN   • Respiratory Care per Protocol Continuous RT   • Pharmacy Consult Request ...Pain Management Review 1 Each PHARMACY TO DOSE   • acetaminophen (TYLENOL) tablet 650 mg Q4HRS PRN   • artificial tears 1.4 % ophthalmic solution 1 Drop PRN   • benzocaine-menthol (CEPACOL) lozenge 1 Lozenge Q2HRS PRN   • mag hydrox-al hydrox-simeth (MAALOX PLUS ES or MYLANTA DS) suspension 20 mL Q2HRS PRN   • ondansetron (ZOFRAN ODT) dispertab 4 mg 4X/DAY PRN    Or   • ondansetron (ZOFRAN) syringe/vial injection 4 mg 4X/DAY PRN   • traZODone (DESYREL) tablet 50 mg QHS PRN   • sodium chloride (OCEAN) 0.65 % nasal spray 2 Spray PRN   • hydrOXYzine HCl (ATARAX) tablet 50 mg Q6HRS PRN   • melatonin tablet 3 mg HS PRN   • polyethylene glycol/lytes (MIRALAX) PACKET 1 Packet QDAY PRN    And   • magnesium hydroxide (MILK OF MAGNESIA) suspension 30 mL QDAY PRN    And   • bisacodyl (DULCOLAX) suppository 10 mg QDAY PRN   • fluticasone (FLONASE) nasal spray 100 mcg DAILY   • oxyCODONE immediate-release (ROXICODONE) tablet 5 mg Q6HRS PRN   • tramadol (ULTRAM) 50 MG tablet 100 mg Q6HRS PRN   • dextrose 50% (D50W) injection 50 mL Q15 MIN PRN   • diphenhydrAMINE-ZnAcetate (BENADRYL ITCH) 1-0.1 % cream TID PRN   • albuterol inhaler 2 Puff Q4HRS PRN       Orders Placed This Encounter   Procedures   • Diet Order Diabetic, Cardiac     Standing Status:   Standing     Number of Occurrences:   1     Order Specific Question:   Diet:     Answer:   Diabetic [3]     Order Specific  Question:   Diet:     Answer:   Cardiac [6]     Order Specific Question:   Consistency/Fluid modifications:     Answer:   Thin Liquids [3]       Assessment:  Active Hospital Problems    Diagnosis   • *S/P BKA (below knee amputation) unilateral, left (HCC)   • Acute on chronic respiratory failure with hypoxia ([facility])   • SALOME (obstructive sleep apnea)   • Cardiomyopathy (HCC)   • DM (diabetes mellitus), type 2, uncontrolled (CMS-HCC)   • Essential hypertension   • Left shoulder pain   • Chronic pain syndrome   • Methadone dependence ([facility])     This patient is a 63 y.o. male admitted for acute inpatient rehabilitation with S/P BKA (below knee amputation) unilateral, left (HCC).    I led and attended the weekly conference, and agree with the IDT conference documentation and plan of care as noted below.    Date of conference: 7/22/2019    Goals and barriers: See IDT note.    Biggest barriers: right foot wounds, left BKA, pain management, oxygen tubing management    Admission FIM 66 --> 79 (7/16) --> 88 (7/18)    CM/social support: To single level home in [place], [place], with support of wife.    Anticipated DC date: 7/31/2019, if ramp in place    Outpatient: PT    Equip: grab bars, WC, ramp, knee scooter    Follow up: PCP, surgery    Medical Decision Making and Plan:    Left BKA  Dr. Schneider 6/24  Continue full rehab program  PT/OT, 1 hr each discipline   Outpatient follow up with surgery, s/p partial suture removal  Return to clinic 7/24  - still not ready for suture removal, needs to return to clinic on 31st  Continue compression/edema management, need to show patient/wife how to wrap    Residual limb fluid collection  Post-op seroma versus hematoma  US with 3 distinct fluid collections  Most likely seroma, though hemoglobin slightly lower, d/c Lovenox and apirin, hemoglobin improved  Scheduled with orthopedics - no intervention other than compression    Right heel ulcer  Continue wound care, appreciate assistance  Off  loading boot  Referral made to limb preservation service    Normocytic anemia  Iron deficient  Likely post-operative  Monitor, stable/higher on recheck  Continue Vit C and ferrous sulfate    History of elevated troponin  Discontinued aspirin due to decreased Hgb    Diabetes  Continue insulin  Appreciate hospitalist assistance    Chronic pain  Narcotic dependence/tolerance  Continue methadone, prescribed by his PCP  Dose decreased from 35 mg BID at home, to 20 mg BID due to encephalopathy (stroke code was called)  Continue PRN opiates - using 3-4 5 mg tabs daily  Continue Lyrica     Left shoulder pain  With history of multiple surgeries, including left humeral head resection  Modalities with therapy  Continue pain management     Arm/leg jerking  Dr Shaw to look at medications/side effects  Decreasing dose of Lyrica to see if this is the culprit    Rashes  Groin/buttock rash - miconazole, continues  Trunk rash - hydrocortison cream, resolved     Cardiomyopathy, EF 40%  Continue metoprolol  Appreciate hospitalist assistance     History of one kidney  Azotemia  Avoid nephrotoxins  Increase oral fluids    Hyperphosphatemia  Appreciate hospitalist assistance     Respiratory insufficiency  Sleep apnea  On 3 L at home at night  Respiratory therapy to see patient, unable to titrate during day  Will need 24/7 at discharge     Hypertension  Continue metoprolol  Appreciate hospitalist assistance    Vit D deficiency, 12  Continue supplementation    DVT prophylaxis  Discontinued Lovenox due to fluid collection in residual limb and anemia  Increase mobility  Monitor for edema    Total time:  17 minutes.  I spent greater than 50% of the time for patient care, counseling, and coordination on this date, including patient face-to face time, unit/floor time with review of records/pertinent lab data and studies, as well as discussing diagnostic evaluation/work up, planned therapeutic interventions, and future disposition of care, as  per the interval events/subjective and the assessment and plan as noted above.    I have performed a physical exam, reviewed and updated ROS, as well as the assessment and plan today 7/25/2019. In review of note from 7/24/2019 there are no new changes except as documented above.    Mimi Doyle M.D.   Physical Medicine and Rehabilitation

## 2019-07-25 NOTE — CARE PLAN
Problem: Safety  Goal: Will remain free from injury  Patient uses call light appropriately. Bed locked and in the lowest position. Non skid socks in place. Hourly rounding in place.     Problem: Skin Integrity  Goal: Risk for impaired skin integrity will decrease    Intervention: Assess risk factors for impaired skin integrity and/or pressure ulcers  Left BKA incision was cleansed and new dressing was applied as ordered. ABD pad, kerlix and tubigrip in place. Incision appears unchanged (dusky, edematous with bilateral sides sutures in place).  Pt. Has a new f/u appointment with surgeon this 07/30/19.   Will monitor.

## 2019-07-25 NOTE — THERAPY
Physical Therapy   Daily Treatment     Patient Name: Travon Pollack  Age:  63 y.o., Sex:  male  Medical Record #: [id number]  Today's Date: 7/25/2019     Precautions  Precautions: Non Weight Bearing Right Lower Extremity, Immobilizer Left Lower Extremity, Fall Risk, Other (See Comments)  Comments: IPOP L residual limb; chronic L shoulder weakness/pain    Subjective    Pt resting in bed upon arrival, he reported 10/10 L LE pain and requested to be medicated     Objective       07/25/19 1301   Vitals   O2 (LPM) 3   O2 Delivery Nasal Cannula   Pain 0 - 10 Group   Location Leg   Location Orientation Left   Description Sharp;Constant   Comfort Goal Comfort at Rest;Comfort with Movement;Perform Activity   Therapist Pain Assessment 10;Nurse Notified;Prior to Activity   Sitting Lower Body Exercises   Tricep Press 3 sets of 15  (rickshaw forward facing #35)   Interdisciplinary Plan of Care Collaboration   IDT Collaboration with  Nursing;Physical Therapist   Patient Position at End of Therapy Seated;Self Releasing Lap Belt Applied   Collaboration Comments pain/CLOF   PT Total Time Spent   PT Individual Total Time Spent (Mins) 30   PT Charge Group   PT Therapeutic Exercise 1   PT Therapeutic Activities 1   Supervising Physical Therapist Azalia Martínez     Pt donned IPOP and R shoe independently    Lateral scoot transfer SPV-assistance to manage O2 concentrator    FIM Wheelchair Score:  6 - Modified Independent  Wheelchair Description:   Indoors    Pt self propelled wc outdoors and required SBA-Candi to safely manage wood bridge and inclined/declined surfaces outdoors     Assessment    Pt participates in outdoor wc mobility as indicated above, he did not wish to lie prone for hip flexor stretch as he reports it causes pain in low back. Limiting factor today was pain    Plan    Continue residual limb care, standing tolerance, and endurance. Assess in room mod I safety from wc level, pending pt's ability to self-manage O2. FWW vs knee  scooter (with IPOP doffed) for bedroom<>bathroom mobility in pt's home as wc will not likely fit according to pt.

## 2019-07-25 NOTE — THERAPY
Occupational Therapy  Daily Treatment     Patient Name: Travon Pollack  Age:  63 y.o., Sex:  male  Medical Record #: [id number]  Today's Date: 7/25/2019     Precautions  Precautions: Non Weight Bearing Right Lower Extremity, Immobilizer Left Lower Extremity, Fall Risk, Other (See Comments)  Comments: IPOP L residual limb; chronic L shoulder weakness/pain    Safety   ADL Safety : Requires Supervision for Safety  Bathroom Safety: Requires Supervision for Safety  Comments: see FIM for LB dressing and grooming (no changes)    Subjective    Pt expresses sig inc of residual limb pain resulting in limited sleep.      Objective       07/25/19 0831   Precautions   Precautions Non Weight Bearing Right Lower Extremity;Immobilizer Left Lower Extremity;Fall Risk;Other (See Comments)   Comments IPOP L residual limb; chronic L shoulder weakness/pain   Safety    ADL Safety  Requires Supervision for Safety   Comments see FIM for LB dressing and grooming (no changes)   Pain   Intervention Other (Comments)  (Mirror therapy)   Pain 0 - 10 Group   Location Leg   Location Orientation Left   Therapist Pain Assessment Prior to Activity;During Activity;Post Activity   Sitting Upper Body Exercises   Chest Press 3 sets of 15;Bilateral;Weight (See Comments for lbs)  (4lb wted ball)   Tricep Press 3 sets of 15;Right ;Left;Weight (See Comments for lbs)  (4lb RUE, 2lb LUE)   Neuro-Muscular Treatments   Neuro-Muscular Treatments Other (See Comments)  (Mirror therapy)   Comments Mirror therapy seated EOM with mirror between BLE. Pt reports min to mod pain relief. Report of inc ROM and ease with L residual limb hip flexion when performing during mirror therapy.    Bed Mobility    Supine to Sit Modified Independent   Sit to Supine Modified Independent   Scooting Modified Independent   Rolling Modified Independent   Interdisciplinary Plan of Care Collaboration   IDT Collaboration with  Physician   Patient Position at End of Therapy Seated;Call Light  within Reach;Tray Table within Reach   Collaboration Comments reported pt's report of sig increase in pain, checked on pt and discussed DC plan see other tx   OT Total Time Spent   OT Individual Total Time Spent (Mins) 60   OT Charge Group   OT Self Care / ADL 1   OT Therapy Activity 2   OT Therapeutic Exercise  1       FIM Eating Score:  7 - Independent  Eating Description:       FIM Grooming Score:  7 - Independent  Grooming Description:       FIM Lower Body Dressing Score:  5 - Standby Prompting/Supervsion or Set-up  Lower Body Dressing Description:   (set up, extra time and supv in standing for donning brief and pullover pants with support of grab bars)    Other tx: Discussed with MD and pt potential benefit from padded BSC over toilet to dec buttock pain during toileting. Added velcro to IPOP to inc tightness and pull flaps out of way. MD suggested pushing back DC date to the 30th to allow pt and spouse a night stay in [place] prior to DC. MD also expressed feeling that residual limb dressing maybe too tight resulting in inc pain. Provided education for pt on importance of elevation, compression, and movement to reduce hematoma.       Assessment    Pt initially aggravated by pain resulting in inc time required to start therapeutic act. However, given time and consultation able to participate in UB ex, mirror therapy, and DC planning. Pt reports min to mod pain relieft with mirror therapy. MD suggested pushing back DC date to the 30th to allow pt and spouse a night stay in [place] prior to DC. MD also expressed feeling that residual limb dressing maybe too tight resulting in inc pain.    Plan    Toilet/tub transfer with Knee scooter, kitchen mobility at w/c level. Follow up with buttock pain and performance with toilet transfer to padded BSC. *Primary OT see note on 7/25 about MD change in plans for DC.

## 2019-07-25 NOTE — PROGRESS NOTES
Hospital Medicine Daily Progress Note      Chief Complaint  CM, HTN, DM, S/P BKA    Interval Problem Update  FSBS mildly high; pt asymptomatic.    Review of Systems  Review of Systems   Constitutional: Negative for chills and fever.   HENT: Negative.    Eyes: Negative.    Respiratory: Negative for cough and shortness of breath.    Cardiovascular: Negative for chest pain and palpitations.   Gastrointestinal: Negative for abdominal pain, nausea and vomiting.   Genitourinary: Negative.    Musculoskeletal:        Wound pain   Skin: Negative for itching and rash.        Physical Exam  Temp:  [36.6 °C (97.9 °F)-36.7 °C (98.1 °F)] 36.7 °C (98.1 °F)  Pulse:  [74-84] 83  Resp:  [18-20] 20  BP: (113-138)/(54-72) 113/72  SpO2:  [90 %-95 %] 94 %    Physical Exam   Constitutional: He is oriented to person, place, and time. No distress.   HENT:   Head: Normocephalic and atraumatic.   Right Ear: External ear normal.   Left Ear: External ear normal.   Eyes: Conjunctivae and EOM are normal. Right eye exhibits no discharge. Left eye exhibits no discharge.   Neck: Normal range of motion. Neck supple. No tracheal deviation present.   Cardiovascular: Normal rate and regular rhythm.    Pulmonary/Chest: No stridor. No respiratory distress. He has no wheezes.   Decreased BS   Abdominal: Soft. Bowel sounds are normal. He exhibits no distension. There is no tenderness.   Musculoskeletal: He exhibits tenderness.   Left BKA stump w/ brace   Neurological: He is alert and oriented to person, place, and time.   Skin: Skin is warm and dry. He is not diaphoretic.   Vitals reviewed.      Fluids    Intake/Output Summary (Last 24 hours) at 07/27/19 1721  Last data filed at 07/27/19 1500   Gross per 24 hour   Intake              960 ml   Output             1650 ml   Net             -690 ml       Laboratory                        Assessment/Plan  * S/P BKA (below knee amputation) unilateral, left (HCC)- (present on admission)   Assessment & Plan    2/2  diabetic foot infection/sepsis  S/P left BKA on 6/24/19 by Dr. Schneider  Completed antibiotics per ID prior to Rehab admission  U/S shows fluid collection x 3, had Ortho F/U x 2 w/ no additional recommendations  Continue wound care and monitor for bleeding and/or infection     Acute on chronic respiratory failure with hypoxia (HCC)- (present on admission)   Assessment & Plan    S/P VDRF     Cardiomyopathy (HCC)- (present on admission)   Assessment & Plan    Echo EF 40%  Monitor for volume overload     DM (diabetes mellitus), type 2, uncontrolled (CMS-HCC)- (present on admission)   Assessment & Plan    HbA1c 12.2  Discontinue Metformin given solitary kidney  Start low dose Lantus  Outpt meds include Lantus 44 units bid     Essential hypertension- (present on admission)   Assessment & Plan    Continue low dose Metoprolol     Hyperphosphatemia   Assessment & Plan    Continue Sevelamer for elevated phosphorus levels     Azotemia   Assessment & Plan    Renal function improving  Continue to encourage PO fluids     Vitamin D deficiency   Assessment & Plan    Vit D level 12  On supplementation     Anemia   Assessment & Plan    Vit B12 and Folate levels normal  Continue Fe and Vit C for low iron stores     DNR

## 2019-07-25 NOTE — THERAPY
Physical Therapy   Daily Treatment     Patient Name: Travon Pollack  Age:  63 y.o., Sex:  male  Medical Record #: [id number]  Today's Date: 7/25/2019     Precautions  Precautions: (P) Non Weight Bearing Right Lower Extremity, Immobilizer Left Lower Extremity, Fall Risk, Other (See Comments)  Comments: (P) IPOP L residual limb; chronic L shoulder weakness/pain    Subjective    Pt was sitting in w/c upon arrival and agreeable to tx. Sore from yesterday and refused use of knee scooter this session.      Objective       07/25/19 0931   Precautions   Precautions Non Weight Bearing Right Lower Extremity;Immobilizer Left Lower Extremity;Fall Risk;Other (See Comments)   Comments IPOP L residual limb; chronic L shoulder weakness/pain   Interdisciplinary Plan of Care Collaboration   Patient Position at End of Therapy Seated;Call Light within Reach;Tray Table within Reach;Phone within Reach   PT Total Time Spent   PT Individual Total Time Spent (Mins) 60   PT Charge Group   PT Therapeutic Exercise 2   PT Therapeutic Activities 2       STS 2 x 5 from w/c height with 10x mini squat each rep with SBA and FWW  Education on donning/doffing ace wrap with demo/ practice x 5  Education on desensitization techniques     Assessment    Pt verbose and self distracting but demo'd good within session learning of ace wrap technique     Plan    Continue residual limb care, standing tolerance, and endurance. Assess in room mod I safety from wc level, pending pt's ability to self-manage O2. FWW vs knee scooter (with IPOP doffed) for bedroom<>bathroom mobility in pt's home as wc will not likely fit according to pt.

## 2019-07-25 NOTE — THERAPY
Occupational Therapy  Daily Treatment     Patient Name: Travon Pollack  Age:  63 y.o., Sex:  male  Medical Record #: [id number]  Today's Date: 7/25/2019     Precautions  Precautions: Non Weight Bearing Right Lower Extremity, Immobilizer Left Lower Extremity, Fall Risk, Other (See Comments)  Comments: IPOP L residual limb; chronic L shoulder weakness/pain    Safety   ADL Safety : Requires Supervision for Safety  Bathroom Safety: Requires Supervision for Safety  Comments: Education on and practice with tub transfer using lateral scoot method and tub transfer bench. Demonstrates ability to perform at a supv level. Discussed potential placement for grab bars and HHSH. Pt reports dec pain and inc ease of toileting with padded BSC in room. Provided education and hand out on ways to procure recommended DME for bathroom.    Subjective    Pt reports dec pain and inc ease of toileting with padded BSC in room.      Objective       07/25/19 1531   Precautions   Precautions Non Weight Bearing Right Lower Extremity;Immobilizer Left Lower Extremity;Fall Risk;Other (See Comments)   Comments IPOP L residual limb; chronic L shoulder weakness/pain   Safety    Bathroom Safety Requires Supervision for Safety   Comments Education on and practice with tub transfer using lateral scoot method and tub transfer bench. Demonstrates ability to perform at a supv level. Discussed potential placement for grab bars and HHSH. Pt reports dec pain and inc ease of toileting with padded BSC in room. Provided education and hand out on ways to procure recommended DME for bathroom.   Bed Mobility    Supine to Sit Modified Independent   Sit to Supine Modified Independent   Scooting Modified Independent   Rolling Modified Independent   Interdisciplinary Plan of Care Collaboration   Patient Position at End of Therapy In Bed;Call Light within Reach;Tray Table within Reach   OT Total Time Spent   OT Individual Total Time Spent (Mins) 30   OT Charge Group   OT Self  Care / ADL 2           Assessment    Pt demonstrates ability to perform tub transfer at a supv level using lateral scoot method and padded tub transfer bench. Provided info on ways to procure padded BSC for over toilet and padded tub transfer bench for shower but follow up recommended.     Plan    Toilet/tub transfer with Knee scooter, kitchen mobility at w/c level. *Primary OT see note on 7/25 about MD change in plans for DC.

## 2019-07-25 NOTE — DISCHARGE PLANNING
Call to Ranjit Felder, , with Workers' [employer] and informed of need for additional days and need for ramp prior to patient discharging home.   Informed him I will request more days for patient to stay until 7/31/19. That patient needs ramp prior to going home, which he is aware of and he is having difficulty getting answers for DME requested too.  I will update patient.

## 2019-07-25 NOTE — DISCHARGE PLANNING
Received call from [employer] [phone] who is requesting information on whether they own the house for ramp, grab bar placement, structure of shower walls, etc. Gave patient the phone number to call and provide what information he could. [facility]ARR to work with him this afternoon and whatever patient can't tell [employer], [employer] to provide information.   TLA arranged for Tuesday night per Dr. Doyle. Informed Mima, Unit Clerk.

## 2019-07-25 NOTE — CARE PLAN
Problem: Safety  Goal: Will remain free from falls    Intervention: Implement fall precautions  Appropriately uses call light to make needs known.Bed in low position, non skid socks/shoes on when out of bed.Call light within reach.Will continue to monitor and assess needs and safety.      Problem: Pain Management  Goal: Pain level will decrease to patient's comfort goal    Intervention: Follow pain managment plan developed in collaboration with patient and Interdisciplinary Team  Pain is manageable with scheduled medication at this time.Repositioned with pillows for comfort.Will continue to monitor and assess pain level and medicate as needed.      Problem: GLYCEMIA IMBALANCE  Goal: Clinical indication of glycemia balance is achieved    Intervention: MONITOR BLOOD GLUCOSE LEVELS AS ORDERED  FSBS 174, coverage given with hs snack.Will continue to monitor and assess for s/sx of hyper/hypoglycemia.

## 2019-07-25 NOTE — FLOWSHEET NOTE
07/25/19 0815   Events/Summary/Plan   Events/Summary/Plan O2 spot check   Chest Exam   Respiration 16   Pulse 83   Oximetry   #Pulse Oximetry (Single Determination) Yes   Oxygen   Home O2 Use Prior To Admission? Yes   Home O2 LPM Flow 3 LPM   Home O2 Delivery Method Nasal Cannula   Home O2 Frequency of Use At Sleep   Pulse Oximetry 93 %   O2 (LPM) 3   Room Air Challenge Fail  (Room air SpO2=87)

## 2019-07-26 LAB
GLUCOSE BLD-MCNC: 133 MG/DL (ref 65–99)
GLUCOSE BLD-MCNC: 145 MG/DL (ref 65–99)
GLUCOSE BLD-MCNC: 152 MG/DL (ref 65–99)
GLUCOSE BLD-MCNC: 160 MG/DL (ref 65–99)

## 2019-07-26 PROCEDURE — A9270 NON-COVERED ITEM OR SERVICE: HCPCS | Performed by: PHYSICAL MEDICINE & REHABILITATION

## 2019-07-26 PROCEDURE — 97110 THERAPEUTIC EXERCISES: CPT

## 2019-07-26 PROCEDURE — 94760 N-INVAS EAR/PLS OXIMETRY 1: CPT

## 2019-07-26 PROCEDURE — 97530 THERAPEUTIC ACTIVITIES: CPT

## 2019-07-26 PROCEDURE — 700102 HCHG RX REV CODE 250 W/ 637 OVERRIDE(OP): Performed by: PHYSICAL MEDICINE & REHABILITATION

## 2019-07-26 PROCEDURE — 99232 SBSQ HOSP IP/OBS MODERATE 35: CPT | Performed by: HOSPITALIST

## 2019-07-26 PROCEDURE — 700102 HCHG RX REV CODE 250 W/ 637 OVERRIDE(OP): Performed by: HOSPITALIST

## 2019-07-26 PROCEDURE — 99232 SBSQ HOSP IP/OBS MODERATE 35: CPT | Performed by: PHYSICAL MEDICINE & REHABILITATION

## 2019-07-26 PROCEDURE — A9270 NON-COVERED ITEM OR SERVICE: HCPCS | Performed by: HOSPITALIST

## 2019-07-26 PROCEDURE — 770010 HCHG ROOM/CARE - REHAB SEMI PRIVAT*

## 2019-07-26 PROCEDURE — 97535 SELF CARE MNGMENT TRAINING: CPT

## 2019-07-26 PROCEDURE — 82962 GLUCOSE BLOOD TEST: CPT

## 2019-07-26 RX ORDER — PREGABALIN 100 MG/1
300 CAPSULE ORAL 2 TIMES DAILY
Status: DISCONTINUED | OUTPATIENT
Start: 2019-07-26 | End: 2019-07-31 | Stop reason: HOSPADM

## 2019-07-26 RX ADMIN — SEVELAMER CARBONATE 800 MG: 800 TABLET, FILM COATED ORAL at 17:25

## 2019-07-26 RX ADMIN — MICONAZOLE NITRATE: 20 CREAM TOPICAL at 08:03

## 2019-07-26 RX ADMIN — PREGABALIN 300 MG: 100 CAPSULE ORAL at 19:46

## 2019-07-26 RX ADMIN — METOPROLOL TARTRATE 12.5 MG: 25 TABLET ORAL at 08:26

## 2019-07-26 RX ADMIN — SEVELAMER CARBONATE 800 MG: 800 TABLET, FILM COATED ORAL at 08:26

## 2019-07-26 RX ADMIN — TRAZODONE HYDROCHLORIDE 50 MG: 50 TABLET ORAL at 19:46

## 2019-07-26 RX ADMIN — PREGABALIN 200 MG: 100 CAPSULE ORAL at 08:26

## 2019-07-26 RX ADMIN — INSULIN GLARGINE 10 UNITS: 100 INJECTION, SOLUTION SUBCUTANEOUS at 19:46

## 2019-07-26 RX ADMIN — FERROUS SULFATE TAB 325 MG (65 MG ELEMENTAL FE) 325 MG: 325 (65 FE) TAB at 08:26

## 2019-07-26 RX ADMIN — METHADONE HYDROCHLORIDE 20 MG: 10 TABLET ORAL at 08:26

## 2019-07-26 RX ADMIN — SEVELAMER CARBONATE 800 MG: 800 TABLET, FILM COATED ORAL at 11:31

## 2019-07-26 RX ADMIN — MICONAZOLE NITRATE: 20 CREAM TOPICAL at 19:47

## 2019-07-26 RX ADMIN — OXYCODONE HYDROCHLORIDE AND ACETAMINOPHEN 500 MG: 500 TABLET ORAL at 08:26

## 2019-07-26 RX ADMIN — FLUTICASONE PROPIONATE 100 MCG: 50 SPRAY, METERED NASAL at 08:30

## 2019-07-26 RX ADMIN — MELATONIN TAB 3 MG 3 MG: 3 TAB at 19:46

## 2019-07-26 RX ADMIN — METHADONE HYDROCHLORIDE 20 MG: 10 TABLET ORAL at 19:46

## 2019-07-26 RX ADMIN — OXYCODONE HYDROCHLORIDE 5 MG: 5 TABLET ORAL at 13:30

## 2019-07-26 RX ADMIN — VITAMIN D, TAB 1000IU (100/BT) 4000 UNITS: 25 TAB at 08:26

## 2019-07-26 RX ADMIN — OXYCODONE HYDROCHLORIDE 5 MG: 5 TABLET ORAL at 06:33

## 2019-07-26 NOTE — REHAB-CM IDT TEAM NOTE
Case Management    DC Planning  DC destination/dispostion:  To single level home in [place], [place], with support of wife. Ramp and grab bars to be installed Monday, July 29, 2019.    Referrals: Grab bars, bed rail, ramp, wc, FWW, tub transfer bench, 3 in 1 commode.   [facility] for right heel wound- evaluate and treat.    DC Needs: Anticipate 24/7 need for oxygen. Needs portable tanks. Has concentrator through [facility] in [place]. Needs O2 eval. All other DME provided and covered by [employer] comp.  F/U appointment in place with Dr. Schneider on 7/31/19.   F/U with Dr. Turner, PCP.  OP PT.     Barriers to discharge:  Functional deficits. Fluid collection - left residual limb. Right heel wound. DM. Lives remotely with no home health services.    Strengths: Workers comp has approved all needed DME. Patient is motivated. Wife is supportive.     Section completed by:  Anahi Mccall R.N.

## 2019-07-26 NOTE — DISCHARGE PLANNING
Received call from Ranjit Pollack,  , who informed me all equipment, days have been approved. He is uncertain about grab bar approval, but I told him [employer] was asking about installation of these.   will provide  transport home if patient needs this, on 7/31/2019. I just need to let Ranjit know 24 hours prior to need.

## 2019-07-26 NOTE — CARE PLAN
Problem: Communication  Goal: The ability to communicate needs accurately and effectively will improve  Outcome: PROGRESSING AS EXPECTED  Patient uses call light consistently and appropriately this shift.  Waits for assistance when needed and does not attempt self transfer.  Able to verbalize needs.     Problem: Discharge Barriers/Planning  Goal: Patient's continuum of care needs will be met  Outcome: PROGRESSING AS EXPECTED  Discussed potential discharge barriers with patient this evening. He stated that accessibility of his home is a barrier right now as insurance and case management are working together to get a ramp and shower bars installed in his home. He expressed some concern regarding his readiness to go home. Reassured him that he still has some time before his discharge date and encouraged him to troubleshoot any concerns he might have with transfers at home with therapy.

## 2019-07-26 NOTE — PROGRESS NOTES
Received shift report and assumed care of patient.  Patient awake, calm and stable, currently positioned in bed for comfort and safety; call light within reach.  C/o of 8/10 pain, oxycodone 5 mg given eariler by night RN.  Will continue to monitor.

## 2019-07-26 NOTE — THERAPY
"Occupational Therapy  Daily Treatment     Patient Name: Travon Pollack  Age:  63 y.o., Sex:  male  Medical Record #: [id number]  Today's Date: 7/26/2019     Precautions  Precautions: Non Weight Bearing Right Lower Extremity, Immobilizer Left Lower Extremity, Fall Risk, Other (See Comments)  Comments: IPOP L residual limb; chronic L shoulder weakness/pain    Safety   ADL Safety : Requires Supervision for Safety  Bathroom Safety: Requires Supervision for Safety  Comments: Education on and practice with tub transfer using lateral scoot method and tub transfer bench. Demonstrates ability to perform at a supv level. Discussed potential placement for grab bars and HHSH. Pt reports dec pain and inc ease of toileting with padded BSC in room. Provided education and hand out on ways to procure recommended DME for bathroom.    Subjective    \"It just seems like there is always something wrong, I just want to get home so I can figure out what I need to do.\"      Objective       07/26/19 0701   Precautions   Precautions Non Weight Bearing Right Lower Extremity;Immobilizer Left Lower Extremity;Fall Risk;Other (See Comments)   Comments IPOP L residual limb; chronic L shoulder weakness/pain   Pain   Intervention Distraction;Shower   Pain 0 - 10 Group   Location Leg   Location Orientation Left   Pain Rating Scale (NPRS) 9   Description Throbbing;Constant   Comfort Goal Comfort at Rest;Comfort with Movement;Perform Activity   Therapist Pain Assessment Post Activity Pain Same as Prior to Activity;Nurse Notified   Cognition    Level of Consciousness Alert   Bed Mobility    Supine to Sit Modified Independent   Scooting Modified Independent   Rolling Modified Independent   Comments SBA for lateral scoot xfer from eOB to w/c   Interdisciplinary Plan of Care Collaboration   Patient Position at End of Therapy Seated;Self Releasing Lap Belt Applied  (in dining room for breakfast )   OT Total Time Spent   OT Individual Total Time Spent (Mins) 60 "   OT Charge Group   OT Self Care / ADL 4       FIM Grooming Score:  6 - Modified Independent  Grooming Description:  Increased time (washed hands and brushed hair seated in w/c at sink side)    FIM Bathing Score:  5 - Standby Prompting/Supervision or Set-up  Bathing Description:       FIM Upper Body Dressin - Independent  Upper Body Dressing Description:       FIM Lower Body Dressing Score:  4 - Minimal Assistance  Lower Body Dressing Description:  Increased time, Supervision for safety, Verbal cueing, Assist device/equipment, Set-up of equipment, Initial preparation for task, Requires minimal contact (assist to don pants/brief over hips in standing using grab bar)    FIM Tub/Shower Transfers Score:  4 - Minimal Assistance  Tub/Shower Transfers Description:  Grab bar, Increased time, Supervision for safety, Verbal cueing, Set-up of equipment, Initial preparation for task (CGA for lateral scoot xfer to tub bench and stand pivot using grab bar from tub bench to w/c)      Assessment    Pt tolerated session fair. Pt c/o pain and fatigue throughout session and required increased assist with showering and LBD this session, than previous sessions. Pt easily frustrated and required cues for relaxation techniques. Pt limited by pain in stump, decreased sitting/standing balance.     Plan    Toilet/tub transfer with Knee scooter, kitchen mobility at w/c level. *Primary OT see note on  about MD change in plans for DC.

## 2019-07-26 NOTE — FLOWSHEET NOTE
07/26/19 0800   Events/Summary/Plan   Events/Summary/Plan O2 spot check   Chest Exam   Respiration 16   Pulse 90   Oximetry   #Pulse Oximetry (Single Determination) Yes   Oxygen   Home O2 Use Prior To Admission? Yes   Home O2 LPM Flow 3 LPM   Home O2 Delivery Method Nasal Cannula   Home O2 Frequency of Use At Sleep   Pulse Oximetry 90 %   O2 (LPM) 3   O2 Daily Delivery Respiratory  Silicone Nasal Cannula

## 2019-07-26 NOTE — PROGRESS NOTES
Patient care assumed. Report received from day RN. Patient is alert and calm, resting in bed. Call light and bedside table within reach.

## 2019-07-26 NOTE — PROGRESS NOTES
"Rehab Progress Note     Date of Service: 7/26/2019  Chief Complaint: follow up left BKA    Interval Events (Subjective)    Patient seen and examined in the therapy gym today.  He reports the lower dose of Lyrica has not made any difference to the intermittent jerking he has of his right arm.  He also reports increased pain in his residual limb.  For this reason we are going to increase his Lyrica back up to his regular dosing.  He has no other complaints.    Objective:  VITAL SIGNS: /68   Pulse 90   Temp 36.2 °C (97.2 °F) (Temporal)   Resp 16   Ht 1.803 m (5' 11\")   Wt 89.4 kg (197 lb 1.6 oz)   SpO2 90%   BMI 27.49 kg/m²   Gen: alert, no apparent distress  CV: regular rate and rhythm, no murmurs, no peripheral edema  Resp: clear to ascultation bilaterally, normal respiratory effort, on 3L  GI: soft, non-tender abdomen, bowel sounds present  Neuro: notable for left transtibial amputation    Recent Results (from the past 72 hour(s))   ACCU-CHEK GLUCOSE    Collection Time: 07/23/19  5:10 PM   Result Value Ref Range    Glucose - Accu-Ck 151 (H) 65 - 99 mg/dL   ACCU-CHEK GLUCOSE    Collection Time: 07/23/19  8:15 PM   Result Value Ref Range    Glucose - Accu-Ck 142 (H) 65 - 99 mg/dL   ACCU-CHEK GLUCOSE    Collection Time: 07/24/19  7:16 AM   Result Value Ref Range    Glucose - Accu-Ck 153 (H) 65 - 99 mg/dL   ACCU-CHEK GLUCOSE    Collection Time: 07/24/19 12:58 PM   Result Value Ref Range    Glucose - Accu-Ck 148 (H) 65 - 99 mg/dL   ACCU-CHEK GLUCOSE    Collection Time: 07/24/19  5:15 PM   Result Value Ref Range    Glucose - Accu-Ck 136 (H) 65 - 99 mg/dL   ACCU-CHEK GLUCOSE    Collection Time: 07/24/19  8:03 PM   Result Value Ref Range    Glucose - Accu-Ck 174 (H) 65 - 99 mg/dL   ACCU-CHEK GLUCOSE    Collection Time: 07/25/19  7:26 AM   Result Value Ref Range    Glucose - Accu-Ck 128 (H) 65 - 99 mg/dL   ACCU-CHEK GLUCOSE    Collection Time: 07/25/19 11:06 AM   Result Value Ref Range    Glucose - Accu-Ck 184 " (H) 65 - 99 mg/dL   ACCU-CHEK GLUCOSE    Collection Time: 07/25/19  5:13 PM   Result Value Ref Range    Glucose - Accu-Ck 147 (H) 65 - 99 mg/dL   ACCU-CHEK GLUCOSE    Collection Time: 07/25/19  8:02 PM   Result Value Ref Range    Glucose - Accu-Ck 157 (H) 65 - 99 mg/dL   ACCU-CHEK GLUCOSE    Collection Time: 07/26/19  8:00 AM   Result Value Ref Range    Glucose - Accu-Ck 152 (H) 65 - 99 mg/dL   ACCU-CHEK GLUCOSE    Collection Time: 07/26/19 11:25 AM   Result Value Ref Range    Glucose - Accu-Ck 160 (H) 65 - 99 mg/dL       Current Facility-Administered Medications   Medication Frequency   • pregabalin (LYRICA) capsule 300 mg BID   • insulin glargine (LANTUS) injection 10 Units Q EVENING   • sevelamer carbonate (RENVELA) tablet 800 mg TID WITH MEALS   • senna-docusate (PERICOLACE or SENOKOT S) 8.6-50 MG per tablet 2 Tab BID PRN   • hydrocortisone 1 % cream BID PRN   • miconazole 2%-zinc oxide (DELFINO) topical cream BID   • metoprolol (LOPRESSOR) tablet 12.5 mg DAILY   • methadone (DOLOPHINE) tablet 20 mg BID   • vitamin D (cholecalciferol) tablet 4,000 Units DAILY   • ascorbic acid tablet 500 mg QDAY with Breakfast   • ferrous sulfate tablet 325 mg QDAY with Breakfast   • insulin lispro (HUMALOG) injection 2-12 Units 4X/DAY ACHS    And   • glucose 4 g chewable tablet 16 g Q15 MIN PRN   • Respiratory Care per Protocol Continuous RT   • Pharmacy Consult Request ...Pain Management Review 1 Each PHARMACY TO DOSE   • acetaminophen (TYLENOL) tablet 650 mg Q4HRS PRN   • artificial tears 1.4 % ophthalmic solution 1 Drop PRN   • benzocaine-menthol (CEPACOL) lozenge 1 Lozenge Q2HRS PRN   • mag hydrox-al hydrox-simeth (MAALOX PLUS ES or MYLANTA DS) suspension 20 mL Q2HRS PRN   • ondansetron (ZOFRAN ODT) dispertab 4 mg 4X/DAY PRN    Or   • ondansetron (ZOFRAN) syringe/vial injection 4 mg 4X/DAY PRN   • traZODone (DESYREL) tablet 50 mg QHS PRN   • sodium chloride (OCEAN) 0.65 % nasal spray 2 Spray PRN   • hydrOXYzine HCl (ATARAX)  tablet 50 mg Q6HRS PRN   • melatonin tablet 3 mg HS PRN   • polyethylene glycol/lytes (MIRALAX) PACKET 1 Packet QDAY PRN    And   • magnesium hydroxide (MILK OF MAGNESIA) suspension 30 mL QDAY PRN    And   • bisacodyl (DULCOLAX) suppository 10 mg QDAY PRN   • fluticasone (FLONASE) nasal spray 100 mcg DAILY   • oxyCODONE immediate-release (ROXICODONE) tablet 5 mg Q6HRS PRN   • tramadol (ULTRAM) 50 MG tablet 100 mg Q6HRS PRN   • dextrose 50% (D50W) injection 50 mL Q15 MIN PRN   • diphenhydrAMINE-ZnAcetate (BENADRYL ITCH) 1-0.1 % cream TID PRN   • albuterol inhaler 2 Puff Q4HRS PRN       Orders Placed This Encounter   Procedures   • Diet Order Diabetic, Cardiac     Standing Status:   Standing     Number of Occurrences:   1     Order Specific Question:   Diet:     Answer:   Diabetic [3]     Order Specific Question:   Diet:     Answer:   Cardiac [6]     Order Specific Question:   Consistency/Fluid modifications:     Answer:   Thin Liquids [3]       Assessment:  Active Hospital Problems    Diagnosis   • *S/P BKA (below knee amputation) unilateral, left ([facility])   • Acute on chronic respiratory failure with hypoxia ([facility])   • SALOME (obstructive sleep apnea)   • Cardiomyopathy ([facility])   • DM (diabetes mellitus), type 2, uncontrolled ([facility])   • Essential hypertension   • Left shoulder pain   • Chronic pain syndrome   • Methadone dependence ([facility])     This patient is a 63 y.o. male admitted for acute inpatient rehabilitation with S/P BKA (below knee amputation) unilateral, left ([facility]).    I led and attended the weekly conference, and agree with the IDT conference documentation and plan of care as noted below.    Date of conference: 7/22/2019    Goals and barriers: See IDT note.    Biggest barriers: right foot wounds, left BKA, pain management, oxygen tubing management    Admission FIM 66 --> 79 (7/16) --> 88 (7/18)    CM/social support: To single level home in [place], [place], with support of wife.    Anticipated DC date: 7/31/2019,  if ramp in place    Outpatient: PT    Equip: grab bars, WC, ramp, knee scooter    Follow up: PCP, surgery    Medical Decision Making and Plan:    Left BKA  Dr. Schneider 6/24  Continue full rehab program  PT/OT, 1 hr each discipline   Outpatient follow up with surgery, s/p partial suture removal  Return to clinic 7/24  - still not ready for suture removal, needs to return to clinic on 31st  Continue compression/edema management, need to show patient/wife how to wrap    Residual limb fluid collection  Post-op seroma versus hematoma  US with 3 distinct fluid collections  Most likely seroma, though hemoglobin slightly lower, d/c Lovenox and apirin, hemoglobin improved  Scheduled with orthopedics - no intervention other than compression    Right heel ulcer  Continue wound care, appreciate assistance  Off loading boot  Referral made to limb preservation service    Normocytic anemia  Iron deficient  Likely post-operative  Monitor, stable/higher on recheck  Continue Vit C and ferrous sulfate    History of elevated troponin  Discontinued aspirin due to decreased Hgb    Diabetes  Continue insulin  Appreciate hospitalist assistance    Chronic pain  Narcotic dependence/tolerance  Continue methadone, prescribed by his PCP  Dose decreased from 35 mg BID at home, to 20 mg BID due to encephalopathy (stroke code was called)  Continue PRN opiates - using 3-4 5 mg tabs daily  Continue Lyrica, dose increased back up to his home dosing     Left shoulder pain  With history of multiple surgeries, including left humeral head resection  Modalities with therapy  Continue pain management     Arm jerking, intermittent (subjective, have not seen this)  Dr Shaw to look at medications/side effects  Decreasing dose of Lyrica to see if this is the culprit, no difference and will increased pain in residual limb, increase back to home dosing    Rashes  Groin/buttock rash - miconazole, continues  Trunk rash - hydrocortison cream,  resolved     Cardiomyopathy, EF 40%  Continue metoprolol  Appreciate hospitalist assistance     History of one kidney  Azotemia  Avoid nephrotoxins  Increase oral fluids    Hyperphosphatemia  Continue sevelamer  Appreciate hospitalist assistance     Respiratory insufficiency  Sleep apnea  On 3 L at home at night  Respiratory therapy to see patient, unable to titrate during day  Will need 24/7 at discharge     Hypertension  Continue metoprolol  Appreciate hospitalist assistance    Vit D deficiency, 12  Continue supplementation    DVT prophylaxis  Discontinued Lovenox due to fluid collection in residual limb and anemia  Increase mobility  Monitor for edema    Total time:  26 minutes.  I spent greater than 50% of the time for patient care, counseling, and coordination on this date, including patient face-to face time, unit/floor time with review of records/pertinent lab data and studies, as well as discussing diagnostic evaluation/work up, planned therapeutic interventions, and future disposition of care, as per the interval events/subjective and the assessment and plan as noted above.    I have performed a physical exam, reviewed and updated ROS, as well as the assessment and plan today 7/26/2019. In review of note from 7/25/2019 there are no new changes except as documented above.          Mimi Doyle M.D.   Physical Medicine and Rehabilitation

## 2019-07-26 NOTE — THERAPY
"Occupational Therapy  Daily Treatment     Patient Name: Travon Pollack  Age:  63 y.o., Sex:  male  Medical Record #: [id number]  Today's Date: 2019     Precautions  Precautions: (P) Non Weight Bearing Right Lower Extremity, Immobilizer Left Lower Extremity, Fall Risk, Other (See Comments)  Comments: (P) IPOP L residual limb; chronic L shoulder weakness/pain    Safety   ADL Safety : Requires Supervision for Safety  Bathroom Safety: Requires Supervision for Safety  Comments: Education on and practice with tub transfer using lateral scoot method and tub transfer bench. Demonstrates ability to perform at a supv level. Discussed potential placement for grab bars and HHSH. Pt reports dec pain and inc ease of toileting with padded BSC in room. Provided education and hand out on ways to procure recommended DME for bathroom.    Subjective    \"I just want to get better.\"      Objective       19 1401   Precautions   Precautions Non Weight Bearing Right Lower Extremity;Immobilizer Left Lower Extremity;Fall Risk;Other (See Comments)   Comments IPOP L residual limb; chronic L shoulder weakness/pain   Cognition    Level of Consciousness Alert   Sitting Upper Body Exercises   Bilateral Row 2 sets of 15;Bilateral;Weight (See Comments for lbs)  (35 lbs with equalizer)   Tricep Press Bilateral;Weight (See Comments for lbs)  (4 sets x 15 reps with 40 lbs on rickshaw facing front)   Interdisciplinary Plan of Care Collaboration   Patient Position at End of Therapy Seated  (passed off to PT)   OT Total Time Spent   OT Individual Total Time Spent (Mins) 30   OT Charge Group   OT Self Care / ADL 1   OT Therapeutic Exercise  1       FIM Grooming Score:  6 - Modified Independent  Grooming Description:  Increased time (washed hands and brushed hair seated in w/c at sink side)    FIM Toiletin - Minimal Assistance  Toileting Description:  Grab bar, Increased time, Supervision for safety, Set-up of equipment, Initial preparation for " task      Assessment    Pt tolerated session well. Pt easily frustrated but able to be redirected. Pt motivated to work on BUE strengthening. Pt limited by pain in L stump, decreased balance and endurance.     Plan    Toilet/tub transfer with Knee scooter, kitchen mobility at w/c level. *Primary OT see note on 7/25 about MD change in plans for DC.

## 2019-07-26 NOTE — THERAPY
"Physical Therapy   Daily Treatment     Patient Name: Travon Pollack  Age:  63 y.o., Sex:  male  Medical Record #: [id number]  Today's Date: 7/26/2019     Precautions  Precautions: Non Weight Bearing Right Lower Extremity, Immobilizer Left Lower Extremity, Fall Risk, Other (See Comments)  Comments: IPOP L residual limb; chronic L shoulder weakness/pain    Subjective    \"can you tell my  that the ramp and grab bars will be in by Monday when I go home\"     Objective     07/26/19 1431   Supine Lower Body Exercise   Hip Abduction Side Lying;3 sets of 10;Left   Other Exercises prone hip flexor stretch x 10 minutes, prone hs curls B LE 3 x 10   Interdisciplinary Plan of Care Collaboration   Patient Position at End of Therapy Seated;Self Releasing Lap Belt Applied   PT Total Time Spent   PT Individual Total Time Spent (Mins) 30   PT Charge Group   PT Therapeutic Exercise 2   Supervising Physical Therapist Azalia Martínez     Assessment    Pt completed the above prone exercises with pillow under his abdomen for improved comfort. Pt continues to require cues for O2 line management rendering the need for supervision for transfers at this time.    Plan    Continue residual limb care, standing tolerance, and endurance. Assess in room mod I safety from wc level, pending pt's ability to self-manage O2. FWW vs knee scooter (with IPOP doffed) for bedroom<>bathroom mobility in pt's home as wc will not likely fit according to pt.    "

## 2019-07-26 NOTE — THERAPY
Physical Therapy   Daily Treatment     Patient Name: Travon Pollack  Age:  63 y.o., Sex:  male  Medical Record #: [id number]  Today's Date: 7/26/2019     Precautions  Precautions: Non Weight Bearing Right Lower Extremity, Immobilizer Left Lower Extremity, Fall Risk, Other (See Comments)  Comments: IPOP L residual limb; chronic L shoulder weakness/pain    Subjective    Pt resting in bed upon arrival, reports continued L LE throbbing. He is willing to participate in therapy     Objective       07/26/19 0931   Supine Lower Body Exercise   Supine Lower Body Exercises Yes   Bridges Two Legged;3 sets of 10   Hip Abduction Side Lying;3 sets of 10;Left   Hip Adduction  1 set of 10;Bilateral  (isometric hold)   Straight Leg Raises Front;Left;3 sets of 10   Other Exercises Seated EOM L knee ROM flex/ext, QS and glute sets both x 10 with isometric hold   Bed Mobility    Supine to Sit Modified Independent   Sit to Supine Modified Independent   Scooting Modified Independent   Rolling Modified Independent   Interdisciplinary Plan of Care Collaboration   Patient Position at End of Therapy Seated;Self Releasing Lap Belt Applied  (Afia in wc)   PT Total Time Spent   PT Individual Total Time Spent (Mins) 60   PT Charge Group   PT Therapeutic Exercise 3   PT Therapeutic Activities 1   Supervising Physical Therapist Azalia Martínez       Lateral scoot transfer SPV-pt required vc for locking wc prior to transfer    Continued BKA education regarding positioning in bed and wc. Pt states he has been told to put pillow under his L leg, extensive discussion regarding proper positioning and reason for knee in extension, pt returns verbal comprehension     Assessment    Pt is moving toward independent with mat program. Will continue exercises this pm including prone stretch and prone hs curls    Plan       Continue residual limb care, standing tolerance, and endurance. Assess in room mod I safety from wc level, pending pt's ability to self-manage  O2. FWW vs knee scooter (with IPOP doffed) for bedroom<>bathroom mobility in pt's home as wc will not likely fit according to pt.

## 2019-07-27 ENCOUNTER — APPOINTMENT (OUTPATIENT)
Dept: RADIOLOGY | Facility: REHABILITATION | Age: 64
DRG: 559 | End: 2019-07-27
Attending: HOSPITALIST
Payer: COMMERCIAL

## 2019-07-27 ENCOUNTER — APPOINTMENT (OUTPATIENT)
Dept: RADIOLOGY | Facility: MEDICAL CENTER | Age: 64
DRG: 559 | End: 2019-07-27
Attending: HOSPITALIST
Payer: COMMERCIAL

## 2019-07-27 LAB
GLUCOSE BLD-MCNC: 140 MG/DL (ref 65–99)
GLUCOSE BLD-MCNC: 154 MG/DL (ref 65–99)
GLUCOSE BLD-MCNC: 160 MG/DL (ref 65–99)
GLUCOSE BLD-MCNC: 169 MG/DL (ref 65–99)

## 2019-07-27 PROCEDURE — 99232 SBSQ HOSP IP/OBS MODERATE 35: CPT | Performed by: HOSPITALIST

## 2019-07-27 PROCEDURE — 94760 N-INVAS EAR/PLS OXIMETRY 1: CPT

## 2019-07-27 PROCEDURE — A9270 NON-COVERED ITEM OR SERVICE: HCPCS | Performed by: PHYSICAL MEDICINE & REHABILITATION

## 2019-07-27 PROCEDURE — 700102 HCHG RX REV CODE 250 W/ 637 OVERRIDE(OP): Performed by: PHYSICAL MEDICINE & REHABILITATION

## 2019-07-27 PROCEDURE — A9270 NON-COVERED ITEM OR SERVICE: HCPCS | Performed by: HOSPITALIST

## 2019-07-27 PROCEDURE — 97535 SELF CARE MNGMENT TRAINING: CPT

## 2019-07-27 PROCEDURE — 97110 THERAPEUTIC EXERCISES: CPT

## 2019-07-27 PROCEDURE — 82962 GLUCOSE BLOOD TEST: CPT | Mod: 91

## 2019-07-27 PROCEDURE — 97530 THERAPEUTIC ACTIVITIES: CPT

## 2019-07-27 PROCEDURE — 700102 HCHG RX REV CODE 250 W/ 637 OVERRIDE(OP): Performed by: HOSPITALIST

## 2019-07-27 PROCEDURE — 770010 HCHG ROOM/CARE - REHAB SEMI PRIVAT*

## 2019-07-27 PROCEDURE — 76882 US LMTD JT/FCL EVL NVASC XTR: CPT | Mod: LT

## 2019-07-27 RX ORDER — INSULIN GLARGINE 100 [IU]/ML
12 INJECTION, SOLUTION SUBCUTANEOUS EVERY EVENING
Status: DISCONTINUED | OUTPATIENT
Start: 2019-07-27 | End: 2019-07-31 | Stop reason: HOSPADM

## 2019-07-27 RX ADMIN — FERROUS SULFATE TAB 325 MG (65 MG ELEMENTAL FE) 325 MG: 325 (65 FE) TAB at 08:08

## 2019-07-27 RX ADMIN — SEVELAMER CARBONATE 800 MG: 800 TABLET, FILM COATED ORAL at 08:08

## 2019-07-27 RX ADMIN — PREGABALIN 300 MG: 100 CAPSULE ORAL at 08:07

## 2019-07-27 RX ADMIN — MELATONIN TAB 3 MG 3 MG: 3 TAB at 20:56

## 2019-07-27 RX ADMIN — METOPROLOL TARTRATE 12.5 MG: 25 TABLET ORAL at 08:07

## 2019-07-27 RX ADMIN — SEVELAMER CARBONATE 800 MG: 800 TABLET, FILM COATED ORAL at 11:13

## 2019-07-27 RX ADMIN — METHADONE HYDROCHLORIDE 20 MG: 10 TABLET ORAL at 20:56

## 2019-07-27 RX ADMIN — OXYCODONE HYDROCHLORIDE AND ACETAMINOPHEN 500 MG: 500 TABLET ORAL at 08:07

## 2019-07-27 RX ADMIN — SEVELAMER CARBONATE 800 MG: 800 TABLET, FILM COATED ORAL at 17:28

## 2019-07-27 RX ADMIN — VITAMIN D, TAB 1000IU (100/BT) 4000 UNITS: 25 TAB at 08:08

## 2019-07-27 RX ADMIN — OXYCODONE HYDROCHLORIDE 5 MG: 5 TABLET ORAL at 01:48

## 2019-07-27 RX ADMIN — INSULIN GLARGINE 12 UNITS: 100 INJECTION, SOLUTION SUBCUTANEOUS at 21:07

## 2019-07-27 RX ADMIN — METHADONE HYDROCHLORIDE 20 MG: 10 TABLET ORAL at 08:08

## 2019-07-27 RX ADMIN — OXYCODONE HYDROCHLORIDE 5 MG: 5 TABLET ORAL at 17:43

## 2019-07-27 RX ADMIN — FLUTICASONE PROPIONATE 100 MCG: 50 SPRAY, METERED NASAL at 08:08

## 2019-07-27 RX ADMIN — MICONAZOLE NITRATE: 20 CREAM TOPICAL at 08:08

## 2019-07-27 RX ADMIN — PREGABALIN 300 MG: 100 CAPSULE ORAL at 20:56

## 2019-07-27 RX ADMIN — TRAZODONE HYDROCHLORIDE 50 MG: 50 TABLET ORAL at 20:56

## 2019-07-27 RX ADMIN — MICONAZOLE NITRATE: 20 CREAM TOPICAL at 21:04

## 2019-07-27 ASSESSMENT — ENCOUNTER SYMPTOMS
NAUSEA: 0
SHORTNESS OF BREATH: 0
FEVER: 0
ABDOMINAL PAIN: 0
EYES NEGATIVE: 1
CHILLS: 0
PALPITATIONS: 0
VOMITING: 0
COUGH: 0

## 2019-07-27 NOTE — THERAPY
"Occupational Therapy  Daily Treatment     Patient Name: Travon Pollack  Age:  63 y.o., Sex:  male  Medical Record #: [id number]  Today's Date: 7/27/2019     Precautions  Precautions: Immobilizer Left Upper Extremity, Fall Risk, Other (See Comments)  Comments: R heel ulcer, Chronic L shoulder weakness    Safety   ADL Safety : Requires Supervision for Safety  Bathroom Safety: Requires Supervision for Safety  Comments: Education on and practice with tub transfer using lateral scoot method and tub transfer bench. Demonstrates ability to perform at a supv level. Discussed potential placement for grab bars and HHSH. Pt reports dec pain and inc ease of toileting with padded BSC in room. Provided education and hand out on ways to procure recommended DME for bathroom.    Subjective    \"I can go right like this.\" Pt wanting to do therapy with no wound dressing.      Objective       07/27/19 1301   Precautions   Precautions Immobilizer Left Upper Extremity;Fall Risk;Other (See Comments)   Comments R heel ulcer, Chronic L shoulder weakness   Sitting Upper Body Exercises   Upper Extremity Bike Level 5 Resistance   Comments 8 min UE bike for endurance training.    Interdisciplinary Plan of Care Collaboration   IDT Collaboration with  Nursing   Collaboration Comments Wound care   OT Total Time Spent   OT Individual Total Time Spent (Mins) 30   OT Charge Group   OT Self Care / ADL 1   OT Therapeutic Exercise  1     Notified and collaborated with nursing re dressing change for residual limb after ultrasound. Then completed wheelchair ambulation room <-> gym and UE bike.     Assessment    Pt pleasant and tolerating therapy well. Reports most pain in shoulder but feels better with movement.     Plan       Toilet/tub transfer with Knee scooter, kitchen mobility at w/c level.    "

## 2019-07-27 NOTE — THERAPY
Occupational Therapy  Daily Treatment     Patient Name: Travon Pollack  Age:  63 y.o., Sex:  male  Medical Record #: [id number]  Today's Date: 2019     Precautions  Precautions: Non Weight Bearing Right Lower Extremity, Immobilizer Left Lower Extremity, Fall Risk, Other (See Comments)  Comments: IPOP L residual limb; chronic L shoulder weakness/pain    Safety   ADL Safety : Requires Supervision for Safety  Bathroom Safety: Requires Supervision for Safety  Comments: Education on and practice with tub transfer using lateral scoot method and tub transfer bench. Demonstrates ability to perform at a supv level. Discussed potential placement for grab bars and HHSH. Pt reports dec pain and inc ease of toileting with padded BSC in room. Provided education and hand out on ways to procure recommended DME for bathroom.    Subjective    Pt agreeable to tx.      Objective    See Flowsheet    FIM Eating Score:  6 - Modified Independent  Eating Description:       FIM Grooming Score:  6 - Modified Independent  Grooming Description:  Increased time    FIM Bathing Score:  5 - Standby Prompting/Supervision or Set-up  Bathing Description:       FIM Upper Body Dressin - Independent  Upper Body Dressing Description:       FIM Lower Body Dressing Score:  5 - Standby Prompting/Supervision or Set-up  Lower Body Dressing Description:   (Assist tightening IPOP EOB )    FIM Bed/Chair/Wheelchair Transfers Score: 5 - Standby Prompting/Supervision or Set-up  Bed/Chair/Wheelchair Transfers Description:  Supervision for safety, Verbal cueing (VQ to check R brake )    FIM Tub/Shower Transfers Score:  5 - Standby Prompting/Supervision or Set-up  Tub/Shower Transfers Description:         Assessment    Pt cooperative and compliant with single leg shuttle press and prone mat activity.  SBA for functional lateral scoot transfers with fair safety awareness.  Pt limited by chronic shoulder pain (L), pain in residual limb, fair endurance, and  generalized weakness.     Plan    Toilet/tub transfer with Knee scooter, kitchen mobility at w/c level.

## 2019-07-27 NOTE — THERAPY
"Physical Therapy   Daily Treatment     Patient Name: Travon Pollack  Age:  63 y.o., Sex:  male  Medical Record #: [id number]  Today's Date: 7/27/2019     Precautions  Precautions: Immobilizer Left Upper Extremity, Fall Risk, Other (See Comments)  Comments: R heel ulcer, Chronic L shoulder weakness    Subjective    Pt and wife report they are going to stay at a hotel on Monday night     Objective       07/27/19 1031   Standing Lower Body Exercises   Hip Abduction 3 sets of 15;Left   Bed Mobility    Sit to Stand Modified Independent   Interdisciplinary Plan of Care Collaboration   IDT Collaboration with  Physician   Patient Position at End of Therapy Seated;Self Releasing Lap Belt Applied;Chair Alarm On;Call Light within Reach;Tray Table within Reach   Collaboration Comments Discsused DC planning as pt and wife report they are going to go stay at a hotel on Monday night and then if their ramp is not ready in time, he will come back to the hospital. Edu for pt with MD that this is not an option - pt reporting he prefers to leave Monday and go stay at a hotel until ramp is ready   PT Total Time Spent   PT Individual Total Time Spent (Mins) 30   PT Charge Group   PT Therapeutic Exercise 1   PT Therapeutic Activities 1       FIM Wheelchair Score:  5 - Standby Prompting/Supervision or Set-up  Wheelchair Description:  Supervision for safety, Safety concerns, Verbal cueing, Extra time (SPV for safety)    See above in flowsheet for edu on DC planning.    Assessment    Pt reports understanding of edu on rehab process and inability to \"check out\" and \"check back in.\" Pt desires to DC Monday and stay at hotel until ramp is completed at home.     Plan    Continue residual limb care, standing tolerance, and endurance. Assess in room mod I safety from wc level, pending pt's ability to self-manage O2. FWW vs knee scooter (with IPOP doffed) for bedroom<>bathroom mobility in pt's home as wc will not likely fit according to pt. Firm up DC " plan, possible DC Monday afternoon?

## 2019-07-27 NOTE — REHAB-PHARMACY IDT TEAM NOTE
Pharmacy   Pharmacy  Antibiotics/Antifungals/Antivirals:  Medication:      Active Orders     None        Route:         NA  Stop Date:  NA  Reason: NA  Antihypertensives/Cardiac:  Medication:    Orders (72h ago through future)    Start     Ordered    07/14/19 0900  metoprolol (LOPRESSOR) tablet 12.5 mg  DAILY      07/13/19 1041        Patient Vitals for the past 24 hrs:   BP Pulse   07/26/19 0800 - 90   07/26/19 0700 127/68 66   07/25/19 2000 148/67 84       Anticoagulation:  Medication:  None  INR:      Other key medications: Humalog, Lantus, Methadone, Pregabalin, Sevelamer  A review of the medication list reveals no issues at this time. Patient is currently on antihypertensive(s). Recommend home blood pressure monitoring/recording if antihypertensive(s) regimen(s) continue.  Patient is currently on diabetic medication(s) and/or Insulin(s). Recommend monitoring and recording of blood sugars to insure compliance, if these regimen(s) continue.  Section completed by:  Estevan Nolasco, PharmD

## 2019-07-27 NOTE — FLOWSHEET NOTE
07/27/19 0847   Events/Summary/Plan   Events/Summary/Plan O2 spot check   Chest Exam   Respiration 18   Pulse 84   Oximetry   #Pulse Oximetry (Single Determination) Yes   Oxygen   Home O2 Use Prior To Admission? Yes   Home O2 LPM Flow 3 LPM   Home O2 Delivery Method Nasal Cannula   Home O2 Frequency of Use At Sleep   Pulse Oximetry 90 %   O2 Daily Delivery Respiratory  Room Air with O2 Available

## 2019-07-27 NOTE — THERAPY
"Physical Therapy   Daily Treatment     Patient Name: Travon Pollack  Age:  63 y.o., Sex:  male  Medical Record #: [id number]  Today's Date: 7/27/2019     Precautions  Precautions: Immobilizer Left Lower Extremity, Fall Risk, Other (See Comments)  Comments: cast shoe on RLE     Subjective  Pt agreeable to therapy. Wife and son present for family training. Personal car present for car transfer training.      Objective     07/27/19 0901   Precautions   Precautions Immobilizer Left Lower Extremity;Fall Risk;Other (See Comments)   Comments cast shoe on RLE    Supine Lower Body Exercise   Bridges Two Legged;2 sets of 10  (bolster)   Hip Abduction Side Lying;1 set of 10;Bilateral  (10 second hold )   Straight Leg Raises Front;2 sets of 15   Other Exercises prone left knee flexion 2 x 15, left hip straight leg extension 2 x 15, supine LLE hamstring stretch 2 x 30\"    Bed Mobility    Supine to Sit Modified Independent   Sit to Supine Modified Independent   Sit to Stand Modified Independent   Scooting Modified Independent   PT Total Time Spent   PT Individual Total Time Spent (Mins) 60   PT Charge Group   PT Therapeutic Exercise 2   PT Therapeutic Activities 2     Pt and family training performing car transfer. Wife and son assisted pt with stand pivot transfer from w/c<> passenger side of car. Pt was given the option of using the FWW to stand and pivot but was able to find safe hand placement on the vehicle for the tranfers. Caregivers demonstrate willingness and ability to provide safe assistance.     FIM Bed/Chair/Wheelchair Transfers Score: 6 - Modified Independent  Bed/Chair/Wheelchair Transfers Description:  Adaptive equipment (w/c<> mat table mod I squat pivot use of hands. Stand pivot transfer w/c <> car SBA assist with equipment set up)    FIM Wheelchair Score:  4 - Minimal Assistance  Wheelchair Description:   (outdoor surfaces 900ft min A for ramps and traversing sloped surfaces, 300ft tolerance needed short rest " break, repeat x 3 )      Assessment    Pt and family did very well with car transfer. Pt demonstrates decreased endurance and strength necessary for independent outdoor w/c mobility in a manual chair at this time.     Plan    Standing tolerance, outdoor w/c mobility, endurance, ambulation with knee scooter.

## 2019-07-27 NOTE — CARE PLAN
Problem: Safety  Goal: Will remain free from injury  Outcome: PROGRESSING AS EXPECTED  Patient has remained free of injury during this shift. Uses call light appropriately to get assistance and does not test physical limitations. Demonstrates safe transfer techniques. Asks questions about medications and procedures appropriately.     Problem: Infection  Goal: Will remain free from infection  Outcome: PROGRESSING AS EXPECTED  Patient does not present signs and symptoms of infection such as fever, inflammation, purulent drainage, change in LOC, turbid urine or loose stools.

## 2019-07-28 LAB
ANION GAP SERPL CALC-SCNC: 9 MMOL/L (ref 0–11.9)
BUN SERPL-MCNC: 35 MG/DL (ref 8–22)
CALCIUM SERPL-MCNC: 8.6 MG/DL (ref 8.5–10.5)
CHLORIDE SERPL-SCNC: 98 MMOL/L (ref 96–112)
CO2 SERPL-SCNC: 30 MMOL/L (ref 20–33)
CREAT SERPL-MCNC: 1.23 MG/DL (ref 0.5–1.4)
ERYTHROCYTE [DISTWIDTH] IN BLOOD BY AUTOMATED COUNT: 50.4 FL (ref 35.9–50)
GLUCOSE BLD-MCNC: 100 MG/DL (ref 65–99)
GLUCOSE BLD-MCNC: 126 MG/DL (ref 65–99)
GLUCOSE BLD-MCNC: 158 MG/DL (ref 65–99)
GLUCOSE SERPL-MCNC: 115 MG/DL (ref 65–99)
HCT VFR BLD AUTO: 30.2 % (ref 42–52)
HGB BLD-MCNC: 9.4 G/DL (ref 14–18)
MCH RBC QN AUTO: 27.7 PG (ref 27–33)
MCHC RBC AUTO-ENTMCNC: 31.1 G/DL (ref 33.7–35.3)
MCV RBC AUTO: 89.1 FL (ref 81.4–97.8)
PHOSPHATE SERPL-MCNC: 4.8 MG/DL (ref 2.5–4.5)
PLATELET # BLD AUTO: 185 K/UL (ref 164–446)
PMV BLD AUTO: 10 FL (ref 9–12.9)
POTASSIUM SERPL-SCNC: 3.9 MMOL/L (ref 3.6–5.5)
RBC # BLD AUTO: 3.39 M/UL (ref 4.7–6.1)
SODIUM SERPL-SCNC: 137 MMOL/L (ref 135–145)
WBC # BLD AUTO: 7.4 K/UL (ref 4.8–10.8)

## 2019-07-28 PROCEDURE — 97530 THERAPEUTIC ACTIVITIES: CPT

## 2019-07-28 PROCEDURE — 36415 COLL VENOUS BLD VENIPUNCTURE: CPT

## 2019-07-28 PROCEDURE — 80048 BASIC METABOLIC PNL TOTAL CA: CPT

## 2019-07-28 PROCEDURE — 700102 HCHG RX REV CODE 250 W/ 637 OVERRIDE(OP): Performed by: PHYSICAL MEDICINE & REHABILITATION

## 2019-07-28 PROCEDURE — 82962 GLUCOSE BLOOD TEST: CPT

## 2019-07-28 PROCEDURE — 94760 N-INVAS EAR/PLS OXIMETRY 1: CPT

## 2019-07-28 PROCEDURE — 99232 SBSQ HOSP IP/OBS MODERATE 35: CPT | Performed by: HOSPITALIST

## 2019-07-28 PROCEDURE — 700102 HCHG RX REV CODE 250 W/ 637 OVERRIDE(OP): Performed by: HOSPITALIST

## 2019-07-28 PROCEDURE — 97116 GAIT TRAINING THERAPY: CPT

## 2019-07-28 PROCEDURE — A9270 NON-COVERED ITEM OR SERVICE: HCPCS | Performed by: HOSPITALIST

## 2019-07-28 PROCEDURE — 84100 ASSAY OF PHOSPHORUS: CPT

## 2019-07-28 PROCEDURE — 97535 SELF CARE MNGMENT TRAINING: CPT

## 2019-07-28 PROCEDURE — 99233 SBSQ HOSP IP/OBS HIGH 50: CPT | Performed by: PHYSICAL MEDICINE & REHABILITATION

## 2019-07-28 PROCEDURE — 85027 COMPLETE CBC AUTOMATED: CPT

## 2019-07-28 PROCEDURE — 770010 HCHG ROOM/CARE - REHAB SEMI PRIVAT*

## 2019-07-28 PROCEDURE — A9270 NON-COVERED ITEM OR SERVICE: HCPCS | Performed by: PHYSICAL MEDICINE & REHABILITATION

## 2019-07-28 RX ADMIN — OXYCODONE HYDROCHLORIDE AND ACETAMINOPHEN 500 MG: 500 TABLET ORAL at 08:19

## 2019-07-28 RX ADMIN — OXYCODONE HYDROCHLORIDE 5 MG: 5 TABLET ORAL at 12:05

## 2019-07-28 RX ADMIN — FLUTICASONE PROPIONATE 100 MCG: 50 SPRAY, METERED NASAL at 08:06

## 2019-07-28 RX ADMIN — PREGABALIN 300 MG: 100 CAPSULE ORAL at 20:23

## 2019-07-28 RX ADMIN — MELATONIN TAB 3 MG 3 MG: 3 TAB at 20:24

## 2019-07-28 RX ADMIN — MICONAZOLE NITRATE: 20 CREAM TOPICAL at 20:23

## 2019-07-28 RX ADMIN — SEVELAMER CARBONATE 800 MG: 800 TABLET, FILM COATED ORAL at 11:30

## 2019-07-28 RX ADMIN — METOPROLOL TARTRATE 12.5 MG: 25 TABLET ORAL at 08:19

## 2019-07-28 RX ADMIN — METHADONE HYDROCHLORIDE 20 MG: 10 TABLET ORAL at 08:18

## 2019-07-28 RX ADMIN — VITAMIN D, TAB 1000IU (100/BT) 4000 UNITS: 25 TAB at 08:19

## 2019-07-28 RX ADMIN — INSULIN GLARGINE 12 UNITS: 100 INJECTION, SOLUTION SUBCUTANEOUS at 20:33

## 2019-07-28 RX ADMIN — PREGABALIN 300 MG: 100 CAPSULE ORAL at 08:18

## 2019-07-28 RX ADMIN — SEVELAMER CARBONATE 800 MG: 800 TABLET, FILM COATED ORAL at 17:30

## 2019-07-28 RX ADMIN — TRAZODONE HYDROCHLORIDE 50 MG: 50 TABLET ORAL at 20:24

## 2019-07-28 RX ADMIN — FERROUS SULFATE TAB 325 MG (65 MG ELEMENTAL FE) 325 MG: 325 (65 FE) TAB at 08:19

## 2019-07-28 RX ADMIN — SEVELAMER CARBONATE 800 MG: 800 TABLET, FILM COATED ORAL at 08:19

## 2019-07-28 RX ADMIN — METHADONE HYDROCHLORIDE 20 MG: 10 TABLET ORAL at 20:23

## 2019-07-28 RX ADMIN — MICONAZOLE NITRATE: 20 CREAM TOPICAL at 08:10

## 2019-07-28 RX ADMIN — OXYCODONE HYDROCHLORIDE 5 MG: 5 TABLET ORAL at 17:10

## 2019-07-28 ASSESSMENT — ENCOUNTER SYMPTOMS
EYES NEGATIVE: 1
CHILLS: 0
SHORTNESS OF BREATH: 0
COUGH: 0
PALPITATIONS: 0
NAUSEA: 0
ABDOMINAL PAIN: 0
FEVER: 0
VOMITING: 0

## 2019-07-28 ASSESSMENT — ACTIVITIES OF DAILY LIVING (ADL)
SHOWER_TRANSFER_LEVEL_OF_ASSIST: REQUIRES SUPERVISION WITH SHOWER TRANSFER
TOILETING_LEVEL_OF_ASSIST: REQUIRES SUPERVISION WITH TOILETING
TOILET_TRANSFER_LEVEL_OF_ASSIST: ABLE TO COMPLETE TOILET TRANSFER WITHOUT ASSIST

## 2019-07-28 NOTE — PROGRESS NOTES
Hospital Medicine Daily Progress Note      Chief Complaint  CM, HTN, DM, S/P BKA    Interval Problem Update  Blood sugar trends better.  Phosphorus still high.    Review of Systems  Review of Systems   Constitutional: Negative for chills and fever.   HENT: Negative.    Eyes: Negative.    Respiratory: Negative for cough and shortness of breath.    Cardiovascular: Negative for chest pain and palpitations.   Gastrointestinal: Negative for abdominal pain, nausea and vomiting.   Genitourinary: Negative.    Musculoskeletal:        Wound pain   Skin: Negative for itching and rash.        Physical Exam  Temp:  [36.6 °C (97.9 °F)-36.7 °C (98.1 °F)] 36.6 °C (97.9 °F)  Pulse:  [79-88] 80  Resp:  [17-20] 18  BP: (102-113)/(52-72) 102/52  SpO2:  [92 %-94 %] 94 %    Physical Exam   Constitutional: He is oriented to person, place, and time. No distress.   HENT:   Head: Normocephalic and atraumatic.   Right Ear: External ear normal.   Left Ear: External ear normal.   Eyes: Conjunctivae and EOM are normal. Right eye exhibits no discharge. Left eye exhibits no discharge.   Neck: Normal range of motion. Neck supple. No tracheal deviation present.   Cardiovascular: Normal rate and regular rhythm.    Pulmonary/Chest: No stridor. No respiratory distress. He has no wheezes.   Decreased BS   Abdominal: Soft. Bowel sounds are normal. He exhibits no distension. There is no tenderness.   Musculoskeletal: He exhibits tenderness.   Left BKA stump wrapped   Neurological: He is alert and oriented to person, place, and time.   Skin: Skin is warm and dry. He is not diaphoretic.   Vitals reviewed.      Fluids    Intake/Output Summary (Last 24 hours) at 07/28/19 1215  Last data filed at 07/28/19 0451   Gross per 24 hour   Intake              840 ml   Output              600 ml   Net              240 ml       Laboratory  Recent Labs      07/28/19   0506   WBC  7.4   RBC  3.39*   HEMOGLOBIN  9.4*   HEMATOCRIT  30.2*   MCV  89.1   MCH  27.7   MCHC   31.1*   RDW  50.4*   PLATELETCT  185   MPV  10.0     Recent Labs      07/28/19   0506   SODIUM  137   POTASSIUM  3.9   CHLORIDE  98   CO2  30   GLUCOSE  115*   BUN  35*   CREATININE  1.23   CALCIUM  8.6                   Assessment/Plan  * S/P BKA (below knee amputation) unilateral, left (HCC)- (present on admission)   Assessment & Plan    2/2 diabetic foot infection/sepsis  S/P left BKA on 6/24/19 by Dr. Schneider  Completed antibiotics per ID prior to Rehab admission  U/S shows fluid collection x 3  Had Ortho F/U x 2 w/ no additional recommendations  F/U U/S now shows only 2 fluid collections and decreasing in size     Acute on chronic respiratory failure with hypoxia (HCC)- (present on admission)   Assessment & Plan    S/P VDRF     Cardiomyopathy (HCC)- (present on admission)   Assessment & Plan    Echo EF 40%  Monitor for volume overload     DM (diabetes mellitus), type 2, uncontrolled (CMS-HCC)- (present on admission)   Assessment & Plan    HbA1c 12.2  Good serum glucose control on Lantus 12 u qhs  Continue Lantus and discontinue SSI on discharge  Outpt meds include Lantus 44 units bid     Essential hypertension- (present on admission)   Assessment & Plan    Blood pressure controlled on low dose Metoprolol     Hyperphosphatemia   Assessment & Plan    Continue Sevelamer for elevated phosphorus levels  PCP F/U to reassess need for phosphate binder     Azotemia   Assessment & Plan    Renal function improving  Continue to encourage PO fluids     Vitamin D deficiency   Assessment & Plan    Vit D level 12  On supplementation     Anemia   Assessment & Plan    Vit B12 and Folate levels normal  Continue Fe and Vit C for low iron stores     DNR

## 2019-07-28 NOTE — THERAPY
Physical Therapy   Daily Treatment     Patient Name: Travon Pollack  Age:  63 y.o., Sex:  male  Medical Record #: [id number]  Today's Date: 7/28/2019     Precautions  Precautions: (P) Non Weight Bearing Left Lower Extremity, Immobilizer Left Lower Extremity, Fall Risk, Other (See Comments)  Comments: (P) R heel ulcer, Chronic L shoulder weakness    Subjective    Pt received seated EOB, agreeable to PT session. Pt stated the contractors are coming out tomorrow to install ramp.     Objective       07/28/19 1301   Precautions   Precautions Non Weight Bearing Left Lower Extremity;Immobilizer Left Lower Extremity;Fall Risk;Other (See Comments)   Comments R heel ulcer, Chronic L shoulder weakness   Vitals   O2 (LPM) 3   O2 Delivery Nasal Cannula   Cognition    Level of Consciousness Alert   Bed Mobility    Supine to Sit Modified Independent   Sit to Supine Modified Independent   Sit to Stand Modified Independent   Scooting Modified Independent   Rolling Modified Independent   Interdisciplinary Plan of Care Collaboration   Patient Position at End of Therapy Seated;Other (Comments)   Collaboration Comments Mod I in wc back to room, able to transition O2 to wall O2   PT Total Time Spent   PT Individual Total Time Spent (Mins) 60   PT Charge Group   PT Gait Training 1   PT Therapeutic Activities 3       FIM Bed/Chair/Wheelchair Transfers Score: 5 - Standby Prompting/Supervision or Set-up  Bed/Chair/Wheelchair Transfers Description:       FIM Walking Score:  2 - Max Assistance  Walking Description:   (pt amb x115' with knee scooter + CGA, distance limited  by reports of R knee fatigue)    FIM Wheelchair Score:  6 - Modified Independent  Wheelchair Description:       FIM Stairs Score:  0 - Not tested,unsafe activity  Stairs Description:       PT wrapped residual limb.     Extensive edu provided on having IPOP donned when OOB for protection and when in bed for knee extension; bathroom DME and safety with transitioning from knee  scoot; need for CGA during short distance ambulation with knee scoot living room<>bathroom; skin inspection of both residual limb and intact R LE; fall prevention edu. PT provided handouts, pt verbalizes understanding.    Assessment    Pt plans to DC tomorrow. Unknown if DME is being delivered tomorrow. Family not present for family training.    Plan    DC tomorrow. Pt reports he is going to stay in a hotel  With spouse until MD appt on Wednesday.

## 2019-07-28 NOTE — PROGRESS NOTES
Hospital Medicine Daily Progress Note      Chief Complaint  CM, HTN, DM, S/P BKA    Interval Problem Update  Pt visiting w/ wife at bedside.  No new problems.    Review of Systems  Review of Systems   Constitutional: Negative for chills and fever.   HENT: Negative.    Eyes: Negative.    Respiratory: Negative for cough and shortness of breath.    Cardiovascular: Negative for chest pain and palpitations.   Gastrointestinal: Negative for abdominal pain, nausea and vomiting.   Genitourinary: Negative.    Musculoskeletal:        Wound pain   Skin: Negative for itching and rash.        Physical Exam  Temp:  [36.6 °C (97.9 °F)-36.7 °C (98.1 °F)] 36.7 °C (98.1 °F)  Pulse:  [74-84] 83  Resp:  [18-20] 20  BP: (113-138)/(54-72) 113/72  SpO2:  [90 %-95 %] 94 %    Physical Exam   Constitutional: He is oriented to person, place, and time. No distress.   HENT:   Head: Normocephalic and atraumatic.   Right Ear: External ear normal.   Left Ear: External ear normal.   Eyes: Conjunctivae and EOM are normal. Right eye exhibits no discharge. Left eye exhibits no discharge.   Neck: Normal range of motion. Neck supple. No tracheal deviation present.   Cardiovascular: Normal rate and regular rhythm.    Pulmonary/Chest: No stridor. No respiratory distress. He has no wheezes.   Decreased BS   Abdominal: Soft. Bowel sounds are normal. He exhibits no distension. There is no tenderness.   Musculoskeletal: He exhibits tenderness.   Left BKA stump w/ brace   Neurological: He is alert and oriented to person, place, and time.   Skin: Skin is warm and dry. He is not diaphoretic.   Vitals reviewed.      Fluids    Intake/Output Summary (Last 24 hours) at 07/27/19 1724  Last data filed at 07/27/19 1500   Gross per 24 hour   Intake              960 ml   Output             1650 ml   Net             -690 ml       Laboratory                        Assessment/Plan  * S/P BKA (below knee amputation) unilateral, left (HCC)- (present on admission)   Assessment  & Plan    2/2 diabetic foot infection/sepsis  S/P left BKA on 6/24/19 by Dr. Schneider  Completed antibiotics per ID prior to Rehab admission  U/S shows fluid collection x 3, had Ortho F/U x 2 w/ no additional recommendations  Request F/U U/S to reassess fluid collections     Acute on chronic respiratory failure with hypoxia (HCC)- (present on admission)   Assessment & Plan    S/P VDRF     Cardiomyopathy (HCC)- (present on admission)   Assessment & Plan    Echo EF 40%  Monitor for volume overload     DM (diabetes mellitus), type 2, uncontrolled (CMS-HCC)- (present on admission)   Assessment & Plan    HbA1c 12.2  Continue low dose Lantus and SSI  Outpt meds include Lantus 44 units bid     Essential hypertension- (present on admission)   Assessment & Plan    Blood pressure controlled on low dose Metoprolol     Hyperphosphatemia   Assessment & Plan    Continue Sevelamer for elevated phosphorus levels     Azotemia   Assessment & Plan    Renal function improving  Continue to encourage PO fluids     Vitamin D deficiency   Assessment & Plan    Vit D level 12  On supplementation     Anemia   Assessment & Plan    Vit B12 and Folate levels normal  Continue Fe and Vit C for low iron stores     DNR

## 2019-07-28 NOTE — CARE PLAN
Problem: Safety  Goal: Will remain free from injury  Outcome: PROGRESSING AS EXPECTED  Patient has remained free of injury during this shift. Uses call light appropriately to get assistance and does not test physical limitations. Demonstrates safe transfer techniques. Asks questions about medications and procedures appropriately.     Problem: Bowel/Gastric:  Goal: Will not experience complications related to bowel motility  Outcome: PROGRESSING AS EXPECTED   07/27/19 1230   OTHER   Last BM 07/27/19     Patient able to have a BM within the past 72 hours without the use of bowel medications.

## 2019-07-28 NOTE — FLOWSHEET NOTE
07/28/19 1118   Events/Summary/Plan   Events/Summary/Plan 02 spot check.  Pt is adamant in not wanting to attempt weaning of 02   Interdisciplinary Plan of Care-Goals (Indications)   Bronchodilator Indications History / Diagnosis   Interdisciplinary Plan of Care-Outcomes    Bronchodilator Outcome Patient at Stable Baseline   Respiratory WDL   Respiratory (WDL) X   Chest Exam   Respiration 18   Pulse 80   Oximetry   #Pulse Oximetry (Single Determination) Yes   Oxygen   Home O2 Use Prior To Admission? Yes   Home O2 LPM Flow 3 LPM   Home O2 Delivery Method Nasal Cannula   Home O2 Frequency of Use At Sleep   Pulse Oximetry 94 %   O2 Daily Delivery Respiratory  Silicone Nasal Cannula

## 2019-07-28 NOTE — PROGRESS NOTES
"Rehab Progress Note     Date of Service: 7/28/2019  Chief Complaint: follow up left BKA    Interval Events (Subjective)    Patient seen and examined in his room this morning.  He reports he would like to discharge home tomorrow as he plans on staying in a hotel room with his wife and then going to 1 of her appointments on Tuesday morning.  He reports his wife is already talked to a contractor and thinks the ramp will be built in the next day or 2.  Patient continues to have pain in his residual limb but he is happy to hear that the fluid collections are smaller per the ultrasound.  He is very happy with the care he is received here in the progress he has made.    Objective:  VITAL SIGNS: /52   Pulse 79   Temp 36.6 °C (97.9 °F) (Temporal)   Resp 17   Ht 1.803 m (5' 11\")   Wt 89.4 kg (197 lb 1.6 oz)   SpO2 92%   BMI 27.49 kg/m²   Gen: alert, no apparent distress  CV: regular rate and rhythm, no murmurs, no peripheral edema  Resp: clear to ascultation bilaterally, normal respiratory effort  GI: soft, non-tender abdomen, bowel sounds present  Neuro: notable for left transtibial amputation    Recent Results (from the past 72 hour(s))   ACCU-CHEK GLUCOSE    Collection Time: 07/25/19 11:06 AM   Result Value Ref Range    Glucose - Accu-Ck 184 (H) 65 - 99 mg/dL   ACCU-CHEK GLUCOSE    Collection Time: 07/25/19  5:13 PM   Result Value Ref Range    Glucose - Accu-Ck 147 (H) 65 - 99 mg/dL   ACCU-CHEK GLUCOSE    Collection Time: 07/25/19  8:02 PM   Result Value Ref Range    Glucose - Accu-Ck 157 (H) 65 - 99 mg/dL   ACCU-CHEK GLUCOSE    Collection Time: 07/26/19  8:00 AM   Result Value Ref Range    Glucose - Accu-Ck 152 (H) 65 - 99 mg/dL   ACCU-CHEK GLUCOSE    Collection Time: 07/26/19 11:25 AM   Result Value Ref Range    Glucose - Accu-Ck 160 (H) 65 - 99 mg/dL   ACCU-CHEK GLUCOSE    Collection Time: 07/26/19  5:16 PM   Result Value Ref Range    Glucose - Accu-Ck 133 (H) 65 - 99 mg/dL   ACCU-CHEK GLUCOSE    Collection " Time: 07/26/19  7:46 PM   Result Value Ref Range    Glucose - Accu-Ck 145 (H) 65 - 99 mg/dL   ACCU-CHEK GLUCOSE    Collection Time: 07/27/19  8:06 AM   Result Value Ref Range    Glucose - Accu-Ck 154 (H) 65 - 99 mg/dL   ACCU-CHEK GLUCOSE    Collection Time: 07/27/19 11:13 AM   Result Value Ref Range    Glucose - Accu-Ck 169 (H) 65 - 99 mg/dL   ACCU-CHEK GLUCOSE    Collection Time: 07/27/19  5:28 PM   Result Value Ref Range    Glucose - Accu-Ck 160 (H) 65 - 99 mg/dL   ACCU-CHEK GLUCOSE    Collection Time: 07/27/19  9:00 PM   Result Value Ref Range    Glucose - Accu-Ck 140 (H) 65 - 99 mg/dL   PHOSPHORUS    Collection Time: 07/28/19  5:06 AM   Result Value Ref Range    Phosphorus 4.8 (H) 2.5 - 4.5 mg/dL   CBC WITHOUT DIFFERENTIAL    Collection Time: 07/28/19  5:06 AM   Result Value Ref Range    WBC 7.4 4.8 - 10.8 K/uL    RBC 3.39 (L) 4.70 - 6.10 M/uL    Hemoglobin 9.4 (L) 14.0 - 18.0 g/dL    Hematocrit 30.2 (L) 42.0 - 52.0 %    MCV 89.1 81.4 - 97.8 fL    MCH 27.7 27.0 - 33.0 pg    MCHC 31.1 (L) 33.7 - 35.3 g/dL    RDW 50.4 (H) 35.9 - 50.0 fL    Platelet Count 185 164 - 446 K/uL    MPV 10.0 9.0 - 12.9 fL   Basic Metabolic Panel    Collection Time: 07/28/19  5:06 AM   Result Value Ref Range    Sodium 137 135 - 145 mmol/L    Potassium 3.9 3.6 - 5.5 mmol/L    Chloride 98 96 - 112 mmol/L    Co2 30 20 - 33 mmol/L    Glucose 115 (H) 65 - 99 mg/dL    Bun 35 (H) 8 - 22 mg/dL    Creatinine 1.23 0.50 - 1.40 mg/dL    Calcium 8.6 8.5 - 10.5 mg/dL    Anion Gap 9.0 0.0 - 11.9   ESTIMATED GFR    Collection Time: 07/28/19  5:06 AM   Result Value Ref Range    GFR If African American >60 >60 mL/min/1.73 m 2    GFR If Non African American 59 (A) >60 mL/min/1.73 m 2   ACCU-CHEK GLUCOSE    Collection Time: 07/28/19  8:00 AM   Result Value Ref Range    Glucose - Accu-Ck 158 (H) 65 - 99 mg/dL       Current Facility-Administered Medications   Medication Frequency   • insulin glargine (LANTUS) injection 12 Units Q EVENING   • pregabalin  (LYRICA) capsule 300 mg BID   • sevelamer carbonate (RENVELA) tablet 800 mg TID WITH MEALS   • senna-docusate (PERICOLACE or SENOKOT S) 8.6-50 MG per tablet 2 Tab BID PRN   • hydrocortisone 1 % cream BID PRN   • miconazole 2%-zinc oxide (DELFINO) topical cream BID   • metoprolol (LOPRESSOR) tablet 12.5 mg DAILY   • methadone (DOLOPHINE) tablet 20 mg BID   • vitamin D (cholecalciferol) tablet 4,000 Units DAILY   • ascorbic acid tablet 500 mg QDAY with Breakfast   • ferrous sulfate tablet 325 mg QDAY with Breakfast   • insulin lispro (HUMALOG) injection 2-12 Units 4X/DAY ACHS    And   • glucose 4 g chewable tablet 16 g Q15 MIN PRN   • Respiratory Care per Protocol Continuous RT   • Pharmacy Consult Request ...Pain Management Review 1 Each PHARMACY TO DOSE   • acetaminophen (TYLENOL) tablet 650 mg Q4HRS PRN   • artificial tears 1.4 % ophthalmic solution 1 Drop PRN   • benzocaine-menthol (CEPACOL) lozenge 1 Lozenge Q2HRS PRN   • mag hydrox-al hydrox-simeth (MAALOX PLUS ES or MYLANTA DS) suspension 20 mL Q2HRS PRN   • ondansetron (ZOFRAN ODT) dispertab 4 mg 4X/DAY PRN    Or   • ondansetron (ZOFRAN) syringe/vial injection 4 mg 4X/DAY PRN   • traZODone (DESYREL) tablet 50 mg QHS PRN   • sodium chloride (OCEAN) 0.65 % nasal spray 2 Spray PRN   • hydrOXYzine HCl (ATARAX) tablet 50 mg Q6HRS PRN   • melatonin tablet 3 mg HS PRN   • polyethylene glycol/lytes (MIRALAX) PACKET 1 Packet QDAY PRN    And   • magnesium hydroxide (MILK OF MAGNESIA) suspension 30 mL QDAY PRN    And   • bisacodyl (DULCOLAX) suppository 10 mg QDAY PRN   • fluticasone (FLONASE) nasal spray 100 mcg DAILY   • oxyCODONE immediate-release (ROXICODONE) tablet 5 mg Q6HRS PRN   • tramadol (ULTRAM) 50 MG tablet 100 mg Q6HRS PRN   • dextrose 50% (D50W) injection 50 mL Q15 MIN PRN   • diphenhydrAMINE-ZnAcetate (BENADRYL ITCH) 1-0.1 % cream TID PRN   • albuterol inhaler 2 Puff Q4HRS PRN       Orders Placed This Encounter   Procedures   • Diet Order Diabetic, Cardiac      Standing Status:   Standing     Number of Occurrences:   1     Order Specific Question:   Diet:     Answer:   Diabetic [3]     Order Specific Question:   Diet:     Answer:   Cardiac [6]     Order Specific Question:   Consistency/Fluid modifications:     Answer:   Thin Liquids [3]       Radiology    7/27/2019 11:53 AM    HISTORY/REASON FOR EXAM:  Follow-up left BKA stump fluid collections.      TECHNIQUE/EXAM DESCRIPTION AND NUMBER OF VIEWS:  Ultrasound was performed along the inferior aspect of the left BKA.    COMPARISON: 7/11/2019    FINDINGS:  There are 2 fluid collections identified along the left BKA stump. One measures 10 x 20 mm and the other measures 29 x 20 mm. These appear smaller and less complex when compared with prior study.   Impression       1.  Interval decrease in size and complexity of the 2 remaining fluid collections along the left BKA stump suggesting they are resolving postoperative hematomas or seromas.         Assessment:  Active Hospital Problems    Diagnosis   • *S/P BKA (below knee amputation) unilateral, left (HCC)   • Acute on chronic respiratory failure with hypoxia ([facility])   • SALOME (obstructive sleep apnea)   • Cardiomyopathy ([facility])   • DM (diabetes mellitus), type 2, uncontrolled (CMS-[facility])   • Essential hypertension   • Left shoulder pain   • Chronic pain syndrome   • Methadone dependence ([facility])     This patient is a 63 y.o. male admitted for acute inpatient rehabilitation with S/P BKA (below knee amputation) unilateral, left (HCC).    I led and attended the weekly conference, and agree with the IDT conference documentation and plan of care as noted below.    Date of conference: 7/22/2019    Goals and barriers: See IDT note.    Biggest barriers: right foot wounds, left BKA, pain management, oxygen tubing management    Admission FIM 66 --> 79 (7/16) --> 88 (7/18)    CM/social support: To single level home in [place], [place], with support of wife.    Anticipated DC date: 7/29, if ramp in  place    Outpatient: PT    Equip: grab bars, WC, ramp, knee scooter    Follow up: PCP, surgery    Medical Decision Making and Plan:    Left TRINITY Schneider 6/24  Continue full rehab program  PT/OT, 1 hr each discipline   Outpatient follow up with surgery, s/p partial suture removal  Return to clinic 7/24  - still not ready for suture removal, needs to return to clinic on 31st  Continue compression/edema management, need to show patient/wife how to wrap    Residual limb fluid collection  Post-op seroma versus hematoma  US with 3 distinct fluid collections - repeat US with 2 collections now, smaller in size, continue compression wrapping  Most likely seroma, though hemoglobin slightly lower, d/c Lovenox and apirin, hemoglobin improved  Scheduled with orthopedics - no intervention other than compression    Right heel ulcer  Continue wound care, appreciate assistance  Off loading boot  Referral made to limb preservation service    Normocytic anemia  Iron deficient  Likely post-operative  Monitor, stable/higher on recheck  Continue Vit C and ferrous sulfate    History of elevated troponin  Discontinued aspirin due to decreased Hgb    Diabetes  Continue insulin  Appreciate hospitalist assistance    Chronic pain  Narcotic dependence/tolerance  Continue methadone, prescribed by his PCP  Dose decreased from 35 mg BID at home, to 20 mg BID due to encephalopathy (stroke code was called)  Continue PRN opiates - using 3-4 5 mg tabs daily  Continue Lyrica, dose increased back up to his home dosing     Left shoulder pain  With history of multiple surgeries, including left humeral head resection  Modalities with therapy  Continue pain management     Arm jerking, intermittent (subjective, have not seen this)  Dr Shaw to look at medications/side effects  Decreasing dose of Lyrica to see if this is the culprit, no difference and will increased pain in residual limb, increase back to home dosing    Rashes  Groin/buttock rash -  miconazole, continues  Trunk rash - hydrocortison cream, resolved     Cardiomyopathy, EF 40%  Continue metoprolol  Appreciate hospitalist assistance     History of one kidney  Azotemia  Avoid nephrotoxins  Increase oral fluids    Hyperphosphatemia  Continue sevelamer  Appreciate hospitalist assistance     Respiratory insufficiency  Sleep apnea  On 3 L at home at night  Respiratory therapy to see patient, unable to titrate during day  Will need 24/7 at discharge     Hypertension  Continue metoprolol  Appreciate hospitalist assistance    Vit D deficiency, 12  Continue supplementation    DVT prophylaxis  Discontinued Lovenox due to fluid collection in residual limb and anemia  Increase mobility  Monitor for edema    Total time:  35 minutes.  I spent greater than 50% of the time for patient care, counseling, and coordination on this date, including patient face-to face time, unit/floor time with review of records/pertinent lab data and studies, as well as discussing diagnostic evaluation/work up, planned therapeutic interventions, and future disposition of care, as per the interval events/subjective and the assessment and plan as noted above.    I have performed a physical exam, reviewed and updated ROS, as well as the assessment and plan today 7/28/2019. In review of note from 7/26/2019 there are no new changes except as documented above.    Mimi Doyle M.D.   Physical Medicine and Rehabilitation

## 2019-07-28 NOTE — THERAPY
Occupational Therapy  Daily Treatment     Patient Name: Travon Pollack  Age:  63 y.o., Sex:  male  Medical Record #: [id number]  Today's Date: 2019     Precautions  Precautions: (P) Immobilizer Left Upper Extremity, Fall Risk, Other (See Comments)  Comments: (P) R heel ulcer, Chronic L shoulder weakness    Safety   ADL Safety : (P) Modified Independent  Bathroom Safety: (P) Modified Independent  Comments: (P) See FIMs    Subjective          Objective       19 0701   Precautions   Precautions Immobilizer Left Upper Extremity;Fall Risk;Other (See Comments)   Comments R heel ulcer, Chronic L shoulder weakness   Safety    ADL Safety  Modified Independent   Bathroom Safety Modified Independent   Comments See FIMs   Pain 0 - 10 Group   Therapist Pain Assessment Post Activity Pain Same as Prior to Activity;0   Cognition    Level of Consciousness Alert   Balance   Sitting Balance (Static) Good   Sitting Balance (Dynamic) Fair +   Standing Balance (Static) Fair -   Standing Balance (Dynamic) Fair -   Weight Shift Sitting Good   Weight Shift Standing Fair   Skilled Intervention Postural Facilitation;Tactile Cuing;Verbal Cuing   Comments Oberved during functional transfers, ADL routiune   Bed Mobility    Supine to Sit Modified Independent   Interdisciplinary Plan of Care Collaboration   IDT Collaboration with  Nursing   Patient Position at End of Therapy Seated   Collaboration Comments Wound care   OT Total Time Spent   OT Individual Total Time Spent (Mins) 60   OT Charge Group   Charges Yes   OT Self Care / ADL 3   OT Therapy Activity 1       FIM Eating Score:  7 - Independent  Eating Description:       FIM Grooming Score:  7 - Independent  Grooming Description:       FIM Bathing Score:  6 - Modified Independent  Bathing Description:       FIM Upper Body Dressin - Independent  Upper Body Dressing Description:       FIM Lower Body Dressing Score:  6 - Modified Independent  Lower Body Dressing Description:   Increased time    FIM Toileting Body Dressing:     Toileting Description:       FIM Bed/Chair/Wheelchair Transfers Score: 6 - Modified Independent  Bed/Chair/Wheelchair Transfers Description:  Increased time    FIM Toilet Transfer Score:     Toilet Transfer Description:       FIM Tub/Shower Transfers Score:  6 - Modified Independent  Tub/Shower Transfers Description:  Grab bar, Increased time, Initial preparation for task (Pt articulated/demonstrated squat pivot transfer with good safety awareness and no external verbal tactile cues)      Assessment    Pt pleasant and cooperative with discharge ADL routine demonstrating good safety awareness and compensatory strategies throughout.  Pt expressed continued [place]' wound care for residual limb and R heel ulcer.  Pt noted that his son and wife will be available to assist him during his initial transition home.      Plan    Toileting FIMs not captured during session.   Will attempt during afternoon tx for D/C FIMs.    Anticipated D/C to home with assistance from adult son and wife on Monday.

## 2019-07-28 NOTE — THERAPY
"Occupational Therapy  Daily Treatment     Patient Name: Travon Pollack  Age:  63 y.o., Sex:  male  Medical Record #: [id number]  Today's Date: 2019     Precautions  Precautions: (P) Non Weight Bearing Left Lower Extremity, Immobilizer Left Lower Extremity, Fall Risk, Other (See Comments)  Comments: (P) R heel ulcer, Chronic L shoulder weakness    Safety   ADL Safety : Modified Independent  Bathroom Safety: Modified Independent  Comments: See FIMs    Subjective    \"I love this bathroom.  Everything in here is perfectly adequate.\"      Objective       19 1431   Precautions   Precautions Non Weight Bearing Left Lower Extremity;Immobilizer Left Lower Extremity;Fall Risk;Other (See Comments)   Comments R heel ulcer, Chronic L shoulder weakness   Interdisciplinary Plan of Care Collaboration   Patient Position at End of Therapy In Bed;Bed Alarm On;Call Light within Reach;Tray Table within Reach;Phone within Reach   OT Total Time Spent   OT Individual Total Time Spent (Mins) 30   OT Charge Group   Charges Yes   OT Self Care / ADL 2       FIM Eating Score:  7 - Independent  Eating Description:       FIM Grooming Score:  7 - Independent  Grooming Description:       FIM Bathing Score:  6 - Modified Independent  Bathing Description:       FIM Upper Body Dressin - Independent  Upper Body Dressing Description:       FIM Lower Body Dressing Score:  6 - Modified Independent  Lower Body Dressing Description:  Increased time    FIM Toileting Body Dressin - Modified Independent  Toileting Description:  Supervision for safety, Increased time, Grab bar (Supervision for safety; no LOB noted during clothing management/hygiene pursuits. )    FIM Bed/Chair/Wheelchair Transfers Score: 6 - Modified Independent  Bed/Chair/Wheelchair Transfers Description:  Increased time, Supervision for safety (Supervision for safety secondary to limited time with pt.  No verbal cues or set-up assist to complete transfer )    FIM Toilet " Transfer Score:  6 - Modified Independent  Toilet Transfer Description:  Increased time, Supervision for safety, Grab bar    FIM Tub/Shower Transfers Score:  6 - Modified Independent  Tub/Shower Transfers Description:  Grab bar, Increased time, Initial preparation for task (Pt articulated/demonstrated squat pivot transfer with good safety awareness and no external verbal tactile cues)      Assessment    Pt completed toileting/toilet transfer with distant supervision, no LOB and good safety awareness of w/c position observed.  Pt demonstrated donning/doffing IPOP, off-loading boot, textured sock with sock aid at a Mod I level from w/c position.  Pt cont' to require distant supervision for bathroom ADLs secondary to Fair - dynamic standing balance.     Plan    Anticipated D/C Monday.

## 2019-07-28 NOTE — PROGRESS NOTES
Hospital Medicine Daily Progress Note      Chief Complaint  CM, HTN, DM, S/P BKA    Interval Problem Update  FSBS still not optimal.  U/S results noted.    Review of Systems  Review of Systems   Constitutional: Negative for chills and fever.   HENT: Negative.    Eyes: Negative.    Respiratory: Negative for cough and shortness of breath.    Cardiovascular: Negative for chest pain and palpitations.   Gastrointestinal: Negative for abdominal pain, nausea and vomiting.   Genitourinary: Negative.    Musculoskeletal:        Wound pain   Skin: Negative for itching and rash.        Physical Exam  Temp:  [36.6 °C (97.9 °F)-36.7 °C (98.1 °F)] 36.7 °C (98.1 °F)  Pulse:  [74-84] 83  Resp:  [18-20] 20  BP: (113-138)/(54-72) 113/72  SpO2:  [90 %-95 %] 94 %    Physical Exam   Constitutional: He is oriented to person, place, and time. No distress.   HENT:   Head: Normocephalic and atraumatic.   Right Ear: External ear normal.   Left Ear: External ear normal.   Eyes: Conjunctivae and EOM are normal. Right eye exhibits no discharge. Left eye exhibits no discharge.   Neck: Normal range of motion. Neck supple. No tracheal deviation present.   Cardiovascular: Normal rate and regular rhythm.    Pulmonary/Chest: No stridor. No respiratory distress. He has no wheezes.   Decreased BS   Abdominal: Soft. Bowel sounds are normal. He exhibits no distension. There is no tenderness.   Musculoskeletal: He exhibits tenderness.   Left BKA stump w/ brace   Neurological: He is alert and oriented to person, place, and time.   Skin: Skin is warm and dry. He is not diaphoretic.   Vitals reviewed.      Fluids    Intake/Output Summary (Last 24 hours) at 07/27/19 1730  Last data filed at 07/27/19 1500   Gross per 24 hour   Intake              960 ml   Output             1650 ml   Net             -690 ml       Laboratory                        Assessment/Plan  * S/P BKA (below knee amputation) unilateral, left (HCC)- (present on admission)   Assessment &  Plan    2/2 diabetic foot infection/sepsis  S/P left BKA on 6/24/19 by Dr. Schneider  Completed antibiotics per ID prior to Rehab admission  U/S shows fluid collection x 3  Had Ortho F/U x 2 w/ no additional recommendations  F/U U/S now shows only 2 fluid collections and decreasing in size     Acute on chronic respiratory failure with hypoxia (HCC)- (present on admission)   Assessment & Plan    S/P VDRF     Cardiomyopathy (HCC)- (present on admission)   Assessment & Plan    Echo EF 40%  Monitor for volume overload  Check BNP     DM (diabetes mellitus), type 2, uncontrolled (CMS-HCC)- (present on admission)   Assessment & Plan    HbA1c 12.2  Increase Lantus and continue SSI  Outpt meds include Lantus 44 units bid     Essential hypertension- (present on admission)   Assessment & Plan    Blood pressure controlled on low dose Metoprolol     Hyperphosphatemia   Assessment & Plan    Continue Sevelamer for elevated phosphorus levels  Check F/U labs in am     Azotemia   Assessment & Plan    Renal function improving  Continue to encourage PO fluids     Vitamin D deficiency   Assessment & Plan    Vit D level 12  On supplementation     Anemia   Assessment & Plan    Vit B12 and Folate levels normal  Continue Fe and Vit C for low iron stores     DNR

## 2019-07-28 NOTE — THERAPY
Physical Therapy   Daily Treatment     Patient Name: Travon Pollack  Age:  63 y.o., Sex:  male  Medical Record #: [id number]  Today's Date: 7/28/2019     Precautions  Precautions: (P) Non Weight Bearing Left Lower Extremity, Immobilizer Left Lower Extremity, Fall Risk  Comments: (P) R heel ulcer, Chronic L shoulder weakness    Subjective    Patient was pleased learning bwd wc mobility technique over sloped inclines.     Objective       07/28/19 0831   Precautions   Precautions Non Weight Bearing Left Lower Extremity;Immobilizer Left Lower Extremity;Fall Risk   Comments R heel ulcer, Chronic L shoulder weakness   Neuro-Muscular Treatments   Comments Residual limb wrapping with figue 8 method, patellar loack and 2 bands = improved fit of IPOP to stay in place.  Review of D/C recommendations.    Interdisciplinary Plan of Care Collaboration   IDT Collaboration with  Physical Therapist   Patient Position at End of Therapy Seated   Collaboration Comments POC, D/C tomorrow   PT Total Time Spent   PT Individual Total Time Spent (Mins) 30   PT Charge Group   PT Therapeutic Activities 2       FIM Wheelchair Score:  5 - Standby Prompting/Supervision or Set-up  Wheelchair Description:   (Mod I indoors; SPV outdoors- Patient able to propel bwd uphill with inc ease )      Assessment    Patient demonstrated improved outdoor wc safety with mobility, and inc ease propelling bwd on uphill sloped surfaces.     Plan    Continue D/C data collection; assess in room mod I wc level.

## 2019-07-29 PROBLEM — R79.89 AZOTEMIA: Status: RESOLVED | Noted: 2019-07-15 | Resolved: 2019-07-29

## 2019-07-29 LAB
GLUCOSE BLD-MCNC: 123 MG/DL (ref 65–99)
GLUCOSE BLD-MCNC: 124 MG/DL (ref 65–99)
GLUCOSE BLD-MCNC: 135 MG/DL (ref 65–99)
GLUCOSE BLD-MCNC: 157 MG/DL (ref 65–99)
GLUCOSE BLD-MCNC: 172 MG/DL (ref 65–99)

## 2019-07-29 PROCEDURE — 97530 THERAPEUTIC ACTIVITIES: CPT

## 2019-07-29 PROCEDURE — 97110 THERAPEUTIC EXERCISES: CPT

## 2019-07-29 PROCEDURE — 99233 SBSQ HOSP IP/OBS HIGH 50: CPT | Performed by: PHYSICAL MEDICINE & REHABILITATION

## 2019-07-29 PROCEDURE — 94760 N-INVAS EAR/PLS OXIMETRY 1: CPT

## 2019-07-29 PROCEDURE — 99232 SBSQ HOSP IP/OBS MODERATE 35: CPT | Performed by: HOSPITALIST

## 2019-07-29 PROCEDURE — 97535 SELF CARE MNGMENT TRAINING: CPT

## 2019-07-29 PROCEDURE — 700102 HCHG RX REV CODE 250 W/ 637 OVERRIDE(OP): Performed by: PHYSICAL MEDICINE & REHABILITATION

## 2019-07-29 PROCEDURE — A9270 NON-COVERED ITEM OR SERVICE: HCPCS | Performed by: HOSPITALIST

## 2019-07-29 PROCEDURE — A9270 NON-COVERED ITEM OR SERVICE: HCPCS | Performed by: PHYSICAL MEDICINE & REHABILITATION

## 2019-07-29 PROCEDURE — 82962 GLUCOSE BLOOD TEST: CPT

## 2019-07-29 PROCEDURE — 700102 HCHG RX REV CODE 250 W/ 637 OVERRIDE(OP): Performed by: HOSPITALIST

## 2019-07-29 PROCEDURE — 770010 HCHG ROOM/CARE - REHAB SEMI PRIVAT*

## 2019-07-29 RX ORDER — ASCORBIC ACID 500 MG
500 TABLET ORAL
Qty: 30 TAB | Refills: 0 | COMMUNITY
Start: 2019-07-30

## 2019-07-29 RX ORDER — SEVELAMER CARBONATE 800 MG/1
800 TABLET, FILM COATED ORAL
Qty: 42 TAB | Refills: 0 | Status: SHIPPED | OUTPATIENT
Start: 2019-07-29 | End: 2019-08-12

## 2019-07-29 RX ORDER — SEVELAMER CARBONATE 800 MG/1
800 TABLET, FILM COATED ORAL
Qty: 270 TAB | Refills: 0 | Status: SHIPPED | OUTPATIENT
Start: 2019-07-29 | End: 2019-07-29

## 2019-07-29 RX ORDER — METHADONE HYDROCHLORIDE 10 MG/1
20 TABLET ORAL 2 TIMES DAILY
Qty: 30 TAB | Refills: 0
Start: 2019-07-29 | End: 2019-08-12

## 2019-07-29 RX ORDER — FLUTICASONE PROPIONATE 50 MCG
2 SPRAY, SUSPENSION (ML) NASAL DAILY
Qty: 16 G | Refills: 0 | Status: SHIPPED | OUTPATIENT
Start: 2019-07-30

## 2019-07-29 RX ORDER — OXYCODONE HYDROCHLORIDE 5 MG/1
5 TABLET ORAL EVERY 6 HOURS PRN
Qty: 28 TAB | Refills: 0 | Status: SHIPPED | OUTPATIENT
Start: 2019-07-29 | End: 2019-08-12

## 2019-07-29 RX ORDER — PREGABALIN 300 MG/1
300 CAPSULE ORAL 2 TIMES DAILY
Qty: 60 CAP | Refills: 0 | Status: SHIPPED | OUTPATIENT
Start: 2019-07-29 | End: 2019-08-28

## 2019-07-29 RX ORDER — INSULIN GLARGINE 100 [IU]/ML
12 INJECTION, SOLUTION SUBCUTANEOUS EVERY EVENING
Qty: 10 ML | Refills: 0 | Status: SHIPPED | OUTPATIENT
Start: 2019-07-29

## 2019-07-29 RX ORDER — FERROUS SULFATE 325(65) MG
325 TABLET ORAL
Qty: 30 TAB | Refills: 0 | Status: SHIPPED | OUTPATIENT
Start: 2019-07-30

## 2019-07-29 RX ADMIN — MICONAZOLE NITRATE: 20 CREAM TOPICAL at 07:20

## 2019-07-29 RX ADMIN — VITAMIN D, TAB 1000IU (100/BT) 4000 UNITS: 25 TAB at 08:26

## 2019-07-29 RX ADMIN — TRAZODONE HYDROCHLORIDE 50 MG: 50 TABLET ORAL at 20:10

## 2019-07-29 RX ADMIN — SEVELAMER CARBONATE 800 MG: 800 TABLET, FILM COATED ORAL at 08:26

## 2019-07-29 RX ADMIN — METHADONE HYDROCHLORIDE 20 MG: 10 TABLET ORAL at 08:26

## 2019-07-29 RX ADMIN — MICONAZOLE NITRATE: 20 CREAM TOPICAL at 20:06

## 2019-07-29 RX ADMIN — PREGABALIN 300 MG: 100 CAPSULE ORAL at 08:25

## 2019-07-29 RX ADMIN — SEVELAMER CARBONATE 800 MG: 800 TABLET, FILM COATED ORAL at 11:30

## 2019-07-29 RX ADMIN — PREGABALIN 300 MG: 100 CAPSULE ORAL at 20:06

## 2019-07-29 RX ADMIN — SEVELAMER CARBONATE 800 MG: 800 TABLET, FILM COATED ORAL at 17:30

## 2019-07-29 RX ADMIN — FERROUS SULFATE TAB 325 MG (65 MG ELEMENTAL FE) 325 MG: 325 (65 FE) TAB at 08:27

## 2019-07-29 RX ADMIN — METHADONE HYDROCHLORIDE 20 MG: 10 TABLET ORAL at 20:06

## 2019-07-29 RX ADMIN — METOPROLOL TARTRATE 12.5 MG: 25 TABLET ORAL at 08:27

## 2019-07-29 RX ADMIN — INSULIN GLARGINE 12 UNITS: 100 INJECTION, SOLUTION SUBCUTANEOUS at 20:11

## 2019-07-29 RX ADMIN — OXYCODONE HYDROCHLORIDE AND ACETAMINOPHEN 500 MG: 500 TABLET ORAL at 08:26

## 2019-07-29 RX ADMIN — FLUTICASONE PROPIONATE 100 MCG: 50 SPRAY, METERED NASAL at 07:21

## 2019-07-29 ASSESSMENT — ENCOUNTER SYMPTOMS
SHORTNESS OF BREATH: 0
FEVER: 0
COUGH: 0
VOMITING: 0
NAUSEA: 0
CHILLS: 0
EYES NEGATIVE: 1
PALPITATIONS: 0
ABDOMINAL PAIN: 0

## 2019-07-29 ASSESSMENT — PATIENT HEALTH QUESTIONNAIRE - PHQ9
1. LITTLE INTEREST OR PLEASURE IN DOING THINGS: NOT AT ALL
SUM OF ALL RESPONSES TO PHQ9 QUESTIONS 1 AND 2: 0
2. FEELING DOWN, DEPRESSED, IRRITABLE, OR HOPELESS: NOT AT ALL

## 2019-07-29 NOTE — THERAPY
"Occupational Therapy  Daily Treatment     Patient Name: Travon Pollack  Age:  63 y.o., Sex:  male  Medical Record #: [id number]  Today's Date: 2019     Precautions  Precautions: Non Weight Bearing Left Lower Extremity, Immobilizer Left Lower Extremity, Fall Risk  Comments: R heel ulcer, Chronic L shoulder weakness    Safety   ADL Safety : Modified Independent  Bathroom Safety: Modified Independent  Comments: See FIMs    Subjective    \"I am leaving today\"     Objective       19 0701   Safety    ADL Safety  Modified Independent   Bathroom Safety Modified Independent   Interdisciplinary Plan of Care Collaboration   Patient Position at End of Therapy Seated;Call Light within Reach;Tray Table within Reach   OT Total Time Spent   OT Individual Total Time Spent (Mins) 60   OT Charge Group   OT Self Care / ADL 4       FIM Eating Score:  7 - Independent  Eating Description:       FIM Grooming Score:  7 - Independent  Grooming Description:       FIM Bathing Score:  6 - Modified Independent  Bathing Description:       FIM Upper Body Dressin - Independent  Upper Body Dressing Description:       FIM Lower Body Dressing Score:  6 - Modified Independent  Lower Body Dressing Description:  Increased time    FIM Toileting Body Dressin - Minimal Assistance  Toileting Description:       FIM Bed/Chair/Wheelchair Transfers Score:    Bed/Chair/Wheelchair Transfers Description:       FIM Toilet Transfer Score:  5 - Standby Prompting/Supervision or Set-up  Toilet Transfer Description:       FIM Tub/Shower Transfers Score:  6 - Modified Independent  Tub/Shower Transfers Description:  Increased time, Grab bar (squat pivot w/c<>tub bench with GB)      Assessment    Pt completed shower routine at Mod I level overall using w/c, shower bench, GB's. Pt's functional performance was impacted by requiring increase time to set-up waterproofing of L residual limb prior to shower and impaired standing balance    Plan    Cont overall " strength/endurance and standing balance to improve ADL's and functional mobility

## 2019-07-29 NOTE — CARE PLAN
Problem: Safety  Goal: Will remain free from falls    Intervention: Assess risk factors for falls  Patient with left BKA, assisted with transfers to prevent falls.      Problem: Pain Management  Goal: Pain level will decrease to patient's comfort goal  Patient medicated x2 with Roxicodone 5 mg po x2 for left stump pain with moderate relief, will continue to monitor.

## 2019-07-29 NOTE — DISCHARGE PLANNING
Patient is trying to discharge today, spend tonight in a hotel room with his wife and return home tomorrow. I am working with [facility]' comp  to determine when wc is going to be delivered to patient's home, when ramp is going to be installed and grab bars. Patient is anxious to go home to please his wife. Patient plans to discharge later today, if DME is in place, stay overnight in a hotel with patient's wife; then go to wife's appointment with her tomorrow. He will travel back to [place] for his ortho f/u on Wednesday.   I am unsure DME can be in place for discharge today, but 's  is working on this.

## 2019-07-29 NOTE — CARE PLAN
Problem: PT-Long Term Goals  Goal: LTG-By discharge, patient will propel wheelchair  1) Individualized goal:  Inside and outside surfaces with Mod I  2) Interventions: PT Group Therapy, PT Prosthetic Training, PT Gait Training, PT Self Care/Home Eval, PT Therapeutic Exercises, PT Neuro Re-Ed/Balance, PT Therapeutic Activity and PT Evaluation         Outcome: MET Date Met: 07/29/19    Goal: LTG-By discharge, patient will ambulate  1) Individualized goal:  >100' with FWW and SPV, hop-to pattern on R LE  2) Interventions:  PT Group Therapy, PT Prosthetic Training, PT Gait Training, PT Self Care/Home Eval, PT Therapeutic Exercises, PT Neuro Re-Ed/Balance, PT Therapeutic Activity and PT Evaluation         Outcome: NOT MET    Goal: LTG-By discharge, patient will ambulate up/down 4-6 stairs  1) Individualized goal:  B HRs, hop-to pattern, CGA, to safely enter/exit home if ramp not yet installed  2) Interventions:  PT Group Therapy, PT Prosthetic Training, PT Gait Training, PT Self Care/Home Eval, PT Therapeutic Exercises, PT Neuro Re-Ed/Balance, PT Therapeutic Activity and PT Evaluation         Outcome: NOT MET    Goal: LTG-By discharge, patient will transfer in/out of a car  1) Individualized goal:  SPV from wc level  2) Interventions:  PT Group Therapy, PT Prosthetic Training, PT Gait Training, PT Self Care/Home Eval, PT Therapeutic Exercises, PT Neuro Re-Ed/Balance, PT Therapeutic Activity and PT Evaluation         Outcome: MET Date Met: 07/29/19

## 2019-07-29 NOTE — CARE PLAN
Problem: OT Long Term Goals  Goal: LTG-By discharge, patient will complete basic self care tasks  1) Individualized Goal:  With supervision using AE/AD as needed   2) Interventions:  OT Group Therapy, OT Self Care/ADL, OT Cognitive Skill Dev, OT Community Reintegration, OT Manual Ther Technique, OT Neuro Re-Ed/Balance, OT Sensory Int Techniques, OT Therapeutic Activity, OT Evaluation and OT Therapeutic Exercise   Outcome: MET Date Met: 07/29/19    Goal: LTG-By discharge, patient will perform bathroom transfers  1) Individualized Goal:  With supervision using AE/AD as needed   2) Interventions:  OT Group Therapy, OT Self Care/ADL, OT Cognitive Skill Dev, OT Community Reintegration, OT Manual Ther Technique, OT Neuro Re-Ed/Balance, OT Sensory Int Techniques, OT Therapeutic Activity, OT Evaluation and OT Therapeutic Exercise   Outcome: MET Date Met: 07/29/19    Goal: LTG-By discharge, patient will complete basic home management  1) Individualized Goal:  With supervision using AE/AD as needed   2) Interventions:  OT Group Therapy, OT Self Care/ADL, OT Cognitive Skill Dev, OT Community Reintegration, OT Manual Ther Technique, OT Neuro Re-Ed/Balance, OT Sensory Int Techniques, OT Therapeutic Activity, OT Evaluation and OT Therapeutic Exercise   Outcome: MET Date Met: 07/29/19

## 2019-07-29 NOTE — CARE PLAN
Problem: Dressing  Goal: STG-Within one week, patient will dress LB  1) Individualized Goal: With supervision a using AE/AD as needed   2) Interventions: OT Group Therapy, OT Self Care/ADL, OT Cognitive Skill Dev, OT Community Reintegration, OT Manual Ther Technique, OT Neuro Re-Ed/Balance, OT Sensory Int Techniques, OT Therapeutic Activity, OT Evaluation and OT Therapeutic Exercise      Outcome: MET Date Met: 07/29/19      Problem: Toileting  Goal: STG-Within one week, patient will complete toileting tasks  1) Individualized Goal:  With min a using AE/AD as needed   2) Interventions:  OT Group Therapy, OT Self Care/ADL, OT Cognitive Skill Dev, OT Community Reintegration, OT Manual Ther Technique, OT Neuro Re-Ed/Balance, OT Sensory Int Techniques, OT Therapeutic Activity, OT Evaluation and OT Therapeutic Exercise   Outcome: MET Date Met: 07/29/19      Problem: Functional Transfers  Goal: STG-Within one week, patient will transfer to toilet  1) Individualized Goal: With supervision a using AE/AD as needed   2) Interventions: OT Group Therapy, OT Self Care/ADL, OT Cognitive Skill Dev, OT Community Reintegration, OT Manual Ther Technique, OT Neuro Re-Ed/Balance, OT Sensory Int Techniques, OT Therapeutic Activity, OT Evaluation and OT Therapeutic Exercise    Outcome: MET Date Met: 07/29/19

## 2019-07-29 NOTE — REHAB-COLLABORATIVE ONGOING IDT TEAM NOTE
Weekly Interdisciplinary Team Conference Note    Weekly Interdisciplinary Team Conference #3  Date:  7/29/2019    Clinicians present and reporting at team conference include the following:   MD: Mimi Doyle MD   RN:  Ivis Araujo RN   PT:   Azalia Martínez, PT, DPT  OT:  Nara Ojeda MS, OTR/L   ST:  None  CM:  Anahi Mccall RN  REC:  None  RT:  None  RPh:  Alessio Starkey, [facility]  Other:   None  All reporting clinicians have a working knowledge of this patient's plan of care.    Targeted DC Date:  7/31/2019     Medical    Patient Active Problem List    Diagnosis Date Noted   • Acute encephalopathy 06/27/2019     Priority: High   • OZ (acute kidney injury) ([facility]) 06/18/2019     Priority: High   • Acute on chronic respiratory failure with hypoxia ([facility]) 01/25/2018     Priority: High   • Diabetic foot ulcer ([facility]) 01/13/2018     Priority: High   • Facial nerve paralysis 09/03/2016     Priority: High   • Septic shock due to undetermined organism ([facility]) 07/10/2015     Priority: High   • SALOME (obstructive sleep apnea) 06/16/2019     Priority: Medium   • Pneumonia due to infectious organism 06/16/2019     Priority: Medium   • Aspiration into airway 06/16/2019     Priority: Medium   • Cardiomyopathy ([facility]) 01/29/2018     Priority: Medium   • DM (diabetes mellitus), type 2, uncontrolled (CMS-[facility]) 07/12/2015     Priority: Medium   • Essential hypertension 03/16/2013     Priority: Medium   • Mucus plugging of bronchi 06/16/2019     Priority: Low   • Left shoulder pain 06/15/2019     Priority: Low   • Hypokalemia 07/12/2015     Priority: Low   • Tobacco dependence 07/12/2015     Priority: Low   • Low back pain 07/10/2015     Priority: Low   • Hyperphosphatemia 07/24/2019   • Jerking 07/22/2019   • Anemia 07/15/2019   • Vitamin D deficiency 07/15/2019   • S/P BKA (below knee amputation) unilateral, left ([facility]) 07/10/2019   • Hypoglycemia 06/27/2019   • Leucocytosis 06/23/2019   • Chronic pain syndrome 01/27/2018   • Opiate  withdrawal ([facility]) 01/12/2018   • Methadone dependence ([facility]) 01/12/2018   • Acute respiratory failure with hypoxemia ([facility]) 01/12/2018   • Closed 2-part nondisplaced fracture of surgical neck of left humerus 01/12/2018   • Shingles 09/03/2016   • CVA (cerebral vascular accident) ([facility]) 09/02/2016   • Thrombocytopenia ([facility]) 07/12/2015   • Obesity 07/12/2015   • Hyponatremia 07/10/2015   • Tobacco abuse 07/10/2015   • ARF (acute renal failure) ([facility]) 07/10/2015   • DM (diabetes mellitus) (CMS-HCC) 03/16/2013   • Back pain 03/15/2013   • Fall 03/15/2013   • Spine disorder 03/04/2013   • Degeneration of lumbar or lumbosacral intervertebral disc 02/11/2013   • Lumbar stenosis 02/11/2013     Results     Procedure Component Value Ref Range Date/Time    ACCU-CHEK GLUCOSE [[id number]]  (Abnormal) Collected:  07/29/19 1123    Order Status:  Completed Lab Status:  Final result Updated:  07/29/19 1139     Glucose - Accu-Ck 172 (H) 65 - 99 mg/dL     ACCU-CHEK GLUCOSE [[id number]]  (Abnormal) Collected:  07/29/19 0719    Order Status:  Completed Lab Status:  Final result Updated:  07/29/19 0734     Glucose - Accu-Ck 135 (H) 65 - 99 mg/dL     ACCU-CHEK GLUCOSE [[id number]]  (Abnormal) Collected:  07/28/19 2028    Order Status:  Completed Lab Status:  Final result Updated:  07/29/19 0029     Glucose - Accu-Ck 124 (H) 65 - 99 mg/dL     ACCU-CHEK GLUCOSE [[id number]]  (Abnormal) Collected:  07/28/19 1705    Order Status:  Completed Lab Status:  Final result Updated:  07/28/19 1720     Glucose - Accu-Ck 126 (H) 65 - 99 mg/dL            Nursing  Diet/Nutrition:  Diabetic, Cardiac and Thin Liquids  Medication Administration:  Whole with Liquid Wash  % consumed at meals in last 24 hours:  Consumed % of meals per documentation.  Meal/Snack Consumption for the past 24 hrs:   Oral Nutrition Oral Nutrition Supplement    07/28/19 2023 Snack;Between % Consumed -   07/28/19 1800 Dinner;Between % Consumed -   07/28/19 1230 Between  50-75% Consumed -       Snack schedule:  HS  Appetite:  Good  Fluid Intake/Output in past 24 hours: In: 840 [P.O.:840]  Out: 700   Admit Weight:  Weight: 88.9 kg (196 lb)  Weight Last 7 Days       Pain Issues:    Location:  Leg (07/28 2023)  Left (07/28 2023)         Severity:  Moderate   Scheduled pain medications: Methadone; Lyrica  PRN pain medications used in last 24 hours:  oxycodone immediate release (ROXICODONE)    Non Pharmacologic Interventions:  warm blanket, distraction, emotional support, relaxation, repositioned and rest  Effectiveness of pain management plan:  good=patient states acceptable comfort after interventions    Bowel:    Bowel Assist Initial Score:  4 - Minimal Assistance  Bowel Assist Current Score:  5 - Standby Prompting/Supervision or Set-up  Bowl Accidents in last 7 days:  0  Last bowel movement: 07/27/19  Stool Description: Medium, Soft     Usual bowel pattern:  every other day  Scheduled bowel medications:  None  PRN bowel medications used in last 24 hours:  None  Nursing Interventions:  Increased time, Brief, Supervision, Set-up  Effectiveness of bowel program:   good=regular, predictable, controlled emptying of bowel  Bladder:    Bladder Assist Initial Score:  4 - Minimal Assistance  Bladder Assist Current Score:  4 - Minimal Assistance  Bladder Accidents in last 7 days:  0  PVR range for past 24-48 hours: -- ()  Medications affecting bladder:  None    Time void schedule/voiding pattern:  Voiding every 2-4 hours  Interventions:  Increased time, Emptying of device, Urinal, Brief  Effectiveness of bladder training:  Good=regular, predictable, emptying of bladder, patient initiates time voiding      Diabetes:  Blood Sugar Frequency:  Before meals and at bedtime    Range of BS for last 48 hours:   Recent Labs      07/27/19   0806  07/27/19   1113  07/27/19   1728  07/27/19   2100  07/28/19   0800  07/28/19   1123  07/28/19   1705  07/28/19 2028   POCGLUCOSE  154*  169*  160*  140*  158*   100*  126*  124*     Scheduled diabetic medications:  insulin glargine (LANTUS) injection  and insulin lispro (HUMALOG) injection   Sliding scale usage in past 24 hours:  No  Total Short acting insulin in the past 24 hours:  None  Total Long acting insulin in the past 24 hours:  Lantus 12 units    Wound:         Patient Lines/Drains/Airways Status    Active Current Wounds     Name: Placement date: Placement time: Site: Days:    Wound 07/10/19 Diabetic Ulcer Heel;Foot DM ulceration plantar surface right heel 07/10/19   1027      18    Moisture Associated Skin Damage Anterior;Posterior Buttock;Groin 07/10/19   1027    18                   Interventions:  Right plantar heel-Honey colloid, non-adhesive foam, hypafix tape; Buttocks and groin-Jennifer cream  Effectiveness of intervention:  wound is improving     Sleep/wake cycle:   Average hours slept:  Sleeps 3-4 hours without waking  Sleep medication usage:  Melatonin and trazadone    Patient/Family Training/Education:  Diabetes Management, Diet/Nutrition, Fall Prevention, General Self Care, Medication Management, Pain Management, Respiratory Hygiene, Safe Transfers, Safety, Skin Care and Wound Care  Discharge Supply Recommendations:  Glucometer and Strips and Wound Care Supplies  Strengths: Alert and oriented, Willingly participates in therapeutic activities, Able to follow instructions, Supportive family, Pleasant and cooperative, Effective communication skills, Good carryover of learning, Motivated for self care and independence and Manages pain appropriately   Barriers:   Generalized weakness and Limited mobility            Nursing Problems           Problem: Bowel/Gastric:     Goal: Normal bowel function is maintained or improved           Goal: Will not experience complications related to bowel motility     Flowsheet:     Taken at 07/27/19 1230    Last BM 07/27/19 by Anahi Castro, C.N.A.                  Problem: Communication     Goal: The ability to  communicate needs accurately and effectively will improve             Problem: Discharge Barriers/Planning     Goal: Patient's continuum of care needs will be met             Problem: GLYCEMIA IMBALANCE     Goal: Clinical indication of glycemia balance is achieved             Problem: Infection     Goal: Will remain free from infection             Problem: Knowledge Deficit     Goal: Knowledge of disease process/condition, treatment plan, diagnostic tests, and medications will improve           Goal: Knowledge of the prescribed therapeutic regimen will improve             Problem: Pain Management     Goal: Pain level will decrease to patient's comfort goal     Flowsheet:     Taken at 07/17/19 2217    Comfort Goal Comfort with Movement;Perform Activity;Sleep Comfortably;Comfort at Rest by RAMYA Bill.P.N.    Non Verbal Scale  Calm;Unlabored Breathing by ELYSE BillP.NLatha    Pain Rating Scale (NPRS) 6 - Hard to ignore, avoid usual activities by Daniela Yanez, RAMYA.P.NLatha    Taken at 07/16/19 2300    Comfort Goal Comfort with Movement;Perform Activity;Sleep Comfortably;Comfort at Rest by Daniela Yanez, L.P.N.    Non Verbal Scale  Calm;Unlabored Breathing by Daniela Yanez, RAMYA.P.NLatha    Pain Rating Scale (NPRS) 8 - Awful, hard to do anything by Daniela Yanez, L.P.N.                  Problem: Respiratory:     Goal: Respiratory status will improve             Problem: Safety     Goal: Will remain free from injury           Goal: Will remain free from falls             Problem: Skin Integrity     Goal: Risk for impaired skin integrity will decrease             Problem: Venous Thromboembolism (VTW)/Deep Vein Thrombosis (DVT) Prevention:     Goal: Patient will participate in Venous Thrombosis (VTE)/Deep Vein Thrombosis (DVT)Prevention Measures     Flowsheet:     Taken at 07/28/19 2023    Risk Assessment Score 3 (calculated) by Codi Frederick R.N.    VTE RISK High (calculated) by  Codi Frederick R.N.    Taken at 07/25/19 2017    Pharmacologic Prophylaxis Used No Value by Codi Frederick R.N. Comment:  None ordered                       Long Term Goals:   At discharge patient will be able to function safely at home and in the community with support.    Section completed by:  Codi Frederick R.N.           Respiratory Therapy    Pt has been on 3 lpm 24/7 since admission.  He has failed RA trials during the day and in the last few days he has declined attempting to wean his daytime 02.  He states he is not planning on using it all the time at home.  I suggested that he should buy an oximeter so he can monitor his SATS so he does not dip below 90%.    Section completed by:  Earnestine Ya, RRT       Mobility  Bed mobility:   Mod I  Bed /Chair/Wheelchair Transfer Initial:  2 - Max Assistance  Bed /Chair/Wheelchair Transfer Current:  6 - Modified Independent   Bed/Chair/Wheelchair Transfer Description:  Increased time, Supervision for safety (Supervision for safety secondary to limited time with pt.  No verbal cues or set-up assist to complete transfer )  Walk Initial:  0 - Not tested,unsafe activity  Walk Current:  2 - Max Assistance   Walk Description:   (pt amb x115' with knee scooter + CGA, distance limited  by reports of R knee fatigue)  Wheelchair Initial:  1 - Total Assistance  Wheelchair Current:  6 - Modified Independent   Wheelchair Description:   (Mod I indoors; SPV outdoors- Patient able to propel bwd uphill with inc ease )  Stairs Initial:  0 - Not tested,unsafe activity  Stairs Current: 0 - Not tested,medical condition   Stairs Description:    Patient/Family Training/Education:  Pt has been educated in BKA positioning, stretching, strengthening, skin insepction, fall prevention, wc safety, use of knee scooter for ambulation to/from bathroom only as wc won't fit through doorways in home, BKA wrapping, use of IPOP, desensitization strategies. Pt  working with [employer].  DME/DC Recommendations:  Ramp to enter home, manual wc with L residual limb board and cushion, knee scooter, grab bars in shower and by toilet, BSC, reacher.  Strengths:  Able to follow instructions, Alert and oriented, Effective communication skills, Independent PLOF, Making steady progress towards goals, Manages pain appropriately, Motivated for self care and independence, Pleasant and cooperative and Willingly participates in therapeutic activities  Barriers:   Other: L shoulder weakness/instability, R LE weakness, limited standing tolerance  # of short term goals set= 4 LTGs  # of short term goals met=2/4       Physical Therapy Problems           Problem: Mobility     Dates: Start: 07/11/19       Goal: STG-Within one week, patient will ambulate household distance     Dates: Start: 07/11/19   Expected End: 07/18/19       Description: 1) Individualized goal:  >20' with FWW and Min A  2) Interventions:  PT Group Therapy, PT Prosthetic Training, PT Gait Training, PT Self Care/Home Eval, PT Therapeutic Exercises, PT Neuro Re-Ed/Balance, PT Therapeutic Activity and PT Evaluation               Problem: Mobility Transfers     Dates: Start: 07/11/19       Goal: STG-Within one week, patient will transfer bed to chair     Dates: Start: 07/22/19   Expected End: 07/29/19       Description: 1) Individualized goal:  Mod I in room, will require assist of 2nd person for O2 line management  2) Interventions:  PT Group Therapy, PT Prosthetic Training, PT Gait Training, PT Self Care/Home Eval, PT Therapeutic Exercises, PT Neuro Re-Ed/Balance, PT Therapeutic Activity and PT Evaluation               Problem: PT-Long Term Goals     Dates: Start: 07/11/19       Goal: LTG-By discharge, patient will ambulate     Dates: Start: 07/11/19   Expected End: 08/01/19       Description: 1) Individualized goal:  >100' with FWW and SPV, hop-to pattern on R LE  2) Interventions:  PT Group Therapy, PT Prosthetic  Training, PT Gait Training, PT Self Care/Home Eval, PT Therapeutic Exercises, PT Neuro Re-Ed/Balance, PT Therapeutic Activity and PT Evaluation                 Goal: LTG-By discharge, patient will ambulate up/down 4-6 stairs     Dates: Start: 07/11/19   Expected End: 08/01/19       Description: 1) Individualized goal:  B HRs, hop-to pattern, CGA, to safely enter/exit home if ramp not yet installed  2) Interventions:  PT Group Therapy, PT Prosthetic Training, PT Gait Training, PT Self Care/Home Eval, PT Therapeutic Exercises, PT Neuro Re-Ed/Balance, PT Therapeutic Activity and PT Evaluation                         Section completed by:  Azalia Martínez DPT       Activities of Daily Living  Eating Initial:  5 - Standby Prompting/Supervision or Set-up  Eating Current:  7 - Independent   Eating Description:  Increased time  Grooming Initial:  5 - Standby Prompting/Supervision or Set-up  Grooming Current:  7 - Independent   Grooming Description:  Increased time  Bathing Initial:  4 - Minimal Assistance  Bathing Current:  6 - Modified Independent   Bathing Description:  Grab bar, Tub bench, Hand held shower, Increased time  Upper Body Dressing Initial:  4 - Minimal Assistance  Upper Body Dressing Current:  7 - Independent   Upper Body Dressing Description:  Increased time  Lower Body Dressing Initial:  2 - Max Assistance  Lower Body Dressing Current:  6 - Modified Independent   Lower Body Dressing Description:  6 - Modified Independent  Toileting Initial:  2 - Max Assistance  Toileting Current:  6 - Modified Independent   Toileting Description:  Supervision for safety, Increased time, Grab bar (Supervision for safety; no LOB noted during clothing management/hygiene pursuits. )  Toilet Transfer Initial:  3 - Moderate Assistance  Toilet Transfer Current:  6 - Modified Independent   Toilet Transfer Description:  6 - Modified Independent  Tub / Shower Transfer Initial:  3 - Moderate Assistance  Tub / Shower Transfer  Current:  6 - Modified Independent   Tub / Shower Transfer Description:  Increased time, Grab bar (squat pivot w/c<>tub bench with GB)  IADL:  Supervision - Mod I at w/c level   Family Training/Education: N/A   DME/DC Recommendations: tub transfer bench, grab bars at toilet and shower, 3-in-1 padded commode     Strengths:  Able to follow instructions, Alert and oriented, Independent PLOF, Making steady progress towards goals, Motivated for self care and independence, Pleasant and cooperative, Supportive family and Willingly participates in therapeutic activities  Barriers:  Generalized weakness, Home accessibility, Poor balance, Pressure ulcer and Other: requires O2 during activities, L chronic shoulder weakness/pain   # of short term goals set= 1    # of short term goals met= 3/3          Occupational Therapy Goals           Problem: IADL's     Dates: Start: 07/29/19       Goal: STG-Within one week, patient will access kitchen area     Dates: Start: 07/29/19       Description: 1) Individualized Goal: With set-up a using AE/AD as needed   2) Interventions: OT Group Therapy, OT Self Care/ADL, OT Cognitive Skill Dev, OT Community Reintegration, OT Manual Ther Technique, OT Neuro Re-Ed/Balance, OT Sensory Int Techniques, OT Therapeutic Activity, OT Evaluation and OT Therapeutic Exercise                   Section completed by:  Nara Ojeda MS,OTR/L             REHAB-Pharmacy IDT Team Note by Pavan CulpD at 7/26/2019  5:12 PM  Version 1 of 1    Author:  Estevan Nolasco PharmD Service:  (none) Author Type:  Pharmacist    Filed:  7/26/2019  5:15 PM Date of Service:  7/26/2019  5:12 PM Status:  Signed    :  Estevan Nolasco PharmD (Pharmacist)         Pharmacy   Pharmacy  Antibiotics/Antifungals/Antivirals:  Medication:      Active Orders     None        Route:         NA  Stop Date:  NA  Reason: NA  Antihypertensives/Cardiac:  Medication:    Orders (72h ago through future)    Start      Ordered    07/14/19 0900  metoprolol (LOPRESSOR) tablet 12.5 mg  DAILY      07/13/19 1041        Patient Vitals for the past 24 hrs:   BP Pulse   07/26/19 0800 - 90   07/26/19 0700 127/68 66   07/25/19 2000 148/67 84       Anticoagulation:  Medication:  None  INR:      Other key medications: Humalog, Lantus, Methadone, Pregabalin, Sevelamer  A review of the medication list reveals no issues at this time. Patient is currently on antihypertensive(s). Recommend home blood pressure monitoring/recording if antihypertensive(s) regimen(s) continue.  Patient is currently on diabetic medication(s) and/or Insulin(s). Recommend monitoring and recording of blood sugars to insure compliance, if these regimen(s) continue.  Section completed by:  Estevan Nolasco PharmD[[email or web address]]        Attribution Quintero     RK.1 - Pavan CulpD on 7/26/2019  5:12 PM                    DC Planning  DC destination/dispostion:  To single level home in [place], [place], with support of wife. Ramp and grab bars to be installed Monday, July 29, 2019.    Referrals: Grab bars, bed rail, ramp, wc, FWW, tub transfer bench, 3 in 1 commode.   [facility] for right heel wound- evaluate and treat.    DC Needs: Anticipate 24/7 need for oxygen. Needs portable tanks. Has concentrator through [facility] in [place]. Needs O2 eval. All other DME provided and covered by [employer] comp.  F/U appointment in place with Dr. Schneider on 7/31/19.   F/U with Dr. Turner, PCP.  OP PT.     Barriers to discharge:  Functional deficits. Fluid collection - left residual limb. Right heel wound. DM. Lives remotely with no home health services.    Strengths: [employer] comp has approved all needed DME. Patient is motivated. Wife is supportive.     Section completed by:  Anahi Mccall R.N.    Summary from Meeting: smitha PCP    Physician Summary  Mimi Doyle MD participated and led team conference discussion.

## 2019-07-29 NOTE — CARE PLAN
Problem: Communication  Goal: The ability to communicate needs accurately and effectively will improve  Outcome: PROGRESSING AS EXPECTED  Patient uses call light consistently and appropriately this shift.  Waits for assistance when needed and does not attempt self transfer.  Able to verbalize needs.     Problem: Venous Thromboembolism (VTW)/Deep Vein Thrombosis (DVT) Prevention:  Goal: Patient will participate in Venous Thrombosis (VTE)/Deep Vein Thrombosis (DVT)Prevention Measures  Outcome: PROGRESSING AS EXPECTED   07/25/19 2017 07/28/19 2023   OTHER   Risk Assessment Score --  3   VTE RISK --  High   Pharmacologic Prophylaxis Used (None ordered) --      VTE risk assessment completed (see flowsheets). Patient is at high risk for the development of VTEs. Patient is compliant with prophylactic treatment such as range of motion.

## 2019-07-29 NOTE — REHAB-OT IDT TEAM NOTE
Occupational Therapy   Activities of Daily Living  Eating Initial:  5 - Standby Prompting/Supervision or Set-up  Eating Current:  7 - Independent   Eating Description:  Increased time  Grooming Initial:  5 - Standby Prompting/Supervision or Set-up  Grooming Current:  7 - Independent   Grooming Description:  Increased time  Bathing Initial:  4 - Minimal Assistance  Bathing Current:  6 - Modified Independent   Bathing Description:  Grab bar, Tub bench, Hand held shower, Increased time  Upper Body Dressing Initial:  4 - Minimal Assistance  Upper Body Dressing Current:  7 - Independent   Upper Body Dressing Description:  Increased time  Lower Body Dressing Initial:  2 - Max Assistance  Lower Body Dressing Current:  6 - Modified Independent   Lower Body Dressing Description:  6 - Modified Independent  Toileting Initial:  2 - Max Assistance  Toileting Current:  6 - Modified Independent   Toileting Description:  Supervision for safety, Increased time, Grab bar (Supervision for safety; no LOB noted during clothing management/hygiene pursuits. )  Toilet Transfer Initial:  3 - Moderate Assistance  Toilet Transfer Current:  6 - Modified Independent   Toilet Transfer Description:  6 - Modified Independent  Tub / Shower Transfer Initial:  3 - Moderate Assistance  Tub / Shower Transfer Current:  6 - Modified Independent   Tub / Shower Transfer Description:  Increased time, Grab bar (squat pivot w/c<>tub bench with GB)  IADL:  Supervision - Mod I at w/c level   Family Training/Education: N/A   DME/DC Recommendations: tub transfer bench, grab bars at toilet and shower, 3-in-1 padded commode     Strengths:  Able to follow instructions, Alert and oriented, Independent PLOF, Making steady progress towards goals, Motivated for self care and independence, Pleasant and cooperative, Supportive family and Willingly participates in therapeutic activities  Barriers:  Generalized weakness, Home accessibility, Poor balance, Pressure ulcer and  Other: requires O2 during activities, L chronic shoulder weakness/pain   # of short term goals set= 1    # of short term goals met= 3/3          Occupational Therapy Goals           Problem: IADL's     Dates: Start: 07/29/19       Goal: STG-Within one week, patient will access kitchen area     Dates: Start: 07/29/19       Description: 1) Individualized Goal: With set-up a using AE/AD as needed   2) Interventions: OT Group Therapy, OT Self Care/ADL, OT Cognitive Skill Dev, OT Community Reintegration, OT Manual Ther Technique, OT Neuro Re-Ed/Balance, OT Sensory Int Techniques, OT Therapeutic Activity, OT Evaluation and OT Therapeutic Exercise                   Section completed by:  Nara Ojeda MS,OTR/L

## 2019-07-29 NOTE — REHAB-RESPIRATORY IDT TEAM NOTE
Respiratory Therapy   Respiratory Therapy    Pt has been on 3 lpm 24/7 since admission.  He has failed RA trials during the day and in the last few days he has declined attempting to wean his daytime 02.  He states he is not planning on using it all the time at home.  I suggested that he should buy an oximeter so he can monitor his SATS so he does not dip below 90%.    Section completed by:  Earnestine Ya, RRT

## 2019-07-29 NOTE — PROGRESS NOTES
Hospital Medicine Daily Progress Note      Chief Complaint  CM, HTN, DM, S/P BKA    Interval Problem Update  Pt w/o new complaints today.    Review of Systems  Review of Systems   Constitutional: Negative for chills and fever.   HENT: Negative.    Eyes: Negative.    Respiratory: Negative for cough and shortness of breath.    Cardiovascular: Negative for chest pain and palpitations.   Gastrointestinal: Negative for abdominal pain, nausea and vomiting.   Genitourinary: Negative.    Musculoskeletal:        Wound pain   Skin: Negative for itching and rash.        Physical Exam  Temp:  [36.5 °C (97.7 °F)-37.1 °C (98.7 °F)] 37.1 °C (98.7 °F)  Pulse:  [76-83] 77  Resp:  [18-20] 18  BP: (110-126)/(60-69) 126/69  SpO2:  [94 %-95 %] 95 %    Physical Exam   Constitutional: He is oriented to person, place, and time. No distress.   HENT:   Head: Normocephalic and atraumatic.   Right Ear: External ear normal.   Left Ear: External ear normal.   Eyes: Conjunctivae and EOM are normal. Right eye exhibits no discharge. Left eye exhibits no discharge.   Neck: Normal range of motion. Neck supple. No tracheal deviation present.   Cardiovascular: Normal rate and regular rhythm.    Pulmonary/Chest: No stridor. No respiratory distress. He has no wheezes.   Decreased BS   Abdominal: Soft. Bowel sounds are normal. He exhibits no distension. There is no tenderness.   Musculoskeletal: He exhibits tenderness.   Left BKA stump braced   Neurological: He is alert and oriented to person, place, and time.   Skin: Skin is warm and dry. He is not diaphoretic.   Vitals reviewed.      Fluids    Intake/Output Summary (Last 24 hours) at 07/29/19 1007  Last data filed at 07/29/19 0900   Gross per 24 hour   Intake              840 ml   Output              900 ml   Net              -60 ml       Laboratory  Recent Labs      07/28/19   0506   WBC  7.4   RBC  3.39*   HEMOGLOBIN  9.4*   HEMATOCRIT  30.2*   MCV  89.1   MCH  27.7   MCHC  31.1*   RDW  50.4*    PLATELETCT  185   MPV  10.0     Recent Labs      07/28/19   0506   SODIUM  137   POTASSIUM  3.9   CHLORIDE  98   CO2  30   GLUCOSE  115*   BUN  35*   CREATININE  1.23   CALCIUM  8.6                   Assessment/Plan  * S/P BKA (below knee amputation) unilateral, left (HCC)- (present on admission)   Assessment & Plan    2/2 diabetic foot infection/sepsis  S/P left BKA on 6/24/19 by Dr. Schneider  Completed antibiotics per ID prior to Rehab admission  U/S shows fluid collection x 3  Had Ortho F/U x 2 w/ no additional recommendations  F/U U/S now shows only 2 fluid collections and decreasing in size     Acute on chronic respiratory failure with hypoxia (HCC)- (present on admission)   Assessment & Plan    S/P VDRF     Cardiomyopathy (HCC)- (present on admission)   Assessment & Plan    Echo EF 40%  Monitor for volume overload     DM (diabetes mellitus), type 2, uncontrolled (CMS-HCC)- (present on admission)   Assessment & Plan    HbA1c 12.2  Good serum glucose control on Lantus 12 u qhs  Will not SSI on discharge  Outpt meds include Lantus 44 units bid     Essential hypertension- (present on admission)   Assessment & Plan    Blood pressure controlled on low dose Metoprolol     Hyperphosphatemia- (present on admission)   Assessment & Plan    Continue Sevelamer for elevated phosphorus levels  PCP F/U to reassess need for phosphate binder     Vitamin D deficiency- (present on admission)   Assessment & Plan    Vit D level 12  On supplementation     Anemia- (present on admission)   Assessment & Plan    Vit B12 and Folate levels normal  Continue Fe and Vit C for low iron stores     DNR

## 2019-07-29 NOTE — FLOWSHEET NOTE
07/29/19 0849   Events/Summary/Plan   Events/Summary/Plan 02 spot check, pt again declined to attempt RA trial   Interdisciplinary Plan of Care-Goals (Indications)   Bronchodilator Indications History / Diagnosis   Interdisciplinary Plan of Care-Outcomes    Bronchodilator Outcome Patient at Stable Baseline   Respiratory WDL   Respiratory (WDL) X   Chest Exam   Respiration 18   Pulse 77   Oximetry   #Pulse Oximetry (Single Determination) Yes   Oxygen   Home O2 Use Prior To Admission? Yes   Home O2 LPM Flow 3 LPM   Home O2 Delivery Method Nasal Cannula   Home O2 Frequency of Use At Sleep   Pulse Oximetry 95 %   O2 (LPM) 3   O2 Daily Delivery Respiratory  Silicone Nasal Cannula

## 2019-07-29 NOTE — DISCHARGE PLANNING
O2 referral sent to [facility] per choice form.  Per Kirsty, referral accepted.  She will call patient's wife to arrange delivery.

## 2019-07-29 NOTE — THERAPY
Physical Therapy   Daily Treatment     Patient Name: Travon Pollack  Age:  63 y.o., Sex:  male  Medical Record #: [id number]  Today's Date: 7/29/2019     Precautions  Precautions: (P) Non Weight Bearing Left Lower Extremity, Immobilizer Left Lower Extremity, Fall Risk  Comments: (P) R heel ulcer, Chronic L shoulder weakness    Subjective    Patient recently had a shower, and was awaiting RN dressing change for R heel wound. Patient is eager to D/C today, but frustrated by full scheduled therapies, and the possibility of not D/C today. Patient was agreeable to family training with spouse for residual limb wrapping later this morning.       Objective       07/29/19 0831   Precautions   Precautions Non Weight Bearing Left Lower Extremity;Immobilizer Left Lower Extremity;Fall Risk   Comments R heel ulcer, Chronic L shoulder weakness   Vitals   O2 Delivery Nasal Cannula   Neuro-Muscular Treatments   Comments Residual limb wrapping using patellar lock, 2 wraps, figure 8 method.  Education on knee ext HEP/ rationale, IPOP rationale, and residual limb wrapping to bias knee ext and assist with shaping.    Interdisciplinary Plan of Care Collaboration   IDT Collaboration with  Physical Therapist;Nursing;Physician;   Patient Position at End of Therapy Seated   Collaboration Comments POC, D/C recommendations, wrapping with spouse for family training- recommended. Patient expecting to D/C today; wc not delivered, O2 arrangements needed.  RN dresed R heel wound.   MD approved continuuing with Ace wrapping despite recent hematoma- improving per .   PT Total Time Spent   PT Individual Total Time Spent (Mins) 30   PT Charge Group   PT Therapeutic Activities 2     Quad sets 10x, 5 sec holds.  Positional reminders and recommendations to minimize the possibility of contractures, and promote proper fit for eventual prosthetic.      Assessment    Patient was limited by strong desire to D/C today, and needing answers to when O2  "and wc deliveries would be made. Patient was in room without IPOP, (stump board donned), but with knee flex, requiring education on positioning recommendations and IPOP rationale.     Plan    D/C patient (anticipate today, or tomorrow). Family training with spouse for Ace wrapping residual limb to bias knee ext, and minimize \"dog ears\"/ promote shaping.     "

## 2019-07-29 NOTE — FLOWSHEET NOTE
07/29/19 1020   Events/Summary/Plan   Events/Summary/Plan RA spot check for five minutes   Oximetry   #Pulse Oximetry (Single Determination) Yes   Oxygen   Pulse Oximetry (!) 84 %   O2 Daily Delivery Respiratory  Room Air with O2 Available   Room Air Challenge Fail

## 2019-07-29 NOTE — REHAB-PT IDT TEAM NOTE
Physical Therapy   Mobility  Bed mobility:   Mod I  Bed /Chair/Wheelchair Transfer Initial:  2 - Max Assistance  Bed /Chair/Wheelchair Transfer Current:  6 - Modified Independent   Bed/Chair/Wheelchair Transfer Description:  Increased time, Supervision for safety (Supervision for safety secondary to limited time with pt.  No verbal cues or set-up assist to complete transfer )  Walk Initial:  0 - Not tested,unsafe activity  Walk Current:  2 - Max Assistance   Walk Description:   (pt amb x115' with knee scooter + CGA, distance limited  by reports of R knee fatigue)  Wheelchair Initial:  1 - Total Assistance  Wheelchair Current:  6 - Modified Independent   Wheelchair Description:   (Mod I indoors; SPV outdoors- Patient able to propel bwd uphill with inc ease )  Stairs Initial:  0 - Not tested,unsafe activity  Stairs Current: 0 - Not tested,medical condition   Stairs Description:    Patient/Family Training/Education:  Pt has been educated in BKA positioning, stretching, strengthening, skin insepction, fall prevention, wc safety, use of knee scooter for ambulation to/from bathroom only as wc won't fit through doorways in home, BKA wrapping, use of IPOP, desensitization strategies. Pt working with [employer].  DME/DC Recommendations:  Ramp to enter home, manual wc with L residual limb board and cushion, knee scooter, grab bars in shower and by toilet, BSC, reacher.  Strengths:  Able to follow instructions, Alert and oriented, Effective communication skills, Independent PLOF, Making steady progress towards goals, Manages pain appropriately, Motivated for self care and independence, Pleasant and cooperative and Willingly participates in therapeutic activities  Barriers:   Other: L shoulder weakness/instability, R LE weakness, limited standing tolerance  # of short term goals set= 4 LTGs  # of short term goals met=2/4       Physical Therapy Problems           Problem: Mobility     Dates: Start: 07/11/19       Goal:  STG-Within one week, patient will ambulate household distance     Dates: Start: 07/11/19   Expected End: 07/18/19       Description: 1) Individualized goal:  >20' with FWW and Min A  2) Interventions:  PT Group Therapy, PT Prosthetic Training, PT Gait Training, PT Self Care/Home Eval, PT Therapeutic Exercises, PT Neuro Re-Ed/Balance, PT Therapeutic Activity and PT Evaluation               Problem: Mobility Transfers     Dates: Start: 07/11/19       Goal: STG-Within one week, patient will transfer bed to chair     Dates: Start: 07/22/19   Expected End: 07/29/19       Description: 1) Individualized goal:  Mod I in room, will require assist of 2nd person for O2 line management  2) Interventions:  PT Group Therapy, PT Prosthetic Training, PT Gait Training, PT Self Care/Home Eval, PT Therapeutic Exercises, PT Neuro Re-Ed/Balance, PT Therapeutic Activity and PT Evaluation               Problem: PT-Long Term Goals     Dates: Start: 07/11/19       Goal: LTG-By discharge, patient will ambulate     Dates: Start: 07/11/19   Expected End: 08/01/19       Description: 1) Individualized goal:  >100' with FWW and SPV, hop-to pattern on R LE  2) Interventions:  PT Group Therapy, PT Prosthetic Training, PT Gait Training, PT Self Care/Home Eval, PT Therapeutic Exercises, PT Neuro Re-Ed/Balance, PT Therapeutic Activity and PT Evaluation                 Goal: LTG-By discharge, patient will ambulate up/down 4-6 stairs     Dates: Start: 07/11/19   Expected End: 08/01/19       Description: 1) Individualized goal:  B HRs, hop-to pattern, CGA, to safely enter/exit home if ramp not yet installed  2) Interventions:  PT Group Therapy, PT Prosthetic Training, PT Gait Training, PT Self Care/Home Eval, PT Therapeutic Exercises, PT Neuro Re-Ed/Balance, PT Therapeutic Activity and PT Evaluation                         Section completed by:  Azalia Martínez DPT

## 2019-07-29 NOTE — THERAPY
"Physical Therapy   Daily Treatment     Patient Name: Travon Pollack  Age:  63 y.o., Sex:  male  Medical Record #: [id number]  Today's Date: 7/29/2019     Precautions  Precautions: (P) Non Weight Bearing Left Lower Extremity, Immobilizer Left Lower Extremity, Fall Risk, Other (See Comments)  Comments: (P) R heel ulcer, Chronic L shoulder weakness, 3L O2     Subjective    Pt received in room, states his wife is no longer coming today because he isn't able to discharge. Pt states contractors weren't able to install ramp today because they didn't have the necessary supplies.     Objective       07/29/19 1031   Precautions   Precautions Non Weight Bearing Left Lower Extremity;Immobilizer Left Lower Extremity;Fall Risk;Other (See Comments)   Comments R heel ulcer, Chronic L shoulder weakness, 3L O2    Cognition    Level of Consciousness Alert   Supine Lower Body Exercise   Other Exercises prone x20 min on mat table, pt completed x30 glut sets. Sidelying L hip abduction 3x10 reps. Passive L psoas stretch 3x30\" in sidelying   Bed Mobility    Supine to Sit Modified Independent   Sit to Supine Modified Independent   Scooting Modified Independent   Rolling Modified Independent   Interdisciplinary Plan of Care Collaboration   IDT Collaboration with  ;Nursing;Physician   Patient Position at End of Therapy Seated;Other (Comments)  (pt Mod I in manual wc back to room)   Collaboration Comments re: DC planning   PT Total Time Spent   PT Individual Total Time Spent (Mins) 60   PT Charge Group   PT Therapeutic Exercise 2   PT Therapeutic Activities 2       FIM Bed/Chair/Wheelchair Transfers Score: 6 - Modified Independent  Bed/Chair/Wheelchair Transfers Description:       FIM Walking Score:  2 - Max Assistance  Walking Description:       FIM Wheelchair Score:  6 - Modified Independent  Wheelchair Description:       FIM Stairs Score:  0 - Not tested,medical condition  Stairs Description:         Assessment    Pt cooperative " with session. L UE weakness and instability limit pt's ability to participate in standing with FWW.    Plan    Pending DC to home on Wednesday.

## 2019-07-29 NOTE — REHAB-NURSING IDT TEAM NOTE
Nursing   Nursing  Diet/Nutrition:  Diabetic, Cardiac and Thin Liquids  Medication Administration:  Whole with Liquid Wash  % consumed at meals in last 24 hours:  Consumed % of meals per documentation.  Meal/Snack Consumption for the past 24 hrs:   Oral Nutrition Oral Nutrition Supplement    07/28/19 2023 Snack;Between % Consumed -   07/28/19 1800 Dinner;Between % Consumed -   07/28/19 1230 Between 50-75% Consumed -       Snack schedule:  HS  Appetite:  Good  Fluid Intake/Output in past 24 hours: In: 840 [P.O.:840]  Out: 700   Admit Weight:  Weight: 88.9 kg (196 lb)  Weight Last 7 Days       Pain Issues:    Location:  Leg (07/28 2023)  Left (07/28 2023)         Severity:  Moderate   Scheduled pain medications: Methadone; Lyrica  PRN pain medications used in last 24 hours:  oxycodone immediate release (ROXICODONE)    Non Pharmacologic Interventions:  warm blanket, distraction, emotional support, relaxation, repositioned and rest  Effectiveness of pain management plan:  good=patient states acceptable comfort after interventions    Bowel:    Bowel Assist Initial Score:  4 - Minimal Assistance  Bowel Assist Current Score:  5 - Standby Prompting/Supervision or Set-up  Bowl Accidents in last 7 days:  0  Last bowel movement: 07/27/19  Stool Description: Medium, Soft     Usual bowel pattern:  every other day  Scheduled bowel medications:  None  PRN bowel medications used in last 24 hours:  None  Nursing Interventions:  Increased time, Brief, Supervision, Set-up  Effectiveness of bowel program:   good=regular, predictable, controlled emptying of bowel  Bladder:    Bladder Assist Initial Score:  4 - Minimal Assistance  Bladder Assist Current Score:  4 - Minimal Assistance  Bladder Accidents in last 7 days:  0  PVR range for past 24-48 hours: -- ()  Medications affecting bladder:  None    Time void schedule/voiding pattern:  Voiding every 2-4 hours  Interventions:  Increased time, Emptying of device, Urinal,  Brief  Effectiveness of bladder training:  Good=regular, predictable, emptying of bladder, patient initiates time voiding      Diabetes:  Blood Sugar Frequency:  Before meals and at bedtime    Range of BS for last 48 hours:   Recent Labs      07/27/19   0806  07/27/19   1113  07/27/19   1728  07/27/19   2100  07/28/19   0800  07/28/19   1123  07/28/19   1705  07/28/19   2028   POCGLUCOSE  154*  169*  160*  140*  158*  100*  126*  124*     Scheduled diabetic medications:  insulin glargine (LANTUS) injection  and insulin lispro (HUMALOG) injection   Sliding scale usage in past 24 hours:  No  Total Short acting insulin in the past 24 hours:  None  Total Long acting insulin in the past 24 hours:  Lantus 12 units    Wound:         Patient Lines/Drains/Airways Status    Active Current Wounds     Name: Placement date: Placement time: Site: Days:    Wound 07/10/19 Diabetic Ulcer Heel;Foot DM ulceration plantar surface right heel 07/10/19   1027      18    Moisture Associated Skin Damage Anterior;Posterior Buttock;Groin 07/10/19   1027    18                   Interventions:  Right plantar heel-Honey colloid, non-adhesive foam, hypafix tape; Buttocks and groin-Jennifer cream  Effectiveness of intervention:  wound is improving     Sleep/wake cycle:   Average hours slept:  Sleeps 3-4 hours without waking  Sleep medication usage:  Melatonin and trazadone    Patient/Family Training/Education:  Diabetes Management, Diet/Nutrition, Fall Prevention, General Self Care, Medication Management, Pain Management, Respiratory Hygiene, Safe Transfers, Safety, Skin Care and Wound Care  Discharge Supply Recommendations:  Glucometer and Strips and Wound Care Supplies  Strengths: Alert and oriented, Willingly participates in therapeutic activities, Able to follow instructions, Supportive family, Pleasant and cooperative, Effective communication skills, Good carryover of learning, Motivated for self care and independence and Manages pain  appropriately   Barriers:   Generalized weakness and Limited mobility            Nursing Problems           Problem: Bowel/Gastric:     Goal: Normal bowel function is maintained or improved           Goal: Will not experience complications related to bowel motility     Flowsheet:     Taken at 07/27/19 1230    Last BM 07/27/19 by Anahi Castro, C.N.A.                  Problem: Communication     Goal: The ability to communicate needs accurately and effectively will improve             Problem: Discharge Barriers/Planning     Goal: Patient's continuum of care needs will be met             Problem: GLYCEMIA IMBALANCE     Goal: Clinical indication of glycemia balance is achieved             Problem: Infection     Goal: Will remain free from infection             Problem: Knowledge Deficit     Goal: Knowledge of disease process/condition, treatment plan, diagnostic tests, and medications will improve           Goal: Knowledge of the prescribed therapeutic regimen will improve             Problem: Pain Management     Goal: Pain level will decrease to patient's comfort goal     Flowsheet:     Taken at 07/17/19 2217    Comfort Goal Comfort with Movement;Perform Activity;Sleep Comfortably;Comfort at Rest by Daniela Yanez, L.P.N.    Non Verbal Scale  Calm;Unlabored Breathing by Daniela Yanez, L.P.N.    Pain Rating Scale (NPRS) 6 - Hard to ignore, avoid usual activities by Daniela Yanez, L.P.N.    Taken at 07/16/19 2300    Comfort Goal Comfort with Movement;Perform Activity;Sleep Comfortably;Comfort at Rest by Daniela Yanez, L.P.N.    Non Verbal Scale  Calm;Unlabored Breathing by Daniela Yanez, L.P.N.    Pain Rating Scale (NPRS) 8 - Awful, hard to do anything by Daniela Yanez, L.P.N.                  Problem: Respiratory:     Goal: Respiratory status will improve             Problem: Safety     Goal: Will remain free from injury           Goal: Will remain free from falls              Problem: Skin Integrity     Goal: Risk for impaired skin integrity will decrease             Problem: Venous Thromboembolism (VTW)/Deep Vein Thrombosis (DVT) Prevention:     Goal: Patient will participate in Venous Thrombosis (VTE)/Deep Vein Thrombosis (DVT)Prevention Measures     Flowsheet:     Taken at 07/28/19 2023    Risk Assessment Score 3 (calculated) by Codi Frederick R.N.    VTE RISK High (calculated) by Codi Frederick R.N.    Taken at 07/25/19 2017    Pharmacologic Prophylaxis Used No Value by Codi Frederick R.N. Comment:  None ordered                       Long Term Goals:   At discharge patient will be able to function safely at home and in the community with support.    Section completed by:  Codi Frederick R.N.

## 2019-07-29 NOTE — PROGRESS NOTES
"Rehab Progress Note     Date of Service: 7/29/2019  Chief Complaint: follow up left BKA    Interval Events (Subjective)    Patient seen and examined in the therapy gym today.  He is very discouraged that he is not ready for discharge home today due to his equipment not yet being delivered.  He understands he will need to stay till Wednesday.  We talked about this intermittent arm and leg jerking he has which did not respond to a lower dose of Lyrica.  Unclear etiology.  He reports his contractor is getting the wood for his ramp with plans to likely build tomorrow.    Objective:  VITAL SIGNS: /69   Pulse 77   Temp 37.1 °C (98.7 °F) (Temporal)   Resp 18   Ht 1.803 m (5' 11\")   Wt 89.4 kg (197 lb 1.6 oz)   SpO2 (!) 84%   BMI 27.49 kg/m²   Gen: alert, no apparent distress  CV: regular rate and rhythm, no murmurs, no peripheral edema  Resp: clear to ascultation bilaterally, normal respiratory effort  GI: soft, non-tender abdomen, bowel sounds present  Msk: left transtibial amputation    Recent Results (from the past 72 hour(s))   ACCU-CHEK GLUCOSE    Collection Time: 07/26/19 11:25 AM   Result Value Ref Range    Glucose - Accu-Ck 160 (H) 65 - 99 mg/dL   ACCU-CHEK GLUCOSE    Collection Time: 07/26/19  5:16 PM   Result Value Ref Range    Glucose - Accu-Ck 133 (H) 65 - 99 mg/dL   ACCU-CHEK GLUCOSE    Collection Time: 07/26/19  7:46 PM   Result Value Ref Range    Glucose - Accu-Ck 145 (H) 65 - 99 mg/dL   ACCU-CHEK GLUCOSE    Collection Time: 07/27/19  8:06 AM   Result Value Ref Range    Glucose - Accu-Ck 154 (H) 65 - 99 mg/dL   ACCU-CHEK GLUCOSE    Collection Time: 07/27/19 11:13 AM   Result Value Ref Range    Glucose - Accu-Ck 169 (H) 65 - 99 mg/dL   ACCU-CHEK GLUCOSE    Collection Time: 07/27/19  5:28 PM   Result Value Ref Range    Glucose - Accu-Ck 160 (H) 65 - 99 mg/dL   ACCU-CHEK GLUCOSE    Collection Time: 07/27/19  9:00 PM   Result Value Ref Range    Glucose - Accu-Ck 140 (H) 65 - 99 mg/dL   PHOSPHORUS    " Collection Time: 07/28/19  5:06 AM   Result Value Ref Range    Phosphorus 4.8 (H) 2.5 - 4.5 mg/dL   CBC WITHOUT DIFFERENTIAL    Collection Time: 07/28/19  5:06 AM   Result Value Ref Range    WBC 7.4 4.8 - 10.8 K/uL    RBC 3.39 (L) 4.70 - 6.10 M/uL    Hemoglobin 9.4 (L) 14.0 - 18.0 g/dL    Hematocrit 30.2 (L) 42.0 - 52.0 %    MCV 89.1 81.4 - 97.8 fL    MCH 27.7 27.0 - 33.0 pg    MCHC 31.1 (L) 33.7 - 35.3 g/dL    RDW 50.4 (H) 35.9 - 50.0 fL    Platelet Count 185 164 - 446 K/uL    MPV 10.0 9.0 - 12.9 fL   Basic Metabolic Panel    Collection Time: 07/28/19  5:06 AM   Result Value Ref Range    Sodium 137 135 - 145 mmol/L    Potassium 3.9 3.6 - 5.5 mmol/L    Chloride 98 96 - 112 mmol/L    Co2 30 20 - 33 mmol/L    Glucose 115 (H) 65 - 99 mg/dL    Bun 35 (H) 8 - 22 mg/dL    Creatinine 1.23 0.50 - 1.40 mg/dL    Calcium 8.6 8.5 - 10.5 mg/dL    Anion Gap 9.0 0.0 - 11.9   ESTIMATED GFR    Collection Time: 07/28/19  5:06 AM   Result Value Ref Range    GFR If African American >60 >60 mL/min/1.73 m 2    GFR If Non African American 59 (A) >60 mL/min/1.73 m 2   ACCU-CHEK GLUCOSE    Collection Time: 07/28/19  8:00 AM   Result Value Ref Range    Glucose - Accu-Ck 158 (H) 65 - 99 mg/dL   ACCU-CHEK GLUCOSE    Collection Time: 07/28/19 11:23 AM   Result Value Ref Range    Glucose - Accu-Ck 100 (H) 65 - 99 mg/dL   ACCU-CHEK GLUCOSE    Collection Time: 07/28/19  5:05 PM   Result Value Ref Range    Glucose - Accu-Ck 126 (H) 65 - 99 mg/dL   ACCU-CHEK GLUCOSE    Collection Time: 07/28/19  8:28 PM   Result Value Ref Range    Glucose - Accu-Ck 124 (H) 65 - 99 mg/dL   ACCU-CHEK GLUCOSE    Collection Time: 07/29/19  7:19 AM   Result Value Ref Range    Glucose - Accu-Ck 135 (H) 65 - 99 mg/dL       Current Facility-Administered Medications   Medication Frequency   • insulin glargine (LANTUS) injection 12 Units Q EVENING   • pregabalin (LYRICA) capsule 300 mg BID   • sevelamer carbonate (RENVELA) tablet 800 mg TID WITH MEALS   • senna-docusate  (PERICOLACE or SENOKOT S) 8.6-50 MG per tablet 2 Tab BID PRN   • hydrocortisone 1 % cream BID PRN   • miconazole 2%-zinc oxide (DELFINO) topical cream BID   • metoprolol (LOPRESSOR) tablet 12.5 mg DAILY   • methadone (DOLOPHINE) tablet 20 mg BID   • vitamin D (cholecalciferol) tablet 4,000 Units DAILY   • ascorbic acid tablet 500 mg QDAY with Breakfast   • ferrous sulfate tablet 325 mg QDAY with Breakfast   • insulin lispro (HUMALOG) injection 2-12 Units 4X/DAY ACHS    And   • glucose 4 g chewable tablet 16 g Q15 MIN PRN   • Respiratory Care per Protocol Continuous RT   • Pharmacy Consult Request ...Pain Management Review 1 Each PHARMACY TO DOSE   • acetaminophen (TYLENOL) tablet 650 mg Q4HRS PRN   • artificial tears 1.4 % ophthalmic solution 1 Drop PRN   • benzocaine-menthol (CEPACOL) lozenge 1 Lozenge Q2HRS PRN   • mag hydrox-al hydrox-simeth (MAALOX PLUS ES or MYLANTA DS) suspension 20 mL Q2HRS PRN   • ondansetron (ZOFRAN ODT) dispertab 4 mg 4X/DAY PRN    Or   • ondansetron (ZOFRAN) syringe/vial injection 4 mg 4X/DAY PRN   • traZODone (DESYREL) tablet 50 mg QHS PRN   • sodium chloride (OCEAN) 0.65 % nasal spray 2 Spray PRN   • hydrOXYzine HCl (ATARAX) tablet 50 mg Q6HRS PRN   • melatonin tablet 3 mg HS PRN   • polyethylene glycol/lytes (MIRALAX) PACKET 1 Packet QDAY PRN    And   • magnesium hydroxide (MILK OF MAGNESIA) suspension 30 mL QDAY PRN    And   • bisacodyl (DULCOLAX) suppository 10 mg QDAY PRN   • fluticasone (FLONASE) nasal spray 100 mcg DAILY   • oxyCODONE immediate-release (ROXICODONE) tablet 5 mg Q6HRS PRN   • tramadol (ULTRAM) 50 MG tablet 100 mg Q6HRS PRN   • dextrose 50% (D50W) injection 50 mL Q15 MIN PRN   • diphenhydrAMINE-ZnAcetate (BENADRYL ITCH) 1-0.1 % cream TID PRN   • albuterol inhaler 2 Puff Q4HRS PRN       Orders Placed This Encounter   Procedures   • Diet Order Diabetic, Cardiac     Standing Status:   Standing     Number of Occurrences:   1     Order Specific Question:   Diet:     Answer:    Diabetic [3]     Order Specific Question:   Diet:     Answer:   Cardiac [6]     Order Specific Question:   Consistency/Fluid modifications:     Answer:   Thin Liquids [3]       Radiology    7/27/2019 11:53 AM    HISTORY/REASON FOR EXAM:  Follow-up left BKA stump fluid collections.      TECHNIQUE/EXAM DESCRIPTION AND NUMBER OF VIEWS:  Ultrasound was performed along the inferior aspect of the left BKA.    COMPARISON: 7/11/2019    FINDINGS:  There are 2 fluid collections identified along the left BKA stump. One measures 10 x 20 mm and the other measures 29 x 20 mm. These appear smaller and less complex when compared with prior study.   Impression       1.  Interval decrease in size and complexity of the 2 remaining fluid collections along the left BKA stump suggesting they are resolving postoperative hematomas or seromas.         Assessment:  Active Hospital Problems    Diagnosis   • *S/P BKA (below knee amputation) unilateral, left (HCC)   • Acute on chronic respiratory failure with hypoxia ([facility])   • SALOME (obstructive sleep apnea)   • Cardiomyopathy ([facility])   • DM (diabetes mellitus), type 2, uncontrolled (CMS-[facility])   • Essential hypertension   • Left shoulder pain   • Chronic pain syndrome   • Methadone dependence ([facility])     This patient is a 63 y.o. male admitted for acute inpatient rehabilitation with S/P BKA (below knee amputation) unilateral, left (HCC).    I led and attended the weekly conference, and agree with the IDT conference documentation and plan of care as noted below.    Date of conference: 7/29/2019    Goals and barriers: See IDT note.    Biggest barriers: home accessibility, post-op fluid collection    Therapy update 7/29/2019  Independent eating  Independent grooming  Modified Independent bathing  Independent upper body dressing  Modified Independent lower body dressing  Modified Independent toileting  Min assistbladder  Supervision bowel  Modified Independent bed/chair transfer  Modified Independent  toilet transfer  Modified Independent tub/shower transfer  Max assist ambulation  Modified Independent wheelchair propulsion  Not tested stairs  Modified Independent comprehension  Modified Independent expression  Independent social interaction  Modified Independent problem solving  Modified Independent memory    Admission FIM 66 --> 79 (7/16) --> 88 (7/18)    CM/social support: To single level home in [place], [place], with support of wife.    Anticipated DC date: 7/31/2019, ramp in process    Outpatient: PT    Equip: grab bars, WC, ramp, knee scooter    Follow up: PCP, surgery    Medical Decision Making and Plan:    Left BKA  Dr. Schneider 6/24  Continue full rehab program  PT/OT, 1 hr each discipline   Outpatient follow up with surgery, s/p partial suture removal  Return to clinic 7/24  - still not ready for suture removal, needs to return to clinic on 31st, apt scheduled for 10:30  Continue compression/edema management, need to show patient/wife how to wrap, will try tomorrow or day of discharge    Residual limb fluid collection  Post-op seroma versus hematoma  US with 3 distinct fluid collections - repeat US with 2 collections now, smaller in size, continue compression wrapping  Most likely seroma, though hemoglobin slightly lower, d/c Lovenox and apirin, hemoglobin improved  Scheduled with orthopedics - no intervention other than compression    Right heel ulcer  Continue wound care, appreciate assistance  Off loading boot  Referral made to limb preservation service    Normocytic anemia  Iron deficient  Likely post-operative  Monitor, stable/higher on recheck  Continue Vit C and ferrous sulfate    History of elevated troponin  Discontinued aspirin due to decreased Hgb    Diabetes  Continue insulin  Appreciate hospitalist assistance    Chronic pain  Narcotic dependence/tolerance  Continue methadone, prescribed by his PCP  Dose decreased from 35 mg BID at home, to 20 mg BID due to encephalopathy (stroke code was  called)  Continue PRN opiates - using only 2 5 mg tabs daily  Continue Lyrica, dose increased back up to his home dosing    I discussed the risks and benefits of using opioid medications for pain control.  I discussed the risk of addiction, potential for overdose leading respiratory depression, which could be fatal.  We discussed the potential need for opiate antagonist therapy if overdose occurs.      I encouraged the patient to take this medication sparingly with the expressed goal of weaning off the medication as soon as is clinically appropriate.      I informed the patient that we are only able to provide a 14 day supply of these medications at discharge and no refills will be provided by the [facility] medical team.  No replacement prescriptions will be provided for lost prescriptions.  Any medication refill would need to be provided by the patient's primary pain management provider at that provider's discretion.  The patient's primary provider may decide that ongoing opioid medications are no longer necessary.      We discussed the need to safely secure these medications to prevent theft, inadvertent ingestion, or misuse.  Any unused medication should be immediately disposed of through a sanctioned medication disposal program.  We discussed adjunctive pain medications and conservative therapies at length.      NARxCHECK score was: Narcotic 450  Sedative 491  Opioid Risk tool score was 0    I answered the patient's questions regarding this treatment, and the patient indicated understanding and willingness to proceed.     Left shoulder pain  With history of multiple surgeries, including left humeral head resection  Modalities with therapy  Continue pain management     Arm jerking, intermittent (subjective, have not seen this)  Dr Shaw to look at medications/side effects  Decreased dose of Lyrica to see if this is the culprit, no difference and will increased pain in residual limb, increase back to  home dosing  May need further outpatient work up, referral to neurology?    Rashes  Groin/buttock rash - miconazole, improved  Trunk rash - hydrocortison cream, resolved     Cardiomyopathy, EF 40%  Continue metoprolol  Appreciate hospitalist assistance     History of one kidney  Azotemia, resolved  Avoid nephrotoxins  Increased oral fluids    Hyperphosphatemia  Continue sevelamer  Will need outpatient labs with PCP  Appreciate hospitalist assistance     Respiratory insufficiency  Sleep apnea  On 3 L at home at night  Respiratory therapy to see patient, unable to titrate during day  Will need 24/7 at discharge     Hypertension  Continue metoprolol  Appreciate hospitalist assistance    Vit D deficiency, 12  Continue supplementation    DVT prophylaxis  Discontinued Lovenox due to fluid collection in residual limb and anemia  Increase mobility  Monitor for edema    Total time:  40 minutes.  I spent greater than 50% of the time for patient care, counseling, and coordination on this date, including patient face-to face time, unit/floor time with review of records/pertinent lab data and studies, as well as discussing diagnostic evaluation/work up, planned therapeutic interventions, and future disposition of care, as per the interval events/subjective and the assessment and plan as noted above.      Mimi Doyle M.D.   Physical Medicine and Rehabilitation

## 2019-07-29 NOTE — THERAPY
"Occupational Therapy  Daily Treatment     Patient Name: Travon Pollack  Age:  63 y.o., Sex:  male  Medical Record #: [id number]  Today's Date: 7/29/2019     Precautions  Precautions: Non Weight Bearing Left Lower Extremity, Immobilizer Left Lower Extremity, Fall Risk, Other (See Comments)  Comments: R heel ulcer, Chronic L shoulder weakness, 3L O2     Safety   ADL Safety : Modified Independent  Bathroom Safety: Modified Independent  Comments: See FIMs    Subjective    \"I am leaving today, I promised my wife that I would be with her tonight\"     Objective    Initially pt adamant on d/c today - SBA provided on packing up/organizing items in closet. RT and  present - collaborating about pt's possible d/c today, however we learned that pt still needs w/c, ramp, and portable O2 and it would not be safe to d/c without those items, also unable to borrow those items. Able to convince pt that d/c will be on Wed. D/t waiting on DME from [employer] - pt verbalized understanding and agreeable to new d/c date.       07/29/19 0945   Interdisciplinary Plan of Care Collaboration   Patient Position at End of Therapy Seated;Call Light within Reach;Tray Table within Reach   OT Total Time Spent   OT Individual Total Time Spent (Mins) 30   OT Charge Group   OT Self Care / ADL 1   OT Therapy Activity 1       Assessment    Pt perseverating on d/c today, however through an interdisciplinary approach it is best if pt d/c on Wed d/t still waiting on DME to arrive at home through [employer] insurance     Plan    Cont overall strength/endurance and standing balance to improve ADL's and functional mobility    "

## 2019-07-30 PROBLEM — R79.89 AZOTEMIA: Status: ACTIVE | Noted: 2019-07-30

## 2019-07-30 LAB
GLUCOSE BLD-MCNC: 108 MG/DL (ref 65–99)
GLUCOSE BLD-MCNC: 121 MG/DL (ref 65–99)
GLUCOSE BLD-MCNC: 129 MG/DL (ref 65–99)
GLUCOSE BLD-MCNC: 137 MG/DL (ref 65–99)

## 2019-07-30 PROCEDURE — 97530 THERAPEUTIC ACTIVITIES: CPT

## 2019-07-30 PROCEDURE — 82962 GLUCOSE BLOOD TEST: CPT | Mod: 91

## 2019-07-30 PROCEDURE — 700102 HCHG RX REV CODE 250 W/ 637 OVERRIDE(OP): Performed by: PHYSICAL MEDICINE & REHABILITATION

## 2019-07-30 PROCEDURE — 99231 SBSQ HOSP IP/OBS SF/LOW 25: CPT | Performed by: PHYSICAL MEDICINE & REHABILITATION

## 2019-07-30 PROCEDURE — 99232 SBSQ HOSP IP/OBS MODERATE 35: CPT | Performed by: HOSPITALIST

## 2019-07-30 PROCEDURE — 700102 HCHG RX REV CODE 250 W/ 637 OVERRIDE(OP): Performed by: HOSPITALIST

## 2019-07-30 PROCEDURE — A9270 NON-COVERED ITEM OR SERVICE: HCPCS | Performed by: PHYSICAL MEDICINE & REHABILITATION

## 2019-07-30 PROCEDURE — A9270 NON-COVERED ITEM OR SERVICE: HCPCS | Performed by: HOSPITALIST

## 2019-07-30 PROCEDURE — 770010 HCHG ROOM/CARE - REHAB SEMI PRIVAT*

## 2019-07-30 PROCEDURE — 97535 SELF CARE MNGMENT TRAINING: CPT

## 2019-07-30 PROCEDURE — 97110 THERAPEUTIC EXERCISES: CPT

## 2019-07-30 RX ADMIN — INSULIN GLARGINE 12 UNITS: 100 INJECTION, SOLUTION SUBCUTANEOUS at 19:58

## 2019-07-30 RX ADMIN — OXYCODONE HYDROCHLORIDE 5 MG: 5 TABLET ORAL at 17:25

## 2019-07-30 RX ADMIN — FLUTICASONE PROPIONATE 100 MCG: 50 SPRAY, METERED NASAL at 08:00

## 2019-07-30 RX ADMIN — PREGABALIN 300 MG: 100 CAPSULE ORAL at 08:00

## 2019-07-30 RX ADMIN — OXYCODONE HYDROCHLORIDE AND ACETAMINOPHEN 500 MG: 500 TABLET ORAL at 08:01

## 2019-07-30 RX ADMIN — SEVELAMER CARBONATE 800 MG: 800 TABLET, FILM COATED ORAL at 11:38

## 2019-07-30 RX ADMIN — FERROUS SULFATE TAB 325 MG (65 MG ELEMENTAL FE) 325 MG: 325 (65 FE) TAB at 08:00

## 2019-07-30 RX ADMIN — VITAMIN D, TAB 1000IU (100/BT) 4000 UNITS: 25 TAB at 08:00

## 2019-07-30 RX ADMIN — MICONAZOLE NITRATE: 20 CREAM TOPICAL at 19:57

## 2019-07-30 RX ADMIN — PREGABALIN 300 MG: 100 CAPSULE ORAL at 19:55

## 2019-07-30 RX ADMIN — OXYCODONE HYDROCHLORIDE 5 MG: 5 TABLET ORAL at 02:16

## 2019-07-30 RX ADMIN — SEVELAMER CARBONATE 800 MG: 800 TABLET, FILM COATED ORAL at 17:25

## 2019-07-30 RX ADMIN — METHADONE HYDROCHLORIDE 20 MG: 10 TABLET ORAL at 08:00

## 2019-07-30 RX ADMIN — METHADONE HYDROCHLORIDE 20 MG: 10 TABLET ORAL at 19:55

## 2019-07-30 RX ADMIN — MICONAZOLE NITRATE: 20 CREAM TOPICAL at 08:01

## 2019-07-30 RX ADMIN — METOPROLOL TARTRATE 12.5 MG: 25 TABLET ORAL at 08:01

## 2019-07-30 RX ADMIN — TRAZODONE HYDROCHLORIDE 50 MG: 50 TABLET ORAL at 19:56

## 2019-07-30 RX ADMIN — SEVELAMER CARBONATE 800 MG: 800 TABLET, FILM COATED ORAL at 08:00

## 2019-07-30 ASSESSMENT — ENCOUNTER SYMPTOMS
ABDOMINAL PAIN: 0
VOMITING: 0
NAUSEA: 0
DIARRHEA: 0
SHORTNESS OF BREATH: 0
CHILLS: 0
FEVER: 0
NERVOUS/ANXIOUS: 0

## 2019-07-30 NOTE — CARE PLAN
Problem: Safety  Goal: Will remain free from falls    Intervention: Implement fall precautions  Use of call light encouraged to make needs known.Bed in low position, non skid socks/shoes on when out of bed.Call light within reach.Will continue to monitor and assess needs and safety.      Problem: Pain Management  Goal: Pain level will decrease to patient's comfort goal    Intervention: Follow pain managment plan developed in collaboration with patient and Interdisciplinary Team  Scheduled medication given with good effect.Repositioned with pillows for comfort.Will continue to monitor and assess pain level and medicate as needed.      Problem: GLYCEMIA IMBALANCE  Goal: Clinical indication of glycemia balance is achieved    Intervention: MONITOR BLOOD GLUCOSE LEVELS AS ORDERED  FSBS 157, coverage given with hs snack.Will continue to monitor and assess for s/sx of hyper/hypoglycemia.

## 2019-07-30 NOTE — DISCHARGE PLANNING
Case Management Discharge Instructions  Discharge Date July 31, 2019        Discharge Location: Home with Outpatient Services     Agency Name / Address / Phone: Ordered through [employer] - [employer]. They will contact you with the location for physical therapy.     Outpatient Services: Physical Therapy     DME Provider / Phone: Through [employer]     Medical Equipment Ordered: Wheelchair, Front Wheel Walker, [employer],   Grab bars by toilet; Tub Transfer Bench; Knee Scooter; Bedrail; Ramp    Workers' Compensation : Ranjit Felder [phone]  Please call him with any questions about services or medical equipment.    An order for phosphorous level is attached. Please have Dr. Turner decide when to have this test completed. A discharge summary will be sent to Dr. Turner.     A completed application for disabled person placard was mailed to the [facility]. A copy of that completed document is attached.       Follow-up Information:     [facility]ARGAR[facility]aguirre  [address] [facility] [address]  [phone]   On 7/31/2019 Wednesday @ 10:30AM     Jon Turner M.D.  [address] [facility] [address]  [phone]   On 8/1/2019  PCP f/u on Thursday 4PM check in.  4:15PM appointment     MARGARET Yeung  [address] [facility] [address]  [phone]   On 8/8/2019  Wound care f/u on Thursday 3PM check in, 3:15PM appointment

## 2019-07-30 NOTE — PROGRESS NOTES
"Rehab Progress Note     Date of Service: 7/30/2019  Chief Complaint: follow up left BKA    Interval Events (Subjective)    Patient seen and examined in his room this morning he did not go to his wife's outpatient doctor's appointment today due to scheduling conflicts.  He is very discouraged about his delayed discharge and wants to know when he is going to get his wheelchair.  He states he thinks his ramp is being built today.  Plan for discharge tomorrow to his orthopedic appointment at 1030.    Objective:  VITAL SIGNS: /78   Pulse 76   Temp 36.4 °C (97.5 °F) (Temporal)   Resp 18   Ht 1.803 m (5' 11\")   Wt 88 kg (194 lb)   SpO2 95%   BMI 27.06 kg/m²   Gen: alert, no apparent distress  CV: regular rate and rhythm, no murmurs, no peripheral edema  Resp: clear to ascultation bilaterally, normal respiratory effort  GI: soft, non-tender abdomen, bowel sounds present  Msk: left transtibial amputation    Recent Results (from the past 72 hour(s))   ACCU-CHEK GLUCOSE    Collection Time: 07/27/19 11:13 AM   Result Value Ref Range    Glucose - Accu-Ck 169 (H) 65 - 99 mg/dL   ACCU-CHEK GLUCOSE    Collection Time: 07/27/19  5:28 PM   Result Value Ref Range    Glucose - Accu-Ck 160 (H) 65 - 99 mg/dL   ACCU-CHEK GLUCOSE    Collection Time: 07/27/19  9:00 PM   Result Value Ref Range    Glucose - Accu-Ck 140 (H) 65 - 99 mg/dL   PHOSPHORUS    Collection Time: 07/28/19  5:06 AM   Result Value Ref Range    Phosphorus 4.8 (H) 2.5 - 4.5 mg/dL   CBC WITHOUT DIFFERENTIAL    Collection Time: 07/28/19  5:06 AM   Result Value Ref Range    WBC 7.4 4.8 - 10.8 K/uL    RBC 3.39 (L) 4.70 - 6.10 M/uL    Hemoglobin 9.4 (L) 14.0 - 18.0 g/dL    Hematocrit 30.2 (L) 42.0 - 52.0 %    MCV 89.1 81.4 - 97.8 fL    MCH 27.7 27.0 - 33.0 pg    MCHC 31.1 (L) 33.7 - 35.3 g/dL    RDW 50.4 (H) 35.9 - 50.0 fL    Platelet Count 185 164 - 446 K/uL    MPV 10.0 9.0 - 12.9 fL   Basic Metabolic Panel    Collection Time: 07/28/19  5:06 AM   Result Value Ref " Range    Sodium 137 135 - 145 mmol/L    Potassium 3.9 3.6 - 5.5 mmol/L    Chloride 98 96 - 112 mmol/L    Co2 30 20 - 33 mmol/L    Glucose 115 (H) 65 - 99 mg/dL    Bun 35 (H) 8 - 22 mg/dL    Creatinine 1.23 0.50 - 1.40 mg/dL    Calcium 8.6 8.5 - 10.5 mg/dL    Anion Gap 9.0 0.0 - 11.9   ESTIMATED GFR    Collection Time: 07/28/19  5:06 AM   Result Value Ref Range    GFR If African American >60 >60 mL/min/1.73 m 2    GFR If Non African American 59 (A) >60 mL/min/1.73 m 2   ACCU-CHEK GLUCOSE    Collection Time: 07/28/19  8:00 AM   Result Value Ref Range    Glucose - Accu-Ck 158 (H) 65 - 99 mg/dL   ACCU-CHEK GLUCOSE    Collection Time: 07/28/19 11:23 AM   Result Value Ref Range    Glucose - Accu-Ck 100 (H) 65 - 99 mg/dL   ACCU-CHEK GLUCOSE    Collection Time: 07/28/19  5:05 PM   Result Value Ref Range    Glucose - Accu-Ck 126 (H) 65 - 99 mg/dL   ACCU-CHEK GLUCOSE    Collection Time: 07/28/19  8:28 PM   Result Value Ref Range    Glucose - Accu-Ck 124 (H) 65 - 99 mg/dL   ACCU-CHEK GLUCOSE    Collection Time: 07/29/19  7:19 AM   Result Value Ref Range    Glucose - Accu-Ck 135 (H) 65 - 99 mg/dL   ACCU-CHEK GLUCOSE    Collection Time: 07/29/19 11:23 AM   Result Value Ref Range    Glucose - Accu-Ck 172 (H) 65 - 99 mg/dL   ACCU-CHEK GLUCOSE    Collection Time: 07/29/19  4:51 PM   Result Value Ref Range    Glucose - Accu-Ck 123 (H) 65 - 99 mg/dL   ACCU-CHEK GLUCOSE    Collection Time: 07/29/19  8:04 PM   Result Value Ref Range    Glucose - Accu-Ck 157 (H) 65 - 99 mg/dL   ACCU-CHEK GLUCOSE    Collection Time: 07/30/19  7:14 AM   Result Value Ref Range    Glucose - Accu-Ck 129 (H) 65 - 99 mg/dL       Current Facility-Administered Medications   Medication Frequency   • insulin glargine (LANTUS) injection 12 Units Q EVENING   • pregabalin (LYRICA) capsule 300 mg BID   • sevelamer carbonate (RENVELA) tablet 800 mg TID WITH MEALS   • senna-docusate (PERICOLACE or SENOKOT S) 8.6-50 MG per tablet 2 Tab BID PRN   • hydrocortisone 1 %  cream BID PRN   • miconazole 2%-zinc oxide (DELFINO) topical cream BID   • metoprolol (LOPRESSOR) tablet 12.5 mg DAILY   • methadone (DOLOPHINE) tablet 20 mg BID   • vitamin D (cholecalciferol) tablet 4,000 Units DAILY   • ascorbic acid tablet 500 mg QDAY with Breakfast   • ferrous sulfate tablet 325 mg QDAY with Breakfast   • insulin lispro (HUMALOG) injection 2-12 Units 4X/DAY ACHS    And   • glucose 4 g chewable tablet 16 g Q15 MIN PRN   • Respiratory Care per Protocol Continuous RT   • Pharmacy Consult Request ...Pain Management Review 1 Each PHARMACY TO DOSE   • acetaminophen (TYLENOL) tablet 650 mg Q4HRS PRN   • artificial tears 1.4 % ophthalmic solution 1 Drop PRN   • benzocaine-menthol (CEPACOL) lozenge 1 Lozenge Q2HRS PRN   • mag hydrox-al hydrox-simeth (MAALOX PLUS ES or MYLANTA DS) suspension 20 mL Q2HRS PRN   • ondansetron (ZOFRAN ODT) dispertab 4 mg 4X/DAY PRN    Or   • ondansetron (ZOFRAN) syringe/vial injection 4 mg 4X/DAY PRN   • traZODone (DESYREL) tablet 50 mg QHS PRN   • sodium chloride (OCEAN) 0.65 % nasal spray 2 Spray PRN   • hydrOXYzine HCl (ATARAX) tablet 50 mg Q6HRS PRN   • melatonin tablet 3 mg HS PRN   • polyethylene glycol/lytes (MIRALAX) PACKET 1 Packet QDAY PRN    And   • magnesium hydroxide (MILK OF MAGNESIA) suspension 30 mL QDAY PRN    And   • bisacodyl (DULCOLAX) suppository 10 mg QDAY PRN   • fluticasone (FLONASE) nasal spray 100 mcg DAILY   • oxyCODONE immediate-release (ROXICODONE) tablet 5 mg Q6HRS PRN   • tramadol (ULTRAM) 50 MG tablet 100 mg Q6HRS PRN   • dextrose 50% (D50W) injection 50 mL Q15 MIN PRN   • diphenhydrAMINE-ZnAcetate (BENADRYL ITCH) 1-0.1 % cream TID PRN   • albuterol inhaler 2 Puff Q4HRS PRN       Orders Placed This Encounter   Procedures   • Diet Order Diabetic, Cardiac     Standing Status:   Standing     Number of Occurrences:   1     Order Specific Question:   Diet:     Answer:   Diabetic [3]     Order Specific Question:   Diet:     Answer:   Cardiac [6]      Order Specific Question:   Consistency/Fluid modifications:     Answer:   Thin Liquids [3]       Radiology    7/27/2019 11:53 AM    HISTORY/REASON FOR EXAM:  Follow-up left BKA stump fluid collections.      TECHNIQUE/EXAM DESCRIPTION AND NUMBER OF VIEWS:  Ultrasound was performed along the inferior aspect of the left BKA.    COMPARISON: 7/11/2019    FINDINGS:  There are 2 fluid collections identified along the left BKA stump. One measures 10 x 20 mm and the other measures 29 x 20 mm. These appear smaller and less complex when compared with prior study.   Impression       1.  Interval decrease in size and complexity of the 2 remaining fluid collections along the left BKA stump suggesting they are resolving postoperative hematomas or seromas.         Assessment:  Active Hospital Problems    Diagnosis   • *S/P BKA (below knee amputation) unilateral, left (HCC)   • Acute on chronic respiratory failure with hypoxia ([facility])   • SALOME (obstructive sleep apnea)   • Cardiomyopathy ([facility])   • DM (diabetes mellitus), type 2, uncontrolled (CMS-[facility])   • Essential hypertension   • Left shoulder pain   • Chronic pain syndrome   • Methadone dependence ([facility])     This patient is a 63 y.o. male admitted for acute inpatient rehabilitation with S/P BKA (below knee amputation) unilateral, left (HCC).    I led and attended the weekly conference, and agree with the IDT conference documentation and plan of care as noted below.    Date of conference: 7/29/2019    Goals and barriers: See IDT note.    Biggest barriers: home accessibility, post-op fluid collection    Admission FIM 66 --> 79 (7/16) --> 88 (7/18) --> 105 (7/30)    CM/social support: To single level home in [place], [place], with support of wife.    Anticipated DC date: 7/31/2019, [place] in process, waiting for WC to be delivered    Outpatient: PT    Equip: grab bars, WC, ramp, knee scooter    Follow up: PCP, surgery    Medical Decision Making and Plan:    Left TRIN[facility] Schneider  6/24  Continue full rehab program  PT/OT, 1 hr each discipline   Outpatient follow up with surgery, s/p partial suture removal  Returned to clinic 7/24  - still not ready for suture removal, needs to return to clinic on 31st, apt scheduled for 10:30  Continue compression/edema management, need to show patient/wife how to wrap, will try day of discharge    Residual limb fluid collection  Post-op seroma versus hematoma  US with 3 distinct fluid collections - repeat US with 2 collections now, smaller in size, continue compression wrapping  Most likely seroma, though hemoglobin slightly lower, d/c Lovenox and apirin, hemoglobin improved  Scheduled with orthopedics - no intervention other than compression    Right heel ulcer  Continue wound care, appreciate assistance  Off loading boot  Referral made to limb preservation service    Normocytic anemia  Iron deficient  Likely post-operative  Monitor, stable/higher on recheck  Continue Vit C and ferrous sulfate    History of elevated troponin  Discontinued aspirin due to decreased Hgb    Diabetes  Continue insulin  Appreciate hospitalist assistance    Chronic pain  Narcotic dependence/tolerance  Continue methadone, prescribed by his PCP  Dose decreased from 35 mg BID at home, to 20 mg BID due to encephalopathy (stroke code was called)  Continue PRN opiates - using only 2 5 mg tabs daily  Continue Lyrica, dose increased back up to his home dosing    I discussed the risks and benefits of using opioid medications for pain control.  I discussed the risk of addiction, potential for overdose leading respiratory depression, which could be fatal.  We discussed the potential need for opiate antagonist therapy if overdose occurs.      I encouraged the patient to take this medication sparingly with the expressed goal of weaning off the medication as soon as is clinically appropriate.      I informed the patient that we are only able to provide a 14 day supply of these medications at  discharge and no refills will be provided by the [facility] medical team.  No replacement prescriptions will be provided for lost prescriptions.  Any medication refill would need to be provided by the patient's primary pain management provider at that provider's discretion.  The patient's primary provider may decide that ongoing opioid medications are no longer necessary.      We discussed the need to safely secure these medications to prevent theft, inadvertent ingestion, or misuse.  Any unused medication should be immediately disposed of through a sanctioned medication disposal program.  We discussed adjunctive pain medications and conservative therapies at length.      NARxCHECK score was: Narcotic 450  Sedative 491  Opioid Risk tool score was 0    I answered the patient's questions regarding this treatment, and the patient indicated understanding and willingness to proceed.     Left shoulder pain  With history of multiple surgeries, including left humeral head resection  Modalities with therapy  Continue pain management     Arm jerking, intermittent (subjective, have not seen this)  Dr Shaw looked at medications/side effects  Decreased dose of Lyrica to see if this is the culprit, no difference and will increased pain in residual limb, increase back to home dosing  May need further outpatient work up, referral to neurology?    Rashes  Groin/buttock rash - miconazole, improved  Trunk rash - hydrocortison cream, resolved     Cardiomyopathy, EF 40%  Continue metoprolol  Appreciate hospitalist assistance     History of one kidney  Azotemia, resolved  Avoid nephrotoxins  Increased oral fluids    Hyperphosphatemia  Continue sevelamer  Will need outpatient labs with PCP  Appreciate hospitalist assistance     Respiratory insufficiency  Sleep apnea  On 3 L at home at night  Respiratory therapy to see patient, unable to titrate during day  Will need 24/7 at discharge     Hypertension  Continue  metoprolol  Appreciate hospitalist assistance    Vit D deficiency, 12  Continue supplementation    DVT prophylaxis  Discontinued Lovenox due to fluid collection in residual limb and anemia  Increase mobility  Monitor for edema    Total time:  16 minutes.  I spent greater than 50% of the time for patient care, counseling, and coordination on this date, including patient face-to face time, unit/floor time with review of records/pertinent lab data and studies, as well as discussing diagnostic evaluation/work up, planned therapeutic interventions, and future disposition of care, as per the interval events/subjective and the assessment and plan as noted above.    I have performed a physical exam, reviewed and updated ROS, as well as the assessment and plan today 7/30/2019. In review of note from 7/29/2019 there are no new changes except as documented above.            Mimi Doyle M.D.   Physical Medicine and Rehabilitation

## 2019-07-30 NOTE — THERAPY
Occupational Therapy  Daily Treatment     Patient Name: Travon Pollack  Age:  63 y.o., Sex:  male  Medical Record #: [id number]  Today's Date: 7/30/2019     Precautions  Precautions: (P) Non Weight Bearing Left Lower Extremity, Immobilizer Left Lower Extremity, Fall Risk  Comments: (P) L shoulder weakness, R heal ulcer    Safety   ADL Safety : (P) Modified Independent  Bathroom Safety: (P) Modified Independent  Comments: See FIMs    Subjective    Pt supine in bed and agreeable to therapy     Objective       07/30/19 1031   Precautions   Precautions Non Weight Bearing Left Lower Extremity;Immobilizer Left Lower Extremity;Fall Risk   Comments L shoulder weakness, R heal ulcer   Safety    ADL Safety  Modified Independent   Bathroom Safety Modified Independent   Cognition    Orientation Level Oriented x 4   Level of Consciousness Alert   Ability To Follow Commands 2 Step   Passive ROM Upper Body   Passive ROM Upper Body Not Tested   Active ROM Upper Body   Active ROM Upper Body  WDL   Strength Upper Body   Upper Body Strength  X  (L shoulder weakness d/t non-op humeral head fx)   IADL Treatments   Home Management Pt completed laundry with Mod I at W/C level    Balance   Sitting Balance (Static) Good   Sitting Balance (Dynamic) Good   Weight Shift Sitting Good   Bed Mobility    Supine to Sit Modified Independent   Sit to Supine Supervised   Scooting Modified Independent   Rolling Modified Independent   Skilled Intervention Verbal Cuing   Interdisciplinary Plan of Care Collaboration   Patient Position at End of Therapy Seated;Call Light within Reach;Tray Table within Reach  (seatbelt on)   OT Total Time Spent   OT Individual Total Time Spent (Mins) 30   OT Charge Group   OT Therapy Activity 1   OT Therapeutic Exercise  1       Pt completed W/C<>Mat table SqPT with SBA>Mod I for set up with L dolly platform.     Assessment    Pt demonstrates bed mobility with Mod I despite limited strength and ROM of and NWB of LLE. Pt able to  complete laundry with Mod I from w/c level.     Plan    D/C tomorrow

## 2019-07-30 NOTE — DISCHARGE PLANNING
On 7/29/19, met with patient following Team Conference to provide update and confirm anticipated discharge date of 7/31/19. No questions at that time.

## 2019-07-30 NOTE — THERAPY
"Occupational Therapy  Daily Treatment     Patient Name: Travon Pollack  Age:  63 y.o., Sex:  male  Medical Record #: [id number]  Today's Date: 2019     Precautions  Precautions: Non Weight Bearing Left Lower Extremity, Immobilizer Left Lower Extremity, Fall Risk, Other (See Comments)  Comments: R heel ulcer, Chronic L shoulder weakness, 3L O2     Safety   ADL Safety : Modified Independent  Bathroom Safety: Modified Independent  Comments: See FIMs    Subjective    \"lets go do some exercises\"     Objective       19 0701   Safety    ADL Safety  Modified Independent   Bathroom Safety Modified Independent   Sitting Upper Body Exercises   Chest Press 3 sets of 10;Bilateral;Weight (See Comments for lbs)  (30# equalizer with assist from therapist for LLE)   Lat Pull 3 sets of 10;Bilateral;Weight (See Comments for lbs)  (30# equalizer)   Bilateral Row 3 sets of 10;Bilateral;Weight (See Comments for lbs)  (30# equalizer)   Tricep Press 3 sets of 10;Bilateral;Weight (See Comments for lbs)  (30# rickshaw)   Interdisciplinary Plan of Care Collaboration   IDT Collaboration with  Nursing   Patient Position at End of Therapy Seated;Call Light within Reach;Tray Table within Reach   Collaboration Comments re: gauze wrapping on L residual limb   OT Total Time Spent   OT Individual Total Time Spent (Mins) 60   OT Charge Group   OT Self Care / ADL 2   OT Therapeutic Exercise  2       FIM Grooming Score:  7 - Independent  Grooming Description:       FIM Upper Body Dressin - Independent  Upper Body Dressing Description:       FIM Bed/Chair/Wheelchair Transfers Score: 6 - Modified Independent  Bed/Chair/Wheelchair Transfers Description:  Increased time      Assessment    Pt completed UB therex to the best of their abilities with no complaints of pain    Plan    D/c tomorrow    "

## 2019-07-30 NOTE — CARE PLAN
Problem: Infection  Goal: Will remain free from infection    Intervention: Assess signs and symptoms of infection  Left BKA stump with staples & dressing intact, no signs of infection, will continue to monitor for any changes.      Problem: GLYCEMIA IMBALANCE  Goal: Clinical indication of glycemia balance is achieved  Patient's FSBS monitored ac meals results wnl, will continue to monitor.

## 2019-07-30 NOTE — THERAPY
Physical Therapy   Daily Treatment     Patient Name: Travon Pollack  Age:  63 y.o., Sex:  male  Medical Record #: [id number]  Today's Date: 7/30/2019     Precautions  Precautions: (P) Non Weight Bearing Left Lower Extremity, Immobilizer Left Lower Extremity, Fall Risk, Other (See Comments)  Comments: (P) L shoulder weakness, R heal ulcer    Subjective    Pt received in room seated at EOB. He expresses frustration re: incorrect DME being delivered to his house and the ramp not yet being installed.     Objective       07/30/19 1331   Precautions   Precautions Non Weight Bearing Left Lower Extremity;Immobilizer Left Lower Extremity;Fall Risk;Other (See Comments)   Comments L shoulder weakness, R heal ulcer   Cognition    Level of Consciousness Alert   Neuro-Muscular Treatments   Comments desensitization tapping of L residual limb   Interdisciplinary Plan of Care Collaboration   Patient Position at End of Therapy In Bed;Call Light within Reach;Tray Table within Reach;Phone within Reach   PT Total Time Spent   PT Individual Total Time Spent (Mins) 30   PT Charge Group   PT Therapeutic Activities 2     PT wrapped L residual limb with ace wrap. Reviewed positioning and use of IPOP. Desensitization by both pt and PT to L residual limb.     Assessment    Pt participative with session following redirection.    Plan    Prepare for DC to home tomorrow.

## 2019-07-30 NOTE — THERAPY
Physical Therapy   Daily Treatment     Patient Name: Travon Pollack  Age:  63 y.o., Sex:  male  Medical Record #: [id number]  Today's Date: 7/30/2019     Precautions  Precautions: Non Weight Bearing Left Lower Extremity, Immobilizer Left Lower Extremity, Fall Risk, Other (See Comments)  Comments: L shoulder weakness, R heal ulcer    Subjective    Pt received sleeping in bed, agreeable to PT session upon being woken.     Objective       07/30/19 1501   Precautions   Precautions Non Weight Bearing Left Lower Extremity;Immobilizer Left Lower Extremity;Fall Risk;Other (See Comments)   Comments L shoulder weakness, R heal ulcer   Vitals   O2 (LPM) 3   O2 Delivery Nasal Cannula   Cognition    Level of Consciousness Alert   Sitting Lower Body Exercises   Other Exercises L quad sets x5 reps   Bed Mobility    Supine to Sit Modified Independent   Sit to Supine Modified Independent   Interdisciplinary Plan of Care Collaboration   Patient Position at End of Therapy Seated;Call Light within Reach;Tray Table within Reach;Phone within Reach   PT Total Time Spent   PT Individual Total Time Spent (Mins) 60   PT Charge Group   PT Therapeutic Activities 4       FIM Bed/Chair/Wheelchair Transfers Score: 6 - Modified Independent  Bed/Chair/Wheelchair Transfers Description:       FIM Walking Score:  2 - Max Assistance  Walking Description:       FIM Wheelchair Score:  6 - Modified Independent  Wheelchair Description:   (Mod I inside and outside surfaces in manual wc)    FIM Stairs Score:  0 - Not tested,unsafe activity  Stairs Description:       Review of HEP and safety in home environment.  Discussion of DC planning.  Outside wc mob >300' in manual wc. Pt uses retro technique to ascend ramps.     Assessment    Pt verbalizes readiness for DC tomorrow. Pt is Mod I at manual wc level.     Plan    DC to home tomorrow.

## 2019-07-30 NOTE — CARE PLAN
Problem: Safety  Goal: Will remain free from injury  Patient demonstrates good safety technique this shift.  Asks for assistance when needed and does not attempt self transfer.  Able to verbalize needs.  Will continue to monitor.    Problem: Infection  Goal: Will remain free from infection  Patient remains free from s/s infection; afebrile.  Will continue to monitor.

## 2019-07-30 NOTE — PROGRESS NOTES
Hospital Medicine Daily Progress Note      Chief Complaint:  Hypertension  Diabetes    Interval History:  No significant events or changes since last visit    Review of Systems  Review of Systems   Constitutional: Negative for chills and fever.   Respiratory: Negative for shortness of breath.    Cardiovascular: Negative for chest pain.   Gastrointestinal: Negative for abdominal pain, diarrhea, nausea and vomiting.   Psychiatric/Behavioral: The patient is not nervous/anxious.         Physical Exam  Temp:  [36.6 °C (97.8 °F)-36.7 °C (98.1 °F)] 36.6 °C (97.8 °F)  Pulse:  [70-78] 70  Resp:  [18-20] 20  BP: (117-133)/(63-65) 133/65  SpO2:  [84 %-99 %] 96 %    Physical Exam   Constitutional: He is oriented to person, place, and time. He appears well-nourished.   HENT:   Head: Atraumatic.   Eyes: Pupils are equal, round, and reactive to light. Conjunctivae are normal.   Neck: Normal range of motion. Neck supple.   Cardiovascular: Normal rate and regular rhythm.    No murmur heard.  Pulmonary/Chest: He has no wheezes. He has no rales.   Abdominal: Soft. He exhibits no distension. There is no tenderness.   Musculoskeletal: He exhibits no edema.   Has left BKA stump   Neurological: He is alert and oriented to person, place, and time.   Skin: Skin is warm and dry. No rash noted.   Nursing note and vitals reviewed.      Fluids    Intake/Output Summary (Last 24 hours) at 07/30/19 0755  Last data filed at 07/30/19 0629   Gross per 24 hour   Intake             1100 ml   Output             1950 ml   Net             -850 ml       Laboratory  Recent Labs      07/28/19   0506   WBC  7.4   RBC  3.39*   HEMOGLOBIN  9.4*   HEMATOCRIT  30.2*   MCV  89.1   MCH  27.7   MCHC  31.1*   RDW  50.4*   PLATELETCT  185   MPV  10.0     Recent Labs      07/28/19   0506   SODIUM  137   POTASSIUM  3.9   CHLORIDE  98   CO2  30   GLUCOSE  115*   BUN  35*   CREATININE  1.23   CALCIUM  8.6                   Assessment/Plan  * S/P BKA (below knee  amputation) unilateral, left (HCC)- (present on admission)   Assessment & Plan    S/P left BKA 2nd to diabetic foot on 6/24/19  S/P antibiotics per ID prior to Rehab admission  External wound healing well  Wound care  U/S shows fluid collection x 3  Saw Ortho on 7/17 with no additional recommendations  F/U U/S now shows only 2 fluid collections and decreasing in size     Acute on chronic respiratory failure with hypoxia (HCC)- (present on admission)   Assessment & Plan    S/P VDRF     Cardiomyopathy (HCC)- (present on admission)   Assessment & Plan    Echo: EF 40%     DM (diabetes mellitus), type 2, uncontrolled (CMS-HCC)- (present on admission)   Assessment & Plan    Hba1c: 12.2 (6/16/19)  -172  On Lantus: 12 units qhs (6/27)  Note: home med includes Lantus 44 units bid  Cont to monitor     Essential hypertension- (present on admission)   Assessment & Plan    BP ok  On Lopressor: 12.5 mg bid  Monitor     Azotemia   Assessment & Plan    Bun: 27 (6/23) --> 35 (6/28)  Encouraging fluid intake  Monitor     Hyperphosphatemia- (present on admission)   Assessment & Plan    PO4: 4.8 (6/28)  On Sevelamer: 800 mg tid (6/24)  Monitor     Vitamin D deficiency- (present on admission)   Assessment & Plan    Vit D level 12  On supplementation     Anemia- (present on admission)   Assessment & Plan    Hb: 8.9 (stable)  Fe: 38, sats 18% (7/11)  Vit B12 and Folate: wnl  On Fe supplements

## 2019-07-30 NOTE — WOUND TEAM
"[facility]  Inpatient Services  Wound and Skin Care Progress Note    Admission Date: 7/10/2019     Consult Date: 7/10/19   HPI, PMH, SH: Reviewed    Unit where seen by Wound Team: [id number]     WOUND /FOLLOW UP CONSULT RELATED TO:  Re evaluation of right heel ulceration     SUBJECTIVE:  \"How does it look?\"     Self Report / Pain Level:  denies       OBJECTIVE:  Wound intact with callous over wound edges    WOUND TYPE, LOCATION, CHARACTERISTICS (Pressure Injuries: location, stage, POA or date identified)        Wound 07/10/19 Diabetic Ulcer Heel;Foot DM ulceration plantar surface right heel (Active)   Wound Image       7/24/2019  8:25 AM   Site Assessment Clean;Red 7/30/2019  9:10 AM   Elizabeth-wound Assessment Clean;Intact 7/30/2019  9:10 AM   Margins Attached edges 7/30/2019  9:10 AM   Wound Length (cm) 0.3 cm 7/30/2019  9:10 AM   Wound Width (cm) 0.5 cm 7/30/2019  9:10 AM   Wound Depth (cm) 0.2 cm 7/30/2019  9:10 AM   Wound Surface Area (cm^2) 0.15 cm^2 7/30/2019  9:10 AM   Tunneling 0 cm 7/30/2019  9:10 AM   Undermining 0 cm 7/30/2019  9:10 AM   Closure Secondary intention 7/30/2019  9:10 AM   Drainage Amount None 7/30/2019  9:10 AM   Drainage Description Serosanguineous 7/24/2019  8:25 AM   Non-staged Wound Description Full thickness 7/30/2019  9:10 AM   Treatments Cleansed;Site care 7/30/2019  9:10 AM   Cleansing Approved Wound Cleanser 7/30/2019  9:10 AM   Periwound Protectant Not Applicable 7/30/2019  9:10 AM   Dressing Options Hydrofiber Silver;Nonadhesive Foam 7/30/2019  9:10 AM   Dressing Cleansing/Solutions Not Applicable 7/30/2019  9:10 AM   Dressing Changed Changed 7/30/2019  9:10 AM   Dressing Status Intact 7/30/2019  9:10 AM   Dressing Change Frequency Every 48 hrs 7/30/2019  9:10 AM   NEXT Dressing Change  08/01/19 7/30/2019  9:10 AM   NEXT Weekly Photo (Inpatient Only) 07/31/19 7/27/2019  3:00 AM   WOUND NURSE ONLY - Odor None 7/30/2019  9:10 AM   WOUND NURSE ONLY - Exposed Structures None " 7/30/2019  9:10 AM   WOUND NURSE ONLY - Tissue Type and Percentage red 100% 7/30/2019  9:10 AM   WOUND NURSE ONLY - Time Spent with Patient (mins) 45 7/30/2019  9:10 AM         Lab Values:    WBC:       WBC   Date/Time Value Ref Range Status   07/28/2019 05:06 AM 7.4 4.8 - 10.8 K/uL Final     A1C:      Lab Results   Component Value Date/Time    HBA1C 12.2 (H) 06/16/2019 11:37 AM           Culture:  NA    INTERVENTIONS BY WOUND TEAM:  Removed old dressing and using forceps and scissors peeled loose edges and trimmed revealing red wound bed. Cleansed with wound cleanser and measurements and photo taken.  Filled wound with silver hydrofiber and non adhesive foam securing with hypafix tape. Reviewed again with patient to change every other day and need for professional to assess wound and continue with debridement of callous to ensure that wound heals.     Dressing selection:  Silver hydrofiber, non adhesive foam, hypafix tape        Interdisciplinary consultation: Patient, Bedside RN, case management    EVALUATION: clean appearing wound right foot that is free of slough and should progress with silver hydrofiber     Factors affecting wound healing: DM  Goals: Steady decrease in wound area and depth weekly.    NURSING PLAN OF CARE ORDERS (X):    Dressing changes: See Dressing Care orders: X  Skin care: See Skin Care orders: X  Rectal tube care: See Rectal Tube Care orders:   Other orders:    WOUND TEAM PLAN OF CARE (X):   NPWT change 3 x week:        Dressing changes by wound team:       Follow up as needed:  X weekly       Other (explain):     Anticipated discharge plans (X):   SNF:           Home Care:   X possible       Outpatient Wound Center:            Self Care:  X with ongoing wound care          Other:

## 2019-07-31 VITALS
SYSTOLIC BLOOD PRESSURE: 137 MMHG | RESPIRATION RATE: 17 BRPM | DIASTOLIC BLOOD PRESSURE: 75 MMHG | HEART RATE: 77 BPM | TEMPERATURE: 97.5 F | HEIGHT: 71 IN | OXYGEN SATURATION: 92 % | WEIGHT: 194 LBS | BODY MASS INDEX: 27.16 KG/M2

## 2019-07-31 PROBLEM — R79.89 AZOTEMIA: Status: RESOLVED | Noted: 2019-07-30 | Resolved: 2019-07-31

## 2019-07-31 LAB — GLUCOSE BLD-MCNC: 115 MG/DL (ref 65–99)

## 2019-07-31 PROCEDURE — A9270 NON-COVERED ITEM OR SERVICE: HCPCS | Performed by: PHYSICAL MEDICINE & REHABILITATION

## 2019-07-31 PROCEDURE — A9270 NON-COVERED ITEM OR SERVICE: HCPCS | Performed by: HOSPITALIST

## 2019-07-31 PROCEDURE — 82962 GLUCOSE BLOOD TEST: CPT

## 2019-07-31 PROCEDURE — 99239 HOSP IP/OBS DSCHRG MGMT >30: CPT | Performed by: PHYSICAL MEDICINE & REHABILITATION

## 2019-07-31 PROCEDURE — 99232 SBSQ HOSP IP/OBS MODERATE 35: CPT | Performed by: HOSPITALIST

## 2019-07-31 PROCEDURE — 700102 HCHG RX REV CODE 250 W/ 637 OVERRIDE(OP): Performed by: PHYSICAL MEDICINE & REHABILITATION

## 2019-07-31 PROCEDURE — 700102 HCHG RX REV CODE 250 W/ 637 OVERRIDE(OP): Performed by: HOSPITALIST

## 2019-07-31 RX ADMIN — OXYCODONE HYDROCHLORIDE AND ACETAMINOPHEN 500 MG: 500 TABLET ORAL at 07:42

## 2019-07-31 RX ADMIN — PREGABALIN 300 MG: 100 CAPSULE ORAL at 07:42

## 2019-07-31 RX ADMIN — FERROUS SULFATE TAB 325 MG (65 MG ELEMENTAL FE) 325 MG: 325 (65 FE) TAB at 07:42

## 2019-07-31 RX ADMIN — SEVELAMER CARBONATE 800 MG: 800 TABLET, FILM COATED ORAL at 07:42

## 2019-07-31 RX ADMIN — VITAMIN D, TAB 1000IU (100/BT) 4000 UNITS: 25 TAB at 07:42

## 2019-07-31 RX ADMIN — METOPROLOL TARTRATE 12.5 MG: 25 TABLET ORAL at 07:42

## 2019-07-31 RX ADMIN — METHADONE HYDROCHLORIDE 20 MG: 10 TABLET ORAL at 07:42

## 2019-07-31 RX ADMIN — MICONAZOLE NITRATE: 20 CREAM TOPICAL at 07:46

## 2019-07-31 RX ADMIN — FLUTICASONE PROPIONATE 100 MCG: 50 SPRAY, METERED NASAL at 07:46

## 2019-07-31 ASSESSMENT — ACTIVITIES OF DAILY LIVING (ADL)
TOILETING_LEVEL_OF_ASSIST: ABLE TO COMPLETE TOILETING WITHOUT ASSIST
TOILET_TRANSFER_LEVEL_OF_ASSIST: ABLE TO COMPLETE TOILET TRANSFER WITHOUT ASSIST
SHOWER_TRANSFER_LEVEL_OF_ASSIST: ABLE TO COMPLETE SHOWER TRANSFER WITHOUT ASSIST

## 2019-07-31 ASSESSMENT — ENCOUNTER SYMPTOMS
BLURRED VISION: 0
HALLUCINATIONS: 0
PALPITATIONS: 0
HEADACHES: 0
VOMITING: 0
DIZZINESS: 0
NAUSEA: 0
FEVER: 0
SHORTNESS OF BREATH: 0

## 2019-07-31 NOTE — DISCHARGE SUMMARY
Rehab Discharge Note    Admission: 7/10/2019    Discharge: 7/31/2019    Admission Diagnosis:   Active Hospital Problems    Diagnosis   • *S/P BKA (below knee amputation) unilateral, left (HCC)   • Hyperphosphatemia   • Jerking   • Anemia   • Vitamin D deficiency   • SALOME (obstructive sleep apnea)   • Left shoulder pain   • Cardiomyopathy (HCC)   • Chronic pain syndrome   • Acute on chronic respiratory failure with hypoxia (HCC)   • Methadone dependence (HCC)   • DM (diabetes mellitus), type 2, uncontrolled (CMS-HCC)   • Essential hypertension       Discharge Diagnosis:  Active Hospital Problems    Diagnosis   • *S/P BKA (below knee amputation) unilateral, left (HCC)   • Hyperphosphatemia   • Jerking   • Anemia   • Vitamin D deficiency   • SALOME (obstructive sleep apnea)   • Left shoulder pain   • Cardiomyopathy (HCC)   • Chronic pain syndrome   • Acute on chronic respiratory failure with hypoxia (HCC)   • Methadone dependence (HCC)   • DM (diabetes mellitus), type 2, uncontrolled (CMS-[facility])   • Essential hypertension       HPI prior to rehab  Patient is a 63 y.o. male  with a past medical history of insulin dependent diabetes, chronic respiratory failure on 3 L home O2, admitted to [facility] on 6/15 as a transfer from [place], with elevated troponin to peak 1.11, sepsis, and left diabetic foot ulcer. Also complaining of left shoulder pain with history of fall.  Left humerus xray from outside hospital with inferior dislocation of proximal humerus. CT of shoulder with prior resection of left humeral head, GH joint with chronic loculated fluid collection.      Patient was intubated on admission, extubated 6/17, s/p steroids. Patient had debridement on 6/18 of foot ulcer, then returned to OR on 6/24 for left transtibial amputation with Dr. Schneider. Patient followed by ID, antibiotics completed.  Patient had ECHO with EF 40%. Patient was on methadone 70 mg at home prior to admission, which has been decreased  to 20 mg BID. He has altered mental status, Head CT negative for acute stroke.      Patient current reports left residual limb pain.  Nursing concerned about fluid collection in the posterior aspect of his left residual limb which was evaluated by myself and the hospitalist at bedside.  Patient is also reporting both phantom pain and phantom sensation.  He reports the pain is very severe at night.  Patient has a long history of chronic shoulder pain with multiple surgeries to his left shoulder.  He has had limited range of motion for the past 3 years.  He has had surgeries also to his low back and his neck.  His PCP prescribes his methadone.     Patient is right-hand dominant.  Patient denies any paresthesias.  In the last several days he has developed a rash on the right trunk which is very itchy.  He also reports he did not like using the slide board for transfers as it hurt his scrotum so he is been doing stand pivot transfers.  Patient has already talked to the prosthetists from [facility] regarding his future prosthesis.     Patient was evaluated by Rehab Medicine physician and Physical Therapy and Occupational Therapy and determined to be appropriate for acute inpatient rehab and was transferred to [facility] on 7/10/2019.     Rehab Hospital Course    Left [facility]  Dr. Schneider 6/24  Outpatient follow up with surgery, s/p partial suture removal  Returned to clinic 7/24  - still not ready for suture removal, needs to return to clinic on 31st, apt scheduled for 10:30  Continue compression/edema management, need to show patient/wife how to wrap, will try day of discharge     Residual limb fluid collection  Post-op seroma versus hematoma  US with 3 distinct fluid collections - repeat US with 2 collections now, smaller in size, continue compression wrapping  Most likely seroma, though hemoglobin slightly lower, d/c Lovenox and apirin, hemoglobin improved  Scheduled with orthopedics - no intervention  other than compression     Right heel ulcer  Continue wound care, appreciate assistance  Off loading boot  Referral made to limb preservation service     Normocytic anemia  Iron deficient  Likely post-operative  Monitor, stable/higher on recheck  Continue Vit C and ferrous sulfate     History of elevated troponin  Discontinued aspirin due to decreased Hgb     Diabetes  Continue insulin     Chronic pain  Narcotic dependence/tolerance  Continue methadone, prescribed by his PCP  Dose decreased from 35 mg BID at home, to 20 mg BID due to encephalopathy (stroke code was called)  Continue PRN opiates - using only 2 5 mg tabs daily  Continue Lyrica, dose increased back up to his home dosing     I discussed the risks and benefits of using opioid medications for pain control.  I discussed the risk of addiction, potential for overdose leading respiratory depression, which could be fatal.  We discussed the potential need for opiate antagonist therapy if overdose occurs.       I encouraged the patient to take this medication sparingly with the expressed goal of weaning off the medication as soon as is clinically appropriate.       I informed the patient that we are only able to provide a 14 day supply of these medications at discharge and no refills will be provided by the [facility] hospital medical team.  No replacement prescriptions will be provided for lost prescriptions.  Any medication refill would need to be provided by the patient's primary pain management provider at that provider's discretion.  The patient's primary provider may decide that ongoing opioid medications are no longer necessary.       We discussed the need to safely secure these medications to prevent theft, inadvertent ingestion, or misuse.  Any unused medication should be immediately disposed of through a sanctioned medication disposal program.  We discussed adjunctive pain medications and conservative therapies at length.       NARxCHECK score was:  Narcotic 450  Sedative 491  Opioid Risk tool score was 0     I answered the patient's questions regarding this treatment, and the patient indicated understanding and willingness to proceed.     Left shoulder pain  With history of multiple surgeries, including left humeral head resection  Modalities with therapy  Continue pain management     Arm jerking, intermittent (subjective, have not seen this)  Dr Shaw looked at medications/side effects  Decreased dose of Lyrica to see if this is the culprit, no difference and will increased pain in residual limb, increase back to home dosing  May need further outpatient work up, referral to neurology?     Rashes  Groin/buttock rash - miconazole, resolved  Trunk rash - hydrocortison cream, resolved     Cardiomyopathy, EF 40%  Continue metoprolol  Appreciate hospitalist assistance     History of one kidney  Azotemia, resolved  Avoid nephrotoxins  Increased oral fluids     Hyperphosphatemia  Continue sevelamer  Will need outpatient labs with PCP     Respiratory insufficiency  Sleep apnea  On 3 L at home at night  Respiratory therapy to see patient, unable to titrate during day  Will need 24/7 at discharge     Hypertension  Continue metoprolol     Vit D deficiency, 12  Continue supplementation     DVT prophylaxis  Discontinued Lovenox due to fluid collection in residual limb and anemia  Increase mobility  Monitor for edema    Functional Status at Discharge  Eatin - Independent  Eating Description:  Increased time  Groomin - Independent  Grooming Description:  Increased time  Bathin - Modified Independent  Bathing Description:  Grab bar, Tub bench, Hand held shower, Increased time  Upper Body Dressin - Independent  Upper Body Dressing Description:  Increased time  Lower Body Dressin - Modified Independent  Lower Body Dressing Description:  6 - Modified Independent  Discharge Location : Home  Patient Discharging with Assist of: Spouse / Significant  Other  Level of Supervision Required: Intermittent Supervision  Recommended Equipment for Discharge: Ramp;Tub Transfer Bench;Grab Bars by Toilet;Grab Bars in Tub / Shower  Recommended Services Upon Discharge: Home Health Occupational Therapy  Long Term Goals Met: 3  Long Term Goals Not Met: 0  Criteria for Termination of Services: Maximum Function Achieved for Inpatient Rehabilitation  Walk:  2 - Max Assistance  Distance Walked:  Walks  feet  Walk Description:  (pt amb x115' with knee scooter + CGA, distance limited  by reports of R knee fatigue)  Wheelchair:  6 - Modified Independent  Distance Propelled:  Propels a minimum of 150 feet   Wheelchair Description:  (Mod I inside and outside surfaces in manual wc)  Stairs 0 - Not tested,unsafe activity  Stairs Description   Discharge Location: Home  Patient Discharging with Assist of: Spouse / Significant Other;Other (See Comments)(adult son)  Level of Supervision Required Upon Discharge: Intermittent Supervision  Recommended Equipment for Discharge: Grab Bars in Tub / Shower;Raised Toilet Seat without Arms;Manual Wheelchair;Reacher;Sock Aid;Hand Held Shower Head;Raised Toilet Seat with Arms;Grab Bars by Toilet;Tub Transfer Bench;Ramp  Recommeded Services Upon Discharge: Home Health Physical Therapy  Long Term Goals Met: 2  Long Term Goals Not Met: 2  Reason(s) for Goals Not Met: L shoulder weakness  Criteria for Termination of Services: Maximum Function Achieved for Inpatient Rehabilitation  Comprehension Mode:  Both  Comprehension:  6 - Modified Independent  Comprehension Description:  Glasses  Expression Mode:  Both  Expression:  6 - Modified Independent  Expression Description:  (inc time)  Social Interaction:  7 - Independent  Social Interaction Description:  Increased time  Problem Solvin - Modified Independent  Problem Solving Description:  Increased time  Memory:  6 - Modified Independent  Memory Description:  Verbal cueing, Therapy schedule        Discharge Medication:     Medication List      START taking these medications      Instructions   ascorbic acid 500 MG tablet  Commonly known as:  VITAMIN C   Take 1 Tab by mouth every morning with breakfast.  Dose:  500 mg     ferrous sulfate 325 (65 Fe) MG tablet   Take 1 Tab by mouth every morning with breakfast.  Dose:  325 mg     insulin glargine 100 UNIT/ML Soln  Commonly known as:  LANTUS   Inject 12 Units as instructed every evening.  Dose:  12 Units     sevelamer carbonate 800 MG Tabs tablet  Commonly known as:  RENVELA   Take 1 Tab by mouth 3 times a day, with meals for 14 days.  Dose:  800 mg     vitamin D 1000 UNIT Tabs  Commonly known as:  cholecalciferol   Take 1 Tab by mouth every day.  Dose:  1,000 Units        CHANGE how you take these medications      Instructions   metoprolol 25 MG Tabs  What changed:  when to take this  Commonly known as:  LOPRESSOR   Take 0.5 Tabs by mouth every day.  Dose:  12.5 mg     pregabalin 300 MG capsule  What changed:  when to take this  Commonly known as:  LYRICA   Take 1 Cap by mouth 2 Times a Day for 30 days.  Dose:  300 mg        CONTINUE taking these medications      Instructions   acetaminophen 325 MG Tabs  Commonly known as:  TYLENOL   Take 2 Tabs by mouth every 6 hours as needed (Mild Pain; (Pain scale 1-3); Temp greater than 100.5 F).  Dose:  650 mg     fluticasone 50 MCG/ACT nasal spray  Commonly known as:  FLONASE   Spray 2 Sprays in nose every day.  Dose:  2 Spray     methadone 10 MG Tabs  Commonly known as:  DOLOPHINE   Take 2 Tabs by mouth 2 Times a Day for 14 days.  Dose:  20 mg     oxyCODONE immediate-release 5 MG Tabs  Commonly known as:  ROXICODONE   Take 1 Tab by mouth every 6 hours as needed for up to 14 days.  Dose:  5 mg        STOP taking these medications    aspirin 81 MG Chew chewable tablet  Commonly known as:  ASA     diphenhydrAMINE cream  Commonly known as:  BENADRYL ITCH     insulin lispro 100 UNIT/ML Soln  Commonly known as:  HUMALOG      ipratropium-albuterol 0.5-2.5 (3) MG/3ML nebulizer solution  Commonly known as:  DUONEB     miconazole 2%-zinc oxide 2 % Crea topical cream     senna-docusate 8.6-50 MG Tabs  Commonly known as:  PERICOLACE or SENOKOT S     sodium chloride 0.65 % Soln  Commonly known as:  OCEAN     tramadol 50 MG Tabs  Commonly known as:  ULTRAM          Discharge Location: Home with Outpatient Services     Agency Name / Address / Phone: Ordered through Workers[employer] - [employer]. They will contact you with the location for physical therapy.     Outpatient Services: Physical Therapy     DME Provider / Phone: Through Workers[employer]     Medical Equipment Ordered: Wheelchair, Front Wheel Walker, Three in One Commode,   Grab bars by toilet; Tub Transfer Bench; Knee Scooter; Bedrail; Ramp     Workers' Compensation : Ranjit Felder [phone]  Please call him with any questions about services or medical equipment.     An order for phosphorous level is attached. Please have Dr. Turner decide when to have this test completed. A discharge summary will be sent to Dr. Turner.      A completed application for disabled person placard was mailed to the [facility]. A copy of that completed document is attached.       Follow-up Information:     MARGARET Izaguirre  [address] [facility] [address]  [phone]   On 7/31/2019 Wednesday @ 10:30AM      Jon Turner M.D.  [address] [facility] [address]  [phone]   On 8/1/2019  PCP f/u on Thursday 4PM check in.  4:15PM appointment     MARGARET Yeung  [address]  [facility] [address]  [phone]   On 8/8/2019  Wound care f/u on Thursday 3PM check in, 3:15PM appointment       Condition on Discharge:  Good.    More than 35 minutes was spent on discharging this patient, including face-to-face time, prescription management, and the dictation of this note.

## 2019-07-31 NOTE — PROGRESS NOTES
Patient discharged to home per order.  Discharge instructions reviewed with patient and spouse; they verbalize understanding and signed copies placed in chart.  Patient has all belongings; signed copy of form in chart.  Patient left facility at 1010.

## 2019-07-31 NOTE — DISCHARGE INSTRUCTIONS
[facility] NURSING DISCHARGE INSTRUCTIONS    Blood Pressure : 126/69  Weight: 89.4 kg (197 lb 1.6 oz)  Nursing recommendations for Travon Pollack at time of discharge are as follows:  CLIENT  verbalized understanding of all discharge instructions and prescriptions.     Review all your home medications and newly ordered medications with your doctor and/or pharmacist. Follow medication instructions as directed by your doctor and/or pharmacist.    Pain Management:   Discharge Pain Medication Instructions:  Comfort Goal: Comfort at Rest, Comfort with Movement, Sleep Comfortably  Notify your primary care provider if pain is unrelieved with these measures, if the pain is new, or increased in intensity.    Discharge Skin Characteristics:    Discharge Skin Exam:    Wound 07/10/19 Diabetic Ulcer Heel;Foot DM ulceration plantar surface right heel (Active)   Wound Image   7/24/2019  8:25 AM   Site Assessment MESHA 7/28/2019  8:23 PM   Elizabeth-wound Assessment Clean;Dry;Intact 7/28/2019  8:23 PM   Margins Attached edges 7/28/2019  9:00 AM   Wound Length (cm) 0.5 cm 7/24/2019  8:25 AM   Wound Width (cm) 0.3 cm 7/24/2019  8:25 AM   Wound Depth (cm) 0.5 cm 7/24/2019  8:25 AM   Wound Surface Area (cm^2) 0.15 cm^2 7/24/2019  8:25 AM   Tunneling 0 cm 7/24/2019  8:25 AM   Undermining 0 cm 7/24/2019  8:25 AM   Closure Secondary intention 7/28/2019  9:00 AM   Drainage Amount None 7/28/2019  9:00 AM   Drainage Description Serosanguineous 7/24/2019  8:25 AM   Non-staged Wound Description Full thickness 7/28/2019  9:00 AM   Treatments Cleansed 7/28/2019  9:00 AM   Cleansing Approved Wound Cleanser 7/28/2019  9:00 AM   Periwound Protectant Not Applicable 7/28/2019  8:23 PM   Dressing Options Nonadhesive Foam;Fluffed Dried Gauze Roll 7/28/2019  8:23 PM   Dressing Cleansing/Solutions Not Applicable 7/28/2019  8:23 PM   Dressing Changed Changed 7/28/2019  9:00 AM   Dressing Status Clean;Dry;Intact 7/28/2019  8:23 PM    Dressing Change Frequency Every 72 hrs 7/28/2019  8:23 PM   NEXT Dressing Change  07/30/19 7/27/2019  7:30 AM   NEXT Weekly Photo (Inpatient Only) 07/31/19 7/27/2019  3:00 AM   WOUND NURSE ONLY - Odor None 7/28/2019  9:00 AM   WOUND NURSE ONLY - Exposed Structures None 7/28/2019  9:00 AM   WOUND NURSE ONLY - Tissue Type and Percentage red 100% 7/24/2019  8:25 AM   WOUND NURSE ONLY - Time Spent with Patient (mins) 60 7/24/2019  8:25 AM       Moisture Associated Skin Damage Anterior;Posterior Buttock;Groin (Active)   Drainage Amount None 7/28/2019  8:23 PM   Elizabeth-wound Assessment Clean;Dry;Fragile;Painful;Pink 7/28/2019  8:23 PM   Cleansing Dimethecone Wipes 7/28/2019  8:23 PM   Periwound Protectant Antifungal Therapy 7/28/2019  8:23 PM   NEXT Weekly Photo (Inpatient Only) 07/31/19 7/26/2019  7:46 PM   Containment Device None 7/28/2019  8:23 PM     Skin / Wound Care Instructions: Please contact your primary care physician for any change in skin integrity.     If You Have Surgical Incisions / Wounds:  Monitor surgical site(s) for signs of increased swelling, redness or symptoms of drainage from the site or fever as this could indicate signs and symptoms of infection. If these symptoms are noted, notifiy your primary care provider.      Discharge Safety Instructions:       Discharge Safety Concerns:    The interdisciplinary team has made recommendation that you need SUPERVISION in the house due to weakness, unsteady gait.  Anti-embolic stockings are  NOT  increase circulation to the lower extremities.    Discharge Diet: diabetic      Discharge Liquids: thin   Discharge Bowel Function:  continent  Please contact your primary care physician for any changes in bowel habits.  Discharge Bowel Program:    Discharge Bladder Function:  continent  Discharge Urinary Devices:  urinal      Nursing Discharge Plan:   Influenza Vaccine Indication: Not indicated: Previously immunized this influenza season and > 8 years of age    Case  Management Discharge Instructions:   Discharge Location:    Agency Name/Address/Phone:    Home Health:    Outpatient Services:    DME Provider/Phone:    Medical Equipment Ordered:    Prescription Faxed to:        Discharge Medication Instructions:  Below are the medications your physician expects you to take upon discharge:      Fall Prevention in the Home  Falls can cause injuries and can affect people from all age groups. There are many simple things that you can do to make your home safe and to help prevent falls.  What can I do on the outside of my home?  · Regularly repair the edges of walkways and driveways and fix any cracks.  · Remove high doorway thresholds.  · Trim any shrubbery on the main path into your home.  · Use bright outdoor lighting.  · Clear walkways of debris and clutter, including tools and rocks.  · Regularly check that handrails are securely fastened and in good repair. Both sides of any steps should have handrails.  · Install guardrails along the edges of any raised decks or porches.  · Have leaves, snow, and ice cleared regularly.  · Use sand or salt on walkways during winter months.  · In the garage, clean up any spills right away, including grease or oil spills.  What can I do in the bathroom?  · Use night lights.  · Install grab bars by the toilet and in the tub and shower. Do not use towel bars as grab bars.  · Use non-skid mats or decals on the floor of the tub or shower.  · If you need to sit down while you are in the shower, use a plastic, non-slip stool.  · Keep the floor dry. Immediately clean up any water that spills on the floor.  · Remove soap buildup in the tub or shower on a regular basis.  · Attach bath mats securely with double-sided non-slip rug tape.  · Remove throw rugs and other tripping hazards from the floor.  What can I do in the bedroom?  · Use night lights.  · Make sure that a bedside light is easy to reach.  · Do not use oversized bedding that drapes onto the  floor.  · Have a firm chair that has side arms to use for getting dressed.  · Remove throw rugs and other tripping hazards from the floor.  What can I do in the kitchen?  · Clean up any spills right away.  · Avoid walking on wet floors.  · Place frequently used items in easy-to-reach places.  · If you need to reach for something above you, use a sturdy step stool that has a grab bar.  · Keep electrical cables out of the way.  · Do not use floor polish or wax that makes floors slippery. If you have to use wax, make sure that it is non-skid floor wax.  · Remove throw rugs and other tripping hazards from the floor.  What can I do in the stairways?  · Do not leave any items on the stairs.  · Make sure that there are handrails on both sides of the stairs. Fix handrails that are broken or loose. Make sure that handrails are as long as the stairways.  · Check any carpeting to make sure that it is firmly attached to the stairs. Fix any carpet that is loose or worn.  · Avoid having throw rugs at the top or bottom of stairways, or secure the rugs with carpet tape to prevent them from moving.  · Make sure that you have a light switch at the top of the stairs and the bottom of the stairs. If you do not have them, have them installed.  What are some other fall prevention tips?  · Wear closed-toe shoes that fit well and support your feet. Wear shoes that have rubber soles or low heels.  · When you use a stepladder, make sure that it is completely opened and that the sides are firmly locked. Have someone hold the ladder while you are using it. Do not climb a closed stepladder.  · Add color or contrast paint or tape to grab bars and handrails in your home. Place contrasting color strips on the first and last steps.  · Use mobility aids as needed, such as canes, walkers, scooters, and crutches.  · Turn on lights if it is dark. Replace any light bulbs that burn out.  · Set up furniture so that there are clear paths. Keep the  furniture in the same spot.  · Fix any uneven floor surfaces.  · Choose a carpet design that does not hide the edge of steps of a stairway.  · Be aware of any and all pets.  · Review your medicines with your healthcare provider. Some medicines can cause dizziness or changes in blood pressure, which increase your risk of falling.  Talk with your health care provider about other ways that you can decrease your risk of falls. This may include working with a physical therapist or  to improve your strength, balance, and endurance.  This information is not intended to replace advice given to you by your health care provider. Make sure you discuss any questions you have with your health care provider.  Document Released: 12/08/2003 Document Revised: 05/16/2017 Document Reviewed: 01/22/2016  "[employer] Interactive Patient Education © 2017 "[employer] Inc.    Depression / Suicide Risk    As you are discharged from this [facility] facility, it is important to learn how to keep safe from harming yourself.    Recognize the warning signs:  · Abrupt changes in personality, positive or negative- including increase in energy   · Giving away possessions  · Change in eating patterns- significant weight changes-  positive or negative  · Change in sleeping patterns- unable to sleep or sleeping all the time   · Unwillingness or inability to communicate  · Depression  · Unusual sadness, discouragement and loneliness  · Talk of wanting to die  · Neglect of personal appearance   · Rebelliousness- reckless behavior  · Withdrawal from people/activities they love  · Confusion- inability to concentrate     If you or a loved one observes any of these behaviors or has concerns about self-harm, here's what you can do:  · Talk about it- your feelings and reasons for harming yourself  · Remove any means that you might use to hurt yourself (examples: pills, rope, extension cords, firearm)  · Get professional help from the community (Mental  Health, Substance Abuse, psychological counseling)  · Do not be alone:Call your Safe Contact- someone whom you trust who will be there for you.  · Call your local CRISIS HOTLINE 816-4596 or 184-492-5896  · Call your local Children's Mobile Crisis Response Team [place] (129) 110-9065 or www.Yillio  · Call the toll free National Suicide Prevention Hotlines   · National Suicide Prevention Lifeline 443-581-TSXR (0352)  · National Hope Line Network 800-SUICIDE (584-4737)          Occupational Therapy Discharge Instructions for Travon Pollack    7/29/2019    Level of Assist Required for Eating: Able to Complete Eating without Assist  Level of Assist Required for Grooming: Able to Complete Grooming without Assist  Level of Assist Required for Dressing: Able to Complete Dressing without Assist  Equipment for Dressing: Reacher, Long Handled Shoe Horn  Level of Assist Required for Toileting: Requires Supervision with Toileting  Level of Assist Required for Toilet Transfer: Able to Complete Toilet Transfer without Assist  Equipment for Toilet Transfer: Grab Bars by Toilet  Level of Assist Required for Bathing: Requires Supervision with Bathing  Equipment for Bathing: Shower Chair, Grab Bars in Tub / Shower, Hand Held Shower Head  Level of Assist Required for Shower Transfer: Requires Supervision with Shower Transfer  Equipment for Shower Transfer: Grab Bars in Tub / Shower, Shower Chair  Level of Assist Required for Home Mgmt: Requires Physical Assist with Home Management  Level of Assist Required for Meal Prep: Requires Supervision with Meal Preparation  Driving: Please Contact Physician Prior to Driving  Home Exercise Program: Refer to Home Exercise Program Handout for Details  Comments: Travon demonstrates good safety awareness and strong compensatory strategies to complete ADL pursuits.  Initial supervision recommended in home environment for bathroom ADLs to insure safety during functional pursuits.     It was  "a pleasure working with you Travon. Take care and good luck in your continued recovery! - Nara Ojeda, OT/L    Physical Therapy Discharge Instructions for Travon Pollack    7/29/2019    Weight Bearing Status - Patient Should: Bear No Weight Left Leg  Level of Assist Required for Ambulation: Physical Assist on Flat Surfaces, Should Not Attempt Curbs at This Time, Should Not Attempt Stairs at This Time  Distance Patient May Ambulate:  (short distances living room to/from bathroom using knee scooter)  Device Recommended for Ambulation:  (knee scooter for very short distances only as needed. Use wheelchair as primary means of mobility)  Level of Assist Required to Propel Wheelchair: Requires No Assist  Level of Assist Required for Transfers: Requires No Assist  Home Exercise Program: Refer to Home Exercise Program Handout for Details  Prosthesis / Orthosis Recommendation / Location: Left Leg. Use IPOP (immobilizer) when out of bed. Keep your left knee and hip as straight as possible. Remember to lie on your stomach for 20 minutes, 3 times a day to maintain full range of motion in left hip. Use a pillow under your hips when laying prone to reduce back pain. \"[employer]\" is your prosthetic company, they are based out of [employer]. Contact [employer] if you have any questions regarding immobilizer or prosthetic.    It has been a pleasure working with you Travon! Keep up the hard work in your continued recovery.  Azalia, PT      "

## 2019-07-31 NOTE — PROGRESS NOTES
Hospital Medicine Daily Progress Note      Chief Complaint:  Hypertension  Diabetes    Interval History:  No significant events or changes since last visit    Review of Systems  Review of Systems   Constitutional: Negative for fever.   Eyes: Negative for blurred vision.   Respiratory: Negative for shortness of breath.    Cardiovascular: Negative for palpitations.   Gastrointestinal: Negative for nausea and vomiting.   Neurological: Negative for dizziness and headaches.   Psychiatric/Behavioral: Negative for hallucinations.        Physical Exam  Temp:  [36.3 °C (97.3 °F)] 36.3 °C (97.3 °F)  Pulse:  [78-81] 81  Resp:  [18-20] 20  BP: (122-129)/(68-72) 129/72  SpO2:  [92 %-96 %] 96 %    Physical Exam   Constitutional: He is oriented to person, place, and time.   HENT:   Mouth/Throat: Oropharynx is clear and moist.   Eyes: No scleral icterus.   Cardiovascular: Normal rate and regular rhythm.   No murmur heard.  Pulmonary/Chest: Breath sounds normal. No stridor.   Abdominal: Soft. He exhibits no distension. There is no tenderness.   Musculoskeletal: He exhibits no edema.   Has left BKA stump   Neurological: He is alert and oriented to person, place, and time.   Skin: Skin is warm and dry. No rash noted. He is not diaphoretic.   Nursing note and vitals reviewed.      Fluids    Intake/Output Summary (Last 24 hours) at 7/31/2019 0747  Last data filed at 7/31/2019 0300  Gross per 24 hour   Intake 1040 ml   Output 600 ml   Net 440 ml       Laboratory                        Assessment/Plan  * S/P BKA (below knee amputation) unilateral, left (HCC)- (present on admission)  Assessment & Plan  S/P left BKA 2nd to diabetic foot on 6/24/19  S/P antibiotics per ID prior to Rehab admission  External wound healing well  Wound care  U/S shows fluid collection x 3  Saw Ortho on 7/17 with no additional recommendations  F/U U/S now shows only 2 fluid collections and decreasing in size    Azotemia  Assessment & Plan  Bun: 27 (6/23) --> 35  (6/28)  Encouraging fluid intake  Monitor    Hyperphosphatemia- (present on admission)  Assessment & Plan  PO4: 4.8 (6/28)  On Sevelamer: 800 mg tid (6/24)  Note: needs f/u with PMD  Monitor    Vitamin D deficiency- (present on admission)  Assessment & Plan  Vit D level 12  On supplementation    Anemia- (present on admission)  Assessment & Plan  Hb: 8.9 (stable)  Fe: 38, sats 18% (7/11)  Vit B12 and Folate: wnl  On Fe supplements    Cardiomyopathy (HCC)- (present on admission)  Assessment & Plan  Echo: EF 40%    Acute on chronic respiratory failure with hypoxia (HCC)- (present on admission)  Assessment & Plan  S/P VDRF    DM (diabetes mellitus), type 2, uncontrolled (CMS-HCC)- (present on admission)  Assessment & Plan  Hba1c: 12.2 (6/16)  BS ok  On Lantus: 12 units qhs (6/27)  Note: home med includes Lantus 44 units bid  Cont to monitor    Essential hypertension- (present on admission)  Assessment & Plan  BP ok  On Lopressor: 12.5 mg bid  Monitor

## 2019-07-31 NOTE — CARE PLAN
Problem: Safety  Goal: Will remain free from injury  Pt remains free from accidental injury.Appropriately uses call light to make needs known.Bed in low position.Call light within reach.Will continue to monitor and assess needs and safety.    Problem: Pain Management  Goal: Pain level will decrease to patient's comfort goal    Intervention: Follow pain managment plan developed in collaboration with patient and Interdisciplinary Team  Pain is manageable with scheduled medication at this time.Repositioned with pillows for comfort.Will continue to monitor and assess pain level and medicate as needed.      Problem: GLYCEMIA IMBALANCE  Goal: Clinical indication of glycemia balance is achieved    Intervention: MONITOR BLOOD GLUCOSE LEVELS AS ORDERED  FSBS 137, scheduled lantus 12 units given with hs snack.Will continue to monitor and assess.

## 2019-07-31 NOTE — DISCHARGE PLANNING
Case management Summary:   Met with patient prior to discharge.   Reviewed all follow up appointments.   Outpatient therapies and DME have been managed by workers' compensation.     Confirmed wife has wc, portable oxygen and is bringing those with her Wednesday for patient's discharge. Ramp is to be installed Wednesday. If it is not done, workers' comp  will manage accommodations for patient. Ranjit Felder's phone number (CM) provided to patient in discharge paperwork.  During hospitalization, I have provided support and education and have been available for questions and information during hours of operation, communicated with therapy team and MD along with providing links/resources  to outside services.    Patient verbalizes agreement with all plans and has an understanding of the next steps within the post acute services.     Individualized Goals:   1. Improve functional status to discharge home with wife  2. OP therapy in [place], [place] to be ordered if needed   3. MD f/u appointments in place. Has DME needed and home modifications done.      Outcome:   1. Met  2. Met  3. MD appointments in place. Workers' compensation is working on home modifications, to be completed 7/31/2019.

## 2019-08-08 ENCOUNTER — APPOINTMENT (OUTPATIENT)
Dept: WOUND CARE | Facility: MEDICAL CENTER | Age: 64
End: 2019-08-08
Attending: PHYSICAL MEDICINE & REHABILITATION
Payer: COMMERCIAL

## 2019-08-13 ENCOUNTER — NON-PROVIDER VISIT (OUTPATIENT)
Dept: WOUND CARE | Facility: MEDICAL CENTER | Age: 64
End: 2019-08-13
Attending: NURSE PRACTITIONER
Payer: COMMERCIAL

## 2019-08-13 PROCEDURE — 11055 PARING/CUTG B9 HYPRKER LES 1: CPT

## 2019-08-13 NOTE — CERTIFICATION
Advanced Wound Care   [facility]   [address]   [address]   [place] [address]   [phone] Fax: [phone]    Discharge Note      Referring Physician: SANKET BADILLO  Wound location: right heel diabetic  Date of Discharge: 8/13/19    Assessment:  Discharge patient at this time secondary to wound resolution.    Patient & wife educated on:  Should you experience any significant changes around your scab sites such as infection (redness, swelling, localized heat, increased pain, fever >101 F, chills) or have any questions regarding your home care instructions, contact your primary care physician, [facility] or go the hospital emergency room. You are discharged at this time from [facility].  Follow-up for foot & nail care in 3 months with podiatrist, or [facility] at [facility] or [facility].  Follow-up with [facility].  Follow-up with [facility], Dr. Schneider as needed.  Instructed pt on s/s infection - chills, fever, malaise, NV, increased redness/swelling/pain/exudate - and to go to ER/Urgent Care; on rationale for debridement of calluses; to shake out shoes before donning; to wear protective footwear at all times, including when at home; to examine feet daily for any new redness/wounds and report to PCP; to moisturize feet daily except between toes with water-based moisturizer; to keep tight control over BS for optimum health; on different treatments for nail fungus (oral meds, topical meds and Tea Tree oil); to return to [facility] for foot and nail care every 2-3 months. Pt verbalized understanding of all instruction.       Thank you for the referral and the opportunity to treat your patient.      Clinician Signature: _____________________________ Date:_______________

## 2019-08-13 NOTE — PATIENT INSTRUCTIONS
Should you experience any significant changes around your scab sites such as infection (redness, swelling, localized heat, increased pain, fever >101 F, chills) or have any questions regarding your home care instructions, contact your primary care physician, [facility] or go the hospital emergency room. You are discharged at this time from [facility].  Follow-up for foot & nail care in 3 months with podiatrist, or [facility] at [facility] or [facility].  Follow-up with [facility].  Follow-up with [facility], Dr. Schneider as needed.  Instructed pt on s/s infection - chills, fever, malaise, NV, increased redness/swelling/pain/exudate - and to go to ER/Urgent Care; on rationale for debridement of calluses; to shake out shoes before donning; to wear protective footwear at all times, including when at home; to examine feet daily for any new redness/wounds and report to PCP; to moisturize feet daily except between toes with water-based moisturizer; to keep tight control over BS for optimum health; on different treatments for nail fungus (oral meds, topical meds and Tea Tree oil); to return to [facility] for foot and nail care every 2-3 months. Pt verbalized understanding of all instruction.

## 2019-08-13 NOTE — PROCEDURES
CSWD with scalpel & divya board to right plantar heel to remove callous revealing intact skin & no wound beneath. JW Pearson in to see left bka; patient plans to paint betadine to scab sites & have them flake off. Patient will call [facility]UNC[facility] for follow-up as needed & plans to see [facility] tomorrow.

## 2021-10-16 ENCOUNTER — HOSPITAL ENCOUNTER (EMERGENCY)
Facility: MEDICAL CENTER | Age: 66
End: 2021-10-16
Payer: COMMERCIAL

## 2021-10-16 VITALS
OXYGEN SATURATION: 93 % | WEIGHT: 224 LBS | TEMPERATURE: 99.2 F | HEART RATE: 91 BPM | SYSTOLIC BLOOD PRESSURE: 142 MMHG | RESPIRATION RATE: 17 BRPM | HEIGHT: 71 IN | DIASTOLIC BLOOD PRESSURE: 78 MMHG | BODY MASS INDEX: 31.36 KG/M2

## 2021-10-16 PROCEDURE — 302449 STATCHG TRIAGE ONLY (STATISTIC)

## 2021-10-16 NOTE — ED TRIAGE NOTES
Pt to triage in .  Chief Complaint   Patient presents with   • Foot Pain     rt heel   • Skin Lesion     rt heel     Symptoms x1mo. Hx of left BKA r/t foot wound.

## 2021-12-27 ENCOUNTER — TELEPHONE (OUTPATIENT)
Dept: CARDIOLOGY | Facility: MEDICAL CENTER | Age: 66
End: 2021-12-27

## 2021-12-27 NOTE — TELEPHONE ENCOUNTER
Called from [facility] pt had +trop of 1.1 and then downtrending troponin he had presented with angioedema was olmesartan and lisinopril with recent prescriptions filled by the pharmacy apparently.    On review here he has a history of cardiomyopathy with an EF of 40% in 2019 and has had positive troponins on multiple occasions unclear if he follows with cardiology    For his elevated troponin is likely just demand ischemia with his underlying cardiomyopathy he can stop anticoagulation is reasonable to take aspirin and Plavix given his high risk for coronary events    His blood pressure agents will be switched either amlodipine or carvedilol  He would benefit from urgent echocardiogram and cardiology evaluation but does not need transfer for either of those inpatient    It is my pleasure to participate in the care of Mr. Pollack.  Please do not hesitate to contact me with questions or concerns.    Magdaleno Magallanes MD PhD [facility]  Cardiologist [facility]    Please note that this dictation was created using voice recognition software. There may be errors I did not discover before finalizing the note.     12/27/2021  9:27 AM

## 2022-03-11 NOTE — THERAPY
Physical Therapy   Daily Treatment     Patient Name: Travon Pollack  Age:  63 y.o., Sex:  male  Medical Record #: [id number]  Today's Date: 7/15/2019     Precautions  Precautions: (P) Non Weight Bearing Left Lower Extremity, Immobilizer Left Lower Extremity, Fall Risk, Other (See Comments)  Comments: (P) IPOP L residual limb; chronic L shoulder weakness/pain    Subjective    Pt received in wc in room, ready for therapy session.     Objective       07/15/19 1431   Precautions   Precautions Non Weight Bearing Left Lower Extremity;Immobilizer Left Lower Extremity;Fall Risk;Other (See Comments)   Comments IPOP L residual limb; chronic L shoulder weakness/pain   Cognition    Level of Consciousness Alert   Bed Mobility    Supine to Sit Supervised   Sit to Supine Supervised   Sit to Stand Contact Guard Assist  (wc<>knee scooter)   Interdisciplinary Plan of Care Collaboration   IDT Collaboration with  Physician   Patient Position at End of Therapy Seated;Other (Comments)  (pt self-propelled manual wc to room using B UEs)   Collaboration Comments Dr. Yanira spear with pt trying the knee scooter   PT Total Time Spent   PT Individual Total Time Spent (Mins) 30   PT Charge Group   PT Gait Training 1   PT Therapeutic Activities 1       FIM Bed/Chair/Wheelchair Transfers Score: 4 - Minimal Assistance  Bed/Chair/Wheelchair Transfers Description:   (squat-pivot with reach to R and L CGA, supine<>sit SPV on std bed)    PT provided explanation and visual demo of knee scooter propulsion and brake management.    Assessment    Pt only took 3 steps during ambulation with knee scooter as he reports he isn't ready for propelling further d/t R LE weakness and mild fear of falling.    Plan    Progress gait with FWW vs knee scooter, functional transfers (squat-pivot- vary surface heights), R LE strengthening and standing tolerance, further edu in BKA positioning/desensitization/ROM       No

## 2024-05-09 NOTE — CARE PLAN
Problem: Safety  Goal: Will remain free from falls  Outcome: PROGRESSING AS EXPECTED  Reviewed fall and safety precautions with patient and reminded patient to call for assistance before getting out of bed. Patient verbalized understanding and has not attempted self transfer this shift.       Alert and oriented to person, place and time

## 2024-06-17 NOTE — THERAPY
"Occupational Therapy  Daily Treatment     Patient Name: Travon Pollack  Age:  63 y.o., Sex:  male  Medical Record #: [id number]  Today's Date: 7/21/2019     Precautions  Precautions: Non Weight Bearing Left Lower Extremity, Immobilizer Left Lower Extremity, Fall Risk, Other (See Comments)  Comments: IPOP L residual limb; chronic L shoulder weakness/pain    Safety   ADL Safety : Requires Supervision for Safety  Bathroom Safety: Requires Supervision for Safety  Comments: Close SBA during functional transfers/bathroom ADLs     Subjective    \"I am so tired today\"     Objective    W/c mobility - supervision, back gym<>room with increase time     07/21/19 1331   Sitting Upper Body Exercises   Tricep Press 3 sets of 15;Bilateral;Weight (See Comments for lbs)  (rickshaw 30#)   Upper Extremity Bike Level 1 Resistance  (hydrocycle 14 min, 0 RB, 2.3 km)   Interdisciplinary Plan of Care Collaboration   Patient Position at End of Therapy Seated;Call Light within Reach;Tray Table within Reach   OT Total Time Spent   OT Individual Total Time Spent (Mins) 60   OT Charge Group   OT Therapeutic Exercise  4       Assessment    Pt completed UB therex to the best of their abilities with no complaints of pain    Plan    Cont overall strength/endurance and standing balance to improve ADL's and functional mobility    " Patient moved up to [facility] but would like to stay with Naz Mckeon. Patient requested 8.28.2024 since he would be down already but [facility] Mcke[facility] schedule is not yet available past 8.21.2024. Please advise if okay to do virtual visit instead of coming down for appointment.

## 2025-05-15 NOTE — RESPIRATORY CARE
COPD EDUCATION by COPD CLINICAL EDUCATOR  1/13/2018 at 6:14 AM by Noy Guevara     Patient reviewed by COPD education team. Patient does not qualify for COPD program at this time   66.7

## 2025-07-01 NOTE — DISCHARGE PLANNING
Insurance has authorized inpatient rehab. Will follow for admission Wednesday. VM update to EDWARD Rutherford.   Yes

## (undated) DEVICE — HEAD HOLDER JUNIOR/ADULT

## (undated) DEVICE — KIT EVACUATER 3 SPRING PVC LF 1/8 DRAIN SIZE (10EA/CA)"

## (undated) DEVICE — PACK LOWER EXTREMITY - (2/CA)

## (undated) DEVICE — TUBING CLEARLINK DUO-VENT - C-FLO (48EA/CA)

## (undated) DEVICE — SPLINT PLASTER 5 IN X 30 IN - (50EA/BX 6BX/CA)

## (undated) DEVICE — DRAPE IOBAN II INCISE 23X17 - (10EA/BX 4BX/CA)

## (undated) DEVICE — TOURNIQUET CUFF 34 X 4 ONE PORT DISP - STERILE (10/BX)

## (undated) DEVICE — GLOVE BIOGEL PI INDICATOR SZ 7.0 SURGICAL PF LF - (50/BX 4BX/CA)

## (undated) DEVICE — SET EXTENSION WITH 2 PORTS (48EA/CA) ***PART #2C8610 IS A SUBSTITUTE*****

## (undated) DEVICE — GLOVE BIOGEL PI INDICATOR SZ 8.0 SURGICAL PF LF -(50/BX 4BX/CA)

## (undated) DEVICE — DRESSING KIT V.A.C. SENSA T.R.A.C. SMALL (10EA/CA)

## (undated) DEVICE — LACTATED RINGERS INJ 1000 ML - (14EA/CA 60CA/PF)

## (undated) DEVICE — BLADE SURGICAL #10 - (50/BX)

## (undated) DEVICE — IMMOBILIZER KNEE 20 INCH - (1/EA)

## (undated) DEVICE — SUTURE GENERAL

## (undated) DEVICE — ELECTRODE 850 FOAM ADHESIVE - HYDROGEL RADIOTRNSPRNT (50/PK)

## (undated) DEVICE — SODIUM CHL IRRIGATION 0.9% 1000ML (12EA/CA)

## (undated) DEVICE — MASK ANESTHESIA ADULT  - (100/CA)

## (undated) DEVICE — WATER IRRIG. STER. 1500 ML - (9/CA)

## (undated) DEVICE — SLEEVE, VASO, THIGH, MED

## (undated) DEVICE — GLOVE BIOGEL INDICATOR SZ 6.5 SURGICAL PF LTX - (50PR/BX 4BX/CA)

## (undated) DEVICE — VAC CANISTER W/GEL 500ML - FITS NEW MACHINES (10EA/CA)

## (undated) DEVICE — GOWN WARMING STANDARD FLEX - (30/CA)

## (undated) DEVICE — KIT ROOM DECONTAMINATION

## (undated) DEVICE — SET LEADWIRE 5 LEAD BEDSIDE DISPOSABLE ECG (1SET OF 5/EA)

## (undated) DEVICE — SUTURE 3-0 ETHILON PS-1 (36PK/BX)

## (undated) DEVICE — SUCTION INSTRUMENT YANKAUER BULBOUS TIP W/O VENT (50EA/CA)

## (undated) DEVICE — PADDING CAST 6 IN STERILE - 6 X 4 YDS (24/CA)

## (undated) DEVICE — SUTURE 0 VICRYL PLUS CT-1 - 8 X 18 INCH (12/BX)

## (undated) DEVICE — Device

## (undated) DEVICE — DRESSING ABDOMINAL PAD STERILE 8 X 10" (360EA/CA)"

## (undated) DEVICE — NEPTUNE 4 PORT MANIFOLD - (20/PK)

## (undated) DEVICE — BLOCK

## (undated) DEVICE — GLOVE BIOGEL INDICATOR SZ 8.5 SURGICAL PF LTX - (50/BX 4BX/CA)

## (undated) DEVICE — CANISTER SUCTION 3000ML MECHANICAL FILTER AUTO SHUTOFF MEDI-VAC NONSTERILE LF DISP  (40EA/CA)

## (undated) DEVICE — MASK, LARYNGEAL AIRWAY #4

## (undated) DEVICE — BLADE SAGITTAL SAW DUAL CUT 25.0 X 90.0 X 1.27MM (1/EA)

## (undated) DEVICE — GLOVE BIOGEL INDICATOR SZ 8 SURGICAL PF LTX - (50/BX 4BX/CA)

## (undated) DEVICE — STOCKINET BIAS 6 IN STERILE - (20/CA)

## (undated) DEVICE — CONTAINER, SPECIMEN, STERILE

## (undated) DEVICE — KIT ANESTHESIA W/CIRCUIT & 3/LT BAG W/FILTER (20EA/CA)

## (undated) DEVICE — GOWN SURGEONS X-LARGE - DISP. (30/CA)

## (undated) DEVICE — CHLORAPREP 26 ML APPLICATOR - ORANGE TINT(25/CA)

## (undated) DEVICE — SET IRRIGATION CYSTOSCOPY TUBE L80 IN (20EA/CA)

## (undated) DEVICE — SUTURE 3-0 VICRYL PLUS - 12 X 18 INCH (12/BX)

## (undated) DEVICE — SUTURE 2-0 SILK SH 30 IN C/R (12PK/BX)

## (undated) DEVICE — GLOVE BIOGEL ECLIPSE PF LATEX SIZE 8.0  (50PR/BX)

## (undated) DEVICE — PROTECTOR ULNA NERVE - (36PR/CA)

## (undated) DEVICE — PAD LAP STERILE 18 X 18 - (5/PK 40PK/CA)

## (undated) DEVICE — SUTURE 2-0 VICRYL PLUS CT-1 - 8 X 18 INCH(12/BX)

## (undated) DEVICE — DISPOSABLE WOUND VAC PICO 10 X 20 CM - WOUND CARE (3/CA)

## (undated) DEVICE — SENSOR SPO2 NEO LNCS ADHESIVE (20/BX) SEE USER NOTES

## (undated) DEVICE — GLOVE BIOGEL ECLIPSE  PF LATEX SIZE 6.5 (50PR/BX)

## (undated) DEVICE — ELECTRODE DUAL RETURN W/ CORD - (50/PK)